# Patient Record
Sex: MALE | Employment: FULL TIME | URBAN - METROPOLITAN AREA
[De-identification: names, ages, dates, MRNs, and addresses within clinical notes are randomized per-mention and may not be internally consistent; named-entity substitution may affect disease eponyms.]

---

## 2017-01-04 ENCOUNTER — MEDICAL CORRESPONDENCE (OUTPATIENT)
Dept: TRANSPLANT | Facility: CLINIC | Age: 52
End: 2017-01-04

## 2017-01-08 DIAGNOSIS — I10 BENIGN ESSENTIAL HYPERTENSION: Primary | ICD-10-CM

## 2017-01-09 RX ORDER — AMLODIPINE BESYLATE 10 MG/1
TABLET ORAL
Qty: 30 TABLET | Refills: 11 | Status: SHIPPED | OUTPATIENT
Start: 2017-01-09 | End: 2017-05-26

## 2017-01-09 NOTE — TELEPHONE ENCOUNTER
Last Office Visit with Nephrologist:  11/15/16.  Medication refilled per Nephrology Clinic protocol.     Jen Alan RN

## 2017-01-25 ENCOUNTER — TELEPHONE (OUTPATIENT)
Dept: NEPHROLOGY | Facility: CLINIC | Age: 52
End: 2017-01-25

## 2017-01-25 NOTE — TELEPHONE ENCOUNTER
Left detailed voicemail reminding patient to have labs drawn. Per 12/12/16 encounter:   Let start him on calcitriol 0.25 mcg daily          Follow his pth and ca,phos next step will be either adding binder, sensipar or both               Jen Alan RN

## 2017-02-01 DIAGNOSIS — Z94.0 KIDNEY REPLACED BY TRANSPLANT: ICD-10-CM

## 2017-02-01 DIAGNOSIS — D84.9 IMMUNOSUPPRESSION (H): ICD-10-CM

## 2017-02-01 DIAGNOSIS — Z48.298 CARE AFTER ORGAN TRANSPLANT: ICD-10-CM

## 2017-02-01 DIAGNOSIS — Z94.0 KIDNEY TRANSPLANTED: ICD-10-CM

## 2017-02-01 LAB
PROT UR-MCNC: 0.92 G/L
PROT/CREAT 24H UR: 1.06 G/G CR (ref 0–0.2)
PTH-INTACT SERPL-MCNC: 719 PG/ML (ref 12–72)

## 2017-02-01 PROCEDURE — 80180 DRUG SCRN QUAN MYCOPHENOLATE: CPT | Performed by: FAMILY MEDICINE

## 2017-02-01 PROCEDURE — 82306 VITAMIN D 25 HYDROXY: CPT | Performed by: FAMILY MEDICINE

## 2017-02-01 PROCEDURE — 80069 RENAL FUNCTION PANEL: CPT | Performed by: FAMILY MEDICINE

## 2017-02-01 PROCEDURE — 83970 ASSAY OF PARATHORMONE: CPT | Performed by: FAMILY MEDICINE

## 2017-02-01 PROCEDURE — 36415 COLL VENOUS BLD VENIPUNCTURE: CPT | Performed by: FAMILY MEDICINE

## 2017-02-01 PROCEDURE — 80158 DRUG ASSAY CYCLOSPORINE: CPT | Performed by: FAMILY MEDICINE

## 2017-02-01 PROCEDURE — 84156 ASSAY OF PROTEIN URINE: CPT | Performed by: FAMILY MEDICINE

## 2017-02-02 LAB
ALBUMIN SERPL-MCNC: 4.3 G/DL (ref 3.4–5)
ANION GAP SERPL CALCULATED.3IONS-SCNC: 13 MMOL/L (ref 3–14)
BUN SERPL-MCNC: 46 MG/DL (ref 7–30)
CALCIUM SERPL-MCNC: 9.1 MG/DL (ref 8.5–10.1)
CHLORIDE SERPL-SCNC: 107 MMOL/L (ref 94–109)
CO2 SERPL-SCNC: 17 MMOL/L (ref 20–32)
CREAT SERPL-MCNC: 4.09 MG/DL (ref 0.66–1.25)
CYCLOSPORINE BLD LC/MS/MS-MCNC: 137 UG/L (ref 50–400)
GFR SERPL CREATININE-BSD FRML MDRD: 15 ML/MIN/1.7M2
GLUCOSE SERPL-MCNC: 116 MG/DL (ref 70–99)
PHOSPHATE SERPL-MCNC: 4 MG/DL (ref 2.5–4.5)
POTASSIUM SERPL-SCNC: 5.2 MMOL/L (ref 3.4–5.3)
SODIUM SERPL-SCNC: 137 MMOL/L (ref 133–144)
TME LAST DOSE: 200 H

## 2017-02-03 LAB
DEPRECATED CALCIDIOL+CALCIFEROL SERPL-MC: 32 UG/L (ref 20–75)
MYCOPHENOLATE SERPL LC/MS/MS-MCNC: 2.59 MG/L (ref 1–3.5)
MYCOPHENOLATE-G SERPL LC/MS/MS-MCNC: 306.5 MG/L (ref 30–95)
TME LAST DOSE: 200 H
VITAMIN D2 SERPL-MCNC: 15 UG/L
VITAMIN D3 SERPL-MCNC: 17 UG/L

## 2017-02-06 DIAGNOSIS — Z94.0 KIDNEY REPLACED BY TRANSPLANT: Primary | ICD-10-CM

## 2017-02-06 NOTE — TELEPHONE ENCOUNTER
Issue CYCLOSPORINE  Level  137   Notes Recorded by Deyvi Dick MD on 2/2/2017 at 6:40 PM  Can we aim at  on the CsA?  Can you verify if he is on sodium bicarb and if not he can start 650 mg po bid          Plan   Call Amadou Lewis confirm current dose of CYCLOSPORINE  100 mg twice per day   Confirm 12 hour trough level   If the above is accurate   Lower CYCLOSPORINE  75 mg twice per day     2nd issue    Review the need for bicarb tablets due to low serum bicarb     Repeat transplant  Labs in 2 weeks after dose change

## 2017-02-08 DIAGNOSIS — Z94.0 KIDNEY REPLACED BY TRANSPLANT: Primary | ICD-10-CM

## 2017-02-08 RX ORDER — CYCLOSPORINE 25 MG/1
75 CAPSULE, LIQUID FILLED ORAL 2 TIMES DAILY
Qty: 180 CAPSULE | Refills: 3 | Status: SHIPPED | OUTPATIENT
Start: 2017-02-08 | End: 2017-06-23

## 2017-02-08 RX ORDER — SODIUM BICARBONATE 650 MG/1
1300 TABLET ORAL 2 TIMES DAILY
Qty: 120 TABLET | Refills: 3 | Status: SHIPPED | OUTPATIENT
Start: 2017-02-08 | End: 2017-05-25

## 2017-02-08 RX ORDER — CYCLOSPORINE 100 MG/1
100 CAPSULE, LIQUID FILLED ORAL 2 TIMES DAILY
Qty: 60 CAPSULE | Refills: 3 | Status: CANCELLED | OUTPATIENT
Start: 2017-02-08

## 2017-02-08 NOTE — TELEPHONE ENCOUNTER
Call placed to patient: Patient states a good twelve hour trough and verbalize understanding that he will begin taking sodium bicarb 650 mg twice a day and decrease his CSA dose to 75 mg twice per day and repeat lab work in 2 weeks. Rx sent

## 2017-02-14 ENCOUNTER — OFFICE VISIT (OUTPATIENT)
Dept: NEPHROLOGY | Facility: CLINIC | Age: 52
End: 2017-02-14
Attending: INTERNAL MEDICINE
Payer: COMMERCIAL

## 2017-02-14 VITALS
WEIGHT: 170.8 LBS | HEART RATE: 89 BPM | SYSTOLIC BLOOD PRESSURE: 213 MMHG | HEIGHT: 67 IN | DIASTOLIC BLOOD PRESSURE: 114 MMHG | BODY MASS INDEX: 26.81 KG/M2 | RESPIRATION RATE: 16 BRPM | TEMPERATURE: 98.3 F

## 2017-02-14 DIAGNOSIS — I15.1 HYPERTENSION SECONDARY TO OTHER RENAL DISORDERS: Primary | ICD-10-CM

## 2017-02-14 DIAGNOSIS — L03.119 CELLULITIS AND ABSCESS OF LEG, EXCEPT FOOT: ICD-10-CM

## 2017-02-14 DIAGNOSIS — L02.419 CELLULITIS AND ABSCESS OF LEG, EXCEPT FOOT: ICD-10-CM

## 2017-02-14 PROCEDURE — 99213 OFFICE O/P EST LOW 20 MIN: CPT | Mod: ZF

## 2017-02-14 RX ORDER — CLONIDINE HYDROCHLORIDE 0.1 MG/1
0.1 TABLET ORAL 2 TIMES DAILY PRN
Qty: 60 TABLET | Refills: 1 | Status: ON HOLD | OUTPATIENT
Start: 2017-02-14 | End: 2018-11-20

## 2017-02-14 RX ORDER — CARVEDILOL 6.25 MG/1
12.5 TABLET ORAL 2 TIMES DAILY WITH MEALS
Qty: 180 TABLET | Refills: 3 | Status: SHIPPED | OUTPATIENT
Start: 2017-02-14 | End: 2017-07-11

## 2017-02-14 RX ORDER — AMOXICILLIN AND CLAVULANATE POTASSIUM 500; 125 MG/1; MG/1
1 TABLET, FILM COATED ORAL 2 TIMES DAILY
Qty: 20 TABLET | Refills: 0 | Status: SHIPPED | OUTPATIENT
Start: 2017-02-14 | End: 2017-05-25 | Stop reason: DRUGHIGH

## 2017-02-14 RX ORDER — DAPSONE 25 MG/1
50 TABLET ORAL DAILY
COMMUNITY
Start: 2015-09-14 | End: 2017-05-25

## 2017-02-14 ASSESSMENT — PAIN SCALES - GENERAL: PAINLEVEL: NO PAIN (0)

## 2017-02-14 NOTE — NURSING NOTE
"Chief Complaint   Patient presents with     RECHECK     Kidney follow up       Initial BP (!) 213/114  Pulse 89  Temp 98.3  F (36.8  C) (Oral)  Resp 16  Ht 1.702 m (5' 7\")  Wt 77.5 kg (170 lb 12.8 oz)  BMI 26.75 kg/m2 Estimated body mass index is 26.75 kg/(m^2) as calculated from the following:    Height as of this encounter: 1.702 m (5' 7\").    Weight as of this encounter: 77.5 kg (170 lb 12.8 oz).  Medication Reconciliation: complete    "

## 2017-02-14 NOTE — MR AVS SNAPSHOT
"              After Visit Summary   2/14/2017    Amadou Lewis    MRN: [id number]           Patient Information     Date Of Birth          1965        Visit Information        Provider Department      2/14/2017 2:00 PM Deyvi Dick MD [facility] Nephrology        Today's Diagnoses     Hypertension secondary to other renal disorders    -  1    Cellulitis and abscess of leg, except foot           Follow-ups after your visit        Who to contact     If you have questions or need follow up information about today's clinic visit or your schedule please contact [facility] NEPHROLOGY directly at [phone].  Normal or non-critical lab and imaging results will be communicated to you by Capzleshart, letter or phone within 4 business days after the clinic has received the results. If you do not hear from us within 7 days, please contact the clinic through Samanta Shoest or phone. If you have a critical or abnormal lab result, we will notify you by phone as soon as possible.  Submit refill requests through IMRSV or call your pharmacy and they will forward the refill request to us. Please allow 3 business days for your refill to be completed.          Additional Information About Your Visit        MyChart Information     IMRSV gives you secure access to your electronic health record. If you see a primary care provider, you can also send messages to your care team and make appointments. If you have questions, please call your primary care clinic.  If you do not have a primary care provider, please call [phone] and they will assist you.        Care EveryWhere ID     This is your Care EveryWhere ID. This could be used by other organizations to access your [facility] medical records  [id number]        Your Vitals Were     Pulse Temperature Respirations Height BMI (Body Mass Index)       89 98.3  F (36.8  C) (Oral) 16 1.702 m (5' 7\") 26.75 kg/m2        Blood Pressure from Last 3 Encounters:   02/14/17 (!) 213/114   11/15/16 " 164/84   09/28/16 139/78    Weight from Last 3 Encounters:   02/14/17 77.5 kg (170 lb 12.8 oz)   11/15/16 78 kg (172 lb)   09/28/16 77.1 kg (170 lb)              Today, you had the following     No orders found for display         Today's Medication Changes          These changes are accurate as of: 2/14/17  3:36 PM.  If you have any questions, ask your nurse or doctor.               Start taking these medicines.        Dose/Directions    amoxicillin-clavulanate 500-125 MG per tablet   Commonly known as:  AUGMENTIN   Used for:  Cellulitis and abscess of leg, except foot   Started by:  Deyvi Dick MD        Dose:  1 tablet   Take 1 tablet by mouth 2 times daily   Quantity:  20 tablet   Refills:  0       carvedilol 6.25 MG tablet   Commonly known as:  COREG   Used for:  Hypertension secondary to other renal disorders   Started by:  Deyvi Dick MD        Dose:  12.5 mg   Take 2 tablets (12.5 mg) by mouth 2 times daily (with meals)   Quantity:  180 tablet   Refills:  3       cloNIDine 0.1 MG tablet   Commonly known as:  CATAPRES   Used for:  Hypertension secondary to other renal disorders   Started by:  Deyvi Dick MD        Dose:  0.1 mg   Take 1 tablet (0.1 mg) by mouth 2 times daily as needed For SBP > 170   Quantity:  60 tablet   Refills:  1         These medicines have changed or have updated prescriptions.        Dose/Directions    cycloSPORINE modified 25 MG capsule   Commonly known as:  GENERIC EQUIVALENT   This may have changed:  Another medication with the same name was removed. Continue taking this medication, and follow the directions you see here.   Used for:  Kidney replaced by transplant   Changed by:  Anu Hanson RN        Dose:  75 mg   Take 3 capsules (75 mg) by mouth 2 times daily Total dose 75 mg twice a day   Quantity:  180 capsule   Refills:  3       insulin glargine 100 UNIT/ML injection   Commonly known as:  LANTUS   This may have changed:    - how much to take  - when to  take this   Used for:  Type 2 diabetes mellitus with diabetic chronic kidney disease (H)        Dose:  53 Units   Inject 53 Units Subcutaneous every 24 hours   Refills:  0            Where to get your medicines      These medications were sent to [id number] - [place], [place] - [address] AT [facility] [address] [place] [address]  [address], [place] [address]    Hours:  24-hours Phone:  [phone]     amoxicillin-clavulanate 500-125 MG per tablet    carvedilol 6.25 MG tablet    cloNIDine 0.1 MG tablet                Primary Care Provider Office Phone # Fax #    Masoud Valentin MD, [phone]       [address] [place] [address]        Thank you!     Thank you for choosing [facility] NEPHROLOGY  for your care. Our goal is always to provide you with excellent care. Hearing back from our patients is one way we can continue to improve our services. Please take a few minutes to complete the written survey that you may receive in the mail after your visit with us. Thank you!             Your Updated Medication List - Protect others around you: Learn how to safely use, store and throw away your medicines at www.disposemymeds.org.          This list is accurate as of: 2/14/17  3:36 PM.  Always use your most recent med list.                   Brand Name Dispense Instructions for use    amLODIPine 10 MG tablet    NORVASC    30 tablet    TAKE 1 TABLET BY MOUTH DAILY       amoxicillin-clavulanate 500-125 MG per tablet    AUGMENTIN    20 tablet    Take 1 tablet by mouth 2 times daily       blood glucose monitoring test strip    no brand specified    1 Box    Use to test blood sugar 4 times daily or as directed.       calcitRIOL 0.25 MCG capsule    ROCALTROL    90 capsule    Take 1 capsule (0.25 mcg) by mouth daily for 28 days       carvedilol 6.25 MG tablet    COREG    180 tablet    Take 2 tablets (12.5 mg) by mouth 2 times daily (with meals)        cloNIDine 0.1 MG tablet    CATAPRES    60 tablet    Take 1 tablet (0.1 mg) by mouth 2 times daily as needed For SBP > 170       cycloSPORINE modified 25 MG capsule    GENERIC EQUIVALENT    180 capsule    Take 3 capsules (75 mg) by mouth 2 times daily Total dose 75 mg twice a day       D3-50 46149 UNITS capsule   Generic drug:  cholecalciferol      Take 50,000 Units by mouth once a week       dapsone 25 MG tablet      Take 50 mg by mouth daily       HYDROcodone-acetaminophen 5-325 MG per tablet    NORCO     Take 2 tablets by mouth every 6 hours as needed for moderate to severe pain       insulin aspart 100 UNIT/ML injection    NovoLOG PEN     Inject 1-16 Units Subcutaneous At Bedtime Correction Scale - VERY HIGH INSULIN RESISTANCE DOSING    Do Not give Bedtime Correction Insulin if BG < 200.  For  - 209 give 1 units.  For  - 219 give 2 units.  For  - 229 give 3 units.  For  - 239 give 4 units.  For  - 249 give 5 units.  For  - 259 give 6 units.  For  - 269 give 7 units.  For  - 279 give 8 units.  For  - 289 give 9 units.       insulin glargine 100 UNIT/ML injection    LANTUS     Inject 53 Units Subcutaneous every 24 hours       mycophenolic acid EC tablet     240 tablet    Take 3 tablets twice per day 540 mg in morning and evening AT noon take 2 tabs 360 mg       OMEPRAZOLE PO      Take 20 mg by mouth as needed (Nausea)       predniSONE 5 MG tablet    DELTASONE    90 tablet    Take 1 tablet (5 mg) by mouth daily       sodium bicarbonate 650 MG tablet     120 tablet    Take 2 tablets (1,300 mg) by mouth 2 times daily

## 2017-02-14 NOTE — LETTER
2/14/2017       RE: Amadou Lewis  [address]  [facility] [address]     Dear Colleague,    Thank you for referring your patient, Amadou Lewis, to the [facility] NEPHROLOGY at [facility]. Please see a copy of my visit note below.    ASSESSMENT AND RECOMMENDATIONS:      1. End-stage renal disease due to polycystic kidneys and diabetic nephropathy status post living donor kidney transplant on 10/10/2013. Complicated by repeated bouts of rejection   2. Immunosuppression. currently on Csa and MPA  3. Hypertension: Not well controlled recently due to his ongoing grief related to the loss of his son, adjusted the medications by raising his coreg and adding clonidine as needed   4. He had a previous history of coronary artery disease and has previous stents. Follow up with cardiology.  5. Diabetic foot process with great toe nail loss will empirically cover with Augmentin   I referred Mr. Lewis for re-listing.       REASON FOR VISIT:      The patient is here for followup after recent hospital admission for rejection.      HISTORY OF PRESENT ILLNESS:      Amadou Lewis is a 49 year old gentleman with ESRD due to PKD +/- diabetic nephropathy, he started on dialysis on 5/2010. He is S/p LDKT 10/10/13 with immediate graft function. His initial baseline creatinine 0.8-0.9 mg/dl. Other PMH is significant for CAD, S/P drug-eluting stent 2010, 2011, last stress test June 2013 ([facility]) was negative, EF 45%.  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 He ishes to change his everolimus to Csa. He had some hemoptysis and had upper GI endoscopy on 3/23/16 at [facility] which showed one superficial esophageal ulcer with no stigmata of bleeding and pathology was negative for malignancy, HSV and CMV.     Since his last visit,  he lost his son to overdosing. He is coping fair. Had a foot trauma and lost his great toe nail.            Transplant Hx:       Tx: LDKT  Date: 10/10/13       Present Maintenance IS: Cyclosporine and Mycophenolate mofetil       Baseline Creatinine: 3 to 3.5 mg/dL       Recent DSA: No         Biopsy: Yes, Jan 2016 : Plasma cell rich Acute cellular rejection.    ROS:    A comprehensive review of systems was obtained and negative, except as noted in the HPI or PMH.    Active Medical Problems:  Patient Active Problem List   Diagnosis     Type II diabetes mellitus with renal manifestations (H)     Diabetes mellitus with background retinopathy (H)     Polycystic kidney     NONSPECIFIC MEDICAL HISTORY     Coronary artery disease     Retinopathy     Hyperlipidemia LDL goal <70     Chronic systolic heart failure (H)     Premature ventricular contractions (PVCs) (VPCs)     Kidney replaced by transplant     Immunosuppressed status (H)     Kidney transplant rejection     Hypomagnesemia     Hyperglycemia     Hypertension     Anemia in chronic renal disease     Care after organ transplant     Personal Hx:  Social History     Social History     Marital status: Single     Spouse name: N/A     Number of children: N/A     Years of education: N/A     Occupational History     Not on file.     Social History Main Topics     Smoking status: Never Smoker     Smokeless tobacco: Never Used     Alcohol use No     Drug use: No     Sexual activity: Yes     Partners: Female     Other Topics Concern     Not on file     Social History Narrative       Allergies:  Allergies   Allergen Reactions     No Known Allergies        Medications:  Current Outpatient Prescriptions   Medication Sig Dispense Refill     dapsone 25 MG tablet Take 50 mg by mouth daily       carvedilol (COREG) 6.25 MG tablet Take 2 tablets (12.5 mg) by mouth 2 times daily (with meals) 180 tablet 3     cloNIDine (CATAPRES) 0.1 MG tablet Take 1 tablet (0.1 mg) by mouth 2 times daily  as needed For SBP > 170 60 tablet 1     amoxicillin-clavulanate (AUGMENTIN) 500-125 MG per tablet Take 1 tablet by mouth 2 times daily 20 tablet 0     cycloSPORINE modified (GENERIC EQUIVALENT) 25 MG capsule Take 3 capsules (75 mg) by mouth 2 times daily Total dose 75 mg twice a day 180 capsule 3     sodium bicarbonate 650 MG tablet Take 2 tablets (1,300 mg) by mouth 2 times daily 120 tablet 3     amLODIPine (NORVASC) 10 MG tablet TAKE 1 TABLET BY MOUTH DAILY 30 tablet 11     calcitRIOL (ROCALTROL) 0.25 MCG capsule Take 1 capsule (0.25 mcg) by mouth daily for 28 days 90 capsule 3     predniSONE (DELTASONE) 5 MG tablet Take 1 tablet (5 mg) by mouth daily 90 tablet 3     MYFORTIC 180 MG PO EC TABLET Take 3 tablets twice per day 540 mg in morning and evening AT noon take 2 tabs 360 mg 240 tablet 3     insulin aspart (NOVOLOG PEN) 100 UNIT/ML soln Inject 1-16 Units Subcutaneous At Bedtime Correction Scale - VERY HIGH INSULIN RESISTANCE DOSING     Do Not give Bedtime Correction Insulin if BG < 200.   For  - 209 give 1 units.   For  - 219 give 2 units.   For  - 229 give 3 units.   For  - 239 give 4 units.   For  - 249 give 5 units.   For  - 259 give 6 units.   For  - 269 give 7 units.   For  - 279 give 8 units.   For  - 289 give 9 units.       insulin glargine (LANTUS) 100 UNIT/ML PEN Inject 53 Units Subcutaneous every 24 hours (Patient taking differently: Inject 38 Units Subcutaneous At Bedtime )       blood glucose monitoring (NO BRAND SPECIFIED) test strip Use to test blood sugar 4 times daily or as directed. 1 Box prn     HYDROcodone-acetaminophen (NORCO) 5-325 MG per tablet Take 2 tablets by mouth every 6 hours as needed for moderate to severe pain       OMEPRAZOLE PO Take 20 mg by mouth as needed (Nausea)       cholecalciferol (D3-50) 22097 UNITS capsule Take 50,000 Units by mouth once a week         Vitals:    BP (!) 213/114  Pulse 89  Temp 98.3  F (36.8  C)  "(Oral)  Resp 16  Ht 1.702 m (5' 7\")  Wt 77.5 kg (170 lb 12.8 oz)  BMI 26.75 kg/m2    Repeat BP was 180/90    Exam:   GENERAL APPEARANCE: alert and no distress  HENT: mouth without ulcers or lesions  LYMPHATICS: no cervical nodes  RESP: lungs clear to auscultation   CV: regular rhythm, normal rate, no rub, no murmur  ABDOMEN:  soft, nontender, +BS  EDEMA: no LE edema bilaterally  SKIN: no rash      Results:  Reviewed       Sincerely,    Deyvi Dick MD  "

## 2017-02-20 NOTE — PROGRESS NOTES
ASSESSMENT AND RECOMMENDATIONS:      1. End-stage renal disease due to polycystic kidneys and diabetic nephropathy status post living donor kidney transplant on 10/10/2013. Complicated by repeated bouts of rejection   2. Immunosuppression. currently on Csa and MPA  3. Hypertension: Not well controlled recently due to his ongoing grief related to the loss of his son, adjusted the medications by raising his coreg and adding clonidine as needed   4. He had a previous history of coronary artery disease and has previous stents. Follow up with cardiology.  5. Diabetic foot process with great toe nail loss will empirically cover with Augmentin   I referred Mr. Lewis for re-listing.       REASON FOR VISIT:      The patient is here for followup after recent hospital admission for rejection.      HISTORY OF PRESENT ILLNESS:      Amadou Lewis is a 49 year old gentleman with ESRD due to PKD +/- diabetic nephropathy, he started on dialysis on 5/2010. He is S/p LDKT 10/10/13 with immediate graft function. His initial baseline creatinine 0.8-0.9 mg/dl. Other PMH is significant for CAD, S/P drug-eluting stent 2010, 2011, last stress test June 2013 ([facility]) was negative, EF 45%.  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 He ishes to change his everolimus to Csa. He had some hemoptysis and had upper GI endoscopy on 3/23/16 at [facility] which showed one superficial esophageal ulcer with no stigmata of bleeding and pathology was negative for malignancy, HSV and CMV.     Since his last visit, he lost his son to overdosing. He is coping fair. Had a foot trauma and lost his great toe nail.            Transplant Hx:       Tx: LDKT  Date: 10/10/13       Present Maintenance IS: Cyclosporine and Mycophenolate mofetil       Baseline Creatinine: 3 to 3.5 mg/dL       Recent DSA:  No         Biopsy: Yes, Jan 2016 : Plasma cell rich Acute cellular rejection.    ROS:    A comprehensive review of systems was obtained and negative, except as noted in the HPI or PMH.    Active Medical Problems:  Patient Active Problem List   Diagnosis     Type II diabetes mellitus with renal manifestations (H)     Diabetes mellitus with background retinopathy (H)     Polycystic kidney     NONSPECIFIC MEDICAL HISTORY     Coronary artery disease     Retinopathy     Hyperlipidemia LDL goal <70     Chronic systolic heart failure (H)     Premature ventricular contractions (PVCs) (VPCs)     Kidney replaced by transplant     Immunosuppressed status (H)     Kidney transplant rejection     Hypomagnesemia     Hyperglycemia     Hypertension     Anemia in chronic renal disease     Care after organ transplant     Personal Hx:  Social History     Social History     Marital status: Single     Spouse name: N/A     Number of children: N/A     Years of education: N/A     Occupational History     Not on file.     Social History Main Topics     Smoking status: Never Smoker     Smokeless tobacco: Never Used     Alcohol use No     Drug use: No     Sexual activity: Yes     Partners: Female     Other Topics Concern     Not on file     Social History Narrative       Allergies:  Allergies   Allergen Reactions     No Known Allergies        Medications:  Current Outpatient Prescriptions   Medication Sig Dispense Refill     dapsone 25 MG tablet Take 50 mg by mouth daily       carvedilol (COREG) 6.25 MG tablet Take 2 tablets (12.5 mg) by mouth 2 times daily (with meals) 180 tablet 3     cloNIDine (CATAPRES) 0.1 MG tablet Take 1 tablet (0.1 mg) by mouth 2 times daily as needed For SBP > 170 60 tablet 1     amoxicillin-clavulanate (AUGMENTIN) 500-125 MG per tablet Take 1 tablet by mouth 2 times daily 20 tablet 0     cycloSPORINE modified (GENERIC EQUIVALENT) 25 MG capsule Take 3 capsules (75 mg) by mouth 2 times daily Total dose 75 mg twice a  "day 180 capsule 3     sodium bicarbonate 650 MG tablet Take 2 tablets (1,300 mg) by mouth 2 times daily 120 tablet 3     amLODIPine (NORVASC) 10 MG tablet TAKE 1 TABLET BY MOUTH DAILY 30 tablet 11     calcitRIOL (ROCALTROL) 0.25 MCG capsule Take 1 capsule (0.25 mcg) by mouth daily for 28 days 90 capsule 3     predniSONE (DELTASONE) 5 MG tablet Take 1 tablet (5 mg) by mouth daily 90 tablet 3     MYFORTIC 180 MG PO EC TABLET Take 3 tablets twice per day 540 mg in morning and evening AT noon take 2 tabs 360 mg 240 tablet 3     insulin aspart (NOVOLOG PEN) 100 UNIT/ML soln Inject 1-16 Units Subcutaneous At Bedtime Correction Scale - VERY HIGH INSULIN RESISTANCE DOSING     Do Not give Bedtime Correction Insulin if BG < 200.   For  - 209 give 1 units.   For  - 219 give 2 units.   For  - 229 give 3 units.   For  - 239 give 4 units.   For  - 249 give 5 units.   For  - 259 give 6 units.   For  - 269 give 7 units.   For  - 279 give 8 units.   For  - 289 give 9 units.       insulin glargine (LANTUS) 100 UNIT/ML PEN Inject 53 Units Subcutaneous every 24 hours (Patient taking differently: Inject 38 Units Subcutaneous At Bedtime )       blood glucose monitoring (NO BRAND SPECIFIED) test strip Use to test blood sugar 4 times daily or as directed. 1 Box prn     HYDROcodone-acetaminophen (NORCO) 5-325 MG per tablet Take 2 tablets by mouth every 6 hours as needed for moderate to severe pain       OMEPRAZOLE PO Take 20 mg by mouth as needed (Nausea)       cholecalciferol (D3-50) 93574 UNITS capsule Take 50,000 Units by mouth once a week         Vitals:    BP (!) 213/114  Pulse 89  Temp 98.3  F (36.8  C) (Oral)  Resp 16  Ht 1.702 m (5' 7\")  Wt 77.5 kg (170 lb 12.8 oz)  BMI 26.75 kg/m2    Repeat BP was 180/90    Exam:   GENERAL APPEARANCE: alert and no distress  HENT: mouth without ulcers or lesions  LYMPHATICS: no cervical nodes  RESP: lungs clear to auscultation   CV: regular " rhythm, normal rate, no rub, no murmur  ABDOMEN:  soft, nontender, +BS  EDEMA: no LE edema bilaterally  SKIN: no rash      Results:  Reviewed

## 2017-02-22 DIAGNOSIS — Z94.0 KIDNEY REPLACED BY TRANSPLANT: ICD-10-CM

## 2017-02-22 RX ORDER — MYCOPHENOLIC ACID 180 MG/1
TABLET, DELAYED RELEASE ORAL
Qty: 240 EACH | Refills: 3 | Status: SHIPPED | OUTPATIENT
Start: 2017-02-22 | End: 2017-05-25 | Stop reason: DRUGHIGH

## 2017-02-26 ENCOUNTER — TELEPHONE (OUTPATIENT)
Dept: TRANSPLANT | Facility: CLINIC | Age: 52
End: 2017-02-26

## 2017-02-26 NOTE — TELEPHONE ENCOUNTER
Follow up post kidney transplant   (relisting for 2nd kidney transplant  )   Sent message to transplant shedfrederic to set up 6 month appointment  With Dr Dick  Please call mail/update EPIC orders for once per month labs

## 2017-02-26 NOTE — LETTER
[facility]  Room 2[address]200  [facility],  [address]  [address]  Tel [phone]  Toll Free [phone]                OUTPATIENT LABORATORY TEST ORDER    Patient Name: Amadou Lewis  Transplant Date: 10/10/2013 (Kidney)  YOB: 1965  Issue Date & Time: 2/27/2017 11:12 AM    [facility] MR:  [id number]  Exp. Date (1 year after date issued)      Diagnoses: Kidney Transplant (ICD-10 V42.0)   Long term use of medications (ICD-10  Z58.69)     Lab results to be available on the same day drawn.   Patient should release information to the [facility], [facility].  Please fax to the Transplant Center at [phone].    Monthly    Hemogram and Platelet   Basic Metabolic Panel (Sodium, Potassium, Chloride, CO2, Creatinine, Urea Nitrogen, Glucose,            Calcium)           CSA mail to [facility] - [facility] mailers and instructions provided by the patient)                   Every 6 Months Due:                  BK (Polyoma Virus) PCR Quantitative - Plasma                                            Urine for protein/creatinine    Yearly:   PRA/DSA level (mailers provided by the patient)         If you have any questions, please call [facility] at [phone] or [phone].    Please fax labs to [phone]    Deyvi Dick

## 2017-02-27 NOTE — TELEPHONE ENCOUNTER
Call placed to patient: Patient reminded to continue with monthly transplant lab work. Patient verbalizes understanding. Updated orders mailed to patient

## 2017-03-02 DIAGNOSIS — Z94.0 KIDNEY REPLACED BY TRANSPLANT: ICD-10-CM

## 2017-03-02 LAB
ERYTHROCYTE [DISTWIDTH] IN BLOOD BY AUTOMATED COUNT: 12.7 % (ref 10–15)
HCT VFR BLD AUTO: 37.4 % (ref 40–53)
HGB BLD-MCNC: 12.1 G/DL (ref 13.3–17.7)
MCH RBC QN AUTO: 28.9 PG (ref 26.5–33)
MCHC RBC AUTO-ENTMCNC: 32.4 G/DL (ref 31.5–36.5)
MCV RBC AUTO: 89 FL (ref 78–100)
PLATELET # BLD AUTO: 242 10E9/L (ref 150–450)
RBC # BLD AUTO: 4.19 10E12/L (ref 4.4–5.9)
WBC # BLD AUTO: 5.7 10E9/L (ref 4–11)

## 2017-03-02 PROCEDURE — 36415 COLL VENOUS BLD VENIPUNCTURE: CPT | Performed by: FAMILY MEDICINE

## 2017-03-02 PROCEDURE — 85027 COMPLETE CBC AUTOMATED: CPT | Performed by: FAMILY MEDICINE

## 2017-03-02 PROCEDURE — 80048 BASIC METABOLIC PNL TOTAL CA: CPT | Performed by: FAMILY MEDICINE

## 2017-03-02 PROCEDURE — 80158 DRUG ASSAY CYCLOSPORINE: CPT | Performed by: FAMILY MEDICINE

## 2017-03-03 ENCOUNTER — TELEPHONE (OUTPATIENT)
Dept: TRANSPLANT | Facility: CLINIC | Age: 52
End: 2017-03-03

## 2017-03-03 LAB
ANION GAP SERPL CALCULATED.3IONS-SCNC: 10 MMOL/L (ref 3–14)
BUN SERPL-MCNC: 52 MG/DL (ref 7–30)
CALCIUM SERPL-MCNC: 9.1 MG/DL (ref 8.5–10.1)
CHLORIDE SERPL-SCNC: 106 MMOL/L (ref 94–109)
CO2 SERPL-SCNC: 22 MMOL/L (ref 20–32)
CREAT SERPL-MCNC: 3.92 MG/DL (ref 0.66–1.25)
CYCLOSPORINE BLD LC/MS/MS-MCNC: 82 UG/L (ref 50–400)
GFR SERPL CREATININE-BSD FRML MDRD: 16 ML/MIN/1.7M2
GLUCOSE SERPL-MCNC: 47 MG/DL (ref 70–99)
POTASSIUM SERPL-SCNC: 5.1 MMOL/L (ref 3.4–5.3)
SODIUM SERPL-SCNC: 138 MMOL/L (ref 133–144)
TME LAST DOSE: NORMAL H

## 2017-03-03 NOTE — TELEPHONE ENCOUNTER
DATE:  3/3/2017   TIME OF RECEIPT FROM LAB:  09:13  LAB TEST:  Glucose  LAB VALUE:  47  RESULTS GIVEN WITH READ-BACK TO (PROVIDER):  Glenis Ko RN    TIME LAB VALUE REPORTED TO PROVIDER:   09:19

## 2017-03-30 DIAGNOSIS — D84.9 IMMUNOSUPPRESSION (H): ICD-10-CM

## 2017-03-30 DIAGNOSIS — Z48.298 CARE AFTER ORGAN TRANSPLANT: ICD-10-CM

## 2017-03-30 DIAGNOSIS — Z94.0 KIDNEY TRANSPLANTED: ICD-10-CM

## 2017-03-30 PROCEDURE — 80180 DRUG SCRN QUAN MYCOPHENOLATE: CPT | Performed by: FAMILY MEDICINE

## 2017-03-30 PROCEDURE — 80069 RENAL FUNCTION PANEL: CPT | Performed by: FAMILY MEDICINE

## 2017-03-30 PROCEDURE — 36415 COLL VENOUS BLD VENIPUNCTURE: CPT | Performed by: FAMILY MEDICINE

## 2017-03-30 PROCEDURE — 80158 DRUG ASSAY CYCLOSPORINE: CPT | Performed by: FAMILY MEDICINE

## 2017-03-31 ENCOUNTER — TELEPHONE (OUTPATIENT)
Dept: TRANSPLANT | Facility: CLINIC | Age: 52
End: 2017-03-31

## 2017-03-31 DIAGNOSIS — Z94.0 KIDNEY REPLACED BY TRANSPLANT: Primary | ICD-10-CM

## 2017-03-31 LAB
ALBUMIN SERPL-MCNC: 3.7 G/DL (ref 3.4–5)
ANION GAP SERPL CALCULATED.3IONS-SCNC: 9 MMOL/L (ref 3–14)
BUN SERPL-MCNC: 56 MG/DL (ref 7–30)
CALCIUM SERPL-MCNC: 8.9 MG/DL (ref 8.5–10.1)
CHLORIDE SERPL-SCNC: 101 MMOL/L (ref 94–109)
CO2 SERPL-SCNC: 23 MMOL/L (ref 20–32)
CREAT SERPL-MCNC: 4.36 MG/DL (ref 0.66–1.25)
CYCLOSPORINE BLD LC/MS/MS-MCNC: 63 UG/L (ref 50–400)
GFR SERPL CREATININE-BSD FRML MDRD: 14 ML/MIN/1.7M2
GLUCOSE SERPL-MCNC: 322 MG/DL (ref 70–99)
PHOSPHATE SERPL-MCNC: 3.6 MG/DL (ref 2.5–4.5)
POTASSIUM SERPL-SCNC: 5.2 MMOL/L (ref 3.4–5.3)
SODIUM SERPL-SCNC: 133 MMOL/L (ref 133–144)
TME LAST DOSE: NORMAL H

## 2017-03-31 NOTE — TELEPHONE ENCOUNTER
CSA level 63   Creatine 4.36  Previous levels within goal  Please Recheck CSA levels and BMP  Encourage hydration, diarrhea?, check if feeling sick

## 2017-03-31 NOTE — TELEPHONE ENCOUNTER
Call placed to patient: No answer voice message left requesting a call back to discuss general health, lab results and hydration status. Will try back

## 2017-03-31 NOTE — LETTER
PHYSICIAN ORDERS      DATE & TIME ISSUED: April 3, 2017 11:07 AM  PATIENT NAME: Aamdou Lewis   : 1965     [facility] MR#  [id number]     DIAGNOSIS:  Kidney Transplant  ICD-10 CODE: Z94.0     Please recheck the following lab work  BMP   Cyclosporine Level    Any questions please call: [phone]    Please fax these results to [phone].      Deyvi Dick

## 2017-04-03 LAB
MYCOPHENOLATE SERPL LC/MS/MS-MCNC: 1.99 MG/L (ref 1–3.5)
MYCOPHENOLATE-G SERPL LC/MS/MS-MCNC: >200 MG/L (ref 30–95)
TME LAST DOSE: ABNORMAL H

## 2017-04-03 NOTE — TELEPHONE ENCOUNTER
F\U call placed to patient: Patient confirms a good twelve hour lab draw. Patient state for the past couple of weeks he' been feeling sick. He was urinated blood and having stomach and side pains. Pain denied scheduling an appointment or going to the ER. Patient states that the blood in his urine has subsided however he's still having side and stomach pains. Patient thinks this is r\t his cyst rupturing. Still no appointment scheduled. Abbynet verbalize understanding to increase fluid intake to 2-3 L a day and repeat labs this week

## 2017-04-03 NOTE — TELEPHONE ENCOUNTER
Problem: Recommend UA/UC and patient be seen in ED to follow up with hematuria and persistent pain.

## 2017-04-04 ENCOUNTER — TELEPHONE (OUTPATIENT)
Dept: TRANSPLANT | Facility: CLINIC | Age: 52
End: 2017-04-04

## 2017-04-04 DIAGNOSIS — Z94.0 KIDNEY TRANSPLANTED: Primary | ICD-10-CM

## 2017-04-04 DIAGNOSIS — T86.10 COMPLICATIONS, KIDNEY TRANSPLANT: ICD-10-CM

## 2017-04-04 NOTE — TELEPHONE ENCOUNTER
Call placed to patient: Patient verbalize understanding to contact the imaging dept to schedule a renal ultrasound [phone].

## 2017-04-04 NOTE — TELEPHONE ENCOUNTER
----- Message from Deyvi Dick MD sent at 4/3/2017  8:21 PM CDT -----  Regarding: RE: Pain over graft  Needs usual transplant labs and u/s of the transplant   ----- Message -----     From: Sailaja Ignacio RN     Sent: 4/3/2017  12:38 PM       To: Deyvi Dick MD  Subject: Pain over graft                                  Pt c/o pain over graft site with a hematuria (pt currently states hematuria is resolved).  We are checking UA, do you want any other labs drawn, US?    Per Dr Dick, he would like patient to have renal US, CBC/BMP and UA/UC.  Orders are in, could you ask patient where is wants to go for these?  If requesting outside facility, please fax orders.

## 2017-04-05 ENCOUNTER — TELEPHONE (OUTPATIENT)
Dept: TRANSPLANT | Facility: CLINIC | Age: 52
End: 2017-04-05

## 2017-05-01 ENCOUNTER — TELEPHONE (OUTPATIENT)
Dept: TRANSPLANT | Facility: CLINIC | Age: 52
End: 2017-05-01

## 2017-05-01 DIAGNOSIS — Z76.82 ORGAN TRANSPLANT CANDIDATE: Primary | ICD-10-CM

## 2017-05-01 NOTE — LETTER
May 17, 2017    Demario Bragg  [address] [place] [address]      Dear Mr. Bragg,    Attached is your Pre Kidney and Pancreas Evaluation schedule on May 31, 2017 and returning on August 30, 2017. Please feel free to contact me at [phone], if you have any question.      Sincerely,   Becca MENDEZ    CC:Lizette REDDY                                                    [facility] [address]  [address]      EVALUATION SCHEDULE: KIDNEY/PANCREAS   TRANSPLANT SLOT 4 EVAL      Patient:   DEMARIO BRAGG  MR#:    [id number]  Coordinator:  Lizette REDDY     [phone]  :   Becca MENDEZ     [phone]  Location[facility]   [facility]  Date:    May 31, 2017 & August 30, 2017      This is your evaluation schedule, please follow dates and times.  You  will receive reminder phone calls for other tests, but please follow this  schedule only!  If you have any questions about dates and times,  please call us on number listed above.  Thank you, Transplant Clinic.     NO FOOD or DRINK AFTER MIDNIGHT  (You may only have small amounts of water)    Day/Date:    Wednesday, May 31, 2017  Time Location Activity   6:30a.m. [facility]  Imaging and Lab Testing  1st floor Blood tests (fasting, PLEASE)   7:15a.m. BREAKFAST     7:30a.m. [facility]  Transplant Services  3rd floor; Clinic 3A Check in & go thru evaluation schedule for the day, get vitals & medication records   7:50 - 9:00a.m. [facility]  Transplant Services  3rd floor; Clinic 3A Pre-transplant class   9:00a.m. [facility]  Transplant Services  3rd floor; Clinic 3A; Consult Room Appointment with either Hermila or Ethel,  Transplant    9:45a.m. [facility]  Transplant Services  3rd floor; Clinic 3A Meet with Lizette  MAUREEN,  Transplant Coordinator   10:00a.m. [facility]  Transplant Services  3rd floor; Clinic 3A Appointment with Dr. Jordan,   Transplant Surgeon   10:30a.m. [facility]  Transplant Services  3rd floor; Clinic 3A; Consult Room Appointment with Claire Cruz,  Registered Dietitian   11:00a.m.-  11:15a.m. [facility]  Transplant Services  3rd floor; Clinic 3A; Consult Room Research    11:15a.m. [facility]  Transplant Services  3rd floor; Clinic 3B Appointment with Dr. Miller,   Transplant Nephrologist   12NOON LUNCH    1:15p.m. [facility]  Imaging and Lab Testing  1st floor 2nd ABO blood draw - confirmation of 1st draw    PLEASE TAKE SHUTTLE GOING TO [facility]    2:30p.m. [facility] Waiting Room  (2nd floor [facility]) Chest X-ray    3:00p.m. [facility] Waiting Room  (2nd floor [facility]) EKG   3:30p.m. [facility] Waiting Room  (2nd floor [facility]) Echocardiogram       NO FOOD or DRINK AFTER MIDNIGHT  (You may only have small amounts of water)    Day/Date:   Wednesday, August 30, 2017   Time Location Activity   8:30a.m. [facility]  Imaging and Lab Testing  1st floor Aorta/Ivc/Iliac Ultra Sound = NO FOOD or DRINK AFTER MIDNIGHT!!     9:30a.m. BREAKFAST    10:00a.m. [facility]  Cardiology/Heart Care Clinic  3rd floor; Clinic 3L Appointment with Dr. Will,  Cardiology

## 2017-05-01 NOTE — TELEPHONE ENCOUNTER
Dr. Luevano reviewed his case last week at 4/27/2017 meeting.  He requests Mr. Lewis be offered consult with Dr. Luevano to discuss wait list options and HP situation.  Mr. Lewis can see Bassem  before full evaluation or schedule full eval.  His choice. Order to Becca for consult Bassem and coordinator appt. lorraine

## 2017-05-03 ENCOUNTER — TELEPHONE (OUTPATIENT)
Dept: TRANSPLANT | Facility: CLINIC | Age: 52
End: 2017-05-03

## 2017-05-03 NOTE — TELEPHONE ENCOUNTER
Janette, HP Representative called to discuss the  referral on Amadou Lewis.   She will call back after Dr. Luevano appt on 5/15/2017.    Sean knows to attend Appt with Dr. Barrera for consult only on 5/15/2017 to review his first transplant and current kidney function; INS situation whether he wants to do full evaluation at [facility].

## 2017-05-04 DIAGNOSIS — Z94.0 KIDNEY TRANSPLANTED: ICD-10-CM

## 2017-05-04 PROCEDURE — 36415 COLL VENOUS BLD VENIPUNCTURE: CPT | Performed by: INTERNAL MEDICINE

## 2017-05-04 PROCEDURE — 80180 DRUG SCRN QUAN MYCOPHENOLATE: CPT | Performed by: INTERNAL MEDICINE

## 2017-05-04 PROCEDURE — 80048 BASIC METABOLIC PNL TOTAL CA: CPT | Performed by: INTERNAL MEDICINE

## 2017-05-05 LAB
ANION GAP SERPL CALCULATED.3IONS-SCNC: 8 MMOL/L (ref 3–14)
BUN SERPL-MCNC: 69 MG/DL (ref 7–30)
CALCIUM SERPL-MCNC: 9.1 MG/DL (ref 8.5–10.1)
CHLORIDE SERPL-SCNC: 105 MMOL/L (ref 94–109)
CO2 SERPL-SCNC: 23 MMOL/L (ref 20–32)
CREAT SERPL-MCNC: 4.71 MG/DL (ref 0.66–1.25)
GFR SERPL CREATININE-BSD FRML MDRD: 13 ML/MIN/1.7M2
GLUCOSE SERPL-MCNC: 126 MG/DL (ref 70–99)
POTASSIUM SERPL-SCNC: 5.1 MMOL/L (ref 3.4–5.3)
SODIUM SERPL-SCNC: 136 MMOL/L (ref 133–144)

## 2017-05-08 ENCOUNTER — TELEPHONE (OUTPATIENT)
Dept: NEPHROLOGY | Facility: CLINIC | Age: 52
End: 2017-05-08

## 2017-05-08 DIAGNOSIS — Q61.3 POLYCYSTIC KIDNEY: Primary | ICD-10-CM

## 2017-05-08 DIAGNOSIS — Z94.0 KIDNEY REPLACED BY TRANSPLANT: ICD-10-CM

## 2017-05-08 NOTE — TELEPHONE ENCOUNTER
Per Dr. Dick:  Can you reach out to Mr. Lewis and assess what modality he may be interested in and his access situation. Please add on any CKD labs he may need before his visit   Thanks     Updated patient. Said he has a left fistula with no issues. Would go back to hemodialysis if needed. Notes occasional nausea and shortness of breath with exertion, but otherwise denied symptoms. Update sent to Dr. Dick.    Jen Alan RN

## 2017-05-09 LAB
MYCOPHENOLATE SERPL LC/MS/MS-MCNC: 2.97 MG/L (ref 1–3.5)
MYCOPHENOLATE-G SERPL LC/MS/MS-MCNC: >200 MG/L (ref 30–95)
TME LAST DOSE: ABNORMAL H

## 2017-05-15 ENCOUNTER — OFFICE VISIT (OUTPATIENT)
Dept: TRANSPLANT | Facility: CLINIC | Age: 52
End: 2017-05-15
Attending: SURGERY
Payer: COMMERCIAL

## 2017-05-15 ENCOUNTER — ALLIED HEALTH/NURSE VISIT (OUTPATIENT)
Dept: TRANSPLANT | Facility: CLINIC | Age: 52
End: 2017-05-15
Attending: SURGERY
Payer: COMMERCIAL

## 2017-05-15 VITALS
HEART RATE: 89 BPM | TEMPERATURE: 97.8 F | SYSTOLIC BLOOD PRESSURE: 188 MMHG | HEIGHT: 67 IN | WEIGHT: 169.6 LBS | OXYGEN SATURATION: 99 % | RESPIRATION RATE: 16 BRPM | DIASTOLIC BLOOD PRESSURE: 95 MMHG | BODY MASS INDEX: 26.62 KG/M2

## 2017-05-15 DIAGNOSIS — Z76.82 ORGAN TRANSPLANT CANDIDATE: ICD-10-CM

## 2017-05-15 DIAGNOSIS — Z76.82 ORGAN TRANSPLANT CANDIDATE: Primary | ICD-10-CM

## 2017-05-15 DIAGNOSIS — T86.10 COMPLICATIONS, KIDNEY TRANSPLANT: Primary | ICD-10-CM

## 2017-05-15 DIAGNOSIS — E11.9 DIABETES MELLITUS, TYPE 2 (H): Primary | ICD-10-CM
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
## 2017-05-15 NOTE — PROGRESS NOTES
Clinic appt with Dr. Luevano today at 2:45pm.     SHREE Bauer saw Dr. Luevano with Mr. Lewis to discuss his KP evaluation and $ coverage.      Dr. Luevano call  Arpita Hennessy, $  on speaker / conference call with Mr. Lewis in the room.    Arpita Hennessy sent Letter of HP coverage until November to Mr. Lewis and Lizette in email.    Currently Mr. Lewis has coverage for a KP evaluation at [facility].  Since he is pre dialysis, Dr. Luevano advise he be scheduled for KP as soon as Mr. Lewis can do it, per his life and schedule.      I sent note to Becca and Sandy to call Mr. Lewis and offer KP evaluation.

## 2017-05-15 NOTE — MR AVS SNAPSHOT
After Visit Summary   5/15/2017    Amadou Lewis    MRN: [id number]           Patient Information     Date Of Birth          1965        Visit Information        Provider Department      5/15/2017 2:45 PM Monique Luevano MD [facility]        Today's Diagnoses     Organ transplant candidate    -  1       Follow-ups after your visit        Your next 10 appointments already scheduled     May 15, 2017  4:00 PM CDT   LAB with  LAB   [facility] ([facility])    [address] [place] [id number] [phone]           Patient must bring picture ID.  Patient should be prepared to give a urine specimen  Please do not eat 10-12 hours before your appointment if you are coming in fasting for labs on lipids, cholesterol, or glucose (sugar).  Pregnant women should follow their Care Team instructions. Water with medications is okay. Do not drink coffee or other fluids.   If you have concerns about taking  your medications, please ask at office or if scheduling via YooLottohart, send a message by clicking on Secure Messaging, Message Your Care Team.            May 25, 2017  2:15 PM CDT   (Arrive by 1:45 PM)   Return Kidney Transplant with Deyvi Dick MD   [facility] ([facility])    [address] [place] [id number] [phone]            Sep 26, 2017  3:15 PM CDT   (Arrive by 2:45 PM)   Return Kidney Transplant with Deyvi Dick MD   [facility] ([facility])    [address] [place] [id number] [phone]              Future tests that were ordered for you today     Open Standing Orders        Priority Remaining Interval Expires Ordered    PRA Single Antigen IgG Antibody Routine 4/4  5/15/2018 5/15/2017            Who to contact     If you have questions or need follow up information about today's clinic  "visit or your schedule please contact [facility]ANSPL[facility] directly at [phone].  Normal or non-critical lab and imaging results will be communicated to you by Afluentahart, letter or phone within 4 business days after the clinic has received the results. If you do not hear from us within 7 days, please contact the clinic through Rhythm NewMediat or phone. If you have a critical or abnormal lab result, we will notify you by phone as soon as possible.  Submit refill requests through Locqus or call your pharmacy and they will forward the refill request to us. Please allow 3 business days for your refill to be completed.          Additional Information About Your Visit        [facility] Management Information     Locqus gives you secure access to your electronic health record. If you see a primary care provider, you can also send messages to your care team and make appointments. If you have questions, please call your primary care clinic.  If you do not have a primary care provider, please call [phone] and they will assist you.        Care EveryWhere ID     This is your Care EveryWhere ID. This could be used by other organizations to access your [facility] medical records  [id number]        Your Vitals Were     Pulse Temperature Respirations Height Pulse Oximetry BMI (Body Mass Index)    89 97.8  F (36.6  C) (Oral) 16 1.702 m (5' 7\") 99% 26.56 kg/m2       Blood Pressure from Last 3 Encounters:   05/15/17 (!) 188/95   02/14/17 (!) 213/114   11/15/16 164/84    Weight from Last 3 Encounters:   05/15/17 76.9 kg (169 lb 9.6 oz)   02/14/17 77.5 kg (170 lb 12.8 oz)   11/15/16 78 kg (172 lb)              Today, you had the following     No orders found for display         Today's Medication Changes          These changes are accurate as of: 5/15/17  3:20 PM.  If you have any questions, ask your nurse or doctor.               These medicines have changed or have updated prescriptions.        Dose/Directions    insulin glargine 100 " UNIT/ML injection   Commonly known as:  LANTUS   This may have changed:    - how much to take  - when to take this   Used for:  Type 2 diabetes mellitus with diabetic chronic kidney disease (H)        Dose:  53 Units   Inject 53 Units Subcutaneous every 24 hours   Refills:  0                Primary Care Provider Office Phone # Fax #    Masoud Valentin MD, [phone]       [address] [address] [address] [place] [address]        Thank you!     Thank you for choosing [facility]  for your care. Our goal is always to provide you with excellent care. Hearing back from our patients is one way we can continue to improve our services. Please take a few minutes to complete the written survey that you may receive in the mail after your visit with us. Thank you!             Your Updated Medication List - Protect others around you: Learn how to safely use, store and throw away your medicines at www.disposemymeds.org.          This list is accurate as of: 5/15/17  3:20 PM.  Always use your most recent med list.                   Brand Name Dispense Instructions for use    amLODIPine 10 MG tablet    NORVASC    30 tablet    TAKE 1 TABLET BY MOUTH DAILY       amoxicillin-clavulanate 500-125 MG per tablet    AUGMENTIN    20 tablet    Take 1 tablet by mouth 2 times daily       blood glucose monitoring test strip    no brand specified    1 Box    Use to test blood sugar 4 times daily or as directed.       calcitRIOL 0.25 MCG capsule    ROCALTROL    90 capsule    Take 1 capsule (0.25 mcg) by mouth daily for 28 days       carvedilol 6.25 MG tablet    COREG    180 tablet    Take 2 tablets (12.5 mg) by mouth 2 times daily (with meals)       cloNIDine 0.1 MG tablet    CATAPRES    60 tablet    Take 1 tablet (0.1 mg) by mouth 2 times daily as needed For SBP > 170       cycloSPORINE modified 25 MG capsule    GENERIC EQUIVALENT    180 capsule    Take 3 capsules (75 mg) by mouth 2  times daily Total dose 75 mg twice a day       D3-50 36098 UNITS capsule   Generic drug:  cholecalciferol      Take 50,000 Units by mouth once a week       dapsone 25 MG tablet      Take 50 mg by mouth daily       HYDROcodone-acetaminophen 5-325 MG per tablet    NORCO     Take 2 tablets by mouth every 6 hours as needed for moderate to severe pain       insulin aspart 100 UNIT/ML injection    NovoLOG PEN     Inject 1-16 Units Subcutaneous At Bedtime Correction Scale - VERY HIGH INSULIN RESISTANCE DOSING    Do Not give Bedtime Correction Insulin if BG < 200.  For  - 209 give 1 units.  For  - 219 give 2 units.  For  - 229 give 3 units.  For  - 239 give 4 units.  For  - 249 give 5 units.  For  - 259 give 6 units.  For  - 269 give 7 units.  For  - 279 give 8 units.  For  - 289 give 9 units.       insulin glargine 100 UNIT/ML injection    LANTUS     Inject 53 Units Subcutaneous every 24 hours       mycophenolic acid 180 MG EC tablet    MYFORTIC - GENERIC EQUIVALENT    240 each    TAKE THREE TABLETS BY MOUTH EVERY MORNING AND TAKE TWO TABLETS BY MOUTH EVERY DAY AT NOON AND TAKE THREE TABLETS BY MOUTH EVERY EVENING       OMEPRAZOLE PO      Take 20 mg by mouth as needed (Nausea)       predniSONE 5 MG tablet    DELTASONE    90 tablet    Take 1 tablet (5 mg) by mouth daily       sodium bicarbonate 650 MG tablet     120 tablet    Take 2 tablets (1,300 mg) by mouth 2 times daily
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
## 2017-05-15 NOTE — MR AVS SNAPSHOT
After Visit Summary   5/15/2017    Amadou Lewis    MRN: [id number]           Patient Information     Date Of Birth          1965        Visit Information        Provider Department      5/15/2017 2:45 PM Lizette Cole RN [facility] Solid Organ Transplant        Today's Diagnoses     Diabetes mellitus, type 2 (H)    -  1    Organ transplant candidate           Follow-ups after your visit        Your next 10 appointments already scheduled     May 25, 2017  2:15 PM CDT   (Arrive by 1:45 PM)   Return Kidney Transplant with Deyvi Dick MD   [facility] Nephrology ([facility])    [address] [place] [address] [phone]            Sep 26, 2017  3:15 PM CDT   (Arrive by 2:45 PM)   Return Kidney Transplant with Deyvi Dick MD   [facility] Nephrology ([facility])    [address] [place] [address] [phone]              Future tests that were ordered for you today     Open Standing Orders        Priority Remaining Interval Expires Ordered    PRA Single Antigen IgG Antibody Routine 4/4  5/15/2018 5/15/2017            Who to contact     If you have questions or need follow up information about today's clinic visit or your schedule please contact [facility] SOLID ORGAN TRANSPLANT directly at [phone].  Normal or non-critical lab and imaging results will be communicated to you by MyChart, letter or phone within 4 business days after the clinic has received the results. If you do not hear from us within 7 days, please contact the clinic through MyChart or phone. If you have a critical or abnormal lab result, we will notify you by phone as soon as possible.  Submit refill requests through [employer] or call your pharmacy and they will forward the refill request to us. Please allow 3 business days for your refill to be completed.          Additional Information About Your Visit         [employer] Information     [employer] gives you secure access to your electronic health record. If you see a primary care provider, you can also send messages to your care team and make appointments. If you have questions, please call your primary care clinic.  If you do not have a primary care provider, please call [phone] and they will assist you.        Care EveryWhere ID     This is your Care EveryWhere ID. This could be used by other organizations to access your [place] medical records  [id number]         Blood Pressure from Last 3 Encounters:   05/15/17 (!) 188/95   02/14/17 (!) 213/114   11/15/16 164/84    Weight from Last 3 Encounters:   05/15/17 76.9 kg (169 lb 9.6 oz)   02/14/17 77.5 kg (170 lb 12.8 oz)   11/15/16 78 kg (172 lb)              Today, you had the following     No orders found for display         Today's Medication Changes          These changes are accurate as of: 5/15/17  5:17 PM.  If you have any questions, ask your nurse or doctor.               These medicines have changed or have updated prescriptions.        Dose/Directions    insulin glargine 100 UNIT/ML injection   Commonly known as:  LANTUS   This may have changed:    - how much to take  - when to take this   Used for:  Type 2 diabetes mellitus with diabetic chronic kidney disease (H)        Dose:  53 Units   Inject 53 Units Subcutaneous every 24 hours   Refills:  0                Primary Care Provider Office Phone # Fax #    Masoud Valentin MD, [phone]       [address] [place] [address]        Thank you!     Thank you for choosing [facility] SOLID ORGAN TRANSPLANT  for your care. Our goal is always to provide you with excellent care. Hearing back from our patients is one way we can continue to improve our services. Please take a few minutes to complete the written survey that you may receive in the mail after your visit with us. Thank you!             Your Updated  Medication List - Protect others around you: Learn how to safely use, store and throw away your medicines at www.disposemymeds.org.          This list is accurate as of: 5/15/17  5:17 PM.  Always use your most recent med list.                   Brand Name Dispense Instructions for use    amLODIPine 10 MG tablet    NORVASC    30 tablet    TAKE 1 TABLET BY MOUTH DAILY       amoxicillin-clavulanate 500-125 MG per tablet    AUGMENTIN    20 tablet    Take 1 tablet by mouth 2 times daily       blood glucose monitoring test strip    no brand specified    1 Box    Use to test blood sugar 4 times daily or as directed.       calcitRIOL 0.25 MCG capsule    ROCALTROL    90 capsule    Take 1 capsule (0.25 mcg) by mouth daily for 28 days       carvedilol 6.25 MG tablet    COREG    180 tablet    Take 2 tablets (12.5 mg) by mouth 2 times daily (with meals)       cloNIDine 0.1 MG tablet    CATAPRES    60 tablet    Take 1 tablet (0.1 mg) by mouth 2 times daily as needed For SBP > 170       cycloSPORINE modified 25 MG capsule    GENERIC EQUIVALENT    180 capsule    Take 3 capsules (75 mg) by mouth 2 times daily Total dose 75 mg twice a day       D3-50 89806 UNITS capsule   Generic drug:  cholecalciferol      Take 50,000 Units by mouth once a week       dapsone 25 MG tablet      Take 50 mg by mouth daily       HYDROcodone-acetaminophen 5-325 MG per tablet    NORCO     Take 2 tablets by mouth every 6 hours as needed for moderate to severe pain       insulin aspart 100 UNIT/ML injection    NovoLOG PEN     Inject 1-16 Units Subcutaneous At Bedtime Correction Scale - VERY HIGH INSULIN RESISTANCE DOSING    Do Not give Bedtime Correction Insulin if BG < 200.  For  - 209 give 1 units.  For  - 219 give 2 units.  For  - 229 give 3 units.  For  - 239 give 4 units.  For  - 249 give 5 units.  For  - 259 give 6 units.  For  - 269 give 7 units.  For  - 279 give 8 units.  For  - 289 give 9 units.        insulin glargine 100 UNIT/ML injection    LANTUS     Inject 53 Units Subcutaneous every 24 hours       mycophenolic acid 180 MG EC tablet    MYFORTIC - GENERIC EQUIVALENT    240 each    TAKE THREE TABLETS BY MOUTH EVERY MORNING AND TAKE TWO TABLETS BY MOUTH EVERY DAY AT NOON AND TAKE THREE TABLETS BY MOUTH EVERY EVENING       OMEPRAZOLE PO      Take 20 mg by mouth as needed (Nausea)       predniSONE 5 MG tablet    DELTASONE    90 tablet    Take 1 tablet (5 mg) by mouth daily       sodium bicarbonate 650 MG tablet     120 tablet    Take 2 tablets (1,300 mg) by mouth 2 times daily

## 2017-05-17 ENCOUNTER — TELEPHONE (OUTPATIENT)
Dept: TRANSPLANT | Facility: CLINIC | Age: 52
End: 2017-05-17

## 2017-05-17 NOTE — TELEPHONE ENCOUNTER
Bisi Ramirez from  represenatative called this am to ask about Amadou Paynets appt with Dr. Luevano on 5/15/2017.  Mr. Lewis agreed to do the  evaluation. Lizette sent note to Becca to schedule evaluation with orders in [facility].  Eval 5/31/2017.

## 2017-05-23 ENCOUNTER — TELEPHONE (OUTPATIENT)
Dept: TRANSPLANT | Facility: CLINIC | Age: 52
End: 2017-05-23

## 2017-05-23 NOTE — TELEPHONE ENCOUNTER
Sean Lewis called to verify labs to be drawn pre Dr. Dick's appt. 5/25/1:45 pm.   Monthly labs ordered on file in [facility] and the [facility]  q6 month.   He will have drawn at his local labs at 2pm on 5/23/22017.   The full evaluation labs expiring 12/27/2017 can be drawn fasting on 5/31/2017.  Sean jordan.  Deyvi De La Cruz MD

## 2017-05-24 ENCOUNTER — RESULTS ONLY (OUTPATIENT)
Dept: OTHER | Facility: CLINIC | Age: 52
End: 2017-05-24

## 2017-05-24 DIAGNOSIS — Z94.0 KIDNEY REPLACED BY TRANSPLANT: ICD-10-CM

## 2017-05-24 DIAGNOSIS — Z94.0 KIDNEY TRANSPLANTED: ICD-10-CM

## 2017-05-24 DIAGNOSIS — Q61.3 POLYCYSTIC KIDNEY: ICD-10-CM

## 2017-05-24 LAB
HGB BLD-MCNC: 12 G/DL (ref 13.3–17.7)
PROT UR-MCNC: 0.91 G/L
PROT/CREAT 24H UR: 1.72 G/G CR (ref 0–0.2)

## 2017-05-24 PROCEDURE — 85018 HEMOGLOBIN: CPT | Performed by: INTERNAL MEDICINE

## 2017-05-24 PROCEDURE — 80180 DRUG SCRN QUAN MYCOPHENOLATE: CPT | Performed by: INTERNAL MEDICINE

## 2017-05-24 PROCEDURE — 87799 DETECT AGENT NOS DNA QUANT: CPT | Performed by: INTERNAL MEDICINE

## 2017-05-24 PROCEDURE — 36415 COLL VENOUS BLD VENIPUNCTURE: CPT | Performed by: INTERNAL MEDICINE

## 2017-05-24 PROCEDURE — 86833 HLA CLASS II HIGH DEFIN QUAL: CPT | Performed by: STUDENT IN AN ORGANIZED HEALTH CARE EDUCATION/TRAINING PROGRAM

## 2017-05-24 PROCEDURE — 80069 RENAL FUNCTION PANEL: CPT | Performed by: INTERNAL MEDICINE

## 2017-05-24 PROCEDURE — 86832 HLA CLASS I HIGH DEFIN QUAL: CPT | Performed by: STUDENT IN AN ORGANIZED HEALTH CARE EDUCATION/TRAINING PROGRAM

## 2017-05-24 PROCEDURE — 84156 ASSAY OF PROTEIN URINE: CPT | Performed by: INTERNAL MEDICINE

## 2017-05-25 ENCOUNTER — OFFICE VISIT (OUTPATIENT)
Dept: NEPHROLOGY | Facility: CLINIC | Age: 52
End: 2017-05-25
Attending: INTERNAL MEDICINE
Payer: COMMERCIAL

## 2017-05-25 VITALS
WEIGHT: 172 LBS | DIASTOLIC BLOOD PRESSURE: 92 MMHG | OXYGEN SATURATION: 99 % | SYSTOLIC BLOOD PRESSURE: 182 MMHG | HEART RATE: 97 BPM | BODY MASS INDEX: 27 KG/M2 | HEIGHT: 67 IN

## 2017-05-25 DIAGNOSIS — Z94.0 KIDNEY REPLACED BY TRANSPLANT: ICD-10-CM

## 2017-05-25 DIAGNOSIS — N25.81 SECONDARY RENAL HYPERPARATHYROIDISM (H): Primary | ICD-10-CM

## 2017-05-25 DIAGNOSIS — R06.01 ORTHOPNEA: ICD-10-CM

## 2017-05-25 DIAGNOSIS — R11.0 NAUSEA: ICD-10-CM

## 2017-05-25 LAB
ALBUMIN SERPL-MCNC: 4.2 G/DL (ref 3.4–5)
ANION GAP SERPL CALCULATED.3IONS-SCNC: 12 MMOL/L (ref 3–14)
BUN SERPL-MCNC: 81 MG/DL (ref 7–30)
CALCIUM SERPL-MCNC: 8.8 MG/DL (ref 8.5–10.1)
CHLORIDE SERPL-SCNC: 104 MMOL/L (ref 94–109)
CO2 SERPL-SCNC: 20 MMOL/L (ref 20–32)
CREAT SERPL-MCNC: 5.24 MG/DL (ref 0.66–1.25)
GFR SERPL CREATININE-BSD FRML MDRD: 12 ML/MIN/1.7M2
GLUCOSE SERPL-MCNC: 236 MG/DL (ref 70–99)
PHOSPHATE SERPL-MCNC: 3.8 MG/DL (ref 2.5–4.5)
POTASSIUM SERPL-SCNC: 5.1 MMOL/L (ref 3.4–5.3)
PRA DONOR SPECIFIC ABY: NORMAL
SODIUM SERPL-SCNC: 136 MMOL/L (ref 133–144)

## 2017-05-25 PROCEDURE — 99212 OFFICE O/P EST SF 10 MIN: CPT | Mod: ZF

## 2017-05-25 RX ORDER — CINACALCET 30 MG/1
30 TABLET, FILM COATED ORAL DAILY
Qty: 30 TABLET | Refills: 3 | Status: SHIPPED | OUTPATIENT
Start: 2017-05-25 | End: 2017-07-07

## 2017-05-25 RX ORDER — MYCOPHENOLIC ACID 180 MG/1
TABLET, DELAYED RELEASE ORAL
Qty: 240 TABLET | Refills: 0 | COMMUNITY
Start: 2017-05-25 | End: 2017-07-11

## 2017-05-25 RX ORDER — ONDANSETRON 4 MG/1
4 TABLET, FILM COATED ORAL EVERY 12 HOURS PRN
Qty: 30 TABLET | Refills: 3 | Status: SHIPPED | OUTPATIENT
Start: 2017-05-25 | End: 2018-11-01

## 2017-05-25 ASSESSMENT — PAIN SCALES - GENERAL: PAINLEVEL: NO PAIN (0)

## 2017-05-25 NOTE — MR AVS SNAPSHOT
After Visit Summary   5/25/2017    Amadou Lewis    MRN: [id number]           Patient Information     Date Of Birth          1965        Visit Information        Provider Department      5/25/2017 2:15 PM Deyvi Dick MD [facility] Nephrology        Today's Diagnoses     Secondary renal hyperparathyroidism (H)    -  1    Orthopnea        Nausea        Kidney replaced by transplant          Care Instructions    1. Your Myfortic dose was reduced to 540 mg twice daily  2. Continue current dose of Cyclosporine   3. Restart Carvedilol 12.5 mg twice daily  4. Restart Amlodipine 10 mg daily  5. Monitor your blood pressure daily and keep records for  to review  6. Take Zofran 4 mg two times daily as needed 30 minutes before meals  7.  Follow up blood work in 4 weeks  8. Follow up with your cardiologist as soon as possible  9. Take Sensipar          Follow-ups after your visit        Additional Services     Cardiology Eval Adult Referral                 Follow-up notes from your care team     Return in about 4 weeks (around 6/22/2017).      Your next 10 appointments already scheduled     May 31, 2017  6:30 AM CDT   Lab with  LAB    Health Lab ([facility])    [address] [place] [address]   [phone]            May 31, 2017  8:00 AM CDT   Transplant Class-Kidney with  TRANSPLANT CLASS   [facility] Solid Organ Transplant ([facility])    [address] [place] [address]   [phone]            May 31, 2017  9:00 AM CDT   (Arrive by 8:45 AM)   SOT SOCIAL WORK EVAL with WILLARD Dial   [facility] Solid Organ Transplant ([facility])    [address] [place] [address]   [phone]            May 31, 2017  9:45 AM CDT   (Arrive by 9:30 AM)   SOT CARE COORDINATOR EVAL with Lizette Cole, RN   [facility] Solid Organ  Transplant ([facility])    [address] [facility] [address]   [phone]            May 31, 2017 10:00 AM CDT   (Arrive by 9:45 AM)   Surgery Consult with JOE BEGUME PATIENT 4   [facility] Solid Organ Transplant ([facility])    [address] [facility] [address]   [phone]            May 31, 2017 10:30 AM CDT   NUTRITION VISIT with Carlota Cruz RD   [facility] Solid Organ Transplant ([facility])    [address] [place] [address]  [address] [facility] [address]   [phone]            May 31, 2017  1:15 PM CDT   Lab with [facility] LAB   [facility] ([facility])    [address] [place] [address]   [phone]            May 31, 2017  2:30 PM CDT   XR CHEST 2 VIEWS with UUXR1   [facility],  Radiology ([facility])    [address] [address]   [phone]           Please bring a list of your current medicines to your exam. (Include vitamins, minerals and over-thecounter medicines.) Leave your valuables at home.  Tell your doctor if there is a chance you may be pregnant.  You do not need to do anything special for this exam.            May 31, 2017  3:00 PM CDT   ecg with UUEKGM   [facility] ELECTROCARDIOLOGY ([facility])    [address] [facility] [address]               May 31, 2017  3:30 PM CDT   Ech Complete with UUECHR2   [facility],  Echocardiography ([facility])    [address] [facility] [address] [phone]           1.  Please bring or wear a comfortable two-piece outfit. 2.  You may eat, drink and take your normal medicines. 3.  For any questions that cannot be answered, please contact the ordering physician              Who to contact     If you have questions  "or need follow up information about today's clinic visit or your schedule please contact [facility] NEPHROLOGY directly at [phone].  Normal or non-critical lab and imaging results will be communicated to you by [employer], letter or phone within 4 business days after the clinic has received the results. If you do not hear from us within 7 days, please contact the clinic through [employer] or phone. If you have a critical or abnormal lab result, we will notify you by phone as soon as possible.  Submit refill requests through [employer] or call your pharmacy and they will forward the refill request to us. Please allow 3 business days for your refill to be completed.          Additional Information About Your Visit        [employer] Information     [employer] gives you secure access to your electronic health record. If you see a primary care provider, you can also send messages to your care team and make appointments. If you have questions, please call your primary care clinic.  If you do not have a primary care provider, please call [phone] and they will assist you.        Care EveryWhere ID     This is your Care EveryWhere ID. This could be used by other organizations to access your [place] medical records  [id number]        Your Vitals Were     Pulse Height Pulse Oximetry BMI (Body Mass Index)          97 1.702 m (5' 7\") 99% 26.94 kg/m2         Blood Pressure from Last 3 Encounters:   05/25/17 (!) 182/92   05/15/17 (!) 188/95   02/14/17 (!) 213/114    Weight from Last 3 Encounters:   05/25/17 78 kg (172 lb)   05/15/17 76.9 kg (169 lb 9.6 oz)   02/14/17 77.5 kg (170 lb 12.8 oz)              We Performed the Following     Cardiology Eval Adult Referral          Today's Medication Changes          These changes are accurate as of: 5/25/17  3:55 PM.  If you have any questions, ask your nurse or doctor.               Start taking these medicines.        Dose/Directions    cinacalcet 30 MG tablet   Commonly known as:  " SENSIPAR   Used for:  Secondary renal hyperparathyroidism (H)   Started by:  Deyvi Dick MD        Dose:  30 mg   Take 1 tablet (30 mg) by mouth daily   Quantity:  30 tablet   Refills:  3       ondansetron 4 MG tablet   Commonly known as:  ZOFRAN   Used for:  Nausea   Started by:  Deyvi Dick MD        Dose:  4 mg   Take 1 tablet (4 mg) by mouth every 12 hours as needed for nausea   Quantity:  30 tablet   Refills:  3         These medicines have changed or have updated prescriptions.        Dose/Directions    insulin glargine 100 UNIT/ML injection   Commonly known as:  LANTUS   This may have changed:    - how much to take  - when to take this   Used for:  Type 2 diabetes mellitus with diabetic chronic kidney disease (H)        Dose:  53 Units   Inject 53 Units Subcutaneous every 24 hours   Refills:  0       mycophenolic acid EC tablet   This may have changed:  Another medication with the same name was removed. Continue taking this medication, and follow the directions you see here.   Changed by:  Deyvi Dick MD        Take three tablets twice daily   Quantity:  240 tablet   Refills:  0         Stop taking these medicines if you haven't already. Please contact your care team if you have questions.     amoxicillin-clavulanate 500-125 MG per tablet   Commonly known as:  AUGMENTIN   Stopped by:  Deyvi Dick MD                Where to get your medicines      These medications were sent to [facility] - [place], [place] - [address]  [address] [facility] [address]    Hours:  Mon-Fri 8:30am-5:00pm Toll Free [phone] Phone:  [phone]     cinacalcet 30 MG tablet    ondansetron 4 MG tablet                Primary Care Provider Office Phone # Fax #    Masoud Valentin MD, [phone]       [address] [place] [address]        Thank you!     Thank you for choosing [facility] NEPHROLOGY  for  your care. Our goal is always to provide you with excellent care. Hearing back from our patients is one way we can continue to improve our services. Please take a few minutes to complete the written survey that you may receive in the mail after your visit with us. Thank you!             Your Updated Medication List - Protect others around you: Learn how to safely use, store and throw away your medicines at www.disposemymeds.org.          This list is accurate as of: 5/25/17  3:55 PM.  Always use your most recent med list.                   Brand Name Dispense Instructions for use    amLODIPine 10 MG tablet    NORVASC    30 tablet    TAKE 1 TABLET BY MOUTH DAILY       blood glucose monitoring test strip    no brand specified    1 Box    Use to test blood sugar 4 times daily or as directed.       carvedilol 6.25 MG tablet    COREG    180 tablet    Take 2 tablets (12.5 mg) by mouth 2 times daily (with meals)       cinacalcet 30 MG tablet    SENSIPAR    30 tablet    Take 1 tablet (30 mg) by mouth daily       cloNIDine 0.1 MG tablet    CATAPRES    60 tablet    Take 1 tablet (0.1 mg) by mouth 2 times daily as needed For SBP > 170       cycloSPORINE modified 25 MG capsule    GENERIC EQUIVALENT    180 capsule    Take 3 capsules (75 mg) by mouth 2 times daily Total dose 75 mg twice a day       HYDROcodone-acetaminophen 5-325 MG per tablet    NORCO     Take 2 tablets by mouth every 6 hours as needed for moderate to severe pain       insulin aspart 100 UNIT/ML injection    NovoLOG PEN     Inject 1-16 Units Subcutaneous At Bedtime Correction Scale - VERY HIGH INSULIN RESISTANCE DOSING    Do Not give Bedtime Correction Insulin if BG < 200.  For  - 209 give 1 units.  For  - 219 give 2 units.  For  - 229 give 3 units.  For  - 239 give 4 units.  For  - 249 give 5 units.  For  - 259 give 6 units.  For  - 269 give 7 units.  For  - 279 give 8 units.  For  - 289 give 9 units.        insulin glargine 100 UNIT/ML injection    LANTUS     Inject 53 Units Subcutaneous every 24 hours       mycophenolic acid EC tablet     240 tablet    Take three tablets twice daily       ondansetron 4 MG tablet    ZOFRAN    30 tablet    Take 1 tablet (4 mg) by mouth every 12 hours as needed for nausea

## 2017-05-25 NOTE — NURSING NOTE
"Chief Complaint   Patient presents with     RECHECK     KIDNEY FOLLOW UP       Initial BP (!) 182/92 (BP Location: Right arm, Patient Position: Chair, Cuff Size: Adult Regular)  Pulse 97  Ht 1.702 m (5' 7\")  Wt 78 kg (172 lb)  SpO2 99%  BMI 26.94 kg/m2 Estimated body mass index is 26.94 kg/(m^2) as calculated from the following:    Height as of this encounter: 1.702 m (5' 7\").    Weight as of this encounter: 78 kg (172 lb).  Medication Reconciliation: complete   GEORGES JAIME CMA      "

## 2017-05-25 NOTE — PATIENT INSTRUCTIONS
1. Your Myfortic dose was reduced to 540 mg twice daily  2. Continue current dose of Cyclosporine   3. Restart Carvedilol 12.5 mg twice daily  4. Restart Amlodipine 10 mg daily  5. Monitor your blood pressure daily and keep records for  to review  6. Take Zofran 4 mg two times daily as needed 30 minutes before meals  7.  Follow up blood work in 4 weeks  8. Follow up with your cardiologist as soon as possible  9. Take Sensipar 30 mg daily

## 2017-05-25 NOTE — LETTER
5/25/2017       RE: Amadou Lewis  [address] [place] [address]     Dear Colleague,    Thank you for referring your patient, Amadou Lewis, to the [facility] NEPHROLOGY at [facility]. Please see a copy of my visit note below.    Assessment and Plan:  1. ESRD:  secondary to polycystic kidney disease and diabetic nephropathy s/p LDKT on 10/10/2013. Complicate by repeated bouts of rejection. Baseline serum creatinine of 3-3.5 mg/dL. Creatinine continue worsening likely secondary to uncontrolled hypertension , currently serum creatinine is 5.2 mg/dL. We will check BMP every 4 weeks  Electrolytes:  -Normal  Serum Na  -Normal serum K  -Normal serum Ca  -Acid/Base status: no acidemia.  -Proteinuria: 1.72 g/gCr    2. Immunosuppression: Mycophenolic level and BK virus by PCR are pending. We will decrease Mycophenolic acid dose to 540 mg twice daily and Cyclosporine   3. Hypertension:  BP is not controlled 182/92 mmHg. Patient was advised to resume previous dose Carvedilol 12.5 mg twice daily and Amlodipine 10 mg daily. Furthermore, he was advised to monitor his blood pressure daily and keep records for  to review  4. Anemia secondary to renal disease: Hgb is stable, at baseline. Monitor  5. Secondary renal hyperparathyroidism: most recent PTH was 719 in 2/2017. We will start on Sensipar 30 mg daily  6. Nausea and vomiting: likely secondary to medications +/- uremia. EGD in 03/2016 showed esophageal ulcer. Patient states that he was on omeprazole 40 mg daily for 8 months without improvement. We will try trial of Zofran 4 mg twice daily as needed 30 minutes prior to meals  7. Orthopnea in patient with known history of systolic CHF -patient was advised to follow up with  Cardiologist as soon as possible. Referral was placed  8. Diabetes mellitus type II insuline requiring-management defer to PCP  9. H/o CAD- s/p PCI with DESx2  10. Insomnia-patient was advised to  follow up with PCP for further management      Assessment and plan was discussed with patient and he voiced his understanding and agreement.      I have seen and discussed the patient with Dr. Kapil Castaneda MD  Nephrology Fellow  [facility]  Department of Medicine  Division of Renal Disease and Hypertension            Reason for Visit:  Mr. Lewis is here for follow up on kidney transplant rejection    HPI:   Amadou Lewis is a 51 year old male with ESKD from polycystic kidney disease and diabetic nephropathy and is status post LDKT on 10/10/2013 with immediate graft function. 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 Patient states that he is still grieving the loss of his son. He reports fatigue, insomnia and worsening of nausea in last 2 months. Patient states that he wakes up every morning feeling very nauseous and usually vomites yellow color fluid. Patient states that his nausea gets somewhat better after he takes two tablets of OCT Shayna-seltzer. Furthermore, he admits intermittent aching pain in the RUQ and worsening of orthopnea in last few months.   Patient denies: NSAIDs use, fever, chills, changes in weight, changes in appetite, dizziness, adenopathy, sore throat, rhinorrhea, cough, shortness of breath , chest pain, palpitations, lower extremity edema, hematochezia, melena, hematemesis, abdominal pain, changes in bowel habits, dysuria, urinary frequency, urgency, hematuria, rash, pruritis, metallic tast .     Patient states that all of his antihypertensive medications were discontinued about 7-8 months ago by his PCP due to nausea and vomiting. Patient states that he dose have tonometer but he dose not check his blood pressure at home             Transplant Hx:       Tx: LDKT  Date: 10/10/2013       Present Maintenance IS: Cyclosporine and Mycophenolic acid       Baseline Creatinine:  3-3.5  mg/dL       Recent DSA: No         Biopsy: Yes: 1/21/2016, 2/15/2016, 09/28/2016        Home BP: Not checked.      ROS:   A comprehensive review of systems was obtained and negative, except as noted in the HPI or PMH.    Active Medical Problems:  Patient Active Problem List   Diagnosis     Type II diabetes mellitus with renal manifestations (H)     Diabetes mellitus with background retinopathy (H)     Polycystic kidney     NONSPECIFIC MEDICAL HISTORY     Coronary artery disease     Retinopathy     Hyperlipidemia LDL goal <70     Chronic systolic heart failure (H)     Premature ventricular contractions (PVCs) (VPCs)     Kidney replaced by transplant     Immunosuppressed status (H)     Kidney transplant rejection     Hypomagnesemia     Hyperglycemia     Hypertension     Anemia in chronic renal disease     Care after organ transplant       Personal Hx:  Social History     Social History     Marital status: Single     Spouse name: N/A     Number of children: N/A     Years of education: N/A     Occupational History     Not on file.     Social History Main Topics     Smoking status: Never Smoker     Smokeless tobacco: Never Used     Alcohol use No     Drug use: No     Sexual activity: Yes     Partners: Female     Other Topics Concern     Not on file     Social History Narrative       Allergies:  Allergies   Allergen Reactions     No Known Allergies        Medications:  Current Outpatient Prescriptions   Medication     cinacalcet (SENSIPAR) 30 MG tablet     ondansetron (ZOFRAN) 4 MG tablet     MYFORTIC 180 MG PO EC TABLET     carvedilol (COREG) 6.25 MG tablet     cloNIDine (CATAPRES) 0.1 MG tablet     cycloSPORINE modified (GENERIC EQUIVALENT) 25 MG capsule     amLODIPine (NORVASC) 10 MG tablet     insulin aspart (NOVOLOG PEN) 100 UNIT/ML soln     insulin glargine (LANTUS) 100 UNIT/ML PEN     blood glucose monitoring (NO BRAND SPECIFIED) test strip     HYDROcodone-acetaminophen (NORCO) 5-325 MG per tablet     No current  "facility-administered medications for this visit.          Vitals:  BP (!) 182/92 (BP Location: Right arm, Patient Position: Chair, Cuff Size: Adult Regular)  Pulse 97  Ht 1.702 m (5' 7\")  Wt 78 kg (172 lb)  SpO2 99%  BMI 26.94 kg/m2    Exam:   GENERAL APPEARANCE: alert and no distress, chronically ill looking  HENT: mouth without ulcers or lesions  LYMPHATICS: no cervical or supraclavicular nodes  RESP: lungs clear to auscultation - no rales, rhonchi or wheezes  CV: regular rhythm, normal rate, no rub, no murmur  EDEMA: no LE edema bilaterally, AVF in the left forearm with palpable thrill   ABDOMEN: protruded, soft, slightly distended, surgical incision well healed, nontender, bowel sounds normal  MS: extremities normal - no gross deformities noted, no evidence of inflammation in joints, no muscle tenderness  SKIN: no rash    Results: reviewed      Patient was seen and evaluated by me, Deyvi Dick MD. I have reviewed the note and agree with the the plan of care as documented by the fellow.  Reviewed labs which became available after visit.   Appears to be progressing towards dialysis soon possible in the next weeks to month   I reduced the MPA dose to bid and will reassess his symptoms   Will need to repeat labs every 2-4 weeks and visit every 3 months   I stressed on the importance of BP control     Again, thank you for allowing me to participate in the care of your patient.      Sincerely,    Deyvi Dick MD      "

## 2017-05-25 NOTE — PROGRESS NOTES
Assessment and Plan:  1. ESRD:  secondary to polycystic kidney disease and diabetic nephropathy s/p LDKT on 10/10/2013. Complicate by repeated bouts of rejection. Baseline serum creatinine of 3-3.5 mg/dL. Creatinine continue worsening likely secondary to uncontrolled hypertension , currently serum creatinine is 5.2 mg/dL. We will check BMP every 4 weeks  Electrolytes:  -Normal  Serum Na  -Normal serum K  -Normal serum Ca  -Acid/Base status: no acidemia.  -Proteinuria: 1.72 g/gCr    2. Immunosuppression: Mycophenolic level and BK virus by PCR are pending. We will decrease Mycophenolic acid dose to 540 mg twice daily and Cyclosporine   3. Hypertension:  BP is not controlled 182/92 mmHg. Patient was advised to resume previous dose Carvedilol 12.5 mg twice daily and Amlodipine 10 mg daily. Furthermore, he was advised to monitor his blood pressure daily and keep records for  to review  4. Anemia secondary to renal disease: Hgb is stable, at baseline. Monitor  5. Secondary renal hyperparathyroidism: most recent PTH was 719 in 2/2017. We will start on Sensipar 30 mg daily  6. Nausea and vomiting: likely secondary to medications +/- uremia. EGD in 03/2016 showed esophageal ulcer. Patient states that he was on omeprazole 40 mg daily for 8 months without improvement. We will try trial of Zofran 4 mg twice daily as needed 30 minutes prior to meals  7. Orthopnea in patient with known history of systolic CHF -patient was advised to follow up with hs Cardiologist as soon as possible. Referral was placed  8. Diabetes mellitus type II insuline requiring-management defer to PCP  9. H/o CAD- s/p PCI with DESx2  10. Insomnia-patient was advised to follow up with PCP for further management      Assessment and plan was discussed with patient and he voiced his understanding and agreement.      I have seen and discussed the patient with Dr. Kapil Castaneda MD  Nephrology Fellow  [facility]  Department of  Medicine  Division of Renal Disease and Hypertension            Reason for Visit:  Mr. Lewis is here for follow up on kidney transplant rejection    HPI:   Amadou Lewis is a 51 year old male with ESKD from polycystic kidney disease and diabetic nephropathy and is status post LDKT on 10/10/2013 with immediate graft function. 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 Patient states that he is still grieving the loss of his son. He reports fatigue, insomnia and worsening of nausea in last 2 months. Patient states that he wakes up every morning feeling very nauseous and usually vomites yellow color fluid. Patient states that his nausea gets somewhat better after he takes two tablets of OCT Shayna-seltzer. Furthermore, he admits intermittent aching pain in the RUQ and worsening of orthopnea in last few months.   Patient denies: NSAIDs use, fever, chills, changes in weight, changes in appetite, dizziness, adenopathy, sore throat, rhinorrhea, cough, shortness of breath , chest pain, palpitations, lower extremity edema, hematochezia, melena, hematemesis, abdominal pain, changes in bowel habits, dysuria, urinary frequency, urgency, hematuria, rash, pruritis, metallic tast .     Patient states that all of his antihypertensive medications were discontinued about 7-8 months ago by his PCP due to nausea and vomiting. Patient states that he dose have tonometer but he dose not check his blood pressure at home             Transplant Hx:       Tx: LDKT  Date: 10/10/2013       Present Maintenance IS: Cyclosporine and Mycophenolic acid       Baseline Creatinine:  3-3.5 mg/dL       Recent DSA: No         Biopsy: Yes: 1/21/2016, 2/15/2016, 09/28/2016        Home BP: Not checked.      ROS:   A comprehensive review of systems was obtained and negative, except as noted in the HPI or PMH.    Active Medical Problems:  Patient Active Problem List   Diagnosis      "Type II diabetes mellitus with renal manifestations (H)     Diabetes mellitus with background retinopathy (H)     Polycystic kidney     NONSPECIFIC MEDICAL HISTORY     Coronary artery disease     Retinopathy     Hyperlipidemia LDL goal <70     Chronic systolic heart failure (H)     Premature ventricular contractions (PVCs) (VPCs)     Kidney replaced by transplant     Immunosuppressed status (H)     Kidney transplant rejection     Hypomagnesemia     Hyperglycemia     Hypertension     Anemia in chronic renal disease     Care after organ transplant       Personal Hx:  Social History     Social History     Marital status: Single     Spouse name: N/A     Number of children: N/A     Years of education: N/A     Occupational History     Not on file.     Social History Main Topics     Smoking status: Never Smoker     Smokeless tobacco: Never Used     Alcohol use No     Drug use: No     Sexual activity: Yes     Partners: Female     Other Topics Concern     Not on file     Social History Narrative       Allergies:  Allergies   Allergen Reactions     No Known Allergies        Medications:  Current Outpatient Prescriptions   Medication     cinacalcet (SENSIPAR) 30 MG tablet     ondansetron (ZOFRAN) 4 MG tablet     MYFORTIC 180 MG PO EC TABLET     carvedilol (COREG) 6.25 MG tablet     cloNIDine (CATAPRES) 0.1 MG tablet     cycloSPORINE modified (GENERIC EQUIVALENT) 25 MG capsule     amLODIPine (NORVASC) 10 MG tablet     insulin aspart (NOVOLOG PEN) 100 UNIT/ML soln     insulin glargine (LANTUS) 100 UNIT/ML PEN     blood glucose monitoring (NO BRAND SPECIFIED) test strip     HYDROcodone-acetaminophen (NORCO) 5-325 MG per tablet     No current facility-administered medications for this visit.          Vitals:  BP (!) 182/92 (BP Location: Right arm, Patient Position: Chair, Cuff Size: Adult Regular)  Pulse 97  Ht 1.702 m (5' 7\")  Wt 78 kg (172 lb)  SpO2 99%  BMI 26.94 kg/m2    Exam:   GENERAL APPEARANCE: alert and no distress, " chronically ill looking  HENT: mouth without ulcers or lesions  LYMPHATICS: no cervical or supraclavicular nodes  RESP: lungs clear to auscultation - no rales, rhonchi or wheezes  CV: regular rhythm, normal rate, no rub, no murmur  EDEMA: no LE edema bilaterally, AVF in the left forearm with palpable thrill   ABDOMEN: protruded, soft, slightly distended, surgical incision well healed, nontender, bowel sounds normal  MS: extremities normal - no gross deformities noted, no evidence of inflammation in joints, no muscle tenderness  SKIN: no rash    Results: reviewed      Patient was seen and evaluated by me, Deyvi Dick MD. I have reviewed the note and agree with the the plan of care as documented by the fellow.  Reviewed labs which became available after visit.   Appears to be progressing towards dialysis soon possible in the next weeks to month   I reduced the MPA dose to bid and will reassess his symptoms   Will need to repeat labs every 2-4 weeks and visit every 3 months   I stressed on the importance of BP control

## 2017-05-26 ENCOUNTER — TELEPHONE (OUTPATIENT)
Dept: TRANSPLANT | Facility: CLINIC | Age: 52
End: 2017-05-26

## 2017-05-26 DIAGNOSIS — I10 BENIGN ESSENTIAL HYPERTENSION: ICD-10-CM

## 2017-05-26 LAB
BKV DNA # SPEC NAA+PROBE: NORMAL COPIES/ML
BKV DNA SPEC NAA+PROBE-LOG#: NORMAL LOG COPIES/ML
SPECIMEN SOURCE: NORMAL

## 2017-05-26 RX ORDER — AMLODIPINE BESYLATE 10 MG/1
10 TABLET ORAL DAILY
Qty: 30 TABLET | Refills: 11 | Status: SHIPPED | OUTPATIENT
Start: 2017-05-26 | End: 2017-07-11

## 2017-05-31 ENCOUNTER — HOSPITAL ENCOUNTER (OUTPATIENT)
Dept: GENERAL RADIOLOGY | Facility: CLINIC | Age: 52
Discharge: HOME | End: 2017-05-31
Attending: PHYSICIAN ASSISTANT | Admitting: PHYSICIAN ASSISTANT
Payer: COMMERCIAL

## 2017-05-31 ENCOUNTER — HOSPITAL ENCOUNTER (OUTPATIENT)
Dept: CARDIOLOGY | Facility: CLINIC | Age: 52
End: 2017-05-31
Attending: PHYSICIAN ASSISTANT
Payer: COMMERCIAL

## 2017-05-31 ENCOUNTER — OFFICE VISIT (OUTPATIENT)
Dept: TRANSPLANT | Facility: CLINIC | Age: 52
End: 2017-05-31
Attending: INTERNAL MEDICINE
Payer: COMMERCIAL

## 2017-05-31 ENCOUNTER — OFFICE VISIT (OUTPATIENT)
Dept: TRANSPLANT | Facility: CLINIC | Age: 52
End: 2017-05-31
Attending: SURGERY
Payer: COMMERCIAL

## 2017-05-31 ENCOUNTER — RESULTS ONLY (OUTPATIENT)
Dept: OTHER | Facility: CLINIC | Age: 52
End: 2017-05-31

## 2017-05-31 ENCOUNTER — HOSPITAL ENCOUNTER (OUTPATIENT)
Dept: CARDIOLOGY | Facility: CLINIC | Age: 52
Discharge: HOME | End: 2017-05-31
Attending: PHYSICIAN ASSISTANT | Admitting: PHYSICIAN ASSISTANT
Payer: COMMERCIAL

## 2017-05-31 VITALS
HEIGHT: 66 IN | WEIGHT: 168.9 LBS | HEART RATE: 94 BPM | SYSTOLIC BLOOD PRESSURE: 174 MMHG | TEMPERATURE: 97.8 F | OXYGEN SATURATION: 100 % | BODY MASS INDEX: 27.14 KG/M2 | DIASTOLIC BLOOD PRESSURE: 88 MMHG

## 2017-05-31 VITALS
TEMPERATURE: 97.8 F | WEIGHT: 168.9 LBS | HEART RATE: 94 BPM | DIASTOLIC BLOOD PRESSURE: 88 MMHG | SYSTOLIC BLOOD PRESSURE: 174 MMHG | BODY MASS INDEX: 27.14 KG/M2 | HEIGHT: 66 IN | OXYGEN SATURATION: 100 %

## 2017-05-31 DIAGNOSIS — Z76.82 MULTIPLE ORGAN TRANSPLANT CANDIDATE: Primary | ICD-10-CM

## 2017-05-31 DIAGNOSIS — E78.5 HYPERLIPIDEMIA: ICD-10-CM

## 2017-05-31 DIAGNOSIS — I25.10 CARDIOVASCULAR DISEASE: ICD-10-CM

## 2017-05-31 DIAGNOSIS — T86.12 KIDNEY TRANSPLANT FAILURE: ICD-10-CM

## 2017-05-31 DIAGNOSIS — Q61.3 POLYCYSTIC KIDNEY: ICD-10-CM

## 2017-05-31 DIAGNOSIS — I10 ESSENTIAL HYPERTENSION: ICD-10-CM

## 2017-05-31 DIAGNOSIS — E11.9 DIABETES MELLITUS, TYPE 2 (H): ICD-10-CM

## 2017-05-31 DIAGNOSIS — Z76.82 ORGAN TRANSPLANT CANDIDATE: Primary | ICD-10-CM

## 2017-05-31 DIAGNOSIS — Z76.82 ORGAN TRANSPLANT CANDIDATE: ICD-10-CM

## 2017-05-31 DIAGNOSIS — N18.5 TYPE 2 DIABETES MELLITUS WITH STAGE 5 CHRONIC KIDNEY DISEASE NOT ON CHRONIC DIALYSIS, WITH LONG-TERM CURRENT USE OF INSULIN (H): Primary | ICD-10-CM

## 2017-05-31 DIAGNOSIS — N18.9 CHRONIC RENAL FAILURE: ICD-10-CM

## 2017-05-31 DIAGNOSIS — T86.91 TRANSPLANT FAILURE DUE TO REJECTION: ICD-10-CM

## 2017-05-31 DIAGNOSIS — Z79.4 TYPE 2 DIABETES MELLITUS WITH STAGE 5 CHRONIC KIDNEY DISEASE NOT ON CHRONIC DIALYSIS, WITH LONG-TERM CURRENT USE OF INSULIN (H): Primary | ICD-10-CM

## 2017-05-31 DIAGNOSIS — E11.22 TYPE 2 DIABETES MELLITUS WITH STAGE 5 CHRONIC KIDNEY DISEASE NOT ON CHRONIC DIALYSIS, WITH LONG-TERM CURRENT USE OF INSULIN (H): Primary | ICD-10-CM

## 2017-05-31 DIAGNOSIS — Q61.3 POLYCYSTIC KIDNEY: Primary | ICD-10-CM

## 2017-05-31 DIAGNOSIS — T86.11 KIDNEY TRANSPLANT REJECTION: ICD-10-CM

## 2017-05-31 DIAGNOSIS — Z94.0 KIDNEY REPLACED BY TRANSPLANT: ICD-10-CM

## 2017-05-31 DIAGNOSIS — Z01.818 ENCOUNTER FOR PRE-TRANSPLANT EVALUATION FOR KIDNEY AND PANCREAS TRANSPLANT: Primary | ICD-10-CM

## 2017-05-31 LAB
ABO + RH BLD: NORMAL
ALBUMIN SERPL-MCNC: 4.1 G/DL (ref 3.4–5)
ALBUMIN UR-MCNC: 100 MG/DL
ALP SERPL-CCNC: 150 U/L (ref 40–150)
ALT SERPL W P-5'-P-CCNC: 17 U/L (ref 0–70)
ANION GAP SERPL CALCULATED.3IONS-SCNC: 10 MMOL/L (ref 3–14)
APPEARANCE UR: CLEAR
APTT PPP: 31 SEC (ref 22–37)
AST SERPL W P-5'-P-CCNC: 8 U/L (ref 0–45)
BASOPHILS # BLD AUTO: 0 10E9/L (ref 0–0.2)
BASOPHILS NFR BLD AUTO: 0.4 %
BILIRUB SERPL-MCNC: 0.5 MG/DL (ref 0.2–1.3)
BILIRUB UR QL STRIP: NEGATIVE
BLD GP AB SCN SERPL QL: NORMAL
BLD GP AB SCN TITR SERPL: NORMAL {TITER}
BLOOD BANK CMNT PATIENT-IMP: NORMAL
BUN SERPL-MCNC: 64 MG/DL (ref 7–30)
C PEPTIDE SERPL-MCNC: 2.8 NG/ML (ref 0.9–6.9)
CALCIUM SERPL-MCNC: 9 MG/DL (ref 8.5–10.1)
CARDIOLIPIN ANTIBODY IGG: NORMAL GPL-U/ML (ref 0–19.9)
CARDIOLIPIN ANTIBODY IGM: 1.9 MPL-U/ML (ref 0–19.9)
CHLORIDE SERPL-SCNC: 107 MMOL/L (ref 94–109)
CHOLEST SERPL-MCNC: 145 MG/DL
CMV IGG SERPL QL IA: ABNORMAL AI (ref 0–0.8)
CO2 SERPL-SCNC: 22 MMOL/L (ref 20–32)
COLOR UR AUTO: ABNORMAL
CREAT SERPL-MCNC: 4.68 MG/DL (ref 0.66–1.25)
DIFFERENTIAL METHOD BLD: ABNORMAL
EBV VCA IGG SER QL IA: ABNORMAL AI (ref 0–0.8)
EOSINOPHIL # BLD AUTO: 0.1 10E9/L (ref 0–0.7)
EOSINOPHIL NFR BLD AUTO: 1.9 %
ERYTHROCYTE [DISTWIDTH] IN BLOOD BY AUTOMATED COUNT: 14 % (ref 10–15)
GFR SERPL CREATININE-BSD FRML MDRD: 13 ML/MIN/1.7M2
GLUCOSE SERPL-MCNC: 157 MG/DL (ref 70–99)
GLUCOSE UR STRIP-MCNC: >499 MG/DL
HBA1C MFR BLD: 10.5 % (ref 4.3–6)
HBV CORE AB SERPL QL IA: NONREACTIVE
HBV SURFACE AB SERPL IA-ACNC: 16.98 M[IU]/ML
HBV SURFACE AG SERPL QL IA: NONREACTIVE
HCT VFR BLD AUTO: 37.1 % (ref 40–53)
HCV AB SERPL QL IA: NORMAL
HDLC SERPL-MCNC: 43 MG/DL
HGB BLD-MCNC: 12.1 G/DL (ref 13.3–17.7)
HGB UR QL STRIP: ABNORMAL
HIV 1+2 AB+HIV1 P24 AG SERPL QL IA: NORMAL
IMM GRANULOCYTES # BLD: 0 10E9/L (ref 0–0.4)
IMM GRANULOCYTES NFR BLD: 0.2 %
INR PPP: 1.09 (ref 0.86–1.14)
KETONES UR STRIP-MCNC: NEGATIVE MG/DL
LDLC SERPL CALC-MCNC: 84 MG/DL
LEUKOCYTE ESTERASE UR QL STRIP: NEGATIVE
LYMPHOCYTES # BLD AUTO: 0.6 10E9/L (ref 0.8–5.3)
LYMPHOCYTES NFR BLD AUTO: 12.6 %
MCH RBC QN AUTO: 27.9 PG (ref 26.5–33)
MCHC RBC AUTO-ENTMCNC: 32.6 G/DL (ref 31.5–36.5)
MCV RBC AUTO: 86 FL (ref 78–100)
MONOCYTES # BLD AUTO: 0.4 10E9/L (ref 0–1.3)
MONOCYTES NFR BLD AUTO: 8 %
NEUTROPHILS # BLD AUTO: 3.7 10E9/L (ref 1.6–8.3)
NEUTROPHILS NFR BLD AUTO: 76.9 %
NITRATE UR QL: NEGATIVE
NONHDLC SERPL-MCNC: 102 MG/DL
NRBC # BLD AUTO: 0 10*3/UL
NRBC BLD AUTO-RTO: 0 /100
PH UR STRIP: 6 PH (ref 5–7)
PLATELET # BLD AUTO: 254 10E9/L (ref 150–450)
POTASSIUM SERPL-SCNC: 4.6 MMOL/L (ref 3.4–5.3)
PROT SERPL-MCNC: 7.3 G/DL (ref 6.8–8.8)
PSA SERPL-ACNC: 0.34 UG/L (ref 0–4)
RBC # BLD AUTO: 4.34 10E12/L (ref 4.4–5.9)
RBC #/AREA URNS AUTO: 10 /HPF (ref 0–2)
SODIUM SERPL-SCNC: 139 MMOL/L (ref 133–144)
SP GR UR STRIP: 1.01 (ref 1–1.03)
SPECIMEN EXP DATE BLD: NORMAL
SPECIMEN EXP DATE BLD: NORMAL
SQUAMOUS #/AREA URNS AUTO: <1 /HPF (ref 0–1)
T PALLIDUM IGG+IGM SER QL: NEGATIVE
THROMBIN TIME: 16.4 SEC (ref 13–19)
TRIGL SERPL-MCNC: 91 MG/DL
URN SPEC COLLECT METH UR: ABNORMAL
UROBILINOGEN UR STRIP-MCNC: 0 MG/DL (ref 0–2)
VZV IGG SER QL IA: 3.9 AI (ref 0–0.8)
WBC # BLD AUTO: 4.8 10E9/L (ref 4–11)
WBC #/AREA URNS AUTO: 1 /HPF (ref 0–2)

## 2017-05-31 PROCEDURE — 71020 XR CHEST 2 VW: CPT

## 2017-05-31 PROCEDURE — 86833 HLA CLASS II HIGH DEFIN QUAL: CPT | Performed by: STUDENT IN AN ORGANIZED HEALTH CARE EDUCATION/TRAINING PROGRAM

## 2017-05-31 PROCEDURE — 93306 TTE W/DOPPLER COMPLETE: CPT | Mod: 26 | Performed by: INTERNAL MEDICINE

## 2017-05-31 PROCEDURE — 86832 HLA CLASS I HIGH DEFIN QUAL: CPT | Performed by: STUDENT IN AN ORGANIZED HEALTH CARE EDUCATION/TRAINING PROGRAM

## 2017-05-31 PROCEDURE — 93306 TTE W/DOPPLER COMPLETE: CPT

## 2017-05-31 ASSESSMENT — PAIN SCALES - GENERAL
PAINLEVEL: NO PAIN (0)
PAINLEVEL: NO PAIN (0)

## 2017-05-31 NOTE — MR AVS SNAPSHOT
After Visit Summary   5/31/2017    Amadou Lewis    MRN: [id number]           Patient Information     Date Of Birth          1965        Visit Information        Provider Department      5/31/2017 9:45 AM Lizette Cole RN [facility] Solid Organ Transplant        Today's Diagnoses     Organ transplant candidate    -  1    Diabetes mellitus, type 2 (H)        Essential hypertension           Follow-ups after your visit        Your next 10 appointments already scheduled     Jul 11, 2017  3:15 PM CDT   (Arrive by 2:45 PM)   Return Kidney Transplant with Deyvi Dick MD   [facility] Nephrology ([facility])    [address] [place] [id number] [phone]            Aug 30, 2017  8:30 AM CDT   US AORTA/IVC/ILIAC DUPLEX COMPLETE with [id number]   [facility] Imaging Center US ([facility])    [address] [place] [address] [id number] [phone]           Please bring a list of your medicines (including vitamins, minerals and over-the-counter drugs). Also, tell your doctor about any allergies you may have. Wear comfortable clothes and leave your valuables at home.  Adults: No eating or drinking for 8 hours before the exam. You may take medicine with a small sip of water.  Children: - Children 6+ years: No food or drink for 6 hours before exam. - Children 1-5 years: No food or drink for 4 hours before exam. - Infants, breast-fed: may have breast milk up to 2 hours before exam. - Infants, formula: may have bottle until 4 hours before exam.  Please call the Imaging Department at your exam site with any questions.            Aug 30, 2017 10:00 AM CDT   (Arrive by 9:45 AM)   NEW PANCREAS/KIDNEY TRANSPLANT WORK-UP with Jesse Will MD   [facility] Heart Care ([facility])    [address] [place] [address] [id number] [phone]            Sep 26, 2017  3:15 PM CDT    (Arrive by 2:45 PM)   Return Kidney Transplant with Deyvi Dick MD   [facility] Nephrology ([facility])    [address] [facility] [address] [phone]              Who to contact     If you have questions or need follow up information about today's clinic visit or your schedule please contact [facility] SOLID ORGAN TRANSPLANT directly at [phone].  Normal or non-critical lab and imaging results will be communicated to you by castacliphart, letter or phone within 4 business days after the clinic has received the results. If you do not hear from us within 7 days, please contact the clinic through [address] [facility] or phone. If you have a critical or abnormal lab result, we will notify you by phone as soon as possible.  Submit refill requests through K[facility] or call your pharmacy and they will forward the refill request to us. Please allow 3 business days for your refill to be completed.          Additional Information About Your Visit        Kiddy Information     K[facility] gives you secure access to your electronic health record. If you see a primary care provider, you can also send messages to your care team and make appointments. If you have questions, please call your primary care clinic.  If you do not have a primary care provider, please call [phone] and they will assist you.        Care EveryWhere ID     This is your Care EveryWhere ID. This could be used by other organizations to access your [facility] medical records  [id number]         Blood Pressure from Last 3 Encounters:   05/31/17 174/88   05/31/17 174/88   05/25/17 (!) 182/92    Weight from Last 3 Encounters:   05/31/17 76.6 kg (168 lb 14.4 oz)   05/31/17 76.6 kg (168 lb 14.4 oz)   05/25/17 78 kg (172 lb)              Today, you had the following     No orders found for display         Today's Medication Changes          These changes are accurate as of: 5/31/17 11:59 PM.  If you have any questions, ask  your nurse or doctor.               These medicines have changed or have updated prescriptions.        Dose/Directions    insulin glargine 100 UNIT/ML injection   Commonly known as:  LANTUS   This may have changed:    - how much to take  - when to take this   Used for:  Type 2 diabetes mellitus with diabetic chronic kidney disease (H)        Dose:  53 Units   Inject 53 Units Subcutaneous every 24 hours   Refills:  0                Primary Care Provider Office Phone # Fax #    Masoud Valentin MD, [phone]       [address] [address] [address] [place] [address]        Thank you!     Thank you for choosing [facility] SOLID ORGAN TRANSPLANT  for your care. Our goal is always to provide you with excellent care. Hearing back from our patients is one way we can continue to improve our services. Please take a few minutes to complete the written survey that you may receive in the mail after your visit with us. Thank you!             Your Updated Medication List - Protect others around you: Learn how to safely use, store and throw away your medicines at www.disposemymeds.org.          This list is accurate as of: 5/31/17 11:59 PM.  Always use your most recent med list.                   Brand Name Dispense Instructions for use    amLODIPine 10 MG tablet    NORVASC    30 tablet    Take 1 tablet (10 mg) by mouth daily       blood glucose monitoring test strip    no brand specified    1 Box    Use to test blood sugar 4 times daily or as directed.       carvedilol 6.25 MG tablet    COREG    180 tablet    Take 2 tablets (12.5 mg) by mouth 2 times daily (with meals)       cinacalcet 30 MG tablet    SENSIPAR    30 tablet    Take 1 tablet (30 mg) by mouth daily       cloNIDine 0.1 MG tablet    CATAPRES    60 tablet    Take 1 tablet (0.1 mg) by mouth 2 times daily as needed For SBP > 170       cycloSPORINE modified 25 MG capsule    GENERIC EQUIVALENT    180 capsule    Take 3 capsules (75 mg)  by mouth 2 times daily Total dose 75 mg twice a day       HYDROcodone-acetaminophen 5-325 MG per tablet    NORCO     Take 2 tablets by mouth every 6 hours as needed for moderate to severe pain       insulin aspart 100 UNIT/ML injection    NovoLOG PEN     Inject 1-16 Units Subcutaneous At Bedtime Correction Scale - VERY HIGH INSULIN RESISTANCE DOSING    Do Not give Bedtime Correction Insulin if BG < 200.  For  - 209 give 1 units.  For  - 219 give 2 units.  For  - 229 give 3 units.  For  - 239 give 4 units.  For  - 249 give 5 units.  For  - 259 give 6 units.  For  - 269 give 7 units.  For  - 279 give 8 units.  For  - 289 give 9 units.       insulin glargine 100 UNIT/ML injection    LANTUS     Inject 53 Units Subcutaneous every 24 hours       mycophenolic acid EC tablet     240 tablet    Take three tablets twice daily       ondansetron 4 MG tablet    ZOFRAN    30 tablet    Take 1 tablet (4 mg) by mouth every 12 hours as needed for nausea

## 2017-05-31 NOTE — MR AVS SNAPSHOT
After Visit Summary   5/31/2017    Amadou Lewis    MRN: [id number]           Patient Information     Date Of Birth          1965        Visit Information        Provider Department      5/31/2017 10:30 AM Carlota Cruz RD [facility] Solid Organ Transplant        Today's Diagnoses     Organ transplant candidate    -  1       Follow-ups after your visit        Your next 10 appointments already scheduled     May 31, 2017 11:15 AM CDT   Nephrology Consult with  PKE PATIENT 4   [facility] Solid Organ Transplant ([facility])    [address]  3rd Floor  [facility] [address] [phone]            May 31, 2017  1:15 PM CDT   Lab with  LAB   [facility] Lab ([facility])    [address]  [address] [facility] [id number] [phone]            May 31, 2017  2:30 PM CDT   XR CHEST 2 VIEWS with UUXR1   [facility] [place],  Radiology ([facility])    [address] [facility] [id number] [phone]           Please bring a list of your current medicines to your exam. (Include vitamins, minerals and over-thecounter medicines.) Leave your valuables at home.  Tell your doctor if there is a chance you may be pregnant.  You do not need to do anything special for this exam.            May 31, 2017  3:00 PM CDT   ecg with UKALLIEM   [facility] ([facility])    [address] [address]               May 31, 2017  3:30 PM CDT   Ech Complete with UUECHR2   [facility] [place],  Echocardiography ([facility])    [address] [facility] [address] [phone]           1.  Please bring or wear a comfortable two-piece outfit. 2.  You may eat, drink and take your normal medicines. 3.  For any questions that cannot be answered, please contact the ordering physician             Jul 11, 2017  3:15 PM CDT   (Arrive by 2:45 PM)   Return Kidney Transplant with Deyvi Dick MD   [facility] Nephrology ([facility])    [address] [place] [address] [phone]            Aug 30, 2017  8:30 AM CDT   US AORTA/IVC/ILIAC DUPLEX COMPLETE with UCUSV1   [facility] Imaging Center US ([facility])    [address] [place] [address] [phone]           Please bring a list of your medicines (including vitamins, minerals and over-the-counter drugs). Also, tell your doctor about any allergies you may have. Wear comfortable clothes and leave your valuables at home.  Adults: No eating or drinking for 8 hours before the exam. You may take medicine with a small sip of water.  Children: - Children 6+ years: No food or drink for 6 hours before exam. - Children 1-5 years: No food or drink for 4 hours before exam. - Infants, breast-fed: may have breast milk up to 2 hours before exam. - Infants, formula: may have bottle until 4 hours before exam.  Please call the Imaging Department at your exam site with any questions.            Aug 30, 2017 10:00 AM CDT   (Arrive by 9:45 AM)   NEW PANCREAS/KIDNEY TRANSPLANT WORK-UP with Jesse Will MD   [facility] Heart Care ([facility])    [address] [place] [id number] [phone]            Sep 26, 2017  3:15 PM CDT   (Arrive by 2:45 PM)   Return Kidney Transplant with Deyvi Dick MD   [facility] Nephrology ([facility])    [address] [place] [address] [phone]              Who to contact     If you have questions or need follow up information about today's clinic visit or your schedule please contact [facility] SOLID ORGAN TRANSPLANT directly at [phone].  Normal or non-critical lab and imaging results will be communicated to you by Shelley  letter or phone within 4 business days after the clinic has received the results. If you do not hear from us within 7 days, please contact the clinic through ZUGGI or phone. If you have a critical or abnormal lab result, we will notify you by phone as soon as possible.  Submit refill requests through ZUGGI or call your pharmacy and they will forward the refill request to us. Please allow 3 business days for your refill to be completed.          Additional Information About Your Visit        ZUGGI Information     ZUGGI gives you secure access to your electronic health record. If you see a primary care provider, you can also send messages to your care team and make appointments. If you have questions, please call your primary care clinic.  If you do not have a primary care provider, please call [phone] and they will assist you.        Care EveryWhere ID     This is your Care EveryWhere ID. This could be used by other organizations to access your [facility] medical records  [id number]         Blood Pressure from Last 3 Encounters:   05/31/17 174/88   05/25/17 (!) 182/92   05/15/17 (!) 188/95    Weight from Last 3 Encounters:   05/31/17 76.6 kg (168 lb 14.4 oz)   05/25/17 78 kg (172 lb)   05/15/17 76.9 kg (169 lb 9.6 oz)              Today, you had the following     No orders found for display         Today's Medication Changes          These changes are accurate as of: 5/31/17 10:54 AM.  If you have any questions, ask your nurse or doctor.               These medicines have changed or have updated prescriptions.        Dose/Directions    insulin glargine 100 UNIT/ML injection   Commonly known as:  LANTUS   This may have changed:    - how much to take  - when to take this   Used for:  Type 2 diabetes mellitus with diabetic chronic kidney disease (H)        Dose:  53 Units   Inject 53 Units Subcutaneous every 24 hours   Refills:  0                Primary Care Provider Office Phone # Fax #    Masoud Valentin  MD, [phone]       [address] [address] [address] [place] [address]        Thank you!     Thank you for choosing [facility] SOLID ORGAN TRANSPLANT  for your care. Our goal is always to provide you with excellent care. Hearing back from our patients is one way we can continue to improve our services. Please take a few minutes to complete the written survey that you may receive in the mail after your visit with us. Thank you!             Your Updated Medication List - Protect others around you: Learn how to safely use, store and throw away your medicines at www.disposemymeds.org.          This list is accurate as of: 5/31/17 10:54 AM.  Always use your most recent med list.                   Brand Name Dispense Instructions for use    amLODIPine 10 MG tablet    NORVASC    30 tablet    Take 1 tablet (10 mg) by mouth daily       blood glucose monitoring test strip    no brand specified    1 Box    Use to test blood sugar 4 times daily or as directed.       carvedilol 6.25 MG tablet    COREG    180 tablet    Take 2 tablets (12.5 mg) by mouth 2 times daily (with meals)       cinacalcet 30 MG tablet    SENSIPAR    30 tablet    Take 1 tablet (30 mg) by mouth daily       cloNIDine 0.1 MG tablet    CATAPRES    60 tablet    Take 1 tablet (0.1 mg) by mouth 2 times daily as needed For SBP > 170       cycloSPORINE modified 25 MG capsule    GENERIC EQUIVALENT    180 capsule    Take 3 capsules (75 mg) by mouth 2 times daily Total dose 75 mg twice a day       HYDROcodone-acetaminophen 5-325 MG per tablet    NORCO     Take 2 tablets by mouth every 6 hours as needed for moderate to severe pain       insulin aspart 100 UNIT/ML injection    NovoLOG PEN     Inject 1-16 Units Subcutaneous At Bedtime Correction Scale - VERY HIGH INSULIN RESISTANCE DOSING    Do Not give Bedtime Correction Insulin if BG < 200.  For  - 209 give 1 units.  For  - 219 give 2 units.  For  - 229 give 3  units.  For  - 239 give 4 units.  For  - 249 give 5 units.  For  - 259 give 6 units.  For  - 269 give 7 units.  For  - 279 give 8 units.  For  - 289 give 9 units.       insulin glargine 100 UNIT/ML injection    LANTUS     Inject 53 Units Subcutaneous every 24 hours       mycophenolic acid EC tablet     240 tablet    Take three tablets twice daily       ondansetron 4 MG tablet    ZOFRAN    30 tablet    Take 1 tablet (4 mg) by mouth every 12 hours as needed for nausea

## 2017-05-31 NOTE — MR AVS SNAPSHOT
After Visit Summary   5/31/2017    Amadou Lewis    MRN: [id number]           Patient Information     Date Of Birth          1965        Visit Information        Provider Department      5/31/2017 11:15 AM UC PKE PATIENT 4 [facility] Solid Organ Transplant        Today's Diagnoses     Polycystic kidney    -  1    Kidney transplant rejection           Follow-ups after your visit        Your next 10 appointments already scheduled     Jul 11, 2017  3:15 PM CDT   (Arrive by 2:45 PM)   Return Kidney Transplant with Deyvi Dick MD   [facility] Nephrology ([facility])    [address] [place] [phone] [phone]            Aug 30, 2017  8:30 AM CDT   US AORTA/IVC/ILIAC DUPLEX COMPLETE with UCUSV1   [facility] Imaging Center US ([facility])    [address] [place] [id number] [phone]           Please bring a list of your medicines (including vitamins, minerals and over-the-counter drugs). Also, tell your doctor about any allergies you may have. Wear comfortable clothes and leave your valuables at home.  Adults: No eating or drinking for 8 hours before the exam. You may take medicine with a small sip of water.  Children: - Children 6+ years: No food or drink for 6 hours before exam. - Children 1-5 years: No food or drink for 4 hours before exam. - Infants, breast-fed: may have breast milk up to 2 hours before exam. - Infants, formula: may have bottle until 4 hours before exam.  Please call the Imaging Department at your exam site with any questions.            Aug 30, 2017 10:00 AM CDT   (Arrive by 9:45 AM)   NEW PANCREAS/KIDNEY TRANSPLANT WORK-UP with Jesse Will MD   [facility] Heart Care ([facility])    [address] [place] [id number] [phone]            Sep 26, 2017  3:15 PM CDT   (Arrive by 2:45 PM)   Return Kidney Transplant  "with Deyvi Dick MD   [facility] Nephrology ([facility])    [address] [facility]  3rd Floor  [facility] [address] [phone]              Who to contact     If you have questions or need follow up information about today's clinic visit or your schedule please contact [facility] SOLID ORGAN TRANSPLANT directly at [phone].  Normal or non-critical lab and imaging results will be communicated to you by Ancancohart, letter or phone within 4 business days after the clinic has received the results. If you do not hear from us within 7 days, please contact the clinic through MobFoxt or phone. If you have a critical or abnormal lab result, we will notify you by phone as soon as possible.  Submit refill requests through LetMeHearYa or call your pharmacy and they will forward the refill request to us. Please allow 3 business days for your refill to be completed.          Additional Information About Your Visit        AncancoharTango Publishing Information     LetMeHearYa gives you secure access to your electronic health record. If you see a primary care provider, you can also send messages to your care team and make appointments. If you have questions, please call your primary care clinic.  If you do not have a primary care provider, please call [phone] and they will assist you.        Care EveryWhere ID     This is your Care EveryWhere ID. This could be used by other organizations to access your [place] medical records  [id number]        Your Vitals Were     Pulse Temperature Height Pulse Oximetry BMI (Body Mass Index)       94 97.8  F (36.6  C) (Oral) 1.67 m (5' 5.75\") 100% 27.47 kg/m2        Blood Pressure from Last 3 Encounters:   05/31/17 174/88   05/31/17 174/88   05/25/17 (!) 182/92    Weight from Last 3 Encounters:   05/31/17 76.6 kg (168 lb 14.4 oz)   05/31/17 76.6 kg (168 lb 14.4 oz)   05/25/17 78 kg (172 lb)              Today, you had the following     No orders found for display         Today's " Medication Changes          These changes are accurate as of: 5/31/17 11:59 PM.  If you have any questions, ask your nurse or doctor.               These medicines have changed or have updated prescriptions.        Dose/Directions    insulin glargine 100 UNIT/ML injection   Commonly known as:  LANTUS   This may have changed:    - how much to take  - when to take this   Used for:  Type 2 diabetes mellitus with diabetic chronic kidney disease (H)        Dose:  53 Units   Inject 53 Units Subcutaneous every 24 hours   Refills:  0                Primary Care Provider Office Phone # Fax #    Masoud Valentin MD, [phone]       [address] [place] [address]        Thank you!     Thank you for choosing [facility] SOLID ORGAN TRANSPLANT  for your care. Our goal is always to provide you with excellent care. Hearing back from our patients is one way we can continue to improve our services. Please take a few minutes to complete the written survey that you may receive in the mail after your visit with us. Thank you!             Your Updated Medication List - Protect others around you: Learn how to safely use, store and throw away your medicines at www.disposemymeds.org.          This list is accurate as of: 5/31/17 11:59 PM.  Always use your most recent med list.                   Brand Name Dispense Instructions for use    amLODIPine 10 MG tablet    NORVASC    30 tablet    Take 1 tablet (10 mg) by mouth daily       blood glucose monitoring test strip    no brand specified    1 Box    Use to test blood sugar 4 times daily or as directed.       carvedilol 6.25 MG tablet    COREG    180 tablet    Take 2 tablets (12.5 mg) by mouth 2 times daily (with meals)       cinacalcet 30 MG tablet    SENSIPAR    30 tablet    Take 1 tablet (30 mg) by mouth daily       cloNIDine 0.1 MG tablet    CATAPRES    60 tablet    Take 1 tablet (0.1 mg) by mouth 2 times daily as needed For SBP >  170       cycloSPORINE modified 25 MG capsule    GENERIC EQUIVALENT    180 capsule    Take 3 capsules (75 mg) by mouth 2 times daily Total dose 75 mg twice a day       HYDROcodone-acetaminophen 5-325 MG per tablet    NORCO     Take 2 tablets by mouth every 6 hours as needed for moderate to severe pain       insulin aspart 100 UNIT/ML injection    NovoLOG PEN     Inject 1-16 Units Subcutaneous At Bedtime Correction Scale - VERY HIGH INSULIN RESISTANCE DOSING    Do Not give Bedtime Correction Insulin if BG < 200.  For  - 209 give 1 units.  For  - 219 give 2 units.  For  - 229 give 3 units.  For  - 239 give 4 units.  For  - 249 give 5 units.  For  - 259 give 6 units.  For  - 269 give 7 units.  For  - 279 give 8 units.  For  - 289 give 9 units.       insulin glargine 100 UNIT/ML injection    LANTUS     Inject 53 Units Subcutaneous every 24 hours       mycophenolic acid EC tablet     240 tablet    Take three tablets twice daily       ondansetron 4 MG tablet    ZOFRAN    30 tablet    Take 1 tablet (4 mg) by mouth every 12 hours as needed for nausea
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
## 2017-05-31 NOTE — MR AVS SNAPSHOT
After Visit Summary   5/31/2017    Amadou Lewis    MRN: [id number]           Patient Information     Date Of Birth          1965        Visit Information        Provider Department      5/31/2017 9:00 AM Ethel Rowland, WILLARD [facility] Solid Organ Transplant        Today's Diagnoses     Multiple organ transplant candidate    -  1       Follow-ups after your visit        Your next 10 appointments already scheduled     May 31, 2017  3:30 PM CDT   Ech Complete with UUECHR2   [facility] [place],  [facility] ([facility], [facility])    [address] [facility] [id number] [phone]           1.  Please bring or wear a comfortable two-piece outfit. 2.  You may eat, drink and take your normal medicines. 3.  For any questions that cannot be answered, please contact the ordering physician            Jul 11, 2017  3:15 PM CDT   (Arrive by 2:45 PM)   Return Kidney Transplant with Deyvi Dick MD   [facility] ([facility] [place])    [address] [facility]  [address] [facility] [id number] [phone]            Aug 30, 2017  8:30 AM CDT   US AORTA/IVC/ILIAC DUPLEX COMPLETE with UCUSV1   [facility] US ([facility])    [address] [facility] [address] [facility]  [address] [facility] [id number] [phone]           Please bring a list of your medicines (including vitamins, minerals and over-the-counter drugs). Also, tell your doctor about any allergies you may have. Wear comfortable clothes and leave your valuables at home.  Adults: No eating or drinking for 8 hours before the exam. You may take medicine with a small sip of water.  Children: - Children 6+ years: No food or drink for 6 hours before exam. - Children 1-5 years: No food or drink for 4 hours before exam. - Infants, breast-fed: may have breast milk up to 2 hours before exam. - Infants, formula: may have bottle until 4 hours before exam.  Please call  the Imaging Department at your exam site with any questions.            Aug 30, 2017 10:00 AM CDT   (Arrive by 9:45 AM)   NEW PANCREAS/KIDNEY TRANSPLANT WORK-UP with Jesse Will MD   [facility] ([facility])    [address] [place] [id number] [phone]            Sep 26, 2017  3:15 PM CDT   (Arrive by 2:45 PM)   Return Kidney Transplant with Deyvi Dick MD   [facility] ([facility])    [address] [place] [address] [phone]              Who to contact     If you have questions or need follow up information about today's clinic visit or your schedule please contact [facility] SOLID ORGAN TRANSPLANT directly at [phone].  Normal or non-critical lab and imaging results will be communicated to you by MyChart, letter or phone within 4 business days after the clinic has received the results. If you do not hear from us within 7 days, please contact the clinic through VerbalizeIthart or phone. If you have a critical or abnormal lab result, we will notify you by phone as soon as possible.  Submit refill requests through DormNoise or call your pharmacy and they will forward the refill request to us. Please allow 3 business days for your refill to be completed.          Additional Information About Your Visit        VerbalizeIthart Information     DormNoise gives you secure access to your electronic health record. If you see a primary care provider, you can also send messages to your care team and make appointments. If you have questions, please call your primary care clinic.  If you do not have a primary care provider, please call [phone] and they will assist you.        Care EveryWhere ID     This is your Care EveryWhere ID. This could be used by other organizations to access your [place] medical records  [id number]         Blood Pressure from Last 3 Encounters:   05/31/17 174/88   05/31/17  174/88   05/25/17 (!) 182/92    Weight from Last 3 Encounters:   05/31/17 76.6 kg (168 lb 14.4 oz)   05/31/17 76.6 kg (168 lb 14.4 oz)   05/25/17 78 kg (172 lb)              Today, you had the following     No orders found for display         Today's Medication Changes          These changes are accurate as of: 5/31/17  3:24 PM.  If you have any questions, ask your nurse or doctor.               These medicines have changed or have updated prescriptions.        Dose/Directions    insulin glargine 100 UNIT/ML injection   Commonly known as:  LANTUS   This may have changed:    - how much to take  - when to take this   Used for:  Type 2 diabetes mellitus with diabetic chronic kidney disease (H)        Dose:  53 Units   Inject 53 Units Subcutaneous every 24 hours   Refills:  0                Primary Care Provider Office Phone # Fax #    Masoud Valentin MD, [phone]       [address] [address] [address] [address] [place] [address]        Thank you!     Thank you for choosing [facility] SOLID ORGAN TRANSPLANT  for your care. Our goal is always to provide you with excellent care. Hearing back from our patients is one way we can continue to improve our services. Please take a few minutes to complete the written survey that you may receive in the mail after your visit with us. Thank you!             Your Updated Medication List - Protect others around you: Learn how to safely use, store and throw away your medicines at www.disposemymeds.org.          This list is accurate as of: 5/31/17  3:24 PM.  Always use your most recent med list.                   Brand Name Dispense Instructions for use    amLODIPine 10 MG tablet    NORVASC    30 tablet    Take 1 tablet (10 mg) by mouth daily       blood glucose monitoring test strip    no brand specified    1 Box    Use to test blood sugar 4 times daily or as directed.       carvedilol 6.25 MG tablet    COREG    180 tablet    Take 2 tablets (12.5 mg) by  mouth 2 times daily (with meals)       cinacalcet 30 MG tablet    SENSIPAR    30 tablet    Take 1 tablet (30 mg) by mouth daily       cloNIDine 0.1 MG tablet    CATAPRES    60 tablet    Take 1 tablet (0.1 mg) by mouth 2 times daily as needed For SBP > 170       cycloSPORINE modified 25 MG capsule    GENERIC EQUIVALENT    180 capsule    Take 3 capsules (75 mg) by mouth 2 times daily Total dose 75 mg twice a day       HYDROcodone-acetaminophen 5-325 MG per tablet    NORCO     Take 2 tablets by mouth every 6 hours as needed for moderate to severe pain       insulin aspart 100 UNIT/ML injection    NovoLOG PEN     Inject 1-16 Units Subcutaneous At Bedtime Correction Scale - VERY HIGH INSULIN RESISTANCE DOSING    Do Not give Bedtime Correction Insulin if BG < 200.  For  - 209 give 1 units.  For  - 219 give 2 units.  For  - 229 give 3 units.  For  - 239 give 4 units.  For  - 249 give 5 units.  For  - 259 give 6 units.  For  - 269 give 7 units.  For  - 279 give 8 units.  For  - 289 give 9 units.       insulin glargine 100 UNIT/ML injection    LANTUS     Inject 53 Units Subcutaneous every 24 hours       mycophenolic acid EC tablet     240 tablet    Take three tablets twice daily       ondansetron 4 MG tablet    ZOFRAN    30 tablet    Take 1 tablet (4 mg) by mouth every 12 hours as needed for nausea

## 2017-05-31 NOTE — MR AVS SNAPSHOT
After Visit Summary   5/31/2017    Amadou Lewis    MRN: [id number]           Patient Information     Date Of Birth          1965        Visit Information        Provider Department      5/31/2017 10:00 AM [facility] PATIENT 4 [facility] Solid Organ Transplant        Today's Diagnoses     Type 2 diabetes mellitus with stage 5 chronic kidney disease not on chronic dialysis, with long-term current use of insulin (H)    -  1    Kidney replaced by transplant        Polycystic kidney           Follow-ups after your visit        Your next 10 appointments already scheduled     Jul 11, 2017  3:15 PM CDT   (Arrive by 2:45 PM)   Return Kidney Transplant with Deyvi Dick MD   [facility]ph[facility] ([facility])    [address] [place] [address]  3rd Floor  [facility] [address] [phone]            Aug 30, 2017  8:30 AM CDT   US AORTA/IVC/ILIAC DUPLEX COMPLETE with UCUSV1   [facility] US ([facility])    [address] [place] [address]  1st Floor  [facility] [address] [phone]           Please bring a list of your medicines (including vitamins, minerals and over-the-counter drugs). Also, tell your doctor about any allergies you may have. Wear comfortable clothes and leave your valuables at home.  Adults: No eating or drinking for 8 hours before the exam. You may take medicine with a small sip of water.  Children: - Children 6+ years: No food or drink for 6 hours before exam. - Children 1-5 years: No food or drink for 4 hours before exam. - Infants, breast-fed: may have breast milk up to 2 hours before exam. - Infants, formula: may have bottle until 4 hours before exam.  Please call the Imaging Department at your exam site with any questions.            Aug 30, 2017 10:00 AM CDT   (Arrive by 9:45 AM)   NEW PANCREAS/KIDNEY TRANSPLANT WORK-UP with Jesse Will MD   [facility] ([facility])    [address] [place] [address]  "[address]  [address] [facility] [id number] [phone]            Sep 26, 2017  3:15 PM CDT   (Arrive by 2:45 PM)   Return Kidney Transplant with Deyvi Dick MD   [facility] Neph[facility] ([facility] [place])    [address] [facility]  [address] [facility] [id number] [phone]              Future tests that were ordered for you today     Open Future Orders        Priority Expected Expires Ordered    X-ray Chest 2 vws* Routine 9/25/2017 6/9/2018 6/9/2017            Who to contact     If you have questions or need follow up information about today's clinic visit or your schedule please contact [facility] SOLID ORGAN TRANSPLANT directly at [phone].  Normal or non-critical lab and imaging results will be communicated to you by Gudeng Precisionhart, letter or phone within 4 business days after the clinic has received the results. If you do not hear from us within 7 days, please contact the clinic through G[employer]isionhart or phone. If you have a critical or abnormal lab result, we will notify you by phone as soon as possible.  Submit refill requests through [employer] or call your pharmacy and they will forward the refill request to us. Please allow 3 business days for your refill to be completed.          Additional Information About Your Visit        Ophis Vape Information     O[employer] gives you secure access to your electronic health record. If you see a primary care provider, you can also send messages to your care team and make appointments. If you have questions, please call your primary care clinic.  If you do not have a primary care provider, please call [phone] and they will assist you.        Care EveryWhere ID     This is your Care EveryWhere ID. This could be used by other organizations to access your [place] medical records  [id number]        Your Vitals Were     Pulse Temperature Height Pulse Oximetry BMI (Body Mass Index)       94 97.8  F (36.6  C) (Oral) 1.67 m (5' 5.75\") 100% 27.47 kg/m2        " Blood Pressure from Last 3 Encounters:   05/31/17 174/88   05/31/17 174/88   05/25/17 (!) 182/92    Weight from Last 3 Encounters:   05/31/17 76.6 kg (168 lb 14.4 oz)   05/31/17 76.6 kg (168 lb 14.4 oz)   05/25/17 78 kg (172 lb)              Today, you had the following     No orders found for display         Today's Medication Changes          These changes are accurate as of: 5/31/17 11:59 PM.  If you have any questions, ask your nurse or doctor.               These medicines have changed or have updated prescriptions.        Dose/Directions    insulin glargine 100 UNIT/ML injection   Commonly known as:  LANTUS   This may have changed:    - how much to take  - when to take this   Used for:  Type 2 diabetes mellitus with diabetic chronic kidney disease (H)        Dose:  53 Units   Inject 53 Units Subcutaneous every 24 hours   Refills:  0                Primary Care Provider Office Phone # Fax #    Masoud Valentin MD, [phone]       [address] [facility] [address] [place] [address]        Thank you!     Thank you for choosing [facility] SOLID ORGAN TRANSPLANT  for your care. Our goal is always to provide you with excellent care. Hearing back from our patients is one way we can continue to improve our services. Please take a few minutes to complete the written survey that you may receive in the mail after your visit with us. Thank you!             Your Updated Medication List - Protect others around you: Learn how to safely use, store and throw away your medicines at www.disposemymeds.org.          This list is accurate as of: 5/31/17 11:59 PM.  Always use your most recent med list.                   Brand Name Dispense Instructions for use    amLODIPine 10 MG tablet    NORVASC    30 tablet    Take 1 tablet (10 mg) by mouth daily       blood glucose monitoring test strip    no brand specified    1 Box    Use to test blood sugar 4 times daily or as directed.       carvedilol  6.25 MG tablet    COREG    180 tablet    Take 2 tablets (12.5 mg) by mouth 2 times daily (with meals)       cinacalcet 30 MG tablet    SENSIPAR    30 tablet    Take 1 tablet (30 mg) by mouth daily       cloNIDine 0.1 MG tablet    CATAPRES    60 tablet    Take 1 tablet (0.1 mg) by mouth 2 times daily as needed For SBP > 170       cycloSPORINE modified 25 MG capsule    GENERIC EQUIVALENT    180 capsule    Take 3 capsules (75 mg) by mouth 2 times daily Total dose 75 mg twice a day       HYDROcodone-acetaminophen 5-325 MG per tablet    NORCO     Take 2 tablets by mouth every 6 hours as needed for moderate to severe pain       insulin aspart 100 UNIT/ML injection    NovoLOG PEN     Inject 1-16 Units Subcutaneous At Bedtime Correction Scale - VERY HIGH INSULIN RESISTANCE DOSING    Do Not give Bedtime Correction Insulin if BG < 200.  For  - 209 give 1 units.  For  - 219 give 2 units.  For  - 229 give 3 units.  For  - 239 give 4 units.  For  - 249 give 5 units.  For  - 259 give 6 units.  For  - 269 give 7 units.  For  - 279 give 8 units.  For  - 289 give 9 units.       insulin glargine 100 UNIT/ML injection    LANTUS     Inject 53 Units Subcutaneous every 24 hours       mycophenolic acid EC tablet     240 tablet    Take three tablets twice daily       ondansetron 4 MG tablet    ZOFRAN    30 tablet    Take 1 tablet (4 mg) by mouth every 12 hours as needed for nausea

## 2017-05-31 NOTE — PROGRESS NOTES
"Psychosocial Assessment  Patient Name/ Age: Amadou \"Sean\" EPIFANIO Lewis 51 year old   Medical Record #: [id number]  Duration of Interview: 30 min  Process:   Face-to-Face Interview                (counseling < 50%)   Present at Appointment: Sean        : EDIN Kohli  Date:  May 31, 2017        Type of transplant: Kidney/Pancreas    Donor type:      Cadaver   Prior Transplants:    Yes - Kidney in  Status of Transplant: graft failure, not on dialysis       Current Living Situation    Location:   [address] [place] [address] [address] [place] [address]  With Whom: lives with his girlfriend Ly part time (she has her daughters part time and stays in an apartment with them)       Family/ Social Support:    Sean has two living children, Dario (who lives in [place]) and Kisha (lives in [place], [place]). Sean reported his son Claudio recently passed away this past December. Sean reported his daughter Kendal passed away over 20 years ago. Sean reported he is doing okay after the death of his son Claudio. Sean has four brothers and three sisters who live in other states. He reported they are supportive. Sean reported his mother lives on the [facility] and he does not have a relationship with her. Sean reported his father is .  available, helpful (children)  Available, occasional (siblings)   Committed relationship: Sean is in a relationship with Ly, who was his donor for his first transplant.     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 His significant other Ly is employed full time.    Income   salary/wages   Insurance      At this time, patient can afford medication " costs:  Yes  private insurance- [employer] through employer.        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  Sean reported he may have three alcoholic drinks a month. Sean denied any substance use or history of chemical dependency treatment.      Psychiatric Impairment No  Sean denied any current or history of anxiety, depression, or other mental health concerns.     Reading ability Good  Education level: Some College Recent Legal History No      Coping Style/Strategies: going for long drives, music, vent to friends       Ability to Adhere to Complex Medical Regime: Yes     Adherence History: Sean reported he follows his physician's recommendations, takes his medications as prescribed, and attends his appointments as scheduled.        Education  _X_ Medicare  _X_ Rehabilitation  _X_ Donor issues  _X_ Community resources  _X_ Post discharge housing  _X_ Financial resources  _X_ Medical insurance options  _X_ Psych adjustment  _X_ Family adjustment  _X_ Health Care Directive No, okay with children.   Psychosocial Risks of Transplant Reviewed and Discussed:  _X_ Increased stress related to emotional,            family, social, employment or financial           situation  _X_ Affect on work and/or disability benefits  _X_ Affect on future health and life           insurance  _X_ Transplant outcome expectations may           not be met  _X_ Mental Health Risks: anxiety,           depression, PTSD, guilt, grief and           chronic fatigue     Notable Items:   None noted.       Final Evaluation/Assessment   Patient seemed to process information well. Appeared well informed, motivated and able to follow post transplant requirements. Behavior was appropriate during interview. Has adequate income and insurance coverage. Adequate social support. No major  contraindications noted for transplant.  At this time patient appears to understand the risks and benefits of transplant.      Recommendation  Acceptable    Selection Criteria Met:  Plan for support Yes   Chemical Dependence Yes   Smoking Yes   Mental Health Yes   Adequate Finances Yes    Signature: EDIN Kohli    Title: Clinical

## 2017-05-31 NOTE — LETTER
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 in the care of your patient.      Sincerely,    TERA

## 2017-05-31 NOTE — MR AVS SNAPSHOT
After Visit Summary   5/31/2017    Amadou Lewis    MRN: [id number]           Patient Information     Date Of Birth          1965        Visit Information        Provider Department      5/31/2017 8:00 AM UC TRANSPLANT CLASS [facility] Solid Organ Transplant        Today's Diagnoses     Encounter for pre-transplant evaluation for kidney and pancreas transplant    -  1       Follow-ups after your visit        Your next 10 appointments already scheduled     May 31, 2017  9:45 AM CDT   (Arrive by 9:30 AM)   SOT CARE COORDINATOR GRABIEL with Lizette Cole RN   [facility] Solid Organ Transplant ([facility])    [address] [place] [id number]   [phone]            May 31, 2017 10:00 AM CDT   (Arrive by 9:45 AM)   Surgery Consult with  PKE PATIENT 4   [facility] Solid Organ Transplant ([facility])    [address] [place] [address]   [phone]            May 31, 2017 10:30 AM CDT   NUTRITION VISIT with Carlota Cruz RD   [facility] Solid Organ Transplant ([facility])    [address] [place] [id number] [phone]            May 31, 2017 11:15 AM CDT   Nephrology Consult with JOE PKE PATIENT 4   [facility] Solid Organ Transplant ([facility])    [address] [place] [address]   [phone]            May 31, 2017  1:15 PM CDT   Lab with  LAB   [facility] Lab ([facility])    [address] [place] [id number] [phone]            May 31, 2017  2:30 PM CDT   XR CHEST 2 VIEWS with [email or web address]   [facility], [place],  Radiology ([facility])    [address] [facility] [id number] [phone]           Please bring a list of your current medicines to your exam. (Include vitamins,  minerals and over-thecounter medicines.) Leave your valuables at home.  Tell your doctor if there is a chance you may be pregnant.  You do not need to do anything special for this exam.            May 31, 2017  3:00 PM CDT   ecg with UUECALIN NATHU ELECTROCARDIOLOGY ([facility])    [address] [facility] [address]               May 31, 2017  3:30 PM CDT   Ech Complete with UUECHR2   [facility], [place],  Echocardiography ([facility])    [address] [facility] [address] [phone]           1.  Please bring or wear a comfortable two-piece outfit. 2.  You may eat, drink and take your normal medicines. 3.  For any questions that cannot be answered, please contact the ordering physician            Jul 11, 2017  3:15 PM CDT   (Arrive by 2:45 PM)   Return Kidney Transplant with Deyvi Dick MD   [facility] Nephrology ([facility])    [address] [place] [id number] [phone]            Aug 30, 2017 10:00 AM CDT   (Arrive by 9:45 AM)   NEW PANCREAS/KIDNEY TRANSPLANT WORK-UP with Jesse Will MD   [facility] Heart Care ([facility])    [address] [place] [address] [phone]              Who to contact     If you have questions or need follow up information about today's clinic visit or your schedule please contact [facility] SOLID ORGAN TRANSPLANT directly at [phone].  Normal or non-critical lab and imaging results will be communicated to you by MyChart, letter or phone within 4 business days after the clinic has received the results. If you do not hear from us within 7 days, please contact the clinic through MyChart or phone. If you have a critical or abnormal lab result, we will notify you by phone as soon as possible.  Submit refill requests through [employer] or call your pharmacy and they will forward the  refill request to us. Please allow 3 business days for your refill to be completed.          Additional Information About Your Visit        Insynchart Information     [employer] gives you secure access to your electronic health record. If you see a primary care provider, you can also send messages to your care team and make appointments. If you have questions, please call your primary care clinic.  If you do not have a primary care provider, please call [phone] and they will assist you.        Care EveryWhere ID     This is your Care EveryWhere ID. This could be used by other organizations to access your [facility] medical records  [id number]         Blood Pressure from Last 3 Encounters:   05/25/17 (!) 182/92   05/15/17 (!) 188/95   02/14/17 (!) 213/114    Weight from Last 3 Encounters:   05/25/17 78 kg (172 lb)   05/15/17 76.9 kg (169 lb 9.6 oz)   02/14/17 77.5 kg (170 lb 12.8 oz)              Today, you had the following     No orders found for display         Today's Medication Changes          These changes are accurate as of: 5/31/17  9:09 AM.  If you have any questions, ask your nurse or doctor.               These medicines have changed or have updated prescriptions.        Dose/Directions    insulin glargine 100 UNIT/ML injection   Commonly known as:  LANTUS   This may have changed:    - how much to take  - when to take this   Used for:  Type 2 diabetes mellitus with diabetic chronic kidney disease (H)        Dose:  53 Units   Inject 53 Units Subcutaneous every 24 hours   Refills:  0                Primary Care Provider Office Phone # Fax #    Masoud Valentin MD, [phone]       [address] [place] [address]        Thank you!     Thank you for choosing [facility] SOLID ORGAN TRANSPLANT  for your care. Our goal is always to provide you with excellent care. Hearing back from our patients is one way we can continue to improve our services. Please take  a few minutes to complete the written survey that you may receive in the mail after your visit with us. Thank you!             Your Updated Medication List - Protect others around you: Learn how to safely use, store and throw away your medicines at www.disposemymeds.org.          This list is accurate as of: 5/31/17  9:09 AM.  Always use your most recent med list.                   Brand Name Dispense Instructions for use    amLODIPine 10 MG tablet    NORVASC    30 tablet    Take 1 tablet (10 mg) by mouth daily       blood glucose monitoring test strip    no brand specified    1 Box    Use to test blood sugar 4 times daily or as directed.       carvedilol 6.25 MG tablet    COREG    180 tablet    Take 2 tablets (12.5 mg) by mouth 2 times daily (with meals)       cinacalcet 30 MG tablet    SENSIPAR    30 tablet    Take 1 tablet (30 mg) by mouth daily       cloNIDine 0.1 MG tablet    CATAPRES    60 tablet    Take 1 tablet (0.1 mg) by mouth 2 times daily as needed For SBP > 170       cycloSPORINE modified 25 MG capsule    GENERIC EQUIVALENT    180 capsule    Take 3 capsules (75 mg) by mouth 2 times daily Total dose 75 mg twice a day       HYDROcodone-acetaminophen 5-325 MG per tablet    NORCO     Take 2 tablets by mouth every 6 hours as needed for moderate to severe pain       insulin aspart 100 UNIT/ML injection    NovoLOG PEN     Inject 1-16 Units Subcutaneous At Bedtime Correction Scale - VERY HIGH INSULIN RESISTANCE DOSING    Do Not give Bedtime Correction Insulin if BG < 200.  For  - 209 give 1 units.  For  - 219 give 2 units.  For  - 229 give 3 units.  For  - 239 give 4 units.  For  - 249 give 5 units.  For  - 259 give 6 units.  For  - 269 give 7 units.  For  - 279 give 8 units.  For  - 289 give 9 units.       insulin glargine 100 UNIT/ML injection    LANTUS     Inject 53 Units Subcutaneous every 24 hours       mycophenolic acid EC tablet     240 tablet    Take  three tablets twice daily       ondansetron 4 MG tablet    ZOFRAN    30 tablet    Take 1 tablet (4 mg) by mouth every 12 hours as needed for nausea

## 2017-05-31 NOTE — PROGRESS NOTES
Pre Abdominal Organ Transplant Coordinator Evaluation Note:    MD present: Dr. Warner Jordan; Gael Agee NP /Jori Wolfe MD   Attendees: self    Type of transplant: kidney and pancreas    Required Topic(s) Discussed: Evaluation notification document, SRTR data, Multiple wait list brochure, [employer]PI, Evaluation/approval process, Selection committee process, Wait list process and Living donor process    Teaching: Instruct patient on living donor process/provided contact info, Provided my business card and To call me with any questions    Assessment/Plan: 1) First selection committee 6/7/2017 2) meets criteria for eGFR 19 to list inactive 3) Amadou thought he was referred for  first transplant, appears to have been kidney only 4) Discussion of his [facility] INS that it covers here at [facility] until 11/31/2017. Goal is to Wait list to accrue time on list and if he has to go to another center his time can transfer. He understands 5) I sent note to Marco Benson RN with Dr. Will asking if they can review past stress test and determine if he needs new one. 6) I reviewed [facility], he is unsure of any donors; SRTR and My transplant place.  He is active with [facility]     Time spent with patient: 30 minutes
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
## 2017-05-31 NOTE — PROGRESS NOTES
NUTRITION KIDNEY PANCREAS TRANSPLANT EVALUATION  Medical Nutrition Therapy    Weight and BMI:  Current BMI: 27.5  BMI is within criteria for kidney pancreas transplant    Malnutrition Status:    Pt does not meet criteria for diagnosing malnutrition        Visit Type: F/U Assessment    Amadou Lewis referred by Dr. Luevano for MNT related to kidney pancreas transplant evaluation    Patient accompanied by self    H/o previous txp: Kidney txp 10/2013    Nutrition Assessment:  Anthropometrics  Height:   65.75 in   BMI:    27.5  Weight Status:Overweight BMI 25-29.9   Weight:  168 lbs/76.6 kg             IBW (lb): 141  % IBW: 119%    Wt Hx: Pt reports up 20# since kidney txp, but has remained stable since.    Adj/dosing BW: 168 lbs/76.6 kg         Recent Labs   Lab Test  05/31/17   0716  11/18/16   1448  02/10/14   1559  02/03/14   1626   CHOL  145  152  132  114   HDL  43  43  47  46   LDL  84  92  56  55   TRIG  91  85  139  64   CHOLHDLRATIO   --    --   2.8  2.5     Lab Results   Component Value Date    A1C 10.5 05/31/2017    A1C 10.4 01/25/2016    A1C 10.4 01/23/2016    A1C 11.1 01/21/2016    A1C 7.9 10/12/2013     Potassium   Date Value Ref Range Status   05/31/2017 4.6 3.4 - 5.3 mmol/L Final   Phosphorus: 3.8 today     Vitamins, Supplements, Herbals, Pertinent Meds:   (Per pt, he is taking the following meds to reduce his elevated Cr levels):  Chitosan  Alpha-lipoic acid     Nutrition History  Pt-reported special diets/eating habits: Mindful of phosphorus levels. Pt reports he was told to add salt to foods.    Frequency of BG checks: 3x/day  Dining out/food not made at home: 50% of the time  Appetite: Pt reports his appetite is variable. He has experienced reduced appetite since Nov-Dec.     Recall:  B: white toast or breakfast s/w or breakfast burrito - from [employer], Holiday or work.  L: s/w on white bread with soup - made at home or from work   D: beef or chicken or pork or fish (grilled or baked) with  "veggies (variety of frozen, fresh, canned) - made at home   Sn: ice cream, white toast with cinnamon, fruit (berries, melon)  Beverages: water, 20 oz/week soda (diet coke or camilla Hinojosa), >8oz/day milk    ETOH (1 drink = 12 oz beer, 5 oz wine, 1.5 oz liquor): Drinks 6-pk beer in one month    Current adherence to recommended diet (Fair) - Pt states he is aware his diet could be improved, but is satisfied with his dietary habits in relation to health at this time.      Physical Activity  Active job     MALNUTRITION  % Intake:  Decreased intake does not meet criteria for malnutrition   % Weight Loss:  None noted   Subcutaneous Fat Loss:  None   Muscle Loss:  None  Fluid Accumulation/Edema:  Unknown  Malnutrition Diagnosis: Patient does not meet two of the above criteria necessary for diagnosing malnutrition    Nutrition Prescription  Energy:  1915 - 2298    (25-30 kcal/kg dosing BW for maintenance needs)     Protein:  46 - 61    (0.6.-0.8 g/kg dosing BW for CKD)    Fluid:  1 ml/kcal or per MD        Micronutrient:  Na+: <2000 mg/day  K+: 6336-9068 mg/day  Phos: 800-1000 mg/day         Nutrition Diagnosis:  Excessive Na+ intake r/t food and nutrition related knowledge deficit AEB diet recall reveals high Na+ foods.    Nutrition Intervention:  Nutrition education provided:  Discussed higher sodium intake in relation to pre-packaged/processed food consumption. Provided pt with ideas to avoiding convenience foods by preparing meals at home (e.g. Making large batches of soup or burritos at home and freezing), however, pt had reasons he did not want to do these. Alternatively, pt was willing to change his methods seasoning foods, therefore RD provided pt with handout \"strategies to seasoning food without salt\".      Reviewed post txp diet guidelines in brief (will review in further detail post txp):   (1) Review of proper food safety measures d/t immunosuppressant therapy post-op and increased risk for food-borne " illness (2) Stressed importance of not taking any herbal/Chinese/alternative medicines or supplements post txp (d/t risk for rejection, unknown effects on the organs, potential interactions with immunosuppresants). (3) Med regimen and possible side effects    Patient Understanding: Pt verbalized understanding of education provided.  Expected Compliance: Fair   Follow-Up Plans: PRN      Nutrition Goals:  Reduce dietary Na+ intake by seasoning foods without salt (goal of <2000 mg/day).     Provided pt with contact info.   Time spent with patient: 30 minutes.  Hanh Naidu  Registration Eligible     Claire Cruz RD, LD

## 2017-05-31 NOTE — LETTER
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 By:  Marky Rossi MD, PhD [facility]    TESTING LAB LOCATION:  [address] [address]  [phone]    COLLECTION SITE:  Client: [facility]  Location: [facility] ([facility])    COMMENTS:  This test has been canceled and credited..    Electronically Signed Out By:  ELEANOR     CPT Codes:    TESTING LAB LOCATION:  [address] [address]  [phone]    COLLECTION SITE:  Client[facility]  [facility]  [facility]     M Tuberculosis by Quantiferon [[id number]]    Collection Time: 05/31/17  7:17 AM   Result Value Ref Range    M Tuberculosis Result Negative NEG    M Tuberculosis Antigen Value 0.01 IU/mL   BK virus PCR quantitative [[id number]]    Collection Time: 05/31/17  7:17 AM   Result Value Ref Range    BK Virus Specimen Plasma     BK Virus Result BK Virus DNA Not Detected BKNEG copies/mL    BK Virus  Log  <2.7 Log copies/mL     Not Calculated   The Real-Time quantitative BK Virus assay was developed and its performance   characteristics determined by the [facility] at   the [facility] in [place], [place]. The   primers and probes for each analyte are Analyte Specific Reagents (ASRs)   manufactured by [employer].   ASRs are used in many laboratory tests necessary for standard medical care and   generally do not require U.S. Food and Drug Administration approval. The FDA   has determined that such clearance or approval is not necessary.   This test is used for clinical purposes. It should not be regarded as   investigational or for research. This laboratory is certified under the   Clinical Laboratory Improvement Amendments of 1988 (CLIA-88) as qualified to   perform high complexity clinical laboratory testing.     Routine UA with microscopic [[id number]]    Collection Time: 05/31/17  7:23 AM   Result Value Ref Range    Color Urine Straw     Appearance Urine Clear     Glucose Urine >499 (A) NEG mg/dL    Bilirubin Urine Negative NEG    Ketones Urine Negative NEG mg/dL    Specific Gravity Urine 1.009 1.003 - 1.035    Blood Urine Small (A) NEG    pH Urine 6.0 5.0 - 7.0 pH    Protein Albumin Urine 100 (A) NEG mg/dL    Urobilinogen mg/dL 0.0 0.0 - 2.0 mg/dL    Nitrite Urine Negative NEG    Leukocyte Esterase Urine Negative NEG    Source Midstream Urine     WBC Urine 1 0 - 2 /HPF    RBC Urine 10 (H) 0 - 2 /HPF    Squamous Epithelial /HPF Urine <1 0 - 1 /HPF   ABO type [[id number]]    Collection Time: 05/31/17  1:24 PM   Result Value Ref Range    ABO B     RH(D)  Pos     Specimen Expires 06/03/2017    EKG 12-lead, tracing only [EKG1]    Collection Time: 05/31/17  2:31 PM   Result Value Ref Range    Interpretation ECG Click View Image link to view waveform and result      UNOS cPRA   Date Value Ref Range Status   11/17/2016 75  Final       PKE

## 2017-05-31 NOTE — PROGRESS NOTES
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  [address] [address]  [phone]    COLLECTION SITE:  Client: [facility]  Location: [facility] (B)    COMMENTS:  This test has been canceled and credited..    Electronically Signed Out By:  ELEANOR     CPT Codes:    TESTING LAB LOCATION:  [address] [address]  [phone]    COLLECTION SITE:  Client:  [facility]  Location:  [facility] (B)     M Tuberculosis by Quantiferon [[id number]]    Collection Time: 05/31/17  7:17 AM   Result Value Ref Range    M Tuberculosis Result Negative NEG    M Tuberculosis Antigen Value 0.01 IU/mL   BK virus PCR quantitative [[id number]]    Collection Time: 05/31/17  7:17 AM   Result Value Ref Range    BK Virus Specimen Plasma     BK Virus Result BK Virus DNA Not Detected BKNEG copies/mL    BK Virus Log  <2.7 Log copies/mL     Not Calculated   The Real-Time quantitative BK Virus assay  was developed and its performance   characteristics determined by the Infectious Diseases Diagnostic Laboratory at   the [facility] in [place], [place]. The   primers and probes for each analyte are Analyte Specific Reagents (ASRs)   manufactured by [employer].   ASRs are used in many laboratory tests necessary for standard medical care and   generally do not require U.S. Food and Drug Administration approval. The FDA   has determined that such clearance or approval is not necessary.   This test is used for clinical purposes. It should not be regarded as   investigational or for research. This laboratory is certified under the   Clinical Laboratory Improvement Amendments of 1988 (CLIA-88) as qualified to   perform high complexity clinical laboratory testing.     Routine UA with microscopic [[id number]]    Collection Time: 05/31/17  7:23 AM   Result Value Ref Range    Color Urine Straw     Appearance Urine Clear     Glucose Urine >499 (A) NEG mg/dL    Bilirubin Urine Negative NEG    Ketones Urine Negative NEG mg/dL    Specific Gravity Urine 1.009 1.003 - 1.035    Blood Urine Small (A) NEG    pH Urine 6.0 5.0 - 7.0 pH    Protein Albumin Urine 100 (A) NEG mg/dL    Urobilinogen mg/dL 0.0 0.0 - 2.0 mg/dL    Nitrite Urine Negative NEG    Leukocyte Esterase Urine Negative NEG    Source Midstream Urine     WBC Urine 1 0 - 2 /HPF    RBC Urine 10 (H) 0 - 2 /HPF    Squamous Epithelial /HPF Urine <1 0 - 1 /HPF   ABO type [[id number]]    Collection Time: 05/31/17  1:24 PM   Result Value Ref Range    ABO B     RH(D)  Pos     Specimen Expires 06/03/2017    EKG 12-lead, tracing only [EKG1]    Collection Time: 05/31/17  2:31 PM   Result Value Ref Range    Interpretation ECG Click View Image link to view waveform and result      UNOS cPRA   Date Value Ref Range Status   11/17/2016 77  Final

## 2017-05-31 NOTE — PROGRESS NOTES
Patient attended the Pre Kidney/Pancreas Transplant Education Class today alone.Patiet was very attentive and and engaged throughout the class and asked few questions. I introduced the [facility] website and encouraged him to access it for further education. In addition to the standard video content. I reviewed living donation paired exchange, KDPI, typical length of stay and the need to stay locally post transplant discharge.

## 2017-06-01 ENCOUNTER — TELEPHONE (OUTPATIENT)
Dept: TRANSPLANT | Facility: CLINIC | Age: 52
End: 2017-06-01

## 2017-06-01 LAB
BKV DNA # SPEC NAA+PROBE: NORMAL COPIES/ML
BKV DNA SPEC NAA+PROBE-LOG#: NORMAL LOG COPIES/ML
HLA TYPING COMPLETE SOT RECIPIENT: NORMAL
INTERPRETATION ECG - MUSE: NORMAL
LA PPP-IMP: NORMAL
M TB TUBERC IFN-G BLD QL: NEGATIVE
M TB TUBERC IFN-G/MITOGEN IGNF BLD: 0.01 IU/ML
MYCOPHENOLATE SERPL LC/MS/MS-MCNC: 2.79 MG/L (ref 1–3.5)
MYCOPHENOLATE-G SERPL LC/MS/MS-MCNC: >200 MG/L (ref 30–95)
PRA SINGLE ANTIGEN IGG ANTIBODY: NORMAL
SPECIMEN SOURCE: NORMAL
TME LAST DOSE: ABNORMAL H

## 2017-06-01 NOTE — TELEPHONE ENCOUNTER
Sean Lewis called asking about his CXR report and if it is okay? I said I would send to the provider and let him know the review.   I sent report to Gael Agee NP Impression: Streaky opacity in the right lower lobe, likely atelectasis   I will call him when they have reviewed. I did tell him we will discuss at the 6/7 meeting on Wednesday

## 2017-06-05 ENCOUNTER — TELEPHONE (OUTPATIENT)
Dept: TRANSPLANT | Facility: CLINIC | Age: 52
End: 2017-06-05

## 2017-06-05 NOTE — TELEPHONE ENCOUNTER
From: Gael Agee APRN CNP Sent: 6/5/2017   7:44 AM    To: Lizette Cole RN Subject CXR You can tell Mr. Lewis that as long as he is not having symptoms, then are no interventions that we have to do at this time. He is scheduled to see Dr. Dick in July, so we can recheck a CXR at that time.  Thanks, Alicia     I called Amadou and read this report to him.  He understands the  team will discuss him on 6/7/17

## 2017-06-06 DIAGNOSIS — Z94.0 KIDNEY REPLACED BY TRANSPLANT: Primary | ICD-10-CM

## 2017-06-06 LAB
B51: 644
B51: 700
DONOR IDENTIFICATION: NORMAL
DONOR IDENTIFICATION: NORMAL
DSA COMMENTS: NORMAL
DSA COMMENTS: NORMAL
DSA PRESENT: YES
DSA PRESENT: YES
DSA TEST METHOD: NORMAL
DSA TEST METHOD: NORMAL
ORGAN: NORMAL
ORGAN: NORMAL
SA1 CELL: NORMAL
SA1 CELL: NORMAL
SA1 COMMENTS: NORMAL
SA1 COMMENTS: NORMAL
SA1 HI RISK ABY: NORMAL
SA1 HI RISK ABY: NORMAL
SA1 MOD RISK ABY: NORMAL
SA1 MOD RISK ABY: NORMAL
SA1 TEST METHOD: NORMAL
SA1 TEST METHOD: NORMAL
SA2 CELL: NORMAL
SA2 CELL: NORMAL
SA2 COMMENTS: NORMAL
SA2 COMMENTS: NORMAL
SA2 HI RISK ABY UA: NORMAL
SA2 HI RISK ABY UA: NORMAL
SA2 MOD RISK ABY: NORMAL
SA2 MOD RISK ABY: NORMAL
SA2 TEST METHOD: NORMAL
SA2 TEST METHOD: NORMAL

## 2017-06-06 NOTE — NURSING NOTE
Per Dr. Dick: Usual CKD labs   Renal panel and cbc   Monthly drugs per transplant        Sent mychart message updating patient, and to transplant coordinator to ensure transplant labs are correct.      Jen Alan RN

## 2017-06-07 ENCOUNTER — COMMITTEE REVIEW (OUTPATIENT)
Dept: TRANSPLANT | Facility: CLINIC | Age: 52
End: 2017-06-07

## 2017-06-07 LAB — COPATH REPORT: NORMAL

## 2017-06-07 NOTE — LETTER
2017    Amadou Lewis  [address] [place] [address]      Dear Mr. Lewis,    This letter is sent to confirm that you have completed your transplant work-up and you are a candidate in the {Organ } transplant program at the [facility] [place] [facility].  You were placed on the {Organ } inactive waitlist on ***.  This means you will accumulate waiting time but not receive  donor calls.       Items we will need from you:      We have received approval from you insurance company for the transplant procedure.  It is critical that you notify us if there is any change in your insurance.  It is also important that you familiarize yourself with the details of your specific insurance policy.  Our patient  is available to assist you if you should have any questions regarding your coverage.      An ALA or PRA blood sample may need to be sent here every 3 to 6 months to match you with  donors or any potential living donors.  If you need this testing, special mailing boxes (called mailers) will be sent to you directly from the Outreach Department.  You should take the physician order form and the  to your home laboratory when it is time to for this testing to be done.  Additional mailers can be obtained by calling the Transplant Office and asking to speak to a {Organ } .      During this waiting period, we may request additional periodic laboratory tests with your primary physician.  It will be your responsibility to remind your physician to forward your results to the Transplant Office.      We need to be kept informed of any changes in your medical condition such as:    o changes in your medications,   o significant changes in your health  o significant infections (such as pneumonia or abscesses)  o blood transfusions  o any condition which requires hospitalization  o any surgery      Remember to  complete any routine cancer screening tests required before your transplant.  This includes colonoscopy; prostrate screening for men, and mammogram and gynecologic testing for women, as well as dental work.  Your primary care clinic can assist you with arranging for these exams.  Remind your caregivers to forward copies of the records and final reports.    We want you to know that our program has physician and surgeon coverage 24 hours a day, 365 days a year. If this coverage changes or there are substantial program changes, you will be notified in writing by letter.     Attached is a letter from the [employer] ([employer]). It describes the services and information offered to patients by UNOS and the Organ Procurement and Transplantation Network.    We appreciate having had the opportunity to participate in your care.  If you have questions, please feel free to call the Transplant Office at [phone] or [phone].      Sincerely,       {Organ UC:[id number]} Transplant Program    Enclosures: {SOT enclosures:[id number]}  CC:   ***

## 2017-06-07 NOTE — LETTER
2017    Amadou Lewis  [address] [place] [address]      Dear Mr. Lewis,    This letter is sent to confirm that you have completed your transplant work-up and you are a candidate in the kidney and pancreas transplant program at the [facility].  You were placed on the kidney and pancreas inactive waitlist on 2017.  This means you will accumulate waiting time but not receive  donor calls.       Items we will need from you per our telephone conversation on 2017:      During this waiting period, we request the following items    Keep the Return to clinic appointments on 2017 at [facility]    Arrange for return appointment with Diabetes/ Endocrine doctor at [facility] with Diabetes education    Make Dermatology/skin check appointment at [facility]    Arrange a Colonoscopy to assess colon for cancer at [facility]  Call your local dentist and ask for assessment for gum or bone disease and dentist to write note of status.        We need to be kept informed of any changes in your medical condition such as:    o changes in your medications,   o significant changes in your health  o significant infections (such as pneumonia or abscesses)  o blood transfusions  o any condition which requires hospitalization  o any surgery    We want you to know that our program has physician and surgeon coverage 24 hours a day, 365 days a year. If this coverage changes or there are substantial program changes, you will be notified in writing by letter.     Attached is a letter from the [employer] (UNOS). It describes the services and information offered to patients by UN[employer] and the Organ Procurement and Transplantation Network.    We appreciate the opportunity to participate in your care.  If you or your providers have questions, please feel free to call the Transplant Office at [phone] or Lizette directly [phone].       Sincerely,     Lizette Cole, RN BSN   Kidney and Pancreas Transplant Program    Enclosures: UNOS Letter  CC:   Masoud Valentin MD; Dominick Ramirez MD; Anu Hanson RN [facility]

## 2017-06-07 NOTE — COMMITTEE REVIEW
Abdominal Committee Review Note     Evaluation Date: 5/31/2017  Committee Review Date: 6/7/2017    Organ being evaluated for: Kidney/Pancreas    Transplant Phase: Waitlist  Transplant Status: Inactive    Transplant Coordinator: Lizette Webber  Transplant Surgeon:       Referring Physician: Deyvi Cannon    Primary Diagnosis: Polycystic Kidneys  Secondary Diagnosis:     Committee Review Members:  Nurse Gael Agee, APRN CNP, Diana Castro, JANICE   Nutrition Carlota Cruz,    Pharmacy Rosana Nascimento [facility]    - Clinical Madelaine Anisa Roberts, [facility]   Transplant Savana Perry PA-C, Reanna Mobley, JANICE, Tobin Pal MD, Cathy Bolivar LPN, Sandy Cole NP, Carlotta Ag, JANICE, Lizette Cole, RN, Anu Leo, RN, Monique Luevano MD, Jori Wolfe MD, Warner Jordan MD   Transplant Surgery Monique Luevano MD       Transplant Eligibility: Insulin-dependent diabetes mellitus, Irreversible chronic kidney disease treated w/dialysis or expected need for dialysis    Committee Review Decision: Approved    Relative Contraindications: Other, education regarding diabetes care     Absolute Contraindications: None    Committee Chair Monique Luevano MD verbally attested to the committee's decision.    Committee Discussion Details:     JOHNSON Mcconnell presented Mr. eLwis for KP evaluation.  History of kidney transplant currently followed by post team.   Dr. Jordan asked why his function decreased with a live kidney donor.  Biopsies reviewed. Due to TG not patient related.   Pt was seen by Dr.Ty Jordan on 6/3/2017  ( previously seen by Dr. Luevano 5/15/2017)  DM denies hypoglycemic unawareness  Cards pending August 30, 2017  Works FT   Team recommends he be more engaged with his medical care:  The team determine he does not need a compliance  contract   [facility] to review with educator  his diabetes care  APPROVED to Wait List  inactive until Eval complete  Must see dentist offer at [facility]  Education for Diabetes important with local endocrine   Colonoscopy to be arranged  PT to See Derm at [facility]    Approved of  listing INACTIVE status until evaluation is complete.    Contacted patient to review outcome of selection committee meeting (See selection committee encounter).   Explained to patient that he/she needs to complete all components of the evaluation to be eligible for active status on the waiting list or to proceed with a live donor kidney transplant.   Reviewed next steps based on outcomes:   Patient will be listed as inactive (is not on dialysis and evaluation is not complete)-will receive:    -A listing letter indicating inactive status with list of Evaluation Summery items that are needed to be completed locally or at [facility]   Confirmed with patient that on successful completion of outstanding components, patient is eligible for active status and they will receive a follow-up call.   Confirmed that patient has contact information for additional questions or concerns.   Mr. Lewis aware of listing and follow up items to arrange at [facility] UNOS LISTING INACTIVE  6/9/2017 incomplete EVAL Candidate Amadou Lewis (SSN: ) has been added to the Pending list. The candidate will not be eligible for match runs until the ABO is verified by a different user.   Double checked by Anu peters HLA lab and Arpita Hennessy

## 2017-06-09 DIAGNOSIS — Z94.0 S/P KIDNEY TRANSPLANT: Primary | ICD-10-CM

## 2017-06-09 DIAGNOSIS — Z76.82 ORGAN TRANSPLANT CANDIDATE: ICD-10-CM

## 2017-06-16 DIAGNOSIS — Z94.0 KIDNEY TRANSPLANT RECIPIENT: Primary | ICD-10-CM

## 2017-06-23 DIAGNOSIS — Z94.0 KIDNEY REPLACED BY TRANSPLANT: ICD-10-CM

## 2017-06-24 RX ORDER — CYCLOSPORINE 25 MG/1
75 CAPSULE, LIQUID FILLED ORAL 2 TIMES DAILY
Qty: 180 CAPSULE | Refills: 11 | Status: SHIPPED | OUTPATIENT
Start: 2017-06-24 | End: 2018-06-21

## 2017-06-30 DIAGNOSIS — Z94.0 KIDNEY TRANSPLANT RECIPIENT: ICD-10-CM

## 2017-06-30 LAB
ERYTHROCYTE [DISTWIDTH] IN BLOOD BY AUTOMATED COUNT: 14.1 % (ref 10–15)
HCT VFR BLD AUTO: 32.8 % (ref 40–53)
HGB BLD-MCNC: 10.9 G/DL (ref 13.3–17.7)
MCH RBC QN AUTO: 28.5 PG (ref 26.5–33)
MCHC RBC AUTO-ENTMCNC: 33.2 G/DL (ref 31.5–36.5)
MCV RBC AUTO: 86 FL (ref 78–100)
PLATELET # BLD AUTO: 241 10E9/L (ref 150–450)
RBC # BLD AUTO: 3.82 10E12/L (ref 4.4–5.9)
WBC # BLD AUTO: 5.7 10E9/L (ref 4–11)

## 2017-06-30 PROCEDURE — 80158 DRUG ASSAY CYCLOSPORINE: CPT | Performed by: INTERNAL MEDICINE

## 2017-06-30 PROCEDURE — 80048 BASIC METABOLIC PNL TOTAL CA: CPT | Performed by: INTERNAL MEDICINE

## 2017-06-30 PROCEDURE — 36415 COLL VENOUS BLD VENIPUNCTURE: CPT | Performed by: INTERNAL MEDICINE

## 2017-06-30 PROCEDURE — 85027 COMPLETE CBC AUTOMATED: CPT | Performed by: INTERNAL MEDICINE

## 2017-07-01 LAB
ANION GAP SERPL CALCULATED.3IONS-SCNC: 14 MMOL/L (ref 3–14)
BUN SERPL-MCNC: 69 MG/DL (ref 7–30)
CALCIUM SERPL-MCNC: 9.4 MG/DL (ref 8.5–10.1)
CHLORIDE SERPL-SCNC: 104 MMOL/L (ref 94–109)
CO2 SERPL-SCNC: 16 MMOL/L (ref 20–32)
CREAT SERPL-MCNC: 5.19 MG/DL (ref 0.66–1.25)
CYCLOSPORINE BLD LC/MS/MS-MCNC: 59 UG/L (ref 50–400)
GFR SERPL CREATININE-BSD FRML MDRD: 12 ML/MIN/1.7M2
GLUCOSE SERPL-MCNC: 194 MG/DL (ref 70–99)
POTASSIUM SERPL-SCNC: 5.1 MMOL/L (ref 3.4–5.3)
SODIUM SERPL-SCNC: 134 MMOL/L (ref 133–144)
TME LAST DOSE: NORMAL H

## 2017-07-07 DIAGNOSIS — N25.81 SECONDARY RENAL HYPERPARATHYROIDISM (H): ICD-10-CM

## 2017-07-07 RX ORDER — CINACALCET 30 MG/1
30 TABLET, FILM COATED ORAL DAILY
Qty: 30 TABLET | Refills: 0 | Status: SHIPPED | OUTPATIENT
Start: 2017-07-07 | End: 2017-08-08

## 2017-07-11 ENCOUNTER — OFFICE VISIT (OUTPATIENT)
Dept: NEPHROLOGY | Facility: CLINIC | Age: 52
End: 2017-07-11
Attending: INTERNAL MEDICINE
Payer: COMMERCIAL

## 2017-07-11 VITALS
TEMPERATURE: 98.8 F | SYSTOLIC BLOOD PRESSURE: 173 MMHG | DIASTOLIC BLOOD PRESSURE: 88 MMHG | HEART RATE: 85 BPM | HEIGHT: 66 IN | OXYGEN SATURATION: 98 %

## 2017-07-11 DIAGNOSIS — E87.20 ACIDOSIS: Primary | ICD-10-CM

## 2017-07-11 DIAGNOSIS — Z48.298 AFTERCARE FOLLOWING ORGAN TRANSPLANT: ICD-10-CM

## 2017-07-11 DIAGNOSIS — Z94.0 KIDNEY REPLACED BY TRANSPLANT: Primary | ICD-10-CM

## 2017-07-11 DIAGNOSIS — Z94.83 PANCREAS REPLACED BY TRANSPLANT (H): ICD-10-CM

## 2017-07-11 DIAGNOSIS — N18.5 CKD (CHRONIC KIDNEY DISEASE) STAGE 5, GFR LESS THAN 15 ML/MIN (H): ICD-10-CM

## 2017-07-11 DIAGNOSIS — I15.1 HYPERTENSION SECONDARY TO OTHER RENAL DISORDERS: ICD-10-CM

## 2017-07-11 PROCEDURE — 99212 OFFICE O/P EST SF 10 MIN: CPT | Mod: ZF

## 2017-07-11 RX ORDER — CARVEDILOL 12.5 MG/1
25 TABLET ORAL 2 TIMES DAILY WITH MEALS
Qty: 360 TABLET | Refills: 3 | Status: SHIPPED | OUTPATIENT
Start: 2017-07-11 | End: 2017-08-08

## 2017-07-11 RX ORDER — NIFEDIPINE 30 MG/1
30 TABLET, EXTENDED RELEASE ORAL 2 TIMES DAILY
Qty: 180 TABLET | Refills: 3 | Status: SHIPPED | OUTPATIENT
Start: 2017-07-11 | End: 2017-08-08

## 2017-07-11 RX ORDER — SODIUM BICARBONATE 650 MG/1
1300 TABLET ORAL 3 TIMES DAILY
Qty: 540 TABLET | Refills: 1 | Status: SHIPPED | OUTPATIENT
Start: 2017-07-11 | End: 2017-08-08

## 2017-07-11 ASSESSMENT — PAIN SCALES - GENERAL: PAINLEVEL: NO PAIN (0)

## 2017-07-11 NOTE — PATIENT INSTRUCTIONS
1. We increased your Coreg to 25 mg (2 tabs of 12.5 mg) new script was sent to EP  2. We stopped Norvasc/Amlodipine   3. We started Nifedipine 30 mg twice daily   4. If BP is < 120/80 and or you have symptoms such as dizziness or low energy, then lower Nifedipine to once daily. If still low or symptomatic then lower coreg to 12.5 mg twice daily   5. Increase your sodium Bicarbonate to 1300 mg twice daily   6. Start Vitamin D3   7. Call us with worsening nausea or daily vomiting, tremors, persistent hiccups or worsening cloudiness of thinking and processing   8. Monthly labs for now

## 2017-07-11 NOTE — LETTER
7/11/2017      RE: Amadou Lewis  [address] [place] [address]       Assessment and Plan:  1. ESRD:  secondary to polycystic kidney disease and diabetic nephropathy s/p LDKT on 10/10/2013. Complicate by repeated bouts of rejection. Creatinine continues to worsen likely secondary to uncontrolled hypertension , currently serum creatinine is 5.2 mg/dL. We will check BMP every 4 weeks  2. Immunosuppression:  Mycophenolic acid and Cyclosporine   3. Hypertension:  Better controlled but not at goal   4. Anemia secondary to renal disease: Hgb is stable, at baseline. Monitor  5. Secondary renal hyperparathyroidism: most recent PTH was 719 in 2/2017. We discussed compliance with sensipar  6. Nausea and vomiting: likely secondary to medications +/- uremia. EGD in 03/2016 showed esophageal ulcer. This is largely stable and denied bleeding   7. Diabetes mellitus type II management per PCP  8. H/o CAD- s/p PCI with DESx2  9. Acidosis: we stressed on maintaining NaHco3 regimen     Assessment and plan was discussed with patient and he voiced his understanding and agreement.      Reason for Visit:  Mr. Lewis is here for follow up on kidney transplant rejection    HPI:   Amadou Lewis is a 51 year old male with ESKD from polycystic kidney disease and diabetic nephropathy and is status post LDKT on 10/10/2013 with immediate graft function. 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 Patient states that he is still grieving the loss of his son. He reports fatigue, insomnia and worsening of nausea in last 2 months. Patient states that he wakes up every morning feeling very nauseous and usually vomites yellow color fluid. Patient states that his nausea gets somewhat better after he takes two tablets of OCT Shayna-seltzer. Furthermore, he admits intermittent aching pain in the RUQ and worsening of orthopnea in last few months.   Patient  denies: NSAIDs use, fever, chills, changes in weight, changes in appetite, dizziness, adenopathy, sore throat, rhinorrhea, cough, shortness of breath , chest pain, palpitations, lower extremity edema, hematochezia, melena, hematemesis, abdominal pain, changes in bowel habits, dysuria, urinary frequency, urgency, hematuria, rash, pruritis, metallic tast .     Since was seen last has been stable. Occasional nausea and vomiting in the morning. We discussed uremic symptoms and compliance with medications.              Transplant Hx:       Tx: LDKT  Date: 10/10/2013       Present Maintenance IS: Cyclosporine and Mycophenolic acid       Baseline Creatinine:  3-3.5 mg/dL       Recent DSA: No         Biopsy: Yes: 1/21/2016, 2/15/2016, 09/28/2016        Home BP: Not checked.      ROS:   A comprehensive review of systems was obtained and negative, except as noted in the HPI or PMH.    Active Medical Problems:  Patient Active Problem List   Diagnosis     Type II diabetes mellitus with renal manifestations (H)     Diabetes mellitus with background retinopathy (H)     Polycystic kidney     NONSPECIFIC MEDICAL HISTORY     Coronary artery disease     Retinopathy     Hyperlipidemia LDL goal <70     Premature ventricular contractions (PVCs) (VPCs)     Kidney replaced by transplant     Immunosuppressed status (H)     Kidney transplant rejection     Hyperglycemia     Hypertension     Anemia in chronic renal disease     Care after organ transplant     Esophageal ulcer       Personal Hx:  Social History     Social History     Marital status: Single     Spouse name: N/A     Number of children: 2     Years of education: 14     Occupational History     [employer] [place]     Social History Main Topics     Smoking status: Never Smoker     Smokeless tobacco: Never Used     Alcohol use No     Drug use: No     Sexual activity: Yes     Partners: Female     Other Topics Concern     Not on file     Social History Narrative  "      Allergies:  Allergies   Allergen Reactions     No Known Allergies        Medications:  Current Outpatient Prescriptions   Medication     carvedilol (COREG) 12.5 MG tablet     NIFEdipine ER osmotic (PROCARDIA XL) 30 MG 24 hr tablet     sodium bicarbonate 650 MG tablet     cholecalciferol (VITAMIN  -D) 1000 UNITS capsule     cinacalcet (SENSIPAR) 30 MG tablet     cycloSPORINE modified (GENERIC EQUIVALENT) 25 MG capsule     ondansetron (ZOFRAN) 4 MG tablet     cloNIDine (CATAPRES) 0.1 MG tablet     insulin aspart (NOVOLOG PEN) 100 UNIT/ML soln     insulin glargine (LANTUS) 100 UNIT/ML PEN     blood glucose monitoring (NO BRAND SPECIFIED) test strip     HYDROcodone-acetaminophen (NORCO) 5-325 MG per tablet     MYFORTIC 180 MG PO EC TABLET     No current facility-administered medications for this visit.          Vitals:  /88  Pulse 85  Temp 98.8  F (37.1  C) (Oral)  Ht 1.67 m (5' 5.75\")  SpO2 98%    Exam:   GENERAL APPEARANCE: alert and no distress, chronically ill looking  HENT: mouth without ulcers or lesions  LYMPHATICS: no cervical or supraclavicular nodes  RESP: lungs clear to auscultation - no rales, rhonchi or wheezes  CV: regular rhythm, normal rate, no rub, no murmur  EDEMA: no LE edema bilaterally, AVF in the left forearm with palpable thrill   ABDOMEN: protruded, soft, slightly distended, surgical incision well healed, nontender, bowel sounds normal  MS: extremities normal - no gross deformities noted, no evidence of inflammation in joints, no muscle tenderness  SKIN: no rash    Results: reviewed    We discussed the following at length   1. We increased your Coreg to 25 mg (2 tabs of 12.5 mg) new script was sent to EP  2. We stopped Norvasc/Amlodipine   3. We started Nifedipine 30 mg twice daily   4. If BP is < 120/80 and or you have symptoms such as dizziness or low energy, then lower Nifedipine to once daily. If still low or symptomatic then lower coreg to 12.5 mg twice daily   5. Increase " your sodium Bicarbonate to 1300 mg twice daily   6. Start Vitamin D3   7. Call us with worsening nausea or daily vomiting, tremors, persistent hiccups or worsening cloudiness of thinking and processing   8. Monthly labs for now     Deyvi Dick MD

## 2017-07-11 NOTE — MR AVS SNAPSHOT
After Visit Summary   7/11/2017    Amadou Lewis    MRN: [id number]           Patient Information     Date Of Birth          1965        Visit Information        Provider Department      7/11/2017 3:15 PM Deyvi Dick MD [facility]        Today's Diagnoses     Acidosis    -  1    Hypertension secondary to other renal disorders        CKD (chronic kidney disease) stage 5, GFR less than 15 ml/min (H)        Aftercare following organ transplant          Care Instructions    1. We increased your Coreg to 25 mg (2 tabs of 12.5 mg) new script was sent to EP  2. We stopped Norvasc/Amlodipine   3. We started Nifedipine 30 mg twice daily   4. If BP is < 120/80 and or you have symptoms such as dizziness or low energy, then lower Nifedipine to once daily. If still low or symptomatic then lower coreg to 12.5 mg twice daily   5. Increase your sodium Bicarbonate to 1300 mg twice daily   6. Start Vitamin D3   7. Call us with worsening nausea or daily vomiting, tremors, persistent hiccups or worsening cloudiness of thinking and processing   8. Monthly labs for now           Follow-ups after your visit        Follow-up notes from your care team     Return in about 4 weeks (around 8/8/2017).      Your next 10 appointments already scheduled     Aug 08, 2017  2:00 PM CDT   (Arrive by 1:30 PM)   Return Kidney Transplant with Deyvi Dick MD   [facility] ([facility])    [address] [place] [address] [id number] [phone]            Aug 30, 2017  8:30 AM CDT   US AORTA/IVC/ILIAC DUPLEX COMPLETE with [id number]   [facility]  ([facility])    [address] [place] [address] [id number] [phone]           Please bring a list of your medicines (including vitamins, minerals and over-the-counter drugs). Also, tell your doctor about any allergies you may have. Wear comfortable clothes and leave  your valuables at home.  Adults: No eating or drinking for 8 hours before the exam. You may take medicine with a small sip of water.  Children: - Children 6+ years: No food or drink for 6 hours before exam. - Children 1-5 years: No food or drink for 4 hours before exam. - Infants, breast-fed: may have breast milk up to 2 hours before exam. - Infants, formula: may have bottle until 4 hours before exam.  Please call the Imaging Department at your exam site with any questions.            Aug 30, 2017 10:00 AM CDT   (Arrive by 9:45 AM)   NEW PANCREAS/KIDNEY TRANSPLANT WORK-UP with Jesse Will MD   [facility] ([facility])    [address] [place] [id number] [phone]            Sep 26, 2017  2:45 PM CDT   XR CHEST 2 VIEWS with UCXR1   [facility] ([facility])    [address] [place] [address] [id number] [phone]           Please bring a list of your current medicines to your exam. (Include vitamins, minerals and over-thecounter medicines.) Leave your valuables at home.  Tell your doctor if there is a chance you may be pregnant.  You do not need to do anything special for this exam.            Sep 26, 2017  3:15 PM CDT   (Arrive by 2:45 PM)   Return Kidney Transplant with Deyvi Dick MD   [facility] ([facility])    [address] [place] [address] [id number] [phone]              Future tests that were ordered for you today     Open Future Orders        Priority Expected Expires Ordered    Parathyroid Hormone Intact Routine 7/27/2017 8/10/2017 7/11/2017    Renal panel Routine  8/10/2017 7/11/2017    Cyclosporine Routine  8/10/2017 7/11/2017    Mycophenolic acid Routine  8/10/2017 7/11/2017    CBC with platelets Routine  8/10/2017 7/11/2017            Who to contact     If you have questions or need follow up information about  "today's clinic visit or your schedule please contact [facility] directly at [phone].  Normal or non-critical lab and imaging results will be communicated to you by MyChart, letter or phone within 4 business days after the clinic has received the results. If you do not hear from us within 7 days, please contact the clinic through [employer] or phone. If you have a critical or abnormal lab result, we will notify you by phone as soon as possible.  Submit refill requests through [employer] or call your pharmacy and they will forward the refill request to us. Please allow 3 business days for your refill to be completed.          Additional Information About Your Visit        "[email or web address] Information     [employer] gives you secure access to your electronic health record. If you see a primary care provider, you can also send messages to your care team and make appointments. If you have questions, please call your primary care clinic.  If you do not have a primary care provider, please call [phone] and they will assist you.        Care EveryWhere ID     This is your Care EveryWhere ID. This could be used by other organizations to access your [place] medical records  [id number]        Your Vitals Were     Pulse Temperature Height Pulse Oximetry          85 98.8  F (37.1  C) (Oral) 1.67 m (5' 5.75\") 98%         Blood Pressure from Last 3 Encounters:   07/11/17 173/88   05/31/17 174/88   05/31/17 174/88    Weight from Last 3 Encounters:   05/31/17 76.6 kg (168 lb 14.4 oz)   05/31/17 76.6 kg (168 lb 14.4 oz)   05/25/17 78 kg (172 lb)                 Today's Medication Changes          These changes are accurate as of: 7/11/17  3:37 PM.  If you have any questions, ask your nurse or doctor.               Start taking these medicines.        Dose/Directions    cholecalciferol 1000 UNITS capsule   Commonly known as:  vitamin  -D   Used for:  CKD (chronic kidney disease) stage 5, GFR less than 15 ml/min (H)   Started by:  " Deyvi Dick MD        Dose:  1 capsule   Take 1 capsule (1,000 Units) by mouth daily   Quantity:  90 capsule   Refills:  3       NIFEdipine ER osmotic 30 MG 24 hr tablet   Commonly known as:  PROCARDIA XL   Used for:  Hypertension secondary to other renal disorders   Started by:  Deyvi Dick MD        Dose:  30 mg   Take 1 tablet (30 mg) by mouth 2 times daily   Quantity:  180 tablet   Refills:  3       sodium bicarbonate 650 MG tablet   Used for:  Acidosis   Started by:  Deyvi Dick MD        Dose:  1300 mg   Take 2 tablets (1,300 mg) by mouth 3 times daily   Quantity:  540 tablet   Refills:  1         These medicines have changed or have updated prescriptions.        Dose/Directions    carvedilol 12.5 MG tablet   Commonly known as:  COREG   This may have changed:    - medication strength  - how much to take   Used for:  Hypertension secondary to other renal disorders   Changed by:  Deyvi Dick MD        Dose:  25 mg   Take 2 tablets (25 mg) by mouth 2 times daily (with meals)   Quantity:  360 tablet   Refills:  3       insulin glargine 100 UNIT/ML injection   Commonly known as:  LANTUS   This may have changed:    - how much to take  - when to take this   Used for:  Type 2 diabetes mellitus with diabetic chronic kidney disease (H)        Dose:  53 Units   Inject 53 Units Subcutaneous every 24 hours   Refills:  0         Stop taking these medicines if you haven't already. Please contact your care team if you have questions.     amLODIPine 10 MG tablet   Commonly known as:  NORVASC   Stopped by:  Deyvi Dick MD                Where to get your medicines      These medications were sent to [id number] - [place], [place] - [address] AT [facility] [place] [facility]  [address], [place] [address]    Hours:  24-hours Phone:  [phone]     carvedilol 12.5 MG tablet    cholecalciferol 1000 UNITS capsule    NIFEdipine ER osmotic 30 MG 24 hr tablet    sodium  bicarbonate 650 MG tablet                Primary Care Provider Office Phone # Fax #    Masoud Valentin MD, [phone]       [address] [place] [address]        Equal Access to Services     RENATA BENNETT : Hadii aad ku hadasho Soomaali, waaxda luqadaha, qaybta kaalmada adeegyada, waxay idiin hayaan adeeg khleonash lacristobalyolie maikol. So [facility] [phone].    ATENCIÓN: Si habla español, tiene a tejeda disposición servicios gratuitos de asistencia lingüística. Llame al [phone].    We comply with applicable federal civil rights laws and [place] laws. We do not discriminate on the basis of race, color, national origin, age, disability sex, sexual orientation or gender identity.            Thank you!     Thank you for choosing [facility]  for your care. Our goal is always to provide you with excellent care. Hearing back from our patients is one way we can continue to improve our services. Please take a few minutes to complete the written survey that you may receive in the mail after your visit with us. Thank you!             Your Updated Medication List - Protect others around you: Learn how to safely use, store and throw away your medicines at www.disposemymeds.org.          This list is accurate as of: 7/11/17  3:37 PM.  Always use your most recent med list.                   Brand Name Dispense Instructions for use Diagnosis    blood glucose monitoring test strip    no brand specified    1 Box    Use to test blood sugar 4 times daily or as directed.    Diabetes mellitus with background retinopathy (H)       carvedilol 12.5 MG tablet    COREG    360 tablet    Take 2 tablets (25 mg) by mouth 2 times daily (with meals)    Hypertension secondary to other renal disorders       cholecalciferol 1000 UNITS capsule    vitamin  -D    90 capsule    Take 1 capsule (1,000 Units) by mouth daily    CKD (chronic kidney disease) stage 5, GFR less than 15 ml/min (H)        cinacalcet 30 MG tablet    SENSIPAR    30 tablet    Take 1 tablet (30 mg) by mouth daily    Secondary renal hyperparathyroidism (H)       cloNIDine 0.1 MG tablet    CATAPRES    60 tablet    Take 1 tablet (0.1 mg) by mouth 2 times daily as needed For SBP > 170    Hypertension secondary to other renal disorders       cycloSPORINE modified 25 MG capsule    GENERIC EQUIVALENT    180 capsule    Take 3 capsules (75 mg) by mouth 2 times daily Total dose 75 mg twice a day    Kidney replaced by transplant       HYDROcodone-acetaminophen 5-325 MG per tablet    NORCO     Take 2 tablets by mouth every 6 hours as needed for moderate to severe pain        insulin aspart 100 UNIT/ML injection    NovoLOG PEN     Inject 1-16 Units Subcutaneous At Bedtime Correction Scale - VERY HIGH INSULIN RESISTANCE DOSING    Do Not give Bedtime Correction Insulin if BG < 200.  For  - 209 give 1 units.  For  - 219 give 2 units.  For  - 229 give 3 units.  For  - 239 give 4 units.  For  - 249 give 5 units.  For  - 259 give 6 units.  For  - 269 give 7 units.  For  - 279 give 8 units.  For  - 289 give 9 units.    Type 2 diabetes mellitus with diabetic chronic kidney disease (H)       insulin glargine 100 UNIT/ML injection    LANTUS     Inject 53 Units Subcutaneous every 24 hours    Type 2 diabetes mellitus with diabetic chronic kidney disease (H)       mycophenolic acid EC tablet     240 tablet    Take three tablets twice daily        NIFEdipine ER osmotic 30 MG 24 hr tablet    PROCARDIA XL    180 tablet    Take 1 tablet (30 mg) by mouth 2 times daily    Hypertension secondary to other renal disorders       ondansetron 4 MG tablet    ZOFRAN    30 tablet    Take 1 tablet (4 mg) by mouth every 12 hours as needed for nausea    Nausea       sodium bicarbonate 650 MG tablet     540 tablet    Take 2 tablets (1,300 mg) by mouth 3 times daily    Acidosis

## 2017-07-11 NOTE — NURSING NOTE
"Chief Complaint   Patient presents with     RECHECK     Follow up kidney transplant.       Initial /88  Pulse 85  Temp 98.8  F (37.1  C) (Oral)  Ht 1.67 m (5' 5.75\")  SpO2 98% Estimated body mass index is 27.47 kg/(m^2) as calculated from the following:    Height as of 5/31/17: 1.67 m (5' 5.75\").    Weight as of 5/31/17: 76.6 kg (168 lb 14.4 oz).  Medication Reconciliation: complete   Christine Anderson., CMA    "

## 2017-07-12 RX ORDER — MYCOPHENOLIC ACID 180 MG/1
540 TABLET, DELAYED RELEASE ORAL 2 TIMES DAILY
Qty: 180 TABLET | Refills: 5 | Status: SHIPPED | OUTPATIENT
Start: 2017-07-12 | End: 2018-01-23

## 2017-07-20 NOTE — PROGRESS NOTES
Assessment and Plan:  1. ESRD:  secondary to polycystic kidney disease and diabetic nephropathy s/p LDKT on 10/10/2013. Complicate by repeated bouts of rejection. Creatinine continues to worsen likely secondary to uncontrolled hypertension , currently serum creatinine is 5.2 mg/dL. We will check BMP every 4 weeks  2. Immunosuppression:  Mycophenolic acid and Cyclosporine   3. Hypertension:  Better controlled but not at goal   4. Anemia secondary to renal disease: Hgb is stable, at baseline. Monitor  5. Secondary renal hyperparathyroidism: most recent PTH was 719 in 2/2017. We discussed compliance with sensipar  6. Nausea and vomiting: likely secondary to medications +/- uremia. EGD in 03/2016 showed esophageal ulcer. This is largely stable and denied bleeding   7. Diabetes mellitus type II management per PCP  8. H/o CAD- s/p PCI with DESx2  9. Acidosis: we stressed on maintaining NaHco3 regimen     Assessment and plan was discussed with patient and he voiced his understanding and agreement.      Reason for Visit:  Mr. Lewis is here for follow up on kidney transplant rejection    HPI:   Amadou Lewis is a 51 year old male with ESKD from polycystic kidney disease and diabetic nephropathy and is status post LDKT on 10/10/2013 with immediate graft function. 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 Patient states that he is still grieving the loss of his son. He reports fatigue, insomnia and worsening of nausea in last 2 months. Patient states that he wakes up every morning feeling very nauseous and usually vomites yellow color fluid. Patient states that his nausea gets somewhat better after he takes two tablets of OCT Shayna-seltzer. Furthermore, he admits intermittent aching pain in the RUQ and worsening of orthopnea in last few months.   Patient denies: NSAIDs use, fever, chills, changes in weight, changes in appetite, dizziness,  adenopathy, sore throat, rhinorrhea, cough, shortness of breath , chest pain, palpitations, lower extremity edema, hematochezia, melena, hematemesis, abdominal pain, changes in bowel habits, dysuria, urinary frequency, urgency, hematuria, rash, pruritis, metallic tast .     Since was seen last has been stable. Occasional nausea and vomiting in the morning. We discussed uremic symptoms and compliance with medications.              Transplant Hx:       Tx: LDKT  Date: 10/10/2013       Present Maintenance IS: Cyclosporine and Mycophenolic acid       Baseline Creatinine:  3-3.5 mg/dL       Recent DSA: No         Biopsy: Yes: 1/21/2016, 2/15/2016, 09/28/2016        Home BP: Not checked.      ROS:   A comprehensive review of systems was obtained and negative, except as noted in the HPI or PMH.    Active Medical Problems:  Patient Active Problem List   Diagnosis     Type II diabetes mellitus with renal manifestations (H)     Diabetes mellitus with background retinopathy (H)     Polycystic kidney     NONSPECIFIC MEDICAL HISTORY     Coronary artery disease     Retinopathy     Hyperlipidemia LDL goal <70     Premature ventricular contractions (PVCs) (VPCs)     Kidney replaced by transplant     Immunosuppressed status (H)     Kidney transplant rejection     Hyperglycemia     Hypertension     Anemia in chronic renal disease     Care after organ transplant     Esophageal ulcer       Personal Hx:  Social History     Social History     Marital status: Single     Spouse name: N/A     Number of children: 2     Years of education: 14     Occupational History     [employer]     Social History Main Topics     Smoking status: Never Smoker     Smokeless tobacco: Never Used     Alcohol use No     Drug use: No     Sexual activity: Yes     Partners: Female     Other Topics Concern     Not on file     Social History Narrative       Allergies:  Allergies   Allergen Reactions     No Known Allergies        Medications:  Current  "Outpatient Prescriptions   Medication     carvedilol (COREG) 12.5 MG tablet     NIFEdipine ER osmotic (PROCARDIA XL) 30 MG 24 hr tablet     sodium bicarbonate 650 MG tablet     cholecalciferol (VITAMIN  -D) 1000 UNITS capsule     cinacalcet (SENSIPAR) 30 MG tablet     cycloSPORINE modified (GENERIC EQUIVALENT) 25 MG capsule     ondansetron (ZOFRAN) 4 MG tablet     cloNIDine (CATAPRES) 0.1 MG tablet     insulin aspart (NOVOLOG PEN) 100 UNIT/ML soln     insulin glargine (LANTUS) 100 UNIT/ML PEN     blood glucose monitoring (NO BRAND SPECIFIED) test strip     HYDROcodone-acetaminophen (NORCO) 5-325 MG per tablet     MYFORTIC 180 MG PO EC TABLET     No current facility-administered medications for this visit.          Vitals:  /88  Pulse 85  Temp 98.8  F (37.1  C) (Oral)  Ht 1.67 m (5' 5.75\")  SpO2 98%    Exam:   GENERAL APPEARANCE: alert and no distress, chronically ill looking  HENT: mouth without ulcers or lesions  LYMPHATICS: no cervical or supraclavicular nodes  RESP: lungs clear to auscultation - no rales, rhonchi or wheezes  CV: regular rhythm, normal rate, no rub, no murmur  EDEMA: no LE edema bilaterally, AVF in the left forearm with palpable thrill   ABDOMEN: protruded, soft, slightly distended, surgical incision well healed, nontender, bowel sounds normal  MS: extremities normal - no gross deformities noted, no evidence of inflammation in joints, no muscle tenderness  SKIN: no rash    Results: reviewed    We discussed the following at length   1. We increased your Coreg to 25 mg (2 tabs of 12.5 mg) new script was sent to EP  2. We stopped Norvasc/Amlodipine   3. We started Nifedipine 30 mg twice daily   4. If BP is < 120/80 and or you have symptoms such as dizziness or low energy, then lower Nifedipine to once daily. If still low or symptomatic then lower coreg to 12.5 mg twice daily   5. Increase your sodium Bicarbonate to 1300 mg twice daily   6. Start Vitamin D3   7. Call us with worsening nausea " or daily vomiting, tremors, persistent hiccups or worsening cloudiness of thinking and processing   8. Monthly labs for now

## 2017-07-27 DIAGNOSIS — Z94.0 KIDNEY REPLACED BY TRANSPLANT: Primary | ICD-10-CM

## 2017-07-27 DIAGNOSIS — R79.89 ELEVATED SERUM CREATININE: ICD-10-CM

## 2017-07-27 NOTE — NURSING NOTE
Additional labs per Dr. Jeanie Ramirez, N  Nephrology  [facility]  [phone]

## 2017-08-03 DIAGNOSIS — Z48.298 AFTERCARE FOLLOWING ORGAN TRANSPLANT: ICD-10-CM

## 2017-08-03 DIAGNOSIS — Z94.0 KIDNEY TRANSPLANT RECIPIENT: ICD-10-CM

## 2017-08-03 DIAGNOSIS — Z94.0 KIDNEY REPLACED BY TRANSPLANT: ICD-10-CM

## 2017-08-03 DIAGNOSIS — N18.5 CKD (CHRONIC KIDNEY DISEASE) STAGE 5, GFR LESS THAN 15 ML/MIN (H): ICD-10-CM

## 2017-08-03 DIAGNOSIS — R79.89 ELEVATED SERUM CREATININE: ICD-10-CM

## 2017-08-03 LAB
ERYTHROCYTE [DISTWIDTH] IN BLOOD BY AUTOMATED COUNT: 13.4 % (ref 10–15)
HCT VFR BLD AUTO: 34.9 % (ref 40–53)
HGB BLD-MCNC: 11.4 G/DL (ref 13.3–17.7)
MCH RBC QN AUTO: 28.1 PG (ref 26.5–33)
MCHC RBC AUTO-ENTMCNC: 32.7 G/DL (ref 31.5–36.5)
MCV RBC AUTO: 86 FL (ref 78–100)
PLATELET # BLD AUTO: 249 10E9/L (ref 150–450)
PTH-INTACT SERPL-MCNC: 790 PG/ML (ref 12–72)
RBC # BLD AUTO: 4.05 10E12/L (ref 4.4–5.9)
WBC # BLD AUTO: 4.5 10E9/L (ref 4–11)

## 2017-08-03 PROCEDURE — 80158 DRUG ASSAY CYCLOSPORINE: CPT | Performed by: FAMILY MEDICINE

## 2017-08-03 PROCEDURE — 82306 VITAMIN D 25 HYDROXY: CPT | Performed by: FAMILY MEDICINE

## 2017-08-03 PROCEDURE — 82728 ASSAY OF FERRITIN: CPT | Performed by: FAMILY MEDICINE

## 2017-08-03 PROCEDURE — 83970 ASSAY OF PARATHORMONE: CPT | Performed by: FAMILY MEDICINE

## 2017-08-03 PROCEDURE — 36415 COLL VENOUS BLD VENIPUNCTURE: CPT | Performed by: FAMILY MEDICINE

## 2017-08-03 PROCEDURE — 83540 ASSAY OF IRON: CPT | Performed by: FAMILY MEDICINE

## 2017-08-03 PROCEDURE — 83550 IRON BINDING TEST: CPT | Performed by: FAMILY MEDICINE

## 2017-08-03 PROCEDURE — 85027 COMPLETE CBC AUTOMATED: CPT | Performed by: FAMILY MEDICINE

## 2017-08-03 PROCEDURE — 80069 RENAL FUNCTION PANEL: CPT | Performed by: FAMILY MEDICINE

## 2017-08-03 PROCEDURE — 80180 DRUG SCRN QUAN MYCOPHENOLATE: CPT | Performed by: FAMILY MEDICINE

## 2017-08-04 LAB
ALBUMIN SERPL-MCNC: 4.2 G/DL (ref 3.4–5)
ANION GAP SERPL CALCULATED.3IONS-SCNC: 10 MMOL/L (ref 3–14)
BUN SERPL-MCNC: 73 MG/DL (ref 7–30)
CALCIUM SERPL-MCNC: 9 MG/DL (ref 8.5–10.1)
CHLORIDE SERPL-SCNC: 104 MMOL/L (ref 94–109)
CO2 SERPL-SCNC: 19 MMOL/L (ref 20–32)
CREAT SERPL-MCNC: 4.99 MG/DL (ref 0.66–1.25)
CYCLOSPORINE BLD LC/MS/MS-MCNC: 68 UG/L (ref 50–400)
DEPRECATED CALCIDIOL+CALCIFEROL SERPL-MC: 29 UG/L (ref 20–75)
FERRITIN SERPL-MCNC: 736 NG/ML (ref 26–388)
GFR SERPL CREATININE-BSD FRML MDRD: 12 ML/MIN/1.7M2
GLUCOSE SERPL-MCNC: 237 MG/DL (ref 70–99)
IRON SATN MFR SERPL: 34 % (ref 15–46)
IRON SERPL-MCNC: 79 UG/DL (ref 35–180)
PHOSPHATE SERPL-MCNC: 4.3 MG/DL (ref 2.5–4.5)
POTASSIUM SERPL-SCNC: 5 MMOL/L (ref 3.4–5.3)
SODIUM SERPL-SCNC: 133 MMOL/L (ref 133–144)
TIBC SERPL-MCNC: 230 UG/DL (ref 240–430)
TME LAST DOSE: NORMAL H

## 2017-08-05 LAB
MYCOPHENOLATE SERPL LC/MS/MS-MCNC: 3.63 MG/L (ref 1–3.5)
MYCOPHENOLATE-G SERPL LC/MS/MS-MCNC: >200 MG/L (ref 30–95)
TME LAST DOSE: ABNORMAL H

## 2017-08-08 ENCOUNTER — OFFICE VISIT (OUTPATIENT)
Dept: NEPHROLOGY | Facility: CLINIC | Age: 52
End: 2017-08-08
Attending: INTERNAL MEDICINE
Payer: COMMERCIAL

## 2017-08-08 VITALS
WEIGHT: 166.2 LBS | RESPIRATION RATE: 18 BRPM | HEIGHT: 66 IN | HEART RATE: 91 BPM | DIASTOLIC BLOOD PRESSURE: 88 MMHG | TEMPERATURE: 97.8 F | BODY MASS INDEX: 26.71 KG/M2 | SYSTOLIC BLOOD PRESSURE: 160 MMHG

## 2017-08-08 DIAGNOSIS — I25.10 CORONARY ARTERY DISEASE INVOLVING NATIVE CORONARY ARTERY OF NATIVE HEART WITHOUT ANGINA PECTORIS: ICD-10-CM

## 2017-08-08 DIAGNOSIS — I15.1 HYPERTENSION SECONDARY TO OTHER RENAL DISORDERS: Primary | ICD-10-CM

## 2017-08-08 DIAGNOSIS — N25.81 SECONDARY RENAL HYPERPARATHYROIDISM (H): ICD-10-CM

## 2017-08-08 DIAGNOSIS — E87.20 ACIDOSIS: ICD-10-CM

## 2017-08-08 PROCEDURE — 99212 OFFICE O/P EST SF 10 MIN: CPT | Mod: ZF

## 2017-08-08 RX ORDER — CITRIC ACID/SODIUM CITRATE 334-500MG
45 SOLUTION, ORAL ORAL DAILY
Qty: 1350 ML | Refills: 3 | Status: SHIPPED | OUTPATIENT
Start: 2017-08-08 | End: 2018-01-23

## 2017-08-08 RX ORDER — CARVEDILOL 3.12 MG/1
3.12 TABLET ORAL 2 TIMES DAILY WITH MEALS
Qty: 180 TABLET | Refills: 3 | Status: SHIPPED | OUTPATIENT
Start: 2017-08-08 | End: 2017-09-26

## 2017-08-08 RX ORDER — NIFEDIPINE 30 MG/1
60 TABLET, EXTENDED RELEASE ORAL 2 TIMES DAILY
Qty: 360 TABLET | Refills: 3 | Status: SHIPPED | OUTPATIENT
Start: 2017-08-08 | End: 2017-09-26

## 2017-08-08 RX ORDER — ATORVASTATIN CALCIUM 10 MG/1
5 TABLET, FILM COATED ORAL DAILY
Qty: 45 TABLET | Refills: 3 | Status: SHIPPED | OUTPATIENT
Start: 2017-08-08 | End: 2019-01-08

## 2017-08-08 RX ORDER — CINACALCET 30 MG/1
30 TABLET, FILM COATED ORAL DAILY
Qty: 90 TABLET | Refills: 3 | Status: ON HOLD | OUTPATIENT
Start: 2017-08-08 | End: 2018-11-20

## 2017-08-08 ASSESSMENT — PAIN SCALES - GENERAL: PAINLEVEL: NO PAIN (0)

## 2017-08-08 NOTE — PROGRESS NOTES
Assessment and Plan:  1. ESRD:  secondary to polycystic kidney disease and diabetic nephropathy s/p LDKT on 10/10/2013. Complicate by repeated bouts of rejection. Creatinine continues to worsen likely secondary to uncontrolled hypertension , currently serum creatinine is 5.2 mg/dL. We will check BMP every 4 weeks  2. Immunosuppression:  Mycophenolic acid and Cyclosporine with acceptable levels   3. Hypertension:  Better controlled but not at goal did not tolerate higher doses of coreg, will resume at 3.125 mg po bid   4. Anemia secondary to renal disease: Hgb is stable, at baseline. Monitor  5. Secondary renal hyperparathyroidism: most recent PTH was 719 in 2/2017. We discussed compliance with sensipar  6. Nausea and vomiting: likely secondary to medications +/- uremia. EGD in 03/2016 showed esophageal ulcer. This is largely stable and denied bleeding   7. Diabetes mellitus type II management per PCP  8. H/o CAD- s/p PCI with DESx2, added small dose statin   9. Acidosis: replace tablets with Bicitra     Assessment and plan was discussed with patient and he voiced his understanding and agreement.      Reason for Visit:  Mr. Lewis is here for follow up on kidney transplant rejection    HPI:   Amadou Lewis is a 51 year old male with ESKD from polycystic kidney disease and diabetic nephropathy and is status post LDKT on 10/10/2013 with immediate graft function. 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 Patient states that he is still grieving the loss of his son. He reports fatigue, insomnia and worsening of nausea in last 2 months. Patient states that he wakes up every morning feeling very nauseous and usually vomites yellow color fluid. Patient states that his nausea gets somewhat better after he takes two tablets of OCT Shayna-seltzer. Furthermore, he admits intermittent aching pain in the RUQ and worsening of orthopnea in last few  months.   Patient denies: NSAIDs use, fever, chills, changes in weight, changes in appetite, dizziness, adenopathy, sore throat, rhinorrhea, cough, shortness of breath , chest pain, palpitations, lower extremity edema, hematochezia, melena, hematemesis, abdominal pain, changes in bowel habits, dysuria, urinary frequency, urgency, hematuria, rash, pruritis, metallic tast .     Since was seen last has been stable. N/V had improved. We discussed uremic symptoms and compliance with medications.   Does not endorse major uremic symptoms and would like to keep medical management of CKD for now.            Transplant Hx:       Tx: LDKT  Date: 10/10/2013       Present Maintenance IS: Cyclosporine and Mycophenolic acid       Baseline Creatinine:  3-3.5 mg/dL       Recent DSA: No         Biopsy: Yes: 1/21/2016, 2/15/2016, 09/28/2016        Home BP: Not checked.      ROS:   A comprehensive review of systems was obtained and negative, except as noted in the HPI or PMH.    Active Medical Problems:  Patient Active Problem List   Diagnosis     Type II diabetes mellitus with renal manifestations (H)     Diabetes mellitus with background retinopathy (H)     Polycystic kidney     NONSPECIFIC MEDICAL HISTORY     Coronary artery disease     Retinopathy     Hyperlipidemia LDL goal <70     Premature ventricular contractions (PVCs) (VPCs)     Kidney replaced by transplant     Immunosuppressed status (H)     Kidney transplant rejection     Hyperglycemia     Hypertension     Anemia in chronic renal disease     Care after organ transplant     Esophageal ulcer       Personal Hx:  Social History     Social History     Marital status: Single     Spouse name: N/A     Number of children: 2     Years of education: 14     Occupational History     [employer]     Social History Main Topics     Smoking status: Never Smoker     Smokeless tobacco: Never Used     Alcohol use No     Drug use: No     Sexual activity: Yes     Partners: Female  "    Other Topics Concern     Not on file     Social History Narrative       Allergies:  Allergies   Allergen Reactions     No Known Allergies        Medications:  Current Outpatient Prescriptions   Medication     MYFORTIC 180 MG PO EC TABLET     carvedilol (COREG) 12.5 MG tablet     NIFEdipine ER osmotic (PROCARDIA XL) 30 MG 24 hr tablet     sodium bicarbonate 650 MG tablet     cholecalciferol (VITAMIN  -D) 1000 UNITS capsule     cinacalcet (SENSIPAR) 30 MG tablet     cycloSPORINE modified (GENERIC EQUIVALENT) 25 MG capsule     ondansetron (ZOFRAN) 4 MG tablet     cloNIDine (CATAPRES) 0.1 MG tablet     insulin aspart (NOVOLOG PEN) 100 UNIT/ML soln     insulin glargine (LANTUS) 100 UNIT/ML PEN     blood glucose monitoring (NO BRAND SPECIFIED) test strip     HYDROcodone-acetaminophen (NORCO) 5-325 MG per tablet     No current facility-administered medications for this visit.          Vitals:  /88  Pulse 91  Temp 97.8  F (36.6  C) (Oral)  Resp 18  Ht 1.67 m (5' 5.75\")  Wt 75.4 kg (166 lb 3.2 oz)  BMI 27.03 kg/m2    Exam:   GENERAL APPEARANCE: alert and no distress, chronically ill looking  HENT: mouth without ulcers or lesions  LYMPHATICS: no cervical or supraclavicular nodes  RESP: lungs clear to auscultation - no rales, rhonchi or wheezes  CV: regular rhythm, normal rate, no rub, no murmur  EDEMA: no LE edema bilaterally, AVF in the left forearm with palpable thrill   ABDOMEN: protruded, soft, slightly distended, surgical incision well healed, nontender, bowel sounds normal  MS: extremities normal - no gross deformities noted, no evidence of inflammation in joints, no muscle tenderness  SKIN: no rash    Results: reviewed    "

## 2017-08-08 NOTE — PATIENT INSTRUCTIONS
1. Resume coreg at 3.125 mg twice daily   2. Increase nifedipine to 60 mg twice daily. If BP < 130/80 change to 30 mg in the morning and 60 mg in the evening   3. Start Bicitra and stop sodium bicarbonate.

## 2017-08-08 NOTE — MR AVS SNAPSHOT
After Visit Summary   8/8/2017    Amadou Lewis    MRN: [id number]           Patient Information     Date Of Birth          1965        Visit Information        Provider Department      8/8/2017 2:00 PM Deyvi Dick MD [facility] Nephrology        Today's Diagnoses     Hypertension secondary to other renal disorders    -  1    Secondary renal hyperparathyroidism (H)        Acidosis        Coronary artery disease involving native coronary artery of native heart without angina pectoris          Care Instructions    1. Resume coreg at 3.125 mg twice daily   2. Increase nifedipine to 60 mg twice daily. If BP < 130/80 change to 30 mg in the morning and 60 mg in the evening   3. Start Bicitra and stop sodium bicarbonate.           Follow-ups after your visit        Follow-up notes from your care team     Return in about 1 month (around 9/8/2017).      Your next 10 appointments already scheduled     Aug 08, 2017  2:45 PM CDT   LAB with [facility] ([facility])    [address] [place] [id number] [phone]           Patient must bring picture ID. Patient should be prepared to give a urine specimen  Please do not eat 10-12 hours before your appointment if you are coming in fasting for labs on lipids, cholesterol, or glucose (sugar). Pregnant women should follow their Care Team instructions. Water with medications is okay. Do not drink coffee or other fluids. If you have concerns about taking  your medications, please ask at office or if scheduling via [employer], send a message by clicking on Secure Messaging, Message Your Care Team.            Aug 30, 2017  8:30 AM CDT   US AORTA/IVC/ILIAC DUPLEX COMPLETE with [id number]  ([facility])    [address] [place] [address] [phone]           Please bring a list of your medicines (including vitamins, minerals and  over-the-counter drugs). Also, tell your doctor about any allergies you may have. Wear comfortable clothes and leave your valuables at home.  Adults: No eating or drinking for 8 hours before the exam. You may take medicine with a small sip of water.  Children: - Children 6+ years: No food or drink for 6 hours before exam. - Children 1-5 years: No food or drink for 4 hours before exam. - Infants, breast-fed: may have breast milk up to 2 hours before exam. - Infants, formula: may have bottle until 4 hours before exam.  Please call the Imaging Department at your exam site with any questions.            Aug 30, 2017 10:00 AM CDT   (Arrive by 9:45 AM)   NEW PANCREAS/KIDNEY TRANSPLANT WORK-UP with Jesse Will MD   [facility] ([facility])    [address] [place] [address] [phone]            Sep 26, 2017  2:45 PM CDT   XR CHEST 2 VIEWS with [id number]   [facility] ([facility])    [address] [place] [id number] [phone]           Please bring a list of your current medicines to your exam. (Include vitamins, minerals and over-thecounter medicines.) Leave your valuables at home.  Tell your doctor if there is a chance you may be pregnant.  You do not need to do anything special for this exam.            Sep 26, 2017  3:15 PM CDT   (Arrive by 2:45 PM)   Return Kidney Transplant with Deyvi Dick MD   [facility] Nephrology ([facility])    [address] [place] [address] [phone]              Who to contact     If you have questions or need follow up information about today's clinic visit or your schedule please contact [facility] NEPHROLOGY directly at [phone].  Normal or non-critical lab and imaging results will be communicated to you by MyChart, letter or phone within 4 business days after the clinic has received the  "results. If you do not hear from us within 7 days, please contact the clinic through [employer] or phone. If you have a critical or abnormal lab result, we will notify you by phone as soon as possible.  Submit refill requests through [employer] or call your pharmacy and they will forward the refill request to us. Please allow 3 business days for your refill to be completed.          Additional Information About Your Visit        [employer] Information     [employer] gives you secure access to your electronic health record. If you see a primary care provider, you can also send messages to your care team and make appointments. If you have questions, please call your primary care clinic.  If you do not have a primary care provider, please call [phone] and they will assist you.        Care EveryWhere ID     This is your Care EveryWhere ID. This could be used by other organizations to access your [place] medical records  [id number]        Your Vitals Were     Pulse Temperature Respirations Height BMI (Body Mass Index)       91 97.8  F (36.6  C) (Oral) 18 1.67 m (5' 5.75\") 27.03 kg/m2        Blood Pressure from Last 3 Encounters:   08/08/17 160/88   07/11/17 173/88   05/31/17 174/88    Weight from Last 3 Encounters:   08/08/17 75.4 kg (166 lb 3.2 oz)   05/31/17 76.6 kg (168 lb 14.4 oz)   05/31/17 76.6 kg (168 lb 14.4 oz)              Today, you had the following     No orders found for display         Today's Medication Changes          These changes are accurate as of: 8/8/17  2:28 PM.  If you have any questions, ask your nurse or doctor.               Start taking these medicines.        Dose/Directions    atorvastatin 10 MG tablet   Commonly known as:  LIPITOR   Used for:  Coronary artery disease involving native coronary artery of native heart without angina pectoris   Started by:  Deyvi Dick MD        Dose:  5 mg   Take 0.5 tablets (5 mg) by mouth daily   Quantity:  45 tablet   Refills:  3       sodium " citrate-citric acid 500-334 MG/5ML solution   Commonly known as:  BICITRA   Used for:  Acidosis   Started by:  Deyvi Dick MD        Dose:  45 mL   Take 45 mLs by mouth daily   Quantity:  1350 mL   Refills:  3         These medicines have changed or have updated prescriptions.        Dose/Directions    carvedilol 3.125 MG tablet   Commonly known as:  COREG   This may have changed:    - medication strength  - how much to take   Used for:  Hypertension secondary to other renal disorders   Changed by:  Deyvi Dick MD        Dose:  3.125 mg   Take 1 tablet (3.125 mg) by mouth 2 times daily (with meals)   Quantity:  180 tablet   Refills:  3       insulin glargine 100 UNIT/ML injection   Commonly known as:  LANTUS   This may have changed:    - how much to take  - when to take this   Used for:  Type 2 diabetes mellitus with diabetic chronic kidney disease (H)        Dose:  53 Units   Inject 53 Units Subcutaneous every 24 hours   Refills:  0       NIFEdipine ER osmotic 30 MG 24 hr tablet   Commonly known as:  PROCARDIA XL   This may have changed:  how much to take   Used for:  Hypertension secondary to other renal disorders   Changed by:  Deyvi Dick MD        Dose:  60 mg   Take 2 tablets (60 mg) by mouth 2 times daily   Quantity:  360 tablet   Refills:  3         Stop taking these medicines if you haven't already. Please contact your care team if you have questions.     sodium bicarbonate 650 MG tablet   Stopped by:  Deyvi Dick MD                Where to get your medicines      These medications were sent to [id number] - [place], [place] - [address] AT [facility] [place] [facility]  [address], [place] [address]    Hours:  24-hours Phone:  [phone]     atorvastatin 10 MG tablet    carvedilol 3.125 MG tablet    cinacalcet 30 MG tablet    NIFEdipine ER osmotic 30 MG 24 hr tablet    sodium citrate-citric acid 500-334 MG/5ML solution                Primary Care  Provider Office Phone # Fax #    Masoud Valentin MD, [phone]       [address] [place] [address]        Equal Access to Services     RENATA BENNETT : Hadii aad ku hadrosyo Soomaali, waaxda luqadaha, qaybta kaalmada adeegyada, waxay idiin sravann adeyadiel drummond lacristobalyolie lassiter. So [facility] [phone].    ATENCIÓN: Si habla español, tiene a tejeda disposición servicios gratuitos de asistencia lingüística. Llame al [phone].    We comply with applicable federal civil rights laws and [place] laws. We do not discriminate on the basis of race, color, national origin, age, disability sex, sexual orientation or gender identity.            Thank you!     Thank you for choosing [facility] NEPH[facility]OLOG[facility]  for your care. Our goal is always to provide you with excellent care. Hearing back from our patients is one way we can continue to improve our services. Please take a few minutes to complete the written survey that you may receive in the mail after your visit with us. Thank you!             Your Updated Medication List - Protect others around you: Learn how to safely use, store and throw away your medicines at www.disposemymeds.org.          This list is accurate as of: 8/8/17  2:28 PM.  Always use your most recent med list.                   Brand Name Dispense Instructions for use Diagnosis    atorvastatin 10 MG tablet    LIPITOR    45 tablet    Take 0.5 tablets (5 mg) by mouth daily    Coronary artery disease involving native coronary artery of native heart without angina pectoris       blood glucose monitoring test strip    no brand specified    1 Box    Use to test blood sugar 4 times daily or as directed.    Diabetes mellitus with background retinopathy (H)       carvedilol 3.125 MG tablet    COREG    180 tablet    Take 1 tablet (3.125 mg) by mouth 2 times daily (with meals)    Hypertension secondary to other renal disorders       cholecalciferol 1000 UNITS capsule     vitamin  -D    90 capsule    Take 1 capsule (1,000 Units) by mouth daily    CKD (chronic kidney disease) stage 5, GFR less than 15 ml/min (H)       cinacalcet 30 MG tablet    SENSIPAR    90 tablet    Take 1 tablet (30 mg) by mouth daily    Secondary renal hyperparathyroidism (H)       cloNIDine 0.1 MG tablet    CATAPRES    60 tablet    Take 1 tablet (0.1 mg) by mouth 2 times daily as needed For SBP > 170    Hypertension secondary to other renal disorders       cycloSPORINE modified 25 MG capsule    GENERIC EQUIVALENT    180 capsule    Take 3 capsules (75 mg) by mouth 2 times daily Total dose 75 mg twice a day    Kidney replaced by transplant       HYDROcodone-acetaminophen 5-325 MG per tablet    NORCO     Take 2 tablets by mouth every 6 hours as needed for moderate to severe pain        insulin aspart 100 UNIT/ML injection    NovoLOG PEN     Inject 1-16 Units Subcutaneous At Bedtime Correction Scale - VERY HIGH INSULIN RESISTANCE DOSING    Do Not give Bedtime Correction Insulin if BG < 200.  For  - 209 give 1 units.  For  - 219 give 2 units.  For  - 229 give 3 units.  For  - 239 give 4 units.  For  - 249 give 5 units.  For  - 259 give 6 units.  For  - 269 give 7 units.  For  - 279 give 8 units.  For  - 289 give 9 units.    Type 2 diabetes mellitus with diabetic chronic kidney disease (H)       insulin glargine 100 UNIT/ML injection    LANTUS     Inject 53 Units Subcutaneous every 24 hours    Type 2 diabetes mellitus with diabetic chronic kidney disease (H)       mycophenolic acid EC tablet     180 tablet    Take 3 tablets (540 mg) by mouth 2 times daily    Kidney replaced by transplant, Pancreas replaced by transplant (H)       NIFEdipine ER osmotic 30 MG 24 hr tablet    PROCARDIA XL    360 tablet    Take 2 tablets (60 mg) by mouth 2 times daily    Hypertension secondary to other renal disorders       ondansetron 4 MG tablet    ZOFRAN    30 tablet    Take 1  tablet (4 mg) by mouth every 12 hours as needed for nausea    Nausea       sodium citrate-citric acid 500-334 MG/5ML solution    BICITRA    1350 mL    Take 45 mLs by mouth daily    Acidosis

## 2017-08-08 NOTE — NURSING NOTE
"Chief Complaint   Patient presents with     RECHECK     kIDNEY FOLLOW UP       Initial /88  Pulse 91  Temp 97.8  F (36.6  C) (Oral)  Resp 18  Ht 1.67 m (5' 5.75\")  Wt 75.4 kg (166 lb 3.2 oz)  BMI 27.03 kg/m2 Estimated body mass index is 27.03 kg/(m^2) as calculated from the following:    Height as of this encounter: 1.67 m (5' 5.75\").    Weight as of this encounter: 75.4 kg (166 lb 3.2 oz).  Medication Reconciliation: complete   GEORGES JAIME CMA      "

## 2017-08-08 NOTE — LETTER
8/8/2017    RE: Amadou Lewis  [address] [place] [address]       Assessment and Plan:  1. ESRD:  secondary to polycystic kidney disease and diabetic nephropathy s/p LDKT on 10/10/2013. Complicate by repeated bouts of rejection. Creatinine continues to worsen likely secondary to uncontrolled hypertension , currently serum creatinine is 5.2 mg/dL. We will check BMP every 4 weeks  2. Immunosuppression:  Mycophenolic acid and Cyclosporine with acceptable levels   3. Hypertension:  Better controlled but not at goal did not tolerate higher doses of coreg, will resume at 3.125 mg po bid   4. Anemia secondary to renal disease: Hgb is stable, at baseline. Monitor  5. Secondary renal hyperparathyroidism: most recent PTH was 719 in 2/2017. We discussed compliance with sensipar  6. Nausea and vomiting: likely secondary to medications +/- uremia. EGD in 03/2016 showed esophageal ulcer. This is largely stable and denied bleeding   7. Diabetes mellitus type II management per PCP  8. H/o CAD- s/p PCI with DESx2, added small dose statin   9. Acidosis: replace tablets with Bicitra     Assessment and plan was discussed with patient and he voiced his understanding and agreement.      Reason for Visit:  Mr. Lewis is here for follow up on kidney transplant rejection    HPI:   Amadou Lewis is a 51 year old male with ESKD from polycystic kidney disease and diabetic nephropathy and is status post LDKT on 10/10/2013 with immediate graft function. 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 Patient states that he is still grieving the loss of his son. He reports fatigue, insomnia and worsening of nausea in last 2 months. Patient states that he wakes up every morning feeling very nauseous and usually vomites yellow color fluid. Patient states that his nausea gets somewhat better after he takes two tablets of OCT Shayna-seltzer. Furthermore, he  admits intermittent aching pain in the RUQ and worsening of orthopnea in last few months.   Patient denies: NSAIDs use, fever, chills, changes in weight, changes in appetite, dizziness, adenopathy, sore throat, rhinorrhea, cough, shortness of breath , chest pain, palpitations, lower extremity edema, hematochezia, melena, hematemesis, abdominal pain, changes in bowel habits, dysuria, urinary frequency, urgency, hematuria, rash, pruritis, metallic tast .     Since was seen last has been stable. N/V had improved. We discussed uremic symptoms and compliance with medications.   Does not endorse major uremic symptoms and would like to keep medical management of CKD for now.            Transplant Hx:       Tx: LDKT  Date: 10/10/2013       Present Maintenance IS: Cyclosporine and Mycophenolic acid       Baseline Creatinine:  3-3.5 mg/dL       Recent DSA: No         Biopsy: Yes: 1/21/2016, 2/15/2016, 09/28/2016        Home BP: Not checked.      ROS:   A comprehensive review of systems was obtained and negative, except as noted in the HPI or PMH.    Active Medical Problems:  Patient Active Problem List   Diagnosis     Type II diabetes mellitus with renal manifestations (H)     Diabetes mellitus with background retinopathy (H)     Polycystic kidney     NONSPECIFIC MEDICAL HISTORY     Coronary artery disease     Retinopathy     Hyperlipidemia LDL goal <70     Premature ventricular contractions (PVCs) (VPCs)     Kidney replaced by transplant     Immunosuppressed status (H)     Kidney transplant rejection     Hyperglycemia     Hypertension     Anemia in chronic renal disease     Care after organ transplant     Esophageal ulcer       Personal Hx:  Social History     Social History     Marital status: Single     Spouse name: N/A     Number of children: 2     Years of education: 14     Occupational History     [employer]     Social History Main Topics     Smoking status: Never Smoker     Smokeless tobacco: Never Used      "Alcohol use No     Drug use: No     Sexual activity: Yes     Partners: Female     Other Topics Concern     Not on file     Social History Narrative       Allergies:  Allergies   Allergen Reactions     No Known Allergies        Medications:  Current Outpatient Prescriptions   Medication     MYFORTIC 180 MG PO EC TABLET     carvedilol (COREG) 12.5 MG tablet     NIFEdipine ER osmotic (PROCARDIA XL) 30 MG 24 hr tablet     sodium bicarbonate 650 MG tablet     cholecalciferol (VITAMIN  -D) 1000 UNITS capsule     cinacalcet (SENSIPAR) 30 MG tablet     cycloSPORINE modified (GENERIC EQUIVALENT) 25 MG capsule     ondansetron (ZOFRAN) 4 MG tablet     cloNIDine (CATAPRES) 0.1 MG tablet     insulin aspart (NOVOLOG PEN) 100 UNIT/ML soln     insulin glargine (LANTUS) 100 UNIT/ML PEN     blood glucose monitoring (NO BRAND SPECIFIED) test strip     HYDROcodone-acetaminophen (NORCO) 5-325 MG per tablet     No current facility-administered medications for this visit.          Vitals:  /88  Pulse 91  Temp 97.8  F (36.6  C) (Oral)  Resp 18  Ht 1.67 m (5' 5.75\")  Wt 75.4 kg (166 lb 3.2 oz)  BMI 27.03 kg/m2    Exam:   GENERAL APPEARANCE: alert and no distress, chronically ill looking  HENT: mouth without ulcers or lesions  LYMPHATICS: no cervical or supraclavicular nodes  RESP: lungs clear to auscultation - no rales, rhonchi or wheezes  CV: regular rhythm, normal rate, no rub, no murmur  EDEMA: no LE edema bilaterally, AVF in the left forearm with palpable thrill   ABDOMEN: protruded, soft, slightly distended, surgical incision well healed, nontender, bowel sounds normal  MS: extremities normal - no gross deformities noted, no evidence of inflammation in joints, no muscle tenderness  SKIN: no rash    Results: reviewed      Deyvi Dick MD      "

## 2017-08-28 ENCOUNTER — PRE VISIT (OUTPATIENT)
Dept: CARDIOLOGY | Facility: CLINIC | Age: 52
End: 2017-08-28

## 2017-08-29 ENCOUNTER — TELEPHONE (OUTPATIENT)
Dept: TRANSPLANT | Facility: CLINIC | Age: 52
End: 2017-08-29

## 2017-08-29 NOTE — LETTER
09/29/17    Amadou Lewis  [address]  [place] [address]    Dear Amadou,    It was a pleasure to see you for consideration of kidney transplantation. Your pre-transplant evaluation appointments were here on 5/31/2017. Your evaluation results were reviewed at our Multidisciplinary Selection Committee on 6/7/2017. The Committee has approved you as a candidate for kidney transplant recipient pending the successful completion of the following things.     1) Sean to arrange a return appointment with Diabetes/ Endocrine doctor at [facility]  [phone].   2) Make a Dermatology/skin check appointment at [facility] [phone]  3) Arrange a Colonoscopy to assess colon for cancer at [facility].  Dr. Valentin's office to assist with prep for full colonoscopy ( not just stool tests), and arrange time for procedure at [facility].   4) Sean to call your local dentist and obtain assessment for gum or bone disease and ask dentist to write note of status and send to the Transplant office.       Your name has been added to the kidney transplant wait list with [employer] [facility] on 6/9/2017, please see separate letter regarding the details of this. You have been placed on INACTIVE status until the above items are successfully completed. You will be notified by our office when your status can be changed to ACTIVE.      Please have any potential live donors register now online with our Program to initiate their [facility] at [email or web address].  You will be notified by our office if an approved live donor is found. You will be able to proceed with a live donor kidney transplant as soon as you are medically approved to proceed.     Three months after placing your name on the Wait List the Transplant Program process transitions patients with INACTIVE status to Wait List team for management.  If you or your providers have questions, please feel free to call the Transplant Office at [phone]  option 5.  You have been assigned to Wait List team member: Reanna Mobley RN, [phone] and Linh Fajardo [phone].    We appreciate the opportunity to participate in your care.     Sincerely,     Lizette Cole, BSN RN on behalf of the   Kidney Transplant Program     Enclosure: [facility] Letter     CC: Masoud Valentin MD; Dominick Ramirez MD; Deyvi Dick MD

## 2017-08-29 NOTE — TELEPHONE ENCOUNTER
Amadou reports he will come to Dr. Dick appt on 9/26/2017.   1) Sean to Arrange for return appointment with Diabetes/ Endocrine doctor at [facility]  [phone]  2) Make Dermatology/skin check appointment at [facility] [phone]  3) Arrange a Colonoscopy to assess colon for cancer at [facility].  PCP office to pass message along to RN to call about prep for Colonoscopy.   Dr. Valentin's office to assist with prep for full colonoscopy ( not just stool tests), and arrange time for test at [facility].   4) Call your local dentist and ask for assessment for gum or bone disease and dentist to write note of status.       Mr. Paynets aware of these items and reasons why, letter to Amadou with items and transition to wait list with Pat

## 2017-08-30 ENCOUNTER — OFFICE VISIT (OUTPATIENT)
Dept: CARDIOLOGY | Facility: CLINIC | Age: 52
End: 2017-08-30
Attending: INTERNAL MEDICINE
Payer: COMMERCIAL

## 2017-08-30 VITALS
HEART RATE: 89 BPM | BODY MASS INDEX: 26.34 KG/M2 | HEIGHT: 67 IN | DIASTOLIC BLOOD PRESSURE: 88 MMHG | OXYGEN SATURATION: 97 % | SYSTOLIC BLOOD PRESSURE: 161 MMHG | WEIGHT: 167.8 LBS

## 2017-08-30 DIAGNOSIS — I25.10 CORONARY ARTERY DISEASE INVOLVING NATIVE CORONARY ARTERY OF NATIVE HEART WITHOUT ANGINA PECTORIS: Primary | ICD-10-CM

## 2017-08-30 PROCEDURE — 99214 OFFICE O/P EST MOD 30 MIN: CPT | Mod: ZP | Performed by: INTERNAL MEDICINE

## 2017-08-30 PROCEDURE — 99212 OFFICE O/P EST SF 10 MIN: CPT | Mod: ZF

## 2017-08-30 RX ORDER — METOPROLOL SUCCINATE 50 MG/1
25 TABLET, EXTENDED RELEASE ORAL DAILY
Qty: 90 TABLET | Refills: 3 | Status: SHIPPED | OUTPATIENT
Start: 2017-08-30 | End: 2018-01-23

## 2017-08-30 ASSESSMENT — PAIN SCALES - GENERAL: PAINLEVEL: NO PAIN (0)

## 2017-08-30 NOTE — MR AVS SNAPSHOT
After Visit Summary   8/30/2017    Amadou Lewis    MRN: [id number]           Patient Information     Date Of Birth          1965        Visit Information        Provider Department      8/30/2017 10:00 AM Jesse Will MD [facility]        Today's Diagnoses     CAD (coronary artery disease)    -  1      Care Instructions    You were seen today in the Cardiovascular Clinic at the [facility].      Cardiology Providers you saw during your visit:  Dr. Will    Recommendations:  Please have a Lexiscan stress test.    NM Stress Test:  A.  Do not eat or drink 3 hours prior to the test  B.  No caffeine, alcohol or smoking 12 hours prior to the test  C.  Report to the [facility] Waiting room, 15 minutes prior to the start of the procedure  D.  Allow 3-4 hours for the procedure.    [address] [address]      Follow-up:  As needed.    Thank you for your visit today!       Please call if you have any questions or concerns.  Cardiology Care Coordinator  Joyce Mendes, JANICE    For scheduling needs [phone] option 1 and the option 1 again.  Nursing questions: [phone] option 1 then chose option 3 for the triage nurse.  For emergencies call [phone].                  Follow-ups after your visit        Follow-up notes from your care team     Return if symptoms worsen or fail to improve, for Dr. Will, Pre [place] car eval, CAD, HTN, HLD.      Your next 10 appointments already scheduled     Sep 07, 2017 10:00 AM CDT   NM INJECTION with UUNMINJ1   [facility], Nuclear Medicine ([facility])    [address] [place] [id number] [phone]            Sep 07, 2017 10:45 AM CDT   NM SCAN3 with UUNM1   [facility], Nuclear Medicine ([facility])    [address] [place] [address] [phone]             Sep 07, 2017 11:15 AM CDT   Ekg Stress Nm Lexiscan with UUEKGNMS   [facility] ([facility])    [address] [address]               Sep 07, 2017 12:15 PM CDT   NM MPI WITH LEXISCAN with UUNM1   [facility] [place], Nuclear Medicine ([facility])    [address] [id number] [phone]           For a ONE day exam: Allow 3-4 hours for test. For a TWO day exam: Allow 2 hours PER day for test.  You may need to stop some medicines before the test. Follow your doctor s orders. - If you take a beta blocker: Follow your doctor s specific instructions on taking it prior to and on the day of your exam. - If you take Aggrenox or dipyridamole (Persantine, Permole), stop taking it 48 hours before your test. - If you take Viagra, Cialis or Levitra, stop taking it 48 hours before your test. - If you take theophylline or aminophylline, stop taking it 12 hours before your test.  For patients with diabetes: - If you take insulin, call your diabetes care team. Ask if you should take a 1/2 dose the morning of your test. - If you take diabetes medicine by mouth, don t take it on the morning of your test. Bring it with you to take after the test. (If you have questions, call your diabetes care team.)  Do not take nitrates on the day of your test. Do not wear your Nitro-Patch.  Stop all caffeine 12 hours before the test. This includes coffee, tea, soda pop, chocolate and certain medicines (such as Anacin, Excedrin and NoDoz). Also avoid decaf coffee and tea, as these contain small amounts of caffeine.  No alcohol, smoking or other tobacco for 12 hours before the test.  Stop eating 3 hours before the test. You may drink water.  Please wear a loose two-piece outfit. If you will have an exercise test, bring rubber-soled walking shoes.  When you arrive, please tell us if you: - Have diabetes - Are  breastfeeding - May be pregnant - Have a pacemaker of ICD (implantable defibrillator).  Please call your Imaging Department at your exam site with any questions.            Sep 26, 2017  2:45 PM CDT   XR CHEST 2 VIEWS with UCXR1   [facility] Xray ([facility])    [address]  [address]st [facility] [id number] [phone]           Please bring a list of your current medicines to your exam. (Include vitamins, minerals and over-thecounter medicines.) Leave your valuables at home.  Tell your doctor if there is a chance you may be pregnant.  You do not need to do anything special for this exam.            Sep 26, 2017  3:15 PM CDT   (Arrive by 2:45 PM)   Return Kidney Transplant with Deyvi Dick MD   [facility] Nephrology ([facility])    [address]  [address] [facility] [id number] [phone]              Future tests that were ordered for you today     Open Future Orders        Priority Expected Expires Ordered    Stress NM Lexiscan Routine  8/30/2018 8/30/2017            Who to contact     If you have questions or need follow up information about today's clinic visit or your schedule please contact [facility] directly at [phone].  Normal or non-critical lab and imaging results will be communicated to you by BountyHunterhart, letter or phone within 4 business days after the clinic has received the results. If you do not hear from us within 7 days, please contact the clinic through BountyHunterhart or phone. If you have a critical or abnormal lab result, we will notify you by phone as soon as possible.  Submit refill requests through [employer] or call your pharmacy and they will forward the refill request to us. Please allow 3 business days for your refill to be completed.          Additional Information About Your Visit        C[employer] Information     [employer] gives you secure access to your electronic health record. If you see a  "primary care provider, you can also send messages to your care team and make appointments. If you have questions, please call your primary care clinic.  If you do not have a primary care provider, please call [phone] and they will assist you.        Care EveryWhere ID     This is your Care EveryWhere ID. This could be used by other organizations to access your [place] medical records  [id number]        Your Vitals Were     Pulse Height Pulse Oximetry BMI (Body Mass Index)          89 1.702 m (5' 7\") 97% 26.28 kg/m2         Blood Pressure from Last 3 Encounters:   08/30/17 161/88   08/08/17 160/88   07/11/17 173/88    Weight from Last 3 Encounters:   08/30/17 76.1 kg (167 lb 12.8 oz)   08/08/17 75.4 kg (166 lb 3.2 oz)   05/31/17 76.6 kg (168 lb 14.4 oz)                 Today's Medication Changes          These changes are accurate as of: 8/30/17 10:39 AM.  If you have any questions, ask your nurse or doctor.               Start taking these medicines.        Dose/Directions    metoprolol 50 MG 24 hr tablet   Commonly known as:  TOPROL-XL   Used for:  CAD (coronary artery disease)   Started by:  Jesse Will MD        Dose:  25 mg   Take 0.5 tablets (25 mg) by mouth daily   Quantity:  90 tablet   Refills:  3         These medicines have changed or have updated prescriptions.        Dose/Directions    insulin glargine 100 UNIT/ML injection   Commonly known as:  LANTUS   This may have changed:    - how much to take  - when to take this   Used for:  Type 2 diabetes mellitus with diabetic chronic kidney disease (H)        Dose:  53 Units   Inject 53 Units Subcutaneous every 24 hours   Refills:  0            Where to get your medicines      These medications were sent to [id number] - [place], [place] - [address] AT [facility] [place] [address]   [address], [place] [address]    Hours:  24-hours Phone:  [phone]     metoprolol 50 MG 24 hr tablet                Primary " Care Provider Office Phone # Fax #    Masoud Valentin MD, [phone]       [address] [address] [address] [place] [address]        Equal Access to Services     RENATA BENNETT : Hadii miguelina ku hadrosyo Soomaali, waaxda luqadaha, qaybta kaalmada adeegyada, waxlaureano minain sravann adeyadiel drummond lacristobalyolie lassiter. So [facility] [phone].    ATENCIÓN: Si habla español, tiene a tejeda disposición servicios gratuitos de asistencia lingüística. [facility] [phone].    We comply with applicable federal civil rights laws and [place] laws. We do not discriminate on the basis of race, color, national origin, age, disability sex, sexual orientation or gender identity.            Thank you!     Thank you for choosing [facility]  for your care. Our goal is always to provide you with excellent care. Hearing back from our patients is one way we can continue to improve our services. Please take a few minutes to complete the written survey that you may receive in the mail after your visit with us. Thank you!             Your Updated Medication List - Protect others around you: Learn how to safely use, store and throw away your medicines at www.disposemymeds.org.          This list is accurate as of: 8/30/17 10:39 AM.  Always use your most recent med list.                   Brand Name Dispense Instructions for use Diagnosis    atorvastatin 10 MG tablet    LIPITOR    45 tablet    Take 0.5 tablets (5 mg) by mouth daily    Coronary artery disease involving native coronary artery of native heart without angina pectoris       blood glucose monitoring test strip    no brand specified    1 Box    Use to test blood sugar 4 times daily or as directed.    Diabetes mellitus with background retinopathy (H)       carvedilol 3.125 MG tablet    COREG    180 tablet    Take 1 tablet (3.125 mg) by mouth 2 times daily (with meals)    Hypertension secondary to other renal disorders       cholecalciferol 1000 UNITS capsule     vitamin  -D    90 capsule    Take 1 capsule (1,000 Units) by mouth daily    CKD (chronic kidney disease) stage 5, GFR less than 15 ml/min (H)       cinacalcet 30 MG tablet    SENSIPAR    90 tablet    Take 1 tablet (30 mg) by mouth daily    Secondary renal hyperparathyroidism (H)       cloNIDine 0.1 MG tablet    CATAPRES    60 tablet    Take 1 tablet (0.1 mg) by mouth 2 times daily as needed For SBP > 170    Hypertension secondary to other renal disorders       cycloSPORINE modified 25 MG capsule    GENERIC EQUIVALENT    180 capsule    Take 3 capsules (75 mg) by mouth 2 times daily Total dose 75 mg twice a day    Kidney replaced by transplant       HYDROcodone-acetaminophen 5-325 MG per tablet    NORCO     Take 2 tablets by mouth every 6 hours as needed for moderate to severe pain        insulin aspart 100 UNIT/ML injection    NovoLOG PEN     Inject 1-16 Units Subcutaneous At Bedtime Correction Scale - VERY HIGH INSULIN RESISTANCE DOSING    Do Not give Bedtime Correction Insulin if BG < 200.  For  - 209 give 1 units.  For  - 219 give 2 units.  For  - 229 give 3 units.  For  - 239 give 4 units.  For  - 249 give 5 units.  For  - 259 give 6 units.  For  - 269 give 7 units.  For  - 279 give 8 units.  For  - 289 give 9 units.    Type 2 diabetes mellitus with diabetic chronic kidney disease (H)       insulin glargine 100 UNIT/ML injection    LANTUS     Inject 53 Units Subcutaneous every 24 hours    Type 2 diabetes mellitus with diabetic chronic kidney disease (H)       metoprolol 50 MG 24 hr tablet    TOPROL-XL    90 tablet    Take 0.5 tablets (25 mg) by mouth daily    CAD (coronary artery disease)       mycophenolic acid EC tablet     180 tablet    Take 3 tablets (540 mg) by mouth 2 times daily    Kidney replaced by transplant, Pancreas replaced by transplant (H)       NIFEdipine ER osmotic 30 MG 24 hr tablet    PROCARDIA XL    360 tablet    Take 2 tablets (60 mg) by  mouth 2 times daily    Hypertension secondary to other renal disorders       ondansetron 4 MG tablet    ZOFRAN    30 tablet    Take 1 tablet (4 mg) by mouth every 12 hours as needed for nausea    Nausea       sodium citrate-citric acid 500-334 MG/5ML solution    BICITRA    1350 mL    Take 45 mLs by mouth daily    Acidosis

## 2017-08-30 NOTE — NURSING NOTE
Chief Complaint   Patient presents with     New Patient     CAD, HTN, pre kindey tx cardiac eval     Vitals were taken and medications were reconciled.     Espinoza Andersen MA  8:58 AM

## 2017-08-30 NOTE — NURSING NOTE
Chief Complaint   Patient presents with     New Patient     CAD, HTN, pre kindey tx cardiac eval     Vitals were taken and medications were reconciled.  HARIKA Alarcon  9:21 AM

## 2017-08-30 NOTE — PATIENT INSTRUCTIONS
You were seen today in the Cardiovascular Clinic at the [facility].      Cardiology Providers you saw during your visit:  Dr. Will    Recommendations:  Please have a Lexiscan stress test.    NM Stress Test:  A.  Do not eat or drink 3 hours prior to the test  B.  No caffeine, alcohol or smoking 12 hours prior to the test  C.  Report to the [facility] Waiting room, 15 minutes prior to the start of the procedure  D.  Allow 3-4 hours for the procedure.    [facility], [address] [address]    Follow-up:  As needed.    Thank you for your visit today!       Please call if you have any questions or concerns.  Cardiology Care Coordinator  Joyce Mendes RN    For scheduling needs [phone] option 1 and the option 1 again.  Nursing questions: [phone] option 1 then chose option 3 for the triage nurse.  For emergencies call [phone].
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
## 2017-08-31 NOTE — NURSING NOTE
Chief Complaint   Patient presents with     New Patient     CAD, HTN, pre kindey tx cardiac eval     Cardiology Providers you saw during your visit:  Dr. Will    Recommendations:  Please have a Lexiscan stress test.    NM Stress Test:  A.  Do not eat or drink 3 hours prior to the test  B.  No caffeine, alcohol or smoking 12 hours prior to the test  C.  Report to the [facility] Waiting room, 15 minutes prior to the start of the procedure  D.  Allow 3-4 hours for the procedure.    [facility], [address] [address]    Follow-up:  As needed.    Cardiac Testing: Patient given instructions regarding  nuclear pharmacologic thallium . Discussed purpose, preparation, procedure and when to expect results reported back to the patient. Patient demonstrated understanding of this information and agreed to call with further questions or concerns.  Med Reconcile: Reviewed and verified all current medications with the patient. The updated medication list was printed and given to the patient.  Return Appointment: Patient given instructions regarding scheduling next clinic visit. Patient demonstrated understanding of this information and agreed to call with further questions or concerns.  Patient stated he understood all health information given and agreed to call with further questions or concerns.

## 2017-09-06 DIAGNOSIS — Z94.0 KIDNEY TRANSPLANT RECIPIENT: ICD-10-CM

## 2017-09-06 LAB
ERYTHROCYTE [DISTWIDTH] IN BLOOD BY AUTOMATED COUNT: 13.1 % (ref 10–15)
HCT VFR BLD AUTO: 32.8 % (ref 40–53)
HGB BLD-MCNC: 10.9 G/DL (ref 13.3–17.7)
MCH RBC QN AUTO: 28.3 PG (ref 26.5–33)
MCHC RBC AUTO-ENTMCNC: 33.2 G/DL (ref 31.5–36.5)
MCV RBC AUTO: 85 FL (ref 78–100)
PLATELET # BLD AUTO: 248 10E9/L (ref 150–450)
RBC # BLD AUTO: 3.85 10E12/L (ref 4.4–5.9)
WBC # BLD AUTO: 5.4 10E9/L (ref 4–11)

## 2017-09-06 PROCEDURE — 85027 COMPLETE CBC AUTOMATED: CPT | Performed by: INTERNAL MEDICINE

## 2017-09-06 PROCEDURE — 80048 BASIC METABOLIC PNL TOTAL CA: CPT | Performed by: INTERNAL MEDICINE

## 2017-09-06 PROCEDURE — 80158 DRUG ASSAY CYCLOSPORINE: CPT | Performed by: INTERNAL MEDICINE

## 2017-09-06 PROCEDURE — 36415 COLL VENOUS BLD VENIPUNCTURE: CPT | Performed by: INTERNAL MEDICINE

## 2017-09-07 ENCOUNTER — HOSPITAL ENCOUNTER (OUTPATIENT)
Dept: NUCLEAR MEDICINE | Facility: CLINIC | Age: 52
Setting detail: NUCLEAR MEDICINE
End: 2017-09-07
Attending: INTERNAL MEDICINE
Payer: COMMERCIAL

## 2017-09-07 ENCOUNTER — TRANSFERRED RECORDS (OUTPATIENT)
Dept: CARDIOLOGY | Facility: CLINIC | Age: 52
End: 2017-09-07

## 2017-09-07 ENCOUNTER — TELEPHONE (OUTPATIENT)
Dept: TRANSPLANT | Facility: CLINIC | Age: 52
End: 2017-09-07

## 2017-09-07 ENCOUNTER — HOSPITAL ENCOUNTER (OUTPATIENT)
Dept: NUCLEAR MEDICINE | Facility: CLINIC | Age: 52
Setting detail: NUCLEAR MEDICINE
Discharge: HOME | End: 2017-09-07
Attending: INTERNAL MEDICINE | Admitting: INTERNAL MEDICINE
Payer: COMMERCIAL

## 2017-09-07 ENCOUNTER — HOSPITAL ENCOUNTER (OUTPATIENT)
Dept: CARDIOLOGY | Facility: CLINIC | Age: 52
End: 2017-09-07
Attending: INTERNAL MEDICINE
Payer: COMMERCIAL

## 2017-09-07 DIAGNOSIS — I25.10 CORONARY ARTERY DISEASE INVOLVING NATIVE CORONARY ARTERY OF NATIVE HEART WITHOUT ANGINA PECTORIS: ICD-10-CM

## 2017-09-07 LAB
ANION GAP SERPL CALCULATED.3IONS-SCNC: 13 MMOL/L (ref 3–14)
BUN SERPL-MCNC: 75 MG/DL (ref 7–30)
CALCIUM SERPL-MCNC: 8.5 MG/DL (ref 8.5–10.1)
CHLORIDE SERPL-SCNC: 102 MMOL/L (ref 94–109)
CO2 SERPL-SCNC: 18 MMOL/L (ref 20–32)
CREAT SERPL-MCNC: 4.88 MG/DL (ref 0.66–1.25)
CYCLOSPORINE BLD LC/MS/MS-MCNC: 56 UG/L (ref 50–400)
GFR SERPL CREATININE-BSD FRML MDRD: 13 ML/MIN/1.7M2
GLUCOSE SERPL-MCNC: 423 MG/DL (ref 70–99)
POTASSIUM SERPL-SCNC: 5.3 MMOL/L (ref 3.4–5.3)
SODIUM SERPL-SCNC: 133 MMOL/L (ref 133–144)
TME LAST DOSE: NORMAL H

## 2017-09-07 PROCEDURE — 25000128 H RX IP 250 OP 636: Performed by: INTERNAL MEDICINE

## 2017-09-07 PROCEDURE — A9502 TC99M TETROFOSMIN: HCPCS | Performed by: INTERNAL MEDICINE

## 2017-09-07 PROCEDURE — 34300033 ZZH RX 343: Performed by: INTERNAL MEDICINE

## 2017-09-07 PROCEDURE — 94620 ZZHC PULMONARY STRESS TEST, SIMPLE: CPT | Mod: 26 | Performed by: INTERNAL MEDICINE

## 2017-09-07 PROCEDURE — 78452 HT MUSCLE IMAGE SPECT MULT: CPT

## 2017-09-07 RX ORDER — REGADENOSON 0.08 MG/ML
0.4 INJECTION, SOLUTION INTRAVENOUS ONCE
Status: COMPLETED | OUTPATIENT
Start: 2017-09-07 | End: 2017-09-07

## 2017-09-07 RX ORDER — AMINOPHYLLINE 25 MG/ML
50-100 INJECTION, SOLUTION INTRAVENOUS
Status: DISCONTINUED | OUTPATIENT
Start: 2017-09-07 | End: 2017-09-08 | Stop reason: HOSPADM

## 2017-09-07 RX ORDER — ALBUTEROL SULFATE 90 UG/1
2 AEROSOL, METERED RESPIRATORY (INHALATION) EVERY 5 MIN PRN
Status: DISCONTINUED | OUTPATIENT
Start: 2017-09-07 | End: 2017-09-08 | Stop reason: HOSPADM

## 2017-09-07 RX ADMIN — REGADENOSON 0.4 MG: 0.08 INJECTION, SOLUTION INTRAVENOUS at 11:16

## 2017-09-07 RX ADMIN — TETROFOSMIN 10.1 MCI.: 1.38 INJECTION, POWDER, LYOPHILIZED, FOR SOLUTION INTRAVENOUS at 10:05

## 2017-09-07 RX ADMIN — TETROFOSMIN 40 MCI.: 1.38 INJECTION, POWDER, LYOPHILIZED, FOR SOLUTION INTRAVENOUS at 11:17

## 2017-09-07 NOTE — TELEPHONE ENCOUNTER
Call to pt. LOIDA left requesting callback to ensure glucose was/wasn't fasting and level has decreased.

## 2017-09-07 NOTE — PROGRESS NOTES
Pt here for Lexiscan.  Test, medication and side effects reviewed with patient.  Lung sounds clear.  Pt denies caffeine use. Pt c/o sob with Lexiscan dose, resolved without intervention. Pt escorted back to Nuclear Medicine for remainder of test.

## 2017-09-07 NOTE — TELEPHONE ENCOUNTER
DATE:  9/7/2017   TIME OF RECEIPT FROM LAB:  1144  LAB TEST:  Glucose- Non- Fasting- Drawn yesterday and ran today  LAB VALUE:  423  RESULTS GIVEN WITH READ-BACK TO (PROVIDER):  BEATRIZ HARPER LPN  TIME LAB VALUE REPORTED TO PROVIDER:   [address] RN

## 2017-09-12 ENCOUNTER — TELEPHONE (OUTPATIENT)
Dept: TRANSPLANT | Facility: CLINIC | Age: 52
End: 2017-09-12

## 2017-09-12 NOTE — TELEPHONE ENCOUNTER
Sean Lewis called to ask about the stress test: Dr. Will made addendum in the epic note:   Addendum: Nuc stress does not reveal significant inducible ischemia. Patient is approved for his procedure at somewhat increased but acceptable perioperative risk for event.       Electronically signed by Jesse Will MD at 9/8/2017 12:04 PM   items for Sean to do:   1) Sean to Arrange for return appointment with Diabetes/ Endocrine doctor at [facility]  [phone]  2) Make Dermatology/skin check appointment at [facility] [phone]  3) Arrange a Colonoscopy to assess colon for cancer at [facility].  PCP office to pass message along to RN to call about prep for Colonoscopy.     4) Call your local dentist and ask for assessment for gum or bone disease and dentist to write note of status.      Mr. Lewis aware of these items and reasons why.   Dr. Valentin's office to assist with prep for full colonoscopy ( not just stool tests), and arrange time for test at [facility].

## 2017-09-26 ENCOUNTER — OFFICE VISIT (OUTPATIENT)
Dept: NEPHROLOGY | Facility: CLINIC | Age: 52
End: 2017-09-26
Attending: INTERNAL MEDICINE
Payer: COMMERCIAL

## 2017-09-26 VITALS
SYSTOLIC BLOOD PRESSURE: 181 MMHG | WEIGHT: 168.7 LBS | HEIGHT: 67 IN | DIASTOLIC BLOOD PRESSURE: 95 MMHG | RESPIRATION RATE: 18 BRPM | TEMPERATURE: 97.8 F | BODY MASS INDEX: 26.48 KG/M2 | HEART RATE: 89 BPM

## 2017-09-26 DIAGNOSIS — I15.1 HYPERTENSION SECONDARY TO OTHER RENAL DISORDERS: ICD-10-CM

## 2017-09-26 RX ORDER — NIFEDIPINE 30 MG/1
90 TABLET, EXTENDED RELEASE ORAL 2 TIMES DAILY
Qty: 540 TABLET | Refills: 3 | Status: ON HOLD | OUTPATIENT
Start: 2017-09-26 | End: 2018-11-20

## 2017-09-26 ASSESSMENT — PAIN SCALES - GENERAL: PAINLEVEL: NO PAIN (0)

## 2017-09-26 NOTE — MR AVS SNAPSHOT
After Visit Summary   9/26/2017    Amadou Lewis    MRN: [id number]           Patient Information     Date Of Birth          1965        Visit Information        Provider Department      9/26/2017 3:15 PM Deyvi Dick MD [facility]        Today's Diagnoses     Hypertension secondary to other renal disorders          Care Instructions    Lower Myfrotic to (3 tabs) 540 mg in the morning and (2 tabs)360 mg in the evening   Increase nifedipine to 90 mg twice daily. Lower dose if BP is persistently below 130/80 to 60 mg twice daily          Follow-ups after your visit        Follow-up notes from your care team     Return in about 3 months (around 12/26/2017).      Your next 10 appointments already scheduled     Jan 23, 2018  4:05 PM CST   (Arrive by 3:35 PM)   Return Kidney Transplant with Deyvi Dick MD   [facility] ([facility])    [address] [place] [address] [phone]              Who to contact     If you have questions or need follow up information about today's clinic visit or your schedule please contact [facility] NE[facility] directly at [phone].  Normal or non-critical lab and imaging results will be communicated to you by MyChart, letter or phone within 4 business days after the clinic has received the results. If you do not hear from us within 7 days, please contact the clinic through everbillhart or phone. If you have a critical or abnormal lab result, we will notify you by phone as soon as possible.  Submit refill requests through [employer] or call your pharmacy and they will forward the refill request to us. Please allow 3 business days for your refill to be completed.          Additional Information About Your Visit        MyChart Information     [employer] gives you secure access to your electronic health record. If you see a primary care provider, you can also send messages to your care team and make  "appointments. If you have questions, please call your primary care clinic.  If you do not have a primary care provider, please call [phone] and they will assist you.        Care EveryWhere ID     This is your Care EveryWhere ID. This could be used by other organizations to access your [place] medical records  [id number]        Your Vitals Were     Pulse Temperature Respirations Height BMI (Body Mass Index)       89 97.8  F (36.6  C) (Oral) 18 1.702 m (5' 7\") 26.42 kg/m2        Blood Pressure from Last 3 Encounters:   09/26/17 (!) 181/95   08/30/17 161/88   08/08/17 160/88    Weight from Last 3 Encounters:   09/26/17 76.5 kg (168 lb 11.2 oz)   08/30/17 76.1 kg (167 lb 12.8 oz)   08/08/17 75.4 kg (166 lb 3.2 oz)              Today, you had the following     No orders found for display         Today's Medication Changes          These changes are accurate as of: 9/26/17 11:59 PM.  If you have any questions, ask your nurse or doctor.               These medicines have changed or have updated prescriptions.        Dose/Directions    insulin glargine 100 UNIT/ML injection   Commonly known as:  LANTUS   This may have changed:    - how much to take  - when to take this   Used for:  Type 2 diabetes mellitus with diabetic chronic kidney disease (H)        Dose:  53 Units   Inject 53 Units Subcutaneous every 24 hours   Refills:  0       NIFEdipine ER osmotic 30 MG 24 hr tablet   Commonly known as:  PROCARDIA XL   This may have changed:  how much to take   Used for:  Hypertension secondary to other renal disorders   Changed by:  Deyvi Dick MD        Dose:  90 mg   Take 3 tablets (90 mg) by mouth 2 times daily   Quantity:  540 tablet   Refills:  3         Stop taking these medicines if you haven't already. Please contact your care team if you have questions.     carvedilol 3.125 MG tablet   Commonly known as:  COREG   Stopped by:  Deyvi Dick MD                Where to get your medicines      These " medications were sent to [email or web address] - [place], [place] - [address] AT [facility] [address] [place] [address]  [address], [place] [address]    Hours:  24-hours Phone:  [phone]     NIFEdipine ER osmotic 30 MG 24 hr tablet                Primary Care Provider Office Phone # Fax #    Masoud Valentin MD, [phone]       [address] [place] [address]        Equal Access to Services     [facility]: Hadii aad ku hadasho Soomaali, waaxda luqadaha, qaybta kaalmada adeegyada, waxay idiin hayaan adeeg kharash la'aan ah. So [facility] [phone].    ATENCIÓN: Si habla español, tiene a tejeda disposición servicios gratuitos de asistencia lingüística. [facility] [phone].    We comply with applicable federal civil rights laws and [place] laws. We do not discriminate on the basis of race, color, national origin, age, disability, sex, sexual orientation, or gender identity.            Thank you!     Thank you for choosing [facility] NEPHROLOG[facility]  for your care. Our goal is always to provide you with excellent care. Hearing back from our patients is one way we can continue to improve our services. Please take a few minutes to complete the written survey that you may receive in the mail after your visit with us. Thank you!             Your Updated Medication List - Protect others around you: Learn how to safely use, store and throw away your medicines at www.disposemymeds.org.          This list is accurate as of: 9/26/17 11:59 PM.  Always use your most recent med list.                   Brand Name Dispense Instructions for use Diagnosis    atorvastatin 10 MG tablet    LIPITOR    45 tablet    Take 0.5 tablets (5 mg) by mouth daily    Coronary artery disease involving native coronary artery of native heart without angina pectoris       blood glucose monitoring test strip    no brand specified    1 Box    Use to test blood sugar 4 times daily or as  directed.    Diabetes mellitus with background retinopathy (H)       cholecalciferol 1000 UNITS capsule    vitamin  -D    90 capsule    Take 1 capsule (1,000 Units) by mouth daily    CKD (chronic kidney disease) stage 5, GFR less than 15 ml/min (H)       cinacalcet 30 MG tablet    SENSIPAR    90 tablet    Take 1 tablet (30 mg) by mouth daily    Secondary renal hyperparathyroidism (H)       cloNIDine 0.1 MG tablet    CATAPRES    60 tablet    Take 1 tablet (0.1 mg) by mouth 2 times daily as needed For SBP > 170    Hypertension secondary to other renal disorders       cycloSPORINE modified 25 MG capsule    GENERIC EQUIVALENT    180 capsule    Take 3 capsules (75 mg) by mouth 2 times daily Total dose 75 mg twice a day    Kidney replaced by transplant       HYDROcodone-acetaminophen 5-325 MG per tablet    NORCO     Take 2 tablets by mouth every 6 hours as needed for moderate to severe pain        insulin aspart 100 UNIT/ML injection    NovoLOG PEN     Inject 1-16 Units Subcutaneous At Bedtime Correction Scale - VERY HIGH INSULIN RESISTANCE DOSING    Do Not give Bedtime Correction Insulin if BG < 200.  For  - 209 give 1 units.  For  - 219 give 2 units.  For  - 229 give 3 units.  For  - 239 give 4 units.  For  - 249 give 5 units.  For  - 259 give 6 units.  For  - 269 give 7 units.  For  - 279 give 8 units.  For  - 289 give 9 units.    Type 2 diabetes mellitus with diabetic chronic kidney disease (H)       insulin glargine 100 UNIT/ML injection    LANTUS     Inject 53 Units Subcutaneous every 24 hours    Type 2 diabetes mellitus with diabetic chronic kidney disease (H)       metoprolol 50 MG 24 hr tablet    TOPROL-XL    90 tablet    Take 0.5 tablets (25 mg) by mouth daily    Coronary artery disease involving native coronary artery of native heart without angina pectoris       mycophenolic acid 180 MG EC tablet     180 tablet    Take 3 tablets (540 mg) by mouth 2 times  daily    Kidney replaced by transplant, Pancreas replaced by transplant (H)       NIFEdipine ER osmotic 30 MG 24 hr tablet    PROCARDIA XL    540 tablet    Take 3 tablets (90 mg) by mouth 2 times daily    Hypertension secondary to other renal disorders       ondansetron 4 MG tablet    ZOFRAN    30 tablet    Take 1 tablet (4 mg) by mouth every 12 hours as needed for nausea    Nausea       sodium citrate-citric acid 500-334 MG/5ML solution    BICITRA    1350 mL    Take 45 mLs by mouth daily    Acidosis

## 2017-09-26 NOTE — PATIENT INSTRUCTIONS
Lower Myfrotic to (3 tabs) 540 mg in the morning and (2 tabs)360 mg in the evening   Increase nifedipine to 90 mg twice daily. Lower dose if BP is persistently below 130/80 to 60 mg twice daily

## 2017-09-26 NOTE — LETTER
9/26/2017       RE: Amadou Lewis  [address] [place] [address]     Dear Colleague,    Thank you for referring your patient, Amadou Lewis, to the [facility] NEPHROLOG[facility] at [facility]. Please see a copy of my visit note below.    Assessment and Plan:  1. ESRD:  secondary to polycystic kidney disease and diabetic nephropathy s/p LDKT on 10/10/2013. Complicate by repeated bouts of rejection. Creatinine continues to worsen likely secondary to uncontrolled hypertension , currently serum creatinine is 5.2 mg/dL. We will continue with checking BMP every 4 weeks  2. Immunosuppression:  Mycophenolic acid and Cyclosporine with acceptable levels. We lowered his MPA dose to 540/360  3. Hypertension: Poorly controlled he again stopped taking the medicine due to side effects. He thinks that is elevated in clinic but better at home, however he has not been checking it at home.   4. Anemia secondary to renal disease: Hgb is stable, at baseline. Monitor  5. Secondary renal hyperparathyroidism: most recent PTH was 719 in 2/2017. We discussed compliance with sensipar he claims he is taking it currently. Will recheck next month   6. Nausea and vomiting: likely secondary to medications +/- uremia. EGD in 03/2016 showed esophageal ulcer. This is largely stable and denied bleeding.  7. Diabetes mellitus type II management per PCP  8. H/o CAD- s/p PCI with DESx2, added small dose statin   9. Acidosis: wanted to use the tablets again   10. Non compliance: Mr. Lewis became angry when I raised my concerns about his BP medications and being on and off. I was able to calm him and he was able to complete the visit. He agreed to keep home BP log and will have our clinic follow up with him.    Assessment and plan was discussed with patient and he voiced his understanding and agreement.      Reason for Visit:  Mr. Lewis is here for follow up on kidney transplant rejection    HPI:   Amadou GODFREY  Debbie is a 51 year old male with ESKD from polycystic kidney disease and diabetic nephropathy and is status post LDKT on 10/10/2013 with immediate graft function. 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 Subsequently had GI issues that was difficult to maintain his immunosuppression with and eventually had intractable rejection   Since was seen last has been stable. N/V stable/ improved only in the morning and not interfering with medications. We discussed uremic symptoms and compliance with medications.   Does not endorse major uremic symptoms and would like to keep medical management of CKD for now.  He has been intolerant of his BP meds and claims he gets dizziness with any increase and he would like to avoid this to continue his full time job.             Transplant Hx:       Tx: LDKT  Date: 10/10/2013       Present Maintenance IS: Cyclosporine and Mycophenolic acid       Baseline Creatinine:  3-3.5 mg/dL       Recent DSA: No         Biopsy: Yes: 1/21/2016, 2/15/2016, 09/28/2016        Home BP: Not checked.      ROS:   A comprehensive review of systems was obtained and negative, except as noted in the HPI or PMH.    Active Medical Problems:  Patient Active Problem List   Diagnosis     Type II diabetes mellitus with renal manifestations (H)     Diabetes mellitus with background retinopathy (H)     Polycystic kidney     NONSPECIFIC MEDICAL HISTORY     Coronary artery disease     Retinopathy     Hyperlipidemia LDL goal <70     Premature ventricular contractions (PVCs) (VPCs)     Kidney replaced by transplant     Immunosuppressed status (H)     Kidney transplant rejection     Hyperglycemia     Hypertension     Anemia in chronic renal disease     Care after organ transplant     Esophageal ulcer       Personal Hx:  Social History     Social History     Marital status: Single     Spouse name: N/A     Number of children: 2     Years of  "education: 14     Occupational History     [employer]     Social History Main Topics     Smoking status: Never Smoker     Smokeless tobacco: Never Used     Alcohol use No     Drug use: No     Sexual activity: Yes     Partners: Female     Other Topics Concern     Not on file     Social History Narrative       Allergies:  Allergies   Allergen Reactions     No Known Allergies        Medications:  Current Outpatient Prescriptions   Medication     NIFEdipine ER osmotic (PROCARDIA XL) 30 MG 24 hr tablet     metoprolol (TOPROL-XL) 50 MG 24 hr tablet     cinacalcet (SENSIPAR) 30 MG tablet     sodium citrate-citric acid (BICITRA) 500-334 MG/5ML solution     atorvastatin (LIPITOR) 10 MG tablet     MYFORTIC 180 MG PO EC TABLET     cholecalciferol (VITAMIN  -D) 1000 UNITS capsule     cycloSPORINE modified (GENERIC EQUIVALENT) 25 MG capsule     ondansetron (ZOFRAN) 4 MG tablet     cloNIDine (CATAPRES) 0.1 MG tablet     insulin aspart (NOVOLOG PEN) 100 UNIT/ML soln     insulin glargine (LANTUS) 100 UNIT/ML PEN     blood glucose monitoring (NO BRAND SPECIFIED) test strip     HYDROcodone-acetaminophen (NORCO) 5-325 MG per tablet     No current facility-administered medications for this visit.          Vitals:  BP (!) 181/95  Pulse 89  Temp 97.8  F (36.6  C) (Oral)  Resp 18  Ht 1.702 m (5' 7\")  Wt 76.5 kg (168 lb 11.2 oz)  BMI 26.42 kg/m2    Exam:   GENERAL APPEARANCE: alert and no distress, chronically ill looking  HENT: mouth without ulcers or lesions  LYMPHATICS: no cervical or supraclavicular nodes  RESP: lungs clear to auscultation - no rales, rhonchi or wheezes  CV: regular rhythm, normal rate, no rub, no murmur  EDEMA: no LE edema bilaterally, AVF in the left forearm with palpable thrill   ABDOMEN: protruded, soft, slightly distended, surgical incision well healed, nontender, bowel sounds normal  MS: extremities normal - no gross deformities noted, no evidence of inflammation in joints, no muscle " tenderness  SKIN: no rash    Results: reviewed    Again, thank you for allowing me to participate in the care of your patient.      Sincerely,    Deyvi Dick MD

## 2017-09-26 NOTE — NURSING NOTE
"Chief Complaint   Patient presents with     RECHECK     Kifney transplant5 follow up       Initial BP (!) 181/95  Pulse 89  Temp 97.8  F (36.6  C) (Oral)  Resp 18  Ht 1.702 m (5' 7\")  Wt 76.5 kg (168 lb 11.2 oz)  BMI 26.42 kg/m2 Estimated body mass index is 26.42 kg/(m^2) as calculated from the following:    Height as of this encounter: 1.702 m (5' 7\").    Weight as of this encounter: 76.5 kg (168 lb 11.2 oz).  Medication Reconciliation: complete   GEORGES JAIME CMA      "

## 2017-10-07 NOTE — PROGRESS NOTES
Assessment and Plan:  1. ESRD:  secondary to polycystic kidney disease and diabetic nephropathy s/p LDKT on 10/10/2013. Complicate by repeated bouts of rejection. Creatinine continues to worsen likely secondary to uncontrolled hypertension , currently serum creatinine is 5.2 mg/dL. We will continue with checking BMP every 4 weeks  2. Immunosuppression:  Mycophenolic acid and Cyclosporine with acceptable levels. We lowered his MPA dose to 540/360  3. Hypertension: Poorly controlled he again stopped taking the medicine due to side effects. He thinks that is elevated in clinic but better at home, however he has not been checking it at home.   4. Anemia secondary to renal disease: Hgb is stable, at baseline. Monitor  5. Secondary renal hyperparathyroidism: most recent PTH was 719 in 2/2017. We discussed compliance with sensipar he claims he is taking it currently. Will recheck next month   6. Nausea and vomiting: likely secondary to medications +/- uremia. EGD in 03/2016 showed esophageal ulcer. This is largely stable and denied bleeding.  7. Diabetes mellitus type II management per PCP  8. H/o CAD- s/p PCI with DESx2, added small dose statin   9. Acidosis: wanted to use the tablets again   10. Non compliance: Mr. Lewis became angry when I raised my concerns about his BP medications and being on and off. I was able to calm him and he was able to complete the visit. He agreed to keep home BP log and will have our clinic follow up with him.    Assessment and plan was discussed with patient and he voiced his understanding and agreement.      Reason for Visit:  Mr. Lewis is here for follow up on kidney transplant rejection    HPI:   Amadou Lewis is a 51 year old male with ESKD from polycystic kidney disease and diabetic nephropathy and is status post LDKT on 10/10/2013 with immediate graft function. 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  Subsequently had GI issues that was difficult to maintain his immunosuppression with and eventually had intractable rejection   Since was seen last has been stable. N/V stable/ improved only in the morning and not interfering with medications. We discussed uremic symptoms and compliance with medications.   Does not endorse major uremic symptoms and would like to keep medical management of CKD for now.  He has been intolerant of his BP meds and claims he gets dizziness with any increase and he would like to avoid this to continue his full time job.             Transplant Hx:       Tx: LDKT  Date: 10/10/2013       Present Maintenance IS: Cyclosporine and Mycophenolic acid       Baseline Creatinine:  3-3.5 mg/dL       Recent DSA: No         Biopsy: Yes: 1/21/2016, 2/15/2016, 09/28/2016        Home BP: Not checked.      ROS:   A comprehensive review of systems was obtained and negative, except as noted in the HPI or PMH.    Active Medical Problems:  Patient Active Problem List   Diagnosis     Type II diabetes mellitus with renal manifestations (H)     Diabetes mellitus with background retinopathy (H)     Polycystic kidney     NONSPECIFIC MEDICAL HISTORY     Coronary artery disease     Retinopathy     Hyperlipidemia LDL goal <70     Premature ventricular contractions (PVCs) (VPCs)     Kidney replaced by transplant     Immunosuppressed status (H)     Kidney transplant rejection     Hyperglycemia     Hypertension     Anemia in chronic renal disease     Care after organ transplant     Esophageal ulcer       Personal Hx:  Social History     Social History     Marital status: Single     Spouse name: N/A     Number of children: 2     Years of education: 14     Occupational History     [employer]     Social History Main Topics     Smoking status: Never Smoker     Smokeless tobacco: Never Used     Alcohol use No     Drug use: No     Sexual activity: Yes     Partners: Female     Other Topics Concern     Not on file  "    Social History Narrative       Allergies:  Allergies   Allergen Reactions     No Known Allergies        Medications:  Current Outpatient Prescriptions   Medication     NIFEdipine ER osmotic (PROCARDIA XL) 30 MG 24 hr tablet     metoprolol (TOPROL-XL) 50 MG 24 hr tablet     cinacalcet (SENSIPAR) 30 MG tablet     sodium citrate-citric acid (BICITRA) 500-334 MG/5ML solution     atorvastatin (LIPITOR) 10 MG tablet     MYFORTIC 180 MG PO EC TABLET     cholecalciferol (VITAMIN  -D) 1000 UNITS capsule     cycloSPORINE modified (GENERIC EQUIVALENT) 25 MG capsule     ondansetron (ZOFRAN) 4 MG tablet     cloNIDine (CATAPRES) 0.1 MG tablet     insulin aspart (NOVOLOG PEN) 100 UNIT/ML soln     insulin glargine (LANTUS) 100 UNIT/ML PEN     blood glucose monitoring (NO BRAND SPECIFIED) test strip     HYDROcodone-acetaminophen (NORCO) 5-325 MG per tablet     No current facility-administered medications for this visit.          Vitals:  BP (!) 181/95  Pulse 89  Temp 97.8  F (36.6  C) (Oral)  Resp 18  Ht 1.702 m (5' 7\")  Wt 76.5 kg (168 lb 11.2 oz)  BMI 26.42 kg/m2    Exam:   GENERAL APPEARANCE: alert and no distress, chronically ill looking  HENT: mouth without ulcers or lesions  LYMPHATICS: no cervical or supraclavicular nodes  RESP: lungs clear to auscultation - no rales, rhonchi or wheezes  CV: regular rhythm, normal rate, no rub, no murmur  EDEMA: no LE edema bilaterally, AVF in the left forearm with palpable thrill   ABDOMEN: protruded, soft, slightly distended, surgical incision well healed, nontender, bowel sounds normal  MS: extremities normal - no gross deformities noted, no evidence of inflammation in joints, no muscle tenderness  SKIN: no rash    Results: reviewed  "

## 2017-10-09 ENCOUNTER — TRANSFERRED RECORDS (OUTPATIENT)
Dept: HEALTH INFORMATION MANAGEMENT | Facility: CLINIC | Age: 52
End: 2017-10-09

## 2017-10-12 DIAGNOSIS — Z94.0 KIDNEY TRANSPLANT RECIPIENT: ICD-10-CM

## 2017-10-12 LAB
ERYTHROCYTE [DISTWIDTH] IN BLOOD BY AUTOMATED COUNT: 13.5 % (ref 10–15)
HCT VFR BLD AUTO: 32.8 % (ref 40–53)
HGB BLD-MCNC: 10.7 G/DL (ref 13.3–17.7)
MCH RBC QN AUTO: 28.5 PG (ref 26.5–33)
MCHC RBC AUTO-ENTMCNC: 32.6 G/DL (ref 31.5–36.5)
MCV RBC AUTO: 87 FL (ref 78–100)
PLATELET # BLD AUTO: 240 10E9/L (ref 150–450)
RBC # BLD AUTO: 3.76 10E12/L (ref 4.4–5.9)
WBC # BLD AUTO: 7.3 10E9/L (ref 4–11)

## 2017-10-12 PROCEDURE — 36415 COLL VENOUS BLD VENIPUNCTURE: CPT | Performed by: INTERNAL MEDICINE

## 2017-10-12 PROCEDURE — 85027 COMPLETE CBC AUTOMATED: CPT | Performed by: INTERNAL MEDICINE

## 2017-10-12 PROCEDURE — 80048 BASIC METABOLIC PNL TOTAL CA: CPT | Performed by: INTERNAL MEDICINE

## 2017-10-12 PROCEDURE — 80158 DRUG ASSAY CYCLOSPORINE: CPT | Performed by: INTERNAL MEDICINE

## 2017-10-13 ENCOUNTER — TELEPHONE (OUTPATIENT)
Dept: TRANSPLANT | Facility: CLINIC | Age: 52
End: 2017-10-13

## 2017-10-13 LAB
ANION GAP SERPL CALCULATED.3IONS-SCNC: 10 MMOL/L (ref 3–14)
BUN SERPL-MCNC: 78 MG/DL (ref 7–30)
CALCIUM SERPL-MCNC: 9 MG/DL (ref 8.5–10.1)
CHLORIDE SERPL-SCNC: 109 MMOL/L (ref 94–109)
CO2 SERPL-SCNC: 17 MMOL/L (ref 20–32)
CREAT SERPL-MCNC: 5.03 MG/DL (ref 0.66–1.25)
CYCLOSPORINE BLD LC/MS/MS-MCNC: 42 UG/L (ref 50–400)
GFR SERPL CREATININE-BSD FRML MDRD: 12 ML/MIN/1.7M2
GLUCOSE SERPL-MCNC: 147 MG/DL (ref 70–99)
POTASSIUM SERPL-SCNC: 5.6 MMOL/L (ref 3.4–5.3)
SODIUM SERPL-SCNC: 136 MMOL/L (ref 133–144)
TME LAST DOSE: ABNORMAL H

## 2017-10-13 NOTE — TELEPHONE ENCOUNTER
DATE:  10/13/2017   TIME OF RECEIPT FROM LAB:  9:43AM  LAB TEST:  Creatinine  LAB VALUE:  5.03  RESULTS GIVEN WITH READ-BACK TO (PROVIDER):  Verbally informed Florida Garcia RN  TIME LAB VALUE REPORTED TO PROVIDER:   9:50AM

## 2017-10-13 NOTE — TELEPHONE ENCOUNTER
"  Call to pt for critical creatinine 5.03  Pt listed inactive KP    Pt denies uremic sx. States \"my numbers are crappy but I feel fine\"     No interventions at this time. Discussed if uremic sx to call txp or proceed to ed. Pt agrees.         "

## 2017-11-03 ENCOUNTER — TRANSFERRED RECORDS (OUTPATIENT)
Dept: HEALTH INFORMATION MANAGEMENT | Facility: CLINIC | Age: 52
End: 2017-11-03

## 2017-11-08 ENCOUNTER — TELEPHONE (OUTPATIENT)
Dept: TRANSPLANT | Facility: CLINIC | Age: 52
End: 2017-11-08

## 2017-11-08 NOTE — TELEPHONE ENCOUNTER
Desire Lewis called to state he has completed two of the four items.  Lizette called [facility] recs [phone] for #1 and #3.  Admin staff please send faxes to Lizette 11/9/2017 on behalf of Reanna   1)  Diabetes/ Endocrine doctor at [facility]  Mya saw them twice for his DM   2) Mya to Make Dermatology/skin check appointment at [facility] [phone] pending   3) Colonoscopy done at [facility].  No polyps Lizette to call [facility] for #1 &3 reports   4) Mya to make dental appt assessment for gum or bone disease and dentist to write note of status.  pending

## 2017-11-14 ENCOUNTER — TELEPHONE (OUTPATIENT)
Dept: TRANSPLANT | Facility: CLINIC | Age: 52
End: 2017-11-14

## 2017-11-14 DIAGNOSIS — Z94.0 KIDNEY TRANSPLANT RECIPIENT: ICD-10-CM

## 2017-11-14 LAB
ERYTHROCYTE [DISTWIDTH] IN BLOOD BY AUTOMATED COUNT: 13.6 % (ref 10–15)
HCT VFR BLD AUTO: 30.5 % (ref 40–53)
HGB BLD-MCNC: 10.2 G/DL (ref 13.3–17.7)
MCH RBC QN AUTO: 29.4 PG (ref 26.5–33)
MCHC RBC AUTO-ENTMCNC: 33.4 G/DL (ref 31.5–36.5)
MCV RBC AUTO: 88 FL (ref 78–100)
PLATELET # BLD AUTO: 246 10E9/L (ref 150–450)
RBC # BLD AUTO: 3.47 10E12/L (ref 4.4–5.9)
WBC # BLD AUTO: 5.5 10E9/L (ref 4–11)

## 2017-11-14 PROCEDURE — 36415 COLL VENOUS BLD VENIPUNCTURE: CPT | Performed by: INTERNAL MEDICINE

## 2017-11-14 PROCEDURE — 80048 BASIC METABOLIC PNL TOTAL CA: CPT | Performed by: INTERNAL MEDICINE

## 2017-11-14 PROCEDURE — 85027 COMPLETE CBC AUTOMATED: CPT | Performed by: INTERNAL MEDICINE

## 2017-11-14 PROCEDURE — 80158 DRUG ASSAY CYCLOSPORINE: CPT | Performed by: INTERNAL MEDICINE

## 2017-11-14 NOTE — TELEPHONE ENCOUNTER
PA Initiation    Medication: mycophenolate  Insurance Company: [employer] - Phone [phone] Fax [phone]  Pharmacy Filling the Rx: [facility] - [place], [place] - [facility] [address]  Filling Pharmacy Phone: [phone]  Filling Pharmacy Fax: [phone]  Start Date: 11/14/2017    [facility] Authorization Team   Phone: [phone]  Fax: [phone]

## 2017-11-14 NOTE — TELEPHONE ENCOUNTER
Prior Authorization Approval    Authorization Effective Date: 10/14/2017  Authorization Expiration Date: 11/14/2018  Medication: mycophenolate  Approved Dose/Quantity: 180  Reference #:  (KEY: [id number])    Insurance Company: [employer] - Phone [phone] Fax [phone]  Expected CoPay: $0.00     CoPay Card Available:      Foundation Assistance Needed:    Which Pharmacy is filling the prescription (Not needed for infusion/clinic administered): [facility] - [place] [address] [address]  Pharmacy Notified: Yes  Patient Notified: Yes      [facility] Prior Authorization Team   Phone: [phone]  Fax: [phone]

## 2017-11-15 ENCOUNTER — TELEPHONE (OUTPATIENT)
Dept: TRANSPLANT | Facility: CLINIC | Age: 52
End: 2017-11-15

## 2017-11-15 LAB
ANION GAP SERPL CALCULATED.3IONS-SCNC: 10 MMOL/L (ref 3–14)
BUN SERPL-MCNC: 66 MG/DL (ref 7–30)
CALCIUM SERPL-MCNC: 9.1 MG/DL (ref 8.5–10.1)
CHLORIDE SERPL-SCNC: 108 MMOL/L (ref 94–109)
CO2 SERPL-SCNC: 20 MMOL/L (ref 20–32)
CREAT SERPL-MCNC: 5.29 MG/DL (ref 0.66–1.25)
CYCLOSPORINE BLD LC/MS/MS-MCNC: 48 UG/L (ref 50–400)
GFR SERPL CREATININE-BSD FRML MDRD: 11 ML/MIN/1.7M2
GLUCOSE SERPL-MCNC: 87 MG/DL (ref 70–99)
POTASSIUM SERPL-SCNC: 5.3 MMOL/L (ref 3.4–5.3)
SODIUM SERPL-SCNC: 138 MMOL/L (ref 133–144)
TME LAST DOSE: 200 H

## 2017-11-15 NOTE — TELEPHONE ENCOUNTER
DATE:  11/15/2017   TIME OF RECEIPT FROM LAB:  10:13 AM  LAB TEST:  Cr  LAB VALUE:  5.29  RESULTS GIVEN WITH READ-BACK TO (PROVIDER):  MAUREEN Ko RN  TIME LAB VALUE REPORTED TO PROVIDER:   10:18 AM

## 2017-11-15 NOTE — TELEPHONE ENCOUNTER
"Spoke with pt. Denies any uremic symptoms. Feels \"fine\". Will contact [facility] or go to ER if he develops any.  "

## 2017-12-12 DIAGNOSIS — Z94.0 KIDNEY TRANSPLANT RECIPIENT: ICD-10-CM

## 2017-12-12 LAB
ERYTHROCYTE [DISTWIDTH] IN BLOOD BY AUTOMATED COUNT: 13.4 % (ref 10–15)
HCT VFR BLD AUTO: 31.2 % (ref 40–53)
HGB BLD-MCNC: 10.1 G/DL (ref 13.3–17.7)
MCH RBC QN AUTO: 28.1 PG (ref 26.5–33)
MCHC RBC AUTO-ENTMCNC: 32.4 G/DL (ref 31.5–36.5)
MCV RBC AUTO: 87 FL (ref 78–100)
PLATELET # BLD AUTO: 229 10E9/L (ref 150–450)
RBC # BLD AUTO: 3.59 10E12/L (ref 4.4–5.9)
WBC # BLD AUTO: 5.5 10E9/L (ref 4–11)

## 2017-12-12 PROCEDURE — 85027 COMPLETE CBC AUTOMATED: CPT | Performed by: INTERNAL MEDICINE

## 2017-12-12 PROCEDURE — 80048 BASIC METABOLIC PNL TOTAL CA: CPT | Performed by: INTERNAL MEDICINE

## 2017-12-12 PROCEDURE — 36415 COLL VENOUS BLD VENIPUNCTURE: CPT | Performed by: INTERNAL MEDICINE

## 2017-12-12 PROCEDURE — 80158 DRUG ASSAY CYCLOSPORINE: CPT | Performed by: INTERNAL MEDICINE

## 2017-12-13 ENCOUNTER — TELEPHONE (OUTPATIENT)
Dept: TRANSPLANT | Facility: CLINIC | Age: 52
End: 2017-12-13

## 2017-12-13 LAB
ANION GAP SERPL CALCULATED.3IONS-SCNC: 11 MMOL/L (ref 3–14)
BUN SERPL-MCNC: 59 MG/DL (ref 7–30)
CALCIUM SERPL-MCNC: 8.9 MG/DL (ref 8.5–10.1)
CHLORIDE SERPL-SCNC: 109 MMOL/L (ref 94–109)
CO2 SERPL-SCNC: 19 MMOL/L (ref 20–32)
CREAT SERPL-MCNC: 5.31 MG/DL (ref 0.66–1.25)
CYCLOSPORINE BLD LC/MS/MS-MCNC: 66 UG/L (ref 50–400)
GFR SERPL CREATININE-BSD FRML MDRD: 11 ML/MIN/1.7M2
GLUCOSE SERPL-MCNC: 85 MG/DL (ref 70–99)
POTASSIUM SERPL-SCNC: 4.8 MMOL/L (ref 3.4–5.3)
SODIUM SERPL-SCNC: 139 MMOL/L (ref 133–144)
TME LAST DOSE: NORMAL H

## 2017-12-13 NOTE — TELEPHONE ENCOUNTER
"Discussed uremic sx with pt. Denies illness - does confirm some \"foggy thinking\"     Pt is aware to call us with any questions or in doubt to proceed to the ED.   "

## 2017-12-13 NOTE — TELEPHONE ENCOUNTER
DATE:  12/13/2017   TIME OF RECEIPT FROM LAB:  10:26AM  LAB TEST:  Creatinine Serum   LAB VALUE:  5.31  RESULTS GIVEN WITH READ-BACK TO (PROVIDER):  Verbally informed Florida Garcia RN  TIME LAB VALUE REPORTED TO PROVIDER:   10:29AM

## 2018-01-08 ENCOUNTER — TRANSFERRED RECORDS (OUTPATIENT)
Dept: HEALTH INFORMATION MANAGEMENT | Facility: CLINIC | Age: 53
End: 2018-01-08

## 2018-01-11 DIAGNOSIS — Z94.0 KIDNEY TRANSPLANT RECIPIENT: ICD-10-CM

## 2018-01-11 LAB
ERYTHROCYTE [DISTWIDTH] IN BLOOD BY AUTOMATED COUNT: 13.6 % (ref 10–15)
HCT VFR BLD AUTO: 32.7 % (ref 40–53)
HGB BLD-MCNC: 10.9 G/DL (ref 13.3–17.7)
MCH RBC QN AUTO: 29.6 PG (ref 26.5–33)
MCHC RBC AUTO-ENTMCNC: 33.3 G/DL (ref 31.5–36.5)
MCV RBC AUTO: 89 FL (ref 78–100)
PLATELET # BLD AUTO: 242 10E9/L (ref 150–450)
RBC # BLD AUTO: 3.68 10E12/L (ref 4.4–5.9)
WBC # BLD AUTO: 5.8 10E9/L (ref 4–11)

## 2018-01-11 PROCEDURE — 85027 COMPLETE CBC AUTOMATED: CPT | Performed by: INTERNAL MEDICINE

## 2018-01-11 PROCEDURE — 80048 BASIC METABOLIC PNL TOTAL CA: CPT | Performed by: INTERNAL MEDICINE

## 2018-01-11 PROCEDURE — 36415 COLL VENOUS BLD VENIPUNCTURE: CPT | Performed by: INTERNAL MEDICINE

## 2018-01-11 PROCEDURE — 80158 DRUG ASSAY CYCLOSPORINE: CPT | Performed by: INTERNAL MEDICINE

## 2018-01-12 ENCOUNTER — TELEPHONE (OUTPATIENT)
Dept: TRANSPLANT | Facility: CLINIC | Age: 53
End: 2018-01-12

## 2018-01-12 LAB
ANION GAP SERPL CALCULATED.3IONS-SCNC: 13 MMOL/L (ref 3–14)
BUN SERPL-MCNC: 70 MG/DL (ref 7–30)
CALCIUM SERPL-MCNC: 8.8 MG/DL (ref 8.5–10.1)
CHLORIDE SERPL-SCNC: 106 MMOL/L (ref 94–109)
CO2 SERPL-SCNC: 18 MMOL/L (ref 20–32)
CREAT SERPL-MCNC: 5.02 MG/DL (ref 0.66–1.25)
CYCLOSPORINE BLD LC/MS/MS-MCNC: 55 UG/L (ref 50–400)
GFR SERPL CREATININE-BSD FRML MDRD: 12 ML/MIN/1.7M2
GLUCOSE SERPL-MCNC: 166 MG/DL (ref 70–99)
POTASSIUM SERPL-SCNC: 5.2 MMOL/L (ref 3.4–5.3)
SODIUM SERPL-SCNC: 137 MMOL/L (ref 133–144)
TME LAST DOSE: 1400 H

## 2018-01-12 NOTE — TELEPHONE ENCOUNTER
DATE:  1/12/2018   TIME OF RECEIPT FROM LAB:  3514    LAB TEST:  Creatinine  LAB VALUE:  5.02  RESULTS GIVEN WITH READ-BACK TO (PROVIDER):  BEATRIZ HARPER LPN  TIME LAB VALUE REPORTED TO PROVIDER:   [id number] Folrida Garcia RN

## 2018-01-23 ENCOUNTER — OFFICE VISIT (OUTPATIENT)
Dept: NEPHROLOGY | Facility: CLINIC | Age: 53
End: 2018-01-23
Attending: INTERNAL MEDICINE
Payer: COMMERCIAL

## 2018-01-23 VITALS
HEIGHT: 67 IN | OXYGEN SATURATION: 98 % | BODY MASS INDEX: 27.56 KG/M2 | SYSTOLIC BLOOD PRESSURE: 189 MMHG | HEART RATE: 87 BPM | DIASTOLIC BLOOD PRESSURE: 95 MMHG | WEIGHT: 175.6 LBS

## 2018-01-23 DIAGNOSIS — Z94.0 KIDNEY REPLACED BY TRANSPLANT: ICD-10-CM

## 2018-01-23 DIAGNOSIS — N25.0 RENAL OSTEODYSTROPHY: ICD-10-CM

## 2018-01-23 DIAGNOSIS — I15.1 HYPERTENSION SECONDARY TO OTHER RENAL DISORDERS: Primary | ICD-10-CM

## 2018-01-23 DIAGNOSIS — N18.5 CKD (CHRONIC KIDNEY DISEASE) STAGE 5, GFR LESS THAN 15 ML/MIN (H): ICD-10-CM

## 2018-01-23 DIAGNOSIS — D84.9 IMMUNOSUPPRESSED STATUS (H): ICD-10-CM

## 2018-01-23 DIAGNOSIS — Z94.83 PANCREAS REPLACED BY TRANSPLANT (H): ICD-10-CM

## 2018-01-23 DIAGNOSIS — Z48.298 AFTERCARE FOLLOWING ORGAN TRANSPLANT: ICD-10-CM

## 2018-01-23 PROCEDURE — G0463 HOSPITAL OUTPT CLINIC VISIT: HCPCS | Mod: ZF

## 2018-01-23 RX ORDER — MYCOPHENOLIC ACID 180 MG/1
540 TABLET, DELAYED RELEASE ORAL 2 TIMES DAILY
Qty: 180 TABLET | Refills: 5 | Status: SHIPPED | OUTPATIENT
Start: 2018-01-23 | End: 2018-08-28

## 2018-01-23 ASSESSMENT — PAIN SCALES - GENERAL: PAINLEVEL: MODERATE PAIN (4)

## 2018-01-23 NOTE — MR AVS SNAPSHOT
After Visit Summary   1/23/2018    Amadou Lewis    MRN: [id number]           Patient Information     Date Of Birth          1965        Visit Information        Provider Department      1/23/2018 4:05 PM Deyvi Dick MD [facility]        Today's Diagnoses     Hypertension secondary to other renal disorders    -  1    Kidney replaced by transplant        Pancreas replaced by transplant (H)        Renal osteodystrophy        Immunosuppressed status (H)        Aftercare following organ transplant        CKD (chronic kidney disease) stage 5, GFR less than 15 ml/min (H)           Follow-ups after your visit        Your next 10 appointments already scheduled     May 01, 2018  2:50 PM CDT   (Arrive by 2:20 PM)   Return Kidney Transplant with Deyvi Dick MD   [facility] ([facility])    [address] [place] [address]  [address]  [facility] [address] [phone]              Future tests that were ordered for you today     Open Future Orders        Priority Expected Expires Ordered    Parathyroid Hormone Intact Routine  2/22/2018 1/23/2018    Routine UA with microscopic Routine  2/22/2018 1/23/2018            Who to contact     If you have questions or need follow up information about today's clinic visit or your schedule please contact [facility] directly at [phone].  Normal or non-critical lab and imaging results will be communicated to you by MyChart, letter or phone within 4 business days after the clinic has received the results. If you do not hear from us within 7 days, please contact the clinic through [employer] or phone. If you have a critical or abnormal lab result, we will notify you by phone as soon as possible.  Submit refill requests through [employer] or call your pharmacy and they will forward the refill request to us. Please allow 3 business days for your refill to be completed.          Additional Information About Your Visit    "     [employer] Information     [employer] gives you secure access to your electronic health record. If you see a primary care provider, you can also send messages to your care team and make appointments. If you have questions, please call your primary care clinic.  If you do not have a primary care provider, please call [phone] and they will assist you.        Care EveryWhere ID     This is your Care EveryWhere ID. This could be used by other organizations to access your [facility] medical records  [id number]        Your Vitals Were     Pulse Height Pulse Oximetry BMI (Body Mass Index)          87 1.702 m (5' 7\") 98% 27.5 kg/m2         Blood Pressure from Last 3 Encounters:   01/23/18 (!) 189/95   09/26/17 (!) 181/95   08/30/17 161/88    Weight from Last 3 Encounters:   01/23/18 79.7 kg (175 lb 9.6 oz)   09/26/17 76.5 kg (168 lb 11.2 oz)   08/30/17 76.1 kg (167 lb 12.8 oz)                 Today's Medication Changes          These changes are accurate as of: 1/23/18  4:09 PM.  If you have any questions, ask your nurse or doctor.               These medicines have changed or have updated prescriptions.        Dose/Directions    cholecalciferol 1000 UNITS capsule   Commonly known as:  vitamin  -D   This may have changed:  how much to take   Used for:  CKD (chronic kidney disease) stage 5, GFR less than 15 ml/min (H)   Changed by:  Deyvi Dick MD        Dose:  2 capsule   Take 2 capsules (2,000 Units) by mouth daily   Quantity:  180 capsule   Refills:  3       * cloNIDine 0.1 MG tablet   Commonly known as:  CATAPRES   This may have changed:  Another medication with the same name was added. Make sure you understand how and when to take each.   Used for:  Hypertension secondary to other renal disorders   Changed by:  Deyvi Dick MD        Dose:  0.1 mg   Take 1 tablet (0.1 mg) by mouth 2 times daily as needed For SBP > 170   Quantity:  60 tablet   Refills:  1       * cloNIDine 0.1 MG/24HR WK patch   Commonly " known as:  CATAPRES-TTS1   This may have changed:  You were already taking a medication with the same name, and this prescription was added. Make sure you understand how and when to take each.   Used for:  Hypertension secondary to other renal disorders   Changed by:  Deyvi Dick MD        Dose:  1 patch   Place 1 patch onto the skin once a week   Quantity:  4 patch   Refills:  11       insulin glargine 100 UNIT/ML injection   Commonly known as:  LANTUS   This may have changed:    - how much to take  - when to take this   Used for:  Type 2 diabetes mellitus with diabetic chronic kidney disease (H)        Dose:  53 Units   Inject 53 Units Subcutaneous every 24 hours   Refills:  0       * Notice:  This list has 2 medication(s) that are the same as other medications prescribed for you. Read the directions carefully, and ask your doctor or other care provider to review them with you.      Stop taking these medicines if you haven't already. Please contact your care team if you have questions.     metoprolol succinate 50 MG 24 hr tablet   Commonly known as:  TOPROL-XL   Stopped by:  Deyvi Dick MD           sodium citrate-citric acid 500-334 MG/5ML solution   Commonly known as:  BICITRA   Stopped by:  Deyvi Dick MD                Where to get your medicines      These [id number] [address] [facility]  [address] [facility] [address]     Phone:  [phone]     cholecalciferol 1000 UNITS capsule    cloNIDine 0.1 MG/24HR WK patch    mycophenolic acid 180 MG EC tablet                Primary Care Provider Office Phone # Fax #    Masoud Valentin MD, [phone]       [address] [place] [address]        Equal Access to Services     RENATA BENNETT AH: Hadii miguelina Mchugh, waaxda luqadaha, qaybta kaalmada myles, jack longo  evonneleonacr forresterBashiraayolie ah. So [facility] [phone].    ATENCIÓN: Si dallin cain, tiene a tejeda disposición servicios gratuitos de asistencia lingüística. Makeda al [phone].    We comply with applicable federal civil rights laws and [place] laws. We do not discriminate on the basis of race, color, national origin, age, disability, sex, sexual orientation, or gender identity.            Thank you!     Thank you for choosing [facility]  for your care. Our goal is always to provide you with excellent care. Hearing back from our patients is one way we can continue to improve our services. Please take a few minutes to complete the written survey that you may receive in the mail after your visit with us. Thank you!             Your Updated Medication List - Protect others around you: Learn how to safely use, store and throw away your medicines at www.disposemymeds.org.          This list is accurate as of: 1/23/18  4:09 PM.  Always use your most recent med list.                   Brand Name Dispense Instructions for use Diagnosis    atorvastatin 10 MG tablet    LIPITOR    45 tablet    Take 0.5 tablets (5 mg) by mouth daily    Coronary artery disease involving native coronary artery of native heart without angina pectoris       blood glucose monitoring test strip    no brand specified    1 Box    Use to test blood sugar 4 times daily or as directed.    Diabetes mellitus with background retinopathy (H)       cholecalciferol 1000 UNITS capsule    vitamin  -D    180 capsule    Take 2 capsules (2,000 Units) by mouth daily    CKD (chronic kidney disease) stage 5, GFR less than 15 ml/min (H)       cinacalcet 30 MG tablet    SENSIPAR    90 tablet    Take 1 tablet (30 mg) by mouth daily    Secondary renal hyperparathyroidism (H)       * cloNIDine 0.1 MG tablet    CATAPRES    60 tablet    Take 1 tablet (0.1 mg) by mouth 2 times daily as needed For SBP > 170    Hypertension secondary to other renal disorders       * cloNIDine 0.1  MG/24HR WK patch    CATAPRES-TTS1    4 patch    Place 1 patch onto the skin once a week    Hypertension secondary to other renal disorders       cycloSPORINE modified 25 MG capsule    GENERIC EQUIVALENT    180 capsule    Take 3 capsules (75 mg) by mouth 2 times daily Total dose 75 mg twice a day    Kidney replaced by transplant       HYDROcodone-acetaminophen 5-325 MG per tablet    NORCO     Take 2 tablets by mouth every 6 hours as needed for moderate to severe pain        insulin aspart 100 UNIT/ML injection    NovoLOG PEN     Inject 1-16 Units Subcutaneous At Bedtime Correction Scale - VERY HIGH INSULIN RESISTANCE DOSING    Do Not give Bedtime Correction Insulin if BG < 200.  For  - 209 give 1 units.  For  - 219 give 2 units.  For  - 229 give 3 units.  For  - 239 give 4 units.  For  - 249 give 5 units.  For  - 259 give 6 units.  For  - 269 give 7 units.  For  - 279 give 8 units.  For  - 289 give 9 units.    Type 2 diabetes mellitus with diabetic chronic kidney disease (H)       insulin glargine 100 UNIT/ML injection    LANTUS     Inject 53 Units Subcutaneous every 24 hours    Type 2 diabetes mellitus with diabetic chronic kidney disease (H)       mycophenolic acid 180 MG EC tablet     180 tablet    Take 3 tablets (540 mg) by mouth 2 times daily    Kidney replaced by transplant, Pancreas replaced by transplant (H)       NIFEdipine ER osmotic 30 MG 24 hr tablet    PROCARDIA XL    540 tablet    Take 3 tablets (90 mg) by mouth 2 times daily    Hypertension secondary to other renal disorders       ondansetron 4 MG tablet    ZOFRAN    30 tablet    Take 1 tablet (4 mg) by mouth every 12 hours as needed for nausea    Nausea       sodium bicarbonate 650 MG tablet     270 tablet    Take 3 tablets (1,950 mg) by mouth 3 times daily    Acidosis       * Notice:  This list has 2 medication(s) that are the same as other medications prescribed for you. Read the directions  carefully, and ask your doctor or other care provider to review them with you.

## 2018-01-23 NOTE — NURSING NOTE
"Chief Complaint   Patient presents with     RECHECK     Post kid tx follow up        Initial BP (!) 189/95  Pulse 87  Ht 1.702 m (5' 7\")  Wt 79.7 kg (175 lb 9.6 oz)  SpO2 98%  BMI 27.5 kg/m2 Estimated body mass index is 27.5 kg/(m^2) as calculated from the following:    Height as of this encounter: 1.702 m (5' 7\").    Weight as of this encounter: 79.7 kg (175 lb 9.6 oz).  Medication Reconciliation: complete   Nadia Byers CMA    "

## 2018-01-23 NOTE — PROGRESS NOTES
Assessment and Plan:  1. ESRD:  secondary to polycystic kidney disease and diabetic nephropathy s/p LDKT on 10/10/2013. Complicate by repeated bouts of rejection. Creatinine stable, currently serum creatinine is ~ 5.2 mg/dL. We will continue with checking BMP every 4 weeks. No significant uremic symptoms.   2. Immunosuppression:  Mycophenolic acid and Cyclosporine with acceptable levels.   3. Hypertension: Somewhat controlled, He thinks that is elevated in clinic but better at home, however he has not been checking it at home. Did not tolerate metoprolol.   4. Anemia secondary to renal disease: Hgb is stable, at baseline. Monitor  5. Secondary renal hyperparathyroidism: most recent PTH was 790 in 8/2017. He is more compliant with Sensipar. Will recheck next month   6. Nausea and vomiting: improved   7. Diabetes mellitus type II management per PCP  8. H/o CAD- s/p PCI with DESx2, added small dose statin (didn't tolerate BB)  9. Acidosis: wanted to use the tablets again   10. PCKD: MRI/MRA from the [facility] didn't reveal aneurysms     Assessment and plan was discussed with patient and he voiced his understanding and agreement.      Reason for Visit:  Mr. Lewis is here for follow up on kidney transplant rejection    HPI:   Amadou Lewis is a 52 year old male with ESKD from polycystic kidney disease and diabetic nephropathy and is status post LDKT on 10/10/2013 with immediate graft function. 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 Subsequently had GI issues that was difficult to maintain his immunosuppression with and eventually had intractable rejection   Since was seen last has been stable. N/V stable/ improved only in the morning and not interfering with medications. We discussed uremic symptoms and compliance with medications.   Does not endorse major uremic symptoms and would like to keep medical management of CKD for now. We  discussed completing the work up so he can be activated on the list.            Transplant Hx:       Tx: LDKT  Date: 10/10/2013       Present Maintenance IS: Cyclosporine and Mycophenolic acid       Baseline Creatinine:  3-3.5 mg/dL       Recent DSA: No         Biopsy: Yes: 1/21/2016, 2/15/2016, 09/28/2016        Home BP: Not checked.      ROS:   A comprehensive review of systems was obtained and negative, except as noted in the HPI or PMH.    Active Medical Problems:  Patient Active Problem List   Diagnosis     Type II diabetes mellitus with renal manifestations (H)     Diabetes mellitus with background retinopathy (H)     Polycystic kidney     NONSPECIFIC MEDICAL HISTORY     Coronary artery disease     Retinopathy     Hyperlipidemia LDL goal <70     Premature ventricular contractions (PVCs) (VPCs)     Kidney replaced by transplant     Immunosuppressed status (H)     Kidney transplant rejection     Hyperglycemia     Hypertension     Anemia in chronic renal disease     Care after organ transplant     Esophageal ulcer       Personal Hx:  Social History     Social History     Marital status: Single     Spouse name: N/A     Number of children: 2     Years of education: 14     Occupational History     [employer]     Social History Main Topics     Smoking status: Never Smoker     Smokeless tobacco: Never Used     Alcohol use No     Drug use: No     Sexual activity: Yes     Partners: Female     Other Topics Concern     Not on file     Social History Narrative       Allergies:  Allergies   Allergen Reactions     No Known Allergies        Medications:  Current Outpatient Prescriptions   Medication     cloNIDine (CATAPRES-TTS1) 0.1 MG/24HR WK patch     MYFORTIC (BRAND) 180 MG EC TABLET     cholecalciferol (VITAMIN  -D) 1000 UNITS capsule     sodium bicarbonate 650 MG tablet     NIFEdipine ER osmotic (PROCARDIA XL) 30 MG 24 hr tablet     cinacalcet (SENSIPAR) 30 MG tablet     atorvastatin (LIPITOR) 10 MG tablet      "cycloSPORINE modified (GENERIC EQUIVALENT) 25 MG capsule     cloNIDine (CATAPRES) 0.1 MG tablet     insulin aspart (NOVOLOG PEN) 100 UNIT/ML soln     insulin glargine (LANTUS) 100 UNIT/ML PEN     blood glucose monitoring (NO BRAND SPECIFIED) test strip     HYDROcodone-acetaminophen (NORCO) 5-325 MG per tablet     ondansetron (ZOFRAN) 4 MG tablet     No current facility-administered medications for this visit.          Vitals:  BP (!) 189/95  Pulse 87  Ht 1.702 m (5' 7\")  Wt 79.7 kg (175 lb 9.6 oz)  SpO2 98%  BMI 27.5 kg/m2    Exam:   GENERAL APPEARANCE: alert and no distress, chronically ill looking  HENT: mouth without ulcers or lesions  LYMPHATICS: no cervical or supraclavicular nodes  RESP: lungs clear to auscultation - no rales, rhonchi or wheezes  CV: regular rhythm, normal rate, no rub, no murmur  EDEMA: no LE edema bilaterally, AVF in the left forearm with palpable thrill   ABDOMEN: protruded, soft, slightly distended, surgical incision well healed, nontender, bowel sounds normal  MS: extremities normal - no gross deformities noted, no evidence of inflammation in joints, no muscle tenderness  SKIN: no rash    Results: reviewed  "

## 2018-02-08 ENCOUNTER — TRANSFERRED RECORDS (OUTPATIENT)
Dept: HEALTH INFORMATION MANAGEMENT | Facility: CLINIC | Age: 53
End: 2018-02-08

## 2018-02-13 ENCOUNTER — TELEPHONE (OUTPATIENT)
Dept: TRANSPLANT | Facility: CLINIC | Age: 53
End: 2018-02-13

## 2018-03-06 DIAGNOSIS — N25.0 RENAL OSTEODYSTROPHY: ICD-10-CM

## 2018-03-06 DIAGNOSIS — Z48.298 AFTERCARE FOLLOWING ORGAN TRANSPLANT: ICD-10-CM

## 2018-03-06 DIAGNOSIS — Z94.0 KIDNEY TRANSPLANT RECIPIENT: ICD-10-CM

## 2018-03-06 LAB
ALBUMIN UR-MCNC: 100 MG/DL
APPEARANCE UR: CLEAR
BACTERIA #/AREA URNS HPF: ABNORMAL /HPF
BILIRUB UR QL STRIP: NEGATIVE
COLOR UR AUTO: YELLOW
ERYTHROCYTE [DISTWIDTH] IN BLOOD BY AUTOMATED COUNT: 13.7 % (ref 10–15)
GLUCOSE UR STRIP-MCNC: >=1000 MG/DL
HCT VFR BLD AUTO: 32.8 % (ref 40–53)
HGB BLD-MCNC: 10.5 G/DL (ref 13.3–17.7)
HGB UR QL STRIP: ABNORMAL
KETONES UR STRIP-MCNC: NEGATIVE MG/DL
LEUKOCYTE ESTERASE UR QL STRIP: NEGATIVE
MCH RBC QN AUTO: 28.2 PG (ref 26.5–33)
MCHC RBC AUTO-ENTMCNC: 32 G/DL (ref 31.5–36.5)
MCV RBC AUTO: 88 FL (ref 78–100)
NITRATE UR QL: NEGATIVE
NON-SQ EPI CELLS #/AREA URNS LPF: ABNORMAL /LPF
PH UR STRIP: 6 PH (ref 5–7)
PLATELET # BLD AUTO: 266 10E9/L (ref 150–450)
PTH-INTACT SERPL-MCNC: 1238 PG/ML (ref 18–80)
RBC # BLD AUTO: 3.73 10E12/L (ref 4.4–5.9)
RBC #/AREA URNS AUTO: ABNORMAL /HPF
SOURCE: ABNORMAL
SP GR UR STRIP: 1.01 (ref 1–1.03)
UROBILINOGEN UR STRIP-ACNC: 0.2 EU/DL (ref 0.2–1)
WBC # BLD AUTO: 6.8 10E9/L (ref 4–11)
WBC #/AREA URNS AUTO: ABNORMAL /HPF

## 2018-03-06 PROCEDURE — 80048 BASIC METABOLIC PNL TOTAL CA: CPT | Performed by: INTERNAL MEDICINE

## 2018-03-06 PROCEDURE — 80158 DRUG ASSAY CYCLOSPORINE: CPT | Performed by: INTERNAL MEDICINE

## 2018-03-06 PROCEDURE — 36415 COLL VENOUS BLD VENIPUNCTURE: CPT | Performed by: INTERNAL MEDICINE

## 2018-03-06 PROCEDURE — 81001 URINALYSIS AUTO W/SCOPE: CPT | Performed by: INTERNAL MEDICINE

## 2018-03-06 PROCEDURE — 85027 COMPLETE CBC AUTOMATED: CPT | Performed by: INTERNAL MEDICINE

## 2018-03-06 PROCEDURE — 83970 ASSAY OF PARATHORMONE: CPT | Performed by: INTERNAL MEDICINE

## 2018-03-07 ENCOUNTER — TELEPHONE (OUTPATIENT)
Dept: TRANSPLANT | Facility: CLINIC | Age: 53
End: 2018-03-07

## 2018-03-07 LAB
ANION GAP SERPL CALCULATED.3IONS-SCNC: 8 MMOL/L (ref 3–14)
BUN SERPL-MCNC: 61 MG/DL (ref 7–30)
CALCIUM SERPL-MCNC: 8.3 MG/DL (ref 8.5–10.1)
CHLORIDE SERPL-SCNC: 106 MMOL/L (ref 94–109)
CO2 SERPL-SCNC: 21 MMOL/L (ref 20–32)
CREAT SERPL-MCNC: 5.31 MG/DL (ref 0.66–1.25)
CYCLOSPORINE BLD LC/MS/MS-MCNC: 58 UG/L (ref 50–400)
GFR SERPL CREATININE-BSD FRML MDRD: 11 ML/MIN/1.7M2
GLUCOSE SERPL-MCNC: 231 MG/DL (ref 70–99)
POTASSIUM SERPL-SCNC: 5.1 MMOL/L (ref 3.4–5.3)
SODIUM SERPL-SCNC: 135 MMOL/L (ref 133–144)
TME LAST DOSE: NORMAL H

## 2018-03-07 NOTE — TELEPHONE ENCOUNTER
DATE:  3/7/2018   TIME OF RECEIPT FROM LAB:  0920  LAB TEST:  creatinine  LAB VALUE:  5.31  RESULTS GIVEN WITH READ-BACK TO (PROVIDER):  Glenis Ko  TIME LAB VALUE REPORTED TO PROVIDER:   0920

## 2018-03-13 ENCOUNTER — DOCUMENTATION ONLY (OUTPATIENT)
Dept: TRANSPLANT | Facility: CLINIC | Age: 53
End: 2018-03-13

## 2018-03-13 NOTE — PROGRESS NOTES
03/13/18 Kidney transplant episode added as patient listed for kidney and kidney/pancreas on 06/09/17.

## 2018-03-29 ENCOUNTER — TELEPHONE (OUTPATIENT)
Dept: TRANSPLANT | Facility: CLINIC | Age: 53
End: 2018-03-29

## 2018-03-29 NOTE — TELEPHONE ENCOUNTER
"Noted I did leave patient a voice message on 02/13/18 to contact me with an update.  Patient did not respond to this and was silent.  Asked whether patient has competed dermatology appointment and dental checkup/any dental work, patient responds he has not; that the dental is going to be \"out of pocket\" for him to pay, so he working on funding for this.  Provided my contact information and requested patient contact me once he has completed these items or should he have questions/or concerns.  Patient responded, \"ok.\"  "

## 2018-04-25 DIAGNOSIS — Z94.0 KIDNEY TRANSPLANT RECIPIENT: ICD-10-CM

## 2018-04-25 LAB
ERYTHROCYTE [DISTWIDTH] IN BLOOD BY AUTOMATED COUNT: 13.1 % (ref 10–15)
HCT VFR BLD AUTO: 33.9 % (ref 40–53)
HGB BLD-MCNC: 11 G/DL (ref 13.3–17.7)
MCH RBC QN AUTO: 28.4 PG (ref 26.5–33)
MCHC RBC AUTO-ENTMCNC: 32.4 G/DL (ref 31.5–36.5)
MCV RBC AUTO: 88 FL (ref 78–100)
PLATELET # BLD AUTO: 247 10E9/L (ref 150–450)
RBC # BLD AUTO: 3.87 10E12/L (ref 4.4–5.9)
WBC # BLD AUTO: 5.8 10E9/L (ref 4–11)

## 2018-04-25 PROCEDURE — 85027 COMPLETE CBC AUTOMATED: CPT | Performed by: INTERNAL MEDICINE

## 2018-04-25 PROCEDURE — 36415 COLL VENOUS BLD VENIPUNCTURE: CPT | Performed by: INTERNAL MEDICINE

## 2018-04-25 PROCEDURE — 80158 DRUG ASSAY CYCLOSPORINE: CPT | Performed by: INTERNAL MEDICINE

## 2018-04-25 PROCEDURE — 80048 BASIC METABOLIC PNL TOTAL CA: CPT | Performed by: INTERNAL MEDICINE

## 2018-04-26 ENCOUNTER — TELEPHONE (OUTPATIENT)
Dept: TRANSPLANT | Facility: CLINIC | Age: 53
End: 2018-04-26

## 2018-04-26 LAB
ANION GAP SERPL CALCULATED.3IONS-SCNC: 12 MMOL/L (ref 3–14)
BUN SERPL-MCNC: 74 MG/DL (ref 7–30)
CALCIUM SERPL-MCNC: 8.6 MG/DL (ref 8.5–10.1)
CHLORIDE SERPL-SCNC: 105 MMOL/L (ref 94–109)
CO2 SERPL-SCNC: 18 MMOL/L (ref 20–32)
CREAT SERPL-MCNC: 5.12 MG/DL (ref 0.66–1.25)
CYCLOSPORINE BLD LC/MS/MS-MCNC: 50 UG/L (ref 50–400)
GFR SERPL CREATININE-BSD FRML MDRD: 12 ML/MIN/1.7M2
GLUCOSE SERPL-MCNC: 291 MG/DL (ref 70–99)
POTASSIUM SERPL-SCNC: 5.1 MMOL/L (ref 3.4–5.3)
SODIUM SERPL-SCNC: 135 MMOL/L (ref 133–144)
TME LAST DOSE: NORMAL H

## 2018-04-26 NOTE — TELEPHONE ENCOUNTER
"Creatinine consistent with previous results. Continue monthly BMP.  Spoke with pt. Denies uremic symptoms. Feeling \"the same.\"  "

## 2018-04-26 NOTE — TELEPHONE ENCOUNTER
DATE:  4/26/2018   TIME OF RECEIPT FROM LAB:  1029  LAB TEST:  creatinine  LAB VALUE:  5.06  RESULTS GIVEN WITH READ-BACK TO (PROVIDER):  Glenis Ko RN  TIME LAB VALUE REPORTED TO PROVIDER:   1038

## 2018-05-01 ENCOUNTER — OFFICE VISIT (OUTPATIENT)
Dept: NEPHROLOGY | Facility: CLINIC | Age: 53
End: 2018-05-01
Attending: INTERNAL MEDICINE
Payer: COMMERCIAL

## 2018-05-01 VITALS
SYSTOLIC BLOOD PRESSURE: 181 MMHG | TEMPERATURE: 98.5 F | BODY MASS INDEX: 27.44 KG/M2 | HEART RATE: 93 BPM | DIASTOLIC BLOOD PRESSURE: 93 MMHG | OXYGEN SATURATION: 97 % | HEIGHT: 67 IN | WEIGHT: 174.8 LBS

## 2018-05-01 DIAGNOSIS — D84.9 IMMUNOSUPPRESSED STATUS (H): ICD-10-CM

## 2018-05-01 DIAGNOSIS — Z48.298 CARE AFTER ORGAN TRANSPLANT: Primary | ICD-10-CM

## 2018-05-01 DIAGNOSIS — I15.1 HYPERTENSION SECONDARY TO OTHER RENAL DISORDERS: ICD-10-CM

## 2018-05-01 DIAGNOSIS — N25.0 RENAL OSTEODYSTROPHY: ICD-10-CM

## 2018-05-01 DIAGNOSIS — T86.11 KIDNEY TRANSPLANT REJECTION: ICD-10-CM

## 2018-05-01 PROCEDURE — G0463 HOSPITAL OUTPT CLINIC VISIT: HCPCS | Mod: ZF

## 2018-05-01 RX ORDER — HYDRALAZINE HYDROCHLORIDE 25 MG/1
25 TABLET, FILM COATED ORAL 3 TIMES DAILY
Qty: 270 TABLET | Refills: 1 | Status: ON HOLD | OUTPATIENT
Start: 2018-05-01 | End: 2018-11-13

## 2018-05-01 RX ORDER — CALCITRIOL 0.25 UG/1
0.25 CAPSULE, LIQUID FILLED ORAL DAILY
Qty: 90 CAPSULE | Refills: 1 | Status: SHIPPED | OUTPATIENT
Start: 2018-05-01 | End: 2018-08-13

## 2018-05-01 ASSESSMENT — PAIN SCALES - GENERAL: PAINLEVEL: NO PAIN (0)

## 2018-05-01 NOTE — LETTER
5/1/2018      RE: Amadou Lewis  [address] [place] [address]       Assessment and Plan:  1. ESRD:  secondary to polycystic kidney disease and diabetic nephropathy s/p LDKT on 10/10/2013. Complicate by repeated bouts of rejection. Creatinine stable, currently serum creatinine is ~ 5.2 mg/dL but stable for the last year. We will continue with checking BMP every 4 weeks. No significant uremic symptoms.    2. Immunosuppression:  Mycophenolic acid and Cyclosporine with acceptable levels.   3. Hypertension: He continues to think that is elevated in clinic but better at home, however he has not been checking it at home. Did not tolerate metoprolol. Will add hydralazine   4. Anemia secondary to renal disease: Hgb is stable, at baseline. Monitor  5. Secondary renal hyperparathyroidism: most recent PTH was 790 in 8/2017. He is more compliant with Sensipar. Will add rocaltrol 0.25 mcg daily. Was not able to raise the sensipar due to mild hypocalcemia. Will check phos.    6. Nausea and vomiting: improved    7. Diabetes mellitus type II management per PCP  8. H/o CAD- s/p PCI with DESx2, added small dose statin (didn't tolerate BB)  9. Acidosis: wanted to use the tablets again   10. PCKD: MRI/MRA from the [facility] didn't reveal aneurysms   11. Access: functional fistula   Assessment and plan was discussed with patient and he voiced his understanding and agreement.      Reason for Visit:  Mr. Lewis is here for follow up on kidney transplant rejection    HPI:   Amadou Lewis is a 52 year old male with ESKD from polycystic kidney disease and diabetic nephropathy and is status post LDKT on 10/10/2013 with immediate graft function. 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 Subsequently had GI issues that was difficult to maintain his immunosuppression with and eventually had intractable rejection   Since was seen last has been  stable. N/V stable/ improved only in the morning and not interfering with medications. We discussed uremic symptoms and compliance with medications.         Transplant Hx:       Tx: LDKT  Date: 10/10/2013       Present Maintenance IS: Cyclosporine and Mycophenolic acid       Baseline Creatinine:  3-3.5 mg/dL       Recent DSA: No         Biopsy: Yes: 1/21/2016, 2/15/2016, 09/28/2016    Since was seen last, he had done ok. He has no new complaints. He is taking his medications regularly.       Home BP: Not checked.      ROS:   A comprehensive review of systems was obtained and negative, except as noted in the HPI or PMH.    Active Medical Problems:  Patient Active Problem List   Diagnosis     Type II diabetes mellitus with renal manifestations (H)     Diabetes mellitus with background retinopathy (H)     Polycystic kidney     NONSPECIFIC MEDICAL HISTORY     Coronary artery disease     Retinopathy     Hyperlipidemia LDL goal <70     Premature ventricular contractions (PVCs) (VPCs)     Kidney replaced by transplant     Immunosuppressed status (H)     Kidney transplant rejection     Hyperglycemia     Hypertension     Anemia in chronic renal disease     Care after organ transplant     Esophageal ulcer       Personal Hx:  Social History     Social History     Marital status: Single     Spouse name: N/A     Number of children: 2     Years of education: 14     Occupational History     [employer] [place]     Social History Main Topics     Smoking status: Never Smoker     Smokeless tobacco: Never Used     Alcohol use No     Drug use: No     Sexual activity: Yes     Partners: Female     Other Topics Concern     Not on file     Social History Narrative       Allergies:  Allergies   Allergen Reactions     No Known Allergies        Medications:  Current Outpatient Prescriptions   Medication     atorvastatin (LIPITOR) 10 MG tablet     blood glucose monitoring (NO BRAND SPECIFIED) test strip     calcitRIOL (ROCALTROL) 0.25 MCG  "capsule     cholecalciferol (VITAMIN  -D) 1000 UNITS capsule     cinacalcet (SENSIPAR) 30 MG tablet     cloNIDine (CATAPRES) 0.1 MG tablet     cycloSPORINE modified (GENERIC EQUIVALENT) 25 MG capsule     hydrALAZINE (APRESOLINE) 25 MG tablet     HYDROcodone-acetaminophen (NORCO) 5-325 MG per tablet     insulin aspart (NOVOLOG PEN) 100 UNIT/ML soln     insulin glargine (LANTUS) 100 UNIT/ML PEN     MYFORTIC (BRAND) 180 MG EC TABLET     NIFEdipine ER osmotic (PROCARDIA XL) 30 MG 24 hr tablet     ondansetron (ZOFRAN) 4 MG tablet     sodium bicarbonate 650 MG tablet     [DISCONTINUED] cloNIDine (CATAPRES-TTS1) 0.1 MG/24HR WK patch     No current facility-administered medications for this visit.          Vitals:  BP (!) 181/93  Pulse 93  Temp 98.5  F (36.9  C) (Oral)  Ht 1.702 m (5' 7\")  Wt 79.3 kg (174 lb 12.8 oz)  SpO2 97%  BMI 27.38 kg/m2    Exam:   GENERAL APPEARANCE: alert and no distress, chronically ill looking  HENT: mouth without ulcers or lesions  LYMPHATICS: no cervical or supraclavicular nodes  RESP: lungs clear to auscultation - no rales, rhonchi or wheezes  CV: regular rhythm, normal rate, no rub, no murmur  EDEMA: no LE edema bilaterally, AVF in the left forearm with palpable thrill   ABDOMEN: protruded, soft, slightly distended, surgical incision well healed, nontender, bowel sounds normal  MS: extremities normal - no gross deformities noted, no evidence of inflammation in joints, no muscle tenderness  SKIN: no rash    Results: reviewed    Deyvi Dick MD      "

## 2018-05-01 NOTE — NURSING NOTE
"Chief Complaint   Patient presents with     RECHECK     kidney Tx       Initial BP (!) 187/96  Pulse 93  Temp 98.5  F (36.9  C) (Oral)  Ht 1.702 m (5' 7\")  Wt 79.3 kg (174 lb 12.8 oz)  SpO2 97%  BMI 27.38 kg/m2 Estimated body mass index is 27.38 kg/(m^2) as calculated from the following:    Height as of this encounter: 1.702 m (5' 7\").    Weight as of this encounter: 79.3 kg (174 lb 12.8 oz).  Medication Reconciliation: complete     Malika Arroyo MA    "

## 2018-05-01 NOTE — PROGRESS NOTES
Assessment and Plan:  1. ESRD:  secondary to polycystic kidney disease and diabetic nephropathy s/p LDKT on 10/10/2013. Complicate by repeated bouts of rejection. Creatinine stable, currently serum creatinine is ~ 5.2 mg/dL but stable for the last year. We will continue with checking BMP every 4 weeks. No significant uremic symptoms.    2. Immunosuppression:  Mycophenolic acid and Cyclosporine with acceptable levels.   3. Hypertension: He continues to think that is elevated in clinic but better at home, however he has not been checking it at home. Did not tolerate metoprolol. Will add hydralazine   4. Anemia secondary to renal disease: Hgb is stable, at baseline. Monitor  5. Secondary renal hyperparathyroidism: most recent PTH was 790 in 8/2017. He is more compliant with Sensipar. Will add rocaltrol 0.25 mcg daily. Was not able to raise the sensipar due to mild hypocalcemia. Will check phos.    6. Nausea and vomiting: improved    7. Diabetes mellitus type II management per PCP  8. H/o CAD- s/p PCI with DESx2, added small dose statin (didn't tolerate BB)  9. Acidosis: wanted to use the tablets again   10. PCKD: MRI/MRA from the [facility] didn't reveal aneurysms   11. Access: functional fistula   Assessment and plan was discussed with patient and he voiced his understanding and agreement.      Reason for Visit:  Mr. Lewis is here for follow up on kidney transplant rejection    HPI:   Amadou Lewis is a 52 year old male with ESKD from polycystic kidney disease and diabetic nephropathy and is status post LDKT on 10/10/2013 with immediate graft function. 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 Subsequently had GI issues that was difficult to maintain his immunosuppression with and eventually had intractable rejection   Since was seen last has been stable. N/V stable/ improved only in the morning and not interfering with medications. We  discussed uremic symptoms and compliance with medications.         Transplant Hx:       Tx: LDKT  Date: 10/10/2013       Present Maintenance IS: Cyclosporine and Mycophenolic acid       Baseline Creatinine:  3-3.5 mg/dL       Recent DSA: No         Biopsy: Yes: 1/21/2016, 2/15/2016, 09/28/2016    Since was seen last, he had done ok. He has no new complaints. He is taking his medications regularly.       Home BP: Not checked.      ROS:   A comprehensive review of systems was obtained and negative, except as noted in the HPI or PMH.    Active Medical Problems:  Patient Active Problem List   Diagnosis     Type II diabetes mellitus with renal manifestations (H)     Diabetes mellitus with background retinopathy (H)     Polycystic kidney     NONSPECIFIC MEDICAL HISTORY     Coronary artery disease     Retinopathy     Hyperlipidemia LDL goal <70     Premature ventricular contractions (PVCs) (VPCs)     Kidney replaced by transplant     Immunosuppressed status (H)     Kidney transplant rejection     Hyperglycemia     Hypertension     Anemia in chronic renal disease     Care after organ transplant     Esophageal ulcer       Personal Hx:  Social History     Social History     Marital status: Single     Spouse name: N/A     Number of children: 2     Years of education: 14     Occupational History     [employer] [place]     Social History Main Topics     Smoking status: Never Smoker     Smokeless tobacco: Never Used     Alcohol use No     Drug use: No     Sexual activity: Yes     Partners: Female     Other Topics Concern     Not on file     Social History Narrative       Allergies:  Allergies   Allergen Reactions     No Known Allergies        Medications:  Current Outpatient Prescriptions   Medication     atorvastatin (LIPITOR) 10 MG tablet     blood glucose monitoring (NO BRAND SPECIFIED) test strip     calcitRIOL (ROCALTROL) 0.25 MCG capsule     cholecalciferol (VITAMIN  -D) 1000 UNITS capsule     cinacalcet (SENSIPAR) 30 MG  "tablet     cloNIDine (CATAPRES) 0.1 MG tablet     cycloSPORINE modified (GENERIC EQUIVALENT) 25 MG capsule     hydrALAZINE (APRESOLINE) 25 MG tablet     HYDROcodone-acetaminophen (NORCO) 5-325 MG per tablet     insulin aspart (NOVOLOG PEN) 100 UNIT/ML soln     insulin glargine (LANTUS) 100 UNIT/ML PEN     MYFORTIC (BRAND) 180 MG EC TABLET     NIFEdipine ER osmotic (PROCARDIA XL) 30 MG 24 hr tablet     ondansetron (ZOFRAN) 4 MG tablet     sodium bicarbonate 650 MG tablet     [DISCONTINUED] cloNIDine (CATAPRES-TTS1) 0.1 MG/24HR WK patch     No current facility-administered medications for this visit.          Vitals:  BP (!) 181/93  Pulse 93  Temp 98.5  F (36.9  C) (Oral)  Ht 1.702 m (5' 7\")  Wt 79.3 kg (174 lb 12.8 oz)  SpO2 97%  BMI 27.38 kg/m2    Exam:   GENERAL APPEARANCE: alert and no distress, chronically ill looking  HENT: mouth without ulcers or lesions  LYMPHATICS: no cervical or supraclavicular nodes  RESP: lungs clear to auscultation - no rales, rhonchi or wheezes  CV: regular rhythm, normal rate, no rub, no murmur  EDEMA: no LE edema bilaterally, AVF in the left forearm with palpable thrill   ABDOMEN: protruded, soft, slightly distended, surgical incision well healed, nontender, bowel sounds normal  MS: extremities normal - no gross deformities noted, no evidence of inflammation in joints, no muscle tenderness  SKIN: no rash    Results: reviewed  "

## 2018-05-01 NOTE — MR AVS SNAPSHOT
After Visit Summary   5/1/2018    Amadou Lewis    MRN: [id number]           Patient Information     Date Of Birth          1965        Visit Information        Provider Department      5/1/2018 2:50 PM Deyvi Dick MD [facility]        Today's Diagnoses     Care after organ transplant    -  1    Renal osteodystrophy        Hypertension secondary to other renal disorders        Immunosuppressed status (H)        Kidney transplant rejection           Follow-ups after your visit        Follow-up notes from your care team     Return in about 3 months (around 8/1/2018).      Your next 10 appointments already scheduled     Aug 13, 2018  2:00 PM CDT   (Arrive by 1:30 PM)   Return Kidney Transplant with Deyvi Dick MD   [facility] ([facility])    [address] [place] [address]  [address]  [facility] [address] [phone]              Future tests that were ordered for you today     Open Future Orders        Priority Expected Expires Ordered    Phosphorus Routine  5/31/2018 5/1/2018            Who to contact     If you have questions or need follow up information about today's clinic visit or your schedule please contact [facility] directly at [phone].  Normal or non-critical lab and imaging results will be communicated to you by MyChart, letter or phone within 4 business days after the clinic has received the results. If you do not hear from us within 7 days, please contact the clinic through [employer] or phone. If you have a critical or abnormal lab result, we will notify you by phone as soon as possible.  Submit refill requests through [employer] or call your pharmacy and they will forward the refill request to us. Please allow 3 business days for your refill to be completed.          Additional Information About Your Visit        MyChart Information     [employer] gives you secure access to your electronic health record. If you see a primary  "care provider, you can also send messages to your care team and make appointments. If you have questions, please call your primary care clinic.  If you do not have a primary care provider, please call [phone] and they will assist you.        Care EveryWhere ID     This is your Care EveryWhere ID. This could be used by other organizations to access your [place] medical records  [id number]        Your Vitals Were     Pulse Temperature Height Pulse Oximetry BMI (Body Mass Index)       93 98.5  F (36.9  C) (Oral) 1.702 m (5' 7\") 97% 27.38 kg/m2        Blood Pressure from Last 3 Encounters:   05/01/18 (!) 181/93   01/23/18 (!) 189/95   09/26/17 (!) 181/95    Weight from Last 3 Encounters:   05/01/18 79.3 kg (174 lb 12.8 oz)   01/23/18 79.7 kg (175 lb 9.6 oz)   09/26/17 76.5 kg (168 lb 11.2 oz)                 Today's Medication Changes          These changes are accurate as of 5/1/18  3:29 PM.  If you have any questions, ask your nurse or doctor.               Start taking these medicines.        Dose/Directions    calcitRIOL 0.25 MCG capsule   Commonly known as:  ROCALTROL   Used for:  Renal osteodystrophy   Started by:  Deyvi Dick MD        Dose:  0.25 mcg   Take 1 capsule (0.25 mcg) by mouth daily   Quantity:  90 capsule   Refills:  1       hydrALAZINE 25 MG tablet   Commonly known as:  APRESOLINE   Used for:  Hypertension secondary to other renal disorders   Started by:  Deyvi Dick MD        Dose:  25 mg   Take 1 tablet (25 mg) by mouth 3 times daily   Quantity:  270 tablet   Refills:  1         These medicines have changed or have updated prescriptions.        Dose/Directions    cloNIDine 0.1 MG tablet   Commonly known as:  CATAPRES   This may have changed:  Another medication with the same name was removed. Continue taking this medication, and follow the directions you see here.   Used for:  Hypertension secondary to other renal disorders   Changed by:  Deyvi Dick MD        Dose:  0.1 " mg   Take 1 tablet (0.1 mg) by mouth 2 times daily as needed For SBP > 170   Quantity:  60 tablet   Refills:  1       insulin glargine 100 UNIT/ML injection   Commonly known as:  LANTUS   This may have changed:    - how much to take  - when to take this   Used for:  Type 2 diabetes mellitus with diabetic chronic kidney disease (H)        Dose:  53 Units   Inject 53 Units Subcutaneous every 24 hours   Refills:  0            Where to get your medicines      These medications were sent to [id number] - [place], [place] - [address] AT [facility] [place] [address]  [address], [place] [address]     Phone:  [phone]     calcitRIOL 0.25 MCG capsule    hydrALAZINE 25 MG tablet                Primary Care Provider Office Phone # Fax #    Masoud Valentin MD, [phone]       [address] [place] [address]        Equal Access to Services     [facility] AH: Hadii aad ku hadasho Soomaali, waaxda luqadaha, qaybta kaalmada adeegyada, waxay idiin hayaan adeeg kharacr jeffers . So [facility] [phone].    ATENCIÓN: Si habla español, tiene a tejeda disposición servicios gratuitos de asistencia lingüística. [facility] [phone].    We comply with applicable federal civil rights laws and [place] laws. We do not discriminate on the basis of race, color, national origin, age, disability, sex, sexual orientation, or gender identity.            Thank you!     Thank you for choosing [facility]  for your care. Our goal is always to provide you with excellent care. Hearing back from our patients is one way we can continue to improve our services. Please take a few minutes to complete the written survey that you may receive in the mail after your visit with us. Thank you!             Your Updated Medication List - Protect others around you: Learn how to safely use, store and throw away your medicines at www.disposemymeds.org.          This  list is accurate as of 5/1/18  3:29 PM.  Always use your most recent med list.                   Brand Name Dispense Instructions for use Diagnosis    atorvastatin 10 MG tablet    LIPITOR    45 tablet    Take 0.5 tablets (5 mg) by mouth daily    Coronary artery disease involving native coronary artery of native heart without angina pectoris       blood glucose monitoring test strip    no brand specified    1 Box    Use to test blood sugar 4 times daily or as directed.    Diabetes mellitus with background retinopathy (H)       calcitRIOL 0.25 MCG capsule    ROCALTROL    90 capsule    Take 1 capsule (0.25 mcg) by mouth daily    Renal osteodystrophy       cholecalciferol 1000 units capsule    vitamin  -D    180 capsule    Take 2 capsules (2,000 Units) by mouth daily    CKD (chronic kidney disease) stage 5, GFR less than 15 ml/min (H)       cinacalcet 30 MG tablet    SENSIPAR    90 tablet    Take 1 tablet (30 mg) by mouth daily    Secondary renal hyperparathyroidism (H)       cloNIDine 0.1 MG tablet    CATAPRES    60 tablet    Take 1 tablet (0.1 mg) by mouth 2 times daily as needed For SBP > 170    Hypertension secondary to other renal disorders       cycloSPORINE modified 25 MG capsule    GENERIC EQUIVALENT    180 capsule    Take 3 capsules (75 mg) by mouth 2 times daily Total dose 75 mg twice a day    Kidney replaced by transplant       hydrALAZINE 25 MG tablet    APRESOLINE    270 tablet    Take 1 tablet (25 mg) by mouth 3 times daily    Hypertension secondary to other renal disorders       HYDROcodone-acetaminophen 5-325 MG per tablet    NORCO     Take 2 tablets by mouth every 6 hours as needed for moderate to severe pain        insulin aspart 100 UNIT/ML injection    NovoLOG PEN     Inject 1-16 Units Subcutaneous At Bedtime Correction Scale - VERY HIGH INSULIN RESISTANCE DOSING    Do Not give Bedtime Correction Insulin if BG < 200.  For  - 209 give 1 units.  For  - 219 give 2 units.  For  - 229  give 3 units.  For  - 239 give 4 units.  For  - 249 give 5 units.  For  - 259 give 6 units.  For  - 269 give 7 units.  For  - 279 give 8 units.  For  - 289 give 9 units.    Type 2 diabetes mellitus with diabetic chronic kidney disease (H)       insulin glargine 100 UNIT/ML injection    LANTUS     Inject 53 Units Subcutaneous every 24 hours    Type 2 diabetes mellitus with diabetic chronic kidney disease (H)       mycophenolic acid 180 MG EC tablet     180 tablet    Take 3 tablets (540 mg) by mouth 2 times daily    Kidney replaced by transplant, Pancreas replaced by transplant (H)       NIFEdipine ER osmotic 30 MG 24 hr tablet    PROCARDIA XL    540 tablet    Take 3 tablets (90 mg) by mouth 2 times daily    Hypertension secondary to other renal disorders       ondansetron 4 MG tablet    ZOFRAN    30 tablet    Take 1 tablet (4 mg) by mouth every 12 hours as needed for nausea    Nausea       sodium bicarbonate 650 MG tablet     270 tablet    Take 3 tablets (1,950 mg) by mouth 3 times daily    Acidosis

## 2018-06-21 DIAGNOSIS — Z94.0 KIDNEY REPLACED BY TRANSPLANT: Primary | ICD-10-CM

## 2018-06-21 RX ORDER — CYCLOSPORINE 25 MG/1
75 CAPSULE, LIQUID FILLED ORAL 2 TIMES DAILY
Qty: 180 CAPSULE | Refills: 0 | Status: SHIPPED | OUTPATIENT
Start: 2018-06-21 | End: 2018-07-25

## 2018-06-21 NOTE — TELEPHONE ENCOUNTER
Cyclosporine 25mg  Last FIlled Date 5/25/18  Qty dispensed 180  Thank you very kindly!  Charlotte Valenzuela [facility]

## 2018-06-27 DIAGNOSIS — E87.20 ACIDOSIS: ICD-10-CM

## 2018-06-27 DIAGNOSIS — Z94.0 KIDNEY TRANSPLANT RECIPIENT: ICD-10-CM

## 2018-06-27 DIAGNOSIS — Z48.298 AFTERCARE FOLLOWING ORGAN TRANSPLANT: ICD-10-CM

## 2018-06-27 DIAGNOSIS — N25.0 RENAL OSTEODYSTROPHY: ICD-10-CM

## 2018-06-27 LAB
ERYTHROCYTE [DISTWIDTH] IN BLOOD BY AUTOMATED COUNT: 13.1 % (ref 10–15)
HCT VFR BLD AUTO: 35.1 % (ref 40–53)
HGB BLD-MCNC: 11.9 G/DL (ref 13.3–17.7)
MCH RBC QN AUTO: 28 PG (ref 26.5–33)
MCHC RBC AUTO-ENTMCNC: 33.9 G/DL (ref 31.5–36.5)
MCV RBC AUTO: 83 FL (ref 78–100)
PLATELET # BLD AUTO: 231 10E9/L (ref 150–450)
RBC # BLD AUTO: 4.25 10E12/L (ref 4.4–5.9)
WBC # BLD AUTO: 6.3 10E9/L (ref 4–11)

## 2018-06-27 PROCEDURE — 80158 DRUG ASSAY CYCLOSPORINE: CPT | Performed by: INTERNAL MEDICINE

## 2018-06-27 PROCEDURE — 36415 COLL VENOUS BLD VENIPUNCTURE: CPT | Performed by: INTERNAL MEDICINE

## 2018-06-27 PROCEDURE — 85027 COMPLETE CBC AUTOMATED: CPT | Performed by: INTERNAL MEDICINE

## 2018-06-27 PROCEDURE — 80069 RENAL FUNCTION PANEL: CPT | Performed by: INTERNAL MEDICINE

## 2018-06-28 ENCOUNTER — TELEPHONE (OUTPATIENT)
Dept: TRANSPLANT | Facility: CLINIC | Age: 53
End: 2018-06-28

## 2018-06-28 DIAGNOSIS — Z94.0 KIDNEY REPLACED BY TRANSPLANT: ICD-10-CM

## 2018-06-28 DIAGNOSIS — Z94.83 PANCREAS REPLACED BY TRANSPLANT (H): Primary | ICD-10-CM

## 2018-06-28 LAB
ALBUMIN SERPL-MCNC: 4.2 G/DL (ref 3.4–5)
ANION GAP SERPL CALCULATED.3IONS-SCNC: 13 MMOL/L (ref 3–14)
BUN SERPL-MCNC: 77 MG/DL (ref 7–30)
CALCIUM SERPL-MCNC: 9.1 MG/DL (ref 8.5–10.1)
CHLORIDE SERPL-SCNC: 101 MMOL/L (ref 94–109)
CO2 SERPL-SCNC: 16 MMOL/L (ref 20–32)
CREAT SERPL-MCNC: 5.19 MG/DL (ref 0.66–1.25)
CYCLOSPORINE BLD LC/MS/MS-MCNC: 66 UG/L (ref 50–400)
GFR SERPL CREATININE-BSD FRML MDRD: 12 ML/MIN/1.7M2
GLUCOSE SERPL-MCNC: 376 MG/DL (ref 70–99)
PHOSPHATE SERPL-MCNC: 3.8 MG/DL (ref 2.5–4.5)
POTASSIUM SERPL-SCNC: 5.2 MMOL/L (ref 3.4–5.3)
SODIUM SERPL-SCNC: 130 MMOL/L (ref 133–144)
TME LAST DOSE: NORMAL H

## 2018-07-25 ENCOUNTER — TELEPHONE (OUTPATIENT)
Dept: TRANSPLANT | Facility: CLINIC | Age: 53
End: 2018-07-25

## 2018-07-25 DIAGNOSIS — Z94.0 KIDNEY REPLACED BY TRANSPLANT: ICD-10-CM

## 2018-07-25 NOTE — TELEPHONE ENCOUNTER
Left voice message for patient to return my call requesting update regarding dermatology and dental exams.

## 2018-07-26 RX ORDER — CYCLOSPORINE 25 MG/1
75 CAPSULE, LIQUID FILLED ORAL 2 TIMES DAILY
Qty: 180 CAPSULE | Refills: 11 | Status: ON HOLD | OUTPATIENT
Start: 2018-07-26 | End: 2018-11-20

## 2018-08-07 DIAGNOSIS — Z94.0 KIDNEY REPLACED BY TRANSPLANT: ICD-10-CM

## 2018-08-07 DIAGNOSIS — Z94.83 PANCREAS REPLACED BY TRANSPLANT (H): ICD-10-CM

## 2018-08-07 LAB
ERYTHROCYTE [DISTWIDTH] IN BLOOD BY AUTOMATED COUNT: 13.8 % (ref 10–15)
HCT VFR BLD AUTO: 32.5 % (ref 40–53)
HGB BLD-MCNC: 10.5 G/DL (ref 13.3–17.7)
LIPASE SERPL-CCNC: 233 U/L (ref 73–393)
MCH RBC QN AUTO: 27.9 PG (ref 26.5–33)
MCHC RBC AUTO-ENTMCNC: 32.3 G/DL (ref 31.5–36.5)
MCV RBC AUTO: 86 FL (ref 78–100)
PLATELET # BLD AUTO: 268 10E9/L (ref 150–450)
RBC # BLD AUTO: 3.76 10E12/L (ref 4.4–5.9)
WBC # BLD AUTO: 8 10E9/L (ref 4–11)

## 2018-08-07 PROCEDURE — 82150 ASSAY OF AMYLASE: CPT | Performed by: INTERNAL MEDICINE

## 2018-08-07 PROCEDURE — 83690 ASSAY OF LIPASE: CPT | Performed by: INTERNAL MEDICINE

## 2018-08-07 PROCEDURE — 85027 COMPLETE CBC AUTOMATED: CPT | Performed by: INTERNAL MEDICINE

## 2018-08-07 PROCEDURE — 80158 DRUG ASSAY CYCLOSPORINE: CPT | Performed by: INTERNAL MEDICINE

## 2018-08-07 PROCEDURE — 80048 BASIC METABOLIC PNL TOTAL CA: CPT | Performed by: INTERNAL MEDICINE

## 2018-08-07 PROCEDURE — 36415 COLL VENOUS BLD VENIPUNCTURE: CPT | Performed by: INTERNAL MEDICINE

## 2018-08-08 ENCOUNTER — TELEPHONE (OUTPATIENT)
Dept: NURSING | Facility: CLINIC | Age: 53
End: 2018-08-08

## 2018-08-08 ENCOUNTER — TELEPHONE (OUTPATIENT)
Dept: TRANSPLANT | Facility: CLINIC | Age: 53
End: 2018-08-08

## 2018-08-08 LAB
AMYLASE SERPL-CCNC: 71 U/L (ref 30–110)
ANION GAP SERPL CALCULATED.3IONS-SCNC: 12 MMOL/L (ref 3–14)
BUN SERPL-MCNC: 69 MG/DL (ref 7–30)
CALCIUM SERPL-MCNC: 8.4 MG/DL (ref 8.5–10.1)
CHLORIDE SERPL-SCNC: 104 MMOL/L (ref 94–109)
CO2 SERPL-SCNC: 20 MMOL/L (ref 20–32)
CREAT SERPL-MCNC: 5.54 MG/DL (ref 0.66–1.25)
CYCLOSPORINE BLD LC/MS/MS-MCNC: 61 UG/L (ref 50–400)
GFR SERPL CREATININE-BSD FRML MDRD: 11 ML/MIN/1.7M2
GLUCOSE SERPL-MCNC: 115 MG/DL (ref 70–99)
POTASSIUM SERPL-SCNC: 5 MMOL/L (ref 3.4–5.3)
SODIUM SERPL-SCNC: 136 MMOL/L (ref 133–144)
TME LAST DOSE: NORMAL H

## 2018-08-08 NOTE — TELEPHONE ENCOUNTER
DATE:  8/8/2018   TIME OF RECEIPT FROM LAB:  3:00 PM  LAB TEST:  Cr  LAB VALUE:  5.5  RESULTS GIVEN WITH READ-BACK TO (PROVIDER):  JANICE Brock  TIME LAB VALUE REPORTED TO PROVIDER:   3:15 PM

## 2018-08-08 NOTE — TELEPHONE ENCOUNTER
[facility] Call Center    Phone Message    May a detailed message be left on voicemail: yes    Reason for Call: Other: Rhonda from [facility] has a critical lab result     Action Taken: Message routed to:  [facility] ([facility]): Please call her at [phone] as soon as possible

## 2018-08-13 ENCOUNTER — OFFICE VISIT (OUTPATIENT)
Dept: NEPHROLOGY | Facility: CLINIC | Age: 53
End: 2018-08-13
Attending: INTERNAL MEDICINE
Payer: COMMERCIAL

## 2018-08-13 VITALS
DIASTOLIC BLOOD PRESSURE: 86 MMHG | WEIGHT: 178.2 LBS | SYSTOLIC BLOOD PRESSURE: 163 MMHG | RESPIRATION RATE: 16 BRPM | HEIGHT: 67 IN | BODY MASS INDEX: 27.97 KG/M2 | HEART RATE: 93 BPM

## 2018-08-13 DIAGNOSIS — I25.10 CORONARY ARTERY DISEASE INVOLVING NATIVE CORONARY ARTERY OF NATIVE HEART WITHOUT ANGINA PECTORIS: ICD-10-CM

## 2018-08-13 DIAGNOSIS — N18.5 CKD (CHRONIC KIDNEY DISEASE) STAGE 5, GFR LESS THAN 15 ML/MIN (H): ICD-10-CM

## 2018-08-13 DIAGNOSIS — N25.81 SECONDARY RENAL HYPERPARATHYROIDISM (H): ICD-10-CM

## 2018-08-13 DIAGNOSIS — Z94.0 KIDNEY REPLACED BY TRANSPLANT: ICD-10-CM

## 2018-08-13 DIAGNOSIS — N18.5 ANEMIA IN STAGE 5 CHRONIC KIDNEY DISEASE (H): ICD-10-CM

## 2018-08-13 DIAGNOSIS — R60.9 EDEMA, UNSPECIFIED TYPE: ICD-10-CM

## 2018-08-13 DIAGNOSIS — Z48.298 AFTERCARE FOLLOWING ORGAN TRANSPLANT: Primary | ICD-10-CM

## 2018-08-13 DIAGNOSIS — D84.9 IMMUNOSUPPRESSION (H): ICD-10-CM

## 2018-08-13 DIAGNOSIS — N25.0 RENAL OSTEODYSTROPHY: ICD-10-CM

## 2018-08-13 DIAGNOSIS — E55.9 VITAMIN D DEFICIENCY: ICD-10-CM

## 2018-08-13 DIAGNOSIS — Z48.298 CARE AFTER ORGAN TRANSPLANT: ICD-10-CM

## 2018-08-13 DIAGNOSIS — I15.1 HYPERTENSION SECONDARY TO OTHER RENAL DISORDERS: ICD-10-CM

## 2018-08-13 DIAGNOSIS — E87.20 ACIDOSIS: ICD-10-CM

## 2018-08-13 DIAGNOSIS — D63.1 ANEMIA IN STAGE 5 CHRONIC KIDNEY DISEASE (H): ICD-10-CM

## 2018-08-13 DIAGNOSIS — T86.11 KIDNEY TRANSPLANT REJECTION: ICD-10-CM

## 2018-08-13 DIAGNOSIS — Q61.3 POLYCYSTIC KIDNEY: ICD-10-CM

## 2018-08-13 DIAGNOSIS — D84.9 IMMUNOSUPPRESSED STATUS (H): ICD-10-CM

## 2018-08-13 PROCEDURE — G0463 HOSPITAL OUTPT CLINIC VISIT: HCPCS | Mod: ZF

## 2018-08-13 RX ORDER — CALCITRIOL 0.25 UG/1
0.25 CAPSULE, LIQUID FILLED ORAL DAILY
Qty: 90 CAPSULE | Refills: 3 | Status: SHIPPED | OUTPATIENT
Start: 2018-08-13 | End: 2018-11-21

## 2018-08-13 RX ORDER — FUROSEMIDE 20 MG
20 TABLET ORAL DAILY PRN
Qty: 90 TABLET | Refills: 1 | Status: SHIPPED | OUTPATIENT
Start: 2018-08-13 | End: 2018-11-01

## 2018-08-13 RX ORDER — INSULIN GLARGINE 100 [IU]/ML
16 INJECTION, SOLUTION SUBCUTANEOUS 2 TIMES DAILY
Status: ON HOLD | COMMUNITY
Start: 2017-12-05 | End: 2018-11-20

## 2018-08-13 ASSESSMENT — PAIN SCALES - GENERAL: PAINLEVEL: NO PAIN (0)

## 2018-08-13 NOTE — NURSING NOTE
"Chief Complaint   Patient presents with     RECHECK     Post kidney tx follow up     /86  Pulse 93  Resp 16  Ht 1.702 m (5' 7\")  Wt 80.8 kg (178 lb 3.2 oz)  BMI 27.91 kg/m2  GEORGES JAIME CMA    "

## 2018-08-13 NOTE — LETTER
8/13/2018      RE: Amadou Lewis  [address]  [place] [address]       TRANSPLANT NEPHROLOGY VISIT    Assessment & Plan   # LDKT: basline Cr ~ 4.8-5.3; Stable.  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  He has possible uremic symptoms that are stage.  He is currently inactive on KP list due to incomplete work up.   - Proteinuria: Moderate    # Immunosuppression: Cyclosporine (goal  50-75) and Mycophenolic acid (goal  1-3.5)   - Changes: No    # Fatigue: may be from worsening anemia, hypocalcemia, worse edema, or related to bp medications.   - Start lasix as needed  - anemia referral  - Start calcitriol for hypocalcemia    # Prophylaxis:   - PJP: none   - CMV: none    # Transplant History:    Transplant: 10/10/2013 (Kidney)    Donor Type: Living Donor Class: Standard Criteria Donor   Crossmatch at time of Tx: negative    DSA at time of Tx: No    Latest DSA: Yes Date of last check: 5/2017   Significant changes in immunosuppression: reduction with history of BK viremia    Biopsy: Yes - 9/2016 with CAN Rejection History:Yes - multiple episodes   CMV IgG Ab Discordance (D+/R-): No    EBV IgG Ab Discordance (D+/R-): No    History of BK Viremia: Yes    Significant Complications: None    Transplant Office Phone Number: [phone]    # Hypertension: inadequate control; Goal BP: 130/80 on clonidine, nifedipine, hydralazine   - Changes: Yes - start furosemide 40 mg daily    # Anemia in chronic renal disease: Hgb: Stable   - Iron studies: low    # Mineral Bone Disorder:   - Secondary renal hyperparathyroidism: poorly controlled, PTH 1238   - Vitamin D: mildly low   - Calcium: low-normal   - Phosphorus: within normal limits    # Electrolytes:    - Magnesium: within normal limits   - Potassium: mildly elevated    # Other Medical Issues:   # Nausea and vomiting: improved    # Diabetes mellitus type II management per PCP  # H/o CAD- s/p PCI  with DESx2, added small dose statin (didn't tolerate BB)  # Acidosis: on sodium bicarbonate  # PCKD: MRI/MRA from the [facility] didn't reveal aneurysms   # Access: functional fistula     Return visit: No Follow-up on file.    Assessment and plan was discussed with the patient and he voiced his understanding and agreement.    Viral Braxton De Los Santos MD    Chief Complaint   Mr. Lewis is a 52 year old here for routine follow up    History of Present Illness   Patient has end stage kidney disease secondary PKD with LDKT 2013 that has history of 1B ACR and now CKD stage 5 and is inactive on the kidney-pancreas list with an incomplete work up.  He needs to have a dental visit still which is limited from his current insurance coverage. Since his last visit he notes some difficulty at work. Patient works at the [employer] as  or floor  if needed.  He notes worsening energy at work.    Recent Hospitalizations:  [x] No [] Yes    New Medical Issues: [x] No [] Yes    Decreased energy: [] No [x] Yes The patient notes worsening energy since the last visit.   Chest pain or SOB with exertion:  [x] No [] Yes    Appetite change or weight change: [x] No [] Yes    Nausea, vomiting or diarrhea:  [x] No [] Yes    Fever, sweats or chills: [x] No [] Yes    Leg swelling: [x] No [] Yes      Other medical issues:  No    Home BP: 120's with low pulse or 160's / 170's HR in the 80's    Review of Systems   A comprehensive review of systems was obtained and negative, except as noted in the HPI or PMH.    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)     Diabetes mellitus with background retinopathy (H)     Polycystic kidney     NONSPECIFIC MEDICAL HISTORY     Coronary artery disease     Retinopathy     Hyperlipidemia LDL goal <70     Premature ventricular contractions (PVCs) (VPCs)     Kidney replaced by transplant     Immunosuppressed status  (H)     Kidney transplant rejection     Hyperglycemia     Hypertension     Anemia in chronic renal disease     Care after organ transplant     Esophageal ulcer       Social History   Social History   Substance Use Topics     Smoking status: Never Smoker     Smokeless tobacco: Never Used     Alcohol use No       Allergies   Allergies   Allergen Reactions     No Known Allergies        Medications   Current Outpatient Prescriptions   Medication Sig     atorvastatin (LIPITOR) 10 MG tablet Take 0.5 tablets (5 mg) by mouth daily     BASAGLAR 100 UNIT/ML injection Inject 38 Units Subcutaneous     blood glucose monitoring (NO BRAND SPECIFIED) test strip Use to test blood sugar 4 times daily or as directed.     calcitRIOL (ROCALTROL) 0.25 MCG capsule Take 1 capsule (0.25 mcg) by mouth daily     cholecalciferol (VITAMIN  -D) 1000 UNITS capsule Take 2 capsules (2,000 Units) by mouth daily     cinacalcet (SENSIPAR) 30 MG tablet Take 1 tablet (30 mg) by mouth daily     cycloSPORINE modified (GENERIC EQUIVALENT) 25 MG capsule Take 3 capsules (75 mg) by mouth 2 times daily Total dose 75 mg twice a day     hydrALAZINE (APRESOLINE) 25 MG tablet Take 1 tablet (25 mg) by mouth 3 times daily     HYDROcodone-acetaminophen (NORCO) 5-325 MG per tablet Take 2 tablets by mouth every 6 hours as needed for moderate to severe pain     insulin aspart (NOVOLOG PEN) 100 UNIT/ML soln Inject 1-16 Units Subcutaneous At Bedtime Correction Scale - VERY HIGH INSULIN RESISTANCE DOSING     Do Not give Bedtime Correction Insulin if BG < 200.   For  - 209 give 1 units.   For  - 219 give 2 units.   For  - 229 give 3 units.   For  - 239 give 4 units.   For  - 249 give 5 units.   For  - 259 give 6 units.   For  - 269 give 7 units.   For  - 279 give 8 units.   For  - 289 give 9 units.     MYFORTIC (BRAND) 180 MG EC TABLET Take 3 tablets (540 mg) by mouth 2 times daily     NIFEdipine ER osmotic (PROCARDIA  "XL) 30 MG 24 hr tablet Take 3 tablets (90 mg) by mouth 2 times daily     sodium bicarbonate 650 MG tablet Take 3 tablets (1,950 mg) by mouth 3 times daily     cloNIDine (CATAPRES) 0.1 MG tablet Take 1 tablet (0.1 mg) by mouth 2 times daily as needed For SBP > 170 (Patient not taking: Reported on 8/13/2018)     insulin glargine (LANTUS) 100 UNIT/ML PEN Inject 53 Units Subcutaneous every 24 hours (Patient not taking: Reported on 8/13/2018)     ondansetron (ZOFRAN) 4 MG tablet Take 1 tablet (4 mg) by mouth every 12 hours as needed for nausea (Patient not taking: Reported on 8/13/2018)     No current facility-administered medications for this visit.      There are no discontinued medications.    Physical Exam   Vital Signs: /86  Pulse 93  Resp 16  Ht 1.702 m (5' 7\")  Wt 80.8 kg (178 lb 3.2 oz)  BMI 27.91 kg/m2  GENERAL APPEARANCE: alert and no distress  HENT: mouth without ulcers or lesions  LYMPHATICS: no cervical or supraclavicular nodes  RESP: lungs clear to auscultation - no rales, rhonchi or wheezes  CV: regular rhythm, normal rate, no rub, no murmur  EDEMA: no LE edema bilaterally  ABDOMEN: soft, nondistended, nontender, bowel sounds normal  MS: extremities normal - no gross deformities noted, no evidence of inflammation in joints, no muscle tenderness  SKIN: no rash  TX KIDNEY: normal    Data   Renal -   Recent Labs   Lab Test  08/07/18   1351  06/27/18   1357  04/25/18   1355   NA  136  130*  135   POTASSIUM  5.0  5.2  5.1   CHLORIDE  104  101  105   CO2  20  16*  18*   BUN  69*  77*  74*   CR  5.54*  5.19*  5.12*   GLC  115*  376*  291*   JE  8.4*  9.1  8.6     Recent Labs   Lab Test  06/27/18   1357  08/03/17   1440  05/24/17   1414   02/01/16   1548  01/29/16   1535  01/26/16   0651   MAG   --    --    --    --   1.8  1.7  1.8   PHOS  3.8  4.3  3.8   < >  3.2  2.7   --     < > = values in this interval not displayed.     Recent Labs   Lab Test  03/06/18   1354  08/03/17   1440  02/01/17   1412 "   PTHI  1238*  790*  719*     Recent Labs   Lab Test  08/03/17   1440  11/18/16   1448  11/15/16   1402   VITDT  29  21  18*       CBC -   Recent Labs   Lab Test  08/07/18   1351  06/27/18   1357  04/25/18   1355   WBC  8.0  6.3  5.8   HGB  10.5*  11.9*  11.0*   PLT  268  231  247       LFTs -   Recent Labs   Lab Test  06/27/18   1357  08/03/17   1440  05/31/17   0716   01/20/16   1154  02/10/14   1559   ALKPHOS   --    --   150   --   101  176*   BILITOTAL   --    --   0.5   --   0.5  0.7   ALT   --    --   17   --   23  22   AST   --    --   8   --   Unsatisfactory specimen - hemolyzed  19   PROTTOTAL   --    --   7.3   --   8.0  7.7   ALBUMIN  4.2  4.2  4.1   < >  3.4  4.5    < > = values in this interval not displayed.       Pancreas -  Recent Labs   Lab Test  05/31/17   0716  01/25/16   0614  01/23/16   0539   A1C  10.5*  10.4*  10.4*     Recent Labs   Lab Test  08/07/18   1351   AMYLASE  71   LIPASE  233       Iron Panel -   Recent Labs   Lab Test  08/03/17   1440  11/18/16   1448  11/15/16   1402   IRON  79  82  94   IRONSAT  34  34  39   TIMOTHY  736*   --   655*       Transplant -   Recent Labs   Lab Test  09/28/16   0659  04/14/16   1451  03/18/16   1324   CSPEC  Plasma  EDTA PLASMA  Plasma, EDTA anticoagulant  CORRECTED ON 03/19 AT 0821: PREVIOUSLY REPORTED AS Whole blood, EDTA   anticoagulant     CMVQNT  CMV DNA Not Detected   The OFELIA AmpliPrep/OFELIA TaqMan CMV Test is an FDA-approved in vitro nucleic   acid amplification test for the quantitation of cytomegalovirus DNA in human   plasma (EDTA plasma) using the OFELIA AmpliPrep Instrument for automated viral   nucleic acid extraction and the [employer] TaqMan Analyzer or [employer] TaqMan for   automated Real Time amplification and detection of the viral nucleic acid   target.   Titer results are reported in International Units/mL (IU/mL using 1st WHO   International standard for Human Cytomegalovirus for Nucleic Acid Amplification   based assays. The conversion  factor between CMV DNA copis/mL (as defined by the   Roche OFELIA TaqMan CMV test) and International Units is the CMV DNA   concentration in IU/mL x 1.1 copies/IU = CMV DNA in copies/mL.   This assay has received FDA approval for the testing of human plasma only. The   [facility] Diagnostic Laboratory at the [facility], [place], has validated the pe rformance characteristics of the Roche   CMV assay for plasma, bronchial alveolar lavage/wash and urine.    CMV DNA Not Detected   The OFELIA AmpliPrep/OFELIA TaqMan CMV Test is an FDA-approved in vitro nucleic   acid amplification test for the quantitation of cytomegalovirus DNA in human   plasma (EDTA plasma) using the OFELIA AmpliPrep Instrument for automated viral   nucleic acid extraction and the Spreaker TaqMan Analyzer or Cascaad (CircleMe) for   automated Real Time amplification and detection of the viral nucleic acid   target.   Titer results are reported in International Units/mL (IU/mL using 1st WHO   International standard for Human Cytomegalovirus for Nucleic Acid Amplification   based assays. The conversion factor between CMV DNA copis/mL (as defined by the   Roche OFELIA TaqMan CMV test) and International Units is the CMV DNA   concentration in IU/mL x 1.1 copies/IU = CMV DNA in copies/mL.   This assay has received FDA approval for the testing of human plasma only. The   [facility] Diagnostic Laboratory at the [facility], [place], has validated the pe rformance characteristics of the Roche   CMV assay for plasma, bronchial alveolar lavage/wash and urine.    CMV DNA Not Detected   The OFELIA AmpliPrep/OFELIA TaqMan CMV Test is an FDA-approved in vitro nucleic   acid amplification test for the quantitation of cytomegalovirus DNA in human   plasma (EDTA plasma) using the OFELIA MicroblriPrep Instrument for automated viral   nucleic acid extraction and the Spreaker TaqMan Analyzer or Cascaad (CircleMe) for    automated Real Time amplification and detection of the viral nucleic acid   target.   Titer results are reported in International Units/mL (IU/mL using 1st WHO   International standard for Human Cytomegalovirus for Nucleic Acid Amplification   based assays. The conversion factor between CMV DNA copis/mL (as defined by the   Roche OFELIA TaqMan CMV test) and International Units is the CMV DNA   concentration in IU/mL x 1.1 copies/IU = CMV DNA in copies/mL.   This assay has received FDA approval for the testing of human plasma only. The   Infect[facility] Disease Diagnostic Laboratory at the [facility], [place], has validated the pe rformance characteristics of the Roche   CMV assay for plasma, bronchial alveolar lavage/wash and urine.     CMVLOG  Not Calculated  Not Calculated  Not Calculated     Recent Labs   Lab Test  03/18/16   1324  01/20/16   1851   EBRES  EBV DNA Not Detected  EBV DNA Not Detected   EBLOG  Not Calculated   The Real-Time quantitative EBV assay was developed and its performance   characteristics determined by the Infect[facility] Diagnostic Laboratory at   the [facility] in [place], [place].  The   primers and probes are Analyte Specific Reagents (ASRs) manufactured  by   Qiagen.   ASRs are used in many laboratory tests necessary for standard medical care and   generally do not require U.S. Food and Drug Administration approval.  The FDA   has determined that such clearance or approval is not necessary.  This test is   used for clinical purposes.  It should not be regarded as investigational or   research.   This laboratory is certified under the Clinical Laboratory Improvement   Amendments of 1988 (CLIA-88) as qualified to perform high complexity clinical   laboratory testing.   The quantitative range of this assay is 500-22,500,00 copies/mL (2.7-7.4 log   copies/mL).  A negative result does not rule out the presence of PCR inhibitors     in the patient specimen or EBV DNA nucleic acid in concentrations below the   level of detection of the assay.  Inhibition may also lead to underestimation   of viral quantitation.    Not Calculated   The Real-Time quantitative EBV assay was developed and its performance   characteristics determined by the Infect[facility] Diseases Diagnostic Laboratory at   the [facility] in [place], [place].  The   primers and probes are Analyte Specific Reagents (ASRs) manufactured  by   Qiagen.   ASRs are used in many laboratory tests necessary for standard medical care and   generally do not require U.S. Food and Drug Administration approval.  The FDA   has determined that such clearance or approval is not necessary.  This test is   used for clinical purposes.  It should not be regarded as investigational or   research.   This laboratory is certified under the Clinical Laboratory Improvement   Amendments of 1988 (CLIA-88) as qualified to perform high complexity clinical   laboratory testing.   The quantitative range of this assay is 500-22,500,00 copies/mL (2.7-7.4 log   copies/mL).  A negative result does not rule out the presence of PCR inhibitors    in the patient specimen or EBV DNA nucleic acid in concentrations below the   level of detection of the assay.  Inhibition may also lead to underestimation   of viral quantitation.       Recent Labs   Lab Test  05/31/17   0717  05/24/17   1415  11/15/16   1403   BKRES  BK Virus DNA Not Detected  BK Virus DNA Not Detected  BK Virus DNA Not Detected   BKLOG  Not Calculated   The Real-Time quantitative BK Virus assay was developed and its performance   characteristics determined by the [facility] Diagnostic Laboratory at   the [facility] in [place], [place]. The   primers and probes for each analyte are Analyte Specific Reagents (ASRs)   manufactured by [employer].   ASRs are used in many laboratory tests  necessary for standard medical care and   generally do not require U.S. Food and Drug Administration approval. The FDA   has determined that such clearance or approval is not necessary.   This test is used for clinical purposes. It should not be regarded as   investigational or for research. This laboratory is certified under the   Clinical Laboratory Improvement Amendments of 1988 (CLIA-88) as qualified to   perform high complexity clinical laboratory testing.    Not Calculated   The Real-Time quantitative BK Virus assay was developed and its performance   characteristics determined by the Infect[facility] Diseases Diagnostic Laboratory at   the [facility] in [place], [place]. The   primers and probes for each analyte are Analyte Specific Reagents (ASRs)   manufactured by [employer].   ASRs are used in many laboratory tests necessary for standard medical care and   generally do not require U.S. Food and Drug Administration approval. The FDA   has determined that such clearance or approval is not necessary.   This test is used for clinical purposes. It should not be regarded as   investigational or for research. This laboratory is certified under the   Clinical Laboratory Improvement Amendments of 1988 (CLIA-88) as qualified to   perform high complexity clinical laboratory testing.    Not Calculated   The Real-Time quantitative BK Virus assay was developed and its performance   characteristics determined by the Infect[facility] Diagnostic Laboratory at   the [facility] in [place], [place]. The   primers and probes for each analyte are Analyte Specific Reagents (ASRs)   manufactured by [employer].   ASRs are used in many laboratory tests necessary for standard medical care and   generally do not require U.S. Food and Drug Administration approval. The FDA   has determined that such clearance or approval is not necessary.   This test is used for clinical  purposes. It should not be regarded as   investigational or for research. This laboratory is certified under the   Clinical Laboratory Improvement Amendments of 1988 (CLIA-88) as qualified to   perform high complexity clinical laboratory testing.         Recent Labs   Lab Test  10/18/16   1434  10/05/16   1406  09/28/16   0701   DOSTAC  400  10/05/2016 at 0400 AM  19:15on 09/27/16   TACROL  3.7*  4.2*  6.5     Recent Labs   Lab Test  08/03/17   1441  05/24/17   1414  05/04/17   1408   DOSMPA  08/03/2017 AT 0330 AM  Last dose 5/24/17 at 0430AM  LAST DOSE 5/4/2017 AT 2:00 AM   MPACID  3.63*  2.79  2.97   MPAG  >200.0*  >200.0*  >200.0*       Kidney/Pancreas Recipient

## 2018-08-13 NOTE — PROGRESS NOTES
TRANSPLANT NEPHROLOGY VISIT    Assessment & Plan   # LDKT: basline Cr ~ 4.8-5.3; Stable.  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  He has possible uremic symptoms that are stage.  He is currently inactive on KP list due to incomplete work up.   - Proteinuria: Moderate    # Immunosuppression: Cyclosporine (goal  50-75) and Mycophenolic acid (goal  1-3.5)   - Changes: No    # Fatigue: may be from worsening anemia, hypocalcemia, worse edema, or related to bp medications.   - Start lasix as needed  - anemia referral  - Start calcitriol for hypocalcemia    # Prophylaxis:   - PJP: none   - CMV: none    # Transplant History:    Transplant: 10/10/2013 (Kidney)    Donor Type: Living Donor Class: Standard Criteria Donor   Crossmatch at time of Tx: negative    DSA at time of Tx: No    Latest DSA: Yes Date of last check: 5/2017   Significant changes in immunosuppression: reduction with history of BK viremia    Biopsy: Yes - 9/2016 with CAN Rejection History:Yes - multiple episodes   CMV IgG Ab Discordance (D+/R-): No    EBV IgG Ab Discordance (D+/R-): No    History of BK Viremia: Yes    Significant Complications: None    Transplant Office Phone Number: [phone]    # Hypertension: inadequate control; Goal BP: 130/80 on clonidine, nifedipine, hydralazine   - Changes: Yes - start furosemide 40 mg daily    # Anemia in chronic renal disease: Hgb: Stable   - Iron studies: low    # Mineral Bone Disorder:   - Secondary renal hyperparathyroidism: poorly controlled, PTH 1238   - Vitamin D: mildly low   - Calcium: low-normal   - Phosphorus: within normal limits    # Electrolytes:    - Magnesium: within normal limits   - Potassium: mildly elevated    # Other Medical Issues:   # Nausea and vomiting: improved    # Diabetes mellitus type II management per PCP  # H/o CAD- s/p PCI with DESx2, added small dose statin (didn't tolerate BB)  # Acidosis: on sodium  bicarbonate  # PCKD: MRI/MRA from the [facility] didn't reveal aneurysms   # Access: functional fistula     Return visit: No Follow-up on file.    Assessment and plan was discussed with the patient and he voiced his understanding and agreement.    Jose L De Los Santos MD    Chief Complaint   Mr. Lewis is a 52 year old here for routine follow up    History of Present Illness   Patient has end stage kidney disease secondary PKD with LDKT 2013 that has history of 1B ACR and now CKD stage 5 and is inactive on the kidney-pancreas list with an incomplete work up.  He needs to have a dental visit still which is limited from his current insurance coverage. Since his last visit he notes some difficulty at work. Patient works at the [employer] as  or floor  if needed.  He notes worsening energy at work.    Recent Hospitalizations:  [x] No [] Yes    New Medical Issues: [x] No [] Yes    Decreased energy: [] No [x] Yes The patient notes worsening energy since the last visit.   Chest pain or SOB with exertion:  [x] No [] Yes    Appetite change or weight change: [x] No [] Yes    Nausea, vomiting or diarrhea:  [x] No [] Yes    Fever, sweats or chills: [x] No [] Yes    Leg swelling: [x] No [] Yes      Other medical issues:  No    Home BP: 120's with low pulse or 160's / 170's HR in the 80's    Review of Systems   A comprehensive review of systems was obtained and negative, except as noted in the HPI or PMH.    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)     Diabetes mellitus with background retinopathy (H)     Polycystic kidney     NONSPECIFIC MEDICAL HISTORY     Coronary artery disease     Retinopathy     Hyperlipidemia LDL goal <70     Premature ventricular contractions (PVCs) (VPCs)     Kidney replaced by transplant     Immunosuppressed status (H)     Kidney transplant rejection     Hyperglycemia     Hypertension     Anemia  in chronic renal disease     Care after organ transplant     Esophageal ulcer       Social History   Social History   Substance Use Topics     Smoking status: Never Smoker     Smokeless tobacco: Never Used     Alcohol use No       Allergies   Allergies   Allergen Reactions     No Known Allergies        Medications   Current Outpatient Prescriptions   Medication Sig     atorvastatin (LIPITOR) 10 MG tablet Take 0.5 tablets (5 mg) by mouth daily     BASAGLAR 100 UNIT/ML injection Inject 38 Units Subcutaneous     blood glucose monitoring (NO BRAND SPECIFIED) test strip Use to test blood sugar 4 times daily or as directed.     calcitRIOL (ROCALTROL) 0.25 MCG capsule Take 1 capsule (0.25 mcg) by mouth daily     cholecalciferol (VITAMIN  -D) 1000 UNITS capsule Take 2 capsules (2,000 Units) by mouth daily     cinacalcet (SENSIPAR) 30 MG tablet Take 1 tablet (30 mg) by mouth daily     cycloSPORINE modified (GENERIC EQUIVALENT) 25 MG capsule Take 3 capsules (75 mg) by mouth 2 times daily Total dose 75 mg twice a day     hydrALAZINE (APRESOLINE) 25 MG tablet Take 1 tablet (25 mg) by mouth 3 times daily     HYDROcodone-acetaminophen (NORCO) 5-325 MG per tablet Take 2 tablets by mouth every 6 hours as needed for moderate to severe pain     insulin aspart (NOVOLOG PEN) 100 UNIT/ML soln Inject 1-16 Units Subcutaneous At Bedtime Correction Scale - VERY HIGH INSULIN RESISTANCE DOSING     Do Not give Bedtime Correction Insulin if BG < 200.   For  - 209 give 1 units.   For  - 219 give 2 units.   For  - 229 give 3 units.   For  - 239 give 4 units.   For  - 249 give 5 units.   For  - 259 give 6 units.   For  - 269 give 7 units.   For  - 279 give 8 units.   For  - 289 give 9 units.     MYFORTIC (BRAND) 180 MG EC TABLET Take 3 tablets (540 mg) by mouth 2 times daily     NIFEdipine ER osmotic (PROCARDIA XL) 30 MG 24 hr tablet Take 3 tablets (90 mg) by mouth 2 times daily     sodium  "bicarbonate 650 MG tablet Take 3 tablets (1,950 mg) by mouth 3 times daily     cloNIDine (CATAPRES) 0.1 MG tablet Take 1 tablet (0.1 mg) by mouth 2 times daily as needed For SBP > 170 (Patient not taking: Reported on 8/13/2018)     insulin glargine (LANTUS) 100 UNIT/ML PEN Inject 53 Units Subcutaneous every 24 hours (Patient not taking: Reported on 8/13/2018)     ondansetron (ZOFRAN) 4 MG tablet Take 1 tablet (4 mg) by mouth every 12 hours as needed for nausea (Patient not taking: Reported on 8/13/2018)     No current facility-administered medications for this visit.      There are no discontinued medications.    Physical Exam   Vital Signs: /86  Pulse 93  Resp 16  Ht 1.702 m (5' 7\")  Wt 80.8 kg (178 lb 3.2 oz)  BMI 27.91 kg/m2  GENERAL APPEARANCE: alert and no distress  HENT: mouth without ulcers or lesions  LYMPHATICS: no cervical or supraclavicular nodes  RESP: lungs clear to auscultation - no rales, rhonchi or wheezes  CV: regular rhythm, normal rate, no rub, no murmur  EDEMA: no LE edema bilaterally  ABDOMEN: soft, nondistended, nontender, bowel sounds normal  MS: extremities normal - no gross deformities noted, no evidence of inflammation in joints, no muscle tenderness  SKIN: no rash  TX KIDNEY: normal    Data   Renal -   Recent Labs   Lab Test  08/07/18   1351  06/27/18   1357  04/25/18   1355   NA  136  130*  135   POTASSIUM  5.0  5.2  5.1   CHLORIDE  104  101  105   CO2  20  16*  18*   BUN  69*  77*  74*   CR  5.54*  5.19*  5.12*   GLC  115*  376*  291*   JE  8.4*  9.1  8.6     Recent Labs   Lab Test  06/27/18   1357  08/03/17   1440  05/24/17   1414   02/01/16   1548  01/29/16   1535  01/26/16   0651   MAG   --    --    --    --   1.8  1.7  1.8   PHOS  3.8  4.3  3.8   < >  3.2  2.7   --     < > = values in this interval not displayed.     Recent Labs   Lab Test  03/06/18   1354  08/03/17   1440  02/01/17   1412   PTHI  1238*  790*  719*     Recent Labs   Lab Test  08/03/17   1440  11/18/16   " 1448  11/15/16   1402   VITDT  29  21  18*       CBC -   Recent Labs   Lab Test  08/07/18   1351  06/27/18   1357  04/25/18   1355   WBC  8.0  6.3  5.8   HGB  10.5*  11.9*  11.0*   PLT  268  231  247       LFTs -   Recent Labs   Lab Test  06/27/18   1357  08/03/17   1440  05/31/17   0716   01/20/16   1154  02/10/14   1559   ALKPHOS   --    --   150   --   101  176*   BILITOTAL   --    --   0.5   --   0.5  0.7   ALT   --    --   17   --   23  22   AST   --    --   8   --   Unsatisfactory specimen - hemolyzed  19   PROTTOTAL   --    --   7.3   --   8.0  7.7   ALBUMIN  4.2  4.2  4.1   < >  3.4  4.5    < > = values in this interval not displayed.       Pancreas -  Recent Labs   Lab Test  05/31/17   0716  01/25/16   0614  01/23/16   0539   A1C  10.5*  10.4*  10.4*     Recent Labs   Lab Test  08/07/18   1351   AMYLASE  71   LIPASE  233       Iron Panel -   Recent Labs   Lab Test  08/03/17   1440  11/18/16   1448  11/15/16   1402   IRON  79  82  94   IRONSAT  34  34  39   TIMOTHY  736*   --   655*       Transplant -   Recent Labs   Lab Test  09/28/16   0659  04/14/16   1451  03/18/16   1324   CSPEC  Plasma  EDTA PLASMA  Plasma, EDTA anticoagulant  CORRECTED ON 03/19 AT 0821: PREVIOUSLY REPORTED AS Whole blood, EDTA   anticoagulant     CMVQNT  CMV DNA Not Detected   The OFELIA AmpliPrep/OFELIA TaqMan CMV Test is an FDA-approved in vitro nucleic   acid amplification test for the quantitation of cytomegalovirus DNA in human   plasma (EDTA plasma) using the [employer]IA AmpliPrep Instrument for automated viral   nucleic acid extraction and the [employer]ELIA TaqMan Analyzer or [employer] TaqMan for   automated Real Time amplification and detection of the viral nucleic acid   target.   Titer results are reported in International Units/mL (IU/mL using 1st WHO   International standard for Human Cytomegalovirus for Nucleic Acid Amplification   based assays. The conversion factor between CMV DNA copis/mL (as defined by the   Roche OFELIA TaqMan CMV test) and  International Units is the CMV DNA   concentration in IU/mL x 1.1 copies/IU = CMV DNA in copies/mL.   This assay has received FDA approval for the testing of human plasma only. The   [facility] Laboratory at the [facility], [place], has validated the pe rformance characteristics of the Roche   CMV assay for plasma, bronchial alveolar lavage/wash and urine.    CMV DNA Not Detected   The OFELIA AmpliPrep/OFELIA TaqMan CMV Test is an FDA-approved in vitro nucleic   acid amplification test for the quantitation of cytomegalovirus DNA in human   plasma (EDTA plasma) using the [employer] AmpliPrep Instrument for automated viral   nucleic acid extraction and the OFELIA TaqMan Analyzer or [employer]IA TaqMan for   automated Real Time amplification and detection of the viral nucleic acid   target.   Titer results are reported in International Units/mL (IU/mL using 1st WHO   International standard for Human Cytomegalovirus for Nucleic Acid Amplification   based assays. The conversion factor between CMV DNA copis/mL (as defined by the   Roche OFELIA TaqMan CMV test) and International Units is the CMV DNA   concentration in IU/mL x 1.1 copies/IU = CMV DNA in copies/mL.   This assay has received FDA approval for the testing of human plasma only. The   [facility] Laboratory at the [facility], [place], has validated the pe rformance characteristics of the Roche   CMV assay for plasma, bronchial alveolar lavage/wash and urine.    CMV DNA Not Detected   The OFELIA AmpliPrep/OFELIA TaqMan CMV Test is an FDA-approved in vitro nucleic   acid amplification test for the quantitation of cytomegalovirus DNA in human   plasma (EDTA plasma) using the OFELIA AmpliPrep Instrument for automated viral   nucleic acid extraction and the OFELIA TaqMan Analyzer or [employer] TaqMan for   automated Real Time amplification and detection of the viral nucleic acid   target.    Titer results are reported in International Units/mL (IU/mL using 1st WHO   International standard for Human Cytomegalovirus for Nucleic Acid Amplification   based assays. The conversion factor between CMV DNA copis/mL (as defined by the   Roche FOELIA TaqMan CMV test) and International Units is the CMV DNA   concentration in IU/mL x 1.1 copies/IU = CMV DNA in copies/mL.   This assay has received FDA approval for the testing of human plasma only. The   In[facility] Disease Diagnostic Laboratory at the [facility] [place] [facility], [place], has validated the pe rformance characteristics of the Roche   CMV assay for plasma, bronchial alveolar lavage/wash and urine.     CMVLOG  Not Calculated  Not Calculated  Not Calculated     Recent Labs   Lab Test  03/18/16   1324  01/20/16   1851   EBRES  EBV DNA Not Detected  EBV DNA Not Detected   EBLOG  Not Calculated   The Real-Time quantitative EBV assay was developed and its performance   characteristics determined by the Infect[facility] Diagnostic Laboratory at   the [facility] [place] [facility] in [place], [place].  The   primers and probes are Analyte Specific Reagents (ASRs) manufactured  by   [employer].   ASRs are used in many laboratory tests necessary for standard medical care and   generally do not require U.S. Food and Drug Administration approval.  The FDA   has determined that such clearance or approval is not necessary.  This test is   used for clinical purposes.  It should not be regarded as investigational or   research.   This laboratory is certified under the Clinical Laboratory Improvement   Amendments of 1988 (CLIA-88) as qualified to perform high complexity clinical   laboratory testing.   The quantitative range of this assay is 500-22,500,00 copies/mL (2.7-7.4 log   copies/mL).  A negative result does not rule out the presence of PCR inhibitors    in the patient specimen or EBV DNA nucleic acid in concentrations below the   level  of detection of the assay.  Inhibition may also lead to underestimation   of viral quantitation.    Not Calculated   The Real-Time quantitative EBV assay was developed and its performance   characteristics determined by the Infect[facility] Diagnostic Laboratory at   the [facility] in [place], [place].  The   primers and probes are Analyte Specific Reagents (ASRs) manufactured  by   [employer].   ASRs are used in many laboratory tests necessary for standard medical care and   generally do not require U.S. Food and Drug Administration approval.  The FDA   has determined that such clearance or approval is not necessary.  This test is   used for clinical purposes.  It should not be regarded as investigational or   research.   This laboratory is certified under the Clinical Laboratory Improvement   Amendments of 1988 (CLIA-88) as qualified to perform high complexity clinical   laboratory testing.   The quantitative range of this assay is 500-22,500,00 copies/mL (2.7-7.4 log   copies/mL).  A negative result does not rule out the presence of PCR inhibitors    in the patient specimen or EBV DNA nucleic acid in concentrations below the   level of detection of the assay.  Inhibition may also lead to underestimation   of viral quantitation.       Recent Labs   Lab Test  05/31/17   0717  05/24/17   1415  11/15/16   1403   BKRES  BK Virus DNA Not Detected  BK Virus DNA Not Detected  BK Virus DNA Not Detected   BKLOG  Not Calculated   The Real-Time quantitative BK Virus assay was developed and its performance   characteristics determined by the [facility] Diagnostic Laboratory at   the [facility] in [place], [place]. The   primers and probes for each analyte are Analyte Specific Reagents (ASRs)   manufactured by [employer].   ASRs are used in many laboratory tests necessary for standard medical care and   generally do not require U.S. Food and Drug  Administration approval. The FDA   has determined that such clearance or approval is not necessary.   This test is used for clinical purposes. It should not be regarded as   investigational or for research. This laboratory is certified under the   Clinical Laboratory Improvement Amendments of 1988 (CLIA-88) as qualified to   perform high complexity clinical laboratory testing.    Not Calculated   The Real-Time quantitative BK Virus assay was developed and its performance   characteristics determined by the Infectious Diseases Diagnostic Laboratory at   the [facility] in [place], [place]. The   primers and probes for each analyte are Analyte Specific Reagents (ASRs)   manufactured by [employer].   ASRs are used in many laboratory tests necessary for standard medical care and   generally do not require U.S. Food and Drug Administration approval. The FDA   has determined that such clearance or approval is not necessary.   This test is used for clinical purposes. It should not be regarded as   investigational or for research. This laboratory is certified under the   Clinical Laboratory Improvement Amendments of 1988 (CLIA-88) as qualified to   perform high complexity clinical laboratory testing.    Not Calculated   The Real-Time quantitative BK Virus assay was developed and its performance   characteristics determined by the Infect[facility] Diseases Diagnostic Laboratory at   the [facility] in [place], [place]. The   primers and probes for each analyte are Analyte Specific Reagents (ASRs)   manufactured by [employer].   ASRs are used in many laboratory tests necessary for standard medical care and   generally do not require U.S. Food and Drug Administration approval. The FDA   has determined that such clearance or approval is not necessary.   This test is used for clinical purposes. It should not be regarded as   investigational or for research. This laboratory is  certified under the   Clinical Laboratory Improvement Amendments of 1988 (CLIA-88) as qualified to   perform high complexity clinical laboratory testing.         Recent Labs   Lab Test  10/18/16   1434  10/05/16   1406  09/28/16   0701   DOSTAC  400  10/05/2016 at 0400 AM  19:15on 09/27/16   TACROL  3.7*  4.2*  6.5     Recent Labs   Lab Test  08/03/17   1441  05/24/17   1414  05/04/17   1408   DOSMPA  08/03/2017 AT 0330 AM  Last dose 5/24/17 at 0430AM  LAST DOSE 5/4/2017 AT 2:00 AM   MPACID  3.63*  2.79  2.97   MPAG  >200.0*  >200.0*  >200.0*

## 2018-08-13 NOTE — PATIENT INSTRUCTIONS
.Preventive Care:    Diabetic Eye Exam Screening: During our visit today, we discussed that it is recommended you receive diabetic eye exam screening. Please call or make an appointment with your primary care provider to discuss this with them. You may also call the [facility] scheduling line ([phone]) to set up an eye exam at one of the [facility].

## 2018-08-13 NOTE — MR AVS SNAPSHOT
After Visit Summary   8/13/2018    Amadou Lewis    MRN: [id number]           Patient Information     Date Of Birth          1965        Visit Information        Provider Department      8/13/2018 1:55 PM Recipient, [facility] Nephrology        Today's Diagnoses     Aftercare following organ transplant    -  1    Anemia in stage 5 chronic kidney disease (H)        Renal osteodystrophy        Edema, unspecified type        Hypertension secondary to other renal disorders        Immunosuppressed status (H)        Kidney transplant rejection        Care after organ transplant        Kidney replaced by transplant        CKD (chronic kidney disease) stage 5, GFR less than 15 ml/min (H)        Acidosis        Secondary renal hyperparathyroidism (H)        Coronary artery disease involving native coronary artery of native heart without angina pectoris        Polycystic kidney        Vitamin D deficiency        Immunosuppression (H)          Care Instructions    .Preventive Care:    Diabetic Eye Exam Screening: During our visit today, we discussed that it is recommended you receive diabetic eye exam screening. Please call or make an appointment with your primary care provider to discuss this with them. You may also call the [facility] scheduling line ([phone]) to set up an eye exam at one of the [facility] Eye Clinics.                Follow-ups after your visit        Additional Services     ANEMIA REFERRAL (PharmD)       Orders:  Primary Diagnosis: Anemia in Chronic Kidney Disease CKD Stage 5 (D63.1, N18.5)  Secondary Diagnosis: Organ or tissue replaced by transplant,Kidney (Z94.0)  Drug: New Start - PharmD/RN to initiate medication  Dose: PharmD/RN to dose  Hemoglobin Goal Range: 9-10g/dL    By signing this referral form, the physician is giving the clinical pharmacist authority to perform the clinical duties outlined in the Collaborative Practice Agreement.      Referring  "Provider/Pager:  Magali / [phone]                  Follow-up notes from your care team     Return in about 3 months (around 11/13/2018) for Routine Visit.      Who to contact     If you have questions or need follow up information about today's clinic visit or your schedule please contact [facility] NEPHROLOGY directly at [phone].  Normal or non-critical lab and imaging results will be communicated to you by Activaerohart, letter or phone within 4 business days after the clinic has received the results. If you do not hear from us within 7 days, please contact the clinic through Activaerohart or phone. If you have a critical or abnormal lab result, we will notify you by phone as soon as possible.  Submit refill requests through Songkick or call your pharmacy and they will forward the refill request to us. Please allow 3 business days for your refill to be completed.          Additional Information About Your Visit        Activaerohart Information     Songkick gives you secure access to your electronic health record. If you see a primary care provider, you can also send messages to your care team and make appointments. If you have questions, please call your primary care clinic.  If you do not have a primary care provider, please call [phone] and they will assist you.        Care EveryWhere ID     This is your Care EveryWhere ID. This could be used by other organizations to access your [place] medical records  [id number]        Your Vitals Were     Pulse Respirations Height BMI (Body Mass Index)          93 16 1.702 m (5' 7\") 27.91 kg/m2         Blood Pressure from Last 3 Encounters:   08/13/18 163/86   05/01/18 (!) 181/93   01/23/18 (!) 189/95    Weight from Last 3 Encounters:   08/13/18 80.8 kg (178 lb 3.2 oz)   05/01/18 79.3 kg (174 lb 12.8 oz)   01/23/18 79.7 kg (175 lb 9.6 oz)              We Performed the Following     ANEMIA REFERRAL (PharmD)          Today's Medication Changes          These changes are accurate as of " 8/13/18 11:59 PM.  If you have any questions, ask your nurse or doctor.               Start taking these medicines.        Dose/Directions    furosemide 20 MG tablet   Commonly known as:  LASIX   Used for:  Edema, unspecified type   Started by:  Recipient, [facility]        Dose:  20 mg   Take 1 tablet (20 mg) by mouth daily as needed   Quantity:  90 tablet   Refills:  1            Where to get your medicines      These medications were sent to [id number] - [place], [place] - [address] AT [facility] [place] [facility] [address]  [address], [place] [address]    Hours:  24-hours Phone:  [phone]     calcitRIOL 0.25 MCG capsule    furosemide 20 MG tablet                Primary Care Provider Office Phone # Fax #    Masoud Valentin MD, [phone]       [address] [place] [address]        Equal Access to Services     [facility] AH: Hadii aad ku hadasho Soomaali, waaxda luqadaha, qaybta kaalmada adeegyada, waxay idiin hayaan adeeg evonnearacr jeffers . So [facility] [phone].    ATENCIÓN: Si habla español, tiene a tejeda disposición servicios gratuitos de asistencia lingüística. [facility] [phone].    We comply with applicable federal civil rights laws and [place] laws. We do not discriminate on the basis of race, color, national origin, age, disability, sex, sexual orientation, or gender identity.            Thank you!     Thank you for choosing [facility] NEPHROLOGY  for your care. Our goal is always to provide you with excellent care. Hearing back from our patients is one way we can continue to improve our services. Please take a few minutes to complete the written survey that you may receive in the mail after your visit with us. Thank you!             Your Updated Medication List - Protect others around you: Learn how to safely use, store and throw away your medicines at www.disposemymeds.org.          This list is accurate  as of 8/13/18 11:59 PM.  Always use your most recent med list.                   Brand Name Dispense Instructions for use Diagnosis    atorvastatin 10 MG tablet    LIPITOR    45 tablet    Take 0.5 tablets (5 mg) by mouth daily    Coronary artery disease involving native coronary artery of native heart without angina pectoris       blood glucose monitoring test strip    no brand specified    1 Box    Use to test blood sugar 4 times daily or as directed.    Diabetes mellitus with background retinopathy (H)       calcitRIOL 0.25 MCG capsule    ROCALTROL    90 capsule    Take 1 capsule (0.25 mcg) by mouth daily    Renal osteodystrophy       cholecalciferol 1000 units capsule    vitamin  -D    180 capsule    Take 2 capsules (2,000 Units) by mouth daily    CKD (chronic kidney disease) stage 5, GFR less than 15 ml/min (H)       cinacalcet 30 MG tablet    SENSIPAR    90 tablet    Take 1 tablet (30 mg) by mouth daily    Secondary renal hyperparathyroidism (H)       cloNIDine 0.1 MG tablet    CATAPRES    60 tablet    Take 1 tablet (0.1 mg) by mouth 2 times daily as needed For SBP > 170    Hypertension secondary to other renal disorders       cycloSPORINE modified 25 MG capsule    GENERIC EQUIVALENT    180 capsule    Take 3 capsules (75 mg) by mouth 2 times daily Total dose 75 mg twice a day    Kidney replaced by transplant       furosemide 20 MG tablet    LASIX    90 tablet    Take 1 tablet (20 mg) by mouth daily as needed    Edema, unspecified type       hydrALAZINE 25 MG tablet    APRESOLINE    270 tablet    Take 1 tablet (25 mg) by mouth 3 times daily    Hypertension secondary to other renal disorders       HYDROcodone-acetaminophen 5-325 MG per tablet    NORCO     Take 2 tablets by mouth every 6 hours as needed for moderate to severe pain        insulin aspart 100 UNIT/ML injection    NovoLOG PEN     Inject 1-16 Units Subcutaneous At Bedtime Correction Scale - VERY HIGH INSULIN RESISTANCE DOSING    Do Not give Bedtime  Correction Insulin if BG < 200.  For  - 209 give 1 units.  For  - 219 give 2 units.  For  - 229 give 3 units.  For  - 239 give 4 units.  For  - 249 give 5 units.  For  - 259 give 6 units.  For  - 269 give 7 units.  For  - 279 give 8 units.  For  - 289 give 9 units.    Type 2 diabetes mellitus with diabetic chronic kidney disease (H)       * insulin glargine 100 UNIT/ML injection    LANTUS     Inject 53 Units Subcutaneous every 24 hours    Type 2 diabetes mellitus with diabetic chronic kidney disease (H)       * BASAGLAR 100 UNIT/ML injection      Inject 38 Units Subcutaneous        mycophenolic acid 180 MG EC tablet     180 tablet    Take 3 tablets (540 mg) by mouth 2 times daily    Kidney replaced by transplant, Pancreas replaced by transplant (H)       NIFEdipine ER osmotic 30 MG 24 hr tablet    PROCARDIA XL    540 tablet    Take 3 tablets (90 mg) by mouth 2 times daily    Hypertension secondary to other renal disorders       ondansetron 4 MG tablet    ZOFRAN    30 tablet    Take 1 tablet (4 mg) by mouth every 12 hours as needed for nausea    Nausea       sodium bicarbonate 650 MG tablet     270 tablet    Take 3 tablets (1,950 mg) by mouth 3 times daily    Acidosis       * Notice:  This list has 2 medication(s) that are the same as other medications prescribed for you. Read the directions carefully, and ask your doctor or other care provider to review them with you.

## 2018-08-20 ENCOUNTER — TELEPHONE (OUTPATIENT)
Dept: PHARMACY | Facility: CLINIC | Age: 53
End: 2018-08-20

## 2018-08-20 DIAGNOSIS — D63.1 ANEMIA IN STAGE 5 CHRONIC KIDNEY DISEASE, NOT ON CHRONIC DIALYSIS (H): Primary | ICD-10-CM

## 2018-08-20 DIAGNOSIS — N18.5 ANEMIA IN STAGE 5 CHRONIC KIDNEY DISEASE, NOT ON CHRONIC DIALYSIS (H): Primary | ICD-10-CM

## 2018-08-20 DIAGNOSIS — Z94.0 KIDNEY REPLACED BY TRANSPLANT: ICD-10-CM

## 2018-08-20 NOTE — LETTER
August 20, 2018      Amadou Lewis  [address] [place] [address]        Dear Amadou,       Welcome to the Anemia Clinic!  You have been referred to our clinic by Jose L De Los Santos MD for the monitoring of your anemia therapy.  The Anemia Clinic is a referral clinic staffed by [facility] pharmacists.    Our goals in monitoring your anemia therapy include:       Optimizing your anemia therapy.    Providing you with appropriate education and resources concerning your anemia therapy.       Monitoring your lab values (Hemoglobin and iron) and adjusting your anemia therapy as needed.  Your Hemoglobin Goal = 9-10 g/dl      If you use an outside lab to obtain blood draws, it will be your responsibility to report all lab results to the Anemia Clinic.  Follow-up attempts will be made for one month, at that time if we have not heard back from you we will inactive your anemia prescriptions and refer your management back to the referring provider.    Please call the Anemia Clinic at [phone] if you have any questions.  Sincerely,    Franci Benson, PharmD, BCACP  Arcelia Garner,[facility]  [phone]  August 20, 2018      Sincerely,        Franci Benson [facility]

## 2018-08-20 NOTE — TELEPHONE ENCOUNTER
Anemia Management Note - Enrollment  SUBJECTIVE/OBJECTIVE:    Referred by Dr. Jose L De Los Santos on 2018  Primary Diagnosis: Anemia in Chronic Kidney Disease (N18.5, D63.1)     Secondary Diagnosis:  Organ or tissue replaced by transplant, kidney (Z94.0)  Hgb goal range:  9-10  Epo/Darbo: None  Iron regimen:  None  Labs : 2019  Recent RERE use, transfusion, IV iron: None  RX/TX plans : 2019  No history of stroke, MI and blood clots or cancers  Contact:  Ok to leave message regarding Medical and scheduling per consent to communicate dated 2013    OK to speak with Shwetha Lewis (daughter) regarding medical and scheduling per consent to communicate dated 2013  Anemia Latest Ref Rng & Units 2017 2017 2018 3/6/2018 2018 2018 2018   Hemoglobin 13.3 - 17.7 g/dL 10.2(L) 10.1(L) 10.9(L) 10.5(L) 11.0(L) 11.9(L) 10.5(L)   TSAT 15 - 46 % - - - - - - -   Ferritin 26 - 388 ng/mL - - - - - - -       BP Readings from Last 3 Encounters:   18 163/86   18 (!) 181/93   18 (!) 189/95     Wt Readings from Last 2 Encounters:   18 178 lb 3.2 oz (80.8 kg)   18 174 lb 12.8 oz (79.3 kg)     Current Outpatient Prescriptions   Medication Sig Dispense Refill     atorvastatin (LIPITOR) 10 MG tablet Take 0.5 tablets (5 mg) by mouth daily 45 tablet 3     BASAGLAR 100 UNIT/ML injection Inject 38 Units Subcutaneous       blood glucose monitoring (NO BRAND SPECIFIED) test strip Use to test blood sugar 4 times daily or as directed. 1 Box prn     calcitRIOL (ROCALTROL) 0.25 MCG capsule Take 1 capsule (0.25 mcg) by mouth daily 90 capsule 3     cholecalciferol (VITAMIN  -D) 1000 UNITS capsule Take 2 capsules (2,000 Units) by mouth daily 180 capsule 3     cinacalcet (SENSIPAR) 30 MG tablet Take 1 tablet (30 mg) by mouth daily 90 tablet 3     cloNIDine (CATAPRES) 0.1 MG tablet Take 1 tablet (0.1 mg) by mouth 2 times daily as needed For SBP > 170 (Patient not taking:  Reported on 8/13/2018) 60 tablet 1     cycloSPORINE modified (GENERIC EQUIVALENT) 25 MG capsule Take 3 capsules (75 mg) by mouth 2 times daily Total dose 75 mg twice a day 180 capsule 11     furosemide (LASIX) 20 MG tablet Take 1 tablet (20 mg) by mouth daily as needed 90 tablet 1     hydrALAZINE (APRESOLINE) 25 MG tablet Take 1 tablet (25 mg) by mouth 3 times daily 270 tablet 1     HYDROcodone-acetaminophen (NORCO) 5-325 MG per tablet Take 2 tablets by mouth every 6 hours as needed for moderate to severe pain       insulin aspart (NOVOLOG PEN) 100 UNIT/ML soln Inject 1-16 Units Subcutaneous At Bedtime Correction Scale - VERY HIGH INSULIN RESISTANCE DOSING     Do Not give Bedtime Correction Insulin if BG < 200.   For  - 209 give 1 units.   For  - 219 give 2 units.   For  - 229 give 3 units.   For  - 239 give 4 units.   For  - 249 give 5 units.   For  - 259 give 6 units.   For  - 269 give 7 units.   For  - 279 give 8 units.   For  - 289 give 9 units.       insulin glargine (LANTUS) 100 UNIT/ML PEN Inject 53 Units Subcutaneous every 24 hours (Patient not taking: Reported on 8/13/2018)       MYFORTIC (BRAND) 180 MG EC TABLET Take 3 tablets (540 mg) by mouth 2 times daily 180 tablet 5     NIFEdipine ER osmotic (PROCARDIA XL) 30 MG 24 hr tablet Take 3 tablets (90 mg) by mouth 2 times daily 540 tablet 3     ondansetron (ZOFRAN) 4 MG tablet Take 1 tablet (4 mg) by mouth every 12 hours as needed for nausea (Patient not taking: Reported on 8/13/2018) 30 tablet 3     sodium bicarbonate 650 MG tablet Take 3 tablets (1,950 mg) by mouth 3 times daily 270 tablet 11     ASSESSMENT:  Hgb Not at goal/Initiation of therapy   Ferritin: Due for labs  TSat: Due for labs  Iron regimen recommended: TBD  Recommended RERE regimen to initiate when Hgb < 9  Blood Pressure: OK for RERE    PLAN:  1. Patient called today for enrollment in Anemia Management Service.  2. Discussed:  anemia  overview, monitoring service and goal hemoglobin range and rationale and risks of RERE blood clots, stroke and increase in blood pressure  3. Dose location: in clinic   4. Labs: HP or [facility]?  5. Pharmacy: TBD  6. Sent new patient letter with anemia guide to patient.     Left VM for Sean.      Next call date:  08/24/2018    Anemia Management Service  Franci Benson,CheriseD, BCACP and Arcelia Garner,[facility]  Phone: [phone]  Fax: [phone]

## 2018-08-24 ENCOUNTER — TELEPHONE (OUTPATIENT)
Dept: PHARMACY | Facility: CLINIC | Age: 53
End: 2018-08-24

## 2018-08-24 NOTE — TELEPHONE ENCOUNTER
Follow-up with anemia management service:    Left VM asking to get Ferritin and iron binding labs    Anemia Latest Ref Rng & Units 2017 2017 2018 3/6/2018 2018 2018 2018   Hemoglobin 13.3 - 17.7 g/dL 10.2(L) 10.1(L) 10.9(L) 10.5(L) 11.0(L) 11.9(L) 10.5(L)   TSAT 15 - 46 % - - - - - - -   Ferritin 26 - 388 ng/mL - - - - - - -       Orders needed to be renewed (for next follow-up date) in EPIC: None   Med order expires: N/A   Lab orders : 2019    Follow-up call date: 2018    [facility]    Anemia Management Service  Franci Benson,PharmD and Arcelia Garner CPhT  Phone: [phone]  Fax: [phone]

## 2018-08-28 DIAGNOSIS — Z94.0 KIDNEY REPLACED BY TRANSPLANT: ICD-10-CM

## 2018-08-28 DIAGNOSIS — Z94.83 PANCREAS REPLACED BY TRANSPLANT (H): ICD-10-CM

## 2018-08-28 RX ORDER — MYCOPHENOLIC ACID 180 MG/1
540 TABLET, DELAYED RELEASE ORAL 2 TIMES DAILY
Qty: 180 TABLET | Refills: 11 | Status: ON HOLD | OUTPATIENT
Start: 2018-08-28 | End: 2018-11-13

## 2018-08-28 NOTE — TELEPHONE ENCOUNTER
Myfortic 180mg  Last FIlled Date 7/26  Qty dispensed 180    Thank you very kindly!  Charlotte Valenzuela [facility]

## 2018-08-29 DIAGNOSIS — Z94.0 KIDNEY REPLACED BY TRANSPLANT: ICD-10-CM

## 2018-08-29 DIAGNOSIS — Z94.83 PANCREAS REPLACED BY TRANSPLANT (H): ICD-10-CM

## 2018-09-06 ENCOUNTER — TELEPHONE (OUTPATIENT)
Dept: PHARMACY | Facility: CLINIC | Age: 53
End: 2018-09-06

## 2018-09-06 NOTE — TELEPHONE ENCOUNTER
Follow-up with anemia management service:    LM for patient reminding him that he is due for Hgb, ferritin and iron labs    Anemia Latest Ref Rng & Units 2017 2017 2018 3/6/2018 2018 2018 2018   Hemoglobin 13.3 - 17.7 g/dL 10.2(L) 10.1(L) 10.9(L) 10.5(L) 11.0(L) 11.9(L) 10.5(L)   TSAT 15 - 46 % - - - - - - -   Ferritin 26 - 388 ng/mL - - - - - - -       Orders needed to be renewed (for next follow-up date) in EPIC: None                          Med order expires: N/A                          Lab orders : 2019    Follow-up call date: 18    Reshma Garner CP  Anemia Clinic  [phone]  Reviewed 2018 [facility]  Anemia Management Service  Franci Benson,PharmD and Arcelia Garner CPhT  Phone: [phone]  Fax: [phone]

## 2018-09-11 DIAGNOSIS — D63.1 ANEMIA IN STAGE 5 CHRONIC KIDNEY DISEASE, NOT ON CHRONIC DIALYSIS (H): ICD-10-CM

## 2018-09-11 DIAGNOSIS — Z94.0 KIDNEY REPLACED BY TRANSPLANT: ICD-10-CM

## 2018-09-11 DIAGNOSIS — Z94.83 PANCREAS REPLACED BY TRANSPLANT (H): ICD-10-CM

## 2018-09-11 DIAGNOSIS — N18.5 ANEMIA IN STAGE 5 CHRONIC KIDNEY DISEASE, NOT ON CHRONIC DIALYSIS (H): ICD-10-CM

## 2018-09-11 LAB
ERYTHROCYTE [DISTWIDTH] IN BLOOD BY AUTOMATED COUNT: 13.8 % (ref 10–15)
FERRITIN SERPL-MCNC: 761 NG/ML (ref 26–388)
HCT VFR BLD AUTO: 31.8 % (ref 40–53)
HGB BLD-MCNC: 10.4 G/DL (ref 13.3–17.7)
IRON SATN MFR SERPL: 37 % (ref 15–46)
IRON SERPL-MCNC: 85 UG/DL (ref 35–180)
LIPASE SERPL-CCNC: 224 U/L (ref 73–393)
MCH RBC QN AUTO: 28.3 PG (ref 26.5–33)
MCHC RBC AUTO-ENTMCNC: 32.7 G/DL (ref 31.5–36.5)
MCV RBC AUTO: 87 FL (ref 78–100)
PLATELET # BLD AUTO: 298 10E9/L (ref 150–450)
RBC # BLD AUTO: 3.67 10E12/L (ref 4.4–5.9)
TIBC SERPL-MCNC: 232 UG/DL (ref 240–430)
WBC # BLD AUTO: 6.5 10E9/L (ref 4–11)

## 2018-09-11 PROCEDURE — 85027 COMPLETE CBC AUTOMATED: CPT | Performed by: INTERNAL MEDICINE

## 2018-09-11 PROCEDURE — 80158 DRUG ASSAY CYCLOSPORINE: CPT | Performed by: INTERNAL MEDICINE

## 2018-09-11 PROCEDURE — 36415 COLL VENOUS BLD VENIPUNCTURE: CPT | Performed by: INTERNAL MEDICINE

## 2018-09-11 PROCEDURE — 83550 IRON BINDING TEST: CPT | Performed by: INTERNAL MEDICINE

## 2018-09-11 PROCEDURE — 82728 ASSAY OF FERRITIN: CPT | Performed by: INTERNAL MEDICINE

## 2018-09-11 PROCEDURE — 82150 ASSAY OF AMYLASE: CPT | Performed by: INTERNAL MEDICINE

## 2018-09-11 PROCEDURE — 83540 ASSAY OF IRON: CPT | Performed by: INTERNAL MEDICINE

## 2018-09-11 PROCEDURE — 80048 BASIC METABOLIC PNL TOTAL CA: CPT | Performed by: INTERNAL MEDICINE

## 2018-09-11 PROCEDURE — 83690 ASSAY OF LIPASE: CPT | Performed by: INTERNAL MEDICINE

## 2018-09-12 ENCOUNTER — DOCUMENTATION ONLY (OUTPATIENT)
Dept: TRANSPLANT | Facility: CLINIC | Age: 53
End: 2018-09-12

## 2018-09-12 ENCOUNTER — TELEPHONE (OUTPATIENT)
Dept: TRANSPLANT | Facility: CLINIC | Age: 53
End: 2018-09-12

## 2018-09-12 LAB
AMYLASE SERPL-CCNC: 72 U/L (ref 30–110)
ANION GAP SERPL CALCULATED.3IONS-SCNC: 7 MMOL/L (ref 3–14)
BUN SERPL-MCNC: 75 MG/DL (ref 7–30)
CALCIUM SERPL-MCNC: 8.6 MG/DL (ref 8.5–10.1)
CHLORIDE SERPL-SCNC: 107 MMOL/L (ref 94–109)
CO2 SERPL-SCNC: 21 MMOL/L (ref 20–32)
CREAT SERPL-MCNC: 5.25 MG/DL (ref 0.66–1.25)
CYCLOSPORINE BLD LC/MS/MS-MCNC: 51 UG/L (ref 50–400)
GFR SERPL CREATININE-BSD FRML MDRD: 11 ML/MIN/1.7M2
GLUCOSE SERPL-MCNC: 170 MG/DL (ref 70–99)
POTASSIUM SERPL-SCNC: 4.8 MMOL/L (ref 3.4–5.3)
SODIUM SERPL-SCNC: 135 MMOL/L (ref 133–144)
TME LAST DOSE: NORMAL H

## 2018-09-12 NOTE — PROGRESS NOTES
Creatinine 5.25 noted.  Consistent with pt's baseline. Pt. not currently on dialysis, sent message to Nephrology RN who will reach out to pt.     Hanh Multani, RN  P Nephrology Nurses-                     Hey just KI I got a critical page on his creatine 5.25.  Looks pretty close to where he's been but just wanted to let you guys know to keep him on the radar!     Angella Multani RN, BSN   Post Kidney/Pancreas Transplant Coordinator   [phone]

## 2018-09-12 NOTE — TELEPHONE ENCOUNTER
DATE:  9/12/2018   TIME OF RECEIPT FROM LAB:  12:16  LAB TEST:  Creatinine  LAB VALUE:  5.25  RESULTS GIVEN WITH READ-BACK TO (PROVIDER):  Hanh Multani    TIME LAB VALUE REPORTED TO PROVIDER:   12:20

## 2018-09-13 NOTE — TELEPHONE ENCOUNTER
Per vinht message, pt. with no fever, dysuria, or cough.  BP is 160s/80.  Discussed pt. with Dr. De Los Santos and s/s are consistent with uremia and pt. will likely need to start dialysis soon.  Message sent to Jen Alan, nephrology RN to coordinate.

## 2018-09-13 NOTE — TELEPHONE ENCOUNTER
Received message from Jen Alan nephrology RN that pt. was having several days of n/v, headache, and body aches.    JANICE Li had followed up with pt. regarding creatinine of 5.25 which is consistent with pt's baseline.    Left v/m for pt. To return call to further discuss how he is feeling, if he is able to keep medications down, and assess for s/s of uremia.

## 2018-09-14 DIAGNOSIS — N18.5 CKD (CHRONIC KIDNEY DISEASE) STAGE 5, GFR LESS THAN 15 ML/MIN (H): ICD-10-CM

## 2018-09-14 PROCEDURE — 36415 COLL VENOUS BLD VENIPUNCTURE: CPT | Performed by: INTERNAL MEDICINE

## 2018-09-14 PROCEDURE — 86706 HEP B SURFACE ANTIBODY: CPT | Performed by: INTERNAL MEDICINE

## 2018-09-14 PROCEDURE — 87340 HEPATITIS B SURFACE AG IA: CPT | Performed by: INTERNAL MEDICINE

## 2018-09-14 PROCEDURE — 86480 TB TEST CELL IMMUN MEASURE: CPT | Performed by: INTERNAL MEDICINE

## 2018-09-14 PROCEDURE — 86704 HEP B CORE ANTIBODY TOTAL: CPT | Performed by: INTERNAL MEDICINE

## 2018-09-17 ENCOUNTER — TELEPHONE (OUTPATIENT)
Dept: PHARMACY | Facility: CLINIC | Age: 53
End: 2018-09-17

## 2018-09-17 LAB
HBV CORE AB SERPL QL IA: NONREACTIVE
HBV SURFACE AB SERPL IA-ACNC: 14.59 M[IU]/ML
HBV SURFACE AG SERPL QL IA: NONREACTIVE

## 2018-09-17 NOTE — TELEPHONE ENCOUNTER
Anemia Management Note  SUBJECTIVE/OBJECTIVE:    Referred by Dr. Jose L De Los Santos on 2018  Primary Diagnosis: Anemia in Chronic Kidney Disease (N18.5, D63.1)     Secondary Diagnosis:  Organ or tissue replaced by transplant, kidney (Z94.0)  Hgb goal range:  9-10  Epo/Darbo: None  Iron regimen:  None  Labs : 2019  Recent RERE use, transfusion, IV iron: None  RX/TX plans : 2019  No history of stroke, MI and blood clots or cancers  Contact:  Ok to leave message regarding Medical and scheduling per consent to communicate dated 2013    OK to speak with Shwetha Lewis (daughter) regarding medical and scheduling per consent to communicate dated 2013    Anemia Latest Ref Rng & Units 2017 2018 3/6/2018 2018 2018 2018 2018   Hemoglobin 13.3 - 17.7 g/dL 10.1(L) 10.9(L) 10.5(L) 11.0(L) 11.9(L) 10.5(L) 10.4(L)   TSAT 15 - 46 % - - - - - - 37   Ferritin 26 - 388 ng/mL - - - - - - 761(H)     BP Readings from Last 3 Encounters:   18 163/86   18 (!) 181/93   18 (!) 189/95     Wt Readings from Last 2 Encounters:   18 178 lb 3.2 oz (80.8 kg)   18 174 lb 12.8 oz (79.3 kg)     Left detailed VM that iron labs are above level for intervention, and Hgb stable. Does not meet criteria for anemia clinic intervention.  Requested he call back if OK with discharge, happy to re-enroll if services needed in future. ERIC    ASSESSMENT:  Hgb: Above goal -   TSat: at goal >30% Ferritin: At goal (>100ng/mL)    PLAN:  RTC for Hgb, ferritin and iron labs in 4 week(s)  Referred to Franci to determine if patient meets the protocol for Anemia Services    Orders needed to be renewed (for next follow-up date) in EPIC: None    Iron labs due:  10/9/18    Plan discussed with:  Left VM  Plan provided by:  Reshma Garner [facility]  Anemia Clinic  [phone]    NEXT FOLLOW-UP DATE:  2018  Reviewed 2018 [facility]  Anemia Management Service  Franci Benson,CheriseD and Arcelia  Jose De Jesus Garner  Phone: [phone]  Fax: [phone]

## 2018-09-18 LAB
GAMMA INTERFERON BACKGROUND BLD IA-ACNC: 0.02 IU/ML
M TB IFN-G BLD-IMP: NEGATIVE
M TB IFN-G CD4+ BCKGRND COR BLD-ACNC: 3.2 IU/ML
MITOGEN IGNF BCKGRD COR BLD-ACNC: 0 IU/ML
MITOGEN IGNF BCKGRD COR BLD-ACNC: 0.01 IU/ML

## 2018-09-20 ENCOUNTER — TELEPHONE (OUTPATIENT)
Dept: PHARMACY | Facility: CLINIC | Age: 53
End: 2018-09-20

## 2018-09-20 ENCOUNTER — CARE COORDINATION (OUTPATIENT)
Dept: NEPHROLOGY | Facility: CLINIC | Age: 53
End: 2018-09-20

## 2018-09-21 NOTE — TELEPHONE ENCOUNTER
Follow-up with anemia management service:    Discharged due to message abhi Terrell that starting dialysis 2018 - all orders cancelled    Anemia Latest Ref Rng & Units 2017 2018 3/6/2018 2018 2018 2018 2018   Hemoglobin 13.3 - 17.7 g/dL 10.1(L) 10.9(L) 10.5(L) 11.0(L) 11.9(L) 10.5(L) 10.4(L)   TSAT 15 - 46 % - - - - - - 37   Ferritin 26 - 388 ng/mL - - - - - - 761(H)       Orders needed to be renewed (for next follow-up date) in EPIC: None   Med order expires: N/A   Lab orders : N/A    Follow-up call date: N/A    [facility]    Anemia Management Service  Franci Benson PharmD and Arcelia Garner CPhT  Phone: [phone]  Fax: [phone]

## 2018-10-01 ENCOUNTER — TELEPHONE (OUTPATIENT)
Dept: TRANSPLANT | Facility: CLINIC | Age: 53
End: 2018-10-01

## 2018-10-01 DIAGNOSIS — N18.5 CHRONIC KIDNEY DISEASE (CKD), STAGE V (H): ICD-10-CM

## 2018-10-01 DIAGNOSIS — Z76.82 ORGAN TRANSPLANT CANDIDATE: ICD-10-CM

## 2018-10-01 DIAGNOSIS — Z94.0 KIDNEY REPLACED BY TRANSPLANT: ICD-10-CM

## 2018-10-01 DIAGNOSIS — E11.9 DIABETES MELLITUS, TYPE 2 (H): ICD-10-CM

## 2018-10-01 DIAGNOSIS — Q61.2 POLYCYSTIC KIDNEY DISEASE, AUTOSOMAL DOMINANT: Primary | ICD-10-CM

## 2018-10-01 NOTE — Clinical Note
Jarvis Crenshaw, Please call patient schedule kidney/pancreas waitlist update appointments.  The surgeon appointment needs to be scheduled with either Dr. Pal or Dr. Luevano. Thanks, Reanna

## 2018-10-01 NOTE — TELEPHONE ENCOUNTER
Left voice message for patient that Dr. Dick confirmed patient completed dental update and patient is due for update appointments with 1) pancreas transplant surgeon, 2) social work, & 3) [facility] cardiology.  Noted I will send a request to our  to contact patient to schedule these update appointments.  Once completed, will review with the multi-disciplinary transplant team for approval to change patient's status to active on the kidney and kidney/pancreas lists.

## 2018-10-08 ENCOUNTER — TELEPHONE (OUTPATIENT)
Dept: TRANSPLANT | Facility: CLINIC | Age: 53
End: 2018-10-08

## 2018-10-08 ENCOUNTER — OFFICE VISIT (OUTPATIENT)
Dept: TRANSPLANT | Facility: CLINIC | Age: 53
End: 2018-10-08
Attending: SURGERY
Payer: COMMERCIAL

## 2018-10-08 VITALS
BODY MASS INDEX: 27.15 KG/M2 | HEIGHT: 67 IN | WEIGHT: 173 LBS | OXYGEN SATURATION: 98 % | DIASTOLIC BLOOD PRESSURE: 75 MMHG | SYSTOLIC BLOOD PRESSURE: 144 MMHG | HEART RATE: 105 BPM

## 2018-10-08 DIAGNOSIS — Z94.0 KIDNEY REPLACED BY TRANSPLANT: ICD-10-CM

## 2018-10-08 DIAGNOSIS — Q61.2 POLYCYSTIC KIDNEY DISEASE, AUTOSOMAL DOMINANT: ICD-10-CM

## 2018-10-08 DIAGNOSIS — Z76.82 ORGAN TRANSPLANT CANDIDATE: ICD-10-CM

## 2018-10-08 DIAGNOSIS — Z01.818 PRE-TRANSPLANT EVALUATION FOR KIDNEY AND PANCREAS TRANSPLANT: ICD-10-CM

## 2018-10-08 DIAGNOSIS — N18.5 CHRONIC KIDNEY DISEASE (CKD), STAGE V (H): ICD-10-CM

## 2018-10-08 PROCEDURE — G0463 HOSPITAL OUTPT CLINIC VISIT: HCPCS | Mod: ZF

## 2018-10-08 ASSESSMENT — PAIN SCALES - GENERAL: PAINLEVEL: NO PAIN (1)

## 2018-10-08 NOTE — NURSING NOTE
"Chief Complaint   Patient presents with     Transplant Waitlist Maintenance     Waitlist update     /75  Pulse 105  Ht 1.702 m (5' 7\")  Wt 78.5 kg (173 lb)  SpO2 98%  BMI 27.1 kg/m2  GEORGES JAIME CMA    "

## 2018-10-08 NOTE — LETTER
"10/8/2018      RE: Amadou GODFREY Lewis  [address]  [place] [address]         Transplant Surgery      Reason For Visit: Annual wait list appt for simultaneous kidney and pancreas transplant     History of Present Illness:  On simultaneous kidney and pancreas transplant waiting list with 15 months of waiting time.  Inactive due to incomplete finances and dental. Those seem to have been resolved.  Resumed HD this month.  No living donors.  PRA 78%.  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  On CsA and Myfortic.  No blood thinners.  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  History of cardiac stents.  No blood thinners.       ROS: 10 point ROS neg other than the symptoms noted above in the HPI    US Iliacs last year.  Impression:      1. Arterial evaluation: Widely patent with mild scattered  atherosclerosis     2. Venous evaluation: Widely patent    CT abd 2016 shows adequate vascular targets.        Exam:   /75  Pulse 105  Ht 1.702 m (5' 7\")  Wt 78.5 kg (173 lb)  SpO2 98%  BMI 27.1 kg/m2   Well appearing, no apparent distress       Impression:  If cardiac eval, dental eval, and finances in order I do not see barrier to making active on the simultaneous kidney and pancreas transplant waiting list.  Does have adequate vascular targets.    Plan: Complete any outstanding ethan screening and make active on simultaneous kidney and pancreas transplant waiting list.    Tobin Pal MD, PhD  Transplant Surgery    TT: 20 min counseling time > 50%        Tobin Pal MD      "

## 2018-10-08 NOTE — MR AVS SNAPSHOT
After Visit Summary   10/8/2018    Amadou Lewis    MRN: [id number]           Patient Information     Date Of Birth          1965        Visit Information        Provider Department      10/8/2018 3:15 PM Tobin Pal MD [facility] Solid Organ Transplant        Today's Diagnoses     Polycystic kidney disease, autosomal dominant        Kidney replaced by transplant        Chronic kidney disease (CKD), stage V (H)        Pre-transplant evaluation for kidney and pancreas transplant        Organ transplant candidate           Follow-ups after your visit        Your next 10 appointments already scheduled     Nov 01, 2018  8:00 AM CDT   (Arrive by 7:45 AM)   Return Visit with CINTHYA Ambrose [facility] ([facility])    [address] [place] [address]  [address] [place] [id number] [phone]            Nov 01, 2018  9:00 AM CDT   NM INJECTION with UUNMINJ1   [facility], Nuclear Medicine ([facility])    [address] [facility] [id number] [phone]            Nov 01, 2018  9:45 AM CDT   NM SCAN3 with UUNM1   [facility], Nuclear Medicine ([facility])    [address] [facility] [address] [phone]            Nov 01, 2018 10:15 AM CDT   Ekg Stress Nm Lexiscan with UUEKGNMS   [facility] ([facility])    [address] [facility] [address]               Nov 01, 2018 10:45 AM CDT   NM MPI WITH LEXISCAN with UUNM1   [facility], Nuclear Medicine ([facility])    [address] [facility] [address] [phone]           How do I prepare for my exam? (Food and drink instructions) Day 1 & Day 2: Stop all caffeine 12 hours before the test. This includes coffee, tea, soda pop, chocolate and certain medicines  (such as Anacin, Excedrin and NoDoz). Also avoid decaf coffee and tea, as these contain small amounts of caffeine. Stop eating 4 hours before the test. You may drink water.  How do I prepare for my exam? (Other instructions) You may need to stop some medicines before the test. Follow your doctor s orders. Day 1 & Day 2: *If you take a beta blocker: Do not take your beta-blocker on the day before your test, unless specifically told to by your doctor. And do not take it on the day of your test. Bring it with you to take after the test.  *If you take Aggrenox or dipyridamole (Persantine, Permole), stop taking it 48 hours before your test. *If you take Viagra, Cialis or Levitra, stop taking it 48 hours before your test. *If you take theophylline or aminophylline, stop taking it 12 hours before your test.  For patients with diabetes: *If you take insulin, call your diabetes care team. Ask if you should take a 1/2 dose the morning of your test. *If you take diabetes medicine by mouth, don t take it on the morning of your test. Bring it with you to take after the test. (If you have questions, call your diabetes care team.)  *Do not take nitrates on the day of your test. Do not wear your Nitro-Patch. *No alcohol, smoking or other tobacco for 12 hours before the test.  What should I wear: Please wear a loose two-piece outfit. If you will have an exercise test, bring rubber-soled walking shoes.  How long does the exam take: *This test can take 1-2 days.* ONE day exam: Allow 3-4 hours for test. IF TWO day exam: Allow 30-60 minutes PER day for test.  What should I bring: Please bring a list of your medicines (including vitamins, minerals and over-the-counter drugs). Leave your valuables at home.  Do I need a :  No  is needed.  What do I need to tell my doctor? When you arrive, please tell us if you: * Have diabetes * Are breastfeeding * May be pregnant * Have a pacemaker of ICD (implantable defibrillator).  What  should I do after the exam: No restrictions, You may resume normal activities.  What is this test: Your doctor has ordered a nuclear stress test to check how well blood is flowing through your heart. You will either exercise or take a medicine that mimics exercise; we will watch your heart.  Who should I call with questions: If you have any questions, please call the Imaging Department where you will have your exam. Directions, parking instructions, and other information is available on our website, [email or web address].            Nov 01, 2018  1:00 PM CDT   (Arrive by 12:45 PM)   SOT EVALUATION RETURN VISIT with WILLARD Dial   [facility] Solid Organ Transplant ([facility])    [address]  [address]  [facility] [id number] [phone]            Nov 01, 2018  2:00 PM CDT   Lab with  LAB   [facility] Lab ([facility])    [address]  1st Floor  [facility] [id number] [phone]            Nov 13, 2018  2:35 PM CST   (Arrive by 2:05 PM)   Return Kidney Transplant with  Kidney/Pancreas Recipient 1   [facility] Nephrology ([facility])    [address]  [address]  [facility] [id number] [phone]              Who to contact     If you have questions or need follow up information about today's clinic visit or your schedule please contact [facility] SOLID ORGAN TRANSPLANT directly at [phone].  Normal or non-critical lab and imaging results will be communicated to you by MyChart, letter or phone within 4 business days after the clinic has received the results. If you do not hear from us within 7 days, please contact the clinic through MyChart or phone. If you have a critical or abnormal lab result, we will notify you by phone as soon as possible.  Submit refill requests through [employer] or call your pharmacy and they will forward the refill request to us. Please allow 3 business days for  "your refill to be completed.          Additional Information About Your Visit        MyChart Information     [employer] gives you secure access to your electronic health record. If you see a primary care provider, you can also send messages to your care team and make appointments. If you have questions, please call your primary care clinic.  If you do not have a primary care provider, please call [phone] and they will assist you.        Care EveryWhere ID     This is your Care EveryWhere ID. This could be used by other organizations to access your [facility] medical records  [id number]        Your Vitals Were     Pulse Height Pulse Oximetry BMI (Body Mass Index)          105 1.702 m (5' 7\") 98% 27.1 kg/m2         Blood Pressure from Last 3 Encounters:   10/08/18 144/75   08/13/18 163/86   05/01/18 (!) 181/93    Weight from Last 3 Encounters:   10/08/18 78.5 kg (173 lb)   08/13/18 80.8 kg (178 lb 3.2 oz)   05/01/18 79.3 kg (174 lb 12.8 oz)              Today, you had the following     No orders found for display       Primary Care Provider Office Phone # Fax #    Masoud Valentin MD, [phone]       [address] [address] [place] [address]        Equal Access to Services     [facility]: Hadii aad ku hadasho Soomaali, waaxda luqadaha, qaybta kaalmada adeegyada, waxay rigo haykyaw jeffers . So [facility] [phone].    ATENCIÓN: Si habla español, tiene a tejeda disposición servicios gratuitos de asistencia lingüística. Llame al [phone].    We comply with applicable federal civil rights laws and [place] laws. We do not discriminate on the basis of race, color, national origin, age, disability, sex, sexual orientation, or gender identity.            Thank you!     Thank you for choosing [facility] SOL[facility] ORGAN TRANSPLANT  for your care. Our goal is always to provide you with excellent care. Hearing back from our patients is one way we can continue to " improve our services. Please take a few minutes to complete the written survey that you may receive in the mail after your visit with us. Thank you!             Your Updated Medication List - Protect others around you: Learn how to safely use, store and throw away your medicines at www.disposemymeds.org.          This list is accurate as of 10/8/18  4:48 PM.  Always use your most recent med list.                   Brand Name Dispense Instructions for use Diagnosis    atorvastatin 10 MG tablet    LIPITOR    45 tablet    Take 0.5 tablets (5 mg) by mouth daily    Coronary artery disease involving native coronary artery of native heart without angina pectoris       BASAGLAR 100 UNIT/ML injection      Inject 38 Units Subcutaneous        blood glucose monitoring test strip    no brand specified    1 Box    Use to test blood sugar 4 times daily or as directed.    Diabetes mellitus with background retinopathy (H)       calcitRIOL 0.25 MCG capsule    ROCALTROL    90 capsule    Take 1 capsule (0.25 mcg) by mouth daily    Renal osteodystrophy       cholecalciferol 1000 units capsule    vitamin  -D    180 capsule    Take 2 capsules (2,000 Units) by mouth daily    CKD (chronic kidney disease) stage 5, GFR less than 15 ml/min (H)       cinacalcet 30 MG tablet    SENSIPAR    90 tablet    Take 1 tablet (30 mg) by mouth daily    Secondary renal hyperparathyroidism (H)       cloNIDine 0.1 MG tablet    CATAPRES    60 tablet    Take 1 tablet (0.1 mg) by mouth 2 times daily as needed For SBP > 170    Hypertension secondary to other renal disorders       cycloSPORINE modified 25 MG capsule    GENERIC EQUIVALENT    180 capsule    Take 3 capsules (75 mg) by mouth 2 times daily Total dose 75 mg twice a day    Kidney replaced by transplant       furosemide 20 MG tablet    LASIX    90 tablet    Take 1 tablet (20 mg) by mouth daily as needed    Edema, unspecified type       hydrALAZINE 25 MG tablet    APRESOLINE    270 tablet    Take 1 tablet  (25 mg) by mouth 3 times daily    Hypertension secondary to other renal disorders       HYDROcodone-acetaminophen 5-325 MG per tablet    NORCO     Take 2 tablets by mouth every 6 hours as needed for moderate to severe pain        insulin aspart 100 UNIT/ML injection    NovoLOG PEN     Inject 1-16 Units Subcutaneous At Bedtime Correction Scale - VERY HIGH INSULIN RESISTANCE DOSING    Do Not give Bedtime Correction Insulin if BG < 200.  For  - 209 give 1 units.  For  - 219 give 2 units.  For  - 229 give 3 units.  For  - 239 give 4 units.  For  - 249 give 5 units.  For  - 259 give 6 units.  For  - 269 give 7 units.  For  - 279 give 8 units.  For  - 289 give 9 units.    Type 2 diabetes mellitus with diabetic chronic kidney disease (H)       mycophenolic acid 180 MG EC tablet     180 tablet    Take 3 tablets (540 mg) by mouth 2 times daily    Kidney replaced by transplant, Pancreas replaced by transplant (H)       NIFEdipine ER osmotic 30 MG 24 hr tablet    PROCARDIA XL    540 tablet    Take 3 tablets (90 mg) by mouth 2 times daily    Hypertension secondary to other renal disorders       ondansetron 4 MG tablet    ZOFRAN    30 tablet    Take 1 tablet (4 mg) by mouth every 12 hours as needed for nausea    Nausea       sodium bicarbonate 650 MG tablet     270 tablet    Take 3 tablets (1,950 mg) by mouth 3 times daily    Acidosis

## 2018-10-08 NOTE — TELEPHONE ENCOUNTER
Received call from Charlotte at [facility]. She would like to know when patient's last pneumonia shot was. Please call back to provide this information.

## 2018-10-08 NOTE — PROGRESS NOTES
"  Transplant Surgery      Reason For Visit: Annual wait list appt for simultaneous kidney and pancreas transplant     History of Present Illness:  On simultaneous kidney and pancreas transplant waiting list with 15 months of waiting time.  Inactive due to incomplete finances and dental. Those seem to have been resolved.  Resumed HD this month.  No living donors.  PRA 78%.  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  On CsA and Myfortic.  No blood thinners.  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  History of cardiac stents.  No blood thinners.       ROS: 10 point ROS neg other than the symptoms noted above in the HPI    US Iliacs last year.  Impression:      1. Arterial evaluation: Widely patent with mild scattered  atherosclerosis     2. Venous evaluation: Widely patent    CT abd 2016 shows adequate vascular targets.        Exam:   /75  Pulse 105  Ht 1.702 m (5' 7\")  Wt 78.5 kg (173 lb)  SpO2 98%  BMI 27.1 kg/m2   Well appearing, no apparent distress       Impression:  If cardiac eval, dental eval, and finances in order I do not see barrier to making active on the simultaneous kidney and pancreas transplant waiting list.  Does have adequate vascular targets.    Plan: Complete any outstanding ethan screening and make active on simultaneous kidney and pancreas transplant waiting list.    Tobin Pal MD, PhD  Transplant Surgery    TT: 20 min counseling time > 50%      "

## 2018-10-15 ENCOUNTER — TELEPHONE (OUTPATIENT)
Dept: TRANSPLANT | Facility: CLINIC | Age: 53
End: 2018-10-15

## 2018-10-15 ENCOUNTER — DOCUMENTATION ONLY (OUTPATIENT)
Dept: TRANSPLANT | Facility: CLINIC | Age: 53
End: 2018-10-15

## 2018-10-15 NOTE — PROGRESS NOTES
Chart Prep    Clinic Visit on: 11/13/18    Last lab completed:  9/11/18    Lab letter updated: 10/15/18    Lab orders faxed to:  [facility] [phone] (Phone)  [phone] (Fax)     Lab orders up to date in Epic.

## 2018-10-15 NOTE — TELEPHONE ENCOUNTER
Order sent. Call returned to Charlotte at [facility] to inform her that patient has standing orders in EPIC

## 2018-10-15 NOTE — LETTER
PHYSICIAN ORDERS    DATE & TIME ISSUED: October 15, 2018 11:38 AM  PATIENT NAME: Amadou Lewis   : 1965     [facility] MR# [if applicable]: [id number]     DIAGNOSIS / ICD - 10 CODES    Kidney Transplanted (Z94.0)    After Care Following Organ Transplant (Z48.298)    Long Term Use of Medication (Z79.899)    Complications Kidney Transplant (T86.10)      Please complete the following labs:    Every 3 Months    Basic Metabolic panel    Complete Blood Count    Cyclosporine level    Every 6 months    Protein Random Urine with creatinine ratio      Patient should release information to the [facility].   Please fax to the Transplant Center at [phone].  Any questions please call [phone].      .

## 2018-10-15 NOTE — TELEPHONE ENCOUNTER
Provider Call: General    Reason for call: [facility]ino G[facility]mez received orders faxed today- They do not draw labs at their facility for Tx labs  Orders need to be in EPIC for pt to go to [facility]

## 2018-11-01 ENCOUNTER — HOSPITAL ENCOUNTER (OUTPATIENT)
Dept: CARDIOLOGY | Facility: CLINIC | Age: 53
Discharge: HOME | End: 2018-11-01
Attending: SURGERY | Admitting: SURGERY
Payer: COMMERCIAL

## 2018-11-01 ENCOUNTER — HOSPITAL ENCOUNTER (OUTPATIENT)
Dept: NUCLEAR MEDICINE | Facility: CLINIC | Age: 53
Setting detail: NUCLEAR MEDICINE
End: 2018-11-01
Attending: SURGERY
Payer: COMMERCIAL

## 2018-11-01 ENCOUNTER — OFFICE VISIT (OUTPATIENT)
Dept: CARDIOLOGY | Facility: CLINIC | Age: 53
End: 2018-11-01
Attending: SURGERY
Payer: COMMERCIAL

## 2018-11-01 ENCOUNTER — ALLIED HEALTH/NURSE VISIT (OUTPATIENT)
Dept: TRANSPLANT | Facility: CLINIC | Age: 53
End: 2018-11-01
Attending: SURGERY
Payer: COMMERCIAL

## 2018-11-01 ENCOUNTER — RESULTS ONLY (OUTPATIENT)
Dept: OTHER | Facility: CLINIC | Age: 53
End: 2018-11-01

## 2018-11-01 ENCOUNTER — HOSPITAL ENCOUNTER (OUTPATIENT)
Facility: CLINIC | Age: 53
Setting detail: SPECIMEN
Discharge: HOME | End: 2018-11-01
Admitting: SURGERY
Payer: COMMERCIAL

## 2018-11-01 VITALS
SYSTOLIC BLOOD PRESSURE: 136 MMHG | WEIGHT: 175 LBS | HEART RATE: 100 BPM | HEIGHT: 67 IN | BODY MASS INDEX: 27.47 KG/M2 | DIASTOLIC BLOOD PRESSURE: 85 MMHG | OXYGEN SATURATION: 96 %

## 2018-11-01 DIAGNOSIS — N18.5 CHRONIC KIDNEY DISEASE (CKD), STAGE V (H): ICD-10-CM

## 2018-11-01 DIAGNOSIS — Z76.82 ORGAN TRANSPLANT CANDIDATE: ICD-10-CM

## 2018-11-01 DIAGNOSIS — E78.5 HYPERLIPIDEMIA LDL GOAL <70: ICD-10-CM

## 2018-11-01 DIAGNOSIS — Z01.810 PRE-OPERATIVE CARDIOVASCULAR EXAMINATION: Primary | ICD-10-CM

## 2018-11-01 DIAGNOSIS — Z94.0 KIDNEY REPLACED BY TRANSPLANT: ICD-10-CM

## 2018-11-01 DIAGNOSIS — E11.9 DIABETES MELLITUS, TYPE 2 (H): ICD-10-CM

## 2018-11-01 DIAGNOSIS — Z76.82 AWAITING ORGAN TRANSPLANT: Primary | ICD-10-CM

## 2018-11-01 DIAGNOSIS — Q61.2 POLYCYSTIC KIDNEY DISEASE, AUTOSOMAL DOMINANT: ICD-10-CM

## 2018-11-01 DIAGNOSIS — Z94.83 PANCREAS REPLACED BY TRANSPLANT (H): ICD-10-CM

## 2018-11-01 LAB
AMYLASE SERPL-CCNC: 72 U/L (ref 30–110)
ANION GAP SERPL CALCULATED.3IONS-SCNC: 8 MMOL/L (ref 3–14)
BUN SERPL-MCNC: 37 MG/DL (ref 7–30)
CALCIUM SERPL-MCNC: 8.8 MG/DL (ref 8.5–10.1)
CHLORIDE SERPL-SCNC: 94 MMOL/L (ref 94–109)
CHOLEST SERPL-MCNC: 147 MG/DL
CO2 SERPL-SCNC: 29 MMOL/L (ref 20–32)
CREAT SERPL-MCNC: 4.48 MG/DL (ref 0.66–1.25)
CYCLOSPORINE BLD LC/MS/MS-MCNC: 97 UG/L (ref 50–400)
ERYTHROCYTE [DISTWIDTH] IN BLOOD BY AUTOMATED COUNT: 12.9 % (ref 10–15)
GFR SERPL CREATININE-BSD FRML MDRD: 14 ML/MIN/1.7M2
GLUCOSE SERPL-MCNC: 342 MG/DL (ref 70–99)
HCT VFR BLD AUTO: 39.4 % (ref 40–53)
HDLC SERPL-MCNC: 34 MG/DL
HGB BLD-MCNC: 12.3 G/DL (ref 13.3–17.7)
LDLC SERPL CALC-MCNC: 81 MG/DL
LIPASE SERPL-CCNC: 263 U/L (ref 73–393)
MCH RBC QN AUTO: 28.5 PG (ref 26.5–33)
MCHC RBC AUTO-ENTMCNC: 31.2 G/DL (ref 31.5–36.5)
MCV RBC AUTO: 91 FL (ref 78–100)
NONHDLC SERPL-MCNC: 113 MG/DL
PLATELET # BLD AUTO: 243 10E9/L (ref 150–450)
POTASSIUM SERPL-SCNC: 5.1 MMOL/L (ref 3.4–5.3)
PSA SERPL-ACNC: 0.36 UG/L (ref 0–4)
RBC # BLD AUTO: 4.32 10E12/L (ref 4.4–5.9)
SODIUM SERPL-SCNC: 131 MMOL/L (ref 133–144)
TME LAST DOSE: NORMAL H
TRIGL SERPL-MCNC: 160 MG/DL
WBC # BLD AUTO: 4.4 10E9/L (ref 4–11)

## 2018-11-01 PROCEDURE — 93017 CV STRESS TEST TRACING ONLY: CPT

## 2018-11-01 PROCEDURE — 82150 ASSAY OF AMYLASE: CPT | Performed by: SURGERY

## 2018-11-01 PROCEDURE — 94618 PULMONARY STRESS TESTING: CPT | Mod: 26 | Performed by: INTERNAL MEDICINE

## 2018-11-01 PROCEDURE — A9502 TC99M TETROFOSMIN: HCPCS | Performed by: SURGERY

## 2018-11-01 PROCEDURE — 93010 ELECTROCARDIOGRAM REPORT: CPT | Mod: ZP | Performed by: INTERNAL MEDICINE

## 2018-11-01 PROCEDURE — 93016 CV STRESS TEST SUPVJ ONLY: CPT | Performed by: INTERNAL MEDICINE

## 2018-11-01 PROCEDURE — 25000128 H RX IP 250 OP 636: Performed by: INTERNAL MEDICINE

## 2018-11-01 PROCEDURE — 86832 HLA CLASS I HIGH DEFIN QUAL: CPT | Performed by: SURGERY

## 2018-11-01 PROCEDURE — 93005 ELECTROCARDIOGRAM TRACING: CPT | Mod: ZF

## 2018-11-01 PROCEDURE — G0463 HOSPITAL OUTPT CLINIC VISIT: HCPCS | Mod: 25,ZF

## 2018-11-01 PROCEDURE — 34300033 ZZH RX 343: Performed by: SURGERY

## 2018-11-01 PROCEDURE — 99214 OFFICE O/P EST MOD 30 MIN: CPT | Mod: ZP | Performed by: NURSE PRACTITIONER

## 2018-11-01 PROCEDURE — 85027 COMPLETE CBC AUTOMATED: CPT | Performed by: SURGERY

## 2018-11-01 PROCEDURE — 83690 ASSAY OF LIPASE: CPT | Performed by: SURGERY

## 2018-11-01 PROCEDURE — 86833 HLA CLASS II HIGH DEFIN QUAL: CPT | Performed by: SURGERY

## 2018-11-01 PROCEDURE — 78452 HT MUSCLE IMAGE SPECT MULT: CPT

## 2018-11-01 PROCEDURE — 87799 DETECT AGENT NOS DNA QUANT: CPT | Performed by: INTERNAL MEDICINE

## 2018-11-01 PROCEDURE — G0103 PSA SCREENING: HCPCS | Performed by: SURGERY

## 2018-11-01 PROCEDURE — 80158 DRUG ASSAY CYCLOSPORINE: CPT | Performed by: INTERNAL MEDICINE

## 2018-11-01 PROCEDURE — 36415 COLL VENOUS BLD VENIPUNCTURE: CPT | Performed by: SURGERY

## 2018-11-01 PROCEDURE — 80048 BASIC METABOLIC PNL TOTAL CA: CPT | Performed by: SURGERY

## 2018-11-01 RX ORDER — ACYCLOVIR 200 MG/1
0-1 CAPSULE ORAL
Status: DISCONTINUED | OUTPATIENT
Start: 2018-11-01 | End: 2018-11-02 | Stop reason: HOSPADM

## 2018-11-01 RX ORDER — ALBUTEROL SULFATE 90 UG/1
2 AEROSOL, METERED RESPIRATORY (INHALATION) EVERY 5 MIN PRN
Status: DISCONTINUED | OUTPATIENT
Start: 2018-11-01 | End: 2018-11-02 | Stop reason: HOSPADM

## 2018-11-01 RX ORDER — REGADENOSON 0.08 MG/ML
0.4 INJECTION, SOLUTION INTRAVENOUS ONCE
Status: COMPLETED | OUTPATIENT
Start: 2018-11-01 | End: 2018-11-01

## 2018-11-01 RX ORDER — AMINOPHYLLINE 25 MG/ML
50-100 INJECTION, SOLUTION INTRAVENOUS
Status: DISCONTINUED | OUTPATIENT
Start: 2018-11-01 | End: 2018-11-02 | Stop reason: HOSPADM

## 2018-11-01 RX ADMIN — REGADENOSON 0.4 MG: 0.08 INJECTION, SOLUTION INTRAVENOUS at 09:53

## 2018-11-01 RX ADMIN — TETROFOSMIN 10 MCI.: 1.38 INJECTION, POWDER, LYOPHILIZED, FOR SOLUTION INTRAVENOUS at 08:50

## 2018-11-01 RX ADMIN — TETROFOSMIN 42 MCI.: 1.38 INJECTION, POWDER, LYOPHILIZED, FOR SOLUTION INTRAVENOUS at 09:55

## 2018-11-01 ASSESSMENT — PAIN SCALES - GENERAL: PAINLEVEL: NO PAIN (0)

## 2018-11-01 NOTE — PROGRESS NOTES
Pt here for Lexiscan. Test, medication and side effects reviewed with patient. Lung sounds clear.  Denied caffeine use. Tolerated Lexiscan dose with complaints of SOB that slowly resolved. Monitored post injection and then taken back to nuclear medicine for follow up imaging.

## 2018-11-01 NOTE — MR AVS SNAPSHOT
After Visit Summary   11/1/2018    Amadou Lewis    MRN: [id number]           Patient Information     Date Of Birth          1965        Visit Information        Provider Department      11/1/2018 8:00 AM Micheal Feldman APRN CNP [facility]        Today's Diagnoses     Pre-operative cardiovascular examination    -  1    Hyperlipidemia LDL goal <70          Care Instructions    Patient Instructions:    It was a pleasure to see you in the cardiology clinic today.    If you have any questions you can reach our nurse triage line at [phone].  Press Option #1 for the [facility], then press Option #3 for nursing or Option #1 for scheduling. We also encourage the use of OPENLANE to communicate with your HealthCare Provider    Note new medications: none  Stop the following medications: no changes    The results from today include: EKG was normal.    Please follow up with Cardiology in one year.  I will follow-up on your stress test results and send you a OPENLANE message.    Control your risk of coronary artery disease with these four lifestyle changes:  - Eating a heart healthy diet by following the American Heart Association Recommendations: Reduce saturated fat and trans fat to 5-6 percent of daily calories and minimizing the amount of trans fat you eat by limiting your intake of red meat and dairy products made with whole milk. It also means choosing skim milk, low-fat or fat-free dairy products, limiting fried food, and cooking with healthy oils such as vegetable oil. A healthy diet should include emphasis on fruits, vegetables, whole grains, poultry, fish and nuts, and limiting sugary foods and beverages. We recommend following the DASH (Dietary Approaches to Stop Hypertension) or Mediterranean Diet.  - Regular Exercise: Just 40 minutes of aerobic exercise of moderate to vigorous intensity done 3-4 times per week is enough to lower both  cholesterol and high blood pressure. Brisk walking, swimming, bicycling or a dance class are examples.  - Avoiding Tobacco Smoking: Smoking compounds the risk from other risk factors for heart disease including high cholesterol, high blood pressure, and diabetes. Smokers can lower cholesterol, blood pressure, and protect their arteries by quitting. Ask to learn more about quitting smoking.  - Losing Weight: Being overweight or obese raises your risk of high cholesterol, high blood pressure, and diabetes which are all risk factors for heart disease. Losing excess weight can improve cholesterol levels, blood pressure, and reduce incidence of diabetes and potentially reverse these disease processes.     Get help if you experience any of these heart attack warning signs: Although some heart attacks are sudden and intense, most start slowly, with mild pain or discomfort. Pay attention to your body -- and call 911 if you feel:  - Chest discomfort: Most heart attacks involve discomfort in the center of the chest that lasts more than a few minutes, or that goes away and comes back. It can feel like uncomfortable pressure, squeezing, fullness or pain.  - Discomfort in other areas of the upper body: Symptoms can include pain or discomfort in one or both arms, the back, neck, jaw or stomach.   - Shortness of breath with or without chest discomfort   - Other signs may include breaking out in a cold sweat, nausea or lightheadedness      Sincerely,    Micheal Feldman CNP            Follow-ups after your visit        Additional Services     Follow-Up with Cardiologist                 Your next 10 appointments already scheduled     Nov 01, 2018  9:00 AM CDT   NM INJECTION with UUNMINJ1   [facility], Nuclear Medicine ([facility])    [address] [address] [phone]            Nov 01, 2018  9:45 AM CDT   NM SCAN3 with UUNM1   [facility], Nuclear  Medicine ([facility])    [address] [facility] [facility] [address] [facility] [address]   [phone]            Nov 01, 2018 10:15 AM CDT   Ekg Stress Nm Lexiscan with UUEKGNMS   [facility] ([facility])    [address] [facility] [address]               Nov 01, 2018 10:45 AM CDT   NM MPI WITH LEXISCAN with UUNM1   [facility] [place], Nuclear Medicine ([facility])    [address] [facility] [address] [facility] [address]   [phone]           How do I prepare for my exam? (Food and drink instructions) Day 1 & Day 2: Stop all caffeine 12 hours before the test. This includes coffee, tea, soda pop, chocolate and certain medicines (such as Anacin, Excedrin and NoDoz). Also avoid decaf coffee and tea, as these contain small amounts of caffeine. Stop eating 4 hours before the test. You may drink water.  How do I prepare for my exam? (Other instructions) You may need to stop some medicines before the test. Follow your doctor s orders. Day 1 & Day 2: *If you take a beta blocker: Do not take your beta-blocker on the day before your test, unless specifically told to by your doctor. And do not take it on the day of your test. Bring it with you to take after the test.  *If you take Aggrenox or dipyridamole (Persantine, Permole), stop taking it 48 hours before your test. *If you take Viagra, Cialis or Levitra, stop taking it 48 hours before your test. *If you take theophylline or aminophylline, stop taking it 12 hours before your test.  For patients with diabetes: *If you take insulin, call your diabetes care team. Ask if you should take a 1/2 dose the morning of your test. *If you take diabetes medicine by mouth, don t take it on the morning of your test. Bring it with you to take after the test. (If you have questions, call your diabetes care team.)  *Do not take nitrates on the day of your test. Do not  wear your Nitro-Patch. *No alcohol, smoking or other tobacco for 12 hours before the test.  What should I wear: Please wear a loose two-piece outfit. If you will have an exercise test, bring rubber-soled walking shoes.  How long does the exam take: *This test can take 1-2 days.* ONE day exam: Allow 3-4 hours for test. IF TWO day exam: Allow  minutes PER day for test.  What should I bring: Please bring a list of your medicines (including vitamins, minerals and over-the-counter drugs). Leave your valuables at home.  Do I need a :  No  is needed.  What do I need to tell my doctor? When you arrive, please tell us if you: * Have diabetes * Are breastfeeding * May be pregnant * Have a pacemaker of ICD (implantable defibrillator).  What should I do after the exam: No restrictions, You may resume normal activities.  What is this test: Your doctor has ordered a nuclear stress test to check how well blood is flowing through your heart. You will either exercise or take a medicine that mimics exercise; we will watch your heart.  Who should I call with questions: If you have any questions, please call the Imaging Department where you will have your exam. Directions, parking instructions, and other information is available on our website, [email or web address].            Nov 01, 2018  1:00 PM CDT   (Arrive by 12:45 PM)   SOT EVALUATION RETURN VISIT with WILLARD Dial   [facility] ([facility])    [address] [place] [address] Se  [address]  [facility] [address]   [phone]            Nov 01, 2018  2:00 PM CDT   Lab with  LAB   [facility] ([facility])    [address] [place] [address] Se  1st Floor  [facility] [address]   [phone]            Nov 13, 2018  2:35 PM CST   (Arrive by 2:05 PM)   Return Kidney Transplant with  Kidney/Pancreas Recipient [address] [place] [address] Nephrology ([facility])    [address] [place] [address]  "  [address]  [facility] [id number] [phone]              Future tests that were ordered for you today     Open Future Orders        Priority Expected Expires Ordered    Follow-Up with Cardiologist Routine 11/1/2019 11/2/2019 11/1/2018    Lipid panel reflex to direct LDL Fasting Routine 11/1/2019 11/2/2019 11/1/2018            Who to contact     If you have questions or need follow up information about today's clinic visit or your schedule please contact [facility] directly at [phone].  Normal or non-critical lab and imaging results will be communicated to you by [employer], letter or phone within 4 business days after the clinic has received the results. If you do not hear from us within 7 days, please contact the clinic through [employer] or phone. If you have a critical or abnormal lab result, we will notify you by phone as soon as possible.  Submit refill requests through [employer] or call your pharmacy and they will forward the refill request to us. Please allow 3 business days for your refill to be completed.          Additional Information About Your Visit        [employer] Information     [employer] gives you secure access to your electronic health record. If you see a primary care provider, you can also send messages to your care team and make appointments. If you have questions, please call your primary care clinic.  If you do not have a primary care provider, please call [phone] and they will assist you.        Care EveryWhere ID     This is your Care EveryWhere ID. This could be used by other organizations to access your [facility] medical records  [id number]        Your Vitals Were     Pulse Height Pulse Oximetry BMI (Body Mass Index)          100 1.702 m (5' 7\") 96% 27.41 kg/m2         Blood Pressure from Last 3 Encounters:   11/01/18 136/85   10/08/18 144/75   08/13/18 163/86    Weight from Last 3 Encounters:   11/01/18 79.4 kg (175 lb)   10/08/18 78.5 kg (173 lb)   08/13/18 80.8 kg " (178 lb 3.2 oz)              We Performed the Following     EKG 12-lead, tracing only (Same Day)          Today's Medication Changes          These changes are accurate as of 11/1/18  8:10 AM.  If you have any questions, ask your nurse or doctor.               Stop taking these medicines if you haven't already. Please contact your care team if you have questions.     furosemide 20 MG tablet   Commonly known as:  LASIX   Stopped by:  Micheal Feldman APRN CNP           ondansetron 4 MG tablet   Commonly known as:  ZOFRAN   Stopped by:  Micheal Feldman APRN CNP                    Primary Care Provider Office Phone # Fax #    Masoud Valentin MD, [phone]       [address] [place] [address]        Equal Access to Services     [facility]: Hadii aad ku hadasho Soomaali, waaxda luqadaha, qaybta kaalmada adeegyada, waxay rigo haykyaw jeffers . So [facility] [phone].    ATENCIÓN: Si habla español, tiene a tejeda disposición servicios gratuitos de asistencia lingüística. [facility] [phone].    We comply with applicable federal civil rights laws and [place] laws. We do not discriminate on the basis of race, color, national origin, age, disability, sex, sexual orientation, or gender identity.            Thank you!     Thank you for choosing [facility]  for your care. Our goal is always to provide you with excellent care. Hearing back from our patients is one way we can continue to improve our services. Please take a few minutes to complete the written survey that you may receive in the mail after your visit with us. Thank you!             Your Updated Medication List - Protect others around you: Learn how to safely use, store and throw away your medicines at www.disposemymeds.org.          This list is accurate as of 11/1/18  8:10 AM.  Always use your most recent med list.                   Brand Name Dispense  Instructions for use Diagnosis    atorvastatin 10 MG tablet    LIPITOR    45 tablet    Take 0.5 tablets (5 mg) by mouth daily    Coronary artery disease involving native coronary artery of native heart without angina pectoris       BASAGLAR 100 UNIT/ML injection      Inject 38 Units Subcutaneous        blood glucose monitoring test strip    no brand specified    1 Box    Use to test blood sugar 4 times daily or as directed.    Diabetes mellitus with background retinopathy (H)       calcitRIOL 0.25 MCG capsule    ROCALTROL    90 capsule    Take 1 capsule (0.25 mcg) by mouth daily    Renal osteodystrophy       cholecalciferol 1000 units capsule    vitamin  -D    180 capsule    Take 2 capsules (2,000 Units) by mouth daily    CKD (chronic kidney disease) stage 5, GFR less than 15 ml/min (H)       cinacalcet 30 MG tablet    SENSIPAR    90 tablet    Take 1 tablet (30 mg) by mouth daily    Secondary renal hyperparathyroidism (H)       cloNIDine 0.1 MG tablet    CATAPRES    60 tablet    Take 1 tablet (0.1 mg) by mouth 2 times daily as needed For SBP > 170    Hypertension secondary to other renal disorders       cycloSPORINE modified 25 MG capsule    GENERIC EQUIVALENT    180 capsule    Take 3 capsules (75 mg) by mouth 2 times daily Total dose 75 mg twice a day    Kidney replaced by transplant       hydrALAZINE 25 MG tablet    APRESOLINE    270 tablet    Take 1 tablet (25 mg) by mouth 3 times daily    Hypertension secondary to other renal disorders       HYDROcodone-acetaminophen 5-325 MG per tablet    NORCO     Take 2 tablets by mouth every 6 hours as needed for moderate to severe pain        insulin aspart 100 UNIT/ML injection    NovoLOG PEN     Inject 1-16 Units Subcutaneous At Bedtime Correction Scale - VERY HIGH INSULIN RESISTANCE DOSING    Do Not give Bedtime Correction Insulin if BG < 200.  For  - 209 give 1 units.  For  - 219 give 2 units.  For  - 229 give 3 units.  For  - 239 give 4 units.   For  - 249 give 5 units.  For  - 259 give 6 units.  For  - 269 give 7 units.  For  - 279 give 8 units.  For  - 289 give 9 units.    Type 2 diabetes mellitus with diabetic chronic kidney disease (H)       mycophenolic acid 180 MG EC tablet     180 tablet    Take 3 tablets (540 mg) by mouth 2 times daily    Kidney replaced by transplant, Pancreas replaced by transplant (H)       NIFEdipine ER osmotic 30 MG 24 hr tablet    PROCARDIA XL    540 tablet    Take 3 tablets (90 mg) by mouth 2 times daily    Hypertension secondary to other renal disorders       sodium bicarbonate 650 MG tablet     270 tablet    Take 3 tablets (1,950 mg) by mouth 3 times daily    Acidosis
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
## 2018-11-01 NOTE — PATIENT INSTRUCTIONS
Patient Instructions:    It was a pleasure to see you in the cardiology clinic today.    If you have any questions you can reach our nurse triage line at [phone].  Press Option #1 for the [facility], then press Option #3 for nursing or Option #1 for scheduling. We also encourage the use of [employer] to communicate with your HealthCare Provider    Note new medications: none  Stop the following medications: no changes    The results from today include: EKG was normal.    Please follow up with Cardiology in one year.  I will follow-up on your stress test results and send you a [employer] message.    Control your risk of coronary artery disease with these four lifestyle changes:  - Eating a heart healthy diet by following the American Heart Association Recommendations: Reduce saturated fat and trans fat to 5-6 percent of daily calories and minimizing the amount of trans fat you eat by limiting your intake of red meat and dairy products made with whole milk. It also means choosing skim milk, low-fat or fat-free dairy products, limiting fried food, and cooking with healthy oils such as vegetable oil. A healthy diet should include emphasis on fruits, vegetables, whole grains, poultry, fish and nuts, and limiting sugary foods and beverages. We recommend following the DASH (Dietary Approaches to Stop Hypertension) or Mediterranean Diet.  - Regular Exercise: Just 40 minutes of aerobic exercise of moderate to vigorous intensity done 3-4 times per week is enough to lower both cholesterol and high blood pressure. Brisk walking, swimming, bicycling or a dance class are examples.  - Avoiding Tobacco Smoking: Smoking compounds the risk from other risk factors for heart disease including high cholesterol, high blood pressure, and diabetes. Smokers can lower cholesterol, blood pressure, and protect their arteries by quitting. Ask to learn more about quitting smoking.  - Losing Weight: Being overweight or  obese raises your risk of high cholesterol, high blood pressure, and diabetes which are all risk factors for heart disease. Losing excess weight can improve cholesterol levels, blood pressure, and reduce incidence of diabetes and potentially reverse these disease processes.     Get help if you experience any of these heart attack warning signs: Although some heart attacks are sudden and intense, most start slowly, with mild pain or discomfort. Pay attention to your body -- and call 911 if you feel:  - Chest discomfort: Most heart attacks involve discomfort in the center of the chest that lasts more than a few minutes, or that goes away and comes back. It can feel like uncomfortable pressure, squeezing, fullness or pain.  - Discomfort in other areas of the upper body: Symptoms can include pain or discomfort in one or both arms, the back, neck, jaw or stomach.   - Shortness of breath with or without chest discomfort   - Other signs may include breaking out in a cold sweat, nausea or lightheadedness      Sincerely,    Micheal Feldman, CNP
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
## 2018-11-01 NOTE — MR AVS SNAPSHOT
After Visit Summary   11/1/2018    Amadou Lewis    MRN: [id number]           Patient Information     Date Of Birth          1965        Visit Information        Provider Department      11/1/2018 1:00 PM Madelaine Roberts MSW [facility] Solid Organ Transplant        Today's Diagnoses     Awaiting organ transplant    -  1       Follow-ups after your visit        Who to contact     If you have questions or need follow up information about today's clinic visit or your schedule please contact [facility] SOLID ORGAN TRANSPLANT directly at [phone].  Normal or non-critical lab and imaging results will be communicated to you by CoderBuddyhart, letter or phone within 4 business days after the clinic has received the results. If you do not hear from us within 7 days, please contact the clinic through Anatole or phone. If you have a critical or abnormal lab result, we will notify you by phone as soon as possible.  Submit refill requests through Anatole or call your pharmacy and they will forward the refill request to us. Please allow 3 business days for your refill to be completed.          Additional Information About Your Visit        MyChart Information     Anatole gives you secure access to your electronic health record. If you see a primary care provider, you can also send messages to your care team and make appointments. If you have questions, please call your primary care clinic.  If you do not have a primary care provider, please call [phone] and they will assist you.        Care EveryWhere ID     This is your Care EveryWhere ID. This could be used by other organizations to access your [facility] medical records  [id number]         Blood Pressure from Last 3 Encounters:   11/01/18 136/85   10/08/18 144/75   08/13/18 163/86    Weight from Last 3 Encounters:   11/01/18 79.4 kg (175 lb)   10/08/18 78.5 kg (173 lb)   08/13/18 80.8 kg (178 lb 3.2 oz)              Today, you had the following      No orders found for display         Today's Medication Changes          These changes are accurate as of 11/1/18 11:59 PM.  If you have any questions, ask your nurse or doctor.               Stop taking these medicines if you haven't already. Please contact your care team if you have questions.     furosemide 20 MG tablet   Commonly known as:  LASIX   Stopped by:  Micheal Feldman APRN CNP           ondansetron 4 MG tablet   Commonly known as:  ZOFRAN   Stopped by:  Micheal Feldman APRN CNP                    Primary Care Provider Office Phone # Fax #    Masoud Valentin MD, [phone]       [address] [place] [address]        Equal Access to Services     [facility]: Hadii miguelina ku hadasho Soomaali, waaxda luqadaha, qaybta kaalmada adeegyada, waxay idiin haykyaw jeffers . So [facility] [phone].    ATENCIÓN: Si habla español, tiene a tejeda disposición servicios gratuitos de asistencia lingüística. [facility] [phone].    We comply with applicable federal civil rights laws and [place] laws. We do not discriminate on the basis of race, color, national origin, age, disability, sex, sexual orientation, or gender identity.            Thank you!     Thank you for choosing [facility] SOLID ORGAN TRANSPLANT  for your care. Our goal is always to provide you with excellent care. Hearing back from our patients is one way we can continue to improve our services. Please take a few minutes to complete the written survey that you may receive in the mail after your visit with us. Thank you!             Your Updated Medication List - Protect others around you: Learn how to safely use, store and throw away your medicines at www.disposemymeds.org.          This list is accurate as of 11/1/18 11:59 PM.  Always use your most recent med list.                   Brand Name Dispense Instructions for use Diagnosis    atorvastatin 10 MG tablet     LIPITOR    45 tablet    Take 0.5 tablets (5 mg) by mouth daily    Coronary artery disease involving native coronary artery of native heart without angina pectoris       BASAGLAR 100 UNIT/ML injection      Inject 38 Units Subcutaneous        blood glucose monitoring test strip    no brand specified    1 Box    Use to test blood sugar 4 times daily or as directed.    Diabetes mellitus with background retinopathy (H)       calcitRIOL 0.25 MCG capsule    ROCALTROL    90 capsule    Take 1 capsule (0.25 mcg) by mouth daily    Renal osteodystrophy       cholecalciferol 1000 units capsule    vitamin  -D    180 capsule    Take 2 capsules (2,000 Units) by mouth daily    CKD (chronic kidney disease) stage 5, GFR less than 15 ml/min (H)       cinacalcet 30 MG tablet    SENSIPAR    90 tablet    Take 1 tablet (30 mg) by mouth daily    Secondary renal hyperparathyroidism (H)       cloNIDine 0.1 MG tablet    CATAPRES    60 tablet    Take 1 tablet (0.1 mg) by mouth 2 times daily as needed For SBP > 170    Hypertension secondary to other renal disorders       cycloSPORINE modified 25 MG capsule    GENERIC EQUIVALENT    180 capsule    Take 3 capsules (75 mg) by mouth 2 times daily Total dose 75 mg twice a day    Kidney replaced by transplant       hydrALAZINE 25 MG tablet    APRESOLINE    270 tablet    Take 1 tablet (25 mg) by mouth 3 times daily    Hypertension secondary to other renal disorders       HYDROcodone-acetaminophen 5-325 MG per tablet    NORCO     Take 2 tablets by mouth every 6 hours as needed for moderate to severe pain        insulin aspart 100 UNIT/ML injection    NovoLOG PEN     Inject 1-16 Units Subcutaneous At Bedtime Correction Scale - VERY HIGH INSULIN RESISTANCE DOSING    Do Not give Bedtime Correction Insulin if BG < 200.  For  - 209 give 1 units.  For  - 219 give 2 units.  For  - 229 give 3 units.  For  - 239 give 4 units.  For  - 249 give 5 units.  For  - 259 give 6 units.   For  - 269 give 7 units.  For  - 279 give 8 units.  For  - 289 give 9 units.    Type 2 diabetes mellitus with diabetic chronic kidney disease (H)       mycophenolic acid 180 MG EC tablet     180 tablet    Take 3 tablets (540 mg) by mouth 2 times daily    Kidney replaced by transplant, Pancreas replaced by transplant (H)       NIFEdipine ER osmotic 30 MG 24 hr tablet    PROCARDIA XL    540 tablet    Take 3 tablets (90 mg) by mouth 2 times daily    Hypertension secondary to other renal disorders       sodium bicarbonate 650 MG tablet     270 tablet    Take 3 tablets (1,950 mg) by mouth 3 times daily    Acidosis

## 2018-11-01 NOTE — NURSING NOTE
Chief Complaint   Patient presents with     Follow Up For     wait list update assess for active status     Vitals were taken and medications were reconciled. EKG was performed.    Alannah Bean MA    7:48 AM

## 2018-11-02 ENCOUNTER — TELEPHONE (OUTPATIENT)
Dept: TRANSPLANT | Facility: CLINIC | Age: 53
End: 2018-11-02

## 2018-11-02 ENCOUNTER — MYC MEDICAL ADVICE (OUTPATIENT)
Dept: CARDIOLOGY | Facility: CLINIC | Age: 53
End: 2018-11-02

## 2018-11-02 LAB
BKV DNA # SPEC NAA+PROBE: NORMAL COPIES/ML
BKV DNA SPEC NAA+PROBE-LOG#: NORMAL LOG COPIES/ML
INTERPRETATION ECG - MUSE: NORMAL
SPECIMEN SOURCE: NORMAL

## 2018-11-02 NOTE — TELEPHONE ENCOUNTER
Called Sean to discuss his blood sugar of 342 from yesterday.  He states it was 150 yesterday when he left the house.  RNCC asked what if he ate before his lab draw and he states he doesn't remember.  Pt. is on Insulin and on the  wait list.  Sean reports that his blood sugars today have been in the 140s.  Encouraged him to follow up with his endocrinologist.  Pt. verbalized understanding.

## 2018-11-07 ENCOUNTER — COMMITTEE REVIEW (OUTPATIENT)
Dept: TRANSPLANT | Facility: CLINIC | Age: 53
End: 2018-11-07

## 2018-11-07 ENCOUNTER — DOCUMENTATION ONLY (OUTPATIENT)
Dept: TRANSPLANT | Facility: CLINIC | Age: 53
End: 2018-11-07

## 2018-11-07 NOTE — COMMITTEE REVIEW
Abdominal Patient Discussion Note Transplant Coordinator: Hanh Streeter  Transplant Surgeon:       Referring Physician: Dominick Ramirez, Deyvi Dick    Committee Review Members:  Nephrology Masoud Tate MD, Gael Agee, APRN CNP, Deyvi Dick MD, Viral Braxton De Los Santos MD   Nutrition Becca Cruz,    Pharmacy Micheal Matthews, [facility]    - Clinical Madelaine WILLARD Pappas, Anya Hayward, [facility]   Transplant Reanna Mobley RN, Carlotta Ag, RN, Lizette Cole, JANICE, Monique Luevano MD, Jori Wolfe MD       Additional Discussion Notes and Findings:   Patient has completed all requested evaluation appointments and tests.  Team reviewed cardiology update and approves patient to have status changed to Active on the kidney and kidney/pancreas lists.

## 2018-11-08 ENCOUNTER — TELEPHONE (OUTPATIENT)
Dept: TRANSPLANT | Facility: CLINIC | Age: 53
End: 2018-11-08

## 2018-11-08 DIAGNOSIS — N18.6 ESRD ON DIALYSIS (H): ICD-10-CM

## 2018-11-08 DIAGNOSIS — E11.9 DIABETES MELLITUS, TYPE 2 (H): ICD-10-CM

## 2018-11-08 DIAGNOSIS — Z99.2 ESRD ON DIALYSIS (H): ICD-10-CM

## 2018-11-08 DIAGNOSIS — Z76.82 ORGAN TRANSPLANT CANDIDATE: Primary | ICD-10-CM

## 2018-11-08 LAB
DONOR IDENTIFICATION: NORMAL
DSA COMMENTS: NORMAL
DSA PRESENT: NO
DSA TEST METHOD: NORMAL
ORGAN: NORMAL
SA1 CELL: NORMAL
SA1 COMMENTS: NORMAL
SA1 HI RISK ABY: NORMAL
SA1 MOD RISK ABY: NORMAL
SA1 TEST METHOD: NORMAL
SA2 CELL: NORMAL
SA2 COMMENTS: NORMAL
SA2 HI RISK ABY UA: NORMAL
SA2 MOD RISK ABY: NORMAL
SA2 TEST METHOD: NORMAL
UNACCEPTABLE ANTIGEN: NORMAL
UNOS CPRA: 0

## 2018-11-08 NOTE — LETTER
PHYSICIAN ORDER   ALA/PRA BLOOD    DATE & TIME ISSUED: 2018 4:42 PM  PATIENT NAME: Amadou Lewis   : 1965     [facility] MR# [if applicable]: [id number]     DIAGNOSIS/ICD-10 CODE: Awaiting Organ transplant [Z76.82}  EXPIRES: (1 YEAR AFTER DATE ISSUED)  Every 3 months: NEXT DUE 2019    1. Please draw 20ml of blood in red top (plain) tube for Antileukocyte Antibody (ALA or PRA).  2. Label tubes with the patient s name, date of birth, and complete lab slip.    3. Mailers, lab slips with instructions are sent to patient separately.  4. Call the Outreach Lab at [phone] to reorder mailers.  5. Mail blood to (this address is also on the mailers):    IMMUNOLOGY LABORATORY  [facility]  [phone] [place] [address]      .

## 2018-11-08 NOTE — TELEPHONE ENCOUNTER
Spoke with Sean.  Confirmed he is active status on the kidney and kidney/pancreas lists.  Reviewed organ offer call process, average length of hospital stay for kidney versus kidney/pancreas transplants, driving and lifting restrictions, post-discharge return appointments, assessments, and lab frequency.  Noted I will send Sean a waitlist status change letter and PRA order and will send copies of this information to his dialysis unit; that he did have a PRA sample drawn on 11/01/18, so he is next due to have drawn 02/2019 and will have  kits sent to his dialysis unit.  Reviewed while he is on the waitlists, he will be invited back to ealth annual for appointments with transplant surgery, social work, and cardiology.   Encouraged Sean to contact me with questions.  He verbalizes agreement with this plan.

## 2018-11-08 NOTE — LETTER
2018    Amadou Lewis  [address] [place] [address]      Dear Mr. Lewis,    This letter is sent to confirm that you have completed your transplant work-up and you are a candidate in the kidney and pancreas transplant program at the [facility].  Your status was changed to Active on the kidney and  kidney and pancreas waitlists on 2018.      When you are active on the waitlist and an organ becomes available, a coordinator will need to speak to you immediately.  You could be contacted at any time during the day or night as an organ could become available at any time.  Please make certain our office always has your current telephone numbers and address.      Items we will need from you:      We have received approval from your insurance company for the transplant procedure.  It is critical that you notify us if there is any change in your insurance.  It is also important that you familiarize yourself with the details of your specific insurance policy.  Our patient  is available to assist you if you should have any questions regarding your coverage.      An ALA or PRA blood sample needs to be sent here every 3 months to match you with  donors or any potential living donors.  If you need this testing, special mailing boxes (called mailers) will be sent to you directly from the Outreach Department.  You should take the physician order form and the  to your home laboratory when it is time to for this testing to be done.  Additional mailers can be obtained by calling the Transplant Office and asking to speak to a kidney and pancreas .      During this waiting period, we may request additional periodic laboratory tests with your primary physician.  It will be your responsibility to remind your physician to forward your results to the Transplant Office.      We need to be kept informed of any  changes in your medical condition such as:    o changes in your medications,   o significant changes in your health  o significant infections (such as pneumonia or abscesses)  o blood transfusions  o any condition which requires hospitalization  o any surgery      Remember to complete any routine cancer screening tests required before your transplant.  This includes colonoscopy; prostate screening for men, and mammogram and gynecologic testing for women, as well as dental work.  Your primary care clinic can assist you with arranging for these exams.  Remind your caregivers to forward copies of the records and final reports.    We want you to know that our program has physician and surgeon coverage 24 hours a day, 365 days a year. If this coverage changes or there are substantial program changes, you will be notified in writing by letter.     Attached is a letter from the [employer] ([employer]). It describes the services and information offered to patients by [employer] and the Organ Procurement and Transplantation Network.    We appreciate having had the opportunity to participate in your care.  If you have questions, please feel free to call me at [phone].      Sincerely,    Reanna Mobley RN, BSN  Transplant Coordinator      Enclosures: ALA/PRA Physician Order, Telephone Contact List, [employer] Letter, Waitlist Information Update and While You Are Waiting  CC:  Dominick Ramirez MD;  Dash Dick MD;  Masoud Valentin MD;          [facility] (ESRD)

## 2018-11-08 NOTE — TELEPHONE ENCOUNTER
Left voice message for Sean that I am changing his status to Active on the Kidney and kidney/pancreas lists today, which means he is eligible to receive calls regarding  organ offers.  Noted I will send a change of status letter and new PRA order.  Requested Sean have his dialysis unit draw a new PRA sample.  Requested patient contact me with questions.

## 2018-11-08 NOTE — PROGRESS NOTES
Patient Name: Amadou Lewis  : 1965  Age: 52 year old  MRN: [id number]  Date of Initial Social Work Evaluation: 17    Patient on kidney/pancreas transplant wait list inactive due to some required testing.  Met with Sean today to update psychosocial assessment.      Presenting Information   Living Situation: in [place], [place] with girlfriend Ly (actually part time as Ly has her daughters part time and stays in an apartment with them).  If not local, plans for short term stay:  N/A  Previous Functional Status: Independent  Cultural/Language/Spiritual Considerations: N/A    Support System  Primary Support Person Ly  Other support:  His children and sibings  Plan for support in immediate post-transplant period: Ly    Health Care Directive  Decision Maker: Self  Alternate Decision Maker: Adult children  Health Care Directive: Provided education and Declined completing    Mental Health/Coping:   History of Mental Health: No known history  History of Chemical Health: Sean indicated he may have three drinks a month.  Current status: Appropriate  Coping: Sean indicated when under stress he will go for a long drive, listen to music or work on cars.  Services Needed/Recommended: None at this time.    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  Insurance and medication coverage: Medicare and [employer] through his employer.  Financial concerns: None at this time.  Resources needed: None at this time.    Assessment and recommendations and plan:  Sean started dialysis 2018 and has been able to continue working full time.  Sean continues to be an appropriate transplant candidate.  Reviewed transplant education (Medicare, rehabilitation, donor issues, community/financial resources, and psych/family adjustment) as well as psychosocial risks of transplant. Patient seemed to  process information well. Appeared well informed, motivated, and able to follow post transplant requirements. Behavior was appropriate during interview. Has adequate income and insurance coverage. Adequate social support. No major contraindications noted for transplant. At this time, patient appears to understand the risks and benefits of transplant. `

## 2018-11-11 ENCOUNTER — APPOINTMENT (OUTPATIENT)
Dept: CT IMAGING | Facility: CLINIC | Age: 53
DRG: 008 | End: 2018-11-11
Attending: NURSE PRACTITIONER
Payer: COMMERCIAL

## 2018-11-11 ENCOUNTER — ANESTHESIA EVENT (OUTPATIENT)
Dept: SURGERY | Facility: CLINIC | Age: 53
End: 2018-11-11

## 2018-11-11 ENCOUNTER — RESULTS ONLY (OUTPATIENT)
Dept: OTHER | Facility: CLINIC | Age: 53
End: 2018-11-11

## 2018-11-11 ENCOUNTER — ANESTHESIA (OUTPATIENT)
Dept: SURGERY | Facility: CLINIC | Age: 53
End: 2018-11-11

## 2018-11-11 ENCOUNTER — ORGAN (OUTPATIENT)
Dept: TRANSPLANT | Facility: CLINIC | Age: 53
End: 2018-11-11

## 2018-11-11 ENCOUNTER — HOSPITAL ENCOUNTER (INPATIENT)
Facility: CLINIC | Age: 53
LOS: 8 days | Discharge: HOME HEALTH | DRG: 008 | End: 2018-11-20
Attending: SURGERY | Admitting: SURGERY
Payer: COMMERCIAL

## 2018-11-11 ENCOUNTER — APPOINTMENT (OUTPATIENT)
Dept: GENERAL RADIOLOGY | Facility: CLINIC | Age: 53
DRG: 008 | End: 2018-11-11
Attending: SURGERY
Payer: COMMERCIAL

## 2018-11-11 DIAGNOSIS — I15.1 HYPERTENSION SECONDARY TO OTHER RENAL DISORDERS: ICD-10-CM

## 2018-11-11 DIAGNOSIS — E83.39 HYPOPHOSPHATEMIA: ICD-10-CM

## 2018-11-11 DIAGNOSIS — G89.29 OTHER CHRONIC PAIN: Chronic | ICD-10-CM

## 2018-11-11 DIAGNOSIS — Z94.83 STATUS POST SIMULTANEOUS KIDNEY AND PANCREAS TRANSPLANT (H): Chronic | ICD-10-CM

## 2018-11-11 DIAGNOSIS — R11.0 NAUSEA: ICD-10-CM

## 2018-11-11 DIAGNOSIS — K59.03 DRUG-INDUCED CONSTIPATION: ICD-10-CM

## 2018-11-11 DIAGNOSIS — Z94.0 KIDNEY REPLACED BY TRANSPLANT: Primary | ICD-10-CM

## 2018-11-11 DIAGNOSIS — Z94.83 PANCREAS TRANSPLANTED (H): ICD-10-CM

## 2018-11-11 DIAGNOSIS — N18.6 ESRD (END STAGE RENAL DISEASE) (H): Primary | ICD-10-CM

## 2018-11-11 DIAGNOSIS — D84.9 IMMUNOSUPPRESSED STATUS (H): Chronic | ICD-10-CM

## 2018-11-11 DIAGNOSIS — E11.3299 DIABETES MELLITUS WITH BACKGROUND RETINOPATHY (H): ICD-10-CM

## 2018-11-11 DIAGNOSIS — Z94.0 STATUS POST SIMULTANEOUS KIDNEY AND PANCREAS TRANSPLANT (H): Chronic | ICD-10-CM

## 2018-11-11 LAB
ABO + RH BLD: NORMAL
ABO + RH BLD: NORMAL
ALBUMIN SERPL-MCNC: 4 G/DL (ref 3.4–5)
ALBUMIN UR-MCNC: 30 MG/DL
ALP SERPL-CCNC: 189 U/L (ref 40–150)
ALT SERPL W P-5'-P-CCNC: 14 U/L (ref 0–70)
AMYLASE SERPL-CCNC: 71 U/L (ref 30–110)
ANION GAP SERPL CALCULATED.3IONS-SCNC: 11 MMOL/L (ref 3–14)
APPEARANCE UR: CLEAR
APTT PPP: 33 SEC (ref 22–37)
AST SERPL W P-5'-P-CCNC: 9 U/L (ref 0–45)
BILIRUB SERPL-MCNC: 0.3 MG/DL (ref 0.2–1.3)
BILIRUB UR QL STRIP: NEGATIVE
BLD GP AB SCN SERPL QL: NORMAL
BLD PROD TYP BPU: NORMAL
BLOOD BANK CMNT PATIENT-IMP: NORMAL
BUN SERPL-MCNC: 58 MG/DL (ref 7–30)
CALCIUM SERPL-MCNC: 9 MG/DL (ref 8.5–10.1)
CHLORIDE SERPL-SCNC: 96 MMOL/L (ref 94–109)
CMV IGG SERPL QL IA: >8 AI (ref 0–0.8)
CMV IGM SERPL QL IA: 1.6 AI (ref 0–0.8)
CO2 SERPL-SCNC: 25 MMOL/L (ref 20–32)
COLOR UR AUTO: ABNORMAL
CREAT SERPL-MCNC: 5.46 MG/DL (ref 0.66–1.25)
EBV VCA IGG SER QL IA: >8 AI (ref 0–0.8)
EBV VCA IGM SER QL IA: >4 AI (ref 0–0.8)
ERYTHROCYTE [DISTWIDTH] IN BLOOD BY AUTOMATED COUNT: 13.1 % (ref 10–15)
GFR SERPL CREATININE-BSD FRML MDRD: 11 ML/MIN/1.7M2
GLUCOSE BLDC GLUCOMTR-MCNC: 113 MG/DL (ref 70–99)
GLUCOSE BLDC GLUCOMTR-MCNC: 148 MG/DL (ref 70–99)
GLUCOSE BLDC GLUCOMTR-MCNC: 153 MG/DL (ref 70–99)
GLUCOSE BLDC GLUCOMTR-MCNC: 154 MG/DL (ref 70–99)
GLUCOSE BLDC GLUCOMTR-MCNC: 169 MG/DL (ref 70–99)
GLUCOSE BLDC GLUCOMTR-MCNC: 194 MG/DL (ref 70–99)
GLUCOSE BLDC GLUCOMTR-MCNC: 244 MG/DL (ref 70–99)
GLUCOSE BLDC GLUCOMTR-MCNC: 267 MG/DL (ref 70–99)
GLUCOSE BLDC GLUCOMTR-MCNC: 294 MG/DL (ref 70–99)
GLUCOSE SERPL-MCNC: 282 MG/DL (ref 70–99)
GLUCOSE UR STRIP-MCNC: >1000 MG/DL
HBA1C MFR BLD: 9.1 % (ref 0–5.6)
HCT VFR BLD AUTO: 38.7 % (ref 40–53)
HGB BLD-MCNC: 12.6 G/DL (ref 13.3–17.7)
HGB UR QL STRIP: ABNORMAL
INR PPP: 1.04 (ref 0.86–1.14)
KETONES UR STRIP-MCNC: NEGATIVE MG/DL
LEUKOCYTE ESTERASE UR QL STRIP: NEGATIVE
LIPASE SERPL-CCNC: 264 U/L (ref 73–393)
MCH RBC QN AUTO: 29.4 PG (ref 26.5–33)
MCHC RBC AUTO-ENTMCNC: 32.6 G/DL (ref 31.5–36.5)
MCV RBC AUTO: 90 FL (ref 78–100)
NITRATE UR QL: NEGATIVE
NUM BPU REQUESTED: 2
PH UR STRIP: 8 PH (ref 5–7)
PLATELET # BLD AUTO: 269 10E9/L (ref 150–450)
POTASSIUM SERPL-SCNC: 4.5 MMOL/L (ref 3.4–5.3)
PROT SERPL-MCNC: 7.6 G/DL (ref 6.8–8.8)
PROT UR-MCNC: 0.72 G/L
PROT/CREAT 24H UR: 1.96 G/G CR (ref 0–0.2)
RBC # BLD AUTO: 4.28 10E12/L (ref 4.4–5.9)
RBC #/AREA URNS AUTO: <1 /HPF (ref 0–2)
SODIUM SERPL-SCNC: 131 MMOL/L (ref 133–144)
SOURCE: ABNORMAL
SP GR UR STRIP: 1.01 (ref 1–1.03)
SPECIMEN EXP DATE BLD: NORMAL
UROBILINOGEN UR STRIP-MCNC: NORMAL MG/DL (ref 0–2)
WBC # BLD AUTO: 4.8 10E9/L (ref 4–11)
WBC #/AREA URNS AUTO: <1 /HPF (ref 0–5)

## 2018-11-11 PROCEDURE — 84156 ASSAY OF PROTEIN URINE: CPT | Performed by: STUDENT IN AN ORGANIZED HEALTH CARE EDUCATION/TRAINING PROGRAM

## 2018-11-11 PROCEDURE — 25000132 ZZH RX MED GY IP 250 OP 250 PS 637: Performed by: STUDENT IN AN ORGANIZED HEALTH CARE EDUCATION/TRAINING PROGRAM

## 2018-11-11 PROCEDURE — 82150 ASSAY OF AMYLASE: CPT | Performed by: STUDENT IN AN ORGANIZED HEALTH CARE EDUCATION/TRAINING PROGRAM

## 2018-11-11 PROCEDURE — 36415 COLL VENOUS BLD VENIPUNCTURE: CPT | Performed by: STUDENT IN AN ORGANIZED HEALTH CARE EDUCATION/TRAINING PROGRAM

## 2018-11-11 PROCEDURE — 87340 HEPATITIS B SURFACE AG IA: CPT | Performed by: STUDENT IN AN ORGANIZED HEALTH CARE EDUCATION/TRAINING PROGRAM

## 2018-11-11 PROCEDURE — 85027 COMPLETE CBC AUTOMATED: CPT | Performed by: STUDENT IN AN ORGANIZED HEALTH CARE EDUCATION/TRAINING PROGRAM

## 2018-11-11 PROCEDURE — 87389 HIV-1 AG W/HIV-1&-2 AB AG IA: CPT | Performed by: STUDENT IN AN ORGANIZED HEALTH CARE EDUCATION/TRAINING PROGRAM

## 2018-11-11 PROCEDURE — 86704 HEP B CORE ANTIBODY TOTAL: CPT | Performed by: STUDENT IN AN ORGANIZED HEALTH CARE EDUCATION/TRAINING PROGRAM

## 2018-11-11 PROCEDURE — 81001 URINALYSIS AUTO W/SCOPE: CPT | Performed by: STUDENT IN AN ORGANIZED HEALTH CARE EDUCATION/TRAINING PROGRAM

## 2018-11-11 PROCEDURE — 86645 CMV ANTIBODY IGM: CPT | Performed by: STUDENT IN AN ORGANIZED HEALTH CARE EDUCATION/TRAINING PROGRAM

## 2018-11-11 PROCEDURE — 86923 COMPATIBILITY TEST ELECTRIC: CPT | Performed by: STUDENT IN AN ORGANIZED HEALTH CARE EDUCATION/TRAINING PROGRAM

## 2018-11-11 PROCEDURE — 86665 EPSTEIN-BARR CAPSID VCA: CPT | Performed by: STUDENT IN AN ORGANIZED HEALTH CARE EDUCATION/TRAINING PROGRAM

## 2018-11-11 PROCEDURE — 25000125 ZZHC RX 250: Performed by: NURSE PRACTITIONER

## 2018-11-11 PROCEDURE — 83690 ASSAY OF LIPASE: CPT | Performed by: STUDENT IN AN ORGANIZED HEALTH CARE EDUCATION/TRAINING PROGRAM

## 2018-11-11 PROCEDURE — 85610 PROTHROMBIN TIME: CPT | Performed by: STUDENT IN AN ORGANIZED HEALTH CARE EDUCATION/TRAINING PROGRAM

## 2018-11-11 PROCEDURE — 25000132 ZZH RX MED GY IP 250 OP 250 PS 637: Performed by: NURSE PRACTITIONER

## 2018-11-11 PROCEDURE — 71046 X-RAY EXAM CHEST 2 VIEWS: CPT

## 2018-11-11 PROCEDURE — 86900 BLOOD TYPING SEROLOGIC ABO: CPT | Performed by: STUDENT IN AN ORGANIZED HEALTH CARE EDUCATION/TRAINING PROGRAM

## 2018-11-11 PROCEDURE — 80053 COMPREHEN METABOLIC PANEL: CPT | Performed by: STUDENT IN AN ORGANIZED HEALTH CARE EDUCATION/TRAINING PROGRAM

## 2018-11-11 PROCEDURE — 93005 ELECTROCARDIOGRAM TRACING: CPT

## 2018-11-11 PROCEDURE — 25000131 ZZH RX MED GY IP 250 OP 636 PS 637: Performed by: STUDENT IN AN ORGANIZED HEALTH CARE EDUCATION/TRAINING PROGRAM

## 2018-11-11 PROCEDURE — 86850 RBC ANTIBODY SCREEN: CPT | Performed by: STUDENT IN AN ORGANIZED HEALTH CARE EDUCATION/TRAINING PROGRAM

## 2018-11-11 PROCEDURE — 25000128 H RX IP 250 OP 636: Performed by: STUDENT IN AN ORGANIZED HEALTH CARE EDUCATION/TRAINING PROGRAM

## 2018-11-11 PROCEDURE — 93010 ELECTROCARDIOGRAM REPORT: CPT | Performed by: INTERNAL MEDICINE

## 2018-11-11 PROCEDURE — 86901 BLOOD TYPING SEROLOGIC RH(D): CPT | Performed by: STUDENT IN AN ORGANIZED HEALTH CARE EDUCATION/TRAINING PROGRAM

## 2018-11-11 PROCEDURE — 83036 HEMOGLOBIN GLYCOSYLATED A1C: CPT | Performed by: STUDENT IN AN ORGANIZED HEALTH CARE EDUCATION/TRAINING PROGRAM

## 2018-11-11 PROCEDURE — 85730 THROMBOPLASTIN TIME PARTIAL: CPT | Performed by: STUDENT IN AN ORGANIZED HEALTH CARE EDUCATION/TRAINING PROGRAM

## 2018-11-11 PROCEDURE — 82962 GLUCOSE BLOOD TEST: CPT

## 2018-11-11 PROCEDURE — 86803 HEPATITIS C AB TEST: CPT | Performed by: STUDENT IN AN ORGANIZED HEALTH CARE EDUCATION/TRAINING PROGRAM

## 2018-11-11 PROCEDURE — 74176 CT ABD & PELVIS W/O CONTRAST: CPT

## 2018-11-11 PROCEDURE — 86644 CMV ANTIBODY: CPT | Performed by: STUDENT IN AN ORGANIZED HEALTH CARE EDUCATION/TRAINING PROGRAM

## 2018-11-11 PROCEDURE — 40000556 ZZH STATISTIC PERIPHERAL IV START W US GUIDANCE

## 2018-11-11 RX ORDER — NICOTINE POLACRILEX 4 MG
15-30 LOZENGE BUCCAL
Status: DISCONTINUED | OUTPATIENT
Start: 2018-11-11 | End: 2018-11-11

## 2018-11-11 RX ORDER — HYDROMORPHONE HYDROCHLORIDE 1 MG/ML
.3-.5 INJECTION, SOLUTION INTRAMUSCULAR; INTRAVENOUS; SUBCUTANEOUS EVERY 5 MIN PRN
Status: CANCELLED | OUTPATIENT
Start: 2018-11-11

## 2018-11-11 RX ORDER — NICOTINE POLACRILEX 4 MG
15-30 LOZENGE BUCCAL
Status: DISCONTINUED | OUTPATIENT
Start: 2018-11-11 | End: 2018-11-12

## 2018-11-11 RX ORDER — PIPERACILLIN SODIUM, TAZOBACTAM SODIUM 3; .375 G/15ML; G/15ML
3.38 INJECTION, POWDER, LYOPHILIZED, FOR SOLUTION INTRAVENOUS ONCE
Status: COMPLETED | OUTPATIENT
Start: 2018-11-11 | End: 2018-11-12

## 2018-11-11 RX ORDER — DEXTROSE MONOHYDRATE 25 G/50ML
25-50 INJECTION, SOLUTION INTRAVENOUS
Status: DISCONTINUED | OUTPATIENT
Start: 2018-11-11 | End: 2018-11-11

## 2018-11-11 RX ORDER — LABETALOL HYDROCHLORIDE 5 MG/ML
10 INJECTION, SOLUTION INTRAVENOUS
Status: CANCELLED | OUTPATIENT
Start: 2018-11-11

## 2018-11-11 RX ORDER — LIDOCAINE 40 MG/G
CREAM TOPICAL
Status: DISCONTINUED | OUTPATIENT
Start: 2018-11-11 | End: 2018-11-12 | Stop reason: HOSPADM

## 2018-11-11 RX ORDER — ACETAMINOPHEN 325 MG/1
975 TABLET ORAL ONCE
Status: CANCELLED | OUTPATIENT
Start: 2018-11-11 | End: 2018-11-11

## 2018-11-11 RX ORDER — HYDROMORPHONE HYDROCHLORIDE 2 MG/1
2 TABLET ORAL ONCE
Status: COMPLETED | OUTPATIENT
Start: 2018-11-11 | End: 2018-11-11

## 2018-11-11 RX ORDER — OCTREOTIDE ACETATE 100 UG/ML
100 INJECTION, SOLUTION INTRAVENOUS; SUBCUTANEOUS ONCE
Status: DISCONTINUED | OUTPATIENT
Start: 2018-11-11 | End: 2018-11-12 | Stop reason: HOSPADM

## 2018-11-11 RX ORDER — HYDRALAZINE HYDROCHLORIDE 20 MG/ML
10 INJECTION INTRAMUSCULAR; INTRAVENOUS EVERY 4 HOURS PRN
Status: DISCONTINUED | OUTPATIENT
Start: 2018-11-11 | End: 2018-11-17

## 2018-11-11 RX ORDER — HYDROCODONE BITARTRATE AND ACETAMINOPHEN 5; 325 MG/1; MG/1
2 TABLET ORAL EVERY 6 HOURS PRN
Status: DISCONTINUED | OUTPATIENT
Start: 2018-11-11 | End: 2018-11-13

## 2018-11-11 RX ORDER — MYCOPHENOLIC ACID 180 MG/1
540 TABLET, DELAYED RELEASE ORAL 2 TIMES DAILY
Status: DISCONTINUED | OUTPATIENT
Start: 2018-11-11 | End: 2018-11-12

## 2018-11-11 RX ORDER — SODIUM CHLORIDE 9 MG/ML
INJECTION, SOLUTION INTRAVENOUS CONTINUOUS
Status: DISCONTINUED | OUTPATIENT
Start: 2018-11-11 | End: 2018-11-12

## 2018-11-11 RX ORDER — NALOXONE HYDROCHLORIDE 0.4 MG/ML
.1-.4 INJECTION, SOLUTION INTRAMUSCULAR; INTRAVENOUS; SUBCUTANEOUS
Status: CANCELLED | OUTPATIENT
Start: 2018-11-11 | End: 2018-11-12

## 2018-11-11 RX ORDER — CYCLOSPORINE 25 MG/1
75 CAPSULE, LIQUID FILLED ORAL 2 TIMES DAILY
Status: DISCONTINUED | OUTPATIENT
Start: 2018-11-11 | End: 2018-11-12

## 2018-11-11 RX ORDER — GABAPENTIN 300 MG/1
300 CAPSULE ORAL ONCE
Status: CANCELLED | OUTPATIENT
Start: 2018-11-11 | End: 2018-11-11

## 2018-11-11 RX ORDER — ONDANSETRON 2 MG/ML
4 INJECTION INTRAMUSCULAR; INTRAVENOUS EVERY 30 MIN PRN
Status: CANCELLED | OUTPATIENT
Start: 2018-11-11

## 2018-11-11 RX ORDER — DEXTROSE MONOHYDRATE 25 G/50ML
25-50 INJECTION, SOLUTION INTRAVENOUS
Status: DISCONTINUED | OUTPATIENT
Start: 2018-11-11 | End: 2018-11-12

## 2018-11-11 RX ORDER — ONDANSETRON 4 MG/1
4 TABLET, ORALLY DISINTEGRATING ORAL EVERY 30 MIN PRN
Status: CANCELLED | OUTPATIENT
Start: 2018-11-11

## 2018-11-11 RX ORDER — SODIUM CHLORIDE, SODIUM LACTATE, POTASSIUM CHLORIDE, CALCIUM CHLORIDE 600; 310; 30; 20 MG/100ML; MG/100ML; MG/100ML; MG/100ML
INJECTION, SOLUTION INTRAVENOUS CONTINUOUS
Status: DISCONTINUED | OUTPATIENT
Start: 2018-11-11 | End: 2018-11-11

## 2018-11-11 RX ORDER — NALOXONE HYDROCHLORIDE 0.4 MG/ML
.1-.4 INJECTION, SOLUTION INTRAMUSCULAR; INTRAVENOUS; SUBCUTANEOUS
Status: DISCONTINUED | OUTPATIENT
Start: 2018-11-11 | End: 2018-11-12

## 2018-11-11 RX ORDER — FENTANYL CITRATE 50 UG/ML
25-50 INJECTION, SOLUTION INTRAMUSCULAR; INTRAVENOUS
Status: CANCELLED | OUTPATIENT
Start: 2018-11-11

## 2018-11-11 RX ORDER — SODIUM CHLORIDE, SODIUM LACTATE, POTASSIUM CHLORIDE, CALCIUM CHLORIDE 600; 310; 30; 20 MG/100ML; MG/100ML; MG/100ML; MG/100ML
INJECTION, SOLUTION INTRAVENOUS CONTINUOUS
Status: CANCELLED | OUTPATIENT
Start: 2018-11-11

## 2018-11-11 RX ADMIN — HYDROMORPHONE HYDROCHLORIDE 2 MG: 2 TABLET ORAL at 16:40

## 2018-11-11 RX ADMIN — HYDROCODONE BITARTRATE AND ACETAMINOPHEN 2 TABLET: 5; 325 TABLET ORAL at 23:58

## 2018-11-11 RX ADMIN — MYCOPHENOLIC ACID 540 MG: 360 TABLET, DELAYED RELEASE ORAL at 10:44

## 2018-11-11 RX ADMIN — CYCLOSPORINE 75 MG: 25 CAPSULE, LIQUID FILLED ORAL at 20:20

## 2018-11-11 RX ADMIN — HUMAN INSULIN 3.5 UNITS/HR: 100 INJECTION, SOLUTION SUBCUTANEOUS at 20:31

## 2018-11-11 RX ADMIN — NIFEDIPINE 60 MG: 60 TABLET, FILM COATED, EXTENDED RELEASE ORAL at 10:39

## 2018-11-11 RX ADMIN — CYCLOSPORINE 75 MG: 25 CAPSULE, LIQUID FILLED ORAL at 10:46

## 2018-11-11 RX ADMIN — MYCOPHENOLIC ACID 540 MG: 360 TABLET, DELAYED RELEASE ORAL at 20:20

## 2018-11-11 RX ADMIN — SODIUM CHLORIDE: 9 INJECTION, SOLUTION INTRAVENOUS at 12:25

## 2018-11-11 RX ADMIN — SODIUM CHLORIDE: 9 INJECTION, SOLUTION INTRAVENOUS at 23:13

## 2018-11-11 NOTE — IP AVS SNAPSHOT
MRN:[id number]                      After Visit Summary   11/11/2018    Amadou Lewis    MRN: [id number]           Thank you!     Thank you for choosing [place] for your care. Our goal is always to provide you with excellent care. Hearing back from our patients is one way we can continue to improve our services. Please take a few minutes to complete the written survey that you may receive in the mail after you visit with us. Thank you!        Patient Information     Date Of Birth          1965        Designated Caregiver       Most Recent Value    Caregiver    Will someone help with your care after discharge? yes    Name of designated caregiver rocky    Phone number of caregiver [phone]    Caregiver address [place]      About your hospital stay     You were admitted on:  November 11, 2018 You last received care in the:  [address] [facility]    You were discharged on:  November 20, 2018        Reason for your hospital stay       Kidney and pancreas transplant                  Who to Call     For medical emergencies, please call 911.  For non-urgent questions about your medical care, please call your primary care provider or clinic, [phone]  For questions related to your surgery, please call your surgery clinic        Attending Provider     Provider Specialty    Monique Luevano MD Transplant       Primary Care Provider Office Phone # Fax #    Masoud Valentin MD, [phone]       When to contact your care team       Notify your coordinator if you have pain over your kidney or pancreas, fever greater than 100.5F, redness or drainage from incision, or decreased urine output.    Notify your coordinator immediately if you are ever unable to take your immunosuppressive medications for any reason.    Transplant Coordinator Zora [phone]                  After Care Instructions     Activity       Your activity upon discharge: No lifting greater than 10 pounds  for at least 6 weeks, no driving while taking narcotic pain medications, avoid bath tubs, hot tubs, and swimming for at least 3 weeks.            Diet       Follow this diet upon discharge: Regular diet, drink 2-3 liters of water every day            Discharge Instructions       Do not restart your Norco until you are done taking oxycodone. Taking both medications at the same time can cause an overdose. When you restart Norco, stop taking Tylenol (acetaminophen). Both drugs contain acetaminophen and taking both at the same time can cause an overdose. Always keep pain medications away from children. Dispose of unused pain medications at a Pharmacy or safe disposal site.            Tubes and drains       You are going home with the following tubes or drains: SARAH.  Empty drain daily and write down amount. Clean around drain with soap and water every day.            Wound care and dressings       Instructions to care for your wound at home: keep wound clean and dry and may get incision wet in shower but do not soak or scrub.                  Follow-up Appointments     Adult [facility] Follow-up and recommended labs and tests       1. [facility] ([address] [place] [address])  Starting Wed 11/21 at 6:45am.  Please bring all medications, lab book, and medication card with you.  Do not take your morning dose of tacrolimus until after labs are drawn.     2. Labs daily in [facility] and then every Monday, Wednesday, Friday: BMP, CBC, Mag, Phos, tacrolimus level, amylase, lipase  3. Dr. Luevano, Transplant Clinic, 12/3/18  4. Ureteral stent removal in 4-6 weeks, to be scheduled by Transplant Coordinator      Appointments on [facility] and/or [facility] (with [facility] or [facility] provider or service). Call [phone] if you haven't heard regarding these appointments within 7 days of discharge.                  Your next 10 appointments already scheduled     Nov 21, 2018  7:00 AM CST   (Arrive by 6:45 AM)   New Transplant  Visit with UC SPEC INFUSION, UC 41 ATC   [facility] Specialty and Procedure ([facility])    [address]  [address]  [place] [address]   [phone]            Nov 21, 2018  8:00 AM CST   (Arrive by 7:45 AM)   Kidney Transplant Discharge with Jose L De Los Santos MD   [facility] Specialty [facility] Procedure ([facility])    [address]  [address]  [place] [address]   [phone]            Nov 21, 2018  8:30 AM CST   (Arrive by 8:15 AM)   Office Visit with Luke Tesfaye [facility] Medication Therapy Management ([facility])    [address]  3rd Floor  [place] [id number] [phone]           Bring a current list of meds and any records pertaining to this visit. For Physicals, please bring immunization records and any forms needing to be filled out. Please arrive 10 minutes early to complete paperwork.            Nov 22, 2018  7:00 AM CST   (Arrive by 6:45 AM)   New Transplant Visit with UC SPEC INFUSION, UC 43 ATC   [facility] Procedure ([facility])    [address]  [address]  [place] [address]   [phone]            Nov 23, 2018  7:00 AM CST   (Arrive by 6:45 AM)   New Transplant Visit with UC SPEC INFUSION, UC 43 ATC   [facility] ([facility])    [address]  [address]  [place] [address]   [phone]            Nov 23, 2018  8:00 AM CST   (Arrive by 7:45 AM)   Kidney Transplant Discharge with Jose L De Los Santos MD   [facility] Procedure ([facility])    [address]  [address]  [place] [address]   [phone]            Dec 03, 2018  2:30 PM CST   (Arrive by 2:15 PM)   Post-Op with Monique  "MD Bassem   [facility] Solid Organ Transplant ([facility])    [address]  [address]  [facility] [address] [phone]              Additional Services     Home care nursing referral       ___________________________________________________  [facility]  Phone: [phone]  Fax: [phone]  ___________________________________________________        RN skilled nursing visits--to begin when [facility] clinic([phone] visits are complete--approx visit start on 11/26 or 11/28     RN to assess vital signs and weight, respiratory and cardiac status, patients ability to take and record daily blood pressure, temp and weight, pain level and activity tolerance, incision for signs/symptoms of infection, hydration, nutrition and bowel status and home safety.    RN to teach medication management.   Assist / teach patient to obtain and record lab results in handbook       RN to provide morning lab draws, every M-W-F and report results to Outpatient Care Coordinator: Alesha \"Zora\" Rosalva  Ph: [phone]  Fax: [phone]        Your provider has ordered home care nursing services. If you have not been contacted within 2 days of your discharge please call the inpatient department phone number at [phone] .                  Further instructions from your care team       ________________________________________________________  Discharge RN please fax discharge orders to home care agency: [facility]  ________________________________________________________      Diet recommendations post-transplant: High protein diet x 8 weeks.  Heart healthy dietary habits long term (low saturated/trans fat, low sodium). Practice food safety precautions. See nutrition handout and food safety booklet for more information.     Pending Results     No orders found from 11/9/2018 to 11/12/2018.            Statement of Approval     Ordered          11/20/18 1249  I have reviewed and agree with all " "the recommendations and orders detailed in this document.  EFFECTIVE NOW     Approved and electronically signed by:  Karyn Crystal APRN CNP             Admission Information     Date & Time Provider Department Dept. Phone    11/11/2018 Monique Luevano MD [address] [facility] [phone]      Your Vitals Were     Blood Pressure Pulse Temperature Respirations Height Weight    114/73 (BP Location: Right arm) 69 97.5  F (36.4  C) (Oral) 16 1.702 m (5' 7\") 81.8 kg (180 lb 6.4 oz)    Pulse Oximetry BMI (Body Mass Index)                97% 28.25 kg/m2          MyChart Information     [facility] gives you secure access to your electronic health record. If you see a primary care provider, you can also send messages to your care team and make appointments. If you have questions, please call your primary care clinic.  If you do not have a primary care provider, please call [phone] and they will assist you.        Care EveryWhere ID     This is your Care EveryWhere ID. This could be used by other organizations to access your [place] medical records  [id number]        Equal Access to Services     RENATA BENNETT AH: Hadramana Mchugh, jordana perez, jack danielle. So [facility] [phone].    ATENCIÓN: Si habla español, tiene a tejeda disposición servicios gratuitos de asistencia lingüística. [facility] [phone].    We comply with applicable federal civil rights laws and [place] laws. We do not discriminate on the basis of race, color, national origin, age, disability, sex, sexual orientation, or gender identity.               Review of your medicines      START taking        Dose / Directions    acetaminophen 325 MG tablet   Commonly known as:  TYLENOL   Used for:  Kidney replaced by transplant        Dose:  975 mg   Take 3 tablets (975 mg) by mouth every 8 hours as needed for mild pain or fever (DO NOT USE WITH NORCO)   Quantity:  100 tablet "   Refills:  0       aspirin 81 MG EC tablet        Dose:  81 mg   Start taking on:  11/21/2018   Take 1 tablet (81 mg) by mouth daily   Quantity:  30 tablet   Refills:  5       calcium carbonate 500 mg-vitamin D 200 units 500-200 MG-UNIT per tablet   Commonly known as:  OSCAL with D;OYSTER SHELL CALCIUM   Used for:  Kidney replaced by transplant        Dose:  1 tablet   Take 1 tablet by mouth 2 times daily (with meals)   Quantity:  60 tablet   Refills:  2       carvedilol 6.25 MG tablet   Commonly known as:  COREG   Used for:  Hypertension secondary to other renal disorders        Dose:  6.25 mg   Take 1 tablet (6.25 mg) by mouth 2 times daily   Quantity:  60 tablet   Refills:  3       Lidocaine 4 % Patch   Commonly known as:  LIDOCARE        Dose:  2 patch   Place 2 patches onto the skin every 24 hours As needed for pain   Quantity:  10 patch   Refills:  1       magnesium oxide 400 MG tablet   Commonly known as:  MAG-OX        Dose:  400 mg   Take 1 tablet (400 mg) by mouth 3 times daily   Quantity:  90 tablet   Refills:  2       mycophenolic acid 360 MG EC tablet   Commonly known as:  GENERIC EQUIVALENT   Replaces:  mycophenolic acid 180 MG EC tablet        Dose:  720 mg   Take 2 tablets (720 mg) by mouth 2 times daily   Quantity:  120 tablet   Refills:  11       nystatin 215791 UNIT/ML suspension   Commonly known as:  MYCOSTATIN   Indication:  Thrush prophylaxis   Used for:  Immunosuppressed status (H)        Dose:  881089 Units   Take 5 mLs (500,000 Units) by mouth 4 times daily   Quantity:  600 mL   Refills:  2       ondansetron 4 MG ODT tab   Commonly known as:  ZOFRAN-ODT        Dose:  4 mg   Take 1 tablet (4 mg) by mouth every 6 hours as needed for nausea or vomiting   Quantity:  30 tablet   Refills:  1       oxyCODONE IR 5 MG tablet   Commonly known as:  ROXICODONE   Used for:  Kidney replaced by transplant        Take 10-12.5mg (2-2.5 tabs) every 4 hours as needed for pain on 11/20-11/22 Take 5-10mg (1-2  tabs) every 4 hours as needed for pain on 11/23-11/25 Take 5-7.5mg (1-1.5 tabs) every 4 hours as needed for pain on 11/26-11/28 Starting 11/29 you may restart your regular dose of Norco. DO NOT USE OXYCODONE AND NORCO AT THE SAME TIME.   Quantity:  108 tablet   Refills:  0       phosphorus tablet 250 mg 250 MG per tablet   Commonly known as:  PHOSPHA 250 NEUTRAL        Dose:  500 mg   Take 2 tablets (500 mg) by mouth 2 times daily   Quantity:  120 tablet   Refills:  1       polyethylene glycol powder   Commonly known as:  MIRALAX   Used for:  Drug-induced constipation        Dose:  1 capful   Take 17 g (1 capful) by mouth 2 times daily Hold for loose stools   Quantity:  850 g   Refills:  0       prochlorperazine 5 MG tablet   Commonly known as:  COMPAZINE        Dose:  5 mg   Take 1 tablet (5 mg) by mouth every 6 hours as needed for nausea or vomiting   Quantity:  30 tablet   Refills:  1       sennosides 8.6 MG tablet   Commonly known as:  SENOKOT   Used for:  Drug-induced constipation        Dose:  2 tablet   Take 2 tablets by mouth 2 times daily Hold for loose stools   Quantity:  120 each   Refills:  1       sulfamethoxazole-trimethoprim 400-80 MG per tablet   Commonly known as:  BACTRIM/SEPTRA   Indication:  PCP Prophylaxis   Used for:  Kidney replaced by transplant        Dose:  1 tablet   Start taking on:  11/21/2018   Take 1 tablet by mouth daily   Quantity:  30 tablet   Refills:  11       * tacrolimus 1 MG capsule   Commonly known as:  GENERIC EQUIVALENT        Dose:  3 mg   Take 3 capsules (3 mg) by mouth 2 times daily Take with 0.5mg caps for a total of 3.5mg twice daily   Quantity:  180 capsule   Refills:  11       * tacrolimus 0.5 MG capsule   Commonly known as:  GENERIC EQUIVALENT        Dose:  0.5 mg   Take 1 capsule (0.5 mg) by mouth 2 times daily Take with 1mg caps for a total of 3.5mg twice daily   Quantity:  60 capsule   Refills:  11       * tacrolimus 0.5 MG capsule   Commonly known as:  GENERIC  EQUIVALENT        Take 1 cap by mouth twice daily as directed by Transplant Center for dose changes   Quantity:  30 capsule   Refills:  0       valGANciclovir 450 MG tablet   Commonly known as:  VALCYTE   Indication:  Medication Treatment to Prevent Cytomegalovirus Disease   Used for:  Kidney replaced by transplant        Dose:  900 mg   Start taking on:  11/21/2018   Take 2 tablets (900 mg) by mouth daily   Quantity:  152 tablet   Refills:  0       * Notice:  This list has 3 medication(s) that are the same as other medications prescribed for you. Read the directions carefully, and ask your doctor or other care provider to review them with you.      CONTINUE these medicines which may have CHANGED, or have new prescriptions. If we are uncertain of the size of tablets/capsules you have at home, strength may be listed as something that might have changed.        Dose / Directions    HYDROcodone-acetaminophen 5-325 MG per tablet   Commonly known as:  NORCO   This may have changed:    - how much to take  - how to take this  - when to take this  - reasons to take this  - additional instructions        Stop taking until you have completed your oxycodone taper. DO NOT TAKE WITH OXYCODONE.   Refills:  0       NIFEdipine ER osmotic 30 MG 24 hr tablet   Commonly known as:  PROCARDIA XL   This may have changed:  how much to take   Used for:  Hypertension secondary to other renal disorders        Dose:  30 mg   Take 1 tablet (30 mg) by mouth 2 times daily   Quantity:  60 tablet   Refills:  3         CONTINUE these medicines which have NOT CHANGED        Dose / Directions    atorvastatin 10 MG tablet   Commonly known as:  LIPITOR   Used for:  Coronary artery disease involving native coronary artery of native heart without angina pectoris        Dose:  5 mg   Take 0.5 tablets (5 mg) by mouth daily   Quantity:  45 tablet   Refills:  3       blood glucose monitoring test strip   Commonly known as:  no brand specified   Used for:   Diabetes mellitus with background retinopathy (H)        Use to test blood sugar 4 times daily or as directed.   Quantity:  1 Box   Refills:  prn       calcitRIOL 0.25 MCG capsule   Commonly known as:  ROCALTROL   Used for:  Renal osteodystrophy        Dose:  0.25 mcg   Take 1 capsule (0.25 mcg) by mouth daily   Quantity:  90 capsule   Refills:  3       cholecalciferol 1000 units capsule   Commonly known as:  vitamin  -D   Used for:  CKD (chronic kidney disease) stage 5, GFR less than 15 ml/min (H)        Dose:  2 capsule   Take 2 capsules (2,000 Units) by mouth daily   Quantity:  180 capsule   Refills:  3         STOP taking     cinacalcet 30 MG tablet   Commonly known as:  SENSIPAR           cloNIDine 0.1 MG tablet   Commonly known as:  CATAPRES           cycloSPORINE modified 25 MG capsule   Commonly known as:  GENERIC EQUIVALENT           hydrALAZINE 25 MG tablet   Commonly known as:  APRESOLINE           insulin glargine 100 UNIT/ML pen           insulin lispro 100 UNIT/ML injection   Commonly known as:  HumaLOG PEN           mycophenolic acid 180 MG EC tablet   Commonly known as:  GENERIC EQUIVALENT   Replaced by:  mycophenolic acid 360 MG EC tablet           sodium bicarbonate 650 MG tablet                Where to get your medicines      These medications were sent to [facility] [place], [place] - [address] [address]     Phone:  [phone]     acetaminophen 325 MG tablet    aspirin 81 MG EC tablet    carvedilol 6.25 MG tablet    Lidocaine 4 % Patch    magnesium oxide 400 MG tablet    mycophenolic acid 360 MG EC tablet    NIFEdipine ER osmotic 30 MG 24 hr tablet    nystatin 022487 UNIT/ML suspension    ondansetron 4 MG ODT tab    phosphorus tablet 250 mg 250 MG per tablet    polyethylene glycol powder    prochlorperazine 5 MG tablet    sennosides 8.6 MG tablet    sulfamethoxazole-trimethoprim 400-80 MG per tablet    tacrolimus 0.5 MG capsule     tacrolimus 0.5 MG capsule    tacrolimus 1 MG capsule    valGANciclovir 450 MG tablet         Some of these will need a paper prescription and others can be bought over the counter. Ask your nurse if you have questions.     Bring a paper prescription for each of these medications     calcium carbonate 500 mg-vitamin D 200 units 500-200 MG-UNIT per tablet    oxyCODONE IR 5 MG tablet       You don't need a prescription for these medications     HYDROcodone-acetaminophen 5-325 MG per tablet                Protect others around you: Learn how to safely use, store and throw away your medicines at www.disposemymeds.org.        ANTIBIOTIC INSTRUCTION     You've Been Prescribed an Antibiotic - Now What?  Your healthcare team thinks that you or your loved one might have an infection. Some infections can be treated with antibiotics, which are powerful, life-saving drugs. Like all medications, antibiotics have side effects and should only be used when necessary. There are some important things you should know about your antibiotic treatment.      Your healthcare team may run tests before you start taking an antibiotic.    Your team may take samples (e.g., from your blood, urine or other areas) to run tests to look for bacteria. These test can be important to determine if you need an antibiotic at all and, if you do, which antibiotic will work best.      Within a few days, your healthcare team might change or even stop your antibiotic.    Your team may start you on an antibiotic while they are working to find out what is making you sick.    Your team might change your antibiotic because test results show that a different antibiotic would be better to treat your infection.    In some cases, once your team has more information, they learn that you do not need an antibiotic at all. They may find out that you don't have an infection, or that the antibiotic you're taking won't work against your infection. For example, an infection  caused by a virus can't be treated with antibiotics. Staying on an antibiotic when you don't need it is more likely to be harmful than helpful.      You may experience side effects from your antibiotic.    Like all medications, antibiotics have side effects. Some of these can be serious.    Let you healthcare team know if you have any known allergies when you are admitted to the hospital.    One significant side effect of nearly all antibiotics is the risk of severe and sometimes deadly diarrhea caused by Clostridium difficile (C. Difficile). This occurs when a person takes antibiotics because some good germs are destroyed. Antibiotic use allows C. diificile to take over, putting patients at high risk for this serious infection.    As a patient or caregiver, it is important to understand your or your loved one's antibiotic treatment. It is especially important for caregivers to speak up when patients can't speak for themselves. Here are some important questions to ask your healthcare team.    What infection is this antibiotic treating and how do you know I have that infection?    What side effects might occur from this antibiotic?    How long will I need to take this antibiotic?    Is it safe to take this antibiotic with other medications or supplements (e.g., vitamins) that I am taking?     Are there any special directions I need to know about taking this antibiotic? For example, should I take it with food?    How will I be monitored to know whether my infection is responding to the antibiotic?    What tests may help to make sure the right antibiotic is prescribed for me?      Information provided by:  www.cdc.gov/getsmart  U.S. Department of Health and Human Services  Centers for disease Control and Prevention  National Center for Emerging and Zoonotic Infectious Diseases  Division of Healthcare Quality Promotion        Information about OPIOIDS     PRESCRIPTION OPIOIDS: WHAT YOU NEED TO KNOW   We gave you an  opioid (narcotic) pain medicine. It is important to manage your pain, but opioids are not always the best choice. You should first try all the other options your care team gave you. Take this medicine for as short a time (and as few doses) as possible.    Some activities can increase your pain, such as bandage changes or therapy sessions. It may help to take your pain medicine 30 to 60 minutes before these activities. Reduce your stress by getting enough sleep, working on hobbies you enjoy and practicing relaxation or meditation. Talk to your care team about ways to manage your pain beyond prescription opioids.    These medicines have risks:    DO NOT drive when on new or higher doses of pain medicine. These medicines can affect your alertness and reaction times, and you could be arrested for driving under the influence (DUI). If you need to use opioids long-term, talk to your care team about driving.    DO NOT operate heavy machinery    DO NOT do any other dangerous activities while taking these medicines.    DO NOT drink any alcohol while taking these medicines.     If the opioid prescribed includes acetaminophen, DO NOT take with any other medicines that contain acetaminophen. Read all labels carefully. Look for the word  acetaminophen  or  Tylenol.  Ask your pharmacist if you have questions or are unsure.    You can get addicted to pain medicines, especially if you have a history of addiction (chemical, alcohol or substance dependence). Talk to your care team about ways to reduce this risk.    All opioids tend to cause constipation. Drink plenty of water and eat foods that have a lot of fiber, such as fruits, vegetables, prune juice, apple juice and high-fiber cereal. Take a laxative (Miralax, milk of magnesia, Colace, Senna) if you don t move your bowels at least every other day. Other side effects include upset stomach, sleepiness, dizziness, throwing up, tolerance (needing more of the medicine to have the  same effect), physical dependence and slowed breathing.    Store your pills in a secure place, locked if possible. We will not replace any lost or stolen medicine. If you don t finish your medicine, please throw away (dispose) as directed by your pharmacist. The [place] [employer] has more information about safe disposal: https://[email or web address].[facility].mn.us/living-green/managing-unwanted-medications             Medication List: This is a list of all your medications and when to take them. Check marks below indicate your daily home schedule. Keep this list as a reference.      Medications           Morning Afternoon Evening Bedtime As Needed    acetaminophen 325 MG tablet   Commonly known as:  TYLENOL   Take 3 tablets (975 mg) by mouth every 8 hours as needed for mild pain or fever (DO NOT USE WITH NORCO)   Last time this was given:  975 mg on 11/20/2018  5:06 AM                                aspirin 81 MG EC tablet   Take 1 tablet (81 mg) by mouth daily   Start taking on:  11/21/2018   Last time this was given:  81 mg on 11/20/2018  7:52 AM                                atorvastatin 10 MG tablet   Commonly known as:  LIPITOR   Take 0.5 tablets (5 mg) by mouth daily   Last time this was given:  5 mg on 11/19/2018  8:24 PM                                blood glucose monitoring test strip   Commonly known as:  no brand specified   Use to test blood sugar 4 times daily or as directed.                                calcitRIOL 0.25 MCG capsule   Commonly known as:  ROCALTROL   Take 1 capsule (0.25 mcg) by mouth daily   Last time this was given:  0.25 mcg on 11/20/2018  7:52 AM                                calcium carbonate 500 mg-vitamin D 200 units 500-200 MG-UNIT per tablet   Commonly known as:  OSCAL with D;OYSTER SHELL CALCIUM   Take 1 tablet by mouth 2 times daily (with meals)   Last time this was given:  1 tablet on 11/20/2018  7:52 AM                                carvedilol 6.25 MG tablet   Commonly  known as:  COREG   Take 1 tablet (6.25 mg) by mouth 2 times daily   Last time this was given:  6.25 mg on 11/20/2018  7:51 AM                                cholecalciferol 1000 units capsule   Commonly known as:  vitamin  -D   Take 2 capsules (2,000 Units) by mouth daily                                HYDROcodone-acetaminophen 5-325 MG per tablet   Commonly known as:  NORCO   Stop taking until you have completed your oxycodone taper. DO NOT TAKE WITH OXYCODONE.   Last time this was given:  2 tablets on 11/11/2018 11:58 PM                                Lidocaine 4 % Patch   Commonly known as:  LIDOCARE   Place 2 patches onto the skin every 24 hours As needed for pain   Last time this was given:  2 patches on 11/19/2018  8:24 PM                                magnesium oxide 400 MG tablet   Commonly known as:  MAG-OX   Take 1 tablet (400 mg) by mouth 3 times daily   Last time this was given:  400 mg on 11/20/2018  9:13 AM                                mycophenolic acid 360 MG EC tablet   Commonly known as:  GENERIC EQUIVALENT   Take 2 tablets (720 mg) by mouth 2 times daily                                NIFEdipine ER osmotic 30 MG 24 hr tablet   Commonly known as:  PROCARDIA XL   Take 1 tablet (30 mg) by mouth 2 times daily   Last time this was given:  30 mg on 11/20/2018  7:52 AM                                nystatin 530428 UNIT/ML suspension   Commonly known as:  MYCOSTATIN   Take 5 mLs (500,000 Units) by mouth 4 times daily   Last time this was given:  500,000 Units on 11/20/2018 12:43 PM                                ondansetron 4 MG ODT tab   Commonly known as:  ZOFRAN-ODT   Take 1 tablet (4 mg) by mouth every 6 hours as needed for nausea or vomiting                                oxyCODONE IR 5 MG tablet   Commonly known as:  ROXICODONE   Take 10-12.5mg (2-2.5 tabs) every 4 hours as needed for pain on 11/20-11/22 Take 5-10mg (1-2 tabs) every 4 hours as needed for pain on 11/23-11/25 Take 5-7.5mg (1-1.5  tabs) every 4 hours as needed for pain on 11/26-11/28 Starting 11/29 you may restart your regular dose of Norco. DO NOT USE OXYCODONE AND NORCO AT THE SAME TIME.   Last time this was given:  15 mg on 11/20/2018  9:13 AM                                phosphorus tablet 250 mg 250 MG per tablet   Commonly known as:  PHOSPHA 250 NEUTRAL   Take 2 tablets (500 mg) by mouth 2 times daily   Last time this was given:  500 mg on 11/20/2018  7:52 AM                                polyethylene glycol powder   Commonly known as:  MIRALAX   Take 17 g (1 capful) by mouth 2 times daily Hold for loose stools                                prochlorperazine 5 MG tablet   Commonly known as:  COMPAZINE   Take 1 tablet (5 mg) by mouth every 6 hours as needed for nausea or vomiting                                sennosides 8.6 MG tablet   Commonly known as:  SENOKOT   Take 2 tablets by mouth 2 times daily Hold for loose stools   Last time this was given:  8.6 mg on 11/20/2018  7:52 AM                                sulfamethoxazole-trimethoprim 400-80 MG per tablet   Commonly known as:  BACTRIM/SEPTRA   Take 1 tablet by mouth daily   Start taking on:  11/21/2018   Last time this was given:  1 tablet on 11/20/2018  7:52 AM                                * tacrolimus 1 MG capsule   Commonly known as:  GENERIC EQUIVALENT   Take 3 capsules (3 mg) by mouth 2 times daily Take with 0.5mg caps for a total of 3.5mg twice daily   Last time this was given:  3.5 mg on 11/20/2018  7:51 AM                                * tacrolimus 0.5 MG capsule   Commonly known as:  GENERIC EQUIVALENT   Take 1 capsule (0.5 mg) by mouth 2 times daily Take with 1mg caps for a total of 3.5mg twice daily   Last time this was given:  3.5 mg on 11/20/2018  7:51 AM                                * tacrolimus 0.5 MG capsule   Commonly known as:  GENERIC EQUIVALENT   Take 1 cap by mouth twice daily as directed by Transplant Center for dose changes   Last time this was  given:  3.5 mg on 11/20/2018  7:51 AM                                valGANciclovir 450 MG tablet   Commonly known as:  VALCYTE   Take 2 tablets (900 mg) by mouth daily   Start taking on:  11/21/2018   Last time this was given:  900 mg on 11/20/2018  7:52 AM                                * Notice:  This list has 3 medication(s) that are the same as other medications prescribed for you. Read the directions carefully, and ask your doctor or other care provider to review them with you.

## 2018-11-11 NOTE — IP AVS SNAPSHOT
[address] [address]    Phone:  [phone]                                       After Visit Summary   11/11/2018    Amadou Lewis    MRN: [id number]           After Visit Summary Signature Page     I have received my discharge instructions, and my questions have been answered. I have discussed any challenges I see with this plan with the nurse or doctor.    ..........................................................................................................................................  Patient/Patient Representative Signature      ..........................................................................................................................................  Patient Representative Print Name and Relationship to Patient    ..................................................               ................................................  Date                                   Time    ..........................................................................................................................................  Reviewed by Signature/Title    ...................................................              ..............................................  Date                                               Time          22EPIC Rev 08/18

## 2018-11-11 NOTE — LETTER
Transition Communication Hand-off for Care Transitions to Next Level of Care Provider    Name: Amadou Lewis  : 1965  MRN #: [id number]  Primary Care Provider: Masoud Valentin MD     Primary Clinic: [facility] [address] [place] [address]     Reason for Hospitalization:  Pancreas transplanted (H) [Z94.83]  Admit Date/Time: 2018  8:34 AM  Discharge Date: 18  Payor Source: Payor: [employer] / Plan: [employer] HP TRANSPLANT / Product Type: PPO /              Reason for Communication Hand-off Referral: Other SPK    Discharge Plan:       Concern for non-adherence with plan of care:   Y/N N  Discharge Needs Assessment:  Needs       Most Recent Value    Equipment Currently Used at Home glucometer    Home Care [facility] [phone], Fax: [phone]            Follow-up specialty is recommended: Yes    Follow-up plan:  Future Appointments  Date Time Provider Department Center   2018 7:00 AM [facility]ION [facility]   2018 7:00 AM [facility] INFUSION [facility]   2018 8:00 AM Jose L De Los Santos MD INPR [facility]   12/3/2018 2:30 PM Monique Luevano MD TX[facility]   12/10/2018 9:30 AM Transplant, [facility]   2018 9:00 AM Sandy Cole, NP [facility]   2019 9:30 AM Luke Tesfaye, [facility]   2019 11:00 AM Transplant, [facility]   3/4/2019 11:00 AM Transplant, [facility]   2019 11:00 AM Transplant, [facility]       Any outstanding tests or procedures:        Referrals     Future Labs/Procedures    Home care nursing referral     Comments:    ___________________________________________________  [facility]  Phone: [phone]  Fax: [phone]  ___________________________________________________        RN skilled nursing visits--to begin when [facility] clinic([phone] visits are complete--approx visit start on  or  "11/28     RN to assess vital signs and weight, respiratory and cardiac status, patients ability to take and record daily blood pressure, temp and weight, pain level and activity tolerance, incision for signs/symptoms of infection, hydration, nutrition and bowel status and home safety.    RN to teach medication management.   Assist / teach patient to obtain and record lab results in handbook       RN to provide morning lab draws, every M-W-F and report results to Outpatient Care Coordinator: Alesha \"Zora\" Rosalva  Ph: [phone]  Fax: [phone]        Your provider has ordered home care nursing services. If you have not been contacted within 2 days of your discharge please call the inpatient department phone number at [phone] .            [employer] Recommendations:      Charlotte Arteaga    AVS/Discharge Summary is the source of truth; this is a helpful guide for improved communication of patient story          "

## 2018-11-11 NOTE — Clinical Note
Patient currently admitted for a local  offer. This offer was turned down upon visualization, fatty nodules noted. He will remain in the hospital, subsequently received another offer.
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
## 2018-11-11 NOTE — ANESTHESIA PREPROCEDURE EVALUATION
Anesthesia Evaluation     . Pt has had prior anesthetic. Type: General and MAC    No history of anesthetic complications          ROS/MED HX    ENT/Pulmonary:  - neg pulmonary ROS     Neurologic:  - neg neurologic ROS     Cardiovascular:     (+) hypertension--CAD, --stent,. : . CHF Last EF: 45% . . :. .       METS/Exercise Tolerance:     Hematologic:  - neg hematologic  ROS       Musculoskeletal:  - neg musculoskeletal ROS       GI/Hepatic:  - neg GI/hepatic ROS       Renal/Genitourinary: Comment: On Dialysis 3x a week    (+) chronic renal disease, type: ESRD, Pt does not require dialysis, Pt has history of transplant, date: 10/2013,       Endo:     (+) type II DM Using insulin .      Psychiatric:  - neg psychiatric ROS       Infectious Disease:  - neg infectious disease ROS       Malignancy:      - no malignancy   Other:    (+) No chance of pregnancy C-spine cleared: N/A, H/O Chronic Pain,no other significant disability                    Physical Exam  Normal systems: cardiovascular, pulmonary and dental    Airway   Mallampati: I  TM distance: >3 FB  Neck ROM: full    Dental     Cardiovascular   Rhythm and rate: regular and normal      Pulmonary                     Anesthesia Plan      History & Physical Review  History and physical reviewed and following examination; no interval change.    ASA Status:  3 .        Plan for General with Propofol and Intravenous induction. Maintenance will be TIVA and Inhalation.      Additional equipment: Videolaryngoscope, Arterial Line, 2nd IV and Central Line      Postoperative Care      Consents  Anesthetic plan, risks, benefits and alternatives discussed with:  Patient or representative..          ANGELICA FV AN PHYSICAL EXAM    Assessment:   ASA SCORE: 3

## 2018-11-11 NOTE — PLAN OF CARE
Problem: Patient Care Overview  Goal: Plan of Care/Patient Progress Review  Outcome: No Change  Pt. Was admitted from home (called at work) for a kidney/pancreas transplant.  Labs, CXR, EKG, CT of abdomen and some pre-op teaching comepleted.  Hypertensive on arrival to the unit. Orthostatic blood pressures were negative.  Took ordered BP meds. Pt. Then had BP in the normal range.  Took 16 units of long-acting insulin at 0400 this morning at home.  Also took 2 units of Novolog.   Blood sugars ranged from 244 to 153; no additional insulin was given.  Report called to PACU.  PACU nurse called to say transplant for today was cancelled; pt. Aware and now back on [address].

## 2018-11-12 ENCOUNTER — APPOINTMENT (OUTPATIENT)
Dept: ULTRASOUND IMAGING | Facility: CLINIC | Age: 53
DRG: 008 | End: 2018-11-12
Attending: SURGERY
Payer: COMMERCIAL

## 2018-11-12 ENCOUNTER — ANESTHESIA (OUTPATIENT)
Dept: SURGERY | Facility: CLINIC | Age: 53
DRG: 008 | End: 2018-11-12
Payer: COMMERCIAL

## 2018-11-12 ENCOUNTER — APPOINTMENT (OUTPATIENT)
Dept: GENERAL RADIOLOGY | Facility: CLINIC | Age: 53
DRG: 008 | End: 2018-11-12
Attending: ANESTHESIOLOGY
Payer: COMMERCIAL

## 2018-11-12 ENCOUNTER — DOCUMENTATION ONLY (OUTPATIENT)
Dept: TRANSPLANT | Facility: CLINIC | Age: 53
End: 2018-11-12

## 2018-11-12 ENCOUNTER — ANESTHESIA EVENT (OUTPATIENT)
Dept: SURGERY | Facility: CLINIC | Age: 53
DRG: 008 | End: 2018-11-12
Payer: COMMERCIAL

## 2018-11-12 PROBLEM — Z01.818 PRE-TRANSPLANT EVALUATION FOR KIDNEY AND PANCREAS TRANSPLANT: Status: ACTIVE | Noted: 2018-11-12

## 2018-11-12 LAB
AMYLASE SERPL-CCNC: 330 U/L (ref 30–110)
AMYLASE SERPL-CCNC: 352 U/L (ref 30–110)
ANION GAP SERPL CALCULATED.3IONS-SCNC: 13 MMOL/L (ref 3–14)
ANISOCYTOSIS BLD QL SMEAR: SLIGHT
APTT PPP: 33 SEC (ref 22–37)
APTT PPP: 34 SEC (ref 22–37)
BASE DEFICIT BLDA-SCNC: 7 MMOL/L
BASE DEFICIT BLDA-SCNC: 7.7 MMOL/L
BASE DEFICIT BLDA-SCNC: 8 MMOL/L
BASE DEFICIT BLDA-SCNC: 8.7 MMOL/L
BASOPHILS # BLD AUTO: 0 10E9/L (ref 0–0.2)
BASOPHILS # BLD AUTO: 0 10E9/L (ref 0–0.2)
BASOPHILS NFR BLD AUTO: 0 %
BASOPHILS NFR BLD AUTO: 0 %
BLD PROD TYP BPU: NORMAL
BLD PROD TYP BPU: NORMAL
BLD UNIT ID BPU: 0
BLD UNIT ID BPU: 0
BLOOD PRODUCT CODE: NORMAL
BLOOD PRODUCT CODE: NORMAL
BPU ID: NORMAL
BPU ID: NORMAL
BUN SERPL-MCNC: 57 MG/DL (ref 7–30)
CA-I BLD-MCNC: 4.4 MG/DL (ref 4.4–5.2)
CA-I BLD-MCNC: 4.6 MG/DL (ref 4.4–5.2)
CA-I BLD-MCNC: 4.7 MG/DL (ref 4.4–5.2)
CA-I BLD-MCNC: 4.8 MG/DL (ref 4.4–5.2)
CALCIUM SERPL-MCNC: 8.1 MG/DL (ref 8.5–10.1)
CHLORIDE SERPL-SCNC: 107 MMOL/L (ref 94–109)
CO2 SERPL-SCNC: 18 MMOL/L (ref 20–32)
CREAT SERPL-MCNC: 5.02 MG/DL (ref 0.66–1.25)
DIFFERENTIAL METHOD BLD: ABNORMAL
DIFFERENTIAL METHOD BLD: ABNORMAL
EOSINOPHIL # BLD AUTO: 0 10E9/L (ref 0–0.7)
EOSINOPHIL # BLD AUTO: 0 10E9/L (ref 0–0.7)
EOSINOPHIL NFR BLD AUTO: 0 %
EOSINOPHIL NFR BLD AUTO: 0.1 %
ERYTHROCYTE [DISTWIDTH] IN BLOOD BY AUTOMATED COUNT: 13.5 % (ref 10–15)
ERYTHROCYTE [DISTWIDTH] IN BLOOD BY AUTOMATED COUNT: 13.5 % (ref 10–15)
GFR SERPL CREATININE-BSD FRML MDRD: 12 ML/MIN/1.7M2
GLUCOSE BLD-MCNC: 128 MG/DL (ref 70–99)
GLUCOSE BLD-MCNC: 215 MG/DL (ref 70–99)
GLUCOSE BLD-MCNC: 84 MG/DL (ref 70–99)
GLUCOSE BLD-MCNC: 92 MG/DL (ref 70–99)
GLUCOSE BLDC GLUCOMTR-MCNC: 100 MG/DL (ref 70–99)
GLUCOSE BLDC GLUCOMTR-MCNC: 106 MG/DL (ref 70–99)
GLUCOSE BLDC GLUCOMTR-MCNC: 106 MG/DL (ref 70–99)
GLUCOSE BLDC GLUCOMTR-MCNC: 108 MG/DL (ref 70–99)
GLUCOSE BLDC GLUCOMTR-MCNC: 111 MG/DL (ref 70–99)
GLUCOSE BLDC GLUCOMTR-MCNC: 112 MG/DL (ref 70–99)
GLUCOSE BLDC GLUCOMTR-MCNC: 120 MG/DL (ref 70–99)
GLUCOSE BLDC GLUCOMTR-MCNC: 172 MG/DL (ref 70–99)
GLUCOSE BLDC GLUCOMTR-MCNC: 187 MG/DL (ref 70–99)
GLUCOSE BLDC GLUCOMTR-MCNC: 206 MG/DL (ref 70–99)
GLUCOSE BLDC GLUCOMTR-MCNC: 76 MG/DL (ref 70–99)
GLUCOSE BLDC GLUCOMTR-MCNC: 85 MG/DL (ref 70–99)
GLUCOSE BLDC GLUCOMTR-MCNC: 88 MG/DL (ref 70–99)
GLUCOSE BLDC GLUCOMTR-MCNC: 92 MG/DL (ref 70–99)
GLUCOSE BLDC GLUCOMTR-MCNC: 97 MG/DL (ref 70–99)
GLUCOSE BLDC GLUCOMTR-MCNC: 97 MG/DL (ref 70–99)
GLUCOSE BLDC GLUCOMTR-MCNC: 98 MG/DL (ref 70–99)
GLUCOSE SERPL-MCNC: 97 MG/DL (ref 70–99)
HBV CORE AB SERPL QL IA: NONREACTIVE
HBV SURFACE AG SERPL QL IA: NONREACTIVE
HCO3 BLD-SCNC: 17 MMOL/L (ref 21–28)
HCO3 BLD-SCNC: 18 MMOL/L (ref 21–28)
HCT VFR BLD AUTO: 30.9 % (ref 40–53)
HCT VFR BLD AUTO: 31.8 % (ref 40–53)
HCV AB SERPL QL IA: NONREACTIVE
HGB BLD-MCNC: 10.1 G/DL (ref 13.3–17.7)
HGB BLD-MCNC: 10.4 G/DL (ref 13.3–17.7)
HGB BLD-MCNC: 10.4 G/DL (ref 13.3–17.7)
HGB BLD-MCNC: 8.8 G/DL (ref 13.3–17.7)
HGB BLD-MCNC: 9.3 G/DL (ref 13.3–17.7)
HGB BLD-MCNC: 9.8 G/DL (ref 13.3–17.7)
HIV 1+2 AB+HIV1 P24 AG SERPL QL IA: NONREACTIVE
IMM GRANULOCYTES # BLD: 0 10E9/L (ref 0–0.4)
IMM GRANULOCYTES NFR BLD: 0.1 %
INR PPP: 1.31 (ref 0.86–1.14)
INR PPP: 1.41 (ref 0.86–1.14)
LIPASE SERPL-CCNC: 1927 U/L (ref 73–393)
LIPASE SERPL-CCNC: 2878 U/L (ref 73–393)
LYMPHOCYTES # BLD AUTO: 0 10E9/L (ref 0.8–5.3)
LYMPHOCYTES # BLD AUTO: 0.2 10E9/L (ref 0.8–5.3)
LYMPHOCYTES NFR BLD AUTO: 0 %
LYMPHOCYTES NFR BLD AUTO: 1.1 %
MACROCYTES BLD QL SMEAR: PRESENT
MAGNESIUM SERPL-MCNC: 1.5 MG/DL (ref 1.6–2.3)
MAGNESIUM SERPL-MCNC: 1.6 MG/DL (ref 1.6–2.3)
MCH RBC QN AUTO: 28.3 PG (ref 26.5–33)
MCH RBC QN AUTO: 28.7 PG (ref 26.5–33)
MCHC RBC AUTO-ENTMCNC: 31.7 G/DL (ref 31.5–36.5)
MCHC RBC AUTO-ENTMCNC: 31.8 G/DL (ref 31.5–36.5)
MCV RBC AUTO: 89 FL (ref 78–100)
MCV RBC AUTO: 90 FL (ref 78–100)
MONOCYTES # BLD AUTO: 0.2 10E9/L (ref 0–1.3)
MONOCYTES # BLD AUTO: 0.3 10E9/L (ref 0–1.3)
MONOCYTES NFR BLD AUTO: 0.9 %
MONOCYTES NFR BLD AUTO: 2.5 %
NEUTROPHILS # BLD AUTO: 12.9 10E9/L (ref 1.6–8.3)
NEUTROPHILS # BLD AUTO: 19.2 10E9/L (ref 1.6–8.3)
NEUTROPHILS NFR BLD AUTO: 96.2 %
NEUTROPHILS NFR BLD AUTO: 99.1 %
NRBC # BLD AUTO: 0 10*3/UL
NRBC BLD AUTO-RTO: 0 /100
O2/TOTAL GAS SETTING VFR VENT: 44 %
O2/TOTAL GAS SETTING VFR VENT: 49 %
O2/TOTAL GAS SETTING VFR VENT: 60 %
O2/TOTAL GAS SETTING VFR VENT: 60 %
PCO2 BLD: 33 MM HG (ref 35–45)
PCO2 BLD: 33 MM HG (ref 35–45)
PCO2 BLD: 36 MM HG (ref 35–45)
PCO2 BLD: 38 MM HG (ref 35–45)
PH BLD: 7.29 PH (ref 7.35–7.45)
PH BLD: 7.3 PH (ref 7.35–7.45)
PH BLD: 7.31 PH (ref 7.35–7.45)
PH BLD: 7.35 PH (ref 7.35–7.45)
PHOSPHATE SERPL-MCNC: 3.8 MG/DL (ref 2.5–4.5)
PHOSPHATE SERPL-MCNC: 4.1 MG/DL (ref 2.5–4.5)
PLATELET # BLD AUTO: 169 10E9/L (ref 150–450)
PLATELET # BLD AUTO: 216 10E9/L (ref 150–450)
PLATELET # BLD EST: ABNORMAL 10*3/UL
PO2 BLD: 126 MM HG (ref 80–105)
PO2 BLD: 136 MM HG (ref 80–105)
PO2 BLD: 185 MM HG (ref 80–105)
PO2 BLD: 82 MM HG (ref 80–105)
POTASSIUM BLD-SCNC: 3.6 MMOL/L (ref 3.4–5.3)
POTASSIUM BLD-SCNC: 3.7 MMOL/L (ref 3.4–5.3)
POTASSIUM BLD-SCNC: 4.3 MMOL/L (ref 3.4–5.3)
POTASSIUM BLD-SCNC: 4.9 MMOL/L (ref 3.4–5.3)
POTASSIUM SERPL-SCNC: 3.8 MMOL/L (ref 3.4–5.3)
POTASSIUM SERPL-SCNC: 4.2 MMOL/L (ref 3.4–5.3)
POTASSIUM SERPL-SCNC: 4.6 MMOL/L (ref 3.4–5.3)
RBC # BLD AUTO: 3.46 10E12/L (ref 4.4–5.9)
RBC # BLD AUTO: 3.52 10E12/L (ref 4.4–5.9)
SODIUM BLD-SCNC: 133 MMOL/L (ref 133–144)
SODIUM BLD-SCNC: 133 MMOL/L (ref 133–144)
SODIUM BLD-SCNC: 135 MMOL/L (ref 133–144)
SODIUM BLD-SCNC: 136 MMOL/L (ref 133–144)
SODIUM SERPL-SCNC: 138 MMOL/L (ref 133–144)
TRANSFUSION STATUS PATIENT QL: NORMAL
WBC # BLD AUTO: 13.4 10E9/L (ref 4–11)
WBC # BLD AUTO: 19.4 10E9/L (ref 4–11)

## 2018-11-12 PROCEDURE — 0DTJ0ZZ RESECTION OF APPENDIX, OPEN APPROACH: ICD-10-PCS | Performed by: SURGERY

## 2018-11-12 PROCEDURE — 27210995 ZZH RX 272: Performed by: NURSE ANESTHETIST, CERTIFIED REGISTERED

## 2018-11-12 PROCEDURE — 0TY10Z0 TRANSPLANTATION OF LEFT KIDNEY, ALLOGENEIC, OPEN APPROACH: ICD-10-PCS | Performed by: SURGERY

## 2018-11-12 PROCEDURE — 25000125 ZZHC RX 250: Performed by: STUDENT IN AN ORGANIZED HEALTH CARE EDUCATION/TRAINING PROGRAM

## 2018-11-12 PROCEDURE — 80048 BASIC METABOLIC PNL TOTAL CA: CPT | Performed by: STUDENT IN AN ORGANIZED HEALTH CARE EDUCATION/TRAINING PROGRAM

## 2018-11-12 PROCEDURE — 82803 BLOOD GASES ANY COMBINATION: CPT | Performed by: SURGERY

## 2018-11-12 PROCEDURE — P9041 ALBUMIN (HUMAN),5%, 50ML: HCPCS | Performed by: NURSE ANESTHETIST, CERTIFIED REGISTERED

## 2018-11-12 PROCEDURE — 83735 ASSAY OF MAGNESIUM: CPT | Performed by: SURGERY

## 2018-11-12 PROCEDURE — P9016 RBC LEUKOCYTES REDUCED: HCPCS | Performed by: STUDENT IN AN ORGANIZED HEALTH CARE EDUCATION/TRAINING PROGRAM

## 2018-11-12 PROCEDURE — 25000125 ZZHC RX 250: Performed by: SURGERY

## 2018-11-12 PROCEDURE — 83735 ASSAY OF MAGNESIUM: CPT | Performed by: STUDENT IN AN ORGANIZED HEALTH CARE EDUCATION/TRAINING PROGRAM

## 2018-11-12 PROCEDURE — 81200001 ZZH ACQUISITION KIDNEY CADAVER OF SPK

## 2018-11-12 PROCEDURE — 82947 ASSAY GLUCOSE BLOOD QUANT: CPT | Performed by: ANESTHESIOLOGY

## 2018-11-12 PROCEDURE — 83690 ASSAY OF LIPASE: CPT | Performed by: STUDENT IN AN ORGANIZED HEALTH CARE EDUCATION/TRAINING PROGRAM

## 2018-11-12 PROCEDURE — 85610 PROTHROMBIN TIME: CPT | Performed by: SURGERY

## 2018-11-12 PROCEDURE — 0FYG0Z0 TRANSPLANTATION OF PANCREAS, ALLOGENEIC, OPEN APPROACH: ICD-10-PCS | Performed by: SURGERY

## 2018-11-12 PROCEDURE — 0T770DZ DILATION OF LEFT URETER WITH INTRALUMINAL DEVICE, OPEN APPROACH: ICD-10-PCS | Performed by: SURGERY

## 2018-11-12 PROCEDURE — 40000170 ZZH STATISTIC PRE-PROCEDURE ASSESSMENT II: Performed by: SURGERY

## 2018-11-12 PROCEDURE — 84295 ASSAY OF SERUM SODIUM: CPT | Performed by: ANESTHESIOLOGY

## 2018-11-12 PROCEDURE — 25000565 ZZH ISOFLURANE, EA 15 MIN: Performed by: SURGERY

## 2018-11-12 PROCEDURE — 71000017 ZZH RECOVERY PHASE 1 LEVEL 3 EA ADDTL HR: Performed by: SURGERY

## 2018-11-12 PROCEDURE — 76705 ECHO EXAM OF ABDOMEN: CPT

## 2018-11-12 PROCEDURE — 25000128 H RX IP 250 OP 636: Performed by: STUDENT IN AN ORGANIZED HEALTH CARE EDUCATION/TRAINING PROGRAM

## 2018-11-12 PROCEDURE — 25000125 ZZHC RX 250: Performed by: NURSE ANESTHETIST, CERTIFIED REGISTERED

## 2018-11-12 PROCEDURE — 85610 PROTHROMBIN TIME: CPT | Performed by: STUDENT IN AN ORGANIZED HEALTH CARE EDUCATION/TRAINING PROGRAM

## 2018-11-12 PROCEDURE — 81200014 ZZH ACQUISITION PANCREAS CADAVER OF SPK

## 2018-11-12 PROCEDURE — 25000128 H RX IP 250 OP 636: Performed by: NURSE ANESTHETIST, CERTIFIED REGISTERED

## 2018-11-12 PROCEDURE — 84132 ASSAY OF SERUM POTASSIUM: CPT | Performed by: ANESTHESIOLOGY

## 2018-11-12 PROCEDURE — 88304 TISSUE EXAM BY PATHOLOGIST: CPT | Performed by: SURGERY

## 2018-11-12 PROCEDURE — 25000131 ZZH RX MED GY IP 250 OP 636 PS 637: Performed by: SURGERY

## 2018-11-12 PROCEDURE — 25000128 H RX IP 250 OP 636: Performed by: SURGERY

## 2018-11-12 PROCEDURE — 36415 COLL VENOUS BLD VENIPUNCTURE: CPT | Performed by: ANESTHESIOLOGY

## 2018-11-12 PROCEDURE — 36592 COLLECT BLOOD FROM PICC: CPT | Performed by: SURGERY

## 2018-11-12 PROCEDURE — C2617 STENT, NON-COR, TEM W/O DEL: HCPCS | Performed by: SURGERY

## 2018-11-12 PROCEDURE — 83690 ASSAY OF LIPASE: CPT | Performed by: SURGERY

## 2018-11-12 PROCEDURE — 82150 ASSAY OF AMYLASE: CPT | Performed by: STUDENT IN AN ORGANIZED HEALTH CARE EDUCATION/TRAINING PROGRAM

## 2018-11-12 PROCEDURE — 40000275 ZZH STATISTIC RCP TIME EA 10 MIN

## 2018-11-12 PROCEDURE — 84295 ASSAY OF SERUM SODIUM: CPT | Performed by: SURGERY

## 2018-11-12 PROCEDURE — 85025 COMPLETE CBC W/AUTO DIFF WBC: CPT | Performed by: SURGERY

## 2018-11-12 PROCEDURE — 00000146 ZZHCL STATISTIC GLUCOSE BY METER IP

## 2018-11-12 PROCEDURE — 71045 X-RAY EXAM CHEST 1 VIEW: CPT

## 2018-11-12 PROCEDURE — 85025 COMPLETE CBC W/AUTO DIFF WBC: CPT | Performed by: STUDENT IN AN ORGANIZED HEALTH CARE EDUCATION/TRAINING PROGRAM

## 2018-11-12 PROCEDURE — 82947 ASSAY GLUCOSE BLOOD QUANT: CPT | Performed by: SURGERY

## 2018-11-12 PROCEDURE — 25000125 ZZHC RX 250

## 2018-11-12 PROCEDURE — 40000196 ZZH STATISTIC RAPCV CVP MONITORING

## 2018-11-12 PROCEDURE — 25000128 H RX IP 250 OP 636: Performed by: ANESTHESIOLOGY

## 2018-11-12 PROCEDURE — 82330 ASSAY OF CALCIUM: CPT | Performed by: ANESTHESIOLOGY

## 2018-11-12 PROCEDURE — 12000006 ZZH R&B IMCU INTERMEDIATE UMMC

## 2018-11-12 PROCEDURE — 76776 US EXAM K TRANSPL W/DOPPLER: CPT

## 2018-11-12 PROCEDURE — 37000009 ZZH ANESTHESIA TECHNICAL FEE, EACH ADDTL 15 MIN: Performed by: SURGERY

## 2018-11-12 PROCEDURE — 82330 ASSAY OF CALCIUM: CPT | Performed by: SURGERY

## 2018-11-12 PROCEDURE — 71000016 ZZH RECOVERY PHASE 1 LEVEL 3 FIRST HR: Performed by: SURGERY

## 2018-11-12 PROCEDURE — 27210794 ZZH OR GENERAL SUPPLY STERILE: Performed by: SURGERY

## 2018-11-12 PROCEDURE — 82150 ASSAY OF AMYLASE: CPT | Performed by: SURGERY

## 2018-11-12 PROCEDURE — 25000131 ZZH RX MED GY IP 250 OP 636 PS 637: Performed by: STUDENT IN AN ORGANIZED HEALTH CARE EDUCATION/TRAINING PROGRAM

## 2018-11-12 PROCEDURE — 82803 BLOOD GASES ANY COMBINATION: CPT | Performed by: ANESTHESIOLOGY

## 2018-11-12 PROCEDURE — 0WQF0ZZ REPAIR ABDOMINAL WALL, OPEN APPROACH: ICD-10-PCS | Performed by: SURGERY

## 2018-11-12 PROCEDURE — 84132 ASSAY OF SERUM POTASSIUM: CPT | Performed by: SURGERY

## 2018-11-12 PROCEDURE — 36000068 ZZH SURGERY LEVEL 5 1ST 30 MIN - UMMC: Performed by: SURGERY

## 2018-11-12 PROCEDURE — 85730 THROMBOPLASTIN TIME PARTIAL: CPT | Performed by: STUDENT IN AN ORGANIZED HEALTH CARE EDUCATION/TRAINING PROGRAM

## 2018-11-12 PROCEDURE — 88302 TISSUE EXAM BY PATHOLOGIST: CPT | Performed by: SURGERY

## 2018-11-12 PROCEDURE — 84100 ASSAY OF PHOSPHORUS: CPT | Performed by: STUDENT IN AN ORGANIZED HEALTH CARE EDUCATION/TRAINING PROGRAM

## 2018-11-12 PROCEDURE — 85730 THROMBOPLASTIN TIME PARTIAL: CPT | Performed by: SURGERY

## 2018-11-12 PROCEDURE — 40000014 ZZH STATISTIC ARTERIAL MONITORING DAILY

## 2018-11-12 PROCEDURE — 27210995 ZZH RX 272: Performed by: SURGERY

## 2018-11-12 PROCEDURE — 84100 ASSAY OF PHOSPHORUS: CPT | Performed by: SURGERY

## 2018-11-12 PROCEDURE — 25000132 ZZH RX MED GY IP 250 OP 250 PS 637: Performed by: STUDENT IN AN ORGANIZED HEALTH CARE EDUCATION/TRAINING PROGRAM

## 2018-11-12 PROCEDURE — 37000008 ZZH ANESTHESIA TECHNICAL FEE, 1ST 30 MIN: Performed by: SURGERY

## 2018-11-12 PROCEDURE — 36000070 ZZH SURGERY LEVEL 5 EA 15 ADDTL MIN - UMMC: Performed by: SURGERY

## 2018-11-12 PROCEDURE — 85018 HEMOGLOBIN: CPT | Performed by: STUDENT IN AN ORGANIZED HEALTH CARE EDUCATION/TRAINING PROGRAM

## 2018-11-12 DEVICE — STENT URETERAL SOFT UNIVERSA 5.0FRX18CM US-518 G53145
Type: IMPLANTABLE DEVICE | Site: URETER | Status: NON-FUNCTIONAL
Removed: 2018-12-20

## 2018-11-12 RX ORDER — FENTANYL CITRATE 50 UG/ML
25-50 INJECTION, SOLUTION INTRAMUSCULAR; INTRAVENOUS
Status: DISCONTINUED | OUTPATIENT
Start: 2018-11-12 | End: 2018-11-12 | Stop reason: HOSPADM

## 2018-11-12 RX ORDER — SODIUM CHLORIDE 9 MG/ML
INJECTION, SOLUTION INTRAVENOUS CONTINUOUS
Status: DISCONTINUED | OUTPATIENT
Start: 2018-11-12 | End: 2018-11-12 | Stop reason: HOSPADM

## 2018-11-12 RX ORDER — LIDOCAINE HYDROCHLORIDE 20 MG/ML
INJECTION, SOLUTION INFILTRATION; PERINEURAL PRN
Status: DISCONTINUED | OUTPATIENT
Start: 2018-11-12 | End: 2018-11-12

## 2018-11-12 RX ORDER — PROPOFOL 10 MG/ML
INJECTION, EMULSION INTRAVENOUS PRN
Status: DISCONTINUED | OUTPATIENT
Start: 2018-11-12 | End: 2018-11-12

## 2018-11-12 RX ORDER — ONDANSETRON 2 MG/ML
4 INJECTION INTRAMUSCULAR; INTRAVENOUS EVERY 6 HOURS PRN
Status: DISCONTINUED | OUTPATIENT
Start: 2018-11-12 | End: 2018-11-12 | Stop reason: HOSPADM

## 2018-11-12 RX ORDER — METOPROLOL TARTRATE 1 MG/ML
5 INJECTION, SOLUTION INTRAVENOUS EVERY 6 HOURS PRN
Status: DISCONTINUED | OUTPATIENT
Start: 2018-11-12 | End: 2018-11-12 | Stop reason: HOSPADM

## 2018-11-12 RX ORDER — SODIUM CHLORIDE, SODIUM LACTATE, POTASSIUM CHLORIDE, CALCIUM CHLORIDE 600; 310; 30; 20 MG/100ML; MG/100ML; MG/100ML; MG/100ML
INJECTION, SOLUTION INTRAVENOUS CONTINUOUS
Status: DISCONTINUED | OUTPATIENT
Start: 2018-11-12 | End: 2018-11-12 | Stop reason: HOSPADM

## 2018-11-12 RX ORDER — AMOXICILLIN 250 MG
2 CAPSULE ORAL 2 TIMES DAILY
Status: DISCONTINUED | OUTPATIENT
Start: 2018-11-12 | End: 2018-11-12 | Stop reason: HOSPADM

## 2018-11-12 RX ORDER — MANNITOL 20 G/100ML
INJECTION, SOLUTION INTRAVENOUS PRN
Status: DISCONTINUED | OUTPATIENT
Start: 2018-11-12 | End: 2018-11-12

## 2018-11-12 RX ORDER — NALOXONE HYDROCHLORIDE 0.4 MG/ML
.1-.4 INJECTION, SOLUTION INTRAMUSCULAR; INTRAVENOUS; SUBCUTANEOUS
Status: DISCONTINUED | OUTPATIENT
Start: 2018-11-12 | End: 2018-11-12 | Stop reason: HOSPADM

## 2018-11-12 RX ORDER — ALBUMIN, HUMAN INJ 5% 5 %
SOLUTION INTRAVENOUS CONTINUOUS PRN
Status: DISCONTINUED | OUTPATIENT
Start: 2018-11-12 | End: 2018-11-12

## 2018-11-12 RX ORDER — LABETALOL HYDROCHLORIDE 5 MG/ML
INJECTION, SOLUTION INTRAVENOUS PRN
Status: DISCONTINUED | OUTPATIENT
Start: 2018-11-12 | End: 2018-11-12

## 2018-11-12 RX ORDER — METOCLOPRAMIDE HYDROCHLORIDE 5 MG/ML
5 INJECTION INTRAMUSCULAR; INTRAVENOUS EVERY 6 HOURS PRN
Status: DISCONTINUED | OUTPATIENT
Start: 2018-11-12 | End: 2018-11-12 | Stop reason: HOSPADM

## 2018-11-12 RX ORDER — METOCLOPRAMIDE 5 MG/1
5 TABLET ORAL EVERY 6 HOURS PRN
Status: DISCONTINUED | OUTPATIENT
Start: 2018-11-12 | End: 2018-11-12 | Stop reason: HOSPADM

## 2018-11-12 RX ORDER — CALCIUM CHLORIDE 100 MG/ML
INJECTION INTRAVENOUS; INTRAVENTRICULAR PRN
Status: DISCONTINUED | OUTPATIENT
Start: 2018-11-12 | End: 2018-11-12

## 2018-11-12 RX ORDER — PIPERACILLIN SODIUM, TAZOBACTAM SODIUM 2; .25 G/10ML; G/10ML
2.25 INJECTION, POWDER, LYOPHILIZED, FOR SOLUTION INTRAVENOUS EVERY 6 HOURS
Status: DISCONTINUED | OUTPATIENT
Start: 2018-11-12 | End: 2018-11-14

## 2018-11-12 RX ORDER — SODIUM CHLORIDE 9 MG/ML
INJECTION, SOLUTION INTRAVENOUS CONTINUOUS PRN
Status: DISCONTINUED | OUTPATIENT
Start: 2018-11-12 | End: 2018-11-12

## 2018-11-12 RX ORDER — SULFAMETHOXAZOLE AND TRIMETHOPRIM 200; 40 MG/5ML; MG/5ML
10 SUSPENSION ORAL DAILY
Status: DISCONTINUED | OUTPATIENT
Start: 2018-11-12 | End: 2018-11-12 | Stop reason: HOSPADM

## 2018-11-12 RX ORDER — AMOXICILLIN 250 MG
1 CAPSULE ORAL 2 TIMES DAILY
Status: DISCONTINUED | OUTPATIENT
Start: 2018-11-12 | End: 2018-11-12 | Stop reason: HOSPADM

## 2018-11-12 RX ORDER — OCTREOTIDE ACETATE 100 UG/ML
100 INJECTION, SOLUTION INTRAVENOUS; SUBCUTANEOUS EVERY 12 HOURS
Status: COMPLETED | OUTPATIENT
Start: 2018-11-12 | End: 2018-11-17

## 2018-11-12 RX ORDER — FUROSEMIDE 10 MG/ML
INJECTION INTRAMUSCULAR; INTRAVENOUS PRN
Status: DISCONTINUED | OUTPATIENT
Start: 2018-11-12 | End: 2018-11-12

## 2018-11-12 RX ORDER — DEXTROSE MONOHYDRATE 25 G/50ML
25-50 INJECTION, SOLUTION INTRAVENOUS
Status: DISCONTINUED | OUTPATIENT
Start: 2018-11-12 | End: 2018-11-19

## 2018-11-12 RX ORDER — SODIUM CHLORIDE, SODIUM LACTATE, POTASSIUM CHLORIDE, CALCIUM CHLORIDE 600; 310; 30; 20 MG/100ML; MG/100ML; MG/100ML; MG/100ML
INJECTION, SOLUTION INTRAVENOUS CONTINUOUS PRN
Status: DISCONTINUED | OUTPATIENT
Start: 2018-11-12 | End: 2018-11-12

## 2018-11-12 RX ORDER — DEXTROSE MONOHYDRATE 25 G/50ML
25-50 INJECTION, SOLUTION INTRAVENOUS
Status: DISCONTINUED | OUTPATIENT
Start: 2018-11-12 | End: 2018-11-12 | Stop reason: HOSPADM

## 2018-11-12 RX ORDER — MAGNESIUM OXIDE 400 MG/1
400 TABLET ORAL EVERY MORNING
Status: DISCONTINUED | OUTPATIENT
Start: 2018-11-14 | End: 2018-11-17

## 2018-11-12 RX ORDER — MAGNESIUM HYDROXIDE 1200 MG/15ML
LIQUID ORAL
Status: COMPLETED
Start: 2018-11-12 | End: 2018-11-12

## 2018-11-12 RX ORDER — MAGNESIUM OXIDE 400 MG/1
400 TABLET ORAL EVERY MORNING
Status: DISCONTINUED | OUTPATIENT
Start: 2018-11-14 | End: 2018-11-12 | Stop reason: HOSPADM

## 2018-11-12 RX ORDER — NALOXONE HYDROCHLORIDE 0.4 MG/ML
.1-.4 INJECTION, SOLUTION INTRAMUSCULAR; INTRAVENOUS; SUBCUTANEOUS
Status: DISCONTINUED | OUTPATIENT
Start: 2018-11-12 | End: 2018-11-20 | Stop reason: HOSPADM

## 2018-11-12 RX ORDER — ONDANSETRON 2 MG/ML
4 INJECTION INTRAMUSCULAR; INTRAVENOUS EVERY 30 MIN PRN
Status: DISCONTINUED | OUTPATIENT
Start: 2018-11-12 | End: 2018-11-12 | Stop reason: HOSPADM

## 2018-11-12 RX ORDER — FUROSEMIDE 10 MG/ML
80 INJECTION INTRAMUSCULAR; INTRAVENOUS
Status: DISCONTINUED | OUTPATIENT
Start: 2018-11-12 | End: 2018-11-12 | Stop reason: HOSPADM

## 2018-11-12 RX ORDER — VALGANCICLOVIR 450 MG/1
450 TABLET, FILM COATED ORAL
Status: DISCONTINUED | OUTPATIENT
Start: 2018-11-12 | End: 2018-11-12 | Stop reason: HOSPADM

## 2018-11-12 RX ORDER — HYDROMORPHONE HYDROCHLORIDE 1 MG/ML
.3-.5 INJECTION, SOLUTION INTRAMUSCULAR; INTRAVENOUS; SUBCUTANEOUS EVERY 5 MIN PRN
Status: DISCONTINUED | OUTPATIENT
Start: 2018-11-12 | End: 2018-11-12 | Stop reason: HOSPADM

## 2018-11-12 RX ORDER — ONDANSETRON 4 MG/1
4 TABLET, ORALLY DISINTEGRATING ORAL EVERY 30 MIN PRN
Status: DISCONTINUED | OUTPATIENT
Start: 2018-11-12 | End: 2018-11-12 | Stop reason: HOSPADM

## 2018-11-12 RX ORDER — NICOTINE POLACRILEX 4 MG
15-30 LOZENGE BUCCAL
Status: DISCONTINUED | OUTPATIENT
Start: 2018-11-12 | End: 2018-11-19

## 2018-11-12 RX ORDER — MYCOPHENOLATE MOFETIL 200 MG/ML
1500 POWDER, FOR SUSPENSION ORAL 2 TIMES DAILY
Status: DISCONTINUED | OUTPATIENT
Start: 2018-11-12 | End: 2018-11-13

## 2018-11-12 RX ORDER — SODIUM CHLORIDE 9 MG/ML
INJECTION, SOLUTION INTRAVENOUS CONTINUOUS
Status: DISCONTINUED | OUTPATIENT
Start: 2018-11-12 | End: 2018-11-19 | Stop reason: CLARIF

## 2018-11-12 RX ORDER — LIDOCAINE 40 MG/G
CREAM TOPICAL
Status: DISCONTINUED | OUTPATIENT
Start: 2018-11-12 | End: 2018-11-12

## 2018-11-12 RX ORDER — PROCHLORPERAZINE MALEATE 5 MG
10 TABLET ORAL EVERY 6 HOURS PRN
Status: DISCONTINUED | OUTPATIENT
Start: 2018-11-12 | End: 2018-11-12 | Stop reason: HOSPADM

## 2018-11-12 RX ORDER — ONDANSETRON 4 MG/1
4 TABLET, ORALLY DISINTEGRATING ORAL EVERY 6 HOURS PRN
Status: DISCONTINUED | OUTPATIENT
Start: 2018-11-12 | End: 2018-11-20 | Stop reason: HOSPADM

## 2018-11-12 RX ORDER — NICOTINE POLACRILEX 4 MG
15-30 LOZENGE BUCCAL
Status: DISCONTINUED | OUTPATIENT
Start: 2018-11-12 | End: 2018-11-12 | Stop reason: HOSPADM

## 2018-11-12 RX ORDER — NALOXONE HYDROCHLORIDE 0.4 MG/ML
.1-.4 INJECTION, SOLUTION INTRAMUSCULAR; INTRAVENOUS; SUBCUTANEOUS
Status: DISCONTINUED | OUTPATIENT
Start: 2018-11-12 | End: 2018-11-12

## 2018-11-12 RX ORDER — MYCOPHENOLATE MOFETIL 200 MG/ML
1000 POWDER, FOR SUSPENSION ORAL 2 TIMES DAILY
Status: DISCONTINUED | OUTPATIENT
Start: 2018-11-12 | End: 2018-11-12 | Stop reason: HOSPADM

## 2018-11-12 RX ORDER — VALGANCICLOVIR HYDROCHLORIDE 50 MG/ML
450 POWDER, FOR SOLUTION ORAL
Status: DISCONTINUED | OUTPATIENT
Start: 2018-11-12 | End: 2018-11-13

## 2018-11-12 RX ORDER — CYCLOSPORINE 100 MG/ML
75 SOLUTION ORAL 2 TIMES DAILY
Status: DISCONTINUED | OUTPATIENT
Start: 2018-11-12 | End: 2018-11-13

## 2018-11-12 RX ORDER — FENTANYL CITRATE 50 UG/ML
INJECTION, SOLUTION INTRAMUSCULAR; INTRAVENOUS PRN
Status: DISCONTINUED | OUTPATIENT
Start: 2018-11-12 | End: 2018-11-12

## 2018-11-12 RX ORDER — ONDANSETRON 4 MG/1
4 TABLET, ORALLY DISINTEGRATING ORAL EVERY 6 HOURS PRN
Status: DISCONTINUED | OUTPATIENT
Start: 2018-11-12 | End: 2018-11-12 | Stop reason: HOSPADM

## 2018-11-12 RX ORDER — ONDANSETRON 2 MG/ML
4 INJECTION INTRAMUSCULAR; INTRAVENOUS EVERY 6 HOURS PRN
Status: DISCONTINUED | OUTPATIENT
Start: 2018-11-12 | End: 2018-11-20 | Stop reason: HOSPADM

## 2018-11-12 RX ORDER — PIPERACILLIN SODIUM, TAZOBACTAM SODIUM 3; .375 G/15ML; G/15ML
3.38 INJECTION, POWDER, LYOPHILIZED, FOR SOLUTION INTRAVENOUS EVERY 6 HOURS
Status: DISCONTINUED | OUTPATIENT
Start: 2018-11-12 | End: 2018-11-12 | Stop reason: HOSPADM

## 2018-11-12 RX ORDER — HYDROMORPHONE HYDROCHLORIDE 2 MG/1
2 TABLET ORAL EVERY 4 HOURS PRN
Status: DISCONTINUED | OUTPATIENT
Start: 2018-11-12 | End: 2018-11-12 | Stop reason: HOSPADM

## 2018-11-12 RX ORDER — OCTREOTIDE ACETATE 100 UG/ML
100 INJECTION, SOLUTION INTRAVENOUS; SUBCUTANEOUS EVERY 12 HOURS
Status: DISCONTINUED | OUTPATIENT
Start: 2018-11-12 | End: 2018-11-12 | Stop reason: HOSPADM

## 2018-11-12 RX ORDER — ONDANSETRON 2 MG/ML
INJECTION INTRAMUSCULAR; INTRAVENOUS PRN
Status: DISCONTINUED | OUTPATIENT
Start: 2018-11-12 | End: 2018-11-12

## 2018-11-12 RX ORDER — SODIUM CHLORIDE 450 MG/100ML
INJECTION, SOLUTION INTRAVENOUS CONTINUOUS PRN
Status: DISCONTINUED | OUTPATIENT
Start: 2018-11-12 | End: 2018-11-12

## 2018-11-12 RX ORDER — SULFAMETHOXAZOLE AND TRIMETHOPRIM 200; 40 MG/5ML; MG/5ML
10 SUSPENSION ORAL DAILY
Status: DISCONTINUED | OUTPATIENT
Start: 2018-11-13 | End: 2018-11-16

## 2018-11-12 RX ADMIN — PANTOPRAZOLE SODIUM 40 MG: 40 TABLET, DELAYED RELEASE ORAL at 21:46

## 2018-11-12 RX ADMIN — FENTANYL CITRATE 25 MCG: 50 INJECTION, SOLUTION INTRAMUSCULAR; INTRAVENOUS at 15:53

## 2018-11-12 RX ADMIN — ALBUMIN HUMAN: 0.05 INJECTION, SOLUTION INTRAVENOUS at 13:28

## 2018-11-12 RX ADMIN — FENTANYL CITRATE 50 MCG: 50 INJECTION, SOLUTION INTRAMUSCULAR; INTRAVENOUS at 16:23

## 2018-11-12 RX ADMIN — ROCURONIUM BROMIDE 50 MG: 10 INJECTION INTRAVENOUS at 08:23

## 2018-11-12 RX ADMIN — OCTREOTIDE ACETATE 100 MCG: 100 INJECTION, SOLUTION INTRAVENOUS; SUBCUTANEOUS at 21:46

## 2018-11-12 RX ADMIN — ROCURONIUM BROMIDE 20 MG: 10 INJECTION INTRAVENOUS at 11:06

## 2018-11-12 RX ADMIN — PIPERACILLIN AND TAZOBACTAM 2.25 G: 2; .25 INJECTION, POWDER, FOR SOLUTION INTRAVENOUS at 21:47

## 2018-11-12 RX ADMIN — CALCIUM CHLORIDE 250 MG: 100 INJECTION, SOLUTION INTRAVENOUS at 12:51

## 2018-11-12 RX ADMIN — ANTI-THYMOCYTE GLOBULIN (RABBIT) 125 MG: 5 INJECTION, POWDER, LYOPHILIZED, FOR SOLUTION INTRAVENOUS at 09:00

## 2018-11-12 RX ADMIN — SODIUM CHLORIDE, POTASSIUM CHLORIDE, SODIUM LACTATE AND CALCIUM CHLORIDE: 600; 310; 30; 20 INJECTION, SOLUTION INTRAVENOUS at 08:15

## 2018-11-12 RX ADMIN — Medication: at 17:18

## 2018-11-12 RX ADMIN — ROCURONIUM BROMIDE 10 MG: 10 INJECTION INTRAVENOUS at 14:56

## 2018-11-12 RX ADMIN — FENTANYL CITRATE 50 MCG: 50 INJECTION, SOLUTION INTRAMUSCULAR; INTRAVENOUS at 16:06

## 2018-11-12 RX ADMIN — MYCOPHENOLATE MOFETIL 1500 MG: 200 POWDER, FOR SUSPENSION ORAL at 21:47

## 2018-11-12 RX ADMIN — ONDANSETRON 4 MG: 2 INJECTION INTRAMUSCULAR; INTRAVENOUS at 15:50

## 2018-11-12 RX ADMIN — Medication 0.3 MG: at 17:34

## 2018-11-12 RX ADMIN — SODIUM CHLORIDE, SODIUM LACTATE, POTASSIUM CHLORIDE, CALCIUM CHLORIDE AND DEXTROSE MONOHYDRATE: 5; 600; 310; 30; 20 INJECTION, SOLUTION INTRAVENOUS at 17:04

## 2018-11-12 RX ADMIN — PHENYLEPHRINE HYDROCHLORIDE 50 MCG: 10 INJECTION, SOLUTION INTRAMUSCULAR; INTRAVENOUS; SUBCUTANEOUS at 15:48

## 2018-11-12 RX ADMIN — LIDOCAINE HYDROCHLORIDE 100 MG: 20 INJECTION, SOLUTION INFILTRATION; PERINEURAL at 08:23

## 2018-11-12 RX ADMIN — SODIUM CHLORIDE: 4.5 INJECTION, SOLUTION INTRAVENOUS at 14:30

## 2018-11-12 RX ADMIN — PHENYLEPHRINE HYDROCHLORIDE 100 MCG: 10 INJECTION, SOLUTION INTRAMUSCULAR; INTRAVENOUS; SUBCUTANEOUS at 09:13

## 2018-11-12 RX ADMIN — MICAFUNGIN SODIUM 100 MG: 10 INJECTION, POWDER, LYOPHILIZED, FOR SOLUTION INTRAVENOUS at 08:43

## 2018-11-12 RX ADMIN — MIDAZOLAM 1 MG: 1 INJECTION INTRAMUSCULAR; INTRAVENOUS at 08:15

## 2018-11-12 RX ADMIN — ROCURONIUM BROMIDE 10 MG: 10 INJECTION INTRAVENOUS at 14:24

## 2018-11-12 RX ADMIN — SUGAMMADEX 200 MG: 100 INJECTION, SOLUTION INTRAVENOUS at 15:58

## 2018-11-12 RX ADMIN — CALCIUM CHLORIDE 500 MG: 100 INJECTION, SOLUTION INTRAVENOUS at 11:24

## 2018-11-12 RX ADMIN — MANNITOL 12.5 G: 20 INJECTION, SOLUTION INTRAVENOUS at 13:37

## 2018-11-12 RX ADMIN — SODIUM CHLORIDE: 9 INJECTION, SOLUTION INTRAVENOUS at 21:55

## 2018-11-12 RX ADMIN — LABETALOL HYDROCHLORIDE 5 MG: 5 INJECTION INTRAVENOUS at 16:10

## 2018-11-12 RX ADMIN — FENTANYL CITRATE 25 MCG: 50 INJECTION INTRAMUSCULAR; INTRAVENOUS at 16:59

## 2018-11-12 RX ADMIN — PHENYLEPHRINE HYDROCHLORIDE 50 MCG: 10 INJECTION, SOLUTION INTRAMUSCULAR; INTRAVENOUS; SUBCUTANEOUS at 15:21

## 2018-11-12 RX ADMIN — FENTANYL CITRATE 25 MCG: 50 INJECTION INTRAMUSCULAR; INTRAVENOUS at 16:44

## 2018-11-12 RX ADMIN — CALCIUM CHLORIDE 250 MG: 100 INJECTION, SOLUTION INTRAVENOUS at 15:16

## 2018-11-12 RX ADMIN — SODIUM CHLORIDE 500 MG: 9 INJECTION, SOLUTION INTRAVENOUS at 08:29

## 2018-11-12 RX ADMIN — SODIUM CHLORIDE, POTASSIUM CHLORIDE, SODIUM LACTATE AND CALCIUM CHLORIDE: 600; 310; 30; 20 INJECTION, SOLUTION INTRAVENOUS at 09:30

## 2018-11-12 RX ADMIN — SODIUM CHLORIDE: 9 INJECTION, SOLUTION INTRAVENOUS at 17:05

## 2018-11-12 RX ADMIN — HYDROMORPHONE HYDROCHLORIDE 0.5 MG: 1 INJECTION, SOLUTION INTRAMUSCULAR; INTRAVENOUS; SUBCUTANEOUS at 15:42

## 2018-11-12 RX ADMIN — FUROSEMIDE 40 MG: 10 INJECTION, SOLUTION INTRAVENOUS at 13:35

## 2018-11-12 RX ADMIN — ROCURONIUM BROMIDE 30 MG: 10 INJECTION INTRAVENOUS at 11:33

## 2018-11-12 RX ADMIN — VALGANCICLOVIR HYDROCHLORIDE 450 MG: 50 POWDER, FOR SOLUTION ORAL at 21:47

## 2018-11-12 RX ADMIN — SODIUM CHLORIDE: 9 INJECTION, SOLUTION INTRAVENOUS at 12:34

## 2018-11-12 RX ADMIN — PHENYLEPHRINE HYDROCHLORIDE 50 MCG: 10 INJECTION, SOLUTION INTRAMUSCULAR; INTRAVENOUS; SUBCUTANEOUS at 14:08

## 2018-11-12 RX ADMIN — FUROSEMIDE 40 MG: 10 INJECTION, SOLUTION INTRAVENOUS at 11:19

## 2018-11-12 RX ADMIN — FENTANYL CITRATE 25 MCG: 50 INJECTION, SOLUTION INTRAMUSCULAR; INTRAVENOUS at 15:57

## 2018-11-12 RX ADMIN — CYCLOSPORINE 75 MG: 100 SOLUTION ORAL at 21:47

## 2018-11-12 RX ADMIN — PHENYLEPHRINE HYDROCHLORIDE 50 MCG: 10 INJECTION, SOLUTION INTRAMUSCULAR; INTRAVENOUS; SUBCUTANEOUS at 09:46

## 2018-11-12 RX ADMIN — SODIUM BICARBONATE 25 MEQ: 84 INJECTION, SOLUTION INTRAVENOUS at 13:23

## 2018-11-12 RX ADMIN — CALCIUM CHLORIDE 250 MG: 100 INJECTION, SOLUTION INTRAVENOUS at 15:18

## 2018-11-12 RX ADMIN — SODIUM BICARBONATE 25 MEQ: 84 INJECTION, SOLUTION INTRAVENOUS at 10:11

## 2018-11-12 RX ADMIN — FENTANYL CITRATE 25 MCG: 50 INJECTION INTRAMUSCULAR; INTRAVENOUS at 17:22

## 2018-11-12 RX ADMIN — HUMAN INSULIN 2 UNITS/HR: 100 INJECTION, SOLUTION SUBCUTANEOUS at 10:14

## 2018-11-12 RX ADMIN — FENTANYL CITRATE 25 MCG: 50 INJECTION, SOLUTION INTRAMUSCULAR; INTRAVENOUS at 14:54

## 2018-11-12 RX ADMIN — Medication 0.5 MG: at 17:55

## 2018-11-12 RX ADMIN — HUMAN INSULIN 0.2 UNITS/HR: 100 INJECTION, SOLUTION SUBCUTANEOUS at 20:27

## 2018-11-12 RX ADMIN — SODIUM CHLORIDE, PRESERVATIVE FREE: 5 INJECTION INTRAVENOUS at 21:56

## 2018-11-12 RX ADMIN — SODIUM CHLORIDE 500 ML: 900 IRRIGANT IRRIGATION at 21:48

## 2018-11-12 RX ADMIN — ONDANSETRON 4 MG: 2 INJECTION INTRAMUSCULAR; INTRAVENOUS at 22:13

## 2018-11-12 RX ADMIN — MYCOPHENOLATE MOFETIL 1000 MG: 500 INJECTION, POWDER, LYOPHILIZED, FOR SOLUTION INTRAVENOUS at 09:04

## 2018-11-12 RX ADMIN — ROCURONIUM BROMIDE 20 MG: 10 INJECTION INTRAVENOUS at 12:43

## 2018-11-12 RX ADMIN — PROPOFOL 200 MG: 10 INJECTION, EMULSION INTRAVENOUS at 08:23

## 2018-11-12 RX ADMIN — FENTANYL CITRATE 25 MCG: 50 INJECTION INTRAMUSCULAR; INTRAVENOUS at 17:16

## 2018-11-12 RX ADMIN — FENTANYL CITRATE 25 MCG: 50 INJECTION, SOLUTION INTRAMUSCULAR; INTRAVENOUS at 14:52

## 2018-11-12 RX ADMIN — MANNITOL 12.5 G: 20 INJECTION, SOLUTION INTRAVENOUS at 11:19

## 2018-11-12 RX ADMIN — ROCURONIUM BROMIDE 50 MG: 10 INJECTION INTRAVENOUS at 09:20

## 2018-11-12 RX ADMIN — CLOTRIMAZOLE 10 MG: 10 LOZENGE ORAL at 21:47

## 2018-11-12 RX ADMIN — ALBUMIN HUMAN: 0.05 INJECTION, SOLUTION INTRAVENOUS at 11:27

## 2018-11-12 RX ADMIN — PIPERACILLIN SODIUM AND TAZOBACTAM SODIUM 3.38 G: 3; .375 INJECTION, POWDER, LYOPHILIZED, FOR SOLUTION INTRAVENOUS at 08:38

## 2018-11-12 RX ADMIN — FENTANYL CITRATE 25 MCG: 50 INJECTION, SOLUTION INTRAMUSCULAR; INTRAVENOUS at 14:33

## 2018-11-12 RX ADMIN — ROCURONIUM BROMIDE 10 MG: 10 INJECTION INTRAVENOUS at 13:50

## 2018-11-12 RX ADMIN — Medication 0.2 MG: at 17:42

## 2018-11-12 RX ADMIN — CALCIUM CHLORIDE 250 MG: 100 INJECTION, SOLUTION INTRAVENOUS at 12:05

## 2018-11-12 RX ADMIN — FENTANYL CITRATE 100 MCG: 50 INJECTION, SOLUTION INTRAMUSCULAR; INTRAVENOUS at 09:30

## 2018-11-12 RX ADMIN — FENTANYL CITRATE 25 MCG: 50 INJECTION, SOLUTION INTRAMUSCULAR; INTRAVENOUS at 15:30

## 2018-11-12 RX ADMIN — FENTANYL CITRATE 100 MCG: 50 INJECTION, SOLUTION INTRAMUSCULAR; INTRAVENOUS at 08:23

## 2018-11-12 ASSESSMENT — ACTIVITIES OF DAILY LIVING (ADL): ADLS_ACUITY_SCORE: 10

## 2018-11-12 ASSESSMENT — PAIN DESCRIPTION - DESCRIPTORS: DESCRIPTORS: CRAMPING

## 2018-11-12 NOTE — BRIEF OP NOTE
[facility], [place]    Brief Operative Note    Pre-operative diagnosis: End renal stage disease 2/2 PCKD and DMII  Post-operative diagnosis Same  Procedure: Procedure(s):  COMBINED TRANSPLANT PANCREAS, KIDNEY  DONOR, umbilical hernia repair, ureteral stent placement, appendectomy  BENCH PANCREAS/KIDNEY  Surgeon: Surgeon(s) and Role:     * Monique Luevano MD - Primary     * Rodolfo Man MD - Assisting     * Eldon Henry MD - Assisting     * Florence Lu MD - Resident - Assisting  Anesthesia: General   Estimated blood loss: 300 cc  Drains: RLQ SARAH  Specimens:   ID Type Source Tests Collected by Time Destination   A : Umbilical hernia sac Tissue Hernia Sac SURGICAL PATHOLOGY EXAM Monique Luevano MD 2018  9:33 AM    B :  Organ Appendix SURGICAL PATHOLOGY EXAM Monique Luevano MD 2018  2:51 PM      Findings:   Native and previously transplanted kidneys left in place, pancreas txp with enteric drainage.  Complications: None.  Implants: None.

## 2018-11-12 NOTE — PLAN OF CARE
Problem: Patient Care Overview  Goal: Plan of Care/Patient Progress Review  Outcome: No Change  9812-8516: A/Ox4. BPs 140s/80s; OVSS on RA. NPO since midnight for pre-op kidney/pancreas transplant. Up independently. Insulin gtt has been off since 0120. BG . R PIV with NS @ 100mL/hr. Norco given x1 for back pain related to PCKD. Pt went down to pre-op @ 0545. He notified his son and daughter that he was going to the OR. His daughter's contact information is in the chart. Patient's belongings are labeled and being held on 7A; will organize transport of items to 6B after surgery.

## 2018-11-12 NOTE — TELEPHONE ENCOUNTER
Organ Offer Encounter Information    Organ Offer Information   Organ offer date & time:  11/11/2018  8:04 AM   Coordinator/Fellow/Attending name:  BENJI VIDAL   Organ(s):   Organ UNOS ID Match Run ID Comment Organ Laterality   Pancreas [id number] local    Kidney [id number] local          Recent infections?:  No    New medications?:  No Recent pregnancy?:  No   Angicoagulation medications?:  No Recent vaccinations?:  No   Recent blood transfusions?:  No Recent hospitalizations?:  No   Has your insurance changed in the last 6-12 months?:  Neg    Patient last dialyzed:  11/9/2018  5:20 AM   Dialysis type:  [facility]   Discussed organ offer with:  Patient   Patient/Caregiver name:  Amadou Lewis   Discussed risk category with Patient/Other:  N/A   Understood donor criteria, verbalized understanding   Patient/Other asked to speak to a surgeon?:  No   Discussed program-specific outcomes:  Did not have questions regarding SRTR   Organ offer decision status Patient/Other:  Accepted Offer   Organ disposition:  Case Cancelled - Other (specify) (Comment: fatty nodules visualization)   Additional Comments:    November 11, 2018  7:53 AM  MD: Donovan/Bassem  Plan: Discussed local case with Dr. Man. Donor OR set for 1300. Patient to be admitted. Obtain FXM. Savana, in immunology notified. Discussed with Savana will obtain recent serum once patient is admitted, to run FXM.     November 11, 2018  7:56 AM  Left VM on patient's cell.      November 11, 2018  8:25 AM  Discussed local donor KP with patient. Informed him this acceptance is pending visualization of KP. Patient expressed interest.  ETA to hospital in about an hour from now, per patient. Instructions to remain NPO. All questions answered, regarding definition of DBD donor, XM non-compatable vs compatable, local donor information, and possible returning home without transplant. He verbalized understanding. Dr. Man aware patient en route to hospital for  direct admission. OR set for Kody, Wesly in OR desk informed.    November 11, 2018  8:33 AM  Dr. Man will go on procurement for KP on this local case. Jennifer-onsite [employer] coordinator informed. Dr. Man will be picked up at 11:00 [facility] ER entrance. Dr. Man informed.     November 11, 2018  1341  Xclamp 1440. Per Jennifer-onsite coordinator, fatty nodule on visualization. Per Dr. Man, turned down for quality. He will informed patient.  Patient will remain in the hospitalization, subsequently another local offer is pending. Immunology, Michela informed. OR desk, Wesly informed case cancelled.     Unit 7A charge RN @  8:09 AM Gabbie   Admission @ 8:15 AM Oscar   Immunology @ 8:24 [place]  Blood Bank@ Pending  OR Control Desk @ Pending    Benji Vidal RN, On call coordiantor     Attestation I have discussed all of the above with the Patient/Legal Guardian/Caregiver regarding this organ offer.:  Yes   Coordinator/Fellow/Attending name:  BENJI VIDAL

## 2018-11-12 NOTE — ANESTHESIA PREPROCEDURE EVALUATION
Anesthesia Evaluation     . Pt has had prior anesthetic. Type: General and MAC    No history of anesthetic complications          ROS/MED HX    ENT/Pulmonary:  - neg pulmonary ROS     Neurologic:  - neg neurologic ROS     Cardiovascular:     (+) hypertension--CAD, --stent,. : . CHF Last EF: 45% . . :. .       METS/Exercise Tolerance:     Hematologic:  - neg hematologic  ROS       Musculoskeletal:  - neg musculoskeletal ROS       GI/Hepatic:  - neg GI/hepatic ROS       Renal/Genitourinary: Comment: On Dialysis 3x a week    (+) chronic renal disease, type: ESRD, Pt does not require dialysis, Pt has history of transplant, date: 10/2013,       Endo:     (+) type II DM Using insulin .      Psychiatric:  - neg psychiatric ROS       Infectious Disease:  - neg infectious disease ROS       Malignancy:      - no malignancy   Other:    (+) No chance of pregnancy C-spine cleared: N/A, H/O Chronic Pain,no other significant disability                    Physical Exam  Normal systems: cardiovascular, pulmonary and dental    Airway   Mallampati: I  TM distance: >3 FB  Neck ROM: full    Dental     Cardiovascular   Rhythm and rate: regular and normal      Pulmonary                     Anesthesia Plan      History & Physical Review  History and physical reviewed and following examination; no interval change.    ASA Status:  3 .        Plan for General with Propofol and Intravenous induction. Maintenance will be TIVA and Inhalation.      Additional equipment: Arterial Line, 2nd IV and Central Line      Postoperative Care      Consents  Anesthetic plan, risks, benefits and alternatives discussed with:  Patient or representative..          ANGELICA FV AN PHYSICAL EXAM    Assessment:   ASA SCORE: 3            Tobacco Use:  NO Active use of Tobacco/UNKNOWN Tobacco use status     Plan:                             PONV Management:  Adult Risk Factors:, Non-Smoker

## 2018-11-12 NOTE — ANESTHESIA CARE TRANSFER NOTE
Patient: Amadou Lewis    Procedure(s):  COMBINED TRANSPLANT PANCREAS, KIDNEY  DONOR, umbilical hernia repair, ureteral stent placement, appendectomy  BENCH PANCREAS/KIDNEY    Diagnosis: end renal stage disease   Diagnosis Additional Information: No value filed.    Anesthesia Type:   General     Note:  Airway :Face Mask  Patient transferred to:PACU  Comments: Patient oxygenating and ventilating well on 6LFM.  Patient DERAS, following commands, and fentanyl titrated for pain reported by patient.  Report given to RN and VSS.  Handoff Report: Identifed the Patient, Identified the Reponsible Provider, Reviewed the pertinent medical history, Discussed the surgical course, Reviewed Intra-OP anesthesia mangement and issues during anesthesia, Set expectations for post-procedure period and Allowed opportunity for questions and acknowledgement of understanding      Vitals: (Last set prior to Anesthesia Care Transfer)    CRNA VITALS  2018 1545 - 2018 1623      2018             NIBP: 123/74    Pulse: 94    ART BP: 130/57    Ht Rate: 94    SpO2: 100 %    Resp Rate (observed): 16                Electronically Signed By: CINTHYA Bermeo CRNA  2018  4:23 PM

## 2018-11-12 NOTE — ANESTHESIA POSTPROCEDURE EVALUATION
Anesthesia POST Procedure Evaluation    Patient: Amadou Lewis   MRN:     [id number] Gender:   male   Age:    52 year old :      1965        Preoperative Diagnosis: end renal stage disease    Procedure(s):  COMBINED TRANSPLANT PANCREAS, KIDNEY  DONOR, umbilical hernia repair, ureteral stent placement, appendectomy  BENCH PANCREAS/KIDNEY   Postop Comments: No value filed.       Anesthesia Type:  Not documented    Reportable Event: NO     PAIN: Uncomplicated   Sign Out status: Comfortable, Well controlled pain     PONV: No PONV   Sign Out status:  No Nausea or Vomiting     Neuro/Psych: Uneventful perioperative course   Sign Out Status: Preoperative baseline; Age appropriate mentation     Airway/Resp.: Uneventful perioperative course   Sign Out Status: Non labored breathing, age appropriate RR; Resp. Status within EXPECTED Parameters     CV: Uneventful perioperative course   Sign Out status: Appropriate BP and perfusion indices; Appropriate HR/Rhythm     Disposition:   Sign Out in:  PACU  Disposition:  Floor  Recovery Course: Uneventful  Follow-Up: Not required           Last Anesthesia Record Vitals:  CRNA VITALS  2018 1545 - 2018 1642      2018             NIBP: 127/60    Pulse: 92    Temp: 36.7  C (98.1  F)    SpO2: 100 %    Resp Rate (observed): 16          Last PACU/Preop Vitals:  Vitals:    18 0302 18 0606 18 1615   BP: 149/86 (!) 166/93 123/74   Pulse:      Resp: 16 16 16   Temp: 36.7  C (98  F) 36.9  C (98.5  F) 36.7  C (98.1  F)   SpO2: 95% 98% 98%         Electronically Signed By: Escobar Roche MD, 2018, 4:42 PM

## 2018-11-12 NOTE — ANESTHESIA PROCEDURE NOTES
Central Line Procedure Note  Staff:     Anesthesiologist:  HANNAH GERMAN  Location: In OR after induction  Procedure Start/Stop Times:     patient identified, IV checked, site marked, risks and benefits discussed, informed consent, monitors and equipment checked, pre-op evaluation and at physician/surgeon's request      Correct Patient: Yes      Correct Position: Yes      Correct Site: Yes      Correct Procedure: Yes      Correct Laterality:  Yes    Site Marked:  Yes  Line Placement:     Procedure:  Central Line    Insertion laterality:  Right    Insertion site:  Internal Jugular    Position:  Trendelenburg      Maximal Sterile Barriers: All elements of maximal sterile barrier technique followed      (Maximal sterile barriers include:   Sterile gown, Sterile Gloves, Mask, Cap, Whole body draped, hand hygiene and acceptable skin prep).Skin Prep: Chloraprep         Injection Technique:  Ultrasound guided    Sterile Ultrasound Technique:  Sterile probe cover and Sterile gel    Vein evaluated via U/S for patency/adequacy of catheter insertion and is adequate.  Using realtime U/S imaging the vein was punctured, and needle was observed entering vein on U/S      Permanent Image entered into patient's record      Local skin infiltration:  None    Catheter size:  7 Fr, 3 lumen, 20 cm    Catheter length at skin (cm):  15    Cath secured with: suture and anchor securement device      Dressing:  Tegaderm and Biopatch    Complications:  None obvious    Blood aspirated all lumens: Yes      All Lumens Flushed: Yes      Verification method:  Placement to be verified post-op

## 2018-11-12 NOTE — PLAN OF CARE
Problem: Patient Care Overview  Goal: Plan of Care/Patient Progress Review  Outcome: No Change    Hypertensive (152/92), all other vital signs stable. One time dose of PO Dilaudid given for back pain. Insulin drip started using algorithm #1 at 20:30 for blood sugar of 294. 21:30 blood sugar was 267. Up independently in room. Good appetite on regular diet. Pre-op checklist completed. Pre-op shower completed. NPO at midnight for probable OR call tomorrow at 06:00.

## 2018-11-12 NOTE — ANESTHESIA PROCEDURE NOTES
Arterial Line Procedure Note  Staff:     Anesthesiologist:  HANNAH GERMAN  Location: In OR After Induction  Procedure Start/Stop Times:     patient identified, IV checked, site marked, risks and benefits discussed, informed consent, monitors and equipment checked, pre-op evaluation and at physician/surgeon's request      Correct Patient: Yes      Correct Position: Yes      Correct Site: Yes      Correct Procedure: Yes      Correct Laterality:  Yes    Site Marked:  Yes  Line Placement:     Procedure:  Arterial Line    Insertion Site:  Radial    Insertion laterality:  Right    Skin Prep: Chloraprep      Patient Prep: mask, sterile gloves, hat and hand hygiene      Ultrasound Guided?: No      Catheter size:  20 gauge, 12 cm    Dressing:  Tegaderm    Complications:  None obvious    Arterial waveform: Yes      IBP within 10% of NIBP: Yes

## 2018-11-13 PROBLEM — Z94.0 STATUS POST SIMULTANEOUS KIDNEY AND PANCREAS TRANSPLANT (H): Chronic | Status: ACTIVE | Noted: 2018-11-12

## 2018-11-13 PROBLEM — Z94.83 STATUS POST SIMULTANEOUS KIDNEY AND PANCREAS TRANSPLANT (H): Status: ACTIVE | Noted: 2018-11-12

## 2018-11-13 PROBLEM — Z94.83 STATUS POST SIMULTANEOUS KIDNEY AND PANCREAS TRANSPLANT (H): Chronic | Status: ACTIVE | Noted: 2018-11-12

## 2018-11-13 PROBLEM — G89.29 OTHER CHRONIC PAIN: Chronic | Status: ACTIVE | Noted: 2018-11-13

## 2018-11-13 PROBLEM — Z01.818 PRE-TRANSPLANT EVALUATION FOR KIDNEY AND PANCREAS TRANSPLANT: Status: RESOLVED | Noted: 2018-11-12 | Resolved: 2018-11-13

## 2018-11-13 PROBLEM — Z94.0 STATUS POST SIMULTANEOUS KIDNEY AND PANCREAS TRANSPLANT (H): Status: ACTIVE | Noted: 2018-11-12

## 2018-11-13 PROBLEM — G89.29 OTHER CHRONIC PAIN: Status: ACTIVE | Noted: 2018-11-13

## 2018-11-13 LAB
AMYLASE SERPL-CCNC: 289 U/L (ref 30–110)
ANION GAP SERPL CALCULATED.3IONS-SCNC: 9 MMOL/L (ref 3–14)
BASOPHILS # BLD AUTO: 0 10E9/L (ref 0–0.2)
BASOPHILS NFR BLD AUTO: 0 %
BUN SERPL-MCNC: 50 MG/DL (ref 7–30)
CALCIUM SERPL-MCNC: 8 MG/DL (ref 8.5–10.1)
CHLORIDE SERPL-SCNC: 111 MMOL/L (ref 94–109)
CO2 SERPL-SCNC: 20 MMOL/L (ref 20–32)
CREAT SERPL-MCNC: 3.45 MG/DL (ref 0.66–1.25)
CYCLOSPORINE BLD LC/MS/MS-MCNC: 27 UG/L (ref 50–400)
DIFFERENTIAL METHOD BLD: ABNORMAL
DONOR IDENTIFICATION: NORMAL
DSA COMMENTS: NORMAL
DSA PRESENT: NO
DSA TEST METHOD: NORMAL
EOSINOPHIL # BLD AUTO: 0 10E9/L (ref 0–0.7)
EOSINOPHIL NFR BLD AUTO: 0 %
ERYTHROCYTE [DISTWIDTH] IN BLOOD BY AUTOMATED COUNT: 13.6 % (ref 10–15)
GFR SERPL CREATININE-BSD FRML MDRD: 19 ML/MIN/1.7M2
GLUCOSE BLDC GLUCOMTR-MCNC: 102 MG/DL (ref 70–99)
GLUCOSE BLDC GLUCOMTR-MCNC: 104 MG/DL (ref 70–99)
GLUCOSE BLDC GLUCOMTR-MCNC: 107 MG/DL (ref 70–99)
GLUCOSE BLDC GLUCOMTR-MCNC: 109 MG/DL (ref 70–99)
GLUCOSE BLDC GLUCOMTR-MCNC: 111 MG/DL (ref 70–99)
GLUCOSE BLDC GLUCOMTR-MCNC: 112 MG/DL (ref 70–99)
GLUCOSE BLDC GLUCOMTR-MCNC: 113 MG/DL (ref 70–99)
GLUCOSE BLDC GLUCOMTR-MCNC: 114 MG/DL (ref 70–99)
GLUCOSE BLDC GLUCOMTR-MCNC: 115 MG/DL (ref 70–99)
GLUCOSE BLDC GLUCOMTR-MCNC: 117 MG/DL (ref 70–99)
GLUCOSE BLDC GLUCOMTR-MCNC: 118 MG/DL (ref 70–99)
GLUCOSE BLDC GLUCOMTR-MCNC: 118 MG/DL (ref 70–99)
GLUCOSE BLDC GLUCOMTR-MCNC: 119 MG/DL (ref 70–99)
GLUCOSE BLDC GLUCOMTR-MCNC: 121 MG/DL (ref 70–99)
GLUCOSE BLDC GLUCOMTR-MCNC: 130 MG/DL (ref 70–99)
GLUCOSE BLDC GLUCOMTR-MCNC: 94 MG/DL (ref 70–99)
GLUCOSE BLDC GLUCOMTR-MCNC: 96 MG/DL (ref 70–99)
GLUCOSE BLDC GLUCOMTR-MCNC: 97 MG/DL (ref 70–99)
GLUCOSE BLDC GLUCOMTR-MCNC: 98 MG/DL (ref 70–99)
GLUCOSE SERPL-MCNC: 98 MG/DL (ref 70–99)
HCT VFR BLD AUTO: 31.7 % (ref 40–53)
HGB BLD-MCNC: 10 G/DL (ref 13.3–17.7)
HGB BLD-MCNC: 10.5 G/DL (ref 13.3–17.7)
HGB BLD-MCNC: 9.1 G/DL (ref 13.3–17.7)
HGB BLD-MCNC: 9.4 G/DL (ref 13.3–17.7)
HGB BLD-MCNC: 9.4 G/DL (ref 13.3–17.7)
HGB BLD-MCNC: 9.5 G/DL (ref 13.3–17.7)
IMM GRANULOCYTES # BLD: 0 10E9/L (ref 0–0.4)
IMM GRANULOCYTES NFR BLD: 0.2 %
INTERPRETATION ECG - MUSE: NORMAL
LIPASE SERPL-CCNC: 1102 U/L (ref 73–393)
LMWH PPP CHRO-ACNC: <0.1 IU/ML
LYMPHOCYTES # BLD AUTO: 0.2 10E9/L (ref 0.8–5.3)
LYMPHOCYTES NFR BLD AUTO: 1.8 %
MAGNESIUM SERPL-MCNC: 1.8 MG/DL (ref 1.6–2.3)
MCH RBC QN AUTO: 28.6 PG (ref 26.5–33)
MCHC RBC AUTO-ENTMCNC: 31.5 G/DL (ref 31.5–36.5)
MCV RBC AUTO: 91 FL (ref 78–100)
MONOCYTES # BLD AUTO: 0.4 10E9/L (ref 0–1.3)
MONOCYTES NFR BLD AUTO: 3.5 %
NEUTROPHILS # BLD AUTO: 11.2 10E9/L (ref 1.6–8.3)
NEUTROPHILS NFR BLD AUTO: 94.5 %
NRBC # BLD AUTO: 0 10*3/UL
NRBC BLD AUTO-RTO: 0 /100
ORGAN: NORMAL
PHOSPHATE SERPL-MCNC: 4.5 MG/DL (ref 2.5–4.5)
PLATELET # BLD AUTO: 205 10E9/L (ref 150–450)
POTASSIUM SERPL-SCNC: 4.4 MMOL/L (ref 3.4–5.3)
POTASSIUM SERPL-SCNC: 4.4 MMOL/L (ref 3.4–5.3)
POTASSIUM SERPL-SCNC: 4.5 MMOL/L (ref 3.4–5.3)
POTASSIUM SERPL-SCNC: 4.5 MMOL/L (ref 3.4–5.3)
POTASSIUM SERPL-SCNC: 4.6 MMOL/L (ref 3.4–5.3)
POTASSIUM SERPL-SCNC: 4.6 MMOL/L (ref 3.4–5.3)
PTH-INTACT SERPL-MCNC: 1217 PG/ML (ref 18–80)
RBC # BLD AUTO: 3.5 10E12/L (ref 4.4–5.9)
SODIUM SERPL-SCNC: 140 MMOL/L (ref 133–144)
TME LAST DOSE: ABNORMAL H
WBC # BLD AUTO: 11.9 10E9/L (ref 4–11)

## 2018-11-13 PROCEDURE — 25000128 H RX IP 250 OP 636: Performed by: STUDENT IN AN ORGANIZED HEALTH CARE EDUCATION/TRAINING PROGRAM

## 2018-11-13 PROCEDURE — 80158 DRUG ASSAY CYCLOSPORINE: CPT | Performed by: SURGERY

## 2018-11-13 PROCEDURE — 83690 ASSAY OF LIPASE: CPT | Performed by: SURGERY

## 2018-11-13 PROCEDURE — 85018 HEMOGLOBIN: CPT | Performed by: STUDENT IN AN ORGANIZED HEALTH CARE EDUCATION/TRAINING PROGRAM

## 2018-11-13 PROCEDURE — 84100 ASSAY OF PHOSPHORUS: CPT | Performed by: SURGERY

## 2018-11-13 PROCEDURE — 80048 BASIC METABOLIC PNL TOTAL CA: CPT | Performed by: SURGERY

## 2018-11-13 PROCEDURE — 85520 HEPARIN ASSAY: CPT | Performed by: SURGERY

## 2018-11-13 PROCEDURE — 25000125 ZZHC RX 250: Performed by: STUDENT IN AN ORGANIZED HEALTH CARE EDUCATION/TRAINING PROGRAM

## 2018-11-13 PROCEDURE — 25000125 ZZHC RX 250: Performed by: SURGERY

## 2018-11-13 PROCEDURE — 25000132 ZZH RX MED GY IP 250 OP 250 PS 637: Performed by: STUDENT IN AN ORGANIZED HEALTH CARE EDUCATION/TRAINING PROGRAM

## 2018-11-13 PROCEDURE — 82962 GLUCOSE BLOOD TEST: CPT

## 2018-11-13 PROCEDURE — 36415 COLL VENOUS BLD VENIPUNCTURE: CPT | Performed by: SURGERY

## 2018-11-13 PROCEDURE — 85018 HEMOGLOBIN: CPT | Performed by: SURGERY

## 2018-11-13 PROCEDURE — 25000131 ZZH RX MED GY IP 250 OP 636 PS 637: Performed by: NURSE PRACTITIONER

## 2018-11-13 PROCEDURE — 25000132 ZZH RX MED GY IP 250 OP 250 PS 637: Performed by: SURGERY

## 2018-11-13 PROCEDURE — 25000128 H RX IP 250 OP 636: Performed by: NURSE PRACTITIONER

## 2018-11-13 PROCEDURE — 30243S1 TRANSFUSION OF NONAUTOLOGOUS GLOBULIN INTO CENTRAL VEIN, PERCUTANEOUS APPROACH: ICD-10-PCS | Performed by: SURGERY

## 2018-11-13 PROCEDURE — 36592 COLLECT BLOOD FROM PICC: CPT | Performed by: SURGERY

## 2018-11-13 PROCEDURE — 25000132 ZZH RX MED GY IP 250 OP 250 PS 637: Performed by: NURSE PRACTITIONER

## 2018-11-13 PROCEDURE — 84132 ASSAY OF SERUM POTASSIUM: CPT | Performed by: STUDENT IN AN ORGANIZED HEALTH CARE EDUCATION/TRAINING PROGRAM

## 2018-11-13 PROCEDURE — 25000131 ZZH RX MED GY IP 250 OP 636 PS 637: Performed by: STUDENT IN AN ORGANIZED HEALTH CARE EDUCATION/TRAINING PROGRAM

## 2018-11-13 PROCEDURE — 12000024 ZZH R&B TRANSPLANT CRITICAL

## 2018-11-13 PROCEDURE — 25000128 H RX IP 250 OP 636: Performed by: SURGERY

## 2018-11-13 PROCEDURE — 36592 COLLECT BLOOD FROM PICC: CPT | Performed by: STUDENT IN AN ORGANIZED HEALTH CARE EDUCATION/TRAINING PROGRAM

## 2018-11-13 PROCEDURE — 85025 COMPLETE CBC W/AUTO DIFF WBC: CPT | Performed by: SURGERY

## 2018-11-13 PROCEDURE — 82150 ASSAY OF AMYLASE: CPT | Performed by: SURGERY

## 2018-11-13 PROCEDURE — 84132 ASSAY OF SERUM POTASSIUM: CPT | Performed by: SURGERY

## 2018-11-13 PROCEDURE — 83735 ASSAY OF MAGNESIUM: CPT | Performed by: SURGERY

## 2018-11-13 PROCEDURE — 83970 ASSAY OF PARATHORMONE: CPT | Performed by: SURGERY

## 2018-11-13 RX ORDER — HEPARIN SODIUM 10000 [USP'U]/100ML
200 INJECTION, SOLUTION INTRAVENOUS CONTINUOUS
Status: DISCONTINUED | OUTPATIENT
Start: 2018-11-13 | End: 2018-11-17

## 2018-11-13 RX ORDER — DIPHENHYDRAMINE HCL 25 MG
25 CAPSULE ORAL ONCE
Status: COMPLETED | OUTPATIENT
Start: 2018-11-13 | End: 2018-11-13

## 2018-11-13 RX ORDER — VALGANCICLOVIR HYDROCHLORIDE 50 MG/ML
450 POWDER, FOR SOLUTION ORAL EVERY OTHER DAY
Status: DISCONTINUED | OUTPATIENT
Start: 2018-11-14 | End: 2018-11-14

## 2018-11-13 RX ORDER — HYDRALAZINE HYDROCHLORIDE 25 MG/1
25 TABLET, FILM COATED ORAL 3 TIMES DAILY
Status: ON HOLD | COMMUNITY
End: 2018-11-20

## 2018-11-13 RX ORDER — NYSTATIN 100000/ML
500000 SUSPENSION, ORAL (FINAL DOSE FORM) ORAL 4 TIMES DAILY
Status: DISCONTINUED | OUTPATIENT
Start: 2018-11-13 | End: 2018-11-20 | Stop reason: HOSPADM

## 2018-11-13 RX ORDER — DIPHENHYDRAMINE HCL 25 MG
25-50 CAPSULE ORAL ONCE
Status: COMPLETED | OUTPATIENT
Start: 2018-11-13 | End: 2018-11-13

## 2018-11-13 RX ORDER — DIPHENHYDRAMINE HCL 25 MG
25 CAPSULE ORAL EVERY 6 HOURS PRN
Status: DISCONTINUED | OUTPATIENT
Start: 2018-11-13 | End: 2018-11-13

## 2018-11-13 RX ORDER — DIPHENHYDRAMINE HCL 12.5MG/5ML
25-50 LIQUID (ML) ORAL ONCE
Status: COMPLETED | OUTPATIENT
Start: 2018-11-13 | End: 2018-11-13

## 2018-11-13 RX ORDER — MYCOPHENOLATE MOFETIL 200 MG/ML
1000 POWDER, FOR SUSPENSION ORAL
Status: DISCONTINUED | OUTPATIENT
Start: 2018-11-13 | End: 2018-11-16

## 2018-11-13 RX ORDER — MYCOPHENOLIC ACID 180 MG/1
540 TABLET, DELAYED RELEASE ORAL 2 TIMES DAILY
Status: ON HOLD | COMMUNITY
End: 2018-11-20

## 2018-11-13 RX ADMIN — ACETAMINOPHEN 650 MG: 325 SOLUTION ORAL at 10:26

## 2018-11-13 RX ADMIN — PANTOPRAZOLE SODIUM 40 MG: 40 TABLET, DELAYED RELEASE ORAL at 08:06

## 2018-11-13 RX ADMIN — PROCHLORPERAZINE EDISYLATE 5 MG: 5 INJECTION INTRAMUSCULAR; INTRAVENOUS at 08:07

## 2018-11-13 RX ADMIN — HUMAN INSULIN 0.5 UNITS/HR: 100 INJECTION, SOLUTION SUBCUTANEOUS at 00:09

## 2018-11-13 RX ADMIN — SENNOSIDES A AND B 5 ML: 415.36 LIQUID ORAL at 19:56

## 2018-11-13 RX ADMIN — MYCOPHENOLATE MOFETIL 1000 MG: 200 POWDER, FOR SUSPENSION ORAL at 18:25

## 2018-11-13 RX ADMIN — Medication: at 06:15

## 2018-11-13 RX ADMIN — PROCHLORPERAZINE EDISYLATE 5 MG: 5 INJECTION INTRAMUSCULAR; INTRAVENOUS at 20:00

## 2018-11-13 RX ADMIN — METHYLPREDNISOLONE SODIUM SUCCINATE 250 MG: 125 INJECTION, POWDER, LYOPHILIZED, FOR SOLUTION INTRAMUSCULAR; INTRAVENOUS at 10:43

## 2018-11-13 RX ADMIN — PIPERACILLIN AND TAZOBACTAM 2.25 G: 2; .25 INJECTION, POWDER, FOR SOLUTION INTRAVENOUS at 02:08

## 2018-11-13 RX ADMIN — PROCHLORPERAZINE EDISYLATE 5 MG: 5 INJECTION INTRAMUSCULAR; INTRAVENOUS at 00:23

## 2018-11-13 RX ADMIN — OCTREOTIDE ACETATE 100 MCG: 100 INJECTION, SOLUTION INTRAVENOUS; SUBCUTANEOUS at 20:11

## 2018-11-13 RX ADMIN — PIPERACILLIN AND TAZOBACTAM 2.25 G: 2; .25 INJECTION, POWDER, FOR SOLUTION INTRAVENOUS at 08:14

## 2018-11-13 RX ADMIN — DIPHENHYDRAMINE HYDROCHLORIDE 50 MG: 25 SOLUTION ORAL at 10:27

## 2018-11-13 RX ADMIN — Medication 1 MG: at 18:25

## 2018-11-13 RX ADMIN — MYCOPHENOLATE MOFETIL 1500 MG: 200 POWDER, FOR SUSPENSION ORAL at 08:07

## 2018-11-13 RX ADMIN — OCTREOTIDE ACETATE 100 MCG: 100 INJECTION, SOLUTION INTRAVENOUS; SUBCUTANEOUS at 08:42

## 2018-11-13 RX ADMIN — CLOTRIMAZOLE 10 MG: 10 LOZENGE ORAL at 08:06

## 2018-11-13 RX ADMIN — SULFAMETHOXAZOLE AND TRIMETHOPRIM 80 MG: 200; 40 SUSPENSION ORAL at 08:14

## 2018-11-13 RX ADMIN — SENNOSIDES A AND B 5 ML: 415.36 LIQUID ORAL at 09:15

## 2018-11-13 RX ADMIN — SODIUM CHLORIDE 500 ML: 9 INJECTION, SOLUTION INTRAVENOUS at 14:00

## 2018-11-13 RX ADMIN — PIPERACILLIN AND TAZOBACTAM 2.25 G: 2; .25 INJECTION, POWDER, FOR SOLUTION INTRAVENOUS at 20:10

## 2018-11-13 RX ADMIN — CLOTRIMAZOLE 10 MG: 10 LOZENGE ORAL at 11:28

## 2018-11-13 RX ADMIN — Medication 1 MG: at 09:15

## 2018-11-13 RX ADMIN — NYSTATIN 500000 UNITS: 500000 SUSPENSION ORAL at 19:56

## 2018-11-13 RX ADMIN — MICAFUNGIN SODIUM 100 MG: 10 INJECTION, POWDER, LYOPHILIZED, FOR SOLUTION INTRAVENOUS at 09:15

## 2018-11-13 RX ADMIN — DIPHENHYDRAMINE HYDROCHLORIDE 25 MG: 25 CAPSULE ORAL at 19:56

## 2018-11-13 RX ADMIN — CALCIUM CARBONATE-VITAMIN D TAB 500 MG-200 UNIT 1 TABLET: 500-200 TAB at 18:25

## 2018-11-13 RX ADMIN — Medication 5 MG: at 19:56

## 2018-11-13 RX ADMIN — SODIUM CHLORIDE, SODIUM LACTATE, POTASSIUM CHLORIDE, CALCIUM CHLORIDE AND DEXTROSE MONOHYDRATE: 5; 600; 310; 30; 20 INJECTION, SOLUTION INTRAVENOUS at 04:09

## 2018-11-13 RX ADMIN — CALCIUM CARBONATE-VITAMIN D TAB 500 MG-200 UNIT 1 TABLET: 500-200 TAB at 08:06

## 2018-11-13 RX ADMIN — HEPARIN SODIUM 200 UNITS/HR: 10000 INJECTION, SOLUTION INTRAVENOUS at 09:23

## 2018-11-13 RX ADMIN — ANTI-THYMOCYTE GLOBULIN (RABBIT) 125 MG: 5 INJECTION, POWDER, LYOPHILIZED, FOR SOLUTION INTRAVENOUS at 11:18

## 2018-11-13 RX ADMIN — PIPERACILLIN AND TAZOBACTAM 2.25 G: 2; .25 INJECTION, POWDER, FOR SOLUTION INTRAVENOUS at 13:59

## 2018-11-13 ASSESSMENT — ACTIVITIES OF DAILY LIVING (ADL)
FALL_HISTORY_WITHIN_LAST_SIX_MONTHS: NO
ADLS_ACUITY_SCORE: 10

## 2018-11-13 ASSESSMENT — PAIN DESCRIPTION - DESCRIPTORS
DESCRIPTORS: ACHING

## 2018-11-13 NOTE — TELEPHONE ENCOUNTER
Organ Offer Encounter Information    Organ Offer Information   Organ offer date & time:  11/11/2018 11:11 AM   Coordinator/Fellow/Attending name:  RICHARD BILL   Organ(s):   Organ UNOS ID Match Run ID Comment Organ Laterality   Kidney [id number] local    Pancreas [id number] local          Organ disposition:  Transplanted   Additional Comments:    November 11, 2018  11:11 AM  Patient is currently in the hospital [facility] unit 7A. Dr. Man will discussed primary local offer information with patient.  Cathy Brown RN, On call transplant coordinator    November 11, 2018  1356  [place], onsite [employer] coordinator, donor OR will be at 11/12@ 0200. Dr. Man will go for procurement. He will be picked up at the ER entrance at 0115. Dr. Man informed.   FXM pending.  Recipient OR set for  11/12@ 0700. As per [place], -NO Choice KIDNEY/ PANCREAS. Dr. Man informed.   Cathy Brown RN, On call transplant coordinator    November 11, 2018  Addendum donor hep B core positive, Hep surface antigen negative, CARLOTA negative- DR. MAN INFORMED.   Cathy Brown    Admission: n/a  Immunology: Jenny notified.  7A unit Charge RN- Gabbie notified.  OR control desk: [facility] notified. - set for 0700 11/12.  Blood bank:  [facility]/[facility]/Add organ:    November 11, 2018 9:00 PM  FXM negative, reported to Dr. Man.  Maritza Ramirez RN   Transplant Coordinator    November 12, 2018 6:56 AM  RKI/PA accepted.  OR ppw printed, organs added.  Blood bank notified.  Maritza Ramirez RN   Transplant Coordinator         Coordinator/Fellow/Attending name:  BILL MAN

## 2018-11-13 NOTE — PROGRESS NOTES
"Post-Operative check: 12:32 AM 2018   Name: Amadou Lewis 52 year old male ID: [id number]      POD#0 s/p right kidney transplant from  donor      S: Pt is alert and oriented, pt states that pain is adequately controlled. Nausea not controlled with Zofran. No vomiting/chest pain/shortness of breath.     O:  /87  Pulse 88  Temp 98.6  F (37  C) (Oral)  Resp 12  Ht 1.702 m (5' 7\")  Wt 79.7 kg (175 lb 11.3 oz)  SpO2 98%  BMI 27.52 kg/m2  GEN: male, appropriate responses to questions, NAD  CV/PULM: RRR. No M/G/R. Lungs CTAB on anterior exam.   Abdomen: Soft, appropriately tender. SARAH with serosanguinous output.  Incision: Dressings in place with serosanguinous staining    Labs:   Lab Results   Component Value Date    WBC 19.4 2018     Lab Results   Component Value Date    RBC 3.52 2018     Lab Results   Component Value Date    HGB 10.1 2018     Lab Results   Component Value Date    HCT 31.8 2018     Lab Results   Component Value Date    MCV 90 2018     Lab Results   Component Value Date    MCH 28.7 2018     Lab Results   Component Value Date    MCHC 31.8 2018     Lab Results   Component Value Date    RDW 13.5 2018     Lab Results   Component Value Date     2018     Last Comprehensive Metabolic Panel:  Sodium   Date Value Ref Range Status   2018 138 133 - 144 mmol/L Final     Potassium   Date Value Ref Range Status   2018 4.2 3.4 - 5.3 mmol/L Final     Chloride   Date Value Ref Range Status   2018 107 94 - 109 mmol/L Final     Carbon Dioxide   Date Value Ref Range Status   2018 18 (L) 20 - 32 mmol/L Final     Anion Gap   Date Value Ref Range Status   2018 13 3 - 14 mmol/L Final     Glucose   Date Value Ref Range Status   2018 97 70 - 99 mg/dL Final     Urea Nitrogen   Date Value Ref Range Status   2018 57 (H) 7 - 30 mg/dL Final     Creatinine   Date Value Ref Range Status   2018 5.02 (H) " 0.66 - 1.25 mg/dL Final     GFR Estimate   Date Value Ref Range Status   2018 12 (L) >60 mL/min/1.7m2 Final     Comment:     Non  GFR Calc     Calcium   Date Value Ref Range Status   2018 8.1 (L) 8.5 - 10.1 mg/dL Final       A/P: 52 year old male who is s/p right kidney transplant from  donor. Recovering well following surgery.       Nausea not well controlled with zofran, compazine added    Further management per primary team.     Carlos Prince MD on 2018 at 12:32 AM  PGY-1, General Surgey

## 2018-11-13 NOTE — PROGRESS NOTES
Patient removed from UNOS waitlist after  donor SPK transplant. UNOS ID [id number].   Donor PHS increased risk: NO.   If Yes, MD documentation N/A.

## 2018-11-13 NOTE — PROGRESS NOTES
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 Underwent  donor pancreas transplant with enteric drainage, kidney transplant (with venous reconstruction, two arteries, and stent), appendectomy, and umbilical hernia repair on 18.    Graft function: POD #1  Pancreas: Lipase trending down. Minimal insulin requirements on gtt. Continue octreotide.  Kidney: Cr 5->3.5, acceptable UOP. Pt made normal urine volumes PTA.  Immunosuppression management: Induction with thymoglobulin and steroid pulse.  Thymo:  125mg /, 125mg   Methylpred:  500mg , 250mg   MMF: 1000mg BID, consider switch to Myfortic when taking pills d/t past GI intolerance.  Tacro: Start today 1mg, goal level 8-10.  Cyclo: On PTA, discontinued  (no dose today).  Complexity of management: High. Contributing factors: induction  Hematology:   Acute blood loss anemia: 300ml EBL in OR. Hgb 10, stable.  Vascular prophylaxis: Start st rate heparin 200u/h.  Cardiorespiratory:   Hx HTN: On hydralazine, nifedipine, and PRN clonidine PTA.  BP currently acceptable without medications. Monitor. PRN hydralazine available.  Hx CAD: On statin. Does not tolerate beta blockers.  GI/Nutrition: NPO with NG decompression until return of bowel function. Bowel regimen.   Nausea: PRN zofran and compazine  Endocrine: Poorly controlled DM prior to transplant. A1c 9.1-11.1% last few years.  Fluid/Electrolytes: MIVF: D5LR @ 100  : Ramirez to remain due to new surgical anastomosis  Neuro:   Acute and chronic pain:  Currently managed with PCA diluadid. Takes Norco (up to 8 tabs daily) at home for pain.  Infectious disease: Afebrile, leukocytosis 2/2 steroids  Prophylaxis: DVT,  "fall, GI, clotrimazole, micafungin x7d, zosyn x3d, bactrim, valcyte  Disposition: Transfer to  later this morning    Medical Decision Making: Medium  Subsequent visit 09597 (moderate level decision making)    ODALIS/Fellow/Resident Provider: Karyn Crystal NP [id number]    Faculty: Monique Luevano M.D.  _________________________________________________________________  Transplant History:   11/12/2018 (Kidney / Pancreas), 10/10/2013 (Kidney), Postoperative day: 1860 (Kidney), 1 (Kidney, Pancreas)     Interval History: History is obtained from the patient  Overnight events: Nausea overnight, pain issues resulting in PCA increase    ROS:   A 10-point review of systems was negative except as noted above.    Meds:    acetaminophen  650 mg Oral Once     anti-thymocyte globulin  1.5 mg/kg Intravenous Central line Once     calcium carbonate 500 mg-vitamin D 200 units  1 tablet Oral BID w/meals     clotrimazole  10 mg Buccal 4x Daily     diphenhydrAMINE  25-50 mg Oral Once    Or     diphenhydrAMINE  25-50 mg Per NG tube Once     [START ON 11/14/2018] magnesium oxide  400 mg Oral QAM     methylPREDNISolone  250 mg Intravenous Once     micafungin  100 mg Intravenous Q24H     mycophenolate  1,500 mg Per NG tube BID     octreotide  100 mcg Subcutaneous Q12H     pantoprazole  40 mg Per NG tube Daily     piperacillin-tazobactam  2.25 g Intravenous Q6H     sodium chloride (PF)  3 mL Intravenous Q8H     sulfamethoxazole-trimethoprim  10 mL Per NG tube Daily     valGANciclovir  450 mg Oral Once per day on Mon Thu       Physical Exam:     Admit Weight: 79.2 kg (174 lb 8 oz)    Current vitals:   /71  Pulse 88  Temp 98.6  F (37  C) (Oral)  Resp 12  Ht 1.702 m (5' 7\")  Wt 83 kg (182 lb 15.7 oz)  SpO2 97%  BMI 28.66 kg/m2    CVP (mmHg): (S) 2 mmHg (Provider Notified )    Vital sign ranges:    Temp:  [97.9  F (36.6  C)-98.9  F (37.2  C)] 98.6  F (37  C)  Heart Rate:  [] 96  Resp:  [9-16] 12  BP: (109-160)/() 133/71  SpO2: "  [95 %-98 %] 97 %  Patient Vitals for the past 24 hrs:   BP Temp Temp src Heart Rate Resp SpO2 Weight   11/13/18 0652 - - - - - - 83 kg (182 lb 15.7 oz)   11/13/18 0600 133/71 - - 96 12 97 % -   11/13/18 0500 132/78 - - 95 9 96 % -   11/13/18 0400 149/65 98.6  F (37  C) Oral 95 9 96 % -   11/13/18 0300 137/78 - - 96 11 98 % -   11/13/18 0200 142/80 - - 101 10 97 % -   11/13/18 0100 125/71 - - 99 11 96 % -   11/13/18 0030 127/85 - - 105 - - -   11/13/18 0000 146/87 98.6  F (37  C) Oral 103 - 97 % -   11/12/18 2330 (!) 148/109 - - 102 - - -   11/12/18 2300 (!) 147/97 - - 101 - - -   11/12/18 2230 (!) 158/91 - - 99 - - -   11/12/18 2200 133/83 - - 101 - 98 % -   11/12/18 2145 - - - 99 - - -   11/12/18 2130 146/82 - - 103 - - -   11/12/18 2115 - - - 104 - - -   11/12/18 2100 (!) 146/93 - - 101 - - -   11/12/18 2045 133/84 - - 102 - - -   11/12/18 2030 123/80 - - 98 - - -   11/12/18 2015 152/88 - - 101 - - -   11/12/18 2000 132/89 - - 99 12 - -   11/12/18 1945 (!) 154/92 - - 100 - - -   11/12/18 1930 136/81 - - 102 - - -   11/12/18 1927 - 98.9  F (37.2  C) Axillary 100 - - -   11/12/18 1915 (!) 160/96 - - 105 - - -   11/12/18 1900 138/89 - - - - - -   11/12/18 1845 123/75 - - - 12 97 % -   11/12/18 1830 129/74 98.2  F (36.8  C) - 94 10 97 % -   11/12/18 1815 121/76 98.1  F (36.7  C) - 92 12 98 % -   11/12/18 1800 109/69 98.1  F (36.7  C) - 94 14 97 % -   11/12/18 1745 111/69 97.9  F (36.6  C) - 93 12 95 % -   11/12/18 1730 116/68 98.1  F (36.7  C) - 91 10 - -   11/12/18 1715 120/71 98.1  F (36.7  C) - 92 12 - -   11/12/18 1700 119/71 98.1  F (36.7  C) - 93 10 - -   11/12/18 1645 125/73 98.1  F (36.7  C) - 92 12 - -   11/12/18 1630 118/76 98.1  F (36.7  C) - 91 12 - -   11/12/18 1615 123/74 98.1  F (36.7  C) Oral 91 16 98 % -     General Appearance: in no apparent distress.   Skin: normal, warm, dry  Heart: ST  Lungs: Unlabored, 2L  Abdomen: The abdomen is rounded, SARAH serosang, incision CDI  : milian is present. Urine  has small gross hematuria.   Extremities: edema: absent.  Neurologic: awake, alert and oriented. Tremor absent.    Data:   CMP  Recent Labs  Lab 11/13/18 0435 11/13/18  0007 11/12/18  2102 11/12/18  1631  11/12/18  1530 11/12/18  1412  11/11/18  0955     --   --  138  --  133 135  < > 131*   POTASSIUM 4.5 4.5 4.2 3.8  --  3.6 3.7  < > 4.5   CHLORIDE 111*  --   --  107  --   --   --   --  96   CO2 20  --   --  18*  --   --   --   --  25   GLC 98  --   --  97  --  92 84  < > 282*   BUN 50*  --   --  57*  --   --   --   --  58*   CR 3.45*  --   --  5.02*  --   --   --   --  5.46*   GFRESTIMATED 19*  --   --  12*  --   --   --   --  11*   GFRESTBLACK 22*  --   --  15*  --   --   --   --  13*   JE 8.0*  --   --  8.1*  --   --   --   --  9.0   ICAW  --   --   --   --   --  4.8 4.6  < >  --    MAG 1.8  --  1.6 1.5*  < >  --   --   --   --    PHOS 4.5  --  4.1 3.8  < >  --   --   --   --    AMYLASE 289*  --  352* 330*  --   --   --   --  71   LIPASE 1102*  --  1927* 2878*  --   --   --   --  264   ALBUMIN  --   --   --   --   --   --   --   --  4.0   BILITOTAL  --   --   --   --   --   --   --   --  0.3   ALKPHOS  --   --   --   --   --   --   --   --  189*   AST  --   --   --   --   --   --   --   --  9   ALT  --   --   --   --   --   --   --   --  14   < > = values in this interval not displayed.  CBC  Recent Labs  Lab 11/13/18 0435 11/13/18  0007 11/12/18 2102 11/11/18  0955   HGB 10.0* 10.5* 10.1*  < > 12.6*   WBC 11.9*  --  19.4*  < > 4.8     --  216  < > 269   A1C  --   --   --   --  9.1*   < > = values in this interval not displayed.  COAGS  Recent Labs  Lab 11/12/18  2102 11/12/18  1631   INR 1.31* 1.41*   PTT 33 34      Urinalysis  Recent Labs   Lab Test  11/11/18   1100  03/06/18   1410   05/24/17   1424   COLOR  Light Yellow  Yellow   < >   --    APPEARANCE  Clear  Clear   < >   --    URINEGLC  >1000*  >=1000*   < >   --    URINEBILI  Negative  Negative   < >   --    URINEKETONE  Negative   Negative   < >   --    SG  1.006  1.015   < >   --    UBLD  Trace*  Moderate*   < >   --    URINEPH  8.0*  6.0   < >   --    PROTEIN  30*  100*   < >   --    NITRITE  Negative  Negative   < >   --    LEUKEST  Negative  Negative   < >   --    RBCU  <1  2-5*   < >   --    WBCU  <1  0 - 5   < >   --    UTPG  1.96*   --    --   1.72*    < > = values in this interval not displayed.     Virology:  CMV DNA Quantitation Specimen   Date Value Ref Range Status   09/28/2016 Plasma  Final     CMV Quantitative   Date Value Ref Range Status   10/20/2014 <100 <100 Copies/mL Final   12/05/2013 <100 <100 Copies/mL Final   10/17/2013 <100 <100 Copies/mL Final     CMV IgG Antibody   Date Value Ref Range Status   10/09/2013 <0.20  Negative for anti-CMV IgG U/mL Final     EBV VCA IgG Antibody   Date Value Ref Range Status   10/09/2013 173.00 U/mL Final     Comment:     Positive, suggests immunologic exposure.     Hepatitis C Antibody   Date Value Ref Range Status   11/11/2018 Nonreactive NR^Nonreactive Final     Comment:     Assay performance characteristics have not been established for newborns,   infants, and children       Hep B Surface Sophia   Date Value Ref Range Status   10/09/2013 13.0  Final     Comment:     Positive, Patient is considered to be immune to infection with hepatitis B   when   the value is greater than or equal to 12.0 mlU/mL.     BK Virus Result   Date Value Ref Range Status   11/01/2018 BK Virus DNA Not Detected BKNEG^BK Virus DNA Not Detected copies/mL Final   05/31/2017 BK Virus DNA Not Detected BKNEG copies/mL Final   05/24/2017 BK Virus DNA Not Detected BKNEG copies/mL Final   11/15/2016 BK Virus DNA Not Detected BKNEG copies/mL Final   09/28/2016 BK Virus DNA Not Detected BKNEG copies/mL Final   09/14/2016 BK Virus DNA Not Detected BKNEG copies/mL Final   06/14/2016 BK Virus DNA Not Detected BKNEG copies/mL Final   05/17/2016 BK Virus DNA Not Detected BKNEG copies/mL Final   04/14/2016 BK Virus DNA Not  Detected BKNEG copies/mL Final   03/18/2016 BK Virus DNA Not Detected BKNEG copies/mL Final   02/29/2016 BK Virus DNA Not Detected BKNEG copies/mL Final   02/12/2016 BK Virus DNA Not Detected BKNEG copies/mL Final   02/10/2016 BK Virus DNA Not Detected BKNEG copies/mL Final   01/22/2016 BK Virus DNA Not Detected BKNEG copies/mL Final   01/20/2016 BK Virus DNA Not Detected BKNEG copies/mL Final   10/19/2015 BK Virus DNA Not Detected BKNEG copies/mL Final   03/04/2015 BK Virus DNA Not Detected BKNEG copies/mL Final   02/16/2015 BK Virus DNA Not Detected BKNEG copies/mL Final   01/20/2015 BK Virus DNA Not Detected BKNEG copies/mL Final   01/09/2014 <1000 <1000 copies/mL Final   11/06/2013 <1000 <1000 copies/mL Final

## 2018-11-13 NOTE — DISCHARGE SUMMARY
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 He is s/p living donor kidney transplant in  complicated by BK viremia in , rejection in , and graft failure in 2018. Underwent  donor pancreas transplant with enteric drainage, kidney transplant (with venous reconstruction, two arteries, and stent), appendectomy, and umbilical hernia repair on 18.    Hospital Course   Kidney transplant: Good graft function, with decreasing creatinine since transplant (1.2 on day of discharge). Good UOP. Renal US on POD#0 was normal.   Pancreas transplant: Lipase initially trended downward and then began rising again. Patient was found to be significantly constipated, and lipase fell from 1188 to 784 with management of constipation.  Drain lipase 1010, consistent with serum.  CT abdomen negative for fluid collection but noted some inflammation around pancreas. BG normal without exogenous insulin. On ASA 81mg for thrombosis prophylaxis.  Immunosuppression:  Induction immunosuppression with thymoglobulin 600mg (7.6 mg/kg) and steroid taper. Maintenance immunosuppression on admission was cyclosporine and Myfortic, changed to tacrolimus and Myfortic post-op. Tacrolimus goal level 8-10 (12 hour trough). Level on day of discharge was 8.6. Infectious prophylaxis with nystatin (x12 weeks),  valganciclovir (x12 weeks) and bactrim (indefinitely).   Transplant coordinator: Aleshavesta Reed, [phone]  Donor type:  DBD  DSA at time of transplant:  NO  Ureteral stent: YES  CMV:  Donor + / Recipient +  EBV:  Donor + / Recipient +  Thymoglobulin: 600mg (7.6 mg/kg)    Acute blood loss anemia: EBL 300ml in OR, hemoglobin stable in the 8's-9's range after transplant.    Hypertension: On Hydralazine, Nifedipine, and PRN Clonidine PTA. High blood pressures after transplant, has not tolerated Amlodipine in the past due to edema issues. Will discharge on Coreg 6.25mg BID and Nifedipine 30mg BID.    Acute on chronic pain: Takes Norco (up to 8 tabs daily) at home for pain. Post-op management with dilaudid PCA, followed by oxycodone, lidocaine patches, and tylenol. Required higher dose oxycodone due to opioid tolerance. Will taper down dose every 3 days. Should complete taper by  and then may resume home dose Norco. He was dispensed oxycodone #108, enough to complete full taper.    Nausea: Significant nausea post-op. Controlled with compazine and zofran. Improved by day of discharge and tolerating food and fluids. Will discharge with supplies of both medications. Monitor for dehydration over the next few weeks.     Significant Results and Procedures    Procedure Date:  18    Preoperative Diagnosis:  End Stage renal failure due to diabetic nephropathy and diabetes mellitus type 2    Postoperative Diagnosis:  Same,     Procedure:  1. Right Kidney  - Brain Death Kidney  Re-, Left iliac fossa, with venous reconstruction. A J-J stent was placed.   2. Kidney allograft preparation on Back Table   3. Open appendectomy    4. Whole Pancreas  - Brain Death Pancreas Transplant   5. Pancreas allograft preparation on Back Table  6. Umbilical hernia repair     Surgeon:  Surgeon(s) and Role:     * Monique Luevano MD - Primary     * Rodolfo Man MD - Assisting     * Eldon Henry,  MD - Assisting     * Florence Lu MD - Resident - Assisting         Pending Results   These results will be followed up by the transplant coordinator.  Unresulted Labs Ordered in the Past 30 Days of this Admission     Date and Time Order Name Status Description    11/19/2018 2330 Tacrolimus level In process           Code Status   Full    Primary Care Physician   Masoud Valentin MD    General Appearance: in no apparent distress.   Skin: normal, warm, dry  Lungs: Unlabored, RA  Abdomen: The abdomen is rounded, mildly tender, non tender over kidney and pancreas grafts, SARAH serosang, incision CDI  : No milian  Extremities: edema: absent.  Neurologic: awake, alert and oriented x4. Tremor absent.    Time Spent on this Encounter   I, Karyn Crystal NP, personally saw the patient today and spent greater than 30 minutes discharging this patient.    Discharge Disposition   Discharged to home  Condition at discharge: Stable    Consultations This Hospital Stay   VASCULAR ACCESS CARE ADULT IP CONSULT  NEPHROLOGY KIDNEY/PANCREAS TRANSPLANT ADULT IP CONSULT  NEPHROLOGY KIDNEY/PANCREAS TRANSPLANT ADULT IP CONSULT  SOCIAL WORK IP CONSULT  PHARMACY IP CONSULT  NUTRITION SERVICES ADULT IP CONSULT  CNS DIABETES IP CONSULT  DIABETES EDUCATION IP CONSULT  NEPHROLOGY KIDNEY/PANCREAS TRANSPLANT ADULT IP CONSULT  SOCIAL WORK IP CONSULT  PHARMACY IP CONSULT  NUTRITION SERVICES ADULT IP CONSULT  CNS DIABETES IP CONSULT  DIABETES EDUCATION IP CONSULT  NEPHROLOGY KIDNEY/PANCREAS TRANSPLANT ADULT IP CONSULT  PHARMACY IP CONSULT  MEDICATION HISTORY IP PHARMACY CONSULT    Discharge Orders       Home care nursing referral     Reason for your hospital stay   Kidney and pancreas transplant     Activity   Your activity upon discharge: No lifting greater than 10 pounds for at least 6 weeks, no driving while taking narcotic pain medications, avoid bath tubs, hot tubs, and swimming for at least 3 weeks.     When to contact your care team   Notify your  coordinator if you have pain over your kidney or pancreas, fever greater than 100.5F, redness or drainage from incision, or decreased urine output.    Notify your coordinator immediately if you are ever unable to take your immunosuppressive medications for any reason.    Transplant Coordinator Zora [phone]     Wound care and dressings   Instructions to care for your wound at home: keep wound clean and dry and may get incision wet in shower but do not soak or scrub.     Tubes and drains   You are going home with the following tubes or drains: SARAH.  Empty drain daily and write down amount. Clean around drain with soap and water every day.     Discharge Instructions   Do not restart your Norco until you are done taking oxycodone. Taking both medications at the same time can cause an overdose. When you restart Norco, stop taking Tylenol (acetaminophen). Both drugs contain acetaminophen and taking both at the same time can cause an overdose. Always keep pain medications away from children. Dispose of unused pain medications at a Pharmacy or safe disposal site.     Adult [facility] Follow-up and recommended labs and tests   1. [facility] ([address] [place] [address])  Starting Wed 11/21 at 6:45am.  Please bring all medications, lab book, and medication card with you.  Do not take your morning dose of tacrolimus until after labs are drawn.     2. Labs daily in [facility] and then every Monday, Wednesday, Friday: BMP, CBC, Mag, Phos, tacrolimus level, amylase, lipase  3. Dr. Luevano, Transplant Clinic, 12/3/18  4. Ureteral stent removal in 4-6 weeks, to be scheduled by Transplant Coordinator      Appointments on [place] and/or [facility] (with [facility] or [facility] provider or service). Call [phone] if you haven't heard regarding these appointments within 7 days of discharge.     Diet   Follow this diet upon discharge: Regular diet, drink 2-3 liters of water every day          Discharge Medications      Current Discharge Medication List      START taking these medications    Details   acetaminophen (TYLENOL) 325 MG tablet Take 3 tablets (975 mg) by mouth every 8 hours as needed for mild pain or fever (DO NOT USE WITH NORCO)  Qty: 100 tablet, Refills: 0    Associated Diagnoses: Kidney replaced by transplant      aspirin 81 MG EC tablet Take 1 tablet (81 mg) by mouth daily  Qty: 30 tablet, Refills: 5    Associated Diagnoses: Pancreas transplanted (H)      calcium carbonate 500 mg-vitamin D 200 units (OSCAL WITH D;OYSTER SHELL CALCIUM) 500-200 MG-UNIT per tablet Take 1 tablet by mouth 2 times daily (with meals)  Qty: 60 tablet, Refills: 2    Associated Diagnoses: Kidney replaced by transplant      carvedilol (COREG) 6.25 MG tablet Take 1 tablet (6.25 mg) by mouth 2 times daily  Qty: 60 tablet, Refills: 3    Associated Diagnoses: Hypertension secondary to other renal disorders      Lidocaine (LIDOCARE) 4 % Patch Place 2 patches onto the skin every 24 hours As needed for pain  Qty: 10 patch, Refills: 1    Associated Diagnoses: Pancreas transplanted (H)      magnesium oxide (MAG-OX) 400 MG tablet Take 1 tablet (400 mg) by mouth 3 times daily  Qty: 90 tablet, Refills: 2    Associated Diagnoses: Pancreas transplanted (H)      nystatin (MYCOSTATIN) 210472 UNIT/ML suspension Take 5 mLs (500,000 Units) by mouth 4 times daily  Qty: 600 mL, Refills: 2    Associated Diagnoses: Immunosuppressed status (H)      ondansetron (ZOFRAN-ODT) 4 MG ODT tab Take 1 tablet (4 mg) by mouth every 6 hours as needed for nausea or vomiting  Qty: 30 tablet, Refills: 1    Associated Diagnoses: Nausea      oxyCODONE IR (ROXICODONE) 5 MG tablet Take 10-12.5mg (2-2.5 tabs) every 4 hours as needed for pain on 11/20-11/22  Take 5-10mg (1-2 tabs) every 4 hours as needed for pain on 11/23-11/25  Take 5-7.5mg (1-1.5 tabs) every 4 hours as needed for pain on 11/26-11/28  Starting 11/29 you may restart your regular dose of Norco. DO NOT USE OXYCODONE AND  NORCO AT THE SAME TIME.  Qty: 108 tablet, Refills: 0    Associated Diagnoses: Kidney replaced by transplant      phosphorus tablet 250 mg (PHOSPHA 250 NEUTRAL) 250 MG per tablet Take 2 tablets (500 mg) by mouth 2 times daily  Qty: 120 tablet, Refills: 1    Associated Diagnoses: Hypophosphatemia      polyethylene glycol (MIRALAX) powder Take 17 g (1 capful) by mouth 2 times daily Hold for loose stools  Qty: 850 g, Refills: 0    Associated Diagnoses: Drug-induced constipation      prochlorperazine (COMPAZINE) 5 MG tablet Take 1 tablet (5 mg) by mouth every 6 hours as needed for nausea or vomiting  Qty: 30 tablet, Refills: 1    Associated Diagnoses: Nausea      sennosides (SENOKOT) 8.6 MG tablet Take 2 tablets by mouth 2 times daily Hold for loose stools  Qty: 120 each, Refills: 1    Associated Diagnoses: Drug-induced constipation      sulfamethoxazole-trimethoprim (BACTRIM/SEPTRA) 400-80 MG per tablet Take 1 tablet by mouth daily  Qty: 30 tablet, Refills: 11    Associated Diagnoses: Pancreas transplanted (H); Kidney replaced by transplant      !! tacrolimus (GENERIC EQUIVALENT) 0.5 MG capsule Take 1 capsule (0.5 mg) by mouth 2 times daily Take with 1mg caps for a total of 3.5mg twice daily  Qty: 60 capsule, Refills: 11    Associated Diagnoses: Pancreas transplanted (H)      !! tacrolimus (GENERIC EQUIVALENT) 0.5 MG capsule Take 1 cap by mouth twice daily as directed by Transplant Center for dose changes  Qty: 30 capsule, Refills: 0    Associated Diagnoses: Pancreas transplanted (H)      tacrolimus (GENERIC EQUIVALENT) 1 MG capsule Take 3 capsules (3 mg) by mouth 2 times daily Take with 0.5mg caps for a total of 3.5mg twice daily  Qty: 180 capsule, Refills: 11    Associated Diagnoses: Pancreas transplanted (H)      valGANciclovir (VALCYTE) 450 MG tablet Take 2 tablets (900 mg) by mouth daily  Qty: 152 tablet, Refills: 0    Associated Diagnoses: Kidney replaced by transplant; Pancreas transplanted (H)       !! -  Potential duplicate medications found. Please discuss with provider.      CONTINUE these medications which have CHANGED    Details   HYDROcodone-acetaminophen (NORCO) 5-325 MG per tablet Stop taking until you have completed your oxycodone taper. DO NOT TAKE WITH OXYCODONE.    Associated Diagnoses: Other chronic pain      mycophenolic acid (GENERIC EQUIVALENT) 360 MG EC tablet Take 2 tablets (720 mg) by mouth 2 times daily  Qty: 120 tablet, Refills: 11    Associated Diagnoses: Pancreas transplanted (H)      NIFEdipine ER osmotic (PROCARDIA XL) 30 MG 24 hr tablet Take 1 tablet (30 mg) by mouth 2 times daily  Qty: 60 tablet, Refills: 3    Associated Diagnoses: Hypertension secondary to other renal disorders         CONTINUE these medications which have NOT CHANGED    Details   atorvastatin (LIPITOR) 10 MG tablet Take 0.5 tablets (5 mg) by mouth daily  Qty: 45 tablet, Refills: 3    Associated Diagnoses: Coronary artery disease involving native coronary artery of native heart without angina pectoris      calcitRIOL (ROCALTROL) 0.25 MCG capsule Take 1 capsule (0.25 mcg) by mouth daily  Qty: 90 capsule, Refills: 3    Associated Diagnoses: Renal osteodystrophy      cholecalciferol (VITAMIN  -D) 1000 UNITS capsule Take 2 capsules (2,000 Units) by mouth daily  Qty: 180 capsule, Refills: 3    Associated Diagnoses: CKD (chronic kidney disease) stage 5, GFR less than 15 ml/min (H)      blood glucose monitoring (NO BRAND SPECIFIED) test strip Use to test blood sugar 4 times daily or as directed.  Qty: 1 Box, Refills: prn    Associated Diagnoses: Diabetes mellitus with background retinopathy (H)         STOP taking these medications       BASAGLAR 100 UNIT/ML injection Comments:   Reason for Stopping:         cinacalcet (SENSIPAR) 30 MG tablet Comments:   Reason for Stopping:         cloNIDine (CATAPRES) 0.1 MG tablet Comments:   Reason for Stopping:         cycloSPORINE modified (GENERIC EQUIVALENT) 25 MG capsule Comments:    Reason for Stopping:         hydrALAZINE (APRESOLINE) 25 MG tablet Comments:   Reason for Stopping:         insulin lispro (HUMALOG PEN) 100 UNIT/ML injection Comments:   Reason for Stopping:         sodium bicarbonate 650 MG tablet Comments:   Reason for Stopping:             Allergies   Allergies   Allergen Reactions     No Known Allergies      Data   Most Recent 3 CBC's:  Recent Labs   Lab Test  11/20/18 0523 11/19/18   0639  11/18/18   0629   WBC  5.2  5.9  2.8*   HGB  9.1*  9.0*  8.7*   MCV  90  90  91   PLT  165  147*  132*     Most Recent 3 BMP's:  Recent Labs   Lab Test  11/20/18 0523 11/19/18   0639  11/18/18   0629   NA  131*  134  137   POTASSIUM  5.0  4.6  4.0   CHLORIDE  103  106  107   CO2  22  21  22   BUN  16  21  22   CR  1.18  1.18  1.09   ANIONGAP  5  7  8   JE  8.1*  8.0*  8.2*   GLC  84  80  77

## 2018-11-13 NOTE — PROGRESS NOTES
I diIscussed an organ offer with Mr. Amadou Lewis today. The donor is a HBcAb+. The nature of the known risk was disclosed. CARLOTA testing is negative for HBV, HCV, HIV and other tests were reviewed. The risk of potential donor transmitted infection due to this status was discussed in detail, estimated overall as very low, but higher than a donor without this designation. Mr. Lewis verbalized an understanding of this risk and wishes to proceed with transplantation with this donor.    Rodolfo Man MD  Transplant Fellow  Pager# [phone]

## 2018-11-13 NOTE — PROGRESS NOTES
Transfer  Transferred from: PACU  Via:bed  Reason for transfer:Post kidney/panc transplant  Family: Aware of transfer. Daughter at bedside  Belongings: On 7A, House Orderly to deliver  Chart: Received with pt  Medications: MAR  2 RN Skin Assessment Completed By: Linh Yuen   Report received from: Radha.   Pt status:  B/P: 132/89, T: 98.9, P: 88, R: 12  Alert and oriented. C/o incisional pain. Nauseous upon arrival, relieved when connected to suction. Oriented to staff, unit, call light, white board and plan of care. Called 7A searching for belongings, house orderly will deliver. All orders placed as PACU Phase of Care, therefore auto-cancelled upon arrival to 6B. Dr. Swan notified and orders replaced. Will continue to monitor and follow POC.

## 2018-11-13 NOTE — PHARMACY-ADMISSION MEDICATION HISTORY
Admission medication history interview status for the 11/11/2018 admission is complete. See Epic admission navigator for allergy information, pharmacy, prior to admission medications and immunization status.     Medication history interview sources:  patient, [facility], Reconcile external dispense records     Changes made to PTA medication list (reason)  Added: none  Deleted: none  Changed:   -Basaglar from 38 units daily to 16 units BID   -Updated Humalog to 1 unit per 15 grams and sliding scale  -hydralazine from three times daily PRN to scheduled three times daily   -Myfortic (brand) to generic per [facility] dispense records     Additional medication history information (including reliability of information, actions taken by pharmacist): Patient seemed somewhat reliable during medication history interview and did fall asleep once but was able to complete medication interview.     Prior to Admission medications    Medication Sig Last Dose Taking? Auth Provider   atorvastatin (LIPITOR) 10 MG tablet Take 0.5 tablets (5 mg) by mouth daily 11/10/2018 at AM Yes Deyvi Dick MD   BASAGLAR 100 UNIT/ML injection Inject 16 Units Subcutaneous 2 times daily   Yes Reported, Patient   calcitRIOL (ROCALTROL) 0.25 MCG capsule Take 1 capsule (0.25 mcg) by mouth daily 11/11/2018 at 0800 Yes Jose L De Los Santos MD   cholecalciferol (VITAMIN  -D) 1000 UNITS capsule Take 2 capsules (2,000 Units) by mouth daily 11/10/2018 at Unknown time Yes Deyvi Dick MD   cinacalcet (SENSIPAR) 30 MG tablet Take 1 tablet (30 mg) by mouth daily 11/10/2018 at Unknown time Yes Deyvi Dick MD   cycloSPORINE modified (GENERIC EQUIVALENT) 25 MG capsule Take 3 capsules (75 mg) by mouth 2 times daily Total dose 75 mg twice a day 11/10/2018 Yes Deyvi Dick MD   hydrALAZINE (APRESOLINE) 25 MG tablet Take 25 mg by mouth 3 times daily 11/10/2018 Yes Unknown, Entered By History   HYDROcodone-acetaminophen  (NORCO) 5-325 MG per tablet Take 2 tablets by mouth every 6 hours as needed for moderate to severe pain 11/10/2018 Yes Reported, Patient   insulin lispro (HUMALOG PEN) 100 UNIT/ML injection Take 1 unit for 15 g plus 1 unit for every 50 blood sugar above 150 three times daily with meals    For -199 give 1 additional unit  For -249 give 2 additional units  For -299 give 3 additional units  For -349 give 4 additional units  Yes Unknown, Entered By History   mycophenolic acid (GENERIC EQUIVALENT) 180 MG EC tablet Take 540 mg by mouth 2 times daily 11/11/2018 Yes Unknown, Entered By History   NIFEdipine ER osmotic (PROCARDIA XL) 30 MG 24 hr tablet Take 3 tablets (90 mg) by mouth 2 times daily 11/10/2018 Yes Deyvi Dick MD   sodium bicarbonate 650 MG tablet Take 3 tablets (1,950 mg) by mouth 3 times daily 11/10/2018 Yes Deyvi Dick MD   blood glucose monitoring (NO BRAND SPECIFIED) test strip Use to test blood sugar 4 times daily or as directed.   Deborah Mcelroy PA-C   cloNIDine (CATAPRES) 0.1 MG tablet Take 1 tablet (0.1 mg) by mouth 2 times daily as needed For SBP > 170 More than a month at Unknown time  Deyvi Dick MD       Medication history completed by: Ivy Roque, PharmD

## 2018-11-13 NOTE — OR NURSING
Ultrasound done at bedside in PACU. Dr Man text paged that lab results are done and in the chart. VSS. Stable to discharge to floor. Daughter called with update. Son will be coming to hospital to see pt.

## 2018-11-13 NOTE — PHARMACY-TRANSPLANT NOTE
Adult Kidney/Pancreas Transplant Post Operative Note    52 year old male s/p SPK  transplant on 11/12/18 for Type II diabetes.      Planned immunosuppression regimen per kidney/pancreas transplant protocol:  INDUCTION: thymoglobulin 1.5 mg/kg/dose IV for 5 doses and methylprednisolone IV daily x 3 doses used as a pre-med for thymoglobulin.    MAINTENANCE:  mycophenolate and tacrolimus with goal trough levels of 8-10 mcg/L for the first 6 months post-transplant.      Opportunistic pathogen prophylaxis includes: trimethoprim/sulfamethoxazole, clotrimazole and valganciclovir.    Post-op antibiotic/antifungal surgical prophylaxis includes: piperacillin-tazobactam for 3 days post-procedure and micafungin IV for 6 days post-procedure.    Patient is not enrolled in medication study.    Pharmacy will monitor for medication interactions and immunosuppression levels in conjunction with the team.  Medication therapy needs for discharge planning will continue to be addressed throughout the current admission via multidisciplinary rounds and order review.   Pharmacy will make recommendations as appropriate.
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
## 2018-11-13 NOTE — PLAN OF CARE
Problem: Patient Care Overview  Goal: Plan of Care/Patient Progress Review  Outcome: Improving  Neuro: A&Ox4. Drowsy.   Cardiac: SR. -150's/80's, HR up to 100s with pain and nausea. MD notified.     Respiratory: Sating 99% on 2L nc. ETCO2 38, IPI 10, RR 12.   GI/: Light pink urine 400mL trending down to 175mL. +BS. Nausea and vomiting ~30 min after meds through G Tube and NG clamped. Did not respond to zofran so compazine ordered. NG to low continuous suction.   Diet/appetite: Minimal ice chips.   Activity:  Assist of 2, repositioning.   Pain: Dilaudid 0.2 q10min not fully covering pain. Encourage pt to push button.   Skin: No deficits noted.  LDA's: 3L internal jugular, PIV x2 R fa, L arm fistula, Ramirez, SARAH, 2L nc    Plan: Continue with POC. Notify primary team with changes.

## 2018-11-13 NOTE — PROGRESS NOTES
"CLINICAL NUTRITION SERVICES - ASSESSMENT NOTE     Nutrition Prescription    RECOMMENDATIONS FOR MDs/PROVIDERS TO ORDER:  None at this time    Malnutrition Status:    Patient does not meet two of the above criteria necessary for diagnosing malnutrition    Recommendations already ordered by Registered Dietitian (RD):  Discharge diet order written 11/13    Future/Additional Recommendations:  1. Once diet adv, if suspect eating poorly, would offer supplements and start on kcal cnts.   2. Monitor wt, BG and lytes.      REASON FOR ASSESSMENT  Amadou Lewis is a 52 year old male seen by the dietitian for MD Order- Assess & Educate post-op SOT    NUTRITION HISTORY  Pt is s/p SKP txp with enteric drainage (POD1). Pt with h/o ESRD 2/2 T2DM (Poorly controlled DM prior to transplant. A1c 9.1-11.1% last few years), and previous kidney txp in 2013. Pt last seen by RD on 5/31/17 for op eval for KP txp. Per previous RD note, pt reports losing 20# post- last txp in 2013.     Per pt interview (with family present): Pt reports following a regular diet at home, occasionally counting carbs. He denies using herbal supplements PTA. He reports having adequate intake and no issues with appetite prior to txp. He began dialysis in Sept of 2018. He notes losing wt prior to start of dialysis, but had gained that wt back prior to txp. Pt was not restricting protein, potassium, phosphorus or sodium prior-txp.     CURRENT NUTRITION ORDERS  Diet: NPO  Intake/Tolerance: No intake this admission. NG in place for suction.     LABS  Labs reviewed  Mg++ low (1.5) on 11/12, Mg supplement scheduled, WNL today.     MEDICATIONS  Medications reviewed  Calcium with vitamin D  Immunosuppression regimen  IV D5 at 100 mL/hr - Providing 408 kcal/day (5 kcal/kg)  Insulin gtt     ANTHROPOMETRICS  Height: 170.2 cm (5' 7\")  Most Recent Weight: 83 kg (182 lb 15.7 oz)    IBW: 67.3 kg  BMI: Overweight BMI 25-29.9  Weight History: Wt appears to be trending up in the " past year. No concerns with wt loss.  Wt Readings from Last 10 Encounters:   11/13/18 83 kg (182 lb 15.7 oz)   11/01/18 79.4 kg (175 lb)   10/08/18 78.5 kg (173 lb)   08/13/18 80.8 kg (178 lb 3.2 oz)   05/01/18 79.3 kg (174 lb 12.8 oz)   01/23/18 79.7 kg (175 lb 9.6 oz)   09/26/17 76.5 kg (168 lb 11.2 oz)   08/30/17 76.1 kg (167 lb 12.8 oz)   08/08/17 75.4 kg (166 lb 3.2 oz)   05/31/17 76.6 kg (168 lb 14.4 oz)     Dosing Weight: 79 kg (actual, lowest wt this adm of 79.2 kg on 11/11)    ASSESSED NUTRITION NEEDS:  Estimated Energy Needs: 3025-5691 kcals (30-35 Kcal/Kg)  Justification: increased needs post-op SOT  Estimated Protein Needs: 103-158 grams protein (1.3-2 gm/Kg)  Justification: increased needs post op SOT  Estimated Fluid Needs: 0963-7207  mL (25-30 mL/Kg)  Justification: maintenance, or per MD pending fluid status and adequate UOP    PHYSICAL FINDINGS  See malnutrition section below.    MALNUTRITION  % Intake: Decreased intake does not meet criteria  % Weight Loss: None noted  Subcutaneous Fat Loss: None observed  Muscle Loss: None observed  Fluid Accumulation/Edema: None noted  Malnutrition Diagnosis: Patient does not meet two of the above criteria necessary for diagnosing malnutrition    NUTRITION DIAGNOSIS:  1. Food and nutrition-related knowledge deficit r/t length of time since previous post-transplant education AEB patient verbal report, review of chart record, and MD consult for nutrition education.     2. Inadequate energy-protein intake related to NPO status as evidence by pt remaining NPO and no nutrition support yet initiated.     INTERVENTIONS  Implementation  Nutrition Education:  1. Provided instruction on post-transplant diet with discussion regarding protein sources and high protein needs in acute post-tx phase.  Reviewed recommendations to follow low fat/low sodium diet long term and discussed heart healthy diet tips.  Discussed monitoring of K+/Phos lab values with possible need for  adjustment of these in the diet as necessary. Reviewed need for food safety precautions to prevent food borne illness.    2. Provided & reviewed handout: Post-transplant diet guidelines. Patient receptive to information provided. Expected diet compliance is good.     Goals  1. Patient will verbalize understanding of 3 important aspects of post-transplant diet guidelines.   2. Diet adv v nutrition support within 4-5 days.    Monitoring/Evaluation  Progress toward goals will be monitored and evaluated per protocol.    Naheed Aguirre RD      I have read and agree with the above re-assessment, evaluation, interventions and recommendations.    Majo Martinez MS, RD, LD  Pager [phone]

## 2018-11-13 NOTE — PLAN OF CARE
Problem: Patient Care Overview  Goal: Plan of Care/Patient Progress Review  Outcome: Improving  Neuro: A&Ox4.   Cardiac: Sinus Tachy. BP's stable    Respiratory: Sating 94-98 on 2L NC, on Capnography, RR 8-16   GI/: Adequate urine output. No BM overnight  Diet/appetite: Tolerating ice chips diet.   Activity:  Assist of 1 to turn, pt has declined getting to edge of bed.  Pain: On PCA pump for surgery pain, Dose increased due to increase in pain.  Skin: Abdominal Incision  LDA's: SARAH drain, milian catheter, 3 lumen CVC, NG tube to continuous suction, 2 PIV on R arm, and  L forearm dialysis fistula  D5 LR at 100ml/hr, insulin drip titrated on hourly blood sugar amounts, and Diluadid PCA    Plan: Continue hourly checks and monitoring pt's output, titration of drips appropriately. Notified NP at 0600 for low CPA (2) no new orders. Continue with POC. Notify primary team with changes.      Problem: Diabetes Comorbidity  Goal: Diabetes  Patient comorbidity will be monitored for signs and symptoms of hyperglycemia or hypoglycemia. Problems will be absent, minimized or managed by discharge/transition of care.   Outcome: Improving  Insulin drip utilized for tight control of blood sugars.     Problem: Kidney Transplant (Adult)  Goal: Signs and Symptoms of Listed Potential Problems Will be Absent, Minimized or Managed (Kidney Transplant)  Signs and symptoms of listed potential problems will be absent, minimized or managed by discharge/transition of care (reference Kidney Transplant (Adult) CPG).   Outcome: Improving  Pt having nausea, compazine and zofran given to help control. Pt's RR 8-10 when sleeping, continue to monitor and assess somnolence.

## 2018-11-14 LAB
AMYLASE SERPL-CCNC: 121 U/L (ref 30–110)
ANION GAP SERPL CALCULATED.3IONS-SCNC: 5 MMOL/L (ref 3–14)
BASOPHILS # BLD AUTO: 0 10E9/L (ref 0–0.2)
BASOPHILS NFR BLD AUTO: 0 %
BUN SERPL-MCNC: 38 MG/DL (ref 7–30)
CALCIUM SERPL-MCNC: 8.6 MG/DL (ref 8.5–10.1)
CHLORIDE SERPL-SCNC: 116 MMOL/L (ref 94–109)
CO2 SERPL-SCNC: 23 MMOL/L (ref 20–32)
CREAT SERPL-MCNC: 1.84 MG/DL (ref 0.66–1.25)
DIFFERENTIAL METHOD BLD: ABNORMAL
EOSINOPHIL # BLD AUTO: 0 10E9/L (ref 0–0.7)
EOSINOPHIL NFR BLD AUTO: 0 %
ERYTHROCYTE [DISTWIDTH] IN BLOOD BY AUTOMATED COUNT: 14.1 % (ref 10–15)
GFR SERPL CREATININE-BSD FRML MDRD: 38 ML/MIN/1.7M2
GLUCOSE BLDC GLUCOMTR-MCNC: 100 MG/DL (ref 70–99)
GLUCOSE BLDC GLUCOMTR-MCNC: 101 MG/DL (ref 70–99)
GLUCOSE BLDC GLUCOMTR-MCNC: 104 MG/DL (ref 70–99)
GLUCOSE BLDC GLUCOMTR-MCNC: 105 MG/DL (ref 70–99)
GLUCOSE BLDC GLUCOMTR-MCNC: 113 MG/DL (ref 70–99)
GLUCOSE BLDC GLUCOMTR-MCNC: 115 MG/DL (ref 70–99)
GLUCOSE BLDC GLUCOMTR-MCNC: 119 MG/DL (ref 70–99)
GLUCOSE BLDC GLUCOMTR-MCNC: 96 MG/DL (ref 70–99)
GLUCOSE BLDC GLUCOMTR-MCNC: 98 MG/DL (ref 70–99)
GLUCOSE BLDC GLUCOMTR-MCNC: 98 MG/DL (ref 70–99)
GLUCOSE BLDC GLUCOMTR-MCNC: 99 MG/DL (ref 70–99)
GLUCOSE SERPL-MCNC: 97 MG/DL (ref 70–99)
HCT VFR BLD AUTO: 27.9 % (ref 40–53)
HGB BLD-MCNC: 8.7 G/DL (ref 13.3–17.7)
IMM GRANULOCYTES # BLD: 0 10E9/L (ref 0–0.4)
IMM GRANULOCYTES NFR BLD: 0.1 %
LIPASE FLD-CCNC: NORMAL U/L
LIPASE SERPL-CCNC: 534 U/L (ref 73–393)
LMWH PPP CHRO-ACNC: <0.1 IU/ML
LYMPHOCYTES # BLD AUTO: 0.2 10E9/L (ref 0.8–5.3)
LYMPHOCYTES NFR BLD AUTO: 2.6 %
MAGNESIUM SERPL-MCNC: 2 MG/DL (ref 1.6–2.3)
MCH RBC QN AUTO: 28.7 PG (ref 26.5–33)
MCHC RBC AUTO-ENTMCNC: 31.2 G/DL (ref 31.5–36.5)
MCV RBC AUTO: 92 FL (ref 78–100)
MONOCYTES # BLD AUTO: 0.3 10E9/L (ref 0–1.3)
MONOCYTES NFR BLD AUTO: 4.1 %
NEUTROPHILS # BLD AUTO: 6.8 10E9/L (ref 1.6–8.3)
NEUTROPHILS NFR BLD AUTO: 93.2 %
NRBC # BLD AUTO: 0 10*3/UL
NRBC BLD AUTO-RTO: 0 /100
PHOSPHATE SERPL-MCNC: 2.7 MG/DL (ref 2.5–4.5)
PLATELET # BLD AUTO: 111 10E9/L (ref 150–450)
POTASSIUM SERPL-SCNC: 4.6 MMOL/L (ref 3.4–5.3)
RBC # BLD AUTO: 3.03 10E12/L (ref 4.4–5.9)
SODIUM SERPL-SCNC: 144 MMOL/L (ref 133–144)
SPECIMEN SOURCE FLD: NORMAL
TACROLIMUS BLD-MCNC: 3.4 UG/L (ref 5–15)
TME LAST DOSE: ABNORMAL H
WBC # BLD AUTO: 7.3 10E9/L (ref 4–11)

## 2018-11-14 PROCEDURE — 25000132 ZZH RX MED GY IP 250 OP 250 PS 637: Performed by: STUDENT IN AN ORGANIZED HEALTH CARE EDUCATION/TRAINING PROGRAM

## 2018-11-14 PROCEDURE — 25000128 H RX IP 250 OP 636: Performed by: STUDENT IN AN ORGANIZED HEALTH CARE EDUCATION/TRAINING PROGRAM

## 2018-11-14 PROCEDURE — 84100 ASSAY OF PHOSPHORUS: CPT | Performed by: SURGERY

## 2018-11-14 PROCEDURE — 00000146 ZZHCL STATISTIC GLUCOSE BY METER IP

## 2018-11-14 PROCEDURE — 25000132 ZZH RX MED GY IP 250 OP 250 PS 637: Performed by: PHYSICIAN ASSISTANT

## 2018-11-14 PROCEDURE — 85520 HEPARIN ASSAY: CPT | Performed by: SURGERY

## 2018-11-14 PROCEDURE — 83690 ASSAY OF LIPASE: CPT | Performed by: SURGERY

## 2018-11-14 PROCEDURE — 25000131 ZZH RX MED GY IP 250 OP 636 PS 637: Performed by: NURSE PRACTITIONER

## 2018-11-14 PROCEDURE — 25000128 H RX IP 250 OP 636: Performed by: NURSE PRACTITIONER

## 2018-11-14 PROCEDURE — 82150 ASSAY OF AMYLASE: CPT | Performed by: SURGERY

## 2018-11-14 PROCEDURE — 85025 COMPLETE CBC W/AUTO DIFF WBC: CPT | Performed by: SURGERY

## 2018-11-14 PROCEDURE — 25000128 H RX IP 250 OP 636: Performed by: SURGERY

## 2018-11-14 PROCEDURE — 80197 ASSAY OF TACROLIMUS: CPT | Performed by: NURSE PRACTITIONER

## 2018-11-14 PROCEDURE — 36592 COLLECT BLOOD FROM PICC: CPT | Performed by: SURGERY

## 2018-11-14 PROCEDURE — 80048 BASIC METABOLIC PNL TOTAL CA: CPT | Performed by: SURGERY

## 2018-11-14 PROCEDURE — 83690 ASSAY OF LIPASE: CPT | Performed by: NURSE PRACTITIONER

## 2018-11-14 PROCEDURE — 25000132 ZZH RX MED GY IP 250 OP 250 PS 637: Performed by: NURSE PRACTITIONER

## 2018-11-14 PROCEDURE — 12000024 ZZH R&B TRANSPLANT CRITICAL

## 2018-11-14 PROCEDURE — 83735 ASSAY OF MAGNESIUM: CPT | Performed by: SURGERY

## 2018-11-14 PROCEDURE — 25000132 ZZH RX MED GY IP 250 OP 250 PS 637: Performed by: SURGERY

## 2018-11-14 RX ORDER — NIFEDIPINE 30 MG/1
30 TABLET, EXTENDED RELEASE ORAL AT BEDTIME
Status: DISCONTINUED | OUTPATIENT
Start: 2018-11-14 | End: 2018-11-15

## 2018-11-14 RX ORDER — DIPHENHYDRAMINE HCL 12.5MG/5ML
25-50 LIQUID (ML) ORAL ONCE
Status: COMPLETED | OUTPATIENT
Start: 2018-11-14 | End: 2018-11-14

## 2018-11-14 RX ORDER — PIPERACILLIN SODIUM, TAZOBACTAM SODIUM 3; .375 G/15ML; G/15ML
3.38 INJECTION, POWDER, LYOPHILIZED, FOR SOLUTION INTRAVENOUS EVERY 6 HOURS
Status: COMPLETED | OUTPATIENT
Start: 2018-11-14 | End: 2018-11-15

## 2018-11-14 RX ORDER — FUROSEMIDE 10 MG/ML
10 INJECTION INTRAMUSCULAR; INTRAVENOUS ONCE
Status: COMPLETED | OUTPATIENT
Start: 2018-11-14 | End: 2018-11-14

## 2018-11-14 RX ORDER — BISACODYL 10 MG
10 SUPPOSITORY, RECTAL RECTAL DAILY PRN
Status: DISCONTINUED | OUTPATIENT
Start: 2018-11-14 | End: 2018-11-20 | Stop reason: HOSPADM

## 2018-11-14 RX ORDER — VALGANCICLOVIR HYDROCHLORIDE 50 MG/ML
450 POWDER, FOR SOLUTION ORAL DAILY
Status: DISCONTINUED | OUTPATIENT
Start: 2018-11-15 | End: 2018-11-15

## 2018-11-14 RX ORDER — FLUORIDE TOOTHPASTE
5-10 TOOTHPASTE DENTAL 4 TIMES DAILY PRN
Status: DISCONTINUED | OUTPATIENT
Start: 2018-11-14 | End: 2018-11-20 | Stop reason: HOSPADM

## 2018-11-14 RX ORDER — DIPHENHYDRAMINE HCL 25 MG
25-50 CAPSULE ORAL ONCE
Status: COMPLETED | OUTPATIENT
Start: 2018-11-14 | End: 2018-11-14

## 2018-11-14 RX ORDER — DIPHENHYDRAMINE HCL 25 MG
25 CAPSULE ORAL EVERY 6 HOURS PRN
Status: DISCONTINUED | OUTPATIENT
Start: 2018-11-14 | End: 2018-11-20 | Stop reason: HOSPADM

## 2018-11-14 RX ORDER — DIPHENHYDRAMINE HCL 12.5MG/5ML
25 LIQUID (ML) ORAL ONCE
Status: COMPLETED | OUTPATIENT
Start: 2018-11-14 | End: 2018-11-14

## 2018-11-14 RX ORDER — METHYLPREDNISOLONE SODIUM SUCCINATE 125 MG/2ML
100 INJECTION, POWDER, LYOPHILIZED, FOR SOLUTION INTRAMUSCULAR; INTRAVENOUS ONCE
Status: COMPLETED | OUTPATIENT
Start: 2018-11-14 | End: 2018-11-14

## 2018-11-14 RX ORDER — PIPERACILLIN SODIUM, TAZOBACTAM SODIUM 4; .5 G/20ML; G/20ML
4.5 INJECTION, POWDER, LYOPHILIZED, FOR SOLUTION INTRAVENOUS EVERY 6 HOURS
Status: DISCONTINUED | OUTPATIENT
Start: 2018-11-14 | End: 2018-11-14

## 2018-11-14 RX ADMIN — PIPERACILLIN AND TAZOBACTAM 2.25 G: 2; .25 INJECTION, POWDER, FOR SOLUTION INTRAVENOUS at 14:08

## 2018-11-14 RX ADMIN — DIPHENHYDRAMINE HYDROCHLORIDE 25 MG: 25 SOLUTION ORAL at 04:50

## 2018-11-14 RX ADMIN — Medication: at 16:52

## 2018-11-14 RX ADMIN — Medication 10 ML: at 12:09

## 2018-11-14 RX ADMIN — HYDRALAZINE HYDROCHLORIDE 10 MG: 20 INJECTION INTRAMUSCULAR; INTRAVENOUS at 16:04

## 2018-11-14 RX ADMIN — METHYLPREDNISOLONE SODIUM SUCCINATE 100 MG: 125 INJECTION, POWDER, LYOPHILIZED, FOR SOLUTION INTRAMUSCULAR; INTRAVENOUS at 11:37

## 2018-11-14 RX ADMIN — MYCOPHENOLATE MOFETIL 1000 MG: 200 POWDER, FOR SUSPENSION ORAL at 08:07

## 2018-11-14 RX ADMIN — HYDRALAZINE HYDROCHLORIDE 10 MG: 20 INJECTION INTRAMUSCULAR; INTRAVENOUS at 04:03

## 2018-11-14 RX ADMIN — PIPERACILLIN SODIUM,TAZOBACTAM SODIUM 3.38 G: 3; .375 INJECTION, POWDER, FOR SOLUTION INTRAVENOUS at 20:46

## 2018-11-14 RX ADMIN — ACETAMINOPHEN 650 MG: 325 SOLUTION ORAL at 11:34

## 2018-11-14 RX ADMIN — Medication 5 MG: at 20:47

## 2018-11-14 RX ADMIN — PANTOPRAZOLE SODIUM 40 MG: 40 TABLET, DELAYED RELEASE ORAL at 08:07

## 2018-11-14 RX ADMIN — VALGANCICLOVIR HYDROCHLORIDE 450 MG: 50 POWDER, FOR SOLUTION ORAL at 08:07

## 2018-11-14 RX ADMIN — PROCHLORPERAZINE EDISYLATE 5 MG: 5 INJECTION INTRAMUSCULAR; INTRAVENOUS at 09:38

## 2018-11-14 RX ADMIN — BISACODYL 10 MG: 10 SUPPOSITORY RECTAL at 14:30

## 2018-11-14 RX ADMIN — NYSTATIN 500000 UNITS: 500000 SUSPENSION ORAL at 16:04

## 2018-11-14 RX ADMIN — NYSTATIN 500000 UNITS: 500000 SUSPENSION ORAL at 12:12

## 2018-11-14 RX ADMIN — NIFEDIPINE 30 MG: 30 TABLET, FILM COATED, EXTENDED RELEASE ORAL at 21:12

## 2018-11-14 RX ADMIN — MYCOPHENOLATE MOFETIL 1000 MG: 200 POWDER, FOR SUSPENSION ORAL at 18:16

## 2018-11-14 RX ADMIN — SULFAMETHOXAZOLE AND TRIMETHOPRIM 80 MG: 200; 40 SUSPENSION ORAL at 08:06

## 2018-11-14 RX ADMIN — NYSTATIN 500000 UNITS: 500000 SUSPENSION ORAL at 08:08

## 2018-11-14 RX ADMIN — OCTREOTIDE ACETATE 100 MCG: 100 INJECTION, SOLUTION INTRAVENOUS; SUBCUTANEOUS at 08:06

## 2018-11-14 RX ADMIN — SODIUM CHLORIDE, SODIUM LACTATE, POTASSIUM CHLORIDE, CALCIUM CHLORIDE AND DEXTROSE MONOHYDRATE: 5; 600; 310; 30; 20 INJECTION, SOLUTION INTRAVENOUS at 00:08

## 2018-11-14 RX ADMIN — Medication 2 MG: at 18:16

## 2018-11-14 RX ADMIN — PIPERACILLIN AND TAZOBACTAM 2.25 G: 2; .25 INJECTION, POWDER, FOR SOLUTION INTRAVENOUS at 08:09

## 2018-11-14 RX ADMIN — SODIUM CHLORIDE, SODIUM LACTATE, POTASSIUM CHLORIDE, CALCIUM CHLORIDE AND DEXTROSE MONOHYDRATE: 5; 600; 310; 30; 20 INJECTION, SOLUTION INTRAVENOUS at 10:21

## 2018-11-14 RX ADMIN — NYSTATIN 500000 UNITS: 500000 SUSPENSION ORAL at 20:45

## 2018-11-14 RX ADMIN — OCTREOTIDE ACETATE 100 MCG: 100 INJECTION, SOLUTION INTRAVENOUS; SUBCUTANEOUS at 20:41

## 2018-11-14 RX ADMIN — MICAFUNGIN SODIUM 100 MG: 10 INJECTION, POWDER, LYOPHILIZED, FOR SOLUTION INTRAVENOUS at 08:48

## 2018-11-14 RX ADMIN — DIPHENHYDRAMINE HYDROCHLORIDE 25 MG: 25 CAPSULE ORAL at 17:04

## 2018-11-14 RX ADMIN — SENNOSIDES A AND B 5 ML: 415.36 LIQUID ORAL at 08:06

## 2018-11-14 RX ADMIN — FUROSEMIDE 10 MG: 10 INJECTION, SOLUTION INTRAVENOUS at 11:38

## 2018-11-14 RX ADMIN — SODIUM CHLORIDE, SODIUM LACTATE, POTASSIUM CHLORIDE, CALCIUM CHLORIDE AND DEXTROSE MONOHYDRATE: 5; 600; 310; 30; 20 INJECTION, SOLUTION INTRAVENOUS at 22:18

## 2018-11-14 RX ADMIN — SENNOSIDES A AND B 5 ML: 415.36 LIQUID ORAL at 20:47

## 2018-11-14 RX ADMIN — DIPHENHYDRAMINE HYDROCHLORIDE 50 MG: 25 SOLUTION ORAL at 11:36

## 2018-11-14 RX ADMIN — PIPERACILLIN AND TAZOBACTAM 2.25 G: 2; .25 INJECTION, POWDER, FOR SOLUTION INTRAVENOUS at 02:07

## 2018-11-14 RX ADMIN — Medication 2 MG: at 09:25

## 2018-11-14 RX ADMIN — MAGNESIUM OXIDE TAB 400 MG (241.3 MG ELEMENTAL MG) 400 MG: 400 (241.3 MG) TAB at 08:08

## 2018-11-14 RX ADMIN — ANTI-THYMOCYTE GLOBULIN (RABBIT) 125 MG: 5 INJECTION, POWDER, LYOPHILIZED, FOR SOLUTION INTRAVENOUS at 12:24

## 2018-11-14 ASSESSMENT — ACTIVITIES OF DAILY LIVING (ADL)
ADLS_ACUITY_SCORE: 10
ADLS_ACUITY_SCORE: 11
ADLS_ACUITY_SCORE: 10

## 2018-11-14 NOTE — PLAN OF CARE
Problem: Patient Care Overview  Goal: Plan of Care/Patient Progress Review  Outcome: No Change  1900-0730: A&O x 4, pleasant; BPmax 183/111, administered PRN hydralazine x 1, reduced to 155/86, Tachycardia, low 100s, oVSS on RA; c/o pain, dilaudid PCA in use; denies nausea; experiences dyspnea upon exertion; NPO excepts sips/chips; TLIJ infusing D5LR at 100, Heparin at 200 with TKO, dilaudid PCA with TKO; NGT intact at LCS; Ramirez intact, moderate output; No BM, absent flatus, BS hypoactive; R.SARAH with serosanguinous output; Skin remarkable for midline incision covered with operative dressing, drainage minimal and marked, no changes; Up with assist of 1; Will continue to monitor and update Pancreas service with any significant changes.

## 2018-11-14 NOTE — PLAN OF CARE
Problem: Patient Care Overview  Goal: Plan of Care/Patient Progress Review  Outcome: Improving  Data: Patient VSS ex /92 @ 0900 and 157/104 @ 1100. O2 stable on RA. BG ranged  between 0800 and 1400.  Urine output 575 mL during shift. Right SARAH drain put out 25 mL. NG tube put out 300 mL. Patient is up with standby assist.   Interventions: Patient is NPO except ice chips. He is getting q2h BG checks. Patient is using PCA pump to cover pain. He has an NG to LCS. T[facility]J running heparin 200 units/hr with dilaudid PCA, D5LR at 75 mL/hr, NS TKO with zosyn q6. 3rd round of thymoglobulin started at 1100.   Assessment: Patient bowel sounds hypoactive, he has not passed flatus. C/o nausea during medication administration at 0830, managed well with compazine. C/o pain, but stated that it is decently covered with dilaudid bumps. Patient also c/o his back itching which improved once he took a shower.    Plan: Encourage ambulation and bowel activity.

## 2018-11-14 NOTE — PLAN OF CARE
Problem: Patient Care Overview  Goal: Plan of Care/Patient Progress Review  Outcome: No Change  Neuro: A/Ox4.  Cardiac: SR with HR 90 - 100. VSS. Afebrile.   Respiratory: O2 sats stable on RA. Capno in use without alarms.   GI/: Ramirez intact with 50 - 100mL UOP Q1hr. No BM today with hypoactive BS.   Diet/appetite: NPO with ice chips.   Activity:  Ax 1. Up and walking the halls x2.   Pain: At acceptable level on current regimen. PCA dilaudid at 0.3mg Q10min.   Skin: Intact, no new deficits noted.  LDA's: NGT intact to LCS. R internal jugular. PIV x2. MIV at 100mL/hr. Replacements DC'd today. CVP DC'd.      - 130. Insulin gtt remains off.     Plan: Continue with POC. Notify primary team with changes.    Problem: Diabetes Comorbidity  Goal: Diabetes  Patient comorbidity will be monitored for signs and symptoms of hyperglycemia or hypoglycemia. Problems will be absent, minimized or managed by discharge/transition of care.   Outcome: No Change  BG Q1hr.  - 130.

## 2018-11-14 NOTE — PROGRESS NOTES
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 Underwent  donor pancreas transplant with enteric drainage, kidney transplant (with venous reconstruction, two arteries, and stent), appendectomy, and umbilical hernia repair on 18.    Graft function: POD #2  Pancreas: Lipase trending down. Drain lipase 19K. Off insulin gtt. Continue octreotide.  Kidney: Cr 3.5->1.8, acceptable UOP. Pt made normal urine volumes PTA.  Immunosuppression management: Induction with thymoglobulin and steroid pulse.  Thymo:  125mg 11/12, 125mg /, 125mg /14  Methylpred:  500mg /, 250mg /, 100mg /14  MMF: 1000mg BID, consider switch to Myfortic when taking pills d/t past GI intolerance.  Tacro: Increase to 2mg, goal level 8-10.  Cyclo: On PTA, discontinued .  Complexity of management: High. Contributing factors: induction  Hematology:   Acute blood loss anemia: 300ml EBL in OR. Hgb 10->8.7, likely due to hemodilution.  Vascular prophylaxis: St rate heparin 200u/h, no increase per staff.  Cardiorespiratory:   Hx HTN: On hydralazine, nifedipine, and PRN clonidine PTA.  BP increasing, will add nifedipine 30mg HS and lasix 10mg x1 today. Does not tolerate amlodipine due to edema issues. PRN hydralazine available.  Hx CAD: On statin. Does not tolerate beta blockers.  GI/Nutrition: NPO with NG decompression until return of bowel function. Bowel regimen.   Nausea: Controlled with PRN zofran and compazine  Endocrine: Poorly controlled DM prior to transplant. A1c 9.1-11.1% last few years.  Fluid/Electrolytes: Hypervolemia, reduce IVF to 75ml/h and lasix 10mg  : Ramirez to remain until POD 3 due to new surgical anastomosis  Neuro:   Acute and  chronic pain:  Currently managed with PCA dilaudid, plan to decrease PCA dose tomorrow. Takes Norco (up to 8 tabs daily) at home for pain.  Infectious disease: Afebrile  Prophylaxis: DVT, fall, GI, clotrimazole, micafungin x7d, zosyn x3d, bactrim, valcyte  Disposition: 7A    Medical Decision Making: Medium  Subsequent visit 90576 (moderate level decision making)    ODALIS/Fellow/Resident Provider: Karyn Crystal NP [id number]    Faculty: Monique Luevano M.D.  _________________________________________________________________  Transplant History:   11/12/2018 (Kidney / Pancreas), 10/10/2013 (Kidney), Postoperative day: 2     Interval History: History is obtained from the patient  Overnight events: Pain controlled, nausea controlled, dry mouth    ROS:   A 10-point review of systems was negative except as noted above.    Meds:    acetaminophen  650 mg Oral Once     anti-thymocyte globulin  125 mg Intravenous Central line Once     atorvastatin  5 mg Oral QPM     calcium carbonate 500 mg-vitamin D 200 units  1 tablet Oral BID w/meals     diphenhydrAMINE  25-50 mg Oral Once    Or     diphenhydrAMINE  25-50 mg Per NG tube Once     furosemide  10 mg Intravenous Once     magnesium oxide  400 mg Oral QAM     methylPREDNISolone  100 mg Intravenous Once     micafungin  100 mg Intravenous Q24H     mycophenolate  1,000 mg Oral BID IS     NIFEdipine ER osmotic  30 mg Oral At Bedtime     nystatin  500,000 Units Oral 4x Daily     octreotide  100 mcg Subcutaneous Q12H     pantoprazole  40 mg Per NG tube Daily     piperacillin-tazobactam  2.25 g Intravenous Q6H     sennosides  5 mL Oral BID     sodium chloride (PF)  3 mL Intravenous Q8H     sulfamethoxazole-trimethoprim  10 mL Per NG tube Daily     tacrolimus  2 mg Oral BID IS     valGANciclovir  450 mg Oral Every Other Day       Physical Exam:     Admit Weight: 79.2 kg (174 lb 8 oz)    Current vitals:   BP (!) 151/92 (BP Location: Right arm)  Pulse 93  Temp 98.2  F (36.8  C) (Oral)  Resp 12  "  1.702 m (5' 7\")  Wt 84.3 kg (185 lb 12.8 oz)  SpO2 96%  BMI 29.1 kg/m2    Vital sign ranges:    Temp:  [97.5  F (36.4  C)-98.6  F (37  C)] 98.2  F (36.8  C)  Pulse:  [93] 93  Heart Rate:  [] 102  Resp:  [12-18] 12  BP: ()/() 151/92  SpO2:  [92 %-97 %] 96 %  Patient Vitals for the past 24 hrs:   BP Temp Temp src Pulse Heart Rate Resp SpO2 Weight   11/14/18 0900 - - - - - 12 - 84.3 kg (185 lb 12.8 oz)   11/14/18 0859 (!) 151/92 98.2  F (36.8  C) Oral 93 - - 96 % -   11/14/18 0425 155/86 - - - 102 - - -   11/14/18 0358 (!) 183/110 - - - - - - -   11/14/18 0349 (!) 183/111 97.5  F (36.4  C) Oral - 100 18 94 % -   11/13/18 2347 (!) 139/93 97.7  F (36.5  C) Oral - 95 18 93 % -   11/13/18 2056 (!) 143/97 97.5  F (36.4  C) Oral - 109 18 97 % -   11/13/18 2000 127/77 - - - 102 - - -   11/13/18 1800 121/74 - - - 110 16 92 % -   11/13/18 1700 134/82 - - - 105 16 92 % -   11/13/18 1600 140/81 97.5  F (36.4  C) Oral - 100 15 94 % -   11/13/18 1500 126/76 - - - 112 - 93 % -   11/13/18 1445 - - - - - - - 83.2 kg (183 lb 8 oz)   11/13/18 1400 130/88 - - - 102 - 96 % -   11/13/18 1350 (!) 140/91 - - - 91 - - -   11/13/18 1300 99/69 98.6  F (37  C) Oral - 93 - 96 % -   11/13/18 1200 105/71 - - - 96 - 96 % -     General Appearance: in no apparent distress.   Skin: normal, warm, dry  Heart: RRR  Lungs: Unlabored, RA  Abdomen: The abdomen is rounded, SARAH serosang, incision CDI  : milian is present. Urine clear.  Extremities: edema: absent.  Neurologic: awake, alert and oriented x4. Tremor absent.    Data:   CMP  Recent Labs  Lab 11/14/18  0800 11/14/18  0646 11/13/18 2009 11/13/18  0435  11/12/18  1530 11/12/18  1412  11/11/18  0955   NA  --  144  --   --  140  < > 133 135  < > 131*   POTASSIUM  --  4.6 4.6  < > 4.5  < > 3.6 3.7  < > 4.5   CHLORIDE  --  116*  --   --  111*  < >  --   --   --  96   CO2  --  23  --   --  20  < >  --   --   --  25   GLC  --  97  --   --  98  < > 92 84  < > 282*   BUN  --  38*  " --   --  50*  < >  --   --   --  58*   CR  --  1.84*  --   --  3.45*  < >  --   --   --  5.46*   GFRESTIMATED  --  38*  --   --  19*  < >  --   --   --  11*   GFRESTBLACK  --  46*  --   --  22*  < >  --   --   --  13*   JE  --  8.6  --   --  8.0*  < >  --   --   --  9.0   ICAW  --   --   --   --   --   --  4.8 4.6  < >  --    MAG  --  2.0  --   --  1.8  < >  --   --   --   --    PHOS  --  2.7  --   --  4.5  < >  --   --   --   --    AMYLASE  --  121*  --   --  289*  < >  --   --   --  71   LIPASE  --  534*  --   --  1102*  < >  --   --   --  264   ALBUMIN  --   --   --   --   --   --   --   --   --  4.0   BILITOTAL  --   --   --   --   --   --   --   --   --  0.3   ALKPHOS  --   --   --   --   --   --   --   --   --  189*   AST  --   --   --   --   --   --   --   --   --  9   ALT  --   --   --   --   --   --   --   --   --  14   FLIPA 26987  --   --   --   --   --   --   --   --   --    < > = values in this interval not displayed.  CBC  Recent Labs  Lab 11/14/18  0646 11/13/18 2009 11/13/18  0435  11/11/18  0955   HGB 8.7* 9.1*  < > 10.0*  < > 12.6*   WBC 7.3  --   --  11.9*  < > 4.8   *  --   --  205  < > 269   A1C  --   --   --   --   --  9.1*   < > = values in this interval not displayed.  COAGS    Recent Labs  Lab 11/12/18  2102 11/12/18  1631   INR 1.31* 1.41*   PTT 33 34      Urinalysis  Recent Labs   Lab Test  11/11/18   1100  03/06/18   1410   05/24/17   1424   COLOR  Light Yellow  Yellow   < >   --    APPEARANCE  Clear  Clear   < >   --    URINEGLC  >1000*  >=1000*   < >   --    URINEBILI  Negative  Negative   < >   --    URINEKETONE  Negative  Negative   < >   --    SG  1.006  1.015   < >   --    UBLD  Trace*  Moderate*   < >   --    URINEPH  8.0*  6.0   < >   --    PROTEIN  30*  100*   < >   --    NITRITE  Negative  Negative   < >   --    LEUKEST  Negative  Negative   < >   --    RBCU  <1  2-5*   < >   --    WBCU  <1  0 - 5   < >   --    UTPG  1.96*   --    --   1.72*    < > = values in this  interval not displayed.     Virology:  CMV DNA Quantitation Specimen   Date Value Ref Range Status   09/28/2016 Plasma  Final     CMV Quantitative   Date Value Ref Range Status   10/20/2014 <100 <100 Copies/mL Final   12/05/2013 <100 <100 Copies/mL Final   10/17/2013 <100 <100 Copies/mL Final     CMV IgG Antibody   Date Value Ref Range Status   10/09/2013 <0.20  Negative for anti-CMV IgG U/mL Final     EBV VCA IgG Antibody   Date Value Ref Range Status   10/09/2013 173.00 U/mL Final     Comment:     Positive, suggests immunologic exposure.     Hepatitis C Antibody   Date Value Ref Range Status   11/11/2018 Nonreactive NR^Nonreactive Final     Comment:     Assay performance characteristics have not been established for newborns,   infants, and children       Hep B Surface Sophia   Date Value Ref Range Status   10/09/2013 13.0  Final     Comment:     Positive, Patient is considered to be immune to infection with hepatitis B   when   the value is greater than or equal to 12.0 mlU/mL.     BK Virus Result   Date Value Ref Range Status   11/01/2018 BK Virus DNA Not Detected BKNEG^BK Virus DNA Not Detected copies/mL Final   05/31/2017 BK Virus DNA Not Detected BKNEG copies/mL Final   05/24/2017 BK Virus DNA Not Detected BKNEG copies/mL Final   11/15/2016 BK Virus DNA Not Detected BKNEG copies/mL Final   09/28/2016 BK Virus DNA Not Detected BKNEG copies/mL Final   09/14/2016 BK Virus DNA Not Detected BKNEG copies/mL Final   06/14/2016 BK Virus DNA Not Detected BKNEG copies/mL Final   05/17/2016 BK Virus DNA Not Detected BKNEG copies/mL Final   04/14/2016 BK Virus DNA Not Detected BKNEG copies/mL Final   03/18/2016 BK Virus DNA Not Detected BKNEG copies/mL Final   02/29/2016 BK Virus DNA Not Detected BKNEG copies/mL Final   02/12/2016 BK Virus DNA Not Detected BKNEG copies/mL Final   02/10/2016 BK Virus DNA Not Detected BKNEG copies/mL Final   01/22/2016 BK Virus DNA Not Detected BKNEG copies/mL Final   01/20/2016 BK Virus DNA  Not Detected BKNEG copies/mL Final   10/19/2015 BK Virus DNA Not Detected BKNEG copies/mL Final   03/04/2015 BK Virus DNA Not Detected BKNEG copies/mL Final   02/16/2015 BK Virus DNA Not Detected BKNEG copies/mL Final   01/20/2015 BK Virus DNA Not Detected BKNEG copies/mL Final   01/09/2014 <1000 <1000 copies/mL Final   11/06/2013 <1000 <1000 copies/mL Final

## 2018-11-15 LAB
AMYLASE SERPL-CCNC: 93 U/L (ref 30–110)
ANION GAP SERPL CALCULATED.3IONS-SCNC: 8 MMOL/L (ref 3–14)
BASOPHILS # BLD AUTO: 0 10E9/L (ref 0–0.2)
BASOPHILS NFR BLD AUTO: 0 %
BUN SERPL-MCNC: 32 MG/DL (ref 7–30)
CALCIUM SERPL-MCNC: 8.7 MG/DL (ref 8.5–10.1)
CHLORIDE SERPL-SCNC: 116 MMOL/L (ref 94–109)
CO2 SERPL-SCNC: 21 MMOL/L (ref 20–32)
COPATH REPORT: NORMAL
CREAT SERPL-MCNC: 1.52 MG/DL (ref 0.66–1.25)
DIFFERENTIAL METHOD BLD: ABNORMAL
EOSINOPHIL # BLD AUTO: 0 10E9/L (ref 0–0.7)
EOSINOPHIL NFR BLD AUTO: 0 %
ERYTHROCYTE [DISTWIDTH] IN BLOOD BY AUTOMATED COUNT: 14.1 % (ref 10–15)
GFR SERPL CREATININE-BSD FRML MDRD: 48 ML/MIN/1.7M2
GLUCOSE BLDC GLUCOMTR-MCNC: 101 MG/DL (ref 70–99)
GLUCOSE BLDC GLUCOMTR-MCNC: 113 MG/DL (ref 70–99)
GLUCOSE BLDC GLUCOMTR-MCNC: 118 MG/DL (ref 70–99)
GLUCOSE BLDC GLUCOMTR-MCNC: 121 MG/DL (ref 70–99)
GLUCOSE BLDC GLUCOMTR-MCNC: 122 MG/DL (ref 70–99)
GLUCOSE BLDC GLUCOMTR-MCNC: 90 MG/DL (ref 70–99)
GLUCOSE BLDC GLUCOMTR-MCNC: 97 MG/DL (ref 70–99)
GLUCOSE SERPL-MCNC: 96 MG/DL (ref 70–99)
HCT VFR BLD AUTO: 29 % (ref 40–53)
HGB BLD-MCNC: 9 G/DL (ref 13.3–17.7)
IMM GRANULOCYTES # BLD: 0 10E9/L (ref 0–0.4)
IMM GRANULOCYTES NFR BLD: 0 %
LACTATE BLD-SCNC: 1.1 MMOL/L (ref 0.7–2)
LIPASE SERPL-CCNC: 288 U/L (ref 73–393)
LMWH PPP CHRO-ACNC: <0.1 IU/ML
LYMPHOCYTES # BLD AUTO: 0.1 10E9/L (ref 0.8–5.3)
LYMPHOCYTES NFR BLD AUTO: 4.5 %
MAGNESIUM SERPL-MCNC: 2 MG/DL (ref 1.6–2.3)
MCH RBC QN AUTO: 28.7 PG (ref 26.5–33)
MCHC RBC AUTO-ENTMCNC: 31 G/DL (ref 31.5–36.5)
MCV RBC AUTO: 92 FL (ref 78–100)
MONOCYTES # BLD AUTO: 0.2 10E9/L (ref 0–1.3)
MONOCYTES NFR BLD AUTO: 8 %
NEUTROPHILS # BLD AUTO: 2.5 10E9/L (ref 1.6–8.3)
NEUTROPHILS NFR BLD AUTO: 87.5 %
NRBC # BLD AUTO: 0 10*3/UL
NRBC BLD AUTO-RTO: 0 /100
PHOSPHATE SERPL-MCNC: 2.2 MG/DL (ref 2.5–4.5)
PLATELET # BLD AUTO: 101 10E9/L (ref 150–450)
POTASSIUM SERPL-SCNC: 4.4 MMOL/L (ref 3.4–5.3)
RBC # BLD AUTO: 3.14 10E12/L (ref 4.4–5.9)
SODIUM SERPL-SCNC: 145 MMOL/L (ref 133–144)
TACROLIMUS BLD-MCNC: 6.6 UG/L (ref 5–15)
TME LAST DOSE: NORMAL H
WBC # BLD AUTO: 2.9 10E9/L (ref 4–11)

## 2018-11-15 PROCEDURE — 25000132 ZZH RX MED GY IP 250 OP 250 PS 637: Performed by: NURSE PRACTITIONER

## 2018-11-15 PROCEDURE — 25000131 ZZH RX MED GY IP 250 OP 636 PS 637: Performed by: NURSE PRACTITIONER

## 2018-11-15 PROCEDURE — 25000128 H RX IP 250 OP 636: Performed by: STUDENT IN AN ORGANIZED HEALTH CARE EDUCATION/TRAINING PROGRAM

## 2018-11-15 PROCEDURE — 93005 ELECTROCARDIOGRAM TRACING: CPT

## 2018-11-15 PROCEDURE — 85520 HEPARIN ASSAY: CPT | Performed by: SURGERY

## 2018-11-15 PROCEDURE — 25000128 H RX IP 250 OP 636: Performed by: SURGERY

## 2018-11-15 PROCEDURE — 85025 COMPLETE CBC W/AUTO DIFF WBC: CPT | Performed by: SURGERY

## 2018-11-15 PROCEDURE — 93010 ELECTROCARDIOGRAM REPORT: CPT | Performed by: INTERNAL MEDICINE

## 2018-11-15 PROCEDURE — 25000132 ZZH RX MED GY IP 250 OP 250 PS 637: Performed by: SURGERY

## 2018-11-15 PROCEDURE — 25000132 ZZH RX MED GY IP 250 OP 250 PS 637: Performed by: PHYSICIAN ASSISTANT

## 2018-11-15 PROCEDURE — 80197 ASSAY OF TACROLIMUS: CPT | Performed by: NURSE PRACTITIONER

## 2018-11-15 PROCEDURE — 25000128 H RX IP 250 OP 636: Performed by: NURSE PRACTITIONER

## 2018-11-15 PROCEDURE — 12000026 ZZH R&B TRANSPLANT

## 2018-11-15 PROCEDURE — 00000146 ZZHCL STATISTIC GLUCOSE BY METER IP

## 2018-11-15 PROCEDURE — 80048 BASIC METABOLIC PNL TOTAL CA: CPT | Performed by: SURGERY

## 2018-11-15 PROCEDURE — 36415 COLL VENOUS BLD VENIPUNCTURE: CPT | Performed by: SURGERY

## 2018-11-15 PROCEDURE — 36592 COLLECT BLOOD FROM PICC: CPT | Performed by: SURGERY

## 2018-11-15 PROCEDURE — 83690 ASSAY OF LIPASE: CPT | Performed by: SURGERY

## 2018-11-15 PROCEDURE — 82150 ASSAY OF AMYLASE: CPT | Performed by: SURGERY

## 2018-11-15 PROCEDURE — 84100 ASSAY OF PHOSPHORUS: CPT | Performed by: SURGERY

## 2018-11-15 PROCEDURE — 83605 ASSAY OF LACTIC ACID: CPT | Performed by: SURGERY

## 2018-11-15 PROCEDURE — 83735 ASSAY OF MAGNESIUM: CPT | Performed by: SURGERY

## 2018-11-15 PROCEDURE — 25000132 ZZH RX MED GY IP 250 OP 250 PS 637: Performed by: STUDENT IN AN ORGANIZED HEALTH CARE EDUCATION/TRAINING PROGRAM

## 2018-11-15 RX ORDER — FUROSEMIDE 10 MG/ML
40 INJECTION INTRAMUSCULAR; INTRAVENOUS ONCE
Status: COMPLETED | OUTPATIENT
Start: 2018-11-15 | End: 2018-11-15

## 2018-11-15 RX ORDER — DIPHENHYDRAMINE HCL 25 MG
25-50 CAPSULE ORAL ONCE
Status: COMPLETED | OUTPATIENT
Start: 2018-11-15 | End: 2018-11-15

## 2018-11-15 RX ORDER — HYDROMORPHONE HYDROCHLORIDE 1 MG/ML
0.3 INJECTION, SOLUTION INTRAMUSCULAR; INTRAVENOUS; SUBCUTANEOUS ONCE
Status: COMPLETED | OUTPATIENT
Start: 2018-11-15 | End: 2018-11-15

## 2018-11-15 RX ORDER — VALGANCICLOVIR HYDROCHLORIDE 50 MG/ML
900 POWDER, FOR SOLUTION ORAL DAILY
Status: DISCONTINUED | OUTPATIENT
Start: 2018-11-16 | End: 2018-11-16

## 2018-11-15 RX ORDER — LIDOCAINE 4 G/G
2 PATCH TOPICAL
Status: DISCONTINUED | OUTPATIENT
Start: 2018-11-15 | End: 2018-11-20 | Stop reason: HOSPADM

## 2018-11-15 RX ORDER — OXYCODONE HCL 5 MG/5 ML
10-15 SOLUTION, ORAL ORAL
Status: DISCONTINUED | OUTPATIENT
Start: 2018-11-15 | End: 2018-11-16

## 2018-11-15 RX ORDER — OXYCODONE HCL 5 MG/5 ML
10-15 SOLUTION, ORAL ORAL EVERY 4 HOURS PRN
Status: DISCONTINUED | OUTPATIENT
Start: 2018-11-15 | End: 2018-11-15

## 2018-11-15 RX ORDER — HYDRALAZINE HYDROCHLORIDE 20 MG/ML
10 INJECTION INTRAMUSCULAR; INTRAVENOUS ONCE
Status: COMPLETED | OUTPATIENT
Start: 2018-11-15 | End: 2018-11-15

## 2018-11-15 RX ORDER — OXYCODONE HCL 5 MG/5 ML
5-10 SOLUTION, ORAL ORAL EVERY 4 HOURS PRN
Status: DISCONTINUED | OUTPATIENT
Start: 2018-11-15 | End: 2018-11-15

## 2018-11-15 RX ORDER — DIPHENHYDRAMINE HCL 12.5MG/5ML
25-50 LIQUID (ML) ORAL ONCE
Status: COMPLETED | OUTPATIENT
Start: 2018-11-15 | End: 2018-11-15

## 2018-11-15 RX ORDER — NIFEDIPINE 30 MG/1
30 TABLET, EXTENDED RELEASE ORAL 2 TIMES DAILY
Status: DISCONTINUED | OUTPATIENT
Start: 2018-11-15 | End: 2018-11-20 | Stop reason: HOSPADM

## 2018-11-15 RX ORDER — OXYCODONE HCL 5 MG/5 ML
5 SOLUTION, ORAL ORAL ONCE
Status: COMPLETED | OUTPATIENT
Start: 2018-11-15 | End: 2018-11-15

## 2018-11-15 RX ORDER — METHYLPREDNISOLONE SODIUM SUCCINATE 125 MG/2ML
100 INJECTION, POWDER, LYOPHILIZED, FOR SOLUTION INTRAMUSCULAR; INTRAVENOUS ONCE
Status: COMPLETED | OUTPATIENT
Start: 2018-11-15 | End: 2018-11-15

## 2018-11-15 RX ADMIN — HYDRALAZINE HYDROCHLORIDE 10 MG: 20 INJECTION INTRAMUSCULAR; INTRAVENOUS at 03:51

## 2018-11-15 RX ADMIN — OXYCODONE HYDROCHLORIDE 15 MG: 5 SOLUTION ORAL at 20:29

## 2018-11-15 RX ADMIN — MYCOPHENOLATE MOFETIL 1000 MG: 200 POWDER, FOR SUSPENSION ORAL at 18:10

## 2018-11-15 RX ADMIN — ACETAMINOPHEN 975 MG: 325 SOLUTION ORAL at 16:08

## 2018-11-15 RX ADMIN — CALCIUM CARBONATE-VITAMIN D TAB 500 MG-200 UNIT 1 TABLET: 500-200 TAB at 08:55

## 2018-11-15 RX ADMIN — Medication 2 MG: at 18:10

## 2018-11-15 RX ADMIN — SULFAMETHOXAZOLE AND TRIMETHOPRIM 80 MG: 200; 40 SUSPENSION ORAL at 08:56

## 2018-11-15 RX ADMIN — Medication 10 ML: at 16:08

## 2018-11-15 RX ADMIN — PIPERACILLIN SODIUM,TAZOBACTAM SODIUM 3.38 G: 3; .375 INJECTION, POWDER, FOR SOLUTION INTRAVENOUS at 08:55

## 2018-11-15 RX ADMIN — ACETAMINOPHEN 650 MG: 325 SOLUTION ORAL at 12:42

## 2018-11-15 RX ADMIN — OCTREOTIDE ACETATE 100 MCG: 100 INJECTION, SOLUTION INTRAVENOUS; SUBCUTANEOUS at 20:23

## 2018-11-15 RX ADMIN — LIDOCAINE 2 PATCH: 560 PATCH PERCUTANEOUS; TOPICAL; TRANSDERMAL at 20:26

## 2018-11-15 RX ADMIN — NIFEDIPINE 30 MG: 30 TABLET, FILM COATED, EXTENDED RELEASE ORAL at 20:29

## 2018-11-15 RX ADMIN — Medication 0.3 MG: at 16:08

## 2018-11-15 RX ADMIN — METHYLPREDNISOLONE SODIUM SUCCINATE 100 MG: 125 INJECTION, POWDER, LYOPHILIZED, FOR SOLUTION INTRAMUSCULAR; INTRAVENOUS at 12:42

## 2018-11-15 RX ADMIN — PANTOPRAZOLE SODIUM 40 MG: 40 TABLET, DELAYED RELEASE ORAL at 08:56

## 2018-11-15 RX ADMIN — PIPERACILLIN SODIUM,TAZOBACTAM SODIUM 3.38 G: 3; .375 INJECTION, POWDER, FOR SOLUTION INTRAVENOUS at 02:18

## 2018-11-15 RX ADMIN — Medication 5 MG: at 20:29

## 2018-11-15 RX ADMIN — SENNOSIDES A AND B 5 ML: 415.36 LIQUID ORAL at 08:56

## 2018-11-15 RX ADMIN — PIPERACILLIN SODIUM,TAZOBACTAM SODIUM 3.38 G: 3; .375 INJECTION, POWDER, FOR SOLUTION INTRAVENOUS at 14:11

## 2018-11-15 RX ADMIN — DIBASIC SODIUM PHOSPHATE, MONOBASIC POTASSIUM PHOSPHATE AND MONOBASIC SODIUM PHOSPHATE 250 MG: 852; 155; 130 TABLET ORAL at 08:55

## 2018-11-15 RX ADMIN — NYSTATIN 500000 UNITS: 500000 SUSPENSION ORAL at 16:08

## 2018-11-15 RX ADMIN — OXYCODONE HYDROCHLORIDE 15 MG: 5 SOLUTION ORAL at 13:43

## 2018-11-15 RX ADMIN — VALGANCICLOVIR HYDROCHLORIDE 450 MG: 50 POWDER, FOR SOLUTION ORAL at 08:56

## 2018-11-15 RX ADMIN — Medication 2 MG: at 08:56

## 2018-11-15 RX ADMIN — NYSTATIN 500000 UNITS: 500000 SUSPENSION ORAL at 08:55

## 2018-11-15 RX ADMIN — OXYCODONE HYDROCHLORIDE 10 MG: 5 SOLUTION ORAL at 10:20

## 2018-11-15 RX ADMIN — OCTREOTIDE ACETATE 100 MCG: 100 INJECTION, SOLUTION INTRAVENOUS; SUBCUTANEOUS at 08:56

## 2018-11-15 RX ADMIN — NYSTATIN 500000 UNITS: 500000 SUSPENSION ORAL at 20:28

## 2018-11-15 RX ADMIN — HYDRALAZINE HYDROCHLORIDE 10 MG: 20 INJECTION INTRAMUSCULAR; INTRAVENOUS at 00:15

## 2018-11-15 RX ADMIN — SENNOSIDES A AND B 5 ML: 415.36 LIQUID ORAL at 20:29

## 2018-11-15 RX ADMIN — DIBASIC SODIUM PHOSPHATE, MONOBASIC POTASSIUM PHOSPHATE AND MONOBASIC SODIUM PHOSPHATE 250 MG: 852; 155; 130 TABLET ORAL at 20:29

## 2018-11-15 RX ADMIN — DIPHENHYDRAMINE HYDROCHLORIDE 50 MG: 25 CAPSULE ORAL at 12:42

## 2018-11-15 RX ADMIN — DIPHENHYDRAMINE HYDROCHLORIDE 25 MG: 25 CAPSULE ORAL at 03:24

## 2018-11-15 RX ADMIN — FUROSEMIDE 40 MG: 10 INJECTION, SOLUTION INTRAVENOUS at 11:27

## 2018-11-15 RX ADMIN — MAGNESIUM OXIDE TAB 400 MG (241.3 MG ELEMENTAL MG) 400 MG: 400 (241.3 MG) TAB at 11:27

## 2018-11-15 RX ADMIN — NYSTATIN 500000 UNITS: 500000 SUSPENSION ORAL at 11:27

## 2018-11-15 RX ADMIN — MICAFUNGIN SODIUM 100 MG: 10 INJECTION, POWDER, LYOPHILIZED, FOR SOLUTION INTRAVENOUS at 08:50

## 2018-11-15 RX ADMIN — NIFEDIPINE 30 MG: 30 TABLET, FILM COATED, EXTENDED RELEASE ORAL at 08:55

## 2018-11-15 RX ADMIN — MYCOPHENOLATE MOFETIL 1000 MG: 200 POWDER, FOR SUSPENSION ORAL at 08:56

## 2018-11-15 RX ADMIN — OXYCODONE HYDROCHLORIDE 5 MG: 5 SOLUTION ORAL at 16:08

## 2018-11-15 RX ADMIN — PROCHLORPERAZINE EDISYLATE 5 MG: 5 INJECTION INTRAMUSCULAR; INTRAVENOUS at 09:19

## 2018-11-15 RX ADMIN — Medication: at 05:38

## 2018-11-15 RX ADMIN — Medication: at 08:47

## 2018-11-15 RX ADMIN — ANTI-THYMOCYTE GLOBULIN (RABBIT) 75 MG: 5 INJECTION, POWDER, LYOPHILIZED, FOR SOLUTION INTRAVENOUS at 13:54

## 2018-11-15 ASSESSMENT — ACTIVITIES OF DAILY LIVING (ADL)
ADLS_ACUITY_SCORE: 11
ADLS_ACUITY_SCORE: 12
ADLS_ACUITY_SCORE: 11
ADLS_ACUITY_SCORE: 11

## 2018-11-15 ASSESSMENT — PAIN DESCRIPTION - DESCRIPTORS
DESCRIPTORS: ACHING
DESCRIPTORS: ACHING

## 2018-11-15 NOTE — DISCHARGE INSTRUCTIONS
________________________________________________________  Discharge RN please fax discharge orders to home care agency: [facility]  ________________________________________________________      Diet recommendations post-transplant: High protein diet x 8 weeks.  Heart healthy dietary habits long term (low saturated/trans fat, low sodium). Practice food safety precautions. See nutrition handout and food safety booklet for more information.

## 2018-11-15 NOTE — PROVIDER NOTIFICATION
1700: Order obtained for PRN Benadryl; 25mg given for itching.    1730: New onset of chills/shakes and shortness of breath. Frequent VS started.     1745: Thymo stopped. MD notified. Pt tachy in 100s. BPs trending down, but stable. Afebrile. Maintaining sats on RA. Shaking has subsided since infusion stopped.    1625: Thymo restarted at half-rate per transplant team. VS monitored Q15min. Pt put on 1L O2 for shortness of breath; sats remain stable. Denies chest pain. No evidence of hives. Denies pain and nausea.

## 2018-11-15 NOTE — PROGRESS NOTES
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 He has a PMH of uncontrolled DM HbA1c 10.5, CAD sp ENE 2010, HTN on clonidine , hydralazine and nifedipine, on IS with CsA and MMF prior to kidney Pancreas Tx on 11/12/18 . Transplant nephrology consulted fo co management for his transplant and IS.     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 and ACR/AbMR and failed Transplant now on HD since 9/2018  Follows [facility] with dr Bull, EDW 78.5Kg , last HD 11/9  Post transplant he has been doing well , creatinine improving  UO maintained and he is on maintenance IVF CVP 3-5     SP Pancreas Tx 11/12/18  Prior HBA1c 9-11  Lipase and amylase trending down post op  -- NGT +  -- On octreotide , and micafungin, zosyn completed     Induction: Thymo + solumedrol  Immunosuppression: MMF 1000mg (was on MPA due to intolerance to MMF)+ Tacrolimus 2mg (detectable levels)  CsA discontinued 11/13     CMV+/+  EBV+/+  Hx of BK Viremia  BK PCR -ve 11/1/18  HepC + donor     Px:  PCP PX: Bactrim  CMV Px: Valcyte  Clotrimazole lozanges   On Zosyn and micafungin     Volume status  euvolemic  HTN   Increased now  -- lasix 40mg once   -- Resume nifedipine 30mg at bedtime (noted orders)     Electrolytes  Stable  Mild hypomagnesemia  Replete per protocol , keep ~2     Anemia   Hb stable ~9.5 post op     BMD  Hx of sec hyperparathyroidism PTH 1217  Calcium wnl and phos low  -- continue on calcitriol 0.25mcg and vit D 2000U as PTA for now  -- replete phos     Hx of CAD SP ENE 2010  Stable and asymptomatic     Recommendations were communicated to primary team     Seen and discussed with Dr. Paul Moon MD  "  [phone]    Interval History :   Nursing and provider notes from last 24 hours reviewed.  In the last 24 hours Amadou Lewis has been stable , he has had BM , NGT plans for removal tomorrow    Review of Systems:   I reviewed the following systems:  GI: + appetite. mild nausea No vomiting or diarrhea.   Neuro:  - confusion  Constitutional:  - fever or chills  CV: - dyspnea or edema.  - chest pain.    Physical Exam:   I/O last 3 completed shifts:  In: 2276 [I.V.:2056; NG/GT:190; IV Piggyback:30]  Out: 2850 [Urine:2400; Emesis/NG output:350; Drains:100]   BP (!) 163/95 (BP Location: Right arm)  Pulse 101  Temp 98.1  F (36.7  C) (Oral)  Resp 18  Ht 1.702 m (5' 7\")  Wt 85.6 kg (188 lb 11.2 oz)  SpO2 93%  BMI 29.55 kg/m2     GENERAL APPEARANCE: NAD  EYES:  - scleral icterus, pupils equal  HENT: mouth without ulcers or lesions  PULM: lungs clear to auscultation bilaterally, equal air movement, no clubbing  CV: regular rhythm, normal rate, no rub     -JVD -     -edema +   GI: soft, non tender, mild distended, bowel sounds are +  INTEGUMENT: no cyanosis, - rash  NEURO:  - asterixis   Access CVC tunneled     Labs:   All labs reviewed by me  Electrolytes/Renal -   Recent Labs   Lab Test  11/15/18   0547  11/14/18 0646 11/13/18 2009 11/13/18   0435   NA  145*  144   --    --   140   POTASSIUM  4.4  4.6  4.6   < >  4.5   CHLORIDE  116*  116*   --    --   111*   CO2  21  23   --    --   20   BUN  32*  38*   --    --   50*   CR  1.52*  1.84*   --    --   3.45*   GLC  96  97   --    --   98   JE  8.7  8.6   --    --   8.0*   MAG  2.0  2.0   --    --   1.8   PHOS  2.2*  2.7   --    --   4.5    < > = values in this interval not displayed.       CBC -   Recent Labs   Lab Test  11/15/18   0547  11/14/18   0646  11/13/18 2009 11/13/18   0435   WBC  2.9*  7.3   --    --   11.9*   HGB  9.0*  8.7*  9.1*   < >  10.0*   PLT  101*  111*   --    --   205    < > = values in this interval not displayed.       LFTs - "   Recent Labs   Lab Test  11/11/18   0955  06/27/18   1357  08/03/17   1440  05/31/17   0716   01/20/16   1154   ALKPHOS  189*   --    --   150   --   101   BILITOTAL  0.3   --    --   0.5   --   0.5   ALT  14   --    --   17   --   23   AST  9   --    --   8   --   Unsatisfactory specimen - hemolyzed   PROTTOTAL  7.6   --    --   7.3   --   8.0   ALBUMIN  4.0  4.2  4.2  4.1   < >  3.4    < > = values in this interval not displayed.       Iron Panel -   Recent Labs   Lab Test  09/11/18   1402  08/03/17   1440  11/18/16   1448   IRON  85  79  82   IRONSAT  37  34  34   TIMOTHY  761*  736*   --          Imaging:  All imaging studies reviewed by me.     Current Medications:    acetaminophen  975 mg Oral Q8H     atorvastatin  5 mg Oral QPM     calcium carbonate 500 mg-vitamin D 200 units  1 tablet Oral BID w/meals     insulin aspart  1-6 Units Subcutaneous Q4H     lidocaine  2 patch Transdermal Q24h    And     [START ON 11/16/2018] lidocaine   Transdermal Q24H    And     lidocaine   Transdermal Q8H     magnesium oxide  400 mg Oral QAM     micafungin  100 mg Intravenous Q24H     mycophenolate  1,000 mg Oral BID IS     NIFEdipine ER osmotic  30 mg Oral BID     nystatin  500,000 Units Oral 4x Daily     octreotide  100 mcg Subcutaneous Q12H     pantoprazole  40 mg Per NG tube Daily     phosphorus tablet 250 mg  250 mg Oral BID     sennosides  5 mL Oral BID     sodium chloride (PF)  3 mL Intravenous Q8H     sulfamethoxazole-trimethoprim  10 mL Per NG tube Daily     tacrolimus  2 mg Oral BID IS     [START ON 11/16/2018] valGANciclovir  900 mg Oral Daily       IV fluid REPLACEMENT ONLY       IV fluid REPLACEMENT ONLY 50 mL/hr at 11/15/18 0745     heparin 200 Units/hr (11/15/18 0745)     sodium chloride 10 mL/hr at 11/15/18 0745     Ashleigh Moon MD     I have seen and examined this patient with the resident.  This note reflects our joint assessment and plan.     Hermila Miller MD

## 2018-11-15 NOTE — PROGRESS NOTES
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 Underwent  donor pancreas transplant with enteric drainage, kidney transplant (with venous reconstruction, two arteries, and stent), appendectomy, and umbilical hernia repair on 18.    Graft function: POD #3  Pancreas: Lipase trending down.  Drain lipase 19K. Not requiring insulin gtt, will add sliding scale.  Kidney: Cr 1.8->1.5, acceptable UOP. Pt made normal urine volumes PTA.  Immunosuppression management: Induction with thymoglobulin and steroid pulse.  Thymo:  125mg 11/12, 125mg 11/, 125mg 11/14, 75mg 11/15 (run slow due to thymo reaction overnight)  Methylpred:  500mg 11/, 250mg 11/, 100mg 11/14, 100mg 11/15 due to thymo reaction  MMF: 1000mg BID, consider switch to Myfortic when taking pills d/t past GI intolerance.  Tacro:  2mg, goal level 8-10.  Cyclo: On PTA, discontinued .  Complexity of management: High. Contributing factors: induction  Hematology: Pancytopenia  Acute blood loss anemia: 300ml EBL in OR. Hgb 9, stable.  Thrombocytopenia: 2/2 thymo, monitor.  Leukopenia: Secondary to medications, monitor.  Vascular prophylaxis: St rate heparin 200u/h, no increase per staff.  Cardiorespiratory:   Hx HTN: On hydralazine, nifedipine, and PRN clonidine PTA.  BP increasing, will increase nifedipine to 30mg BID. Does not tolerate amlodipine due to edema issues. PRN hydralazine available.  Hx CAD: On statin. Does not tolerate beta blockers.  GI/Nutrition: NPO with NG decompression. Bowel regimen.   Nausea: Controlled with PRN zofran and compazine  Endocrine: Poorly controlled DM prior to transplant. A1c 9.1-11.1% last few years.  Fluid/Electrolytes: Hypervolemia,  reduce IVF to 50ml/h.   : discharge milian today  Neuro:   Acute and chronic pain:  discharge PCA and start oxy 5-10 q4 PRN and assess response. Takes Norco (up to 8 tabs daily) at home for pain.  Infectious disease: Afebrile  Prophylaxis: DVT, fall, GI, nystatin, micafungin x7d, zosyn x3d, bactrim, valcyte  Disposition: 7A    Medical Decision Making: Medium  Subsequent visit 35532 (moderate level decision making)    ODALIS/Fellow/Resident Provider: Karyn Crystal NP [id number]    Faculty: Monique Luevano M.D.  _________________________________________________________________  Transplant History:   11/12/2018 (Kidney / Pancreas), 10/10/2013 (Kidney), Postoperative day: 3     Interval History: History is obtained from the patient  Overnight events: Pain controlled, no nausea, BM x1. Had a presumed thymo reaction last night with chills/shakes/dyspnea that resolved with slowing the rate.    ROS:   A 10-point review of systems was negative except as noted above.    Meds:    acetaminophen  650 mg Oral Once     anti-thymocyte globulin  1.5 mg/kg Intravenous Central line Once     atorvastatin  5 mg Oral QPM     calcium carbonate 500 mg-vitamin D 200 units  1 tablet Oral BID w/meals     diphenhydrAMINE  25-50 mg Oral Once    Or     diphenhydrAMINE  25-50 mg Per NG tube Once     insulin aspart  1-6 Units Subcutaneous Q4H     magnesium oxide  400 mg Oral QAM     methylPREDNISolone  100 mg Intravenous Once     micafungin  100 mg Intravenous Q24H     mycophenolate  1,000 mg Oral BID IS     NIFEdipine ER osmotic  30 mg Oral BID     nystatin  500,000 Units Oral 4x Daily     octreotide  100 mcg Subcutaneous Q12H     pantoprazole  40 mg Per NG tube Daily     phosphorus tablet 250 mg  250 mg Oral BID     piperacillin-tazobactam  3.375 g Intravenous Q6H     sennosides  5 mL Oral BID     sodium chloride (PF)  3 mL Intravenous Q8H     sulfamethoxazole-trimethoprim  10 mL Per NG tube Daily     tacrolimus  2 mg Oral BID IS     valGANciclovir  450  "mg Oral Daily       Physical Exam:     Admit Weight: 79.2 kg (174 lb 8 oz)    Current vitals:   /90  Pulse 101  Temp 97.7  F (36.5  C)  Resp 18  Ht 1.702 m (5' 7\")  Wt 85.6 kg (188 lb 11.2 oz)  SpO2 98%  BMI 29.55 kg/m2    Vital sign ranges:    Temp:  [97.5  F (36.4  C)-98.3  F (36.8  C)] 97.7  F (36.5  C)  Pulse:  [101] 101  Heart Rate:  [] 94  Resp:  [12-18] 18  BP: (129-176)/() 148/90  SpO2:  [90 %-98 %] 98 %  Patient Vitals for the past 24 hrs:   BP Temp Temp src Pulse Heart Rate Resp SpO2 Weight   11/15/18 0900 - - - - - - - 85.6 kg (188 lb 11.2 oz)   11/15/18 0847 148/90 97.7  F (36.5  C) - - 94 18 98 % -   11/15/18 0507 139/86 97.5  F (36.4  C) Oral - 94 16 95 % -   11/15/18 0356 153/90 - - - - - - -   11/15/18 0350 (!) 165/96 - - - 99 - 94 % -   11/15/18 0315 (!) 176/102 97.7  F (36.5  C) Oral - 103 12 96 % -   11/15/18 0020 (!) 162/93 - - - 94 - - -   11/15/18 0000 (!) 172/99 98.3  F (36.8  C) Oral - 97 16 95 % -   11/14/18 1920 (!) 160/93 97.7  F (36.5  C) Oral - 99 16 96 % -   11/14/18 1815 146/89 - - - - - - -   11/14/18 1800 (!) 159/91 - - - - - - -   11/14/18 1753 135/83 - - - 111 - 94 % -   11/14/18 1745 129/84 97.8  F (36.6  C) Oral - 107 12 90 % -   11/14/18 1731 152/84 - - - 108 - 94 % -   11/14/18 1728 176/87 - - - 111 - 95 % -   11/14/18 1715 155/90 97.6  F (36.4  C) Oral - 108 - 92 % -   11/14/18 1634 149/87 - - - - - - -   11/14/18 1525 (!) 162/94 98  F (36.7  C) Oral - 105 16 95 % -   11/14/18 1214 (!) 157/104 98  F (36.7  C) Oral 101 - 15 96 % -     General Appearance: in no apparent distress.   Skin: normal, warm, dry  Heart: RRR  Lungs: Unlabored, RA  Abdomen: The abdomen is rounded, SARAH serosang, incision CDI  : Remove milian  Extremities: edema: absent.  Neurologic: awake, alert and oriented x4. Tremor absent.    Data:   CMP  Recent Labs  Lab 11/15/18  0547 11/14/18  0800 11/14/18  0646  11/12/18  1530 11/12/18  1412  11/11/18  0955   *  --  144  < > 133 135 "  < > 131*   POTASSIUM 4.4  --  4.6  < > 3.6 3.7  < > 4.5   CHLORIDE 116*  --  116*  < >  --   --   --  96   CO2 21  --  23  < >  --   --   --  25   GLC 96  --  97  < > 92 84  < > 282*   BUN 32*  --  38*  < >  --   --   --  58*   CR 1.52*  --  1.84*  < >  --   --   --  5.46*   GFRESTIMATED 48*  --  38*  < >  --   --   --  11*   GFRESTBLACK 58*  --  46*  < >  --   --   --  13*   JE 8.7  --  8.6  < >  --   --   --  9.0   ICAW  --   --   --   --  4.8 4.6  < >  --    MAG 2.0  --  2.0  < >  --   --   --   --    PHOS 2.2*  --  2.7  < >  --   --   --   --    AMYLASE 93  --  121*  < >  --   --   --  71   LIPASE 288  --  534*  < >  --   --   --  264   ALBUMIN  --   --   --   --   --   --   --  4.0   BILITOTAL  --   --   --   --   --   --   --  0.3   ALKPHOS  --   --   --   --   --   --   --  189*   AST  --   --   --   --   --   --   --  9   ALT  --   --   --   --   --   --   --  14   FLIPA  --  56130  --   --   --   --   --   --    < > = values in this interval not displayed.  CBC  Recent Labs  Lab 11/15/18  0547 11/14/18  0646  11/11/18  0955   HGB 9.0* 8.7*  < > 12.6*   WBC 2.9* 7.3  < > 4.8   * 111*  < > 269   A1C  --   --   --  9.1*   < > = values in this interval not displayed.  COAGS    Recent Labs  Lab 11/12/18  2102 11/12/18  1631   INR 1.31* 1.41*   PTT 33 34      Urinalysis  Recent Labs   Lab Test  11/11/18   1100  03/06/18   1410   05/24/17   1424   COLOR  Light Yellow  Yellow   < >   --    APPEARANCE  Clear  Clear   < >   --    URINEGLC  >1000*  >=1000*   < >   --    URINEBILI  Negative  Negative   < >   --    URINEKETONE  Negative  Negative   < >   --    SG  1.006  1.015   < >   --    UBLD  Trace*  Moderate*   < >   --    URINEPH  8.0*  6.0   < >   --    PROTEIN  30*  100*   < >   --    NITRITE  Negative  Negative   < >   --    LEUKEST  Negative  Negative   < >   --    RBCU  <1  2-5*   < >   --    WBCU  <1  0 - 5   < >   --    UTPG  1.96*   --    --   1.72*    < > = values in this interval not displayed.      Virology:  CMV DNA Quantitation Specimen   Date Value Ref Range Status   09/28/2016 Plasma  Final     CMV Quantitative   Date Value Ref Range Status   10/20/2014 <100 <100 Copies/mL Final   12/05/2013 <100 <100 Copies/mL Final   10/17/2013 <100 <100 Copies/mL Final     CMV IgG Antibody   Date Value Ref Range Status   10/09/2013 <0.20  Negative for anti-CMV IgG U/mL Final     EBV VCA IgG Antibody   Date Value Ref Range Status   10/09/2013 173.00 U/mL Final     Comment:     Positive, suggests immunologic exposure.     Hepatitis C Antibody   Date Value Ref Range Status   11/11/2018 Nonreactive NR^Nonreactive Final     Comment:     Assay performance characteristics have not been established for newborns,   infants, and children       Hep B Surface Sophia   Date Value Ref Range Status   10/09/2013 13.0  Final     Comment:     Positive, Patient is considered to be immune to infection with hepatitis B   when   the value is greater than or equal to 12.0 mlU/mL.     BK Virus Result   Date Value Ref Range Status   11/01/2018 BK Virus DNA Not Detected BKNEG^BK Virus DNA Not Detected copies/mL Final   05/31/2017 BK Virus DNA Not Detected BKNEG copies/mL Final   05/24/2017 BK Virus DNA Not Detected BKNEG copies/mL Final   11/15/2016 BK Virus DNA Not Detected BKNEG copies/mL Final   09/28/2016 BK Virus DNA Not Detected BKNEG copies/mL Final   09/14/2016 BK Virus DNA Not Detected BKNEG copies/mL Final   06/14/2016 BK Virus DNA Not Detected BKNEG copies/mL Final   05/17/2016 BK Virus DNA Not Detected BKNEG copies/mL Final   04/14/2016 BK Virus DNA Not Detected BKNEG copies/mL Final   03/18/2016 BK Virus DNA Not Detected BKNEG copies/mL Final   02/29/2016 BK Virus DNA Not Detected BKNEG copies/mL Final   02/12/2016 BK Virus DNA Not Detected BKNEG copies/mL Final   02/10/2016 BK Virus DNA Not Detected BKNEG copies/mL Final   01/22/2016 BK Virus DNA Not Detected BKNEG copies/mL Final   01/20/2016 BK Virus DNA Not Detected BKNEG  copies/mL Final   10/19/2015 BK Virus DNA Not Detected BKNEG copies/mL Final   03/04/2015 BK Virus DNA Not Detected BKNEG copies/mL Final   02/16/2015 BK Virus DNA Not Detected BKNEG copies/mL Final   01/20/2015 BK Virus DNA Not Detected BKNEG copies/mL Final   01/09/2014 <1000 <1000 copies/mL Final   11/06/2013 <1000 <1000 copies/mL Final

## 2018-11-15 NOTE — PROGRESS NOTES
Care Coordinator    Care Coordinator - Discharge Planning    Admission Date/Time:  2018  Attending MD:  Monique Luevano MD     Data  Date of initial CC assessment:  11.15.18  Chart reviewed, discussed with interdisciplinary team.   Patient was admitted for:   1. Kidney replaced by transplant    2. Pancreas transplanted (H)    3. Diabetes mellitus with background retinopathy (H)    4. Immunosuppressed status (H)    5. 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 Underwent  donor pancreas transplant with enteric drainage, kidney transplant (with venous reconstruction, two arteries, and stent), appendectomy, and umbilical hernia repair on 18--per Karyn Crystal NP, note today.        Assessment   Concerns with insurance coverage for discharge needs: None.  Current Living Situation: Patient lives alone.  Support System: Supportive and Involved  Services Involved: Dialysis Services and Home Care  Transportation at Discharge: Car and Family or friend will provide  Transportation to Medical Appointments:    - Name of caregiver: daughter: Kisha Lewis  Barriers to Discharge: post op recovery/labs      Coordination of Care and Referrals: Provided patient/family with options for Home Care.  I met with the pt and his so Milvia, in their room, and explained my role and went over discharge routine. Pt was sleepy but wanted to proceed.  I explained that they return to ATC clinic the next morning @ 0645 for lab draw and then go to clinic and first visit is approx. 4-5 hour visit with subsequent visits approx 2-3 hours long. On lab draw days (ATC and N visit days --), they wait to take their immunosuppression pills until their labs are drawn and then they  take them (bring them with to the ATC clinic). If you will have a HHN visit, they do not begin visits until ATC clinic appointments are complete and then the ATC nurse calls them to let them know what day to begin. I have emailed [facility] agency @ Ph:[phone] with referral. I have verified address and phone #.  Pt's Outpatient Care Coordinator is: Alesha Blackwell. Pt is to call OP CC with questions or concerns and notify them if they develop vomiting or diarrhea right away, as pt may need to be readmitted to determine cause and get IV immunosuppression/ or hydration.  No lifting over 10 lbs x 6 weeks and no driving for at least 2 weeks, and then have to be off narcotics and reflexes back to normal. Stay hydrated with 1-2 liters of water QD. Avoid ill people and frequent handwashing to prevent illness. Pt does not have any pets, if he would pet sit I advised him not to  feces.  PCP: Masoud Valentin @ [facility]  Ph: [phone]  OP Labs--pt has been going to  [facility] and will plan to continue going there when HHn visits are complete.  Endocrinologist: He can't remember the name but is at [facility] with PCP  Hemodialysis: [facility]  Equipment: Pt has a working glucometer and supplies at home.  ATC--I have updated them  Plan  Anticipated Discharge Date:  11/18  Anticipated Discharge Plan:  ATC starting the morning after discharge and then [facility] with RTC's    CTS Handoff completed:  YES    Charlotte Arteaga RN

## 2018-11-15 NOTE — PLAN OF CARE
Problem: Kidney Transplant (Adult)  Goal: Signs and Symptoms of Listed Potential Problems Will be Absent, Minimized or Managed (Kidney Transplant)  Signs and symptoms of listed potential problems will be absent, minimized or managed by discharge/transition of care (reference Kidney Transplant (Adult) CPG).   Outcome: No Change  BP remains elevated 170's-150's/100's-90's. IV Hydralazine given x 2. OVSS. BS-122 & 97. Pain is well controlled with Dilaudid PCA. C/o itching and received prn oral Benadryl 25 mg. Rmairez has had 350 ml + out so far. SARAH had had 10 ml out and is leaking at the site. SARAH dressing change done x 2. Denies nausea.

## 2018-11-15 NOTE — PLAN OF CARE
"Problem: Patient Care Overview  Goal: Plan of Care/Patient Progress Review  Outcome: Improving  BP (!) 160/93 (BP Location: Right arm)  Pulse 101  Temp 97.7  F (36.5  C) (Oral)  Resp 16  Ht 1.702 m (5' 7\")  Wt 84.3 kg (185 lb 12.8 oz)  SpO2 96%  BMI 29.1 kg/m2    2027-7006: A/Ox4. Hypertensive; IV hydralazine given x1. HR in 100s. OVSS on RA. Possible mild thymo reaction today - chills/shaking, shortness of breath, and itching reported by patient; benadryl given x1 with mild relief. MD notified; see note. Thymo dose was stopped when symptoms occurred and later restarted, per MD, at half the rate. Pt tolerated the rest of the dose well. Pain is well controlled on Dilaudid PCA. Denies nausea. NG to LCS with 150mL output. R SARAH with 25mL serosanguinous output. Ramirez with 475mL venkat output. Pt had first BM today since surgery; passing gas infrequently. Ambulated in hallway x2; up with SBA.  R internal jugular with D5LR @ 75mL/hr + Heparin gtt @ 200 units/hr + Dilaudid PCA + TKO. BGs 104-115. Pt is NPO with ice chips. Will continue to monitor closely and notify the team of any acute changes.       "

## 2018-11-15 NOTE — PLAN OF CARE
Problem: Patient Care Overview  Goal: Plan of Care/Patient Progress Review  Borderline hypertensive, AOVSS on RA (93-94%), afebrile. Scheduled nifedipine given for BPs, PRN hydralazine available. C/o LLQ abdominal pain, PCA pump in use until 1145 (0.2 Q10min, 1.2 lockout); transition to oxycodone 5-10 mg via NG. 10 mg oxy given x 1 with little relief. Dose increased to 10-15; 15 mg oxycodone x1 with relief. C/o SOB, MDs notified in rounds; given IV lasix 40 mg. C/o nausea, given PRN compazine 5 mg with relief. NG to low continuous suction. R SARAH 50 mL bright blood out. SARAH site leaky, dressing changed x 2. Midline incision oozing scant serous fluid, abd pad changed x 2. R internal jugular c/d/i; TKO NS @ 10 + IV ABx (micafungin, zosyn x 2); D5 LR @ 50 mL/hr + heparin 200 units/hr. Thymo dose #2 initiated- 75 mg in 180 mL @ 30 mL/hr with premeds given. No s/s of reaction, denies chills, SOB. See VS. Q4H BGs 90, 101, 113. Novolog correction available, not needed. Ramirez catheter removed @ 1000. Cath cares done. Total UO 1425, urine increasingly pale & straw-colored. Ambulated in gonzales x 1 with SBA. Continue to monitor and update team with acute changes.

## 2018-11-15 NOTE — PROGRESS NOTES
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 He has a PMH of uncontrolled DM HbA1c 10.5, CAD sp ENE 2010, HTN on clonidine , hydralazine and nifedipine, on IS with CsA and MMF prior to kidney Pancreas Tx on 11/12/18 . Transplant nephrology consulted fo co management for his transplant and IS.     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 and ACR/AbMR and failed Transplant now on HD since 9/2018  Follows [facility] with dr Bull, EDW 78.5Kg , last HD 11/9  Post transplant he has been doing well , creatinine clearance improving  UO maintained and he is on maintenance IVF CVP 3-5  Creatinine improving      SP Pancreas Tx 11/12/18  Prior HBA1c 9-11  Lipase and amylase trending down post op     Induction: Thymo + solumedrol  Immunosuppression: MMF 1000mg (was on MPA due to intolerance to MMF)+tacrolimus 1mg   CsA discontinued 11/13  -- will follow on levels     CMV+/+  EBV+/+  Hx of BK Viremia  BK PCR -ve 11/1/18  HepC + donor     Px:  PCP PX: Bactrim  CMV Px: Valcyte  Clotrimazole lozanges   On Zosyn and micafungin     Volume status  euvolemic  With high UO and low N CVP , continued on IVF for maintainance  HTN   Increased now  -- lasix and then nifedipine 30mg at bedtime if still uncontrolled     Electrolytes  Stable  Mild hypomagnesemia  Replete per protocol , keep ~2     Anemia   Hb stable ~9.5 post op     BMD  Hx of sec hyperparathyroidism PTH 1217  Calcium and phos wnl  -- he will need to continue cinacalcet 30mg at bedtime.  Discontinue if calcium falls  -- continue on calcitriol 0.25mcg and vit D 2000U as PTA for now  -- phos is stable but he will be at risk for low phos in the coming  "days     Hx of CAD SP ENE 2010  Stable and asymptomatic     Recommendations were communicated to primary team     Seen and discussed with Dr. Paul Moon MD   [phone]    Interval History :   Nursing and provider notes from last 24 hours reviewed.  In the last 24 hours Amadou Lewis has been stable , still has not had a BM and hypoactive bowel sounds    Review of Systems:   I reviewed the following systems:  GI: + appetite. mild nausea No vomiting or diarrhea.   Neuro:  - confusion  Constitutional:  - fever or chills  CV: - dyspnea or edema.  - chest pain.    Physical Exam:   I/O last 3 completed shifts:  In: 2276 [I.V.:2056; NG/GT:190; IV Piggyback:30]  Out: 2850 [Urine:2400; Emesis/NG output:350; Drains:100]   BP (!) 163/95 (BP Location: Right arm)  Pulse 101  Temp 98.1  F (36.7  C) (Oral)  Resp 18  Ht 1.702 m (5' 7\")  Wt 85.6 kg (188 lb 11.2 oz)  SpO2 93%  BMI 29.55 kg/m2     GENERAL APPEARANCE: NAD  EYES:  - scleral icterus, pupils equal  HENT: mouth without ulcers or lesions  PULM: lungs clear to auscultation bilaterally, equal air movement, no clubbing  CV: regular rhythm, normal rate, no rub     -JVD -     -edema +   GI: soft, non tender, mild distended, bowel sounds are +  INTEGUMENT: no cyanosis, - rash  NEURO:  - asterixis   Access CVC tunneled     Labs:   All labs reviewed by me  Electrolytes/Renal -   Recent Labs   Lab Test  11/15/18   0547  11/14/18   0646  11/13/18 2009 11/13/18   0435   NA  145*  144   --    --   140   POTASSIUM  4.4  4.6  4.6   < >  4.5   CHLORIDE  116*  116*   --    --   111*   CO2  21  23   --    --   20   BUN  32*  38*   --    --   50*   CR  1.52*  1.84*   --    --   3.45*   GLC  96  97   --    --   98   JE  8.7  8.6   --    --   8.0*   MAG  2.0  2.0   --    --   1.8   PHOS  2.2*  2.7   --    --   4.5    < > = values in this interval not displayed.       CBC -   Recent Labs   Lab Test  11/15/18   0547  11/14/18   0646  11/13/18 2009 11/13/18   0435 "   WBC  2.9*  7.3   --    --   11.9*   HGB  9.0*  8.7*  9.1*   < >  10.0*   PLT  101*  111*   --    --   205    < > = values in this interval not displayed.       LFTs -   Recent Labs   Lab Test  11/11/18   0955  06/27/18   1357  08/03/17   1440  05/31/17   0716   01/20/16   1154   ALKPHOS  189*   --    --   150   --   101   BILITOTAL  0.3   --    --   0.5   --   0.5   ALT  14   --    --   17   --   23   AST  9   --    --   8   --   Unsatisfactory specimen - hemolyzed   PROTTOTAL  7.6   --    --   7.3   --   8.0   ALBUMIN  4.0  4.2  4.2  4.1   < >  3.4    < > = values in this interval not displayed.       Iron Panel -   Recent Labs   Lab Test  09/11/18   1402  08/03/17   1440  11/18/16   1448   IRON  85  79  82   IRONSAT  37  34  34   TIMTOHY  761*  736*   --          Imaging:  All imaging studies reviewed by me.     Current Medications:    acetaminophen  975 mg Oral Q8H     atorvastatin  5 mg Oral QPM     calcium carbonate 500 mg-vitamin D 200 units  1 tablet Oral BID w/meals     insulin aspart  1-6 Units Subcutaneous Q4H     lidocaine  2 patch Transdermal Q24h    And     [START ON 11/16/2018] lidocaine   Transdermal Q24H    And     lidocaine   Transdermal Q8H     magnesium oxide  400 mg Oral QAM     micafungin  100 mg Intravenous Q24H     mycophenolate  1,000 mg Oral BID IS     NIFEdipine ER osmotic  30 mg Oral BID     nystatin  500,000 Units Oral 4x Daily     octreotide  100 mcg Subcutaneous Q12H     pantoprazole  40 mg Per NG tube Daily     phosphorus tablet 250 mg  250 mg Oral BID     sennosides  5 mL Oral BID     sodium chloride (PF)  3 mL Intravenous Q8H     sulfamethoxazole-trimethoprim  10 mL Per NG tube Daily     tacrolimus  2 mg Oral BID IS     [START ON 11/16/2018] valGANciclovir  900 mg Oral Daily       IV fluid REPLACEMENT ONLY       IV fluid REPLACEMENT ONLY 50 mL/hr at 11/15/18 0745     heparin 200 Units/hr (11/15/18 0745)     sodium chloride 10 mL/hr at 11/15/18 0745     Ashleigh Moon MD     I have seen  and examined this patient with the resident.  This note reflects our joint assessment and plan.     Hermila Miller MD

## 2018-11-16 ENCOUNTER — APPOINTMENT (OUTPATIENT)
Dept: GENERAL RADIOLOGY | Facility: CLINIC | Age: 53
DRG: 008 | End: 2018-11-16
Attending: STUDENT IN AN ORGANIZED HEALTH CARE EDUCATION/TRAINING PROGRAM
Payer: COMMERCIAL

## 2018-11-16 DIAGNOSIS — Z94.83 PANCREAS REPLACED BY TRANSPLANT (H): Primary | ICD-10-CM

## 2018-11-16 DIAGNOSIS — Z94.0 KIDNEY REPLACED BY TRANSPLANT: ICD-10-CM

## 2018-11-16 DIAGNOSIS — Z48.298 AFTERCARE FOLLOWING ORGAN TRANSPLANT: ICD-10-CM

## 2018-11-16 LAB
ALBUMIN UR-MCNC: 30 MG/DL
AMYLASE SERPL-CCNC: 204 U/L (ref 30–110)
ANION GAP SERPL CALCULATED.3IONS-SCNC: 8 MMOL/L (ref 3–14)
APPEARANCE UR: CLEAR
BASOPHILS # BLD AUTO: 0 10E9/L (ref 0–0.2)
BASOPHILS NFR BLD AUTO: 0 %
BILIRUB UR QL STRIP: NEGATIVE
BUN SERPL-MCNC: 30 MG/DL (ref 7–30)
CALCIUM SERPL-MCNC: 8.4 MG/DL (ref 8.5–10.1)
CHLORIDE SERPL-SCNC: 114 MMOL/L (ref 94–109)
CO2 SERPL-SCNC: 24 MMOL/L (ref 20–32)
COLOR UR AUTO: YELLOW
CREAT SERPL-MCNC: 1.46 MG/DL (ref 0.66–1.25)
DIFFERENTIAL METHOD BLD: ABNORMAL
EOSINOPHIL # BLD AUTO: 0 10E9/L (ref 0–0.7)
EOSINOPHIL NFR BLD AUTO: 0 %
ERYTHROCYTE [DISTWIDTH] IN BLOOD BY AUTOMATED COUNT: 13.8 % (ref 10–15)
GFR SERPL CREATININE-BSD FRML MDRD: 50 ML/MIN/1.7M2
GLUCOSE BLDC GLUCOMTR-MCNC: 112 MG/DL (ref 70–99)
GLUCOSE BLDC GLUCOMTR-MCNC: 121 MG/DL (ref 70–99)
GLUCOSE BLDC GLUCOMTR-MCNC: 127 MG/DL (ref 70–99)
GLUCOSE BLDC GLUCOMTR-MCNC: 131 MG/DL (ref 70–99)
GLUCOSE BLDC GLUCOMTR-MCNC: 136 MG/DL (ref 70–99)
GLUCOSE BLDC GLUCOMTR-MCNC: 98 MG/DL (ref 70–99)
GLUCOSE BLDC GLUCOMTR-MCNC: 99 MG/DL (ref 70–99)
GLUCOSE SERPL-MCNC: 91 MG/DL (ref 70–99)
GLUCOSE UR STRIP-MCNC: NEGATIVE MG/DL
HCT VFR BLD AUTO: 26.4 % (ref 40–53)
HGB BLD-MCNC: 8.1 G/DL (ref 13.3–17.7)
HGB UR QL STRIP: ABNORMAL
IMM GRANULOCYTES # BLD: 0 10E9/L (ref 0–0.4)
IMM GRANULOCYTES NFR BLD: 0 %
INTERPRETATION ECG - MUSE: NORMAL
KETONES UR STRIP-MCNC: NEGATIVE MG/DL
LACTATE BLD-SCNC: 1 MMOL/L (ref 0.7–2)
LEUKOCYTE ESTERASE UR QL STRIP: NEGATIVE
LIPASE FLD-CCNC: 2681 U/L
LIPASE SERPL-CCNC: 336 U/L (ref 73–393)
LMWH PPP CHRO-ACNC: <0.1 IU/ML
LYMPHOCYTES # BLD AUTO: 0.1 10E9/L (ref 0.8–5.3)
LYMPHOCYTES NFR BLD AUTO: 6.4 %
MAGNESIUM SERPL-MCNC: 1.8 MG/DL (ref 1.6–2.3)
MCH RBC QN AUTO: 28.5 PG (ref 26.5–33)
MCHC RBC AUTO-ENTMCNC: 30.7 G/DL (ref 31.5–36.5)
MCV RBC AUTO: 93 FL (ref 78–100)
MONOCYTES # BLD AUTO: 0.1 10E9/L (ref 0–1.3)
MONOCYTES NFR BLD AUTO: 5.9 %
MUCOUS THREADS #/AREA URNS LPF: PRESENT /LPF
NEUTROPHILS # BLD AUTO: 1.8 10E9/L (ref 1.6–8.3)
NEUTROPHILS NFR BLD AUTO: 87.7 %
NITRATE UR QL: NEGATIVE
NRBC # BLD AUTO: 0 10*3/UL
NRBC BLD AUTO-RTO: 0 /100
PH UR STRIP: 7 PH (ref 5–7)
PHOSPHATE SERPL-MCNC: 2.3 MG/DL (ref 2.5–4.5)
PLATELET # BLD AUTO: 118 10E9/L (ref 150–450)
POTASSIUM SERPL-SCNC: 3.9 MMOL/L (ref 3.4–5.3)
RBC # BLD AUTO: 2.84 10E12/L (ref 4.4–5.9)
RBC #/AREA URNS AUTO: >182 /HPF (ref 0–2)
SODIUM SERPL-SCNC: 146 MMOL/L (ref 133–144)
SOURCE: ABNORMAL
SP GR UR STRIP: 1.02 (ref 1–1.03)
SPECIMEN SOURCE FLD: NORMAL
SQUAMOUS #/AREA URNS AUTO: <1 /HPF (ref 0–1)
TACROLIMUS BLD-MCNC: 6.4 UG/L (ref 5–15)
TME LAST DOSE: NORMAL H
UROBILINOGEN UR STRIP-MCNC: NORMAL MG/DL (ref 0–2)
WBC # BLD AUTO: 2 10E9/L (ref 4–11)
WBC #/AREA URNS AUTO: 5 /HPF (ref 0–5)

## 2018-11-16 PROCEDURE — 84100 ASSAY OF PHOSPHORUS: CPT | Performed by: SURGERY

## 2018-11-16 PROCEDURE — 83735 ASSAY OF MAGNESIUM: CPT | Performed by: SURGERY

## 2018-11-16 PROCEDURE — 80048 BASIC METABOLIC PNL TOTAL CA: CPT | Performed by: SURGERY

## 2018-11-16 PROCEDURE — 25000128 H RX IP 250 OP 636: Performed by: NURSE PRACTITIONER

## 2018-11-16 PROCEDURE — 00000146 ZZHCL STATISTIC GLUCOSE BY METER IP

## 2018-11-16 PROCEDURE — 83690 ASSAY OF LIPASE: CPT | Performed by: PHYSICIAN ASSISTANT

## 2018-11-16 PROCEDURE — 25000131 ZZH RX MED GY IP 250 OP 636 PS 637: Performed by: PHYSICIAN ASSISTANT

## 2018-11-16 PROCEDURE — 25000128 H RX IP 250 OP 636: Performed by: PHYSICIAN ASSISTANT

## 2018-11-16 PROCEDURE — 85520 HEPARIN ASSAY: CPT | Performed by: SURGERY

## 2018-11-16 PROCEDURE — 25000128 H RX IP 250 OP 636: Performed by: STUDENT IN AN ORGANIZED HEALTH CARE EDUCATION/TRAINING PROGRAM

## 2018-11-16 PROCEDURE — 25000132 ZZH RX MED GY IP 250 OP 250 PS 637: Performed by: STUDENT IN AN ORGANIZED HEALTH CARE EDUCATION/TRAINING PROGRAM

## 2018-11-16 PROCEDURE — 83690 ASSAY OF LIPASE: CPT | Performed by: SURGERY

## 2018-11-16 PROCEDURE — 83605 ASSAY OF LACTIC ACID: CPT | Performed by: SURGERY

## 2018-11-16 PROCEDURE — 36592 COLLECT BLOOD FROM PICC: CPT | Performed by: SURGERY

## 2018-11-16 PROCEDURE — 25000132 ZZH RX MED GY IP 250 OP 250 PS 637: Performed by: NURSE PRACTITIONER

## 2018-11-16 PROCEDURE — 74018 RADEX ABDOMEN 1 VIEW: CPT

## 2018-11-16 PROCEDURE — 25000132 ZZH RX MED GY IP 250 OP 250 PS 637: Performed by: PHYSICIAN ASSISTANT

## 2018-11-16 PROCEDURE — 12000026 ZZH R&B TRANSPLANT

## 2018-11-16 PROCEDURE — 25000132 ZZH RX MED GY IP 250 OP 250 PS 637: Performed by: SURGERY

## 2018-11-16 PROCEDURE — 81001 URINALYSIS AUTO W/SCOPE: CPT | Performed by: PHYSICIAN ASSISTANT

## 2018-11-16 PROCEDURE — 85025 COMPLETE CBC W/AUTO DIFF WBC: CPT | Performed by: SURGERY

## 2018-11-16 PROCEDURE — 80197 ASSAY OF TACROLIMUS: CPT | Performed by: NURSE PRACTITIONER

## 2018-11-16 PROCEDURE — 25000131 ZZH RX MED GY IP 250 OP 636 PS 637: Performed by: NURSE PRACTITIONER

## 2018-11-16 PROCEDURE — 82150 ASSAY OF AMYLASE: CPT | Performed by: SURGERY

## 2018-11-16 RX ORDER — OXYCODONE HYDROCHLORIDE 5 MG/1
10-15 TABLET ORAL
Status: DISCONTINUED | OUTPATIENT
Start: 2018-11-16 | End: 2018-11-17

## 2018-11-16 RX ORDER — CALCITRIOL 0.25 UG/1
0.25 CAPSULE, LIQUID FILLED ORAL DAILY
Status: DISCONTINUED | OUTPATIENT
Start: 2018-11-16 | End: 2018-11-16

## 2018-11-16 RX ORDER — TACROLIMUS 0.5 MG/1
0.5 CAPSULE ORAL ONCE
Status: COMPLETED | OUTPATIENT
Start: 2018-11-16 | End: 2018-11-16

## 2018-11-16 RX ORDER — FUROSEMIDE 10 MG/ML
40 INJECTION INTRAMUSCULAR; INTRAVENOUS ONCE
Status: COMPLETED | OUTPATIENT
Start: 2018-11-16 | End: 2018-11-16

## 2018-11-16 RX ORDER — DIPHENHYDRAMINE HCL 25 MG
25-50 CAPSULE ORAL ONCE
Status: COMPLETED | OUTPATIENT
Start: 2018-11-16 | End: 2018-11-16

## 2018-11-16 RX ORDER — ACETAMINOPHEN 325 MG/1
650 TABLET ORAL ONCE
Status: COMPLETED | OUTPATIENT
Start: 2018-11-16 | End: 2018-11-16

## 2018-11-16 RX ORDER — MYCOPHENOLIC ACID 360 MG/1
720 TABLET, DELAYED RELEASE ORAL
Status: DISCONTINUED | OUTPATIENT
Start: 2018-11-16 | End: 2018-11-20 | Stop reason: HOSPADM

## 2018-11-16 RX ORDER — ACETAMINOPHEN 325 MG/1
975 TABLET ORAL EVERY 8 HOURS PRN
Status: DISCONTINUED | OUTPATIENT
Start: 2018-11-16 | End: 2018-11-20 | Stop reason: HOSPADM

## 2018-11-16 RX ORDER — ASPIRIN 81 MG/1
81 TABLET ORAL DAILY
Status: DISCONTINUED | OUTPATIENT
Start: 2018-11-16 | End: 2018-11-20 | Stop reason: HOSPADM

## 2018-11-16 RX ORDER — SULFAMETHOXAZOLE AND TRIMETHOPRIM 400; 80 MG/1; MG/1
1 TABLET ORAL DAILY
Status: DISCONTINUED | OUTPATIENT
Start: 2018-11-17 | End: 2018-11-20 | Stop reason: HOSPADM

## 2018-11-16 RX ORDER — SENNOSIDES 8.6 MG
8.6 TABLET ORAL 2 TIMES DAILY
Status: DISCONTINUED | OUTPATIENT
Start: 2018-11-16 | End: 2018-11-20 | Stop reason: HOSPADM

## 2018-11-16 RX ORDER — VALGANCICLOVIR 450 MG/1
900 TABLET, FILM COATED ORAL DAILY
Status: DISCONTINUED | OUTPATIENT
Start: 2018-11-17 | End: 2018-11-20 | Stop reason: HOSPADM

## 2018-11-16 RX ORDER — METHYLPREDNISOLONE SODIUM SUCCINATE 125 MG/2ML
50 INJECTION, POWDER, LYOPHILIZED, FOR SOLUTION INTRAMUSCULAR; INTRAVENOUS ONCE
Status: COMPLETED | OUTPATIENT
Start: 2018-11-16 | End: 2018-11-16

## 2018-11-16 RX ORDER — PANTOPRAZOLE SODIUM 40 MG/1
40 TABLET, DELAYED RELEASE ORAL
Status: DISCONTINUED | OUTPATIENT
Start: 2018-11-17 | End: 2018-11-20 | Stop reason: HOSPADM

## 2018-11-16 RX ORDER — DIPHENHYDRAMINE HCL 12.5MG/5ML
25-50 LIQUID (ML) ORAL ONCE
Status: COMPLETED | OUTPATIENT
Start: 2018-11-16 | End: 2018-11-16

## 2018-11-16 RX ORDER — CARVEDILOL 6.25 MG/1
6.25 TABLET ORAL 2 TIMES DAILY
Status: DISCONTINUED | OUTPATIENT
Start: 2018-11-16 | End: 2018-11-20 | Stop reason: HOSPADM

## 2018-11-16 RX ORDER — CALCITRIOL 1 UG/ML
0.25 SOLUTION ORAL DAILY
Status: DISCONTINUED | OUTPATIENT
Start: 2018-11-16 | End: 2018-11-16

## 2018-11-16 RX ORDER — CALCITRIOL 0.25 UG/1
0.25 CAPSULE, LIQUID FILLED ORAL DAILY
Status: DISCONTINUED | OUTPATIENT
Start: 2018-11-17 | End: 2018-11-20 | Stop reason: HOSPADM

## 2018-11-16 RX ADMIN — OXYCODONE HYDROCHLORIDE 15 MG: 5 TABLET ORAL at 17:28

## 2018-11-16 RX ADMIN — TACROLIMUS 0.5 MG: 0.5 CAPSULE ORAL at 13:33

## 2018-11-16 RX ADMIN — OXYCODONE HYDROCHLORIDE 15 MG: 5 SOLUTION ORAL at 07:54

## 2018-11-16 RX ADMIN — Medication 2000 UNITS: at 08:27

## 2018-11-16 RX ADMIN — MYCOPHENOLIC ACID 720 MG: 360 TABLET, DELAYED RELEASE ORAL at 17:28

## 2018-11-16 RX ADMIN — Medication 5 MG: at 19:51

## 2018-11-16 RX ADMIN — CALCIUM CARBONATE-VITAMIN D TAB 500 MG-200 UNIT 1 TABLET: 500-200 TAB at 08:03

## 2018-11-16 RX ADMIN — ACETAMINOPHEN 650 MG: 325 TABLET, FILM COATED ORAL at 16:11

## 2018-11-16 RX ADMIN — CALCITRIOL 0.25 MCG: 1 SOLUTION ORAL at 08:27

## 2018-11-16 RX ADMIN — DIBASIC SODIUM PHOSPHATE, MONOBASIC POTASSIUM PHOSPHATE AND MONOBASIC SODIUM PHOSPHATE 500 MG: 852; 155; 130 TABLET ORAL at 19:52

## 2018-11-16 RX ADMIN — OXYCODONE HYDROCHLORIDE 15 MG: 5 SOLUTION ORAL at 04:17

## 2018-11-16 RX ADMIN — DIBASIC SODIUM PHOSPHATE, MONOBASIC POTASSIUM PHOSPHATE AND MONOBASIC SODIUM PHOSPHATE 250 MG: 852; 155; 130 TABLET ORAL at 08:02

## 2018-11-16 RX ADMIN — SULFAMETHOXAZOLE AND TRIMETHOPRIM 80 MG: 200; 40 SUSPENSION ORAL at 10:45

## 2018-11-16 RX ADMIN — CARVEDILOL 6.25 MG: 25 TABLET, FILM COATED ORAL at 10:47

## 2018-11-16 RX ADMIN — OXYCODONE HYDROCHLORIDE 10 MG: 5 TABLET ORAL at 13:32

## 2018-11-16 RX ADMIN — NIFEDIPINE 30 MG: 30 TABLET, FILM COATED, EXTENDED RELEASE ORAL at 08:02

## 2018-11-16 RX ADMIN — ASPIRIN 81 MG: 81 TABLET, COATED ORAL at 10:48

## 2018-11-16 RX ADMIN — ANTI-THYMOCYTE GLOBULIN (RABBIT) 75 MG: 5 INJECTION, POWDER, LYOPHILIZED, FOR SOLUTION INTRAVENOUS at 17:12

## 2018-11-16 RX ADMIN — LIDOCAINE 2 PATCH: 560 PATCH PERCUTANEOUS; TOPICAL; TRANSDERMAL at 20:08

## 2018-11-16 RX ADMIN — SENNOSIDES 8.6 MG: 8.6 TABLET, FILM COATED ORAL at 19:51

## 2018-11-16 RX ADMIN — PROCHLORPERAZINE EDISYLATE 5 MG: 5 INJECTION INTRAMUSCULAR; INTRAVENOUS at 00:11

## 2018-11-16 RX ADMIN — NIFEDIPINE 30 MG: 30 TABLET, FILM COATED, EXTENDED RELEASE ORAL at 19:52

## 2018-11-16 RX ADMIN — ACETAMINOPHEN 975 MG: 325 SOLUTION ORAL at 00:02

## 2018-11-16 RX ADMIN — VALGANCICLOVIR HYDROCHLORIDE 900 MG: 50 POWDER, FOR SOLUTION ORAL at 10:46

## 2018-11-16 RX ADMIN — NYSTATIN 500000 UNITS: 500000 SUSPENSION ORAL at 08:03

## 2018-11-16 RX ADMIN — Medication 2 MG: at 08:00

## 2018-11-16 RX ADMIN — CARVEDILOL 6.25 MG: 6.25 TABLET, FILM COATED ORAL at 19:52

## 2018-11-16 RX ADMIN — OXYCODONE HYDROCHLORIDE 15 MG: 5 TABLET ORAL at 23:41

## 2018-11-16 RX ADMIN — OXYCODONE HYDROCHLORIDE 15 MG: 5 TABLET ORAL at 20:37

## 2018-11-16 RX ADMIN — METHYLPREDNISOLONE SODIUM SUCCINATE 50 MG: 125 INJECTION, POWDER, LYOPHILIZED, FOR SOLUTION INTRAMUSCULAR; INTRAVENOUS at 16:12

## 2018-11-16 RX ADMIN — ACETAMINOPHEN 975 MG: 325 SOLUTION ORAL at 08:01

## 2018-11-16 RX ADMIN — OCTREOTIDE ACETATE 100 MCG: 100 INJECTION, SOLUTION INTRAVENOUS; SUBCUTANEOUS at 19:51

## 2018-11-16 RX ADMIN — NYSTATIN 500000 UNITS: 500000 SUSPENSION ORAL at 16:12

## 2018-11-16 RX ADMIN — TACROLIMUS 2.5 MG: 1 CAPSULE ORAL at 17:29

## 2018-11-16 RX ADMIN — FUROSEMIDE 40 MG: 10 INJECTION, SOLUTION INTRAVENOUS at 10:50

## 2018-11-16 RX ADMIN — OXYCODONE HYDROCHLORIDE 15 MG: 5 SOLUTION ORAL at 00:02

## 2018-11-16 RX ADMIN — MYCOPHENOLATE MOFETIL 1000 MG: 200 POWDER, FOR SUSPENSION ORAL at 08:01

## 2018-11-16 RX ADMIN — MICAFUNGIN SODIUM 100 MG: 10 INJECTION, POWDER, LYOPHILIZED, FOR SOLUTION INTRAVENOUS at 08:19

## 2018-11-16 RX ADMIN — SODIUM CHLORIDE, SODIUM LACTATE, POTASSIUM CHLORIDE, CALCIUM CHLORIDE AND DEXTROSE MONOHYDRATE: 5; 600; 310; 30; 20 INJECTION, SOLUTION INTRAVENOUS at 05:59

## 2018-11-16 RX ADMIN — PANTOPRAZOLE SODIUM 40 MG: 40 TABLET, DELAYED RELEASE ORAL at 08:02

## 2018-11-16 RX ADMIN — CALCIUM CARBONATE-VITAMIN D TAB 500 MG-200 UNIT 1 TABLET: 500-200 TAB at 17:29

## 2018-11-16 RX ADMIN — NYSTATIN 500000 UNITS: 500000 SUSPENSION ORAL at 19:51

## 2018-11-16 RX ADMIN — OCTREOTIDE ACETATE 100 MCG: 100 INJECTION, SOLUTION INTRAVENOUS; SUBCUTANEOUS at 08:37

## 2018-11-16 RX ADMIN — SENNOSIDES A AND B 5 ML: 415.36 LIQUID ORAL at 08:02

## 2018-11-16 RX ADMIN — MAGNESIUM OXIDE TAB 400 MG (241.3 MG ELEMENTAL MG) 400 MG: 400 (241.3 MG) TAB at 14:41

## 2018-11-16 RX ADMIN — DIPHENHYDRAMINE HYDROCHLORIDE 25 MG: 25 CAPSULE ORAL at 16:11

## 2018-11-16 ASSESSMENT — ACTIVITIES OF DAILY LIVING (ADL)
ADLS_ACUITY_SCORE: 12
ADLS_ACUITY_SCORE: 11

## 2018-11-16 ASSESSMENT — PAIN DESCRIPTION - DESCRIPTORS: DESCRIPTORS: CONSTANT

## 2018-11-16 NOTE — PLAN OF CARE
"Problem: Patient Care Overview  Goal: Plan of Care/Patient Progress Review  Outcome: No Change  BP (!) 146/96 (BP Location: Right arm)  Pulse 101  Temp 98  F (36.7  C) (Oral)  Resp 18  Ht 1.702 m (5' 7\")  Wt 85.6 kg (188 lb 11.2 oz)  SpO2 95%  BMI 29.55 kg/m2    8647-8472: A/Ox4. Hypertensive; BPs decreased after pain controlled. HR in 100s. Up with SBA. NPO with ice chips. Pain uncontrolled at beginning of shift; gave additional 5mg oxycodone, 0.3mg IV Dilaudid, + obtained orders for scheduled Tylenol. Patient received oxycodone at bedtime as well, but pain was well controlled throughout most of shift. Denies nausea. Rebound tenderness in LLQ; Lidocaine patches applied. Incision with [place]. R SARAH with serosanguinous output. NG to LCS. Urine output 915mL; pt reports burning and urgency with urination since Ramirez removed. No BM this shift; passing gas. R internal jugular with D5LR @ 50mL/hr + Heparin @ 200units/hr. BGs 121 and 118; no sliding scale insulin needed. Will continue to monitor and notify the team of any acute changes.       "

## 2018-11-16 NOTE — PROGRESS NOTES
Transplant Social Work Services Progress Note      Collaborated with:     Data:  is concerned about the cost of parking, as his car has been in the ramp since admission.   Intervention: Provided information about parking cost options, and problem-solved ways to address his concerns. Provided a one week parking pass that can start the day he entered the ramp.   Assessment: He will not get towed, and is thinking someone can come and get his car tomorrow. He is concerned about cost as he will be on short-term disability at a portion of his wage for the next several weeks.  Education provided by SW: Parking cost options.   Plan:    Discharge Plans in Progress: Potentially this weekend, to home.     Barriers to d/c plan: Medical status.     Follow up Plan: None by this writer for the above.     Evelin Dee, MSW, LICW

## 2018-11-16 NOTE — PLAN OF CARE
Problem: Patient Care Overview  Goal: Plan of Care/Patient Progress Review  [id number]: Hypertensive that resolved without intervention x2, tachycardic in 100s-110s, AOVSS on RA. Q4H BGs: 121 & 99, no correction required. Pt struggling with pain, receiving oxy 15mg via NG tube x2, compazine IV x1 following an emesis after receiving medications at 0000. Emesis was approximately 50ml. Ice chips intake overnight was approximately 50ml. R triple lumen IJ running D5LR at 50ml/hr and NS TKO with heparin straight rated at 200U/hr. Voiding adequate amounts, no BM overnight. NG tube to low continuous suction, output was 35ml. R SARAH output was 20ml. SBA, slept between cares.

## 2018-11-16 NOTE — PROGRESS NOTES
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 Underwent  donor pancreas transplant with enteric drainage, kidney transplant (with venous reconstruction, two arteries, and stent), appendectomy, and umbilical hernia repair on 18.    Graft function: POD #4  Pancreas: Lipase trending to 288, today 336. 11/14 Drain lipase 19K. Repeat drain lipase today. octreotide x 5 days. Anticoagulation for graft thrombosis ppx. sliding scale insulin, no insulin given.  Kidney: Cr 1.5, ethan, acceptable UOP. Pt made normal urine volumes PTA.  Immunosuppression management: Induction with thymoglobulin and steroid pulse.  Thymo:  125mg 11/12, 125mg 11/13, 125mg 11/14, 75mg 11/15 (ALC 0.1), 75mg 11/16 (ALC 0.1). Will need 75 mg yet.  Methylpred:  500mg 11/12, 250mg 11/13, 100mg 11/14, 100mg 11/15 due to thymo reaction  MMF: Myfortic 720 mg BID d/t past GI intolerance.  Tacro:  2.5mg PO, goal level 8-10. Increased dose to today.   Cyclo: On PTA, discontinued .  Complexity of management: High. Contributing factors: induction  Hematology: Pancytopenia  Acute blood loss anemia: 300ml EBL in OR. Hgb 8.1 <-9 <- 8.7.  Thrombocytopenia: 2/2 thymo, monitor.  Leukopenia: Secondary to medications, monitor.  Vascular prophylaxis: St rate heparin 200u/h, no increase per staff. Start ASA 81 mg today.   Cardiorespiratory:   Hx HTN: On hydralazine, nifedipine, and PRN clonidine PTA.  BP increasing, increased nifedipine to 30mg BID yesterday. Start carvedilol 6.25 mg BID. Lasix 40 mg IV x 1. Does not tolerate amlodipine due to edema issues. PRN hydralazine available.  Hx CAD: On statin. Does not tolerate beta blockers.  GI/Nutrition: Removed NGT. Start CLD.  Bowel regimen.   Nausea: Controlled with PRN zofran and compazine  Endocrine: Poorly controlled DM prior to transplant. A1c 9.1-11.1% last few years.  Fluid/Electrolytes: Hypervolemia, stop MIV. Lasix IV x 1 today. Replace phos, oral.   : Ramirez removed POD 3.   Neuro:   Acute and chronic pain: Takes Norco (up to 8 tabs daily) at home for pain. Oxycodone 10-15 mg every 3 hours PRN. Scheduled tylenol.   Infectious disease: Afebrile  Prophylaxis: DVT, fall, GI, nystatin, micafungin x7d, zosyn x3d, bactrim, valcyte  Disposition: 7A. Possible discharge in 2-3 days.    Medical Decision Making: Medium  Subsequent visit [id number] (moderate level decision making)    ODALIS/Fellow/Resident Provider: JORDAN Killian [id number]    Faculty: Monique Luevano M.D.  _________________________________________________________________  Transplant History:   11/12/2018 (Kidney / Pancreas), 10/10/2013 (Kidney), Postoperative day: 4     Interval History: History is obtained from the patient  Overnight events: Pain controlled, no nausea, +flatus. No BM yesterday, had BM day prior. No SOB. Ambulating. No reaction to thymo yesterday.    ROS:   A 10-point review of systems was negative except as noted above.    Meds:    acetaminophen  650 mg Oral Once     acetaminophen  975 mg Oral Q8H     anti-thymocyte globulin  75 mg Intravenous Central line Once     aspirin  81 mg Oral Daily     atorvastatin  5 mg Oral QPM     calcitRIOL  0.25 mcg Oral Daily     calcium carbonate 500 mg-vitamin D 200 units  1 tablet Oral BID w/meals     carvedilol  6.25 mg Oral BID     cholecalciferol  2,000 Units Oral Daily     diphenhydrAMINE  25-50 mg Oral Once    Or     diphenhydrAMINE  25-50 mg Per NG tube Once     insulin aspart  1-6 Units Subcutaneous Q4H     lidocaine  2 patch Transdermal Q24h    And     lidocaine   Transdermal Q24H    And     lidocaine   Transdermal Q8H     magnesium oxide  400 mg Oral QAM     methylPREDNISolone  50 mg Intravenous Once     micafungin  100  "mg Intravenous Q24H     mycophenolate  1,000 mg Oral BID IS     NIFEdipine ER osmotic  30 mg Oral BID     nystatin  500,000 Units Oral 4x Daily     octreotide  100 mcg Subcutaneous Q12H     pantoprazole  40 mg Per NG tube Daily     phosphorus tablet 250 mg  500 mg Oral BID     sennosides  5 mL Oral BID     sodium chloride (PF)  3 mL Intravenous Q8H     sulfamethoxazole-trimethoprim  10 mL Per NG tube Daily     tacrolimus  2 mg Oral BID IS     valGANciclovir  900 mg Oral Daily       Physical Exam:     Admit Weight: 79.2 kg (174 lb 8 oz)    Current vitals:   /89 (BP Location: Right arm)  Pulse 95  Temp 98  F (36.7  C) (Oral)  Resp 16  Ht 1.702 m (5' 7\")  Wt 85.6 kg (188 lb 11.2 oz)  SpO2 95%  BMI 29.55 kg/m2    Vital sign ranges:    Temp:  [97.7  F (36.5  C)-98.2  F (36.8  C)] 98  F (36.7  C)  Pulse:  [] 95  Heart Rate:  [] 96  Resp:  [14-18] 16  BP: (144-178)/() 156/89  SpO2:  [93 %-96 %] 95 %  Patient Vitals for the past 24 hrs:   BP Temp Temp src Pulse Heart Rate Resp SpO2   11/16/18 0945 156/89 - - - 96 - -   11/16/18 0717 (!) 167/101 98  F (36.7  C) Oral - 98 16 95 %   11/16/18 0438 (!) 162/93 - - 95 - - -   11/16/18 0423 (!) 178/101 - - 107 - - -   11/16/18 0409 (!) 170/104 98  F (36.7  C) Oral 87 - 14 95 %   11/16/18 0043 156/86 - - - - - -   11/16/18 0000 (!) 174/98 - - - - - -   11/15/18 2355 (!) 177/99 98.2  F (36.8  C) Oral 109 - 18 96 %   11/15/18 2002 (!) 146/96 98  F (36.7  C) Oral - 96 18 95 %   11/15/18 1815 144/87 - - - - - -   11/15/18 1502 (!) 163/95 98.1  F (36.7  C) Oral - 96 18 93 %   11/15/18 1410 145/90 97.7  F (36.5  C) Oral - 95 16 93 %   11/15/18 1353 148/78 98.1  F (36.7  C) Oral - 99 18 94 %   11/15/18 1251 154/88 98.2  F (36.8  C) Oral - 106 16 93 %   11/15/18 1210 146/85 97.7  F (36.5  C) - - 110 16 93 %     General Appearance: in no apparent distress.   Skin: normal, warm, dry  Heart: RRR  Lungs: BCTA, Unlabored, RA  Abdomen: The abdomen is rounded, mildly " tender, non tender over kidney and pancreas grafts, SARAH serosang, incision CDI  : Remove milian  Extremities: edema: absent.  Neurologic: awake, alert and oriented x4. Tremor absent.  CVC    Data:   CMP  Recent Labs  Lab 11/16/18  0618 11/15/18  0547 11/14/18  0800  11/12/18  1530 11/12/18  1412  11/11/18  0955   * 145*  --   < > 133 135  < > 131*   POTASSIUM 3.9 4.4  --   < > 3.6 3.7  < > 4.5   CHLORIDE 114* 116*  --   < >  --   --   --  96   CO2 24 21  --   < >  --   --   --  25   GLC 91 96  --   < > 92 84  < > 282*   BUN 30 32*  --   < >  --   --   --  58*   CR 1.46* 1.52*  --   < >  --   --   --  5.46*   GFRESTIMATED 50* 48*  --   < >  --   --   --  11*   GFRESTBLACK 61 58*  --   < >  --   --   --  13*   JE 8.4* 8.7  --   < >  --   --   --  9.0   ICAW  --   --   --   --  4.8 4.6  < >  --    MAG 1.8 2.0  --   < >  --   --   --   --    PHOS 2.3* 2.2*  --   < >  --   --   --   --    AMYLASE 204* 93  --   < >  --   --   --  71   LIPASE 336 288  --   < >  --   --   --  264   ALBUMIN  --   --   --   --   --   --   --  4.0   BILITOTAL  --   --   --   --   --   --   --  0.3   ALKPHOS  --   --   --   --   --   --   --  189*   AST  --   --   --   --   --   --   --  9   ALT  --   --   --   --   --   --   --  14   FLIPA  --   --  06201  --   --   --   --   --    < > = values in this interval not displayed.  CBC  Recent Labs  Lab 11/16/18  0618 11/15/18  0547  11/11/18  0955   HGB 8.1* 9.0*  < > 12.6*   WBC 2.0* 2.9*  < > 4.8   * 101*  < > 269   A1C  --   --   --  9.1*   < > = values in this interval not displayed.  COAGS    Recent Labs  Lab 11/12/18  2102 11/12/18  1631   INR 1.31* 1.41*   PTT 33 34      Urinalysis  Recent Labs   Lab Test  11/11/18   1100  03/06/18   1410   05/24/17   1424   COLOR  Light Yellow  Yellow   < >   --    APPEARANCE  Clear  Clear   < >   --    URINEGLC  >1000*  >=1000*   < >   --    URINEBILI  Negative  Negative   < >   --    URINEKETONE  Negative  Negative   < >   --    SG  1.006   1.015   < >   --    UBLD  Trace*  Moderate*   < >   --    URINEPH  8.0*  6.0   < >   --    PROTEIN  30*  100*   < >   --    NITRITE  Negative  Negative   < >   --    LEUKEST  Negative  Negative   < >   --    RBCU  <1  2-5*   < >   --    WBCU  <1  0 - 5   < >   --    UTPG  1.96*   --    --   1.72*    < > = values in this interval not displayed.     Virology:  CMV DNA Quantitation Specimen   Date Value Ref Range Status   09/28/2016 Plasma  Final     CMV Quantitative   Date Value Ref Range Status   10/20/2014 <100 <100 Copies/mL Final   12/05/2013 <100 <100 Copies/mL Final   10/17/2013 <100 <100 Copies/mL Final     CMV IgG Antibody   Date Value Ref Range Status   10/09/2013 <0.20  Negative for anti-CMV IgG U/mL Final     EBV VCA IgG Antibody   Date Value Ref Range Status   10/09/2013 173.00 U/mL Final     Comment:     Positive, suggests immunologic exposure.     Hepatitis C Antibody   Date Value Ref Range Status   11/11/2018 Nonreactive NR^Nonreactive Final     Comment:     Assay performance characteristics have not been established for newborns,   infants, and children       Hep B Surface Sophia   Date Value Ref Range Status   10/09/2013 13.0  Final     Comment:     Positive, Patient is considered to be immune to infection with hepatitis B   when   the value is greater than or equal to 12.0 mlU/mL.     BK Virus Result   Date Value Ref Range Status   11/01/2018 BK Virus DNA Not Detected BKNEG^BK Virus DNA Not Detected copies/mL Final   05/31/2017 BK Virus DNA Not Detected BKNEG copies/mL Final   05/24/2017 BK Virus DNA Not Detected BKNEG copies/mL Final   11/15/2016 BK Virus DNA Not Detected BKNEG copies/mL Final   09/28/2016 BK Virus DNA Not Detected BKNEG copies/mL Final   09/14/2016 BK Virus DNA Not Detected BKNEG copies/mL Final   06/14/2016 BK Virus DNA Not Detected BKNEG copies/mL Final   05/17/2016 BK Virus DNA Not Detected BKNEG copies/mL Final   04/14/2016 BK Virus DNA Not Detected BKNEG copies/mL Final    03/18/2016 BK Virus DNA Not Detected BKNEG copies/mL Final   02/29/2016 BK Virus DNA Not Detected BKNEG copies/mL Final   02/12/2016 BK Virus DNA Not Detected BKNEG copies/mL Final   02/10/2016 BK Virus DNA Not Detected BKNEG copies/mL Final   01/22/2016 BK Virus DNA Not Detected BKNEG copies/mL Final   01/20/2016 BK Virus DNA Not Detected BKNEG copies/mL Final   10/19/2015 BK Virus DNA Not Detected BKNEG copies/mL Final   03/04/2015 BK Virus DNA Not Detected BKNEG copies/mL Final   02/16/2015 BK Virus DNA Not Detected BKNEG copies/mL Final   01/20/2015 BK Virus DNA Not Detected BKNEG copies/mL Final   01/09/2014 <1000 <1000 copies/mL Final   11/06/2013 <1000 <1000 copies/mL Final

## 2018-11-16 NOTE — PLAN OF CARE
"Problem: Patient Care Overview  Goal: Individualization & Mutuality  Outcome: Improving  /87 (BP Location: Right arm)  Pulse 95  Temp 98  F (36.7  C) (Oral)  Resp 16  Ht 1.702 m (5' 7\")  Wt 85.6 kg (188 lb 11.2 oz)  SpO2 95%  BMI 29.55 kg/m2  Tachycardia in the upper 90's, lower 100's, with regular apical pulse and denies chest pain.   AOVSS on room air.  Denies nausea. NG removed in the am without incident.   Blood glucose monitored. No correction needed per transplant.   Pain controlled with Oxycodone 15 ml in am and 10 mg in pm.   He is up independently. Took a shower with SBA. Passed on need for internal jugular dressing change with bedside report.   Ate a clear liquid diet today without nausea.   Adequate UOP. He may not be saving with two episodes of loose bowel movement.  Thymo will be given this afternoon. Look for Lipase level in abd fluid that can be compared to an earlier level.   Plan is to discontinue Heparin tomorrow as he progresses to discharge.       "

## 2018-11-16 NOTE — PHARMACY-TRANSPLANT NOTE
I had the pleasure of meeting with Amadou Lewis in hospital today for medication teaching. Also reviewed discharge process and getting refills.     Reviewed all transplant and nontransplant medications including names, uses, doses, schedule, duration and primary AE's. Patient was able to repeat names of anti-rejection medications by end of session.     Additional teaching points:   -Avoid Grapefruit and Grapefruit juice.   -Avoid Herbal products unless approved by Transplant team.     Patient will see [facility] pharmacist for JF and at 3-4 month check up.   Lauryn review to use one phone number listed on med box or magnet if using specialty.   Gave 7 day medication box and thermometer. Patient has home BP machine. Patient did choose to continue to receive refills from  specialty pharmacy for immunosuppression. Will continue to use [facility]'s pharmacy for all other meds.     Concerns addressed today:  1. Patient reports having loose stools here, will only use laxatives as needed at home. Using hydrocodone PTA for chronic pain.  2. Patient requested pill splitter, will have discharge pharmacy supply one.    Verified contact information. Patient had no further questions.     Raleigh You, PharmD  Pharmacy Resident

## 2018-11-17 LAB
AMYLASE SERPL-CCNC: 166 U/L (ref 30–110)
ANION GAP SERPL CALCULATED.3IONS-SCNC: 8 MMOL/L (ref 3–14)
BASOPHILS # BLD AUTO: 0 10E9/L (ref 0–0.2)
BASOPHILS NFR BLD AUTO: 0 %
BUN SERPL-MCNC: 22 MG/DL (ref 7–30)
CALCIUM SERPL-MCNC: 8.3 MG/DL (ref 8.5–10.1)
CHLORIDE SERPL-SCNC: 108 MMOL/L (ref 94–109)
CO2 SERPL-SCNC: 23 MMOL/L (ref 20–32)
CREAT SERPL-MCNC: 1.17 MG/DL (ref 0.66–1.25)
DIFFERENTIAL METHOD BLD: ABNORMAL
EOSINOPHIL # BLD AUTO: 0 10E9/L (ref 0–0.7)
EOSINOPHIL NFR BLD AUTO: 0 %
ERYTHROCYTE [DISTWIDTH] IN BLOOD BY AUTOMATED COUNT: 13.7 % (ref 10–15)
GFR SERPL CREATININE-BSD FRML MDRD: 65 ML/MIN/1.7M2
GLUCOSE BLDC GLUCOMTR-MCNC: 100 MG/DL (ref 70–99)
GLUCOSE BLDC GLUCOMTR-MCNC: 103 MG/DL (ref 70–99)
GLUCOSE BLDC GLUCOMTR-MCNC: 113 MG/DL (ref 70–99)
GLUCOSE BLDC GLUCOMTR-MCNC: 78 MG/DL (ref 70–99)
GLUCOSE BLDC GLUCOMTR-MCNC: 97 MG/DL (ref 70–99)
GLUCOSE SERPL-MCNC: 88 MG/DL (ref 70–99)
HCT VFR BLD AUTO: 27.3 % (ref 40–53)
HGB BLD-MCNC: 8.4 G/DL (ref 13.3–17.7)
IMM GRANULOCYTES # BLD: 0 10E9/L (ref 0–0.4)
IMM GRANULOCYTES NFR BLD: 0 %
LIPASE SERPL-CCNC: 468 U/L (ref 73–393)
LMWH PPP CHRO-ACNC: <0.1 IU/ML
LYMPHOCYTES # BLD AUTO: 0.1 10E9/L (ref 0.8–5.3)
LYMPHOCYTES NFR BLD AUTO: 4.8 %
MAGNESIUM SERPL-MCNC: 1.4 MG/DL (ref 1.6–2.3)
MCH RBC QN AUTO: 28.2 PG (ref 26.5–33)
MCHC RBC AUTO-ENTMCNC: 30.8 G/DL (ref 31.5–36.5)
MCV RBC AUTO: 92 FL (ref 78–100)
MONOCYTES # BLD AUTO: 0.1 10E9/L (ref 0–1.3)
MONOCYTES NFR BLD AUTO: 6.7 %
NEUTROPHILS # BLD AUTO: 1.8 10E9/L (ref 1.6–8.3)
NEUTROPHILS NFR BLD AUTO: 88.5 %
NRBC # BLD AUTO: 0 10*3/UL
NRBC BLD AUTO-RTO: 0 /100
PHOSPHATE SERPL-MCNC: 2.4 MG/DL (ref 2.5–4.5)
PLATELET # BLD AUTO: 125 10E9/L (ref 150–450)
POTASSIUM SERPL-SCNC: 4.1 MMOL/L (ref 3.4–5.3)
RBC # BLD AUTO: 2.98 10E12/L (ref 4.4–5.9)
SODIUM SERPL-SCNC: 138 MMOL/L (ref 133–144)
TACROLIMUS BLD-MCNC: 7.6 UG/L (ref 5–15)
TME LAST DOSE: NORMAL H
WBC # BLD AUTO: 2.1 10E9/L (ref 4–11)

## 2018-11-17 PROCEDURE — 25000128 H RX IP 250 OP 636: Performed by: SURGERY

## 2018-11-17 PROCEDURE — 25000132 ZZH RX MED GY IP 250 OP 250 PS 637: Performed by: PHYSICIAN ASSISTANT

## 2018-11-17 PROCEDURE — 25000128 H RX IP 250 OP 636: Performed by: STUDENT IN AN ORGANIZED HEALTH CARE EDUCATION/TRAINING PROGRAM

## 2018-11-17 PROCEDURE — 25000132 ZZH RX MED GY IP 250 OP 250 PS 637: Performed by: NURSE PRACTITIONER

## 2018-11-17 PROCEDURE — 83735 ASSAY OF MAGNESIUM: CPT | Performed by: SURGERY

## 2018-11-17 PROCEDURE — 12000026 ZZH R&B TRANSPLANT

## 2018-11-17 PROCEDURE — 25000128 H RX IP 250 OP 636: Performed by: PHYSICIAN ASSISTANT

## 2018-11-17 PROCEDURE — 25000132 ZZH RX MED GY IP 250 OP 250 PS 637: Performed by: STUDENT IN AN ORGANIZED HEALTH CARE EDUCATION/TRAINING PROGRAM

## 2018-11-17 PROCEDURE — 80048 BASIC METABOLIC PNL TOTAL CA: CPT | Performed by: SURGERY

## 2018-11-17 PROCEDURE — 85520 HEPARIN ASSAY: CPT | Performed by: SURGERY

## 2018-11-17 PROCEDURE — 85025 COMPLETE CBC W/AUTO DIFF WBC: CPT | Performed by: SURGERY

## 2018-11-17 PROCEDURE — 25000132 ZZH RX MED GY IP 250 OP 250 PS 637: Performed by: SURGERY

## 2018-11-17 PROCEDURE — 83690 ASSAY OF LIPASE: CPT | Performed by: SURGERY

## 2018-11-17 PROCEDURE — 80197 ASSAY OF TACROLIMUS: CPT | Performed by: NURSE PRACTITIONER

## 2018-11-17 PROCEDURE — 00000146 ZZHCL STATISTIC GLUCOSE BY METER IP

## 2018-11-17 PROCEDURE — 36592 COLLECT BLOOD FROM PICC: CPT | Performed by: SURGERY

## 2018-11-17 PROCEDURE — 25000131 ZZH RX MED GY IP 250 OP 636 PS 637: Performed by: PHYSICIAN ASSISTANT

## 2018-11-17 PROCEDURE — 84100 ASSAY OF PHOSPHORUS: CPT | Performed by: SURGERY

## 2018-11-17 PROCEDURE — 82150 ASSAY OF AMYLASE: CPT | Performed by: SURGERY

## 2018-11-17 RX ORDER — METHYLPREDNISOLONE SODIUM SUCCINATE 125 MG/2ML
50 INJECTION, POWDER, LYOPHILIZED, FOR SOLUTION INTRAMUSCULAR; INTRAVENOUS ONCE
Status: COMPLETED | OUTPATIENT
Start: 2018-11-17 | End: 2018-11-17

## 2018-11-17 RX ORDER — CLONIDINE HYDROCHLORIDE 0.1 MG/1
0.1 TABLET ORAL 3 TIMES DAILY PRN
Status: DISCONTINUED | OUTPATIENT
Start: 2018-11-17 | End: 2018-11-20 | Stop reason: HOSPADM

## 2018-11-17 RX ORDER — OXYCODONE HYDROCHLORIDE 5 MG/1
10-15 TABLET ORAL EVERY 4 HOURS PRN
Status: DISCONTINUED | OUTPATIENT
Start: 2018-11-17 | End: 2018-11-20 | Stop reason: DRUGHIGH

## 2018-11-17 RX ORDER — OCTREOTIDE ACETATE 100 UG/ML
100 INJECTION, SOLUTION INTRAVENOUS; SUBCUTANEOUS EVERY 12 HOURS
Status: DISCONTINUED | OUTPATIENT
Start: 2018-11-17 | End: 2018-11-18

## 2018-11-17 RX ORDER — MAGNESIUM OXIDE 400 MG/1
400 TABLET ORAL 2 TIMES DAILY
Status: DISCONTINUED | OUTPATIENT
Start: 2018-11-17 | End: 2018-11-19

## 2018-11-17 RX ORDER — DIPHENHYDRAMINE HCL 25 MG
25-50 CAPSULE ORAL ONCE
Status: COMPLETED | OUTPATIENT
Start: 2018-11-17 | End: 2018-11-17

## 2018-11-17 RX ORDER — DIPHENHYDRAMINE HCL 12.5MG/5ML
25-50 LIQUID (ML) ORAL ONCE
Status: COMPLETED | OUTPATIENT
Start: 2018-11-17 | End: 2018-11-17

## 2018-11-17 RX ORDER — ACETAMINOPHEN 325 MG/1
650 TABLET ORAL ONCE
Status: COMPLETED | OUTPATIENT
Start: 2018-11-17 | End: 2018-11-17

## 2018-11-17 RX ADMIN — NIFEDIPINE 30 MG: 30 TABLET, FILM COATED, EXTENDED RELEASE ORAL at 08:17

## 2018-11-17 RX ADMIN — VITAMIN D, TAB 1000IU (100/BT) 2000 UNITS: 25 TAB at 08:17

## 2018-11-17 RX ADMIN — MAGNESIUM OXIDE TAB 400 MG (241.3 MG ELEMENTAL MG) 400 MG: 400 (241.3 MG) TAB at 12:53

## 2018-11-17 RX ADMIN — OXYCODONE HYDROCHLORIDE 10 MG: 5 TABLET ORAL at 20:56

## 2018-11-17 RX ADMIN — OXYCODONE HYDROCHLORIDE 15 MG: 5 TABLET ORAL at 12:52

## 2018-11-17 RX ADMIN — ACETAMINOPHEN 650 MG: 325 TABLET, FILM COATED ORAL at 14:17

## 2018-11-17 RX ADMIN — LIDOCAINE 2 PATCH: 560 PATCH PERCUTANEOUS; TOPICAL; TRANSDERMAL at 20:19

## 2018-11-17 RX ADMIN — VALGANCICLOVIR 900 MG: 450 TABLET, FILM COATED ORAL at 08:18

## 2018-11-17 RX ADMIN — CALCIUM CARBONATE-VITAMIN D TAB 500 MG-200 UNIT 1 TABLET: 500-200 TAB at 08:17

## 2018-11-17 RX ADMIN — DIBASIC SODIUM PHOSPHATE, MONOBASIC POTASSIUM PHOSPHATE AND MONOBASIC SODIUM PHOSPHATE 500 MG: 852; 155; 130 TABLET ORAL at 08:16

## 2018-11-17 RX ADMIN — ANTI-THYMOCYTE GLOBULIN (RABBIT) 75 MG: 5 INJECTION, POWDER, LYOPHILIZED, FOR SOLUTION INTRAVENOUS at 14:53

## 2018-11-17 RX ADMIN — CALCIUM CARBONATE-VITAMIN D TAB 500 MG-200 UNIT 1 TABLET: 500-200 TAB at 17:18

## 2018-11-17 RX ADMIN — SENNOSIDES 8.6 MG: 8.6 TABLET, FILM COATED ORAL at 20:19

## 2018-11-17 RX ADMIN — DIPHENHYDRAMINE HYDROCHLORIDE 50 MG: 25 CAPSULE ORAL at 14:17

## 2018-11-17 RX ADMIN — NYSTATIN 500000 UNITS: 500000 SUSPENSION ORAL at 12:53

## 2018-11-17 RX ADMIN — NIFEDIPINE 30 MG: 30 TABLET, FILM COATED, EXTENDED RELEASE ORAL at 20:19

## 2018-11-17 RX ADMIN — TACROLIMUS 2.5 MG: 1 CAPSULE ORAL at 17:17

## 2018-11-17 RX ADMIN — MYCOPHENOLIC ACID 720 MG: 360 TABLET, DELAYED RELEASE ORAL at 08:18

## 2018-11-17 RX ADMIN — MAGNESIUM OXIDE TAB 400 MG (241.3 MG ELEMENTAL MG) 400 MG: 400 (241.3 MG) TAB at 20:18

## 2018-11-17 RX ADMIN — Medication 5 MG: at 20:27

## 2018-11-17 RX ADMIN — PANTOPRAZOLE SODIUM 40 MG: 40 TABLET, DELAYED RELEASE ORAL at 08:19

## 2018-11-17 RX ADMIN — CARVEDILOL 6.25 MG: 6.25 TABLET, FILM COATED ORAL at 20:19

## 2018-11-17 RX ADMIN — NYSTATIN 500000 UNITS: 500000 SUSPENSION ORAL at 17:16

## 2018-11-17 RX ADMIN — DIBASIC SODIUM PHOSPHATE, MONOBASIC POTASSIUM PHOSPHATE AND MONOBASIC SODIUM PHOSPHATE 500 MG: 852; 155; 130 TABLET ORAL at 20:19

## 2018-11-17 RX ADMIN — ACETAMINOPHEN 975 MG: 325 TABLET, FILM COATED ORAL at 08:21

## 2018-11-17 RX ADMIN — MICAFUNGIN SODIUM 100 MG: 10 INJECTION, POWDER, LYOPHILIZED, FOR SOLUTION INTRAVENOUS at 08:28

## 2018-11-17 RX ADMIN — OXYCODONE HYDROCHLORIDE 10 MG: 5 TABLET ORAL at 17:16

## 2018-11-17 RX ADMIN — MYCOPHENOLIC ACID 720 MG: 360 TABLET, DELAYED RELEASE ORAL at 17:17

## 2018-11-17 RX ADMIN — METHYLPREDNISOLONE SODIUM SUCCINATE 50 MG: 125 INJECTION, POWDER, LYOPHILIZED, FOR SOLUTION INTRAMUSCULAR; INTRAVENOUS at 14:18

## 2018-11-17 RX ADMIN — CALCITRIOL CAPSULES 0.25 MCG 0.25 MCG: 0.25 CAPSULE ORAL at 08:19

## 2018-11-17 RX ADMIN — OCTREOTIDE ACETATE 100 MCG: 100 INJECTION, SOLUTION INTRAVENOUS; SUBCUTANEOUS at 08:38

## 2018-11-17 RX ADMIN — OXYCODONE HYDROCHLORIDE 15 MG: 5 TABLET ORAL at 08:21

## 2018-11-17 RX ADMIN — TACROLIMUS 2.5 MG: 1 CAPSULE ORAL at 08:16

## 2018-11-17 RX ADMIN — OXYCODONE HYDROCHLORIDE 15 MG: 5 TABLET ORAL at 05:30

## 2018-11-17 RX ADMIN — NYSTATIN 500000 UNITS: 500000 SUSPENSION ORAL at 20:18

## 2018-11-17 RX ADMIN — SULFAMETHOXAZOLE AND TRIMETHOPRIM 1 TABLET: 400; 80 TABLET ORAL at 08:19

## 2018-11-17 RX ADMIN — NYSTATIN 500000 UNITS: 500000 SUSPENSION ORAL at 08:16

## 2018-11-17 RX ADMIN — OCTREOTIDE ACETATE 100 MCG: 100 INJECTION, SOLUTION INTRAVENOUS; SUBCUTANEOUS at 20:56

## 2018-11-17 RX ADMIN — SODIUM CHLORIDE, PRESERVATIVE FREE 30 ML: 5 INJECTION INTRAVENOUS at 05:46

## 2018-11-17 RX ADMIN — ASPIRIN 81 MG: 81 TABLET, COATED ORAL at 08:17

## 2018-11-17 RX ADMIN — CARVEDILOL 6.25 MG: 6.25 TABLET, FILM COATED ORAL at 08:17

## 2018-11-17 ASSESSMENT — ACTIVITIES OF DAILY LIVING (ADL)
ADLS_ACUITY_SCORE: 11

## 2018-11-17 NOTE — PROGRESS NOTES
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 Underwent  donor pancreas transplant with enteric drainage, kidney transplant (with venous reconstruction, two arteries, and stent), appendectomy, and umbilical hernia repair on 18.    Graft function: POD #5  Pancreas: Lipase trending to 288, 336 < 468. 11/14 Drain lipase 19K. Repeat drain lipase tomorrow. octreotide x 5 days. Anticoagulation for graft thrombosis ppx. sliding scale insulin, no insulin given.  Kidney: Cr 1.5, ethan, acceptable UOP. Pt made normal urine volumes PTA.  Immunosuppression management: Induction with thymoglobulin and steroid pulse.  Thymo:  125mg 11/12, 125mg 11/13, 125mg 11/14, 75mg 11/15 (ALC 0.1), 75mg 11/16 (ALC 0.1). Giving 75 mg today, last dose.  Methylpred:  500mg 11/12, 250mg 11/13, 100mg 11/14, 100mg 11/15 due to thymo reaction  MMF: Myfortic 720 mg BID d/t past GI intolerance.  Tacro:  2.5mg PO, goal level 8-10. Increased dose to today.   Cyclo: On PTA, discontinued .  Complexity of management: High. Contributing factors: induction  Hematology: Pancytopenia  Acute blood loss anemia: 300ml EBL in OR. Hgb 8.1 <-9 <- 8.7.  Thrombocytopenia: 2/2 thymo, monitor.  Leukopenia: Secondary to medications, monitor.  Vascular prophylaxis: St rate heparin 200u/h, no increase per staff. ASA 81 mg yesterday, will stop Heparin.   Cardiorespiratory:   Hx HTN: On hydralazine, nifedipine, and PRN clonidine PTA.  BP increasing, increased nifedipine to 30mg BID yesterday. Start carvedilol 6.25 mg BID. Lasix 40 mg IV x 1. Does not tolerate amlodipine due to edema issues. PRN hydralazine discontinued; will increase Carvedilol if BP still high.   Hx CAD: On statin.  Does not tolerate beta blockers.  GI/Nutrition: Removed NGT. Start CLD. Bowel regimen.   Nausea: Controlled with PRN zofran and compazine  Endocrine: Poorly controlled DM prior to transplant. A1c 9.1-11.1% last few years.  Fluid/Electrolytes: Hypervolemia, stop MIV. Lasix IV x 1 today. Replace phos, oral.   : Ramirez removed POD 3.   Neuro:   Acute and chronic pain: Takes Norco (up to 8 tabs daily) at home for pain. Oxycodone 10-15 mg every 4 hours PRN. Scheduled tylenol.   Infectious disease: Afebrile  Prophylaxis: DVT, fall, GI, nystatin, micafungin x7d, zosyn x3d, bactrim, valcyte  Disposition: 7A. Possible discharge in 1-2 days.    Medical Decision Making: Medium  Subsequent visit [id number] (moderate level decision making)    ODALIS/Fellow/Resident Provider: Rodolfo Man, fellow    Faculty: Monique Luevano M.D.  _________________________________________________________________  Transplant History:   11/12/2018 (Kidney / Pancreas), 10/10/2013 (Kidney), Postoperative day: 5     Interval History: History is obtained from the patient  Overnight events: Pain controlled, no nausea, +flatus. No BM yesterday, had BM day prior. No SOB. Ambulating.     ROS:   A 10-point review of systems was negative except as noted above.    Meds:    acetaminophen  650 mg Oral Once     anti-thymocyte globulin  75 mg Intravenous Central line Once     aspirin  81 mg Oral Daily     atorvastatin  5 mg Oral QPM     calcitRIOL  0.25 mcg Oral Daily     calcium carbonate 500 mg-vitamin D 200 units  1 tablet Oral BID w/meals     carvedilol  6.25 mg Oral BID     cholecalciferol  2,000 Units Oral Daily     diphenhydrAMINE  25-50 mg Oral Once    Or     diphenhydrAMINE  25-50 mg Per NG tube Once     insulin aspart  1-6 Units Subcutaneous Q4H     lidocaine  2 patch Transdermal Q24h    And     lidocaine   Transdermal Q24H    And     lidocaine   Transdermal Q8H     magnesium oxide  400 mg Oral QAM     methylPREDNISolone  50 mg Intravenous Once     micafungin   "100 mg Intravenous Q24H     mycophenolic acid  720 mg Oral BID IS     NIFEdipine ER osmotic  30 mg Oral BID     nystatin  500,000 Units Oral 4x Daily     octreotide  100 mcg Subcutaneous Q12H     pantoprazole  40 mg Oral QAM AC     phosphorus tablet 250 mg  500 mg Oral BID     sennosides  8.6 mg Oral BID     sodium chloride (PF)  3 mL Intravenous Q8H     sulfamethoxazole-trimethoprim  1 tablet Oral Daily     tacrolimus  2.5 mg Oral BID IS     valGANciclovir  900 mg Oral Daily       Physical Exam:     Admit Weight: 79.2 kg (174 lb 8 oz)    Current vitals:   BP (!) 149/93 (BP Location: Right arm)  Pulse 72  Temp 98  F (36.7  C) (Oral)  Resp 16  Ht 1.702 m (5' 7\")  Wt 85.6 kg (188 lb 11.2 oz)  SpO2 96%  BMI 29.55 kg/m2    Vital sign ranges:    Temp:  [97.9  F (36.6  C)-98.3  F (36.8  C)] 98  F (36.7  C)  Pulse:  [72] 72  Heart Rate:  [] 72  Resp:  [14-16] 16  BP: (119-153)/(76-94) 149/93  SpO2:  [93 %-100 %] 96 %  Patient Vitals for the past 24 hrs:   BP Temp Temp src Pulse Heart Rate Resp SpO2   11/17/18 0808 (!) 149/93 98  F (36.7  C) Oral 72 72 16 96 %   11/17/18 0347 (!) 153/94 98.3  F (36.8  C) Oral - 89 14 96 %   11/16/18 2333 151/89 - - - - - -   11/16/18 2330 (!) 153/94 98.1  F (36.7  C) Oral - 100 15 100 %   11/16/18 2028 147/89 98.1  F (36.7  C) Oral - 95 16 93 %   11/16/18 1731 140/81 - - - 74 - 100 %   11/16/18 1712 119/83 - - - 88 15 96 %   11/16/18 1609 128/76 97.9  F (36.6  C) Oral - 93 16 95 %     General Appearance: in no apparent distress.   Skin: normal, warm, dry  Heart: RRR  Lungs: BCTA, Unlabored, RA  Abdomen: The abdomen is rounded, mildly tender, non tender over kidney and pancreas grafts, SARAH serosang, incision CDI  : Remove milian  Extremities: edema: absent.  Neurologic: awake, alert and oriented x4. Tremor absent.  CVC    Data:   CMP  Recent Labs  Lab 11/17/18  0547 11/16/18  0935 11/16/18  0618  11/12/18  1530 11/12/18  1412  11/11/18  0955     --  146*  < > 133 135  < > " 131*   POTASSIUM 4.1  --  3.9  < > 3.6 3.7  < > 4.5   CHLORIDE 108  --  114*  < >  --   --   --  96   CO2 23  --  24  < >  --   --   --  25   GLC 88  --  91  < > 92 84  < > 282*   BUN 22  --  30  < >  --   --   --  58*   CR 1.17  --  1.46*  < >  --   --   --  5.46*   GFRESTIMATED 65  --  50*  < >  --   --   --  11*   GFRESTBLACK 78  --  61  < >  --   --   --  13*   JE 8.3*  --  8.4*  < >  --   --   --  9.0   ICAW  --   --   --   --  4.8 4.6  < >  --    MAG 1.4*  --  1.8  < >  --   --   --   --    PHOS 2.4*  --  2.3*  < >  --   --   --   --    AMYLASE 166*  --  204*  < >  --   --   --  71   LIPASE 468*  --  336  < >  --   --   --  264   ALBUMIN  --   --   --   --   --   --   --  4.0   BILITOTAL  --   --   --   --   --   --   --  0.3   ALKPHOS  --   --   --   --   --   --   --  189*   AST  --   --   --   --   --   --   --  9   ALT  --   --   --   --   --   --   --  14   FLIPA  --  2681  --   < >  --   --   --   --    < > = values in this interval not displayed.  CBC  Recent Labs  Lab 11/17/18  0547 11/16/18  0618  11/11/18  0955   HGB 8.4* 8.1*  < > 12.6*   WBC 2.1* 2.0*  < > 4.8   * 118*  < > 269   A1C  --   --   --  9.1*   < > = values in this interval not displayed.  COAGS    Recent Labs  Lab 11/12/18  2102 11/12/18  1631   INR 1.31* 1.41*   PTT 33 34      Urinalysis  Recent Labs   Lab Test  11/16/18   1110  11/11/18   1100   05/24/17   1424   COLOR  Yellow  Light Yellow   < >   --    APPEARANCE  Clear  Clear   < >   --    URINEGLC  Negative  >1000*   < >   --    URINEBILI  Negative  Negative   < >   --    URINEKETONE  Negative  Negative   < >   --    SG  1.019  1.006   < >   --    UBLD  Large*  Trace*   < >   --    URINEPH  7.0  8.0*   < >   --    PROTEIN  30*  30*   < >   --    NITRITE  Negative  Negative   < >   --    LEUKEST  Negative  Negative   < >   --    RBCU  >182*  <1   < >   --    WBCU  5  <1   < >   --    UTPG   --   1.96*   --   1.72*    < > = values in this interval not displayed.      Virology:  CMV DNA Quantitation Specimen   Date Value Ref Range Status   09/28/2016 Plasma  Final     CMV Quantitative   Date Value Ref Range Status   10/20/2014 <100 <100 Copies/mL Final   12/05/2013 <100 <100 Copies/mL Final   10/17/2013 <100 <100 Copies/mL Final     CMV IgG Antibody   Date Value Ref Range Status   10/09/2013 <0.20  Negative for anti-CMV IgG U/mL Final     EBV VCA IgG Antibody   Date Value Ref Range Status   10/09/2013 173.00 U/mL Final     Comment:     Positive, suggests immunologic exposure.     Hepatitis C Antibody   Date Value Ref Range Status   11/11/2018 Nonreactive NR^Nonreactive Final     Comment:     Assay performance characteristics have not been established for newborns,   infants, and children       Hep B Surface Sophia   Date Value Ref Range Status   10/09/2013 13.0  Final     Comment:     Positive, Patient is considered to be immune to infection with hepatitis B   when   the value is greater than or equal to 12.0 mlU/mL.     BK Virus Result   Date Value Ref Range Status   11/01/2018 BK Virus DNA Not Detected BKNEG^BK Virus DNA Not Detected copies/mL Final   05/31/2017 BK Virus DNA Not Detected BKNEG copies/mL Final   05/24/2017 BK Virus DNA Not Detected BKNEG copies/mL Final   11/15/2016 BK Virus DNA Not Detected BKNEG copies/mL Final   09/28/2016 BK Virus DNA Not Detected BKNEG copies/mL Final   09/14/2016 BK Virus DNA Not Detected BKNEG copies/mL Final   06/14/2016 BK Virus DNA Not Detected BKNEG copies/mL Final   05/17/2016 BK Virus DNA Not Detected BKNEG copies/mL Final   04/14/2016 BK Virus DNA Not Detected BKNEG copies/mL Final   03/18/2016 BK Virus DNA Not Detected BKNEG copies/mL Final   02/29/2016 BK Virus DNA Not Detected BKNEG copies/mL Final   02/12/2016 BK Virus DNA Not Detected BKNEG copies/mL Final   02/10/2016 BK Virus DNA Not Detected BKNEG copies/mL Final   01/22/2016 BK Virus DNA Not Detected BKNEG copies/mL Final   01/20/2016 BK Virus DNA Not Detected BKNEG  copies/mL Final   10/19/2015 BK Virus DNA Not Detected BKNEG copies/mL Final   03/04/2015 BK Virus DNA Not Detected BKNEG copies/mL Final   02/16/2015 BK Virus DNA Not Detected BKNEG copies/mL Final   01/20/2015 BK Virus DNA Not Detected BKNEG copies/mL Final   01/09/2014 <1000 <1000 copies/mL Final   11/06/2013 <1000 <1000 copies/mL Final

## 2018-11-17 NOTE — PLAN OF CARE
"Problem: Patient Care Overview  Goal: Individualization & Mutuality  Outcome: Improving  /65 (BP Location: Right arm)  Pulse 88  Temp 97.8  F (36.6  C) (Oral)  Resp 17  Ht 1.702 m (5' 7\")  Wt 83.5 kg (184 lb 1.6 oz)  SpO2 96%  BMI 28.83 kg/m2  VSS on RA. Pt is alert and orient x 4. Denies nausea and is tolerating a regular diet. Pain controlled with Oxycodone 15 mg X 2. Pt states he would like to decrease the dosage this evening to 10 mg. Blood glucose monitored; 78 & 103 pre-meal.   Magnesium trending down 1.4 from 1.8. Oral Magnesium order changed to BID per JOHNSON Killian.   Heparin drip discontinued. Thymo 75 mg started at 1450 for 4 hours. Continue to monitor VS.   Internal jugular dressing changed.  Pt is up independently to the bathroom. Good UOP; pt is now saving. Two formed/ loose combination stools this shift. Writer educated pt on reporting how large the formed parts are per monitoring for obstruction. Of note, pt was changed to Myfortic per loose stools yesterday. Continue to monitor.   Plan is for possible discharge tomorrow.         "

## 2018-11-17 NOTE — PLAN OF CARE
Problem: Patient Care Overview  Goal: Individualization & Mutuality  Outcome: No Change  [id number]: AVSS on RA. Blood sugars were 131 and 100 overnight. Patient complains of abdominal pain and requested Oxycodone X2 overnight. Abdomen is distended but patient states that he is passing flatus and had a BM yesterday; voiding fair amounts. Incision is stapled. SARAH has small serosanguinous output. Independent in room. No other complaints and resting between cares. Continue with current plan of care and notify team of any concerns.

## 2018-11-17 NOTE — PROGRESS NOTES
[facility], [place]   Transplant Nephrology Progress Note  Date of Admission:  2018    Assessment & Plan    # SPK: baseline Cr ~ yet to be determined; down to 1.1 mg / dl today.     # Pancreas Tx (SPK):Enteric drained     - Blood glucose: Euglycemia     - Pancreatic enzymes: slight increase in lipase to 468.     # Immunosuppression: Tacrolimus immediate release (goal  8-10) and Mycophenolate mofetil (goal  1-3.5)   - Changes: No     7.6 ng / ml today on 2.5 mg po bid.     # Hypertension/Volume Status: Borderline control; Goal BP: < 140/90   - Changes: increase coreg to 12.5 mg po bid.       # Anemia in chronic renal disease: Hgb: Stable    # Mineral Bone Disorder:    - Secondary renal hyperparathyroidism; PTH level was 1217 prior to transplant.     - Calcium; level is: Low  - Phosphorus; level is: Low     On calcium, phos supplements currently with calcitriol. Monitor closely on discharge as high likelihood of HYPERcalcemia as OP.    #PCP prophylaxis: bactrim single strength tab daily    #CMV prophylaxis: valcyte 900 mg po daily x 3 months for R+    # Transplant History:  Etiology of kidney failure: polycystic kidney disease (PKD)  Tx: SPK  Transplant: 2018 (Kidney / Pancreas), 10/10/2013 (Kidney)  Donor Type:  - Brain Death Donor Class:    Recommendations were communicated to the primary team verbally.    Masoud Tate MD    Interval History   No cp, sob, no n/v/d, no f/s/c. Tmax: 100. I/O: 2467/890.    Review of Systems   4 point ROS was obtained and negative except as noted in the interval history    Physical Exam   Temp  Av.1  F (36.7  C)  Min: 92  F (33.3  C)  Max: 99.2  F (37.3  C)  Arterial Line MAP (mmHg)  Av.8 mmHg  Min: 93 mmHg  Max: 121 mmHg  Arterial Line BP  Min: 132/64  Max: 178/88      Pulse  Av.1  Min: 50  Max: 110 Resp  Avg: 15.5  Min: 9  Max: 20  SpO2  Av.4 %  Min: 90 %  Max: 100 %    CVP (mmHg): 6 mmHgBP 120/73 (BP Location:  "Right arm)  Pulse 84  Temp 98.3  F (36.8  C) (Oral)  Resp 16  Ht 1.702 m (5' 7\")  Wt 83.5 kg (184 lb 1.6 oz)  SpO2 96%  BMI 28.83 kg/m2   Date 11/17/18 0700 - 11/18/18 0659   Shift 1821-9130 2424-2550 7817-3228 24 Hour Total   I  N  T  A  K  E   P.O. 600   600    Shift Total  (mL/kg) 600  (7.19)   600  (7.19)   O  U  T  P  U  T   Urine 425   425    Drains 25   25    Stool 0   0    Shift Total  (mL/kg) 450  (5.39)   450  (5.39)   Weight (kg) 83.51 83.51 83.51 83.51     Admit Weight: 79.2 kg (174 lb 8 oz)   GENERAL APPEARANCE: alert and no distress  HENT: mouth without ulcers or lesions  LYMPHATICS: no cervical or supraclavicular nodes  RESP: lungs clear to auscultation - no rales, rhonchi or wheezes  CV: regular rhythm, normal rate, no rub, no murmur  EDEMA: no LE edema bilaterally  ABDOMEN: soft, nondistended, nontender, bowel sounds normal  MS: extremities normal - no gross deformities noted, no evidence of inflammation in joints, no muscle tenderness  SKIN: no rash    Data   All labs reviewed by me.  CMP  Recent Labs  Lab 11/17/18  0547 11/16/18  0618 11/15/18  0547 11/14/18  0646  11/11/18  0955    146* 145* 144  < > 131*   POTASSIUM 4.1 3.9 4.4 4.6  < > 4.5   CHLORIDE 108 114* 116* 116*  < > 96   CO2 23 24 21 23  < > 25   ANIONGAP 8 8 8 5  < > 11   GLC 88 91 96 97  < > 282*   BUN 22 30 32* 38*  < > 58*   CR 1.17 1.46* 1.52* 1.84*  < > 5.46*   GFRESTIMATED 65 50* 48* 38*  < > 11*   GFRESTBLACK 78 61 58* 46*  < > 13*   JE 8.3* 8.4* 8.7 8.6  < > 9.0   MAG 1.4* 1.8 2.0 2.0  < >  --    PHOS 2.4* 2.3* 2.2* 2.7  < >  --    PROTTOTAL  --   --   --   --   --  7.6   ALBUMIN  --   --   --   --   --  4.0   BILITOTAL  --   --   --   --   --  0.3   ALKPHOS  --   --   --   --   --  189*   AST  --   --   --   --   --  9   ALT  --   --   --   --   --  14   < > = values in this interval not displayed.  CBC  Recent Labs  Lab 11/17/18  0547 11/16/18  0618 11/15/18  0547 11/14/18  0646   HGB 8.4* 8.1* 9.0* 8.7*   WBC " 2.1* 2.0* 2.9* 7.3   RBC 2.98* 2.84* 3.14* 3.03*   HCT 27.3* 26.4* 29.0* 27.9*   MCV 92 93 92 92   MCH 28.2 28.5 28.7 28.7   MCHC 30.8* 30.7* 31.0* 31.2*   RDW 13.7 13.8 14.1 14.1   * 118* 101* 111*     INR  Recent Labs  Lab 11/12/18  2102 11/12/18  1631 11/11/18  0955   INR 1.31* 1.41* 1.04   PTT 33 34 33     ABG  Recent Labs  Lab 11/12/18  1530 11/12/18  1412 11/12/18  1102 11/12/18  0920   PH 7.35 7.30* 7.31* 7.29*   PCO2 33* 36 33* 38   PO2 136* 126* 82 185*   HCO3 18* 18* 17* 18*   O2PER 60 60.0 44.0 49.0      Urine Studies  Recent Labs   Lab Test  11/16/18   1110  11/11/18   1100  03/06/18   1410  05/31/17   0723   COLOR  Yellow  Light Yellow  Yellow  Straw   APPEARANCE  Clear  Clear  Clear  Clear   URINEGLC  Negative  >1000*  >=1000*  >499*   URINEBILI  Negative  Negative  Negative  Negative   URINEKETONE  Negative  Negative  Negative  Negative   SG  1.019  1.006  1.015  1.009   UBLD  Large*  Trace*  Moderate*  Small*   URINEPH  7.0  8.0*  6.0  6.0   PROTEIN  30*  30*  100*  100*   UROBILINOGEN   --    --   0.2   --    NITRITE  Negative  Negative  Negative  Negative   LEUKEST  Negative  Negative  Negative  Negative   RBCU  >182*  <1  2-5*  10*   WBCU  5  <1  0 - 5  1     Recent Labs   Lab Test  11/11/18   1100  05/24/17   1424  02/01/17   1413  12/29/16   1420  11/29/16   1432  11/15/16   1401  09/28/16   0700  02/12/16   0719  02/10/16   0822  10/19/15   1521  02/03/14   1627   UTPG  1.96*  1.72*  1.06*  0.65*  0.67*  1.02*  0.75*  Lost  RUBA NGUYỄN IN UINPR @1425 2/13/16 BY HN    0.31*  0.13  0.07     PTH  Recent Labs   Lab Test  11/13/18   0834  03/06/18   1354  08/03/17   1440  02/01/17   1412  11/18/16   1448  11/15/16   1402  10/14/13   0718   PTHI  1217*  1238*  790*  719*  985*  816*  466*     Iron Studies  Recent Labs   Lab Test  09/11/18   1402  08/03/17   1440  11/18/16   1448  11/15/16   1402  03/31/16   1316  10/14/13   0718   IRON  85  79  82  94  45  51   FEB  232*  230*  241  240  217*    --    IRONSAT  37  34  34  39  21   --    TIMOTHY  761*  736*   --   655*  837*  578*     MEDICATIONS:  Current Facility-Administered Medications   Medication     acetaminophen (TYLENOL) tablet 975 mg     anti-thymocyte globulin (THYMOGLOBULIN - Rabbit) 75 mg in sodium chloride 0.9 % intermittent infusion     artificial saliva (BIOTENE DRY MOUTHWASH) liquid 5-10 mL     aspirin EC tablet 81 mg     atorvastatin (LIPITOR) half-tab 5 mg     bisacodyl (DULCOLAX) Suppository 10 mg     calcitRIOL (ROCALTROL) capsule 0.25 mcg     calcium carbonate 500 mg-vitamin D 200 units (OSCAL with D;OYSTER SHELL CALCIUM) per tablet 1 tablet     carvedilol (COREG) tablet 6.25 mg     cholecalciferol (vitamin D3) tablet 2,000 Units     cloNIDine (CATAPRES) tablet 0.1 mg     dextrose 10 % 1,000 mL infusion     glucose gel 15-30 g    Or     dextrose 50 % injection 25-50 mL    Or     glucagon injection 1 mg     diphenhydrAMINE (BENADRYL) capsule 25 mg     insulin aspart (NovoLOG) inj (RAPID ACTING)     Lidocaine (LIDOCARE) 4 % Patch 2 patch    And     lidocaine patch REMOVAL    And     lidocaine patch in PLACE     magnesium oxide (MAG-OX) tablet 400 mg     micafungin (MYCAMINE) 100 mg in sodium chloride 0.9 % 100 mL intermittent infusion     mycophenolic acid (MYFORTIC BRAND) EC tablet 720 mg     naloxone (NARCAN) injection 0.1-0.4 mg     NIFEdipine ER osmotic (PROCARDIA XL) 24 hr tablet 30 mg     nystatin (MYCOSTATIN) suspension 500,000 Units     octreotide (sandoSTATIN) injection 100 mcg     ondansetron (ZOFRAN-ODT) ODT tab 4 mg    Or     ondansetron (ZOFRAN) injection 4 mg     oxyCODONE IR (ROXICODONE) tablet 10-15 mg     pantoprazole (PROTONIX) EC tablet 40 mg     phosphorus tablet 250 mg (PHOSPHA 250 NEUTRAL) per tablet 500 mg     prochlorperazine (COMPAZINE) injection 5 mg     sennosides (SENOKOT) tablet 8.6 mg     sodium chloride (PF) 0.9% PF flush 3 mL     sodium chloride (PF) 0.9% PF flush 3 mL     sodium chloride (PF) 0.9% PF flush 3  mL     sodium chloride 0.9% infusion     sulfamethoxazole-trimethoprim (BACTRIM/SEPTRA) 400-80 MG per tablet 1 tablet     tacrolimus (GENERIC EQUIVALENT) capsule 2.5 mg     valGANciclovir (VALCYTE) tablet 900 mg

## 2018-11-17 NOTE — PROGRESS NOTES
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 He has a PMH of uncontrolled DM HbA1c 10.5, CAD sp ENE 2010, HTN on clonidine , hydralazine and nifedipine, on IS with CsA and MMF prior to kidney Pancreas Tx on 11/12/18 . Transplant nephrology consulted fo co management for his transplant and IS.     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 and ACR/AbMR and failed Transplant now on HD since 9/2018  Follows [facility] with dr Bull, EDW 78.5Kg , last HD 11/9  Post transplant he has been doing well , creatinine improving, 1.4-1.5  UO maintained   -- discontinue IVF  -- once time dose of lasix 40mg , start coreg 6.25mg BiD and continue on Nifedipine 30mg Bid     SP Pancreas Tx 11/12/18  Prior HBA1c 9-11  Lipase and amylase trending down post op, today mild increase , follow on labs in am  -- NGT discontinued 11/16  -- On octreotide , and micafungin, zosyn completed     Induction: Thymo + solumedrol   Immunosuppression: MMF 1000mg (was on MPA due to intolerance to MMF)+ Tacrolimus 2.5mg (detectable levels)  CsA discontinued 11/13     CMV+/+  EBV+/+  Hx of BK Viremia  BK PCR -ve 11/1/18  HepC + donor     Px:  PCP PX: Bactrim  CMV Px: Valcyte  Clotrimazole lozanges   On Zosyn and micafungin     Volume status  euvolemic  HTN   Increased now  -- lasix 40mg once   -- Resume nifedipine 30mg Bid , Added Coreg 6.25 mg Bid , monitor as he reports prior hx of intolerance , hydralazine PRN     Electrolytes  Stable  Mild hypomagnesemia  Replete per protocol , keep ~2     Anemia   Hb stable ~9.5 post op     BMD  Hx of sec hyperparathyroidism PTH 1217  Calcium wnl and phos low  -- continue on calcitriol  "0.25mcg and vit D 2000U as PTA for now  -- replete phos     Hx of CAD SP ENE 2010  Stable and asymptomatic     Recommendations were communicated to primary team     Seen and discussed with Dr. Paul Moon MD   [phone]    Interval History :   Nursing and provider notes from last 24 hours reviewed.  In the last 24 hours Amadou Lewis has been stable , he has had BM , NGT removed today      Review of Systems:   I reviewed the following systems:  GI: + appetite. mild nausea No vomiting or diarrhea.   Neuro:  - confusion  Constitutional:  - fever or chills  CV: - dyspnea or edema.  - chest pain.    Physical Exam:   I/O last 3 completed shifts:  In: 2510 [P.O.:1270; I.V.:980; NG/GT:260]  Out: 2420 [Urine:1670; Emesis/NG output:660; Drains:90]   /81  Pulse 95  Temp 97.9  F (36.6  C) (Oral)  Resp 15  Ht 1.702 m (5' 7\")  Wt 85.6 kg (188 lb 11.2 oz)  SpO2 100%  BMI 29.55 kg/m2     GENERAL APPEARANCE: NAD  EYES:  - scleral icterus, pupils equal  HENT: mouth without ulcers or lesions  PULM: lungs clear to auscultation bilaterally, equal air movement, no clubbing  CV: regular rhythm, normal rate, no rub     -JVD -     -edema +   GI: soft, non tender, mild distended, bowel sounds are +  INTEGUMENT: no cyanosis, - rash  NEURO:  - asterixis   Access CVC tunneled     Labs:   All labs reviewed by me  Electrolytes/Renal -   Recent Labs   Lab Test  11/16/18   0618  11/15/18   0547  11/14/18   0646   NA  146*  145*  144   POTASSIUM  3.9  4.4  4.6   CHLORIDE  114*  116*  116*   CO2  24  21  23   BUN  30  32*  38*   CR  1.46*  1.52*  1.84*   GLC  91  96  97   JE  8.4*  8.7  8.6   MAG  1.8  2.0  2.0   PHOS  2.3*  2.2*  2.7       CBC -   Recent Labs   Lab Test  11/16/18   0618  11/15/18   0547  11/14/18   0646   WBC  2.0*  2.9*  7.3   HGB  8.1*  9.0*  8.7*   PLT  118*  101*  111*       LFTs -   Recent Labs   Lab Test  11/11/18   0955  06/27/18   1357  08/03/17   1440  05/31/17   0716   01/20/16   1154   ALKPHOS  " 189*   --    --   150   --   101   BILITOTAL  0.3   --    --   0.5   --   0.5   ALT  14   --    --   17   --   23   AST  9   --    --   8   --   Unsatisfactory specimen - hemolyzed   PROTTOTAL  7.6   --    --   7.3   --   8.0   ALBUMIN  4.0  4.2  4.2  4.1   < >  3.4    < > = values in this interval not displayed.       Iron Panel -   Recent Labs   Lab Test  09/11/18   1402  08/03/17   1440  11/18/16   1448   IRON  85  79  82   IRONSAT  37  34  34   TIMOTHY  761*  736*   --          Imaging:  All imaging studies reviewed by me.     Current Medications:    aspirin  81 mg Oral Daily     atorvastatin  5 mg Oral QPM     [START ON 11/17/2018] calcitRIOL  0.25 mcg Oral Daily     calcium carbonate 500 mg-vitamin D 200 units  1 tablet Oral BID w/meals     carvedilol  6.25 mg Oral BID     [START ON 11/17/2018] cholecalciferol  2,000 Units Oral Daily     insulin aspart  1-6 Units Subcutaneous Q4H     lidocaine  2 patch Transdermal Q24h    And     lidocaine   Transdermal Q24H    And     lidocaine   Transdermal Q8H     magnesium oxide  400 mg Oral QAM     micafungin  100 mg Intravenous Q24H     mycophenolic acid  720 mg Oral BID IS     NIFEdipine ER osmotic  30 mg Oral BID     nystatin  500,000 Units Oral 4x Daily     octreotide  100 mcg Subcutaneous Q12H     [START ON 11/17/2018] pantoprazole  40 mg Oral QAM AC     phosphorus tablet 250 mg  500 mg Oral BID     sennosides  8.6 mg Oral BID     sodium chloride (PF)  3 mL Intravenous Q8H     [START ON 11/17/2018] sulfamethoxazole-trimethoprim  1 tablet Oral Daily     tacrolimus  2.5 mg Oral BID IS     [START ON 11/17/2018] valGANciclovir  900 mg Oral Daily       IV fluid REPLACEMENT ONLY       heparin 200 Units/hr (11/16/18 1600)     sodium chloride 10 mL/hr at 11/15/18 0745     Ashleigh Moon MD     I have seen and examined this patient with the resident.  This note reflects our joint assessment and plan.     Hermila Miller MD

## 2018-11-17 NOTE — PLAN OF CARE
Problem: Patient Care Overview  Goal: Plan of Care/Patient Progress Review  Outcome: No Change  Patient is Hypertensive, otherwise VSS on RA. Patient reports sharp abd pain, PRN oxy 15mg administered 2x this shift. SARAH dressing change completed this shift, serioussangoius leaking noted. R internal jugular infusing heparin 200 units/hr and NS. Patient is on a clear liquid diet, tolerating fair, patient reports abd discomfort r/t gas. Fair amount of urine output, patient was encouraged to drink fluids. Patient is up at ro[facility]io. Lisa Bolton RN on 11/16/2018 at 10:10 PM

## 2018-11-18 LAB
AMYLASE SERPL-CCNC: 197 U/L (ref 30–110)
ANION GAP SERPL CALCULATED.3IONS-SCNC: 8 MMOL/L (ref 3–14)
BASOPHILS # BLD AUTO: 0 10E9/L (ref 0–0.2)
BASOPHILS NFR BLD AUTO: 0 %
BUN SERPL-MCNC: 22 MG/DL (ref 7–30)
CALCIUM SERPL-MCNC: 8.2 MG/DL (ref 8.5–10.1)
CHLORIDE SERPL-SCNC: 107 MMOL/L (ref 94–109)
CO2 SERPL-SCNC: 22 MMOL/L (ref 20–32)
CREAT SERPL-MCNC: 1.09 MG/DL (ref 0.66–1.25)
DIFFERENTIAL METHOD BLD: ABNORMAL
EOSINOPHIL # BLD AUTO: 0 10E9/L (ref 0–0.7)
EOSINOPHIL NFR BLD AUTO: 0.4 %
ERYTHROCYTE [DISTWIDTH] IN BLOOD BY AUTOMATED COUNT: 13.7 % (ref 10–15)
GFR SERPL CREATININE-BSD FRML MDRD: 70 ML/MIN/1.7M2
GLUCOSE BLDC GLUCOMTR-MCNC: 102 MG/DL (ref 70–99)
GLUCOSE BLDC GLUCOMTR-MCNC: 103 MG/DL (ref 70–99)
GLUCOSE BLDC GLUCOMTR-MCNC: 139 MG/DL (ref 70–99)
GLUCOSE BLDC GLUCOMTR-MCNC: 145 MG/DL (ref 70–99)
GLUCOSE BLDC GLUCOMTR-MCNC: 91 MG/DL (ref 70–99)
GLUCOSE BLDC GLUCOMTR-MCNC: 98 MG/DL (ref 70–99)
GLUCOSE BLDC GLUCOMTR-MCNC: 98 MG/DL (ref 70–99)
GLUCOSE SERPL-MCNC: 77 MG/DL (ref 70–99)
HCT VFR BLD AUTO: 27.5 % (ref 40–53)
HGB BLD-MCNC: 8.7 G/DL (ref 13.3–17.7)
IMM GRANULOCYTES # BLD: 0 10E9/L (ref 0–0.4)
IMM GRANULOCYTES NFR BLD: 0.7 %
LIPASE FLD-CCNC: 4818 U/L
LIPASE SERPL-CCNC: 735 U/L (ref 73–393)
LYMPHOCYTES # BLD AUTO: 0.1 10E9/L (ref 0.8–5.3)
LYMPHOCYTES NFR BLD AUTO: 3.6 %
MAGNESIUM SERPL-MCNC: 1.4 MG/DL (ref 1.6–2.3)
MCH RBC QN AUTO: 28.6 PG (ref 26.5–33)
MCHC RBC AUTO-ENTMCNC: 31.6 G/DL (ref 31.5–36.5)
MCV RBC AUTO: 91 FL (ref 78–100)
MONOCYTES # BLD AUTO: 0.2 10E9/L (ref 0–1.3)
MONOCYTES NFR BLD AUTO: 6.2 %
NEUTROPHILS # BLD AUTO: 2.5 10E9/L (ref 1.6–8.3)
NEUTROPHILS NFR BLD AUTO: 89.1 %
NRBC # BLD AUTO: 0 10*3/UL
NRBC BLD AUTO-RTO: 0 /100
PHOSPHATE SERPL-MCNC: 2.4 MG/DL (ref 2.5–4.5)
PLATELET # BLD AUTO: 132 10E9/L (ref 150–450)
POTASSIUM SERPL-SCNC: 4 MMOL/L (ref 3.4–5.3)
RBC # BLD AUTO: 3.04 10E12/L (ref 4.4–5.9)
SODIUM SERPL-SCNC: 137 MMOL/L (ref 133–144)
SPECIMEN SOURCE FLD: NORMAL
TACROLIMUS BLD-MCNC: 6.8 UG/L (ref 5–15)
TME LAST DOSE: NORMAL H
WBC # BLD AUTO: 2.8 10E9/L (ref 4–11)

## 2018-11-18 PROCEDURE — 83735 ASSAY OF MAGNESIUM: CPT | Performed by: SURGERY

## 2018-11-18 PROCEDURE — 00000146 ZZHCL STATISTIC GLUCOSE BY METER IP

## 2018-11-18 PROCEDURE — 25000132 ZZH RX MED GY IP 250 OP 250 PS 637: Performed by: NURSE PRACTITIONER

## 2018-11-18 PROCEDURE — 84100 ASSAY OF PHOSPHORUS: CPT | Performed by: SURGERY

## 2018-11-18 PROCEDURE — 25000125 ZZHC RX 250: Performed by: PHYSICIAN ASSISTANT

## 2018-11-18 PROCEDURE — 80197 ASSAY OF TACROLIMUS: CPT | Performed by: NURSE PRACTITIONER

## 2018-11-18 PROCEDURE — 25000132 ZZH RX MED GY IP 250 OP 250 PS 637: Performed by: STUDENT IN AN ORGANIZED HEALTH CARE EDUCATION/TRAINING PROGRAM

## 2018-11-18 PROCEDURE — 85025 COMPLETE CBC W/AUTO DIFF WBC: CPT | Performed by: SURGERY

## 2018-11-18 PROCEDURE — 25000132 ZZH RX MED GY IP 250 OP 250 PS 637: Performed by: PHYSICIAN ASSISTANT

## 2018-11-18 PROCEDURE — 25000128 H RX IP 250 OP 636: Performed by: STUDENT IN AN ORGANIZED HEALTH CARE EDUCATION/TRAINING PROGRAM

## 2018-11-18 PROCEDURE — 25000131 ZZH RX MED GY IP 250 OP 636 PS 637: Performed by: STUDENT IN AN ORGANIZED HEALTH CARE EDUCATION/TRAINING PROGRAM

## 2018-11-18 PROCEDURE — 80048 BASIC METABOLIC PNL TOTAL CA: CPT | Performed by: SURGERY

## 2018-11-18 PROCEDURE — 25000128 H RX IP 250 OP 636: Performed by: PHYSICIAN ASSISTANT

## 2018-11-18 PROCEDURE — 36592 COLLECT BLOOD FROM PICC: CPT | Performed by: SURGERY

## 2018-11-18 PROCEDURE — 83690 ASSAY OF LIPASE: CPT | Performed by: SURGERY

## 2018-11-18 PROCEDURE — 25000131 ZZH RX MED GY IP 250 OP 636 PS 637: Performed by: NURSE PRACTITIONER

## 2018-11-18 PROCEDURE — 25000131 ZZH RX MED GY IP 250 OP 636 PS 637: Performed by: PHYSICIAN ASSISTANT

## 2018-11-18 PROCEDURE — 12000026 ZZH R&B TRANSPLANT

## 2018-11-18 PROCEDURE — 82150 ASSAY OF AMYLASE: CPT | Performed by: SURGERY

## 2018-11-18 PROCEDURE — 83690 ASSAY OF LIPASE: CPT | Performed by: STUDENT IN AN ORGANIZED HEALTH CARE EDUCATION/TRAINING PROGRAM

## 2018-11-18 PROCEDURE — 25000132 ZZH RX MED GY IP 250 OP 250 PS 637: Performed by: SURGERY

## 2018-11-18 RX ORDER — OCTREOTIDE ACETATE 100 UG/ML
100 INJECTION, SOLUTION INTRAVENOUS; SUBCUTANEOUS 3 TIMES DAILY
Status: DISCONTINUED | OUTPATIENT
Start: 2018-11-18 | End: 2018-11-20 | Stop reason: HOSPADM

## 2018-11-18 RX ORDER — TACROLIMUS 1 MG/1
3 CAPSULE ORAL
Status: DISCONTINUED | OUTPATIENT
Start: 2018-11-18 | End: 2018-11-19

## 2018-11-18 RX ORDER — MAGNESIUM SULFATE HEPTAHYDRATE 40 MG/ML
2 INJECTION, SOLUTION INTRAVENOUS ONCE
Status: COMPLETED | OUTPATIENT
Start: 2018-11-18 | End: 2018-11-18

## 2018-11-18 RX ORDER — LIDOCAINE HYDROCHLORIDE 10 MG/ML
10 INJECTION, SOLUTION EPIDURAL; INFILTRATION; INTRACAUDAL; PERINEURAL ONCE
Status: COMPLETED | OUTPATIENT
Start: 2018-11-18 | End: 2018-11-18

## 2018-11-18 RX ADMIN — MYCOPHENOLIC ACID 720 MG: 360 TABLET, DELAYED RELEASE ORAL at 08:03

## 2018-11-18 RX ADMIN — Medication 5 MG: at 20:53

## 2018-11-18 RX ADMIN — NIFEDIPINE 30 MG: 30 TABLET, FILM COATED, EXTENDED RELEASE ORAL at 08:07

## 2018-11-18 RX ADMIN — VITAMIN D, TAB 1000IU (100/BT) 2000 UNITS: 25 TAB at 08:07

## 2018-11-18 RX ADMIN — LIDOCAINE HYDROCHLORIDE 10 ML: 10 INJECTION, SOLUTION EPIDURAL; INFILTRATION; INTRACAUDAL; PERINEURAL at 10:08

## 2018-11-18 RX ADMIN — NYSTATIN 500000 UNITS: 500000 SUSPENSION ORAL at 12:45

## 2018-11-18 RX ADMIN — OXYCODONE HYDROCHLORIDE 10 MG: 5 TABLET ORAL at 01:01

## 2018-11-18 RX ADMIN — OCTREOTIDE ACETATE 100 MCG: 100 INJECTION, SOLUTION INTRAVENOUS; SUBCUTANEOUS at 15:56

## 2018-11-18 RX ADMIN — ASPIRIN 81 MG: 81 TABLET, COATED ORAL at 08:07

## 2018-11-18 RX ADMIN — CALCITRIOL CAPSULES 0.25 MCG 0.25 MCG: 0.25 CAPSULE ORAL at 08:05

## 2018-11-18 RX ADMIN — SULFAMETHOXAZOLE AND TRIMETHOPRIM 1 TABLET: 400; 80 TABLET ORAL at 08:07

## 2018-11-18 RX ADMIN — NYSTATIN 500000 UNITS: 500000 SUSPENSION ORAL at 08:03

## 2018-11-18 RX ADMIN — CALCIUM CARBONATE-VITAMIN D TAB 500 MG-200 UNIT 1 TABLET: 500-200 TAB at 17:45

## 2018-11-18 RX ADMIN — MAGNESIUM OXIDE TAB 400 MG (241.3 MG ELEMENTAL MG) 400 MG: 400 (241.3 MG) TAB at 20:00

## 2018-11-18 RX ADMIN — CALCIUM CARBONATE-VITAMIN D TAB 500 MG-200 UNIT 1 TABLET: 500-200 TAB at 08:07

## 2018-11-18 RX ADMIN — OCTREOTIDE ACETATE 100 MCG: 100 INJECTION, SOLUTION INTRAVENOUS; SUBCUTANEOUS at 20:56

## 2018-11-18 RX ADMIN — DIBASIC SODIUM PHOSPHATE, MONOBASIC POTASSIUM PHOSPHATE AND MONOBASIC SODIUM PHOSPHATE 500 MG: 852; 155; 130 TABLET ORAL at 20:00

## 2018-11-18 RX ADMIN — MICAFUNGIN SODIUM 100 MG: 10 INJECTION, POWDER, LYOPHILIZED, FOR SOLUTION INTRAVENOUS at 08:29

## 2018-11-18 RX ADMIN — VALGANCICLOVIR 900 MG: 450 TABLET, FILM COATED ORAL at 08:04

## 2018-11-18 RX ADMIN — MAGNESIUM OXIDE TAB 400 MG (241.3 MG ELEMENTAL MG) 400 MG: 400 (241.3 MG) TAB at 12:45

## 2018-11-18 RX ADMIN — OXYCODONE HYDROCHLORIDE 15 MG: 5 TABLET ORAL at 04:55

## 2018-11-18 RX ADMIN — MAGNESIUM SULFATE IN WATER 2 G: 40 INJECTION, SOLUTION INTRAVENOUS at 11:58

## 2018-11-18 RX ADMIN — OXYCODONE HYDROCHLORIDE 15 MG: 5 TABLET ORAL at 20:53

## 2018-11-18 RX ADMIN — TACROLIMUS 2.5 MG: 1 CAPSULE ORAL at 08:04

## 2018-11-18 RX ADMIN — ACETAMINOPHEN 975 MG: 325 TABLET, FILM COATED ORAL at 15:39

## 2018-11-18 RX ADMIN — DIBASIC SODIUM PHOSPHATE, MONOBASIC POTASSIUM PHOSPHATE AND MONOBASIC SODIUM PHOSPHATE 500 MG: 852; 155; 130 TABLET ORAL at 08:03

## 2018-11-18 RX ADMIN — CARVEDILOL 6.25 MG: 6.25 TABLET, FILM COATED ORAL at 20:01

## 2018-11-18 RX ADMIN — NIFEDIPINE 30 MG: 30 TABLET, FILM COATED, EXTENDED RELEASE ORAL at 20:01

## 2018-11-18 RX ADMIN — DIPHENHYDRAMINE HYDROCHLORIDE 25 MG: 25 CAPSULE ORAL at 20:09

## 2018-11-18 RX ADMIN — TACROLIMUS 3 MG: 1 CAPSULE ORAL at 17:48

## 2018-11-18 RX ADMIN — NYSTATIN 500000 UNITS: 500000 SUSPENSION ORAL at 20:01

## 2018-11-18 RX ADMIN — PANTOPRAZOLE SODIUM 40 MG: 40 TABLET, DELAYED RELEASE ORAL at 08:07

## 2018-11-18 RX ADMIN — MYCOPHENOLIC ACID 720 MG: 360 TABLET, DELAYED RELEASE ORAL at 17:45

## 2018-11-18 RX ADMIN — LIDOCAINE 2 PATCH: 560 PATCH PERCUTANEOUS; TOPICAL; TRANSDERMAL at 20:03

## 2018-11-18 RX ADMIN — NYSTATIN 500000 UNITS: 500000 SUSPENSION ORAL at 15:39

## 2018-11-18 RX ADMIN — SENNOSIDES 8.6 MG: 8.6 TABLET, FILM COATED ORAL at 20:01

## 2018-11-18 RX ADMIN — OCTREOTIDE ACETATE 100 MCG: 100 INJECTION, SOLUTION INTRAVENOUS; SUBCUTANEOUS at 08:34

## 2018-11-18 RX ADMIN — ACETAMINOPHEN 975 MG: 325 TABLET, FILM COATED ORAL at 23:37

## 2018-11-18 RX ADMIN — INSULIN ASPART 1 UNITS: 100 INJECTION, SOLUTION INTRAVENOUS; SUBCUTANEOUS at 00:35

## 2018-11-18 RX ADMIN — CARVEDILOL 6.25 MG: 6.25 TABLET, FILM COATED ORAL at 08:05

## 2018-11-18 RX ADMIN — ACETAMINOPHEN 975 MG: 325 TABLET, FILM COATED ORAL at 04:55

## 2018-11-18 RX ADMIN — OXYCODONE HYDROCHLORIDE 15 MG: 5 TABLET ORAL at 09:04

## 2018-11-18 RX ADMIN — OXYCODONE HYDROCHLORIDE 15 MG: 5 TABLET ORAL at 12:45

## 2018-11-18 RX ADMIN — OXYCODONE HYDROCHLORIDE 15 MG: 5 TABLET ORAL at 16:48

## 2018-11-18 ASSESSMENT — ACTIVITIES OF DAILY LIVING (ADL)
ADLS_ACUITY_SCORE: 11

## 2018-11-18 NOTE — PLAN OF CARE
Problem: Patient Care Overview  Goal: Plan of Care/Patient Progress Review  Outcome: Improving  V/S/S on RA. BG Q 4 hours; 97, 113. Mag; 1.4, replacing with oral BID regimen. Pain managed with oxycodone; 10mg Q 4 hours, given x2. Can be given again @ 0100 11/18/18. Denies nausea. Regular diet, good appetite and intake. Triple lumen, saline locked. SARAH, serosanguinous output. Dressing changed, leaky around sight. At least 2 BM's. Adequate urine output. Anticipated to discharge tomorrow ( 11/18). Patient is aware. Continue with plan of care, please notify MD with any changes.

## 2018-11-18 NOTE — PROGRESS NOTES
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 Underwent  donor pancreas transplant with enteric drainage, kidney transplant (with venous reconstruction, two arteries, and stent), appendectomy, and umbilical hernia repair on 18.    Graft function: POD #6  Pancreas: Lipase trending to 288, 336 < 468 < 735 - today. 11 Drain lipase 19K. Repeat drain lipase today. octreotide will keep for now. Anticoagulation for graft thrombosis, Aspirin 81 mg daily. sliding scale insulin, no insulin given.  Kidney: Cr 1.09 today, with acceptable UOP. Pt made normal urine volumes PTA.  Immunosuppression management: Induction with thymoglobulin and steroid pulse.  Thymo:  125mg 11/12, 125mg 11/13, 125mg 11/14, 75mg 11/15 (ALC 0.1), 75mg 11/16 (ALC 0.1), 75 mg, completed yesterday.  Methylpred:  500mg 11/12, 250mg 11/13, 100mg 11/14, 100mg 11/15 due to thymo reaction  MMF: Myfortic 720 mg BID d/t past GI intolerance.  Tacro:  Increased to 3 mg PO, level today is 6.8 goal level 8-10.   Cyclo: On PTA, discontinued .  Complexity of management: High. Contributing factors: induction  Hematology: Pancytopenia  Acute blood loss anemia: 300ml EBL in OR. Hgb 8.1 <-9 <- 8.7.  Thrombocytopenia: 2/2 thymo, monitor.  Leukopenia: Secondary to medications, monitor.  Vascular prophylaxis: St rate heparin 200u/h, no increase per staff. ASA 81 mg yesterday, will stop Heparin.   Cardiorespiratory:   Hx HTN: On hydralazine, nifedipine, and PRN clonidine PTA.  BP increasing, increased nifedipine to 30mg BID yesterday. Start carvedilol 6.25 mg BID. Lasix 40 mg IV x 1. Does not tolerate amlodipine due to edema issues. PRN hydralazine discontinued; will increase  Carvedilol if BP still high.   Hx CAD: On statin. Does not tolerate beta blockers.  GI/Nutrition: Removed NGT. Start CLD. Bowel regimen.   Nausea: Controlled with PRN zofran and compazine  Endocrine: Poorly controlled DM prior to transplant. A1c 9.1-11.1% last few years.  Fluid/Electrolytes: Hypervolemia, stop MIV. Replace phos, oral.   : Ramirez removed POD 3.   Neuro:   Acute and chronic pain: Takes Norco (up to 8 tabs daily) at home for pain. Oxycodone 10-15 mg every 4 hours PRN. Scheduled tylenol.   Infectious disease: Afebrile  Prophylaxis: DVT, fall, GI, nystatin, micafungin x7d, zosyn x3d, bactrim, valcyte  Disposition: 7A. Possible discharge in 1-2 days.    Medical Decision Making: Medium  Subsequent visit 11361 (moderate level decision making)    ODALIS/Fellow/Resident Provider: Rodolfo Man, fellow    Faculty: Monique Luevano M.D.  _________________________________________________________________  Transplant History:   11/12/2018 (Kidney / Pancreas), 10/10/2013 (Kidney), Postoperative day: 6     Interval History: History is obtained from the patient  Overnight events: Had LLQ pain overnight, no nausea, +flatus. No BM. No SOB. Ambulating.     ROS:   A 10-point review of systems was negative except as noted above.    Meds:    aspirin  81 mg Oral Daily     atorvastatin  5 mg Oral QPM     calcitRIOL  0.25 mcg Oral Daily     calcium carbonate 500 mg-vitamin D 200 units  1 tablet Oral BID w/meals     carvedilol  6.25 mg Oral BID     cholecalciferol  2,000 Units Oral Daily     insulin aspart  1-6 Units Subcutaneous Q4H     lidocaine  2 patch Transdermal Q24h    And     lidocaine   Transdermal Q24H    And     lidocaine   Transdermal Q8H     magnesium oxide  400 mg Oral BID     magnesium sulfate  2 g Intravenous Once     mycophenolic acid  720 mg Oral BID IS     NIFEdipine ER osmotic  30 mg Oral BID     nystatin  500,000 Units Oral 4x Daily     octreotide  100 mcg Subcutaneous Q12H     pantoprazole  40 mg Oral QAM AC  "    phosphorus tablet 250 mg  500 mg Oral BID     sennosides  8.6 mg Oral BID     sodium chloride (PF)  3 mL Intravenous Q8H     sulfamethoxazole-trimethoprim  1 tablet Oral Daily     tacrolimus  3 mg Oral BID IS     valGANciclovir  900 mg Oral Daily       Physical Exam:     Admit Weight: 79.2 kg (174 lb 8 oz)    Current vitals:   /75 (BP Location: Right arm)  Pulse 94  Temp 98.1  F (36.7  C) (Oral)  Resp 16  Ht 1.702 m (5' 7\")  Wt 83.2 kg (183 lb 8 oz)  SpO2 96%  BMI 28.74 kg/m2    Vital sign ranges:    Temp:  [97.8  F (36.6  C)-98.7  F (37.1  C)] 98.1  F (36.7  C)  Pulse:  [78-94] 94  Heart Rate:  [78-98] 94  Resp:  [16-17] 16  BP: (114-146)/(65-93) 125/75  SpO2:  [93 %-96 %] 96 %  Patient Vitals for the past 24 hrs:   BP Temp Temp src Pulse Heart Rate Resp SpO2 Weight   11/18/18 0949 - - - - - - - 83.2 kg (183 lb 8 oz)   11/18/18 0810 125/75 98.1  F (36.7  C) Oral 94 94 16 96 % -   11/18/18 0358 (!) 146/93 98  F (36.7  C) Oral - 89 16 96 % -   11/17/18 2359 (!) 140/92 - - - - - - -   11/17/18 2355 (!) 145/91 98.3  F (36.8  C) Oral - 93 17 96 % -   11/17/18 1948 141/88 98.7  F (37.1  C) Oral - 98 17 96 % -   11/17/18 1637 136/88 98.4  F (36.9  C) Oral 78 78 16 93 % -   11/17/18 1455 114/65 97.8  F (36.6  C) Oral 88 88 17 96 % -   11/17/18 1228 120/73 98.3  F (36.8  C) Oral 84 84 16 96 % 83.5 kg (184 lb 1.6 oz)     General Appearance: in no apparent distress.   Skin: normal, warm, dry  Heart: RRR  Lungs: BCTA, Unlabored, RA  Abdomen: The abdomen is rounded, mildly tender, non tender over kidney and pancreas grafts, SARAH serosang, incision CDI  : Remove milian  Extremities: edema: absent.  Neurologic: awake, alert and oriented x4. Tremor absent.  CVC    Data:   CMP  Recent Labs  Lab 11/18/18  0945 11/18/18  0629 11/17/18  0547  11/12/18  1530 11/12/18  1412   NA  --  137 138  < > 133 135   POTASSIUM  --  4.0 4.1  < > 3.6 3.7   CHLORIDE  --  107 108  < >  --   --    CO2  --  22 23  < >  --   --    GLC  -- "  77 88  < > 92 84   BUN  --  22 22  < >  --   --    CR  --  1.09 1.17  < >  --   --    GFRESTIMATED  --  70 65  < >  --   --    GFRESTBLACK  --  85 78  < >  --   --    JE  --  8.2* 8.3*  < >  --   --    ICAW  --   --   --   --  4.8 4.6   MAG  --  1.4* 1.4*  < >  --   --    PHOS  --  2.4* 2.4*  < >  --   --    AMYLASE  --  197* 166*  < >  --   --    LIPASE  --  735* 468*  < >  --   --    FLIPA 4818  --   --   < >  --   --    < > = values in this interval not displayed.  CBC    Recent Labs  Lab 11/18/18  0629 11/17/18  0547   HGB 8.7* 8.4*   WBC 2.8* 2.1*   * 125*     COAGS    Recent Labs  Lab 11/12/18  2102 11/12/18  1631   INR 1.31* 1.41*   PTT 33 34      Urinalysis  Recent Labs   Lab Test  11/16/18   1110  11/11/18   1100   05/24/17   1424   COLOR  Yellow  Light Yellow   < >   --    APPEARANCE  Clear  Clear   < >   --    URINEGLC  Negative  >1000*   < >   --    URINEBILI  Negative  Negative   < >   --    URINEKETONE  Negative  Negative   < >   --    SG  1.019  1.006   < >   --    UBLD  Large*  Trace*   < >   --    URINEPH  7.0  8.0*   < >   --    PROTEIN  30*  30*   < >   --    NITRITE  Negative  Negative   < >   --    LEUKEST  Negative  Negative   < >   --    RBCU  >182*  <1   < >   --    WBCU  5  <1   < >   --    UTPG   --   1.96*   --   1.72*    < > = values in this interval not displayed.     Virology:  CMV DNA Quantitation Specimen   Date Value Ref Range Status   09/28/2016 Plasma  Final     CMV Quantitative   Date Value Ref Range Status   10/20/2014 <100 <100 Copies/mL Final   12/05/2013 <100 <100 Copies/mL Final   10/17/2013 <100 <100 Copies/mL Final     CMV IgG Antibody   Date Value Ref Range Status   10/09/2013 <0.20  Negative for anti-CMV IgG U/mL Final     EBV VCA IgG Antibody   Date Value Ref Range Status   10/09/2013 173.00 U/mL Final     Comment:     Positive, suggests immunologic exposure.     Hepatitis C Antibody   Date Value Ref Range Status   11/11/2018 Nonreactive NR^Nonreactive Final      Comment:     Assay performance characteristics have not been established for newborns,   infants, and children       Hep B Surface Sophia   Date Value Ref Range Status   10/09/2013 13.0  Final     Comment:     Positive, Patient is considered to be immune to infection with hepatitis B   when   the value is greater than or equal to 12.0 mlU/mL.     BK Virus Result   Date Value Ref Range Status   11/01/2018 BK Virus DNA Not Detected BKNEG^BK Virus DNA Not Detected copies/mL Final   05/31/2017 BK Virus DNA Not Detected BKNEG copies/mL Final   05/24/2017 BK Virus DNA Not Detected BKNEG copies/mL Final   11/15/2016 BK Virus DNA Not Detected BKNEG copies/mL Final   09/28/2016 BK Virus DNA Not Detected BKNEG copies/mL Final   09/14/2016 BK Virus DNA Not Detected BKNEG copies/mL Final   06/14/2016 BK Virus DNA Not Detected BKNEG copies/mL Final   05/17/2016 BK Virus DNA Not Detected BKNEG copies/mL Final   04/14/2016 BK Virus DNA Not Detected BKNEG copies/mL Final   03/18/2016 BK Virus DNA Not Detected BKNEG copies/mL Final   02/29/2016 BK Virus DNA Not Detected BKNEG copies/mL Final   02/12/2016 BK Virus DNA Not Detected BKNEG copies/mL Final   02/10/2016 BK Virus DNA Not Detected BKNEG copies/mL Final   01/22/2016 BK Virus DNA Not Detected BKNEG copies/mL Final   01/20/2016 BK Virus DNA Not Detected BKNEG copies/mL Final   10/19/2015 BK Virus DNA Not Detected BKNEG copies/mL Final   03/04/2015 BK Virus DNA Not Detected BKNEG copies/mL Final   02/16/2015 BK Virus DNA Not Detected BKNEG copies/mL Final   01/20/2015 BK Virus DNA Not Detected BKNEG copies/mL Final   01/09/2014 <1000 <1000 copies/mL Final   11/06/2013 <1000 <1000 copies/mL Final

## 2018-11-18 NOTE — PLAN OF CARE
Problem: Patient Care Overview  Goal: Plan of Care/Patient Progress Review  Outcome: No Change  [id number]: A&O x 4; BP max 146/93, oVSS on RA; BGs 145 and 91; c/o abdominal pain, administered Oxy x 2, Tylenol x 1; denies nausea and SOB; Regular diet; TLIJ saline locked; R.SARAH leaking at site, drain output minimal; 2 self-reported voids, not saving; No BM overnight; Up independently; Possible discharge today; Will continue to monitor and update Pancreas service with any significant changes.

## 2018-11-18 NOTE — PROGRESS NOTES
[facility], [place]   Transplant Nephrology Progress Note  Date of Admission:  2018    Assessment & Plan    # SPK: baseline Cr ~ yet to be determined; down to 1.1 mg / dl today.     # Pancreas Tx (SPK):Enteric drained     - Blood glucose: Euglycemia     - Pancreatic enzymes: up again today to 700's. Getting lipase in the drain fluid - elevated. Would recommend pancreas tx ultrasound to see if there is pocket of fluid that is not draining.     # Immunosuppression: Tacrolimus immediate release (goal  8-10) and Mycophenolate mofetil (goal  1-3.5)   - Changes: No     6.8 ng / ml today on 2.5 mg po bid. Increase to 3 mg po bid.     # Hypertension/Volume Status: Controlled; Goal BP: < 140/90   coreg to 12.5 mg po bid.     # Anemia in chronic renal disease: Hgb: Stable 8.7 g / dl.     # Mineral Bone Disorder:    - Secondary renal hyperparathyroidism; PTH level was 1217 prior to transplant.     - Calcium; level is: Low  - Phosphorus; level is: Low     On calcium, phos supplements currently with calcitriol. Monitor closely on discharge as high likelihood of HYPERcalcemia as OP.    #PCP prophylaxis: bactrim single strength tab daily    #CMV prophylaxis: valcyte 900 mg po daily x 3 months for R+    # Transplant History:  Etiology of kidney failure: polycystic kidney disease (PKD)  Tx: SPK  Transplant: 2018 (Kidney / Pancreas), 10/10/2013 (Kidney)  Donor Type:  - Brain Death Donor Class:    Recommendations were communicated to the primary team verbally.    Masoud Tate MD    Interval History   No cp, sob, no n/v/d, no f/s/c. Tmax: 98.7. I/O: 1620/1215. Lipase increasing. Drain leaking.    Review of Systems   4 point ROS was obtained and negative except as noted in the interval history    Physical Exam   Temp  Av.1  F (36.7  C)  Min: 92  F (33.3  C)  Max: 99.2  F (37.3  C)  Arterial Line MAP (mmHg)  Av.8 mmHg  Min: 93 mmHg  Max: 121 mmHg  Arterial Line BP  Min:  "132/64  Max: 178/88      Pulse  Av.1  Min: 50  Max: 110 Resp  Avg: 15.5  Min: 9  Max: 20  SpO2  Av.4 %  Min: 90 %  Max: 100 %    CVP (mmHg): 6 mmHgBP 125/75 (BP Location: Right arm)  Pulse 94  Temp 98.1  F (36.7  C) (Oral)  Resp 16  Ht 1.702 m (5' 7\")  Wt 83.2 kg (183 lb 8 oz)  SpO2 96%  BMI 28.74 kg/m2   Date 18 07 - 18 0659   Shift 7170-0178 1557-8464 6506-7239 24 Hour Total   I  N  T  A  K  E   P.O. 600   600    Shift Total  (mL/kg) 600  (7.19)   600  (7.19)   O  U  T  P  U  T   Urine 425   425    Drains 25   25    Stool 0   0    Shift Total  (mL/kg) 450  (5.39)   450  (5.39)   Weight (kg) 83.51 83.51 83.51 83.51     Admit Weight: 79.2 kg (174 lb 8 oz)   GENERAL APPEARANCE: alert and no distress  HENT: mouth without ulcers or lesions  LYMPHATICS: no cervical or supraclavicular nodes  RESP: lungs clear to auscultation - no rales, rhonchi or wheezes  CV: regular rhythm, normal rate, no rub, no murmur  EDEMA: no LE edema bilaterally  ABDOMEN: soft, nondistended, nontender, bowel sounds normal  MS: extremities normal - no gross deformities noted, no evidence of inflammation in joints, no muscle tenderness  SKIN: no rash    Data   All labs reviewed by me.  CMP    Recent Labs  Lab 18  0629 18  0547 18  0618 11/15/18  0547    138 146* 145*   POTASSIUM 4.0 4.1 3.9 4.4   CHLORIDE 107 108 114* 116*   CO2 22 23 24 21   ANIONGAP 8 8 8 8   GLC 77 88 91 96   BUN 22 22 30 32*   CR 1.09 1.17 1.46* 1.52*   GFRESTIMATED 70 65 50* 48*   GFRESTBLACK 85 78 61 58*   JE 8.2* 8.3* 8.4* 8.7   MAG 1.4* 1.4* 1.8 2.0   PHOS 2.4* 2.4* 2.3* 2.2*     CBC    Recent Labs  Lab 18  0629 18  0547 18  0618 11/15/18  0547   HGB 8.7* 8.4* 8.1* 9.0*   WBC 2.8* 2.1* 2.0* 2.9*   RBC 3.04* 2.98* 2.84* 3.14*   HCT 27.5* 27.3* 26.4* 29.0*   MCV 91 92 93 92   MCH 28.6 28.2 28.5 28.7   MCHC 31.6 30.8* 30.7* 31.0*   RDW 13.7 13.7 13.8 14.1   * 125* 118* 101*     INR    Recent " Labs  Lab 11/12/18  2102 11/12/18  1631   INR 1.31* 1.41*   PTT 33 34     ABG    Recent Labs  Lab 11/12/18  1530 11/12/18  1412 11/12/18  1102 11/12/18  0920   PH 7.35 7.30* 7.31* 7.29*   PCO2 33* 36 33* 38   PO2 136* 126* 82 185*   HCO3 18* 18* 17* 18*   O2PER 60 60.0 44.0 49.0      Urine Studies  Recent Labs   Lab Test  11/16/18   1110  11/11/18   1100  03/06/18   1410  05/31/17   0723   COLOR  Yellow  Light Yellow  Yellow  Straw   APPEARANCE  Clear  Clear  Clear  Clear   URINEGLC  Negative  >1000*  >=1000*  >499*   URINEBILI  Negative  Negative  Negative  Negative   URINEKETONE  Negative  Negative  Negative  Negative   SG  1.019  1.006  1.015  1.009   UBLD  Large*  Trace*  Moderate*  Small*   URINEPH  7.0  8.0*  6.0  6.0   PROTEIN  30*  30*  100*  100*   UROBILINOGEN   --    --   0.2   --    NITRITE  Negative  Negative  Negative  Negative   LEUKEST  Negative  Negative  Negative  Negative   RBCU  >182*  <1  2-5*  10*   WBCU  5  <1  0 - 5  1     Recent Labs   Lab Test  11/11/18   1100  05/24/17   1424  02/01/17   1413  12/29/16   1420  11/29/16   1432  11/15/16   1401  09/28/16   0700  02/12/16   0719  02/10/16   0822  10/19/15   1521  02/03/14   1627   UTPG  1.96*  1.72*  1.06*  0.65*  0.67*  1.02*  0.75*  Lost  RUBA NGUYỄN IN [facility] @1425 2/13/16 BY [facility]    0.31*  0.13  0.07     PTH  Recent Labs   Lab Test  11/13/18   0834  03/06/18   1354  08/03/17   1440  02/01/17   1412  11/18/16   1448  11/15/16   1402  10/14/13   0718   PTHI  1217*  1238*  790*  719*  985*  816*  466*     Iron Studies  Recent Labs   Lab Test  09/11/18   1402  08/03/17   1440  11/18/16   1448  11/15/16   1402  03/31/16   1316  10/14/13   0718   IRON  85  79  82  94  45  51   FEB  232*  230*  241  240  217*   --    IRONSAT  37  34  34  39  21   --    TIMOTHY  761*  736*   --   655*  837*  578*     MEDICATIONS:  Current Facility-Administered Medications   Medication     acetaminophen (TYLENOL) tablet 975 mg     artificial saliva (BIOTENE DRY  MOUTHWASH) liquid 5-10 mL     aspirin EC tablet 81 mg     atorvastatin (LIPITOR) half-tab 5 mg     bisacodyl (DULCOLAX) Suppository 10 mg     calcitRIOL (ROCALTROL) capsule 0.25 mcg     calcium carbonate 500 mg-vitamin D 200 units (OSCAL with D;OYSTER SHELL CALCIUM) per tablet 1 tablet     carvedilol (COREG) tablet 6.25 mg     cholecalciferol (vitamin D3) tablet 2,000 Units     cloNIDine (CATAPRES) tablet 0.1 mg     dextrose 10 % 1,000 mL infusion     glucose gel 15-30 g    Or     dextrose 50 % injection 25-50 mL    Or     glucagon injection 1 mg     diphenhydrAMINE (BENADRYL) capsule 25 mg     insulin aspart (NovoLOG) inj (RAPID ACTING)     Lidocaine (LIDOCARE) 4 % Patch 2 patch    And     lidocaine patch REMOVAL    And     lidocaine patch in PLACE     magnesium oxide (MAG-OX) tablet 400 mg     magnesium sulfate 2 g in water intermittent infusion     mycophenolic acid (MYFORTIC BRAND) EC tablet 720 mg     naloxone (NARCAN) injection 0.1-0.4 mg     NIFEdipine ER osmotic (PROCARDIA XL) 24 hr tablet 30 mg     nystatin (MYCOSTATIN) suspension 500,000 Units     octreotide (sandoSTATIN) injection 100 mcg     ondansetron (ZOFRAN-ODT) ODT tab 4 mg    Or     ondansetron (ZOFRAN) injection 4 mg     oxyCODONE IR (ROXICODONE) tablet 10-15 mg     pantoprazole (PROTONIX) EC tablet 40 mg     phosphorus tablet 250 mg (PHOSPHA 250 NEUTRAL) per tablet 500 mg     prochlorperazine (COMPAZINE) injection 5 mg     sennosides (SENOKOT) tablet 8.6 mg     sodium chloride (PF) 0.9% PF flush 3 mL     sodium chloride (PF) 0.9% PF flush 3 mL     sodium chloride (PF) 0.9% PF flush 3 mL     sodium chloride 0.9% infusion     sulfamethoxazole-trimethoprim (BACTRIM/SEPTRA) 400-80 MG per tablet 1 tablet     tacrolimus (GENERIC EQUIVALENT) capsule 3 mg     valGANciclovir (VALCYTE) tablet 900 mg

## 2018-11-18 NOTE — PLAN OF CARE
"Problem: Patient Care Overview  Goal: Individualization & Mutuality  Outcome: No Change  /84 (BP Location: Right arm)  Pulse 87  Temp 97.8  F (36.6  C) (Oral)  Resp 16  Ht 1.702 m (5' 7\")  Wt 83.2 kg (183 lb 8 oz)  SpO2 95%  BMI 28.74 kg/m2  Pt is on room air. Denies nausea and ate 100 % of both meals. Pain controlled with Oxycodone 15 mg X 2.   Magnesium 2 grams given IV in addition to his oral replacement for a serum level of 1.4. No orders to recheck tonight.   Blood glucose monitored and is WNL.   Good UOP. Independent to the bathroom and went for one walk. He has ate in the chair X 2 today.   No bowel movement reported.   Plan is to give Octreotide 150 mg subcutaneous (an increase) and to monitor Lipase in the am. Possible imaging tomorrow if there is no decrease in levels.       "

## 2018-11-19 ENCOUNTER — APPOINTMENT (OUTPATIENT)
Dept: CT IMAGING | Facility: CLINIC | Age: 53
DRG: 008 | End: 2018-11-19
Attending: SURGERY
Payer: COMMERCIAL

## 2018-11-19 LAB
AMYLASE SERPL-CCNC: 257 U/L (ref 30–110)
ANION GAP SERPL CALCULATED.3IONS-SCNC: 7 MMOL/L (ref 3–14)
BASOPHILS # BLD AUTO: 0 10E9/L (ref 0–0.2)
BASOPHILS NFR BLD AUTO: 0 %
BUN SERPL-MCNC: 21 MG/DL (ref 7–30)
CALCIUM SERPL-MCNC: 8 MG/DL (ref 8.5–10.1)
CHLORIDE SERPL-SCNC: 106 MMOL/L (ref 94–109)
CO2 SERPL-SCNC: 21 MMOL/L (ref 20–32)
CREAT SERPL-MCNC: 1.18 MG/DL (ref 0.66–1.25)
DIFFERENTIAL METHOD BLD: ABNORMAL
EOSINOPHIL # BLD AUTO: 0 10E9/L (ref 0–0.7)
EOSINOPHIL NFR BLD AUTO: 0.2 %
ERYTHROCYTE [DISTWIDTH] IN BLOOD BY AUTOMATED COUNT: 13.9 % (ref 10–15)
GFR SERPL CREATININE-BSD FRML MDRD: 64 ML/MIN/1.7M2
GLUCOSE SERPL-MCNC: 80 MG/DL (ref 70–99)
HCT VFR BLD AUTO: 28 % (ref 40–53)
HGB BLD-MCNC: 9 G/DL (ref 13.3–17.7)
IMM GRANULOCYTES # BLD: 0 10E9/L (ref 0–0.4)
IMM GRANULOCYTES NFR BLD: 0.3 %
LIPASE FLD-CCNC: 1010 U/L
LIPASE SERPL-CCNC: 1188 U/L (ref 73–393)
LYMPHOCYTES # BLD AUTO: 0.2 10E9/L (ref 0.8–5.3)
LYMPHOCYTES NFR BLD AUTO: 2.6 %
MAGNESIUM SERPL-MCNC: 1.5 MG/DL (ref 1.6–2.3)
MCH RBC QN AUTO: 28.8 PG (ref 26.5–33)
MCHC RBC AUTO-ENTMCNC: 32.1 G/DL (ref 31.5–36.5)
MCV RBC AUTO: 90 FL (ref 78–100)
MONOCYTES # BLD AUTO: 0.1 10E9/L (ref 0–1.3)
MONOCYTES NFR BLD AUTO: 1.9 %
NEUTROPHILS # BLD AUTO: 5.6 10E9/L (ref 1.6–8.3)
NEUTROPHILS NFR BLD AUTO: 95 %
NRBC # BLD AUTO: 0 10*3/UL
NRBC BLD AUTO-RTO: 0 /100
PHOSPHATE SERPL-MCNC: 2.9 MG/DL (ref 2.5–4.5)
PLATELET # BLD AUTO: 147 10E9/L (ref 150–450)
POTASSIUM SERPL-SCNC: 4.6 MMOL/L (ref 3.4–5.3)
RBC # BLD AUTO: 3.13 10E12/L (ref 4.4–5.9)
SODIUM SERPL-SCNC: 134 MMOL/L (ref 133–144)
SPECIMEN SOURCE FLD: NORMAL
TACROLIMUS BLD-MCNC: 6.1 UG/L (ref 5–15)
TME LAST DOSE: NORMAL H
WBC # BLD AUTO: 5.9 10E9/L (ref 4–11)

## 2018-11-19 PROCEDURE — 36592 COLLECT BLOOD FROM PICC: CPT | Performed by: SURGERY

## 2018-11-19 PROCEDURE — 82150 ASSAY OF AMYLASE: CPT | Performed by: SURGERY

## 2018-11-19 PROCEDURE — 83690 ASSAY OF LIPASE: CPT | Performed by: SURGERY

## 2018-11-19 PROCEDURE — 40000141 ZZH STATISTIC PERIPHERAL IV START W/O US GUIDANCE

## 2018-11-19 PROCEDURE — 74177 CT ABD & PELVIS W/CONTRAST: CPT

## 2018-11-19 PROCEDURE — 25000132 ZZH RX MED GY IP 250 OP 250 PS 637: Performed by: NURSE PRACTITIONER

## 2018-11-19 PROCEDURE — 85025 COMPLETE CBC W/AUTO DIFF WBC: CPT | Performed by: SURGERY

## 2018-11-19 PROCEDURE — 12000026 ZZH R&B TRANSPLANT

## 2018-11-19 PROCEDURE — 25000128 H RX IP 250 OP 636: Performed by: NURSE PRACTITIONER

## 2018-11-19 PROCEDURE — 25000128 H RX IP 250 OP 636: Performed by: RADIOLOGY

## 2018-11-19 PROCEDURE — 25000128 H RX IP 250 OP 636: Performed by: STUDENT IN AN ORGANIZED HEALTH CARE EDUCATION/TRAINING PROGRAM

## 2018-11-19 PROCEDURE — 25000131 ZZH RX MED GY IP 250 OP 636 PS 637: Performed by: PHYSICIAN ASSISTANT

## 2018-11-19 PROCEDURE — 25000131 ZZH RX MED GY IP 250 OP 636 PS 637: Performed by: NURSE PRACTITIONER

## 2018-11-19 PROCEDURE — 84100 ASSAY OF PHOSPHORUS: CPT | Performed by: SURGERY

## 2018-11-19 PROCEDURE — 25000132 ZZH RX MED GY IP 250 OP 250 PS 637: Performed by: PHYSICIAN ASSISTANT

## 2018-11-19 PROCEDURE — 25000132 ZZH RX MED GY IP 250 OP 250 PS 637: Performed by: SURGERY

## 2018-11-19 PROCEDURE — 83690 ASSAY OF LIPASE: CPT | Performed by: NURSE PRACTITIONER

## 2018-11-19 PROCEDURE — 83735 ASSAY OF MAGNESIUM: CPT | Performed by: SURGERY

## 2018-11-19 PROCEDURE — 80197 ASSAY OF TACROLIMUS: CPT | Performed by: NURSE PRACTITIONER

## 2018-11-19 PROCEDURE — 25000131 ZZH RX MED GY IP 250 OP 636 PS 637: Performed by: STUDENT IN AN ORGANIZED HEALTH CARE EDUCATION/TRAINING PROGRAM

## 2018-11-19 PROCEDURE — 80048 BASIC METABOLIC PNL TOTAL CA: CPT | Performed by: SURGERY

## 2018-11-19 PROCEDURE — 25000132 ZZH RX MED GY IP 250 OP 250 PS 637: Performed by: STUDENT IN AN ORGANIZED HEALTH CARE EDUCATION/TRAINING PROGRAM

## 2018-11-19 RX ORDER — POLYETHYLENE GLYCOL 3350 17 G/17G
17 POWDER, FOR SOLUTION ORAL 2 TIMES DAILY
Status: DISCONTINUED | OUTPATIENT
Start: 2018-11-19 | End: 2018-11-20 | Stop reason: HOSPADM

## 2018-11-19 RX ORDER — BISACODYL 10 MG
10 SUPPOSITORY, RECTAL RECTAL ONCE
Status: COMPLETED | OUTPATIENT
Start: 2018-11-19 | End: 2018-11-19

## 2018-11-19 RX ORDER — MAGNESIUM OXIDE 400 MG/1
400 TABLET ORAL 3 TIMES DAILY
Status: DISCONTINUED | OUTPATIENT
Start: 2018-11-19 | End: 2018-11-20 | Stop reason: HOSPADM

## 2018-11-19 RX ORDER — IOPAMIDOL 755 MG/ML
111 INJECTION, SOLUTION INTRAVASCULAR ONCE
Status: COMPLETED | OUTPATIENT
Start: 2018-11-19 | End: 2018-11-19

## 2018-11-19 RX ADMIN — NIFEDIPINE 30 MG: 30 TABLET, FILM COATED, EXTENDED RELEASE ORAL at 20:24

## 2018-11-19 RX ADMIN — MYCOPHENOLIC ACID 720 MG: 360 TABLET, DELAYED RELEASE ORAL at 18:09

## 2018-11-19 RX ADMIN — MYCOPHENOLIC ACID 720 MG: 360 TABLET, DELAYED RELEASE ORAL at 10:28

## 2018-11-19 RX ADMIN — OCTREOTIDE ACETATE 100 MCG: 100 INJECTION, SOLUTION INTRAVENOUS; SUBCUTANEOUS at 20:24

## 2018-11-19 RX ADMIN — TACROLIMUS 3 MG: 1 CAPSULE ORAL at 10:28

## 2018-11-19 RX ADMIN — OCTREOTIDE ACETATE 100 MCG: 100 INJECTION, SOLUTION INTRAVENOUS; SUBCUTANEOUS at 14:38

## 2018-11-19 RX ADMIN — OXYCODONE HYDROCHLORIDE 15 MG: 5 TABLET ORAL at 08:42

## 2018-11-19 RX ADMIN — TACROLIMUS 3.5 MG: 0.5 CAPSULE ORAL at 18:09

## 2018-11-19 RX ADMIN — OXYCODONE HYDROCHLORIDE 15 MG: 5 TABLET ORAL at 16:53

## 2018-11-19 RX ADMIN — CALCIUM CARBONATE-VITAMIN D TAB 500 MG-200 UNIT 1 TABLET: 500-200 TAB at 10:33

## 2018-11-19 RX ADMIN — CALCITRIOL CAPSULES 0.25 MCG 0.25 MCG: 0.25 CAPSULE ORAL at 10:33

## 2018-11-19 RX ADMIN — LIDOCAINE 2 PATCH: 560 PATCH PERCUTANEOUS; TOPICAL; TRANSDERMAL at 20:24

## 2018-11-19 RX ADMIN — SENNOSIDES 8.6 MG: 8.6 TABLET, FILM COATED ORAL at 10:33

## 2018-11-19 RX ADMIN — Medication 5 MG: at 20:24

## 2018-11-19 RX ADMIN — CALCIUM CARBONATE-VITAMIN D TAB 500 MG-200 UNIT 1 TABLET: 500-200 TAB at 18:09

## 2018-11-19 RX ADMIN — NIFEDIPINE 30 MG: 30 TABLET, FILM COATED, EXTENDED RELEASE ORAL at 10:33

## 2018-11-19 RX ADMIN — OCTREOTIDE ACETATE 100 MCG: 100 INJECTION, SOLUTION INTRAVENOUS; SUBCUTANEOUS at 10:47

## 2018-11-19 RX ADMIN — OXYCODONE HYDROCHLORIDE 15 MG: 5 TABLET ORAL at 12:45

## 2018-11-19 RX ADMIN — NYSTATIN 500000 UNITS: 500000 SUSPENSION ORAL at 20:23

## 2018-11-19 RX ADMIN — CARVEDILOL 6.25 MG: 6.25 TABLET, FILM COATED ORAL at 20:23

## 2018-11-19 RX ADMIN — OXYCODONE HYDROCHLORIDE 15 MG: 5 TABLET ORAL at 20:59

## 2018-11-19 RX ADMIN — NYSTATIN 500000 UNITS: 500000 SUSPENSION ORAL at 16:54

## 2018-11-19 RX ADMIN — IOPAMIDOL 11 ML: 755 INJECTION, SOLUTION INTRAVENOUS at 13:34

## 2018-11-19 RX ADMIN — OXYCODONE HYDROCHLORIDE 15 MG: 5 TABLET ORAL at 00:52

## 2018-11-19 RX ADMIN — CARVEDILOL 6.25 MG: 6.25 TABLET, FILM COATED ORAL at 10:32

## 2018-11-19 RX ADMIN — POLYETHYLENE GLYCOL 3350 17 G: 17 POWDER, FOR SOLUTION ORAL at 08:42

## 2018-11-19 RX ADMIN — NYSTATIN 500000 UNITS: 500000 SUSPENSION ORAL at 14:08

## 2018-11-19 RX ADMIN — ACETAMINOPHEN 975 MG: 325 TABLET, FILM COATED ORAL at 10:44

## 2018-11-19 RX ADMIN — VALGANCICLOVIR 900 MG: 450 TABLET, FILM COATED ORAL at 10:33

## 2018-11-19 RX ADMIN — MAGNESIUM HYDROXIDE 30 ML: 400 SUSPENSION ORAL at 16:53

## 2018-11-19 RX ADMIN — MAGNESIUM OXIDE TAB 400 MG (241.3 MG ELEMENTAL MG) 400 MG: 400 (241.3 MG) TAB at 22:37

## 2018-11-19 RX ADMIN — SULFAMETHOXAZOLE AND TRIMETHOPRIM 1 TABLET: 400; 80 TABLET ORAL at 10:33

## 2018-11-19 RX ADMIN — ACETAMINOPHEN 975 MG: 325 TABLET, FILM COATED ORAL at 22:53

## 2018-11-19 RX ADMIN — PROCHLORPERAZINE EDISYLATE 5 MG: 5 INJECTION INTRAMUSCULAR; INTRAVENOUS at 14:17

## 2018-11-19 RX ADMIN — MAGNESIUM OXIDE TAB 400 MG (241.3 MG ELEMENTAL MG) 400 MG: 400 (241.3 MG) TAB at 14:07

## 2018-11-19 RX ADMIN — BISACODYL 10 MG: 10 SUPPOSITORY RECTAL at 14:11

## 2018-11-19 RX ADMIN — NYSTATIN 500000 UNITS: 500000 SUSPENSION ORAL at 08:40

## 2018-11-19 RX ADMIN — DIBASIC SODIUM PHOSPHATE, MONOBASIC POTASSIUM PHOSPHATE AND MONOBASIC SODIUM PHOSPHATE 500 MG: 852; 155; 130 TABLET ORAL at 20:23

## 2018-11-19 RX ADMIN — POLYETHYLENE GLYCOL 3350 17 G: 17 POWDER, FOR SOLUTION ORAL at 20:23

## 2018-11-19 RX ADMIN — PANTOPRAZOLE SODIUM 40 MG: 40 TABLET, DELAYED RELEASE ORAL at 10:32

## 2018-11-19 RX ADMIN — SENNOSIDES 8.6 MG: 8.6 TABLET, FILM COATED ORAL at 20:24

## 2018-11-19 RX ADMIN — OXYCODONE HYDROCHLORIDE 15 MG: 5 TABLET ORAL at 05:01

## 2018-11-19 RX ADMIN — MAGNESIUM OXIDE TAB 400 MG (241.3 MG ELEMENTAL MG) 400 MG: 400 (241.3 MG) TAB at 20:24

## 2018-11-19 RX ADMIN — VITAMIN D, TAB 1000IU (100/BT) 2000 UNITS: 25 TAB at 10:33

## 2018-11-19 RX ADMIN — PROCHLORPERAZINE EDISYLATE 5 MG: 5 INJECTION INTRAMUSCULAR; INTRAVENOUS at 08:55

## 2018-11-19 ASSESSMENT — ACTIVITIES OF DAILY LIVING (ADL)
ADLS_ACUITY_SCORE: 11

## 2018-11-19 NOTE — PLAN OF CARE
Problem: Patient Care Overview  Goal: Plan of Care/Patient Progress Review  Outcome: No Change  Patient is VSS on RA. BG  98 and 103. Pain managed with Tylenol x1, oxy (15mg) x2, T-pump arrived on unit for back pain. Benadryl given for itchyness at incision site. No BM this shift, not passing gas. Adequate urine output, venkat in color. Patient on a regular diet and tolerating well. R internal jugular triple lumen-SL. Patient is up at rosalio and was walking the unit. SARAH draining serous fluid. Lisa Bolton RN on 11/18/2018 at 10:26 PM

## 2018-11-19 NOTE — PROGRESS NOTES
CLINICAL NUTRITION SERVICES - REASSESSMENT NOTE     Nutrition Prescription    Malnutrition Status:    Patient does not meet two of the criteria necessary for diagnosing malnutrition but is at risk for malnutrition    Recommendations already ordered by Registered Dietitian (RD):  Boost Plus BID between meals (to increase kcal/pro intake)    Future/Additional Recommendations:  Minimize diet restrictions as able d/t high calorie/protein needs post-transplant.  Oral supplements as needed to help meet nutritional needs.     High protein food choices with meals to help meet high needs post-transplant over the next 6-8 weeks.     Heart-healthy diet (low saturated fat, low sodium, high fiber) and food safety precautions long term due to immunosuppression regimen post-transplant         EVALUATION OF THE PROGRESS TOWARD GOALS   Diet: Regular (advanced 11/17)    Intake: Patient reports good intake (for the most part), % of meals.  Did have an episode of vomiting this morning.       NEW FINDINGS   Elevated lipase, team evaluating for pancreatic leak    MALNUTRITION  % Intake: < 75% for > 7 days (non-severe)  % Weight Loss: None noted  Subcutaneous Fat Loss: None observed  Muscle Loss: None observed  Fluid Accumulation/Edema: None noted  Malnutrition Diagnosis: Patient does not meet two of the above criteria necessary for diagnosing malnutrition but is at risk for malnutrition    Previous Goals   1. Patient will verbalize understanding of 3 important aspects of post-transplant diet guidelines. - MET  2. Diet adv v nutrition support within 4-5 days. - MET    Previous Nutrition Diagnosis  1. Food and nutrition related knowledge deficit - Improving  2. Inadequate energy-protein intake - Improving    CURRENT NUTRITION DIAGNOSIS  Predicted inadequate nutrient intake (kcal/pro) related to increased needs post-transplant      INTERVENTIONS  Implementation  Medical food supplement therapy - send trial of Boost Plus supplements  for increased kcal/protein intake    Nutrition education for recommended modifications - provided verbal review of post-transplant diet guidelines    Goals  Patient to consume % of nutritionally adequate meal trays TID, or the equivalent with supplements/snacks.    Monitoring/Evaluation  Progress toward goals will be monitored and evaluated per protocol.    Majo Martinez MS, RD, LD  Pager [phone]

## 2018-11-19 NOTE — PLAN OF CARE
Problem: Patient Care Overview  Goal: Individualization & Mutuality  Outcome: No Change  VSS on room air. Pt afebrile.   Nauseated and treated with Compazine 5 mg IV prior to am meds. Pt thought he could get the meds down but states, now, that he thinks he took too many at once. Pt's Octreotide 100 mg was also increased to TID. This may be contributing to his nausea as he did say to me that he was nauseated prior to am medications. Pt had a 400 ml emesis as charted with pills seen. Am meds redosed later.  Writer pre-medicated pt for his afternoon Octreotide per a one time order of Compazine 5 mg again and pt tolerated the injection well.   Pain controlled with Oxycodone 15 mg X 2.  Blood glucose monitored before meals; 83 & 95.  SARAH sent for abdominal fluid lipase which decreased to 1010.   Pt reported a bowel movement after giving Miralax and senna only. Writer gave Bisacodyl suppository and MOM after his Abd CT so that he would not have an accident during the test.   Adequate UOP.   Plan is to clean the colon and to continue monitoring.

## 2018-11-19 NOTE — PLAN OF CARE
Problem: Patient Care Overview  Goal: Individualization & Mutuality  Outcome: Improving  1487-5194: AVSS on RA. Blood sugars were in the 70s this shift. Voiding good amounts. No BM reported overnight. Abdomen continues to be distended. SARAH with small serosanguinous output. No other complaints and resting between cares. Continue with current plan of care and notify team with any concerns.

## 2018-11-19 NOTE — PROGRESS NOTES
[facility], [place]   Transplant Nephrology Progress Note  Date of Admission:  2018    Assessment & Plan    # SPK: baseline Cr ~ yet to be determined; stable at 1.1 mg / dl today.     # Pancreas Tx (SPK):Enteric drained     - Blood glucose: Euglycemia   - Pancreatic enzymes: Continue to trend up. Also having abdominal pain and vomiting- awaiting CT abdomen and pelvis. US of pancreas normal. Also being treated for constipation.     # Immunosuppression: Tacrolimus immediate release (goal  8-10) and Mycophenolate mofetil (goal  1-3.5)   - Changes: No . Prograf was increased to 3mg BID last evening for lower troughs.     # Hypertension/Volume Status: Controlled; Goal BP: < 140/90   Continue coreg to 12.5 mg po bid.     # Anemia in chronic renal disease: Hgb: Stable 9.0  g / dl.     # Mineral Bone Disorder:    - Secondary renal hyperparathyroidism; PTH level was 1217 prior to transplant.   - Calcium; level is: Low  - Phosphorus; level is: Low     On calcium, phos supplements currently with calcitriol. Monitor closely on discharge as high likelihood of HYPERcalcemia as OP.    #PCP prophylaxis: bactrim single strength tab daily    #CMV prophylaxis: valcyte 900 mg po daily x 3 months for R+    # Transplant History:  Etiology of kidney failure: polycystic kidney disease (PKD)  Tx: SPK  Transplant: 2018 (Kidney / Pancreas), 10/10/2013 (Kidney)  Donor Type:  - Brain Death Donor Class:    Recommendations were communicated to the primary team verbally.    Patrick Tolbert MD    Interval History   Feels worse today. Had one epsiode of NBNB vomiting. Continues to have left lower quadrant pain. No cp, sob, no n/v/d, no f/s/c. Lipase increasing.     Review of Systems   4 point ROS was obtained and negative except as noted in the interval history    Physical Exam   Temp  Av.1  F (36.7  C)  Min: 92  F (33.3  C)  Max: 99.2  F (37.3  C)  Arterial Line MAP (mmHg)  Av.8 mmHg  Min: 93  "mmHg  Max: 121 mmHg  Arterial Line BP  Min: 132/64  Max: 178/88      Pulse  Av.1  Min: 50  Max: 110 Resp  Avg: 15.5  Min: 9  Max: 20  SpO2  Av.4 %  Min: 90 %  Max: 100 %    CVP (mmHg): 6 mmHgBP 125/81 (BP Location: Right arm)  Pulse 84  Temp 98.1  F (36.7  C) (Oral)  Resp 14  Ht 1.702 m (5' 7\")  Wt 81.8 kg (180 lb 4.8 oz)  SpO2 98%  BMI 28.24 kg/m2   Date 18 07 - 18 0659   Shift 7014-0591 1354-3325 1492-3299 24 Hour Total   I  N  T  A  K  E   P.O. 600   600    Shift Total  (mL/kg) 600  (7.19)   600  (7.19)   O  U  T  P  U  T   Urine 425   425    Drains 25   25    Stool 0   0    Shift Total  (mL/kg) 450  (5.39)   450  (5.39)   Weight (kg) 83.51 83.51 83.51 83.51     Admit Weight: 79.2 kg (174 lb 8 oz)   GENERAL APPEARANCE: alert and no distress  HENT: mouth without ulcers or lesions  LYMPHATICS: no cervical or supraclavicular nodes  RESP: lungs clear to auscultation - no rales, rhonchi or wheezes  CV: regular rhythm, normal rate, no rub, no murmur  EDEMA: no LE edema bilaterally  ABDOMEN: soft, nondistended, nontender, bowel sounds normal. Surgical site in dressing- c/d/i  MS: extremities normal - no gross deformities noted, no evidence of inflammation in joints, no muscle tenderness  SKIN: no rash    Data   All labs reviewed by me.  CMP    Recent Labs  Lab 18  0639 18  0629 18  0547 18  0618    137 138 146*   POTASSIUM 4.6 4.0 4.1 3.9   CHLORIDE 106 107 108 114*   CO2 21 22 23 24   ANIONGAP 7 8 8 8   GLC 80 77 88 91   BUN 21 22 22 30   CR 1.18 1.09 1.17 1.46*   GFRESTIMATED 64 70 65 50*   GFRESTBLACK 78 85 78 61   JE 8.0* 8.2* 8.3* 8.4*   MAG 1.5* 1.4* 1.4* 1.8   PHOS 2.9 2.4* 2.4* 2.3*     CBC    Recent Labs  Lab 18  0639 18  0629 18  0547 18  0618   HGB 9.0* 8.7* 8.4* 8.1*   WBC 5.9 2.8* 2.1* 2.0*   RBC 3.13* 3.04* 2.98* 2.84*   HCT 28.0* 27.5* 27.3* 26.4*   MCV 90 91 92 93   MCH 28.8 28.6 28.2 28.5   MCHC 32.1 31.6 30.8* 30.7* "   RDW 13.9 13.7 13.7 13.8   * 132* 125* 118*     INR    Recent Labs  Lab 11/12/18  2102 11/12/18  1631   INR 1.31* 1.41*   PTT 33 34     ABG    Recent Labs  Lab 11/12/18  1530   PH 7.35   PCO2 33*   PO2 136*   HCO3 18*   O2PER 60      Urine Studies  Recent Labs   Lab Test  11/16/18   1110  11/11/18   1100  03/06/18   1410  05/31/17   0723   COLOR  Yellow  Light Yellow  Yellow  Straw   APPEARANCE  Clear  Clear  Clear  Clear   URINEGLC  Negative  >1000*  >=1000*  >499*   URINEBILI  Negative  Negative  Negative  Negative   URINEKETONE  Negative  Negative  Negative  Negative   SG  1.019  1.006  1.015  1.009   UBLD  Large*  Trace*  Moderate*  Small*   URINEPH  7.0  8.0*  6.0  6.0   PROTEIN  30*  30*  100*  100*   UROBILINOGEN   --    --   0.2   --    NITRITE  Negative  Negative  Negative  Negative   LEUKEST  Negative  Negative  Negative  Negative   RBCU  >182*  <1  2-5*  10*   WBCU  5  <1  0 - 5  1     Recent Labs   Lab Test  11/11/18   1100  05/24/17   1424  02/01/17   1413  12/29/16   1420  11/29/16   1432  11/15/16   1401  09/28/16   0700  02/12/16   0719  02/10/16   0822  10/19/15   1521  02/03/14   1627   UTPG  1.96*  1.72*  1.06*  0.65*  0.67*  1.02*  0.75*  Lost  RUBA NGUYỄN IN [facility] 2/13/16 BY [facility]    0.31*  0.13  0.07     PTH  Recent Labs   Lab Test  11/13/18   0834  03/06/18   1354  08/03/17   1440  02/01/17   1412  11/18/16   1448  11/15/16   1402  10/14/13   0718   PTHI  1217*  1238*  790*  719*  985*  816*  466*     Iron Studies  Recent Labs   Lab Test  09/11/18   1402  08/03/17   1440  11/18/16   1448  11/15/16   1402  03/31/16   1316  10/14/13   0718   IRON  85  79  82  94  45  51   FEB  232*  230*  241  240  217*   --    IRONSAT  37  34  34  39  21   --    TIMOTHY  761*  736*   --   655*  837*  578*     MEDICATIONS:  Current Facility-Administered Medications   Medication     acetaminophen (TYLENOL) tablet 975 mg     artificial saliva (BIOTENE DRY MOUTHWASH) liquid 5-10 mL     aspirin EC tablet 81  mg     atorvastatin (LIPITOR) half-tab 5 mg     bisacodyl (DULCOLAX) Suppository 10 mg     calcitRIOL (ROCALTROL) capsule 0.25 mcg     calcium carbonate 500 mg-vitamin D 200 units (OSCAL with D;OYSTER SHELL CALCIUM) per tablet 1 tablet     carvedilol (COREG) tablet 6.25 mg     cholecalciferol (vitamin D3) tablet 2,000 Units     cloNIDine (CATAPRES) tablet 0.1 mg     diphenhydrAMINE (BENADRYL) capsule 25 mg     Lidocaine (LIDOCARE) 4 % Patch 2 patch    And     lidocaine patch REMOVAL    And     lidocaine patch in PLACE     magnesium hydroxide (MILK OF MAGNESIA) suspension 30 mL     magnesium oxide (MAG-OX) tablet 400 mg     mycophenolic acid (MYFORTIC BRAND) EC tablet 720 mg     naloxone (NARCAN) injection 0.1-0.4 mg     NIFEdipine ER osmotic (PROCARDIA XL) 24 hr tablet 30 mg     nystatin (MYCOSTATIN) suspension 500,000 Units     octreotide (sandoSTATIN) injection 100 mcg     ondansetron (ZOFRAN-ODT) ODT tab 4 mg    Or     ondansetron (ZOFRAN) injection 4 mg     oxyCODONE IR (ROXICODONE) tablet 10-15 mg     pantoprazole (PROTONIX) EC tablet 40 mg     phosphorus tablet 250 mg (PHOSPHA 250 NEUTRAL) per tablet 500 mg     polyethylene glycol (MIRALAX/GLYCOLAX) Packet 17 g     prochlorperazine (COMPAZINE) injection 5 mg     sennosides (SENOKOT) tablet 8.6 mg     sodium chloride (PF) 0.9% PF flush 3 mL     sodium chloride (PF) 0.9% PF flush 3 mL     sodium chloride (PF) 0.9% PF flush 3 mL     sodium chloride (PF) 0.9% PF flush 78 mL     sulfamethoxazole-trimethoprim (BACTRIM/SEPTRA) 400-80 MG per tablet 1 tablet     tacrolimus (GENERIC EQUIVALENT) capsule 3.5 mg     valGANciclovir (VALCYTE) tablet 900 mg     Attestation:  This patient has been seen and evaluated by me, Masoud Tate MD.  I have reviewed the note and agree with plan of care as documented by the fellow.

## 2018-11-19 NOTE — PROGRESS NOTES
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 Underwent  donor pancreas transplant with enteric drainage, kidney transplant (with venous reconstruction, two arteries, and stent), appendectomy, and umbilical hernia repair on 18.    Graft function: POD #7  Pancreas: Lipase trending up, 735->1188. 11 Drain lipase 19K. Repeat drain lipase today. Continue octreotide. CT abd today to eval for pancreatic leak. Will also treat constipation.  Kidney: Cr 1.2 today, with acceptable UOP. Pt made normal urine volumes PTA.  Immunosuppression management: Induction with thymoglobulin and steroid pulse.  Thymo:  Completed 600mg, 7.6mg/kg  Methylpred:  500mg , 250mg , 100mg , 100mg 11/15 due to thymo reaction  MMF: Myfortic 720 mg BID d/t past GI intolerance.  Tacro:  Goal level 8-10.   Cyclo: On PTA, discontinued .  Complexity of management: High. Contributing factors: induction  Hematology:   Acute blood loss anemia: 300ml EBL in OR. Hgb 9 today, stable.  Thrombocytopenia: 2/2 thymo, monitor.  Vascular prophylaxis: ASA 81mg daily  Cardiorespiratory:   Hx HTN: On hydralazine, nifedipine, and PRN clonidine PTA.  Currently on carvedilol 6.25 mg BID and nifedipine. Does not tolerate amlodipine due to edema issues.   Hx CAD: On statin and coreg.  GI/Nutrition: Reg diet  Nausea: PRN zofran and compazine  Constipation: Suppository today. Enema if no results. Continue bowel regimen.  Endocrine: Poorly controlled DM prior to transplant. A1c 9.1-11.1% last few years.  Fluid/Electrolytes:   Hypomagnesemia: Increase MagOx.  Hypophosphatemia: On Phospha.  : No acute issues  Neuro:   Acute and chronic pain: Takes Norco (up to 8 tabs  "daily) at home for pain. Oxycodone 10-15 mg every 4 hours PRN. Scheduled tylenol, lidoderm patches.  Infectious disease: Afebrile  Prophylaxis: DVT, fall, GI, nystatin, micafungin x7d, bactrim, valcyte  Disposition: 7A.     Medical Decision Making: Medium  Subsequent visit 99954 (moderate level decision making)    ODALIS/Fellow/Resident Provider: Karyn Crystal NP [id number]    Faculty: Tobin Pal MD  _________________________________________________________________  Transplant History:   11/12/2018 (Kidney / Pancreas), 10/10/2013 (Kidney), Postoperative day: 7     Interval History: History is obtained from the patient  Overnight events: LLQ pain, vomited this morning, + flatus. No BM since 11/17.    ROS:   A 10-point review of systems was negative except as noted above.    Meds:    aspirin  81 mg Oral Daily     atorvastatin  5 mg Oral QPM     bisacodyl  10 mg Rectal Once     calcitRIOL  0.25 mcg Oral Daily     calcium carbonate 500 mg-vitamin D 200 units  1 tablet Oral BID w/meals     carvedilol  6.25 mg Oral BID     cholecalciferol  2,000 Units Oral Daily     lidocaine  2 patch Transdermal Q24h    And     lidocaine   Transdermal Q24H    And     lidocaine   Transdermal Q8H     magnesium hydroxide  30 mL Oral Once     magnesium oxide  400 mg Oral TID     mycophenolic acid  720 mg Oral BID IS     NIFEdipine ER osmotic  30 mg Oral BID     nystatin  500,000 Units Oral 4x Daily     octreotide  100 mcg Subcutaneous TID     pantoprazole  40 mg Oral QAM AC     phosphorus tablet 250 mg  500 mg Oral BID     polyethylene glycol  17 g Oral BID     sennosides  8.6 mg Oral BID     sodium chloride (PF)  3 mL Intravenous Q8H     sulfamethoxazole-trimethoprim  1 tablet Oral Daily     tacrolimus  3 mg Oral BID IS     valGANciclovir  900 mg Oral Daily       Physical Exam:     Admit Weight: 79.2 kg (174 lb 8 oz)    Current vitals:   /76 (BP Location: Right arm)  Pulse 87  Temp 98.2  F (36.8  C) (Oral)  Resp 14  Ht 1.702 m (5' 7\")  Wt " 81.8 kg (180 lb 4.8 oz)  SpO2 95%  BMI 28.24 kg/m2    Vital sign ranges:    Temp:  [97.8  F (36.6  C)-99  F (37.2  C)] 98.2  F (36.8  C)  Pulse:  [84-94] 87  Heart Rate:  [84-97] 97  Resp:  [14-16] 14  BP: (127-145)/(75-91) 135/76  SpO2:  [95 %-97 %] 95 %  Patient Vitals for the past 24 hrs:   BP Temp Temp src Pulse Heart Rate Resp SpO2 Weight   11/19/18 1030 135/76 - - - 97 - 95 % 81.8 kg (180 lb 4.8 oz)   11/19/18 0718 127/84 98.2  F (36.8  C) Oral 87 - 14 96 % -   11/19/18 0453 145/75 99  F (37.2  C) Oral - 90 16 96 % -   11/19/18 0053 (!) 145/91 98  F (36.7  C) Oral - 89 16 97 % -   11/18/18 1957 127/76 98.1  F (36.7  C) Oral 94 - 16 - -   11/18/18 1621 142/83 98.1  F (36.7  C) Oral 84 84 16 97 % -   11/18/18 1147 136/84 97.8  F (36.6  C) Oral 87 87 16 95 % -     General Appearance: in no apparent distress.   Skin: normal, warm, dry  Heart: RRR  Lungs: Unlabored, RA  Abdomen: The abdomen is rounded, mildly tender LLQ, SARAH serosang, incision CDI  : Voiding  Extremities: edema: absent.  Neurologic: awake, alert and oriented x4. Tremor absent.  CVC    Data:   CMP  Recent Labs  Lab 11/19/18  0639 11/18/18  0945 11/18/18  0629  11/12/18  1530 11/12/18  1412     --  137  < > 133 135   POTASSIUM 4.6  --  4.0  < > 3.6 3.7   CHLORIDE 106  --  107  < >  --   --    CO2 21  --  22  < >  --   --    GLC 80  --  77  < > 92 84   BUN 21  --  22  < >  --   --    CR 1.18  --  1.09  < >  --   --    GFRESTIMATED 64  --  70  < >  --   --    GFRESTBLACK 78  --  85  < >  --   --    JE 8.0*  --  8.2*  < >  --   --    ICAW  --   --   --   --  4.8 4.6   MAG 1.5*  --  1.4*  < >  --   --    PHOS 2.9  --  2.4*  < >  --   --    AMYLASE 257*  --  197*  < >  --   --    LIPASE 1188*  --  735*  < >  --   --    FLIPA  --  4818  --   < >  --   --    < > = values in this interval not displayed.  CBC    Recent Labs  Lab 11/19/18  0639 11/18/18  0629   HGB 9.0* 8.7*   WBC 5.9 2.8*   * 132*     COAGS    Recent Labs  Lab 11/12/18  7411  11/12/18  1631   INR 1.31* 1.41*   PTT 33 34      Urinalysis  Recent Labs   Lab Test  11/16/18   1110  11/11/18   1100   05/24/17   1424   COLOR  Yellow  Light Yellow   < >   --    APPEARANCE  Clear  Clear   < >   --    URINEGLC  Negative  >1000*   < >   --    URINEBILI  Negative  Negative   < >   --    URINEKETONE  Negative  Negative   < >   --    SG  1.019  1.006   < >   --    UBLD  Large*  Trace*   < >   --    URINEPH  7.0  8.0*   < >   --    PROTEIN  30*  30*   < >   --    NITRITE  Negative  Negative   < >   --    LEUKEST  Negative  Negative   < >   --    RBCU  >182*  <1   < >   --    WBCU  5  <1   < >   --    UTPG   --   1.96*   --   1.72*    < > = values in this interval not displayed.     Virology:  CMV DNA Quantitation Specimen   Date Value Ref Range Status   09/28/2016 Plasma  Final     CMV Quantitative   Date Value Ref Range Status   10/20/2014 <100 <100 Copies/mL Final   12/05/2013 <100 <100 Copies/mL Final   10/17/2013 <100 <100 Copies/mL Final     CMV IgG Antibody   Date Value Ref Range Status   10/09/2013 <0.20  Negative for anti-CMV IgG U/mL Final     EBV VCA IgG Antibody   Date Value Ref Range Status   10/09/2013 173.00 U/mL Final     Comment:     Positive, suggests immunologic exposure.     Hepatitis C Antibody   Date Value Ref Range Status   11/11/2018 Nonreactive NR^Nonreactive Final     Comment:     Assay performance characteristics have not been established for newborns,   infants, and children       Hep B Surface Sophia   Date Value Ref Range Status   10/09/2013 13.0  Final     Comment:     Positive, Patient is considered to be immune to infection with hepatitis B   when   the value is greater than or equal to 12.0 mlU/mL.     BK Virus Result   Date Value Ref Range Status   11/01/2018 BK Virus DNA Not Detected BKNEG^BK Virus DNA Not Detected copies/mL Final   05/31/2017 BK Virus DNA Not Detected BKNEG copies/mL Final   05/24/2017 BK Virus DNA Not Detected BKNEG copies/mL Final   11/15/2016 BK  Virus DNA Not Detected BKNEG copies/mL Final   09/28/2016 BK Virus DNA Not Detected BKNEG copies/mL Final   09/14/2016 BK Virus DNA Not Detected BKNEG copies/mL Final   06/14/2016 BK Virus DNA Not Detected BKNEG copies/mL Final   05/17/2016 BK Virus DNA Not Detected BKNEG copies/mL Final   04/14/2016 BK Virus DNA Not Detected BKNEG copies/mL Final   03/18/2016 BK Virus DNA Not Detected BKNEG copies/mL Final   02/29/2016 BK Virus DNA Not Detected BKNEG copies/mL Final   02/12/2016 BK Virus DNA Not Detected BKNEG copies/mL Final   02/10/2016 BK Virus DNA Not Detected BKNEG copies/mL Final   01/22/2016 BK Virus DNA Not Detected BKNEG copies/mL Final   01/20/2016 BK Virus DNA Not Detected BKNEG copies/mL Final   10/19/2015 BK Virus DNA Not Detected BKNEG copies/mL Final   03/04/2015 BK Virus DNA Not Detected BKNEG copies/mL Final   02/16/2015 BK Virus DNA Not Detected BKNEG copies/mL Final   01/20/2015 BK Virus DNA Not Detected BKNEG copies/mL Final   01/09/2014 <1000 <1000 copies/mL Final   11/06/2013 <1000 <1000 copies/mL Final

## 2018-11-20 ENCOUNTER — DOCUMENTATION ONLY (OUTPATIENT)
Dept: PHARMACY | Facility: CLINIC | Age: 53
End: 2018-11-20

## 2018-11-20 VITALS
WEIGHT: 180.4 LBS | HEART RATE: 69 BPM | OXYGEN SATURATION: 97 % | TEMPERATURE: 97.5 F | DIASTOLIC BLOOD PRESSURE: 73 MMHG | RESPIRATION RATE: 16 BRPM | HEIGHT: 67 IN | BODY MASS INDEX: 28.31 KG/M2 | SYSTOLIC BLOOD PRESSURE: 114 MMHG

## 2018-11-20 PROBLEM — K59.03 DRUG INDUCED CONSTIPATION: Status: ACTIVE | Noted: 2018-11-20

## 2018-11-20 PROBLEM — R11.0 NAUSEA: Status: ACTIVE | Noted: 2018-11-20

## 2018-11-20 PROBLEM — E83.39 HYPOPHOSPHATEMIA: Status: ACTIVE | Noted: 2018-11-20

## 2018-11-20 LAB
AMYLASE SERPL-CCNC: 198 U/L (ref 30–110)
ANION GAP SERPL CALCULATED.3IONS-SCNC: 5 MMOL/L (ref 3–14)
BUN SERPL-MCNC: 16 MG/DL (ref 7–30)
CALCIUM SERPL-MCNC: 8.1 MG/DL (ref 8.5–10.1)
CHLORIDE SERPL-SCNC: 103 MMOL/L (ref 94–109)
CO2 SERPL-SCNC: 22 MMOL/L (ref 20–32)
CREAT SERPL-MCNC: 1.18 MG/DL (ref 0.66–1.25)
ERYTHROCYTE [DISTWIDTH] IN BLOOD BY AUTOMATED COUNT: 14 % (ref 10–15)
GFR SERPL CREATININE-BSD FRML MDRD: 64 ML/MIN/1.7M2
GLUCOSE SERPL-MCNC: 84 MG/DL (ref 70–99)
HCT VFR BLD AUTO: 28.6 % (ref 40–53)
HGB BLD-MCNC: 9.1 G/DL (ref 13.3–17.7)
LIPASE SERPL-CCNC: 784 U/L (ref 73–393)
MAGNESIUM SERPL-MCNC: 1.7 MG/DL (ref 1.6–2.3)
MCH RBC QN AUTO: 28.5 PG (ref 26.5–33)
MCHC RBC AUTO-ENTMCNC: 31.8 G/DL (ref 31.5–36.5)
MCV RBC AUTO: 90 FL (ref 78–100)
PHOSPHATE SERPL-MCNC: 2.8 MG/DL (ref 2.5–4.5)
PLATELET # BLD AUTO: 165 10E9/L (ref 150–450)
POTASSIUM SERPL-SCNC: 5 MMOL/L (ref 3.4–5.3)
RBC # BLD AUTO: 3.19 10E12/L (ref 4.4–5.9)
SODIUM SERPL-SCNC: 131 MMOL/L (ref 133–144)
TACROLIMUS BLD-MCNC: 8.6 UG/L (ref 5–15)
TME LAST DOSE: NORMAL H
WBC # BLD AUTO: 5.2 10E9/L (ref 4–11)

## 2018-11-20 PROCEDURE — 80197 ASSAY OF TACROLIMUS: CPT | Performed by: NURSE PRACTITIONER

## 2018-11-20 PROCEDURE — 83735 ASSAY OF MAGNESIUM: CPT | Performed by: SURGERY

## 2018-11-20 PROCEDURE — 25000128 H RX IP 250 OP 636: Performed by: NURSE PRACTITIONER

## 2018-11-20 PROCEDURE — 85027 COMPLETE CBC AUTOMATED: CPT | Performed by: NURSE PRACTITIONER

## 2018-11-20 PROCEDURE — 25000132 ZZH RX MED GY IP 250 OP 250 PS 637: Performed by: NURSE PRACTITIONER

## 2018-11-20 PROCEDURE — 80048 BASIC METABOLIC PNL TOTAL CA: CPT | Performed by: SURGERY

## 2018-11-20 PROCEDURE — 25000131 ZZH RX MED GY IP 250 OP 636 PS 637: Performed by: NURSE PRACTITIONER

## 2018-11-20 PROCEDURE — 25000132 ZZH RX MED GY IP 250 OP 250 PS 637: Performed by: STUDENT IN AN ORGANIZED HEALTH CARE EDUCATION/TRAINING PROGRAM

## 2018-11-20 PROCEDURE — 36592 COLLECT BLOOD FROM PICC: CPT | Performed by: SURGERY

## 2018-11-20 PROCEDURE — 25000131 ZZH RX MED GY IP 250 OP 636 PS 637: Performed by: PHYSICIAN ASSISTANT

## 2018-11-20 PROCEDURE — 25000132 ZZH RX MED GY IP 250 OP 250 PS 637: Performed by: PHYSICIAN ASSISTANT

## 2018-11-20 PROCEDURE — 84100 ASSAY OF PHOSPHORUS: CPT | Performed by: SURGERY

## 2018-11-20 PROCEDURE — 82150 ASSAY OF AMYLASE: CPT | Performed by: SURGERY

## 2018-11-20 PROCEDURE — 83690 ASSAY OF LIPASE: CPT | Performed by: SURGERY

## 2018-11-20 PROCEDURE — 25000132 ZZH RX MED GY IP 250 OP 250 PS 637: Performed by: SURGERY

## 2018-11-20 PROCEDURE — 25000128 H RX IP 250 OP 636: Performed by: STUDENT IN AN ORGANIZED HEALTH CARE EDUCATION/TRAINING PROGRAM

## 2018-11-20 RX ORDER — NIFEDIPINE 30 MG/1
30 TABLET, EXTENDED RELEASE ORAL 2 TIMES DAILY
Qty: 60 TABLET | Refills: 3 | Status: SHIPPED | OUTPATIENT
Start: 2018-11-20 | End: 2019-01-03

## 2018-11-20 RX ORDER — HYDROCODONE BITARTRATE AND ACETAMINOPHEN 5; 325 MG/1; MG/1
TABLET ORAL
Start: 2018-11-20 | End: 2018-12-18

## 2018-11-20 RX ORDER — TACROLIMUS 0.5 MG/1
0.5 CAPSULE ORAL 2 TIMES DAILY
Qty: 60 CAPSULE | Refills: 11 | Status: SHIPPED | OUTPATIENT
Start: 2018-11-20 | End: 2018-11-21

## 2018-11-20 RX ORDER — POLYETHYLENE GLYCOL 3350 17 G/17G
1 POWDER, FOR SOLUTION ORAL 2 TIMES DAILY
Qty: 850 G | Refills: 0 | Status: SHIPPED | OUTPATIENT
Start: 2018-11-20 | End: 2019-04-29

## 2018-11-20 RX ORDER — OXYCODONE HYDROCHLORIDE 5 MG/1
TABLET ORAL
Qty: 108 TABLET | Refills: 0 | Status: SHIPPED | OUTPATIENT
Start: 2018-11-20 | End: 2018-12-18

## 2018-11-20 RX ORDER — ACETAMINOPHEN 325 MG/1
975 TABLET ORAL EVERY 8 HOURS PRN
Qty: 100 TABLET | Refills: 0 | Status: SHIPPED | OUTPATIENT
Start: 2018-11-20 | End: 2019-04-29

## 2018-11-20 RX ORDER — TACROLIMUS 0.5 MG/1
CAPSULE ORAL
Qty: 30 CAPSULE | Refills: 0 | Status: SHIPPED | OUTPATIENT
Start: 2018-11-20 | End: 2018-11-21

## 2018-11-20 RX ORDER — NYSTATIN 100000/ML
500000 SUSPENSION, ORAL (FINAL DOSE FORM) ORAL 4 TIMES DAILY
Qty: 600 ML | Refills: 2 | Status: SHIPPED | OUTPATIENT
Start: 2018-11-20 | End: 2019-02-08

## 2018-11-20 RX ORDER — PROCHLORPERAZINE MALEATE 5 MG
5 TABLET ORAL EVERY 6 HOURS PRN
Qty: 30 TABLET | Refills: 1 | Status: SHIPPED | OUTPATIENT
Start: 2018-11-20 | End: 2019-01-08

## 2018-11-20 RX ORDER — MAGNESIUM OXIDE 400 MG/1
400 TABLET ORAL 3 TIMES DAILY
Qty: 90 TABLET | Refills: 2 | Status: SHIPPED | OUTPATIENT
Start: 2018-11-20 | End: 2018-11-21

## 2018-11-20 RX ORDER — TACROLIMUS 1 MG/1
3 CAPSULE ORAL 2 TIMES DAILY
Qty: 180 CAPSULE | Refills: 11 | Status: SHIPPED | OUTPATIENT
Start: 2018-11-20 | End: 2018-11-21

## 2018-11-20 RX ORDER — SULFAMETHOXAZOLE AND TRIMETHOPRIM 400; 80 MG/1; MG/1
1 TABLET ORAL DAILY
Qty: 30 TABLET | Refills: 11 | Status: SHIPPED | OUTPATIENT
Start: 2018-11-21 | End: 2019-04-29

## 2018-11-20 RX ORDER — MYCOPHENOLIC ACID 360 MG/1
720 TABLET, DELAYED RELEASE ORAL 2 TIMES DAILY
Qty: 120 TABLET | Refills: 11 | Status: SHIPPED | OUTPATIENT
Start: 2018-11-20 | End: 2018-12-10

## 2018-11-20 RX ORDER — ONDANSETRON 4 MG/1
4 TABLET, ORALLY DISINTEGRATING ORAL EVERY 6 HOURS PRN
Qty: 30 TABLET | Refills: 1 | Status: SHIPPED | OUTPATIENT
Start: 2018-11-20 | End: 2019-01-08

## 2018-11-20 RX ORDER — LIDOCAINE 4 G/G
2 PATCH TOPICAL EVERY 24 HOURS
Qty: 10 PATCH | Refills: 1 | Status: SHIPPED | OUTPATIENT
Start: 2018-11-20 | End: 2019-01-08

## 2018-11-20 RX ORDER — CARVEDILOL 6.25 MG/1
6.25 TABLET ORAL 2 TIMES DAILY
Qty: 60 TABLET | Refills: 3 | Status: SHIPPED | OUTPATIENT
Start: 2018-11-20 | End: 2018-11-21

## 2018-11-20 RX ORDER — VALGANCICLOVIR 450 MG/1
900 TABLET, FILM COATED ORAL DAILY
Qty: 152 TABLET | Refills: 0 | Status: SHIPPED | OUTPATIENT
Start: 2018-11-21 | End: 2019-01-14

## 2018-11-20 RX ORDER — SENNOSIDES 8.6 MG
2 TABLET ORAL 2 TIMES DAILY
Qty: 120 EACH | Refills: 1 | Status: SHIPPED | OUTPATIENT
Start: 2018-11-20 | End: 2019-04-29

## 2018-11-20 RX ADMIN — OXYCODONE HYDROCHLORIDE 15 MG: 5 TABLET ORAL at 09:13

## 2018-11-20 RX ADMIN — ACETAMINOPHEN 975 MG: 325 TABLET, FILM COATED ORAL at 16:04

## 2018-11-20 RX ADMIN — MAGNESIUM HYDROXIDE 30 ML: 400 SUSPENSION ORAL at 10:32

## 2018-11-20 RX ADMIN — OXYCODONE HYDROCHLORIDE 15 MG: 5 TABLET ORAL at 05:06

## 2018-11-20 RX ADMIN — NYSTATIN 500000 UNITS: 500000 SUSPENSION ORAL at 07:52

## 2018-11-20 RX ADMIN — CALCIUM CARBONATE-VITAMIN D TAB 500 MG-200 UNIT 1 TABLET: 500-200 TAB at 07:52

## 2018-11-20 RX ADMIN — MYCOPHENOLIC ACID 720 MG: 360 TABLET, DELAYED RELEASE ORAL at 07:52

## 2018-11-20 RX ADMIN — SULFAMETHOXAZOLE AND TRIMETHOPRIM 1 TABLET: 400; 80 TABLET ORAL at 07:52

## 2018-11-20 RX ADMIN — OXYCODONE HYDROCHLORIDE 15 MG: 5 TABLET ORAL at 01:04

## 2018-11-20 RX ADMIN — VITAMIN D, TAB 1000IU (100/BT) 2000 UNITS: 25 TAB at 07:52

## 2018-11-20 RX ADMIN — SODIUM CHLORIDE, PRESERVATIVE FREE 500 ML: 5 INJECTION INTRAVENOUS at 09:14

## 2018-11-20 RX ADMIN — TACROLIMUS 3.5 MG: 0.5 CAPSULE ORAL at 07:51

## 2018-11-20 RX ADMIN — DIBASIC SODIUM PHOSPHATE, MONOBASIC POTASSIUM PHOSPHATE AND MONOBASIC SODIUM PHOSPHATE 500 MG: 852; 155; 130 TABLET ORAL at 07:52

## 2018-11-20 RX ADMIN — MAGNESIUM OXIDE TAB 400 MG (241.3 MG ELEMENTAL MG) 400 MG: 400 (241.3 MG) TAB at 13:25

## 2018-11-20 RX ADMIN — POLYETHYLENE GLYCOL 3350 17 G: 17 POWDER, FOR SOLUTION ORAL at 07:52

## 2018-11-20 RX ADMIN — MAGNESIUM OXIDE TAB 400 MG (241.3 MG ELEMENTAL MG) 400 MG: 400 (241.3 MG) TAB at 09:13

## 2018-11-20 RX ADMIN — NYSTATIN 500000 UNITS: 500000 SUSPENSION ORAL at 12:43

## 2018-11-20 RX ADMIN — OCTREOTIDE ACETATE 100 MCG: 100 INJECTION, SOLUTION INTRAVENOUS; SUBCUTANEOUS at 15:03

## 2018-11-20 RX ADMIN — VALGANCICLOVIR 900 MG: 450 TABLET, FILM COATED ORAL at 07:52

## 2018-11-20 RX ADMIN — PANTOPRAZOLE SODIUM 40 MG: 40 TABLET, DELAYED RELEASE ORAL at 07:52

## 2018-11-20 RX ADMIN — NIFEDIPINE 30 MG: 30 TABLET, FILM COATED, EXTENDED RELEASE ORAL at 07:52

## 2018-11-20 RX ADMIN — ACETAMINOPHEN 975 MG: 325 TABLET, FILM COATED ORAL at 05:06

## 2018-11-20 RX ADMIN — CALCITRIOL CAPSULES 0.25 MCG 0.25 MCG: 0.25 CAPSULE ORAL at 07:52

## 2018-11-20 RX ADMIN — Medication 12.5 MG: at 13:25

## 2018-11-20 RX ADMIN — OCTREOTIDE ACETATE 100 MCG: 100 INJECTION, SOLUTION INTRAVENOUS; SUBCUTANEOUS at 08:30

## 2018-11-20 RX ADMIN — CARVEDILOL 6.25 MG: 6.25 TABLET, FILM COATED ORAL at 07:51

## 2018-11-20 RX ADMIN — SENNOSIDES 8.6 MG: 8.6 TABLET, FILM COATED ORAL at 07:52

## 2018-11-20 RX ADMIN — ASPIRIN 81 MG: 81 TABLET, COATED ORAL at 07:52

## 2018-11-20 ASSESSMENT — ACTIVITIES OF DAILY LIVING (ADL)
ADLS_ACUITY_SCORE: 11

## 2018-11-20 ASSESSMENT — PAIN DESCRIPTION - DESCRIPTORS: DESCRIPTORS: ACHING

## 2018-11-20 NOTE — PROGRESS NOTES
[facility], [place]   Transplant Nephrology Progress Note  Date of Admission:  2018    Assessment & Plan    # SPK: baseline Cr ~ yet to be determined; stable at 1.1 mg / dl today.     # Pancreas Tx (SPK):Enteric drained     - Blood glucose: Euglycemia   - Pancreatic enzymes: Improved with bowel regimen. CT with no peripancreatic fluid.     # Immunosuppression: Tacrolimus immediate release (goal  8-10) and Mycophenolate mofetil (goal  1-3.5)   - Changes: No . Prograf was increased to 3.5 mg BID last evening for lower troughs.   todays levels okay at 8.6.     # Hypertension/Volume Status: Controlled; Goal BP: < 140/90   Continue coreg to 12.5 mg po bid and nifedipine 30 mg BID     # Anemia in chronic renal disease: Hgb: Stable 9.1  g / dl.     # Mineral Bone Disorder:    - Secondary renal hyperparathyroidism; PTH level was 1217 prior to transplant. On calcitriol 0.25mcg daily.   - Calcium; level is: Low but stable. On calcium supplement of 1 tab BID  - Phosphorus; level is: Normal - on 500mg BID    Monitor closely on discharge as high likelihood of HYPERcalcemia as OP.    #PCP prophylaxis: bactrim single strength tab daily    #CMV prophylaxis: valcyte 900 mg po daily x 3 months for R+    # Transplant History:  Etiology of kidney failure: polycystic kidney disease (PKD)  Tx: SPK  Transplant: 2018 (Kidney / Pancreas), 10/10/2013 (Kidney)  Donor Type:  - Brain Death Donor Class:    Recommendations were communicated to the primary team verbally.    Patrick Tolbert MD    Interval History   Patient today feels well. No more nausea or abdominal pain. Wanting to go home. Was given iVF for poor oral intake.   No fever, sweats or chills.  No leg swelling.  No pain or burning with urination.      Review of Systems   4 point ROS was obtained and negative except as noted in the interval history    Physical Exam   Temp  Av.1  F (36.7  C)  Min: 92  F (33.3  C)  Max: 99.2  F (37.3  " C)  Arterial Line MAP (mmHg)  Av.8 mmHg  Min: 93 mmHg  Max: 121 mmHg  Arterial Line BP  Min: 132/64  Max: 178/88      Pulse  Av.1  Min: 50  Max: 110 Resp  Avg: 15.5  Min: 9  Max: 20  SpO2  Av.4 %  Min: 90 %  Max: 100 %    CVP (mmHg): 6 mmHgBP 114/73 (BP Location: Right arm)  Pulse 69  Temp 97.5  F (36.4  C) (Oral)  Resp 16  Ht 1.702 m (5' 7\")  Wt 81.8 kg (180 lb 6.4 oz)  SpO2 97%  BMI 28.25 kg/m2   Date 18 07 - 18 0659   Shift 6728-5727 5583-8301 6600-3315 24 Hour Total   I  N  T  A  K  E   P.O. 600   600    Shift Total  (mL/kg) 600  (7.19)   600  (7.19)   O  U  T  P  U  T   Urine 425   425    Drains 25   25    Stool 0   0    Shift Total  (mL/kg) 450  (5.39)   450  (5.39)   Weight (kg) 83.51 83.51 83.51 83.51     Admit Weight: 79.2 kg (174 lb 8 oz)   GENERAL APPEARANCE: alert and no distress  HENT: mouth without ulcers or lesions  LYMPHATICS: no cervical or supraclavicular nodes  RESP: lungs clear to auscultation - no rales, rhonchi or wheezes  CV: regular rhythm, normal rate, no rub, no murmur  EDEMA: no LE edema bilaterally  ABDOMEN: soft, nondistended, nontender, bowel sounds normal. Surgical site in dressing- c/d/i  MS: extremities normal - no gross deformities noted, no evidence of inflammation in joints, no muscle tenderness  SKIN: no rash    Data   All labs reviewed by me.  CMP    Recent Labs  Lab 18  0523 18  0639 18  0629 18  0547   * 134 137 138   POTASSIUM 5.0 4.6 4.0 4.1   CHLORIDE 103 106 107 108   CO2 22 21 22 23   ANIONGAP 5 7 8 8   GLC 84 80 77 88   BUN 16 21 22 22   CR 1.18 1.18 1.09 1.17   GFRESTIMATED 64 64 70 65   GFRESTBLACK 78 78 85 78   JE 8.1* 8.0* 8.2* 8.3*   MAG 1.7 1.5* 1.4* 1.4*   PHOS 2.8 2.9 2.4* 2.4*     CBC    Recent Labs  Lab 18  0523 18  0639 18  0629 18  0547   HGB 9.1* 9.0* 8.7* 8.4*   WBC 5.2 5.9 2.8* 2.1*   RBC 3.19* 3.13* 3.04* 2.98*   HCT 28.6* 28.0* 27.5* 27.3*   MCV 90 90 91 92   MCH " 28.5 28.8 28.6 28.2   MCHC 31.8 32.1 31.6 30.8*   RDW 14.0 13.9 13.7 13.7    147* 132* 125*     INR  No lab results found in last 7 days.  ABG  No lab results found in last 7 days.   Urine Studies  Recent Labs   Lab Test  11/16/18   1110  11/11/18   1100  03/06/18   1410  05/31/17   0723   COLOR  Yellow  Light Yellow  Yellow  Straw   APPEARANCE  Clear  Clear  Clear  Clear   URINEGLC  Negative  >1000*  >=1000*  >499*   URINEBILI  Negative  Negative  Negative  Negative   URINEKETONE  Negative  Negative  Negative  Negative   SG  1.019  1.006  1.015  1.009   UBLD  Large*  Trace*  Moderate*  Small*   URINEPH  7.0  8.0*  6.0  6.0   PROTEIN  30*  30*  100*  100*   UROBILINOGEN   --    --   0.2   --    NITRITE  Negative  Negative  Negative  Negative   LEUKEST  Negative  Negative  Negative  Negative   RBCU  >182*  <1  2-5*  10*   WBCU  5  <1  0 - 5  1     Recent Labs   Lab Test  11/11/18   1100  05/24/17   1424  02/01/17   1413  12/29/16   1420  11/29/16   1432  11/15/16   1401  09/28/16   0700  02/12/16   0719  02/10/16   0822  10/19/15   1521  02/03/14   1627   UTPG  1.96*  1.72*  1.06*  0.65*  0.67*  1.02*  0.75*  Lost  RUBA NGUYỄN IN [facility] 2/13/16 BY HN    0.31*  0.13  0.07     PTH  Recent Labs   Lab Test  11/13/18   0834  03/06/18   1354  08/03/17   1440  02/01/17   1412  11/18/16   1448  11/15/16   1402  10/14/13   0718   PTHI  1217*  1238*  790*  719*  985*  816*  466*     Iron Studies  Recent Labs   Lab Test  09/11/18   1402  08/03/17   1440  11/18/16   1448  11/15/16   1402  03/31/16   1316  10/14/13   0718   IRON  85  79  82  94  45  51   FEB  232*  230*  241  240  217*   --    IRONSAT  37  34  34  39  21   --    TIMOTHY  761*  736*   --   655*  837*  578*     MEDICATIONS:  Current Facility-Administered Medications   Medication     acetaminophen (TYLENOL) tablet 975 mg     artificial saliva (BIOTENE DRY MOUTHWASH) liquid 5-10 mL     aspirin EC tablet 81 mg     atorvastatin (LIPITOR) half-tab 5 mg      bisacodyl (DULCOLAX) Suppository 10 mg     calcitRIOL (ROCALTROL) capsule 0.25 mcg     calcium carbonate 500 mg-vitamin D 200 units (OSCAL with D;OYSTER SHELL CALCIUM) per tablet 1 tablet     carvedilol (COREG) tablet 6.25 mg     cholecalciferol (vitamin D3) tablet 2,000 Units     cloNIDine (CATAPRES) tablet 0.1 mg     diphenhydrAMINE (BENADRYL) capsule 25 mg     Lidocaine (LIDOCARE) 4 % Patch 2 patch    And     lidocaine patch REMOVAL    And     lidocaine patch in PLACE     magnesium oxide (MAG-OX) tablet 400 mg     mycophenolic acid (MYFORTIC BRAND) EC tablet 720 mg     naloxone (NARCAN) injection 0.1-0.4 mg     NIFEdipine ER osmotic (PROCARDIA XL) 24 hr tablet 30 mg     nystatin (MYCOSTATIN) suspension 500,000 Units     octreotide (sandoSTATIN) injection 100 mcg     ondansetron (ZOFRAN-ODT) ODT tab 4 mg    Or     ondansetron (ZOFRAN) injection 4 mg     oxyCODONE IR (ROXICODONE) half-tab 10-12.5 mg     pantoprazole (PROTONIX) EC tablet 40 mg     phosphorus tablet 250 mg (PHOSPHA 250 NEUTRAL) per tablet 500 mg     polyethylene glycol (MIRALAX/GLYCOLAX) Packet 17 g     prochlorperazine (COMPAZINE) injection 5 mg     sennosides (SENOKOT) tablet 8.6 mg     sodium chloride (PF) 0.9% PF flush 3 mL     sodium chloride (PF) 0.9% PF flush 3 mL     sodium chloride (PF) 0.9% PF flush 3 mL     sulfamethoxazole-trimethoprim (BACTRIM/SEPTRA) 400-80 MG per tablet 1 tablet     tacrolimus (GENERIC EQUIVALENT) capsule 3.5 mg     valGANciclovir (VALCYTE) tablet 900 mg     Attestation:  This patient has been seen and evaluated by me, Masoud Tate MD.  I have reviewed the note and agree with plan of care as documented by the fellow.

## 2018-11-20 NOTE — CONSULTS
Consult for diabetes education not needed as patient is eucglycemic and not on any antidiabetes medications.  Ila Swan, Diabetes Clinical Nurse [employer]  [phone]

## 2018-11-20 NOTE — PROGRESS NOTES
CLINICAL NUTRITION SERVICES - DISCHARGE NOTE    Patient s discharge needs assessed and discharge planning has been conducted with the multidisciplinary transplant care team including physicians, pharmacy, social work and transplant coordinator.    Follow up/Monitoring:  Once discharged, place outpatient nutrition consult via the transplant team if nutrition concerns arise.    Majo Martinez MS, RD, LD  Pager [phone]

## 2018-11-20 NOTE — PROGRESS NOTES
Prior Authorization Approval    oxycodone 5 mg tabs  Date Initiated: 11/20/2018  Date Completed: 11/20/2018  Prior Auth Type: Quantity Limit     Status: Approved    Effective Date: 10/21/2018 - 11/20/2019  Copay: 0.00  [place] Filled: Yes    Insurance: [employer]  Ph: [phone] opt. 2  ID: [id number]  Case Number: [id number]  Submitted Via: Eleuterio Blair  [facility] Liaison  Ph: [phone] Page: [phone]

## 2018-11-20 NOTE — PLAN OF CARE
Problem: Patient Care Overview  Goal: Plan of Care/Patient Progress Review  Outcome: No Change  A&O, VSS, afebrile, RA. ACHS BG checks. No tx needed. Pain controlled with PRN PO oxy. Tolerating regular diet, denies nausea. BS hypoactive, abd distended & tender, given scheduled bowel regime meds, denies suppos on shift, states that will try tomorrow morning. Voiding. R internal jugular CDI, SL. R PIV CDI, SL. Abd incision is CDI, with staples, no drainage present. R SARAH has minimal drainage, dressing changed. Serosang fluid. Fistula CDI, thrill/bruits present. Up ad rosalio in room. Will continue to monitor pt per soc.

## 2018-11-20 NOTE — PROGRESS NOTES
[facility]  Met with pt to discuss plans for HC.  Pt to be discharged home 11/20 and has agreed to have [facility] follow with services of . Patient care support center processing referral.  Pt verbalized understanding that initial visit is scheduled for   approximately 11/26 or 11/28 after ATC.   Pt has 24 hour phone number for [facility] for any questions or concerns.    Thank you  Mignon Goldman RN, BSN  [facility] Liaison  [facility]  [phone]

## 2018-11-20 NOTE — PHARMACY-TRANSPLANT NOTE
Solid Organ Transplant Recipient Prior to Discharge Note    52 year old male s/p SPK transplant on 11/12/18.    Pharmacy has monitored for medication interactions and immunosuppression levels in conjunction with the multidisciplinary team. In anticipation for discharge, medication therapy needs have been addressed daily throughout the current admission via multidisciplinary rounds and/or discussions, order verification, daily clinical pharmacy review, and communication with prescribers.  Ivy Roque, PharmD

## 2018-11-20 NOTE — PLAN OF CARE
"Problem: Patient Care Overview  Goal: Plan of Care/Patient Progress Review  Outcome: Adequate for Discharge Date Met: 11/20/18  /73 (BP Location: Right arm)  Pulse 69  Temp 97.5  F (36.4  C) (Oral)  Resp 16  Ht 1.702 m (5' 7\")  Wt 81.8 kg (180 lb 6.4 oz)  SpO2 97%  BMI 28.25 kg/m2    Patient to be discharged home with daughter at 1600. Discharge instructions reviewed with patient and all questions answered. SARAH home care instructions and output chart given to and reviewed with patient. Supplies given to patient. Internal jugular removed without complications. PIV removed. Prescriptions have been sent to [facility]. This RN called and gave report to [facility].       "

## 2018-11-20 NOTE — PLAN OF CARE
Problem: Patient Care Overview  Goal: Plan of Care/Patient Progress Review  Outcome: Improving  Sean has been sleeping between cares, VSS, medicated for pain with 15mg of oxy. Every 4hrs and gave tylenol x1. Has been voiding adequate amounts of urine using his urinal. SARAH drained approx. 10cc of serosanguinous no leaking noted. Has also been sitting up in his chair a couple of times this shift. No bm noted. Will continue to monitor and report any significant changes.

## 2018-11-21 ENCOUNTER — OFFICE VISIT (OUTPATIENT)
Dept: PHARMACY | Facility: CLINIC | Age: 53
End: 2018-11-21
Payer: COMMERCIAL

## 2018-11-21 ENCOUNTER — INFUSION THERAPY VISIT (OUTPATIENT)
Dept: INFUSION THERAPY | Facility: CLINIC | Age: 53
End: 2018-11-21
Attending: INTERNAL MEDICINE
Payer: COMMERCIAL

## 2018-11-21 ENCOUNTER — OFFICE VISIT (OUTPATIENT)
Dept: INFUSION THERAPY | Facility: CLINIC | Age: 53
End: 2018-11-21
Attending: NURSE PRACTITIONER
Payer: COMMERCIAL

## 2018-11-21 ENCOUNTER — RADIANT APPOINTMENT (OUTPATIENT)
Dept: GENERAL RADIOLOGY | Facility: CLINIC | Age: 53
End: 2018-11-21
Payer: COMMERCIAL

## 2018-11-21 VITALS
HEART RATE: 88 BPM | SYSTOLIC BLOOD PRESSURE: 117 MMHG | DIASTOLIC BLOOD PRESSURE: 70 MMHG | WEIGHT: 179.68 LBS | BODY MASS INDEX: 28.14 KG/M2 | RESPIRATION RATE: 16 BRPM | TEMPERATURE: 98 F

## 2018-11-21 DIAGNOSIS — R06.00 DYSPNEA, UNSPECIFIED TYPE: ICD-10-CM

## 2018-11-21 DIAGNOSIS — R11.2 NON-INTRACTABLE VOMITING WITH NAUSEA, UNSPECIFIED VOMITING TYPE: ICD-10-CM

## 2018-11-21 DIAGNOSIS — Z94.0 KIDNEY REPLACED BY TRANSPLANT: ICD-10-CM

## 2018-11-21 DIAGNOSIS — N18.5 CKD (CHRONIC KIDNEY DISEASE) STAGE 5, GFR LESS THAN 15 ML/MIN (H): ICD-10-CM

## 2018-11-21 DIAGNOSIS — Z94.0 HYPERTENSION DUE TO KIDNEY TRANSPLANT: ICD-10-CM

## 2018-11-21 DIAGNOSIS — Z94.83 PANCREAS TRANSPLANTED (H): Primary | ICD-10-CM

## 2018-11-21 DIAGNOSIS — Z48.298 AFTERCARE FOLLOWING ORGAN TRANSPLANT: ICD-10-CM

## 2018-11-21 DIAGNOSIS — E55.9 VITAMIN D DEFICIENCY: ICD-10-CM

## 2018-11-21 DIAGNOSIS — Z94.83 STATUS POST SIMULTANEOUS KIDNEY AND PANCREAS TRANSPLANT (H): ICD-10-CM

## 2018-11-21 DIAGNOSIS — R11.0 NAUSEA: ICD-10-CM

## 2018-11-21 DIAGNOSIS — Z94.0 KIDNEY REPLACED BY TRANSPLANT: Primary | ICD-10-CM

## 2018-11-21 DIAGNOSIS — E83.39 HYPOPHOSPHATEMIA: ICD-10-CM

## 2018-11-21 DIAGNOSIS — G89.18 PAIN FOLLOWING SURGERY OR PROCEDURE: ICD-10-CM

## 2018-11-21 DIAGNOSIS — Z92.25 HISTORY OF IMMUNOSUPPRESSION THERAPY: ICD-10-CM

## 2018-11-21 DIAGNOSIS — Z94.83 PANCREAS TRANSPLANTED (H): ICD-10-CM

## 2018-11-21 DIAGNOSIS — Z94.0 STATUS POST SIMULTANEOUS KIDNEY AND PANCREAS TRANSPLANT (H): Chronic | ICD-10-CM

## 2018-11-21 DIAGNOSIS — Z94.0 STATUS POST SIMULTANEOUS KIDNEY AND PANCREAS TRANSPLANT (H): Primary | ICD-10-CM

## 2018-11-21 DIAGNOSIS — Z94.83 STATUS POST SIMULTANEOUS KIDNEY AND PANCREAS TRANSPLANT (H): Primary | ICD-10-CM

## 2018-11-21 DIAGNOSIS — K59.00 CONSTIPATION, UNSPECIFIED CONSTIPATION TYPE: ICD-10-CM

## 2018-11-21 DIAGNOSIS — E83.42 HYPOMAGNESEMIA: ICD-10-CM

## 2018-11-21 DIAGNOSIS — N25.81 SECONDARY RENAL HYPERPARATHYROIDISM (H): ICD-10-CM

## 2018-11-21 DIAGNOSIS — I15.1 HYPERTENSION DUE TO KIDNEY TRANSPLANT: ICD-10-CM

## 2018-11-21 DIAGNOSIS — Z94.0 STATUS POST SIMULTANEOUS KIDNEY AND PANCREAS TRANSPLANT (H): ICD-10-CM

## 2018-11-21 DIAGNOSIS — N25.0 RENAL OSTEODYSTROPHY: ICD-10-CM

## 2018-11-21 DIAGNOSIS — Z94.83 STATUS POST SIMULTANEOUS KIDNEY AND PANCREAS TRANSPLANT (H): Chronic | ICD-10-CM

## 2018-11-21 DIAGNOSIS — K21.9 GASTROESOPHAGEAL REFLUX DISEASE, ESOPHAGITIS PRESENCE NOT SPECIFIED: ICD-10-CM

## 2018-11-21 LAB
AMYLASE SERPL-CCNC: 137 U/L (ref 30–110)
ANION GAP SERPL CALCULATED.3IONS-SCNC: 9 MMOL/L (ref 3–14)
BASOPHILS # BLD AUTO: 0 10E9/L (ref 0–0.2)
BASOPHILS NFR BLD AUTO: 0.1 %
BUN SERPL-MCNC: 13 MG/DL (ref 7–30)
CALCIUM SERPL-MCNC: 8 MG/DL (ref 8.5–10.1)
CHLORIDE SERPL-SCNC: 102 MMOL/L (ref 94–109)
CO2 SERPL-SCNC: 22 MMOL/L (ref 20–32)
CREAT SERPL-MCNC: 1.17 MG/DL (ref 0.66–1.25)
DIFFERENTIAL METHOD BLD: ABNORMAL
EOSINOPHIL # BLD AUTO: 0 10E9/L (ref 0–0.7)
EOSINOPHIL NFR BLD AUTO: 0.5 %
ERYTHROCYTE [DISTWIDTH] IN BLOOD BY AUTOMATED COUNT: 13.8 % (ref 10–15)
GFR SERPL CREATININE-BSD FRML MDRD: 65 ML/MIN/1.7M2
GLUCOSE SERPL-MCNC: 91 MG/DL (ref 70–99)
HCT VFR BLD AUTO: 29.1 % (ref 40–53)
HGB BLD-MCNC: 9.3 G/DL (ref 13.3–17.7)
IMM GRANULOCYTES # BLD: 0.1 10E9/L (ref 0–0.4)
IMM GRANULOCYTES NFR BLD: 0.9 %
LIPASE FLD-CCNC: 628 U/L
LIPASE SERPL-CCNC: 584 U/L (ref 73–393)
LYMPHOCYTES # BLD AUTO: 0.1 10E9/L (ref 0.8–5.3)
LYMPHOCYTES NFR BLD AUTO: 0.6 %
MAGNESIUM SERPL-MCNC: 1.6 MG/DL (ref 1.6–2.3)
MCH RBC QN AUTO: 28.7 PG (ref 26.5–33)
MCHC RBC AUTO-ENTMCNC: 32 G/DL (ref 31.5–36.5)
MCV RBC AUTO: 90 FL (ref 78–100)
MONOCYTES # BLD AUTO: 0.2 10E9/L (ref 0–1.3)
MONOCYTES NFR BLD AUTO: 2.6 %
NEUTROPHILS # BLD AUTO: 7.6 10E9/L (ref 1.6–8.3)
NEUTROPHILS NFR BLD AUTO: 95.3 %
NRBC # BLD AUTO: 0 10*3/UL
NRBC BLD AUTO-RTO: 0 /100
PHOSPHATE SERPL-MCNC: 2.4 MG/DL (ref 2.5–4.5)
PLATELET # BLD AUTO: 251 10E9/L (ref 150–450)
POTASSIUM SERPL-SCNC: 5.2 MMOL/L (ref 3.4–5.3)
RBC # BLD AUTO: 3.24 10E12/L (ref 4.4–5.9)
SODIUM SERPL-SCNC: 133 MMOL/L (ref 133–144)
SPECIMEN SOURCE FLD: NORMAL
TACROLIMUS BLD-MCNC: 12 UG/L (ref 5–15)
TME LAST DOSE: NORMAL H
WBC # BLD AUTO: 8 10E9/L (ref 4–11)

## 2018-11-21 PROCEDURE — 85025 COMPLETE CBC W/AUTO DIFF WBC: CPT | Performed by: INTERNAL MEDICINE

## 2018-11-21 PROCEDURE — 83690 ASSAY OF LIPASE: CPT | Performed by: NURSE PRACTITIONER

## 2018-11-21 PROCEDURE — 83690 ASSAY OF LIPASE: CPT | Mod: 91 | Performed by: INTERNAL MEDICINE

## 2018-11-21 PROCEDURE — 99607 MTMS BY PHARM ADDL 15 MIN: CPT | Performed by: PHARMACIST

## 2018-11-21 PROCEDURE — 99605 MTMS BY PHARM NP 15 MIN: CPT | Performed by: PHARMACIST

## 2018-11-21 PROCEDURE — 80180 DRUG SCRN QUAN MYCOPHENOLATE: CPT | Performed by: INTERNAL MEDICINE

## 2018-11-21 PROCEDURE — G0463 HOSPITAL OUTPT CLINIC VISIT: HCPCS

## 2018-11-21 PROCEDURE — 84100 ASSAY OF PHOSPHORUS: CPT | Performed by: INTERNAL MEDICINE

## 2018-11-21 PROCEDURE — 82150 ASSAY OF AMYLASE: CPT | Performed by: INTERNAL MEDICINE

## 2018-11-21 PROCEDURE — 80197 ASSAY OF TACROLIMUS: CPT | Performed by: INTERNAL MEDICINE

## 2018-11-21 PROCEDURE — 80048 BASIC METABOLIC PNL TOTAL CA: CPT | Performed by: INTERNAL MEDICINE

## 2018-11-21 PROCEDURE — 83735 ASSAY OF MAGNESIUM: CPT | Performed by: INTERNAL MEDICINE

## 2018-11-21 RX ORDER — TACROLIMUS 1 MG/1
3 CAPSULE ORAL EVERY 12 HOURS
Qty: 180 CAPSULE | Refills: 11 | Status: SHIPPED | OUTPATIENT
Start: 2018-11-21 | End: 2018-11-22

## 2018-11-21 RX ORDER — PANTOPRAZOLE SODIUM 20 MG/1
40 TABLET, DELAYED RELEASE ORAL
Qty: 90 TABLET | Refills: 3 | Status: SHIPPED | OUTPATIENT
Start: 2018-11-21 | End: 2019-01-08

## 2018-11-21 RX ORDER — CALCITRIOL 0.25 UG/1
0.25 CAPSULE, LIQUID FILLED ORAL DAILY
Qty: 90 CAPSULE | Refills: 1 | Status: SHIPPED | OUTPATIENT
Start: 2018-11-21 | End: 2019-03-08

## 2018-11-21 RX ORDER — TACROLIMUS 0.5 MG/1
CAPSULE ORAL
Qty: 60 CAPSULE | Refills: 11 | Status: SHIPPED | OUTPATIENT
Start: 2018-11-21 | End: 2018-11-22

## 2018-11-21 RX ORDER — MAGNESIUM OXIDE 400 MG/1
400 TABLET ORAL 2 TIMES DAILY
Qty: 90 TABLET | Refills: 2 | COMMUNITY
Start: 2018-11-21 | End: 2018-11-22

## 2018-11-21 NOTE — PATIENT INSTRUCTIONS
Recommendations from today's MTM visit:                                                    MTM (medication therapy management) is a service provided by a clinical pharmacist designed to help you get the most of out of your medicines.   Today we reviewed what your medicines are for, how to know if they are working, that your medicines are safe and how to make your medicine regimen as easy as possible.     1. Try filling Calcitriol 0.25mcg daily today on your way out.    2. Use the Miralax 17 grams daily to make sure you have normal bowel movements while on Oxycodone.     3. Use an alarm on your phone to remind you to take your medications on time.     4. Try using Compazine before your morning medications to help with nausea/ vomiting.     Next MTM visit: 2 months post transplant, call me if you have any questions!    To schedule another MTM appointment, please call the clinic directly or you may call the MTM scheduling line at [phone] or toll-free at [phone].     My Clinical Pharmacist's contact information:                                                      It was a pleasure talking with you today!  Please feel free to contact me with any questions or concerns you have.      Luke Tesfaye, PharmD  MTM Pharmacist    Phone: [phone]     You may receive a survey about the MTM services you received.  I would appreciate your feedback to help me serve you better in the future. Please fill it out and return it when you can. Your comments will be anonymous.

## 2018-11-21 NOTE — PROGRESS NOTES
ACUTE TRANSPLANT NEPHROLOGY VISIT    Assessment & Plan   # DDKT ([facility]): baseline Cr ~ 1.1-1.2; Stable   - Proteinuria: Not checked recently   - Latest DSA: No Date of DSA last checked: 2018   - BK: No   - Kidney Tx Biopsy: No    # Pancreas Tx ([facility]):Enteric drained     - Blood glucose: Euglycemia   - HbA1c: not checked   - Pancreatic enzymes: Decreased   - Date of DSA last checked: 2018 Latest DSA: No    # Immunosuppression: Tacrolimus immediate release (goal  8-10) and Mycophenolic acid (goal  1-3.5)   - Changes: No     # Dyspnea: concern for atelectasis.  Will check a 2v xray and supply an incentive spirometry with instructions to use hourly at home.    # Prophylaxis:   - PJP: TMP/Sulfa (Bactrim)   - CMV: Valcyte x3 months    # Hypertension: Controlled; Goal BP: < 130/80 on nifedipine 30 mg bid and carvedilol 6.25 mg bid   - Changes: Yes - heart rate currently slow at 47. Will hold bp medications including carvedilol and nifedipine.  If the systolic blood pressurei s above 150 mmhg this evening then ok to continue nifedipine.    # Anemia in chronic renal disease: Hgb: Increased   - Iron studies: Replete    # Mineral Bone Disorder:    - Secondary renal hyperparathyroidism; PTH level is: Significantly elevated   - Vitamin D; level is: Low   - Calcium; level is: Low   - Phosphorus; level is: Low.   - Continue calcitriol and cholecalciferol and phos supplementation, refills supplied   - Recheck levels at 4 months    # Electrolytes:   - Potassium; level: Normal  - Magnesium; level: Normal on supplementation  - Bicarbonate; level: Normal    Return visit: Return in about 1 day (around 2018).    # Transplant History:  Etiology of kidney failure: diabetic nephropathy  Tx: DDKT ([facility])  Transplant: 2018 (Kidney / Pancreas), 10/10/2013 (Kidney)  Donor Type:  - Brain Death Donor Class:   Crossmatch at time of Tx: negative  DSA at time of Tx: No  Significant changes in immunosuppression: None  CMV IgG Ab  Discordance (D+/R-): No  EBV IgG Ab Discordance (D+/R-): No  Significant transplant-related complications: None    Transplant Office Phone Number: [phone]    Assessment and plan was discussed with the patient and he voiced his understanding and agreement.    Viral Braxton De Los Santos MD    Chief Complaint   Mr. Lewis is a 52 year old here for hospital follow up following kidney transplant    History of Present Illness     Recent Hospitalizations:  [] No [x] Yes S/p kidney / panc transplant   New Medical Issues: [] No [x] Yes See below   Decreased energy: [x] No [] Yes He notes some improvement and good energy at times   Chest pain or SOB with exertion:  [] No [x] Yes He notes dyspnea since the time of transplant.  The dyspnea is episodic and not associated with fluid, o2 requirements, cough, or fever.  A prior xray on 11/12 showed streaky atelectasis bilaterally.   Appetite change or weight change: [x] No [] Yes    Nausea, vomiting or diarrhea:  [] No [x] Yes Good appetite but one episode of vomiting this am.  The patient has no diarrhea but is somewhat constipated he feels   Fever, sweats or chills: [x] No [] Yes    Leg swelling: [x] No [] Yes      Other medical issues:  No    Home BP: 110/70    Review of Systems   A comprehensive review of systems was obtained and negative, except as noted in the HPI or PMH.    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)     Diabetes mellitus with background retinopathy (H)     Polycystic kidney     NONSPECIFIC MEDICAL HISTORY     Coronary artery disease     Retinopathy     Hyperlipidemia LDL goal <70     Premature ventricular contractions (PVCs) (VPCs)     Kidney replaced by transplant     Immunosuppressed status (H)     Kidney transplant rejection     Hyperglycemia     Hypertension     Anemia in chronic renal disease     Care after organ transplant     Esophageal ulcer     Status post simultaneous kidney and  pancreas transplant (H)     Other chronic pain     Nausea     Drug induced constipation     Hypophosphatemia       Social History   Social History   Substance Use Topics     Smoking status: Never Smoker     Smokeless tobacco: Never Used     Alcohol use No       Allergies   Allergies   Allergen Reactions     No Known Allergies        Medications   Current Outpatient Prescriptions   Medication Sig     acetaminophen (TYLENOL) 325 MG tablet Take 3 tablets (975 mg) by mouth every 8 hours as needed for mild pain or fever (DO NOT USE WITH NORCO) (Patient not taking: Reported on 11/21/2018)     aspirin 81 MG EC tablet Take 1 tablet (81 mg) by mouth daily     atorvastatin (LIPITOR) 10 MG tablet Take 0.5 tablets (5 mg) by mouth daily     blood glucose monitoring (NO BRAND SPECIFIED) test strip Use to test blood sugar 4 times daily or as directed.     calcitRIOL (ROCALTROL) 0.25 MCG capsule Take 1 capsule (0.25 mcg) by mouth daily     calcium carbonate 500 mg-vitamin D 200 units (OSCAL WITH D;OYSTER SHELL CALCIUM) 500-200 MG-UNIT per tablet Take 1 tablet by mouth 2 times daily (with meals)     carvedilol (COREG) 6.25 MG tablet Take 1 tablet (6.25 mg) by mouth 2 times daily     cholecalciferol (VITAMIN  -D) 1000 UNITS capsule Take 2 capsules (2,000 Units) by mouth daily     HYDROcodone-acetaminophen (NORCO) 5-325 MG per tablet Stop taking until you have completed your oxycodone taper. DO NOT TAKE WITH OXYCODONE. (Patient not taking: Reported on 11/21/2018)     Lidocaine (LIDOCARE) 4 % Patch Place 2 patches onto the skin every 24 hours As needed for pain     magnesium oxide (MAG-OX) 400 MG tablet Take 1 tablet (400 mg) by mouth 3 times daily     mycophenolic acid (GENERIC EQUIVALENT) 360 MG EC tablet Take 2 tablets (720 mg) by mouth 2 times daily     NIFEdipine ER osmotic (PROCARDIA XL) 30 MG 24 hr tablet Take 1 tablet (30 mg) by mouth 2 times daily     nystatin (MYCOSTATIN) 468880 UNIT/ML suspension Take 5 mLs (500,000 Units)  by mouth 4 times daily     ondansetron (ZOFRAN-ODT) 4 MG ODT tab Take 1 tablet (4 mg) by mouth every 6 hours as needed for nausea or vomiting     oxyCODONE IR (ROXICODONE) 5 MG tablet Take 10-12.5mg (2-2.5 tabs) every 4 hours as needed for pain on 11/20-11/22  Take 5-10mg (1-2 tabs) every 4 hours as needed for pain on 11/23-11/25  Take 5-7.5mg (1-1.5 tabs) every 4 hours as needed for pain on 11/26-11/28  Starting 11/29 you may restart your regular dose of Norco. DO NOT USE OXYCODONE AND NORCO AT THE SAME TIME.     phosphorus tablet 250 mg (PHOSPHA 250 NEUTRAL) 250 MG per tablet Take 2 tablets (500 mg) by mouth 2 times daily     polyethylene glycol (MIRALAX) powder Take 17 g (1 capful) by mouth 2 times daily Hold for loose stools     prochlorperazine (COMPAZINE) 5 MG tablet Take 1 tablet (5 mg) by mouth every 6 hours as needed for nausea or vomiting     sennosides (SENOKOT) 8.6 MG tablet Take 2 tablets by mouth 2 times daily Hold for loose stools     sulfamethoxazole-trimethoprim (BACTRIM/SEPTRA) 400-80 MG per tablet Take 1 tablet by mouth daily     tacrolimus (GENERIC EQUIVALENT) 0.5 MG capsule Take 1 capsule (0.5 mg) by mouth 2 times daily Take with 1mg caps for a total of 3.5mg twice daily     tacrolimus (GENERIC EQUIVALENT) 0.5 MG capsule Take 1 cap by mouth twice daily as directed by Transplant Center for dose changes     tacrolimus (GENERIC EQUIVALENT) 1 MG capsule Take 3 capsules (3 mg) by mouth 2 times daily Take with 0.5mg caps for a total of 3.5mg twice daily     valGANciclovir (VALCYTE) 450 MG tablet Take 2 tablets (900 mg) by mouth daily     No current facility-administered medications for this visit.      There are no discontinued medications.    Physical Exam   Vital Signs: There were no vitals taken for this visit.    GENERAL APPEARANCE: alert and no distress  HENT: mouth without ulcers or lesions  LYMPHATICS: no cervical or supraclavicular nodes  RESP: crackles in right lung base on auscultation - no  rales, rhonchi or wheezes  CV: regular rhythm, normal rate, no rub, no murmur  EDEMA: no LE edema bilaterally  ABDOMEN: soft, + distended, nontender, bowel sounds normal  MS: extremities normal - no gross deformities noted, no evidence of inflammation in joints, no muscle tenderness  SKIN: no rash  TX KIDNEY: normal    Data     Renal Latest Ref Rng & Units 11/21/2018 11/20/2018 11/19/2018   Na 133 - 144 mmol/L 133 131(L) 134   K 3.4 - 5.3 mmol/L 5.2 5.0 4.6   Cl 94 - 109 mmol/L 102 103 106   CO2 20 - 32 mmol/L 22 22 21   BUN 7 - 30 mg/dL 13 16 21   Cr 0.66 - 1.25 mg/dL 1.17 1.18 1.18   Cr (external) 0.5 - 1.5 mg/dL - - -   Glucose 70 - 99 mg/dL 91 84 80   Ca  8.5 - 10.1 mg/dL 8.0(L) 8.1(L) 8.0(L)   Mg 1.6 - 2.3 mg/dL 1.6 1.7 1.5(L)     Bone Health Latest Ref Rng & Units 11/21/2018 11/20/2018 11/19/2018   Phos 2.5 - 4.5 mg/dL 2.4(L) 2.8 2.9   PTHi 18 - 80 pg/mL - - -   Vit D Def 20 - 75 ug/L - - -     Heme Latest Ref Rng & Units 11/21/2018 11/20/2018 11/19/2018   WBC 4.0 - 11.0 10e9/L 8.0 5.2 5.9   Hgb 13.3 - 17.7 g/dL 9.3(L) 9.1(L) 9.0(L)   Plt 150 - 450 10e9/L 251 165 147(L)     Liver Latest Ref Rng & Units 11/11/2018 6/27/2018 8/3/2017   AP 40 - 150 U/L 189(H) - -   TBili 0.2 - 1.3 mg/dL 0.3 - -   ALT 0 - 70 U/L 14 - -   AST 0 - 45 U/L 9 - -   Tot Protein 6.8 - 8.8 g/dL 7.6 - -   Albumin 3.4 - 5.0 g/dL 4.0 4.2 4.2     Pancreas Latest Ref Rng & Units 11/21/2018 11/20/2018 11/19/2018   A1C 0 - 5.6 % - - -   Amylase 30 - 110 U/L 137(H) 198(H) 257(H)   Lipase 73 - 393 U/L 584(H) 784(H) 1188(H)     Iron studies Latest Ref Rng & Units 9/11/2018 8/3/2017 11/18/2016   Iron 35 - 180 ug/dL 85 79 82   Iron sat 15 - 46 % 37 34 34   Ferritin 26 - 388 ng/mL 761(H) 736(H) -     [facility] Txp Virology Latest Ref Rng & Units 11/11/2018 11/1/2018 9/14/2018   CVM DNA Quant - - - -   CMV Quant <100 Copies/mL - - -   CMV QT Log <2.0 Log copies/mL - - -   BK Spec - - Plasma, EDTA anticoagulant -   BK Res BKNEG:BK Virus DNA Not Detected  copies/mL - BK Virus DNA Not Detected -   BK Log <2.7 Log copies/mL - Not Calculated -   Hep B Core NR:Nonreactive Nonreactive - Nonreactive   Hep B Surf - - - -   HIV 1&2 NEG - - -        Recent Labs   Lab Test  11/18/18   0629  11/19/18   0639  11/20/18   0523   DOSTAC  Not Provided  Not Provided  Not Provided   TACROL  6.8  6.1  8.6     Recent Labs   Lab Test  05/04/17   1408  05/24/17   1414  08/03/17   1441   DOSMPA  LAST DOSE 5/4/2017 AT 2:00 AM  Last dose 5/24/17 at 0430AM  08/03/2017 AT 0330 AM   MPACID  2.97  2.79  3.63*   MPAG  >200.0*  >200.0*  >200.0*

## 2018-11-21 NOTE — MR AVS SNAPSHOT
After Visit Summary   11/21/2018    Amadou Lewis    MRN: [id number]           Patient Information     Date Of Birth          1965        Visit Information        Provider Department      11/21/2018 8:30 AM [facility]        Care Instructions    Recommendations from today's MTM visit:                                                    MTM (medication therapy management) is a service provided by a clinical pharmacist designed to help you get the most of out of your medicines.   Today we reviewed what your medicines are for, how to know if they are working, that your medicines are safe and how to make your medicine regimen as easy as possible.     1. Try filling Calcitriol 0.25mcg daily today on your way out.    2. Use the Miralax 17 grams daily to make sure you have normal bowel movements while on Oxycodone.     3. Use an alarm on your phone to remind you to take your medications on time.     4. Try using Compazine before your morning medications to help with nausea/ vomiting.     Next MTM visit: 2 months post transplant, call me if you have any questions!    To schedule another MTM appointment, please call the clinic directly or you may call the MTM scheduling line at [phone] or toll-free at [phone].     My Clinical Pharmacist's contact information:                                                      It was a pleasure talking with you today!  Please feel free to contact me with any questions or concerns you have.      Luke Tesfaye, PharmD  MTM Pharmacist    Phone: [phone]     You may receive a survey about the MTM services you received.  I would appreciate your feedback to help me serve you better in the future. Please fill it out and return it when you can. Your comments will be anonymous.            Follow-ups after your visit        Your next 10 appointments already scheduled     Nov 22, 2018  7:00 AM CST   (Arrive by  6:45 AM)   New Transplant Visit with UC SPEC INFUSION, UC 43 ATC   [facility] Specialty and Procedure ([facility])    [address]  [address]  [facility] [address]   [phone]            Nov 23, 2018  7:00 AM CST   (Arrive by 6:45 AM)   New Transplant Visit with UC SPEC INFUSION, UC 43 ATC   [facility] Specialty [facility] Procedure ([facility])    [address]  [address]  [facility] [address]   [phone]            Nov 23, 2018  8:00 AM CST   (Arrive by 7:45 AM)   Kidney Transplant Discharge with Jose L De Los Santos MD   [facility] Specialty [facility] Procedure ([facility])    [address]  [address]  [facility] [address]   [phone]            Dec 03, 2018  2:30 PM CST   (Arrive by 2:15 PM)   Post-Op with Monique Luevano MD   [facility] Solid Organ Transplant ([facility])    [address]  [address]  [facility] [address]   [phone]            Dec 10, 2018  9:30 AM CST   (Arrive by 9:00 AM)   Return Kidney Transplant with Uc Early Post Transplant   [facility] Nephrology ([facility])    [address]  [address]  [facility] [address]   [phone]            Dec 20, 2018  9:00 AM CST   (Arrive by 8:45 AM)   Cystoscopy with Sandy Cole NP   [facility] Solid Organ Transplant ([facility])    [address]  [address]  [facility] [address]   [phone]            Jan 08, 2019 11:00 AM CST   (Arrive by 10:30 AM)   Return Kidney Transplant with Uc Early Post Transplant   [facility] Nephrology ([facility])    [address] [place] [address]  [address]  [facility] [address]   [phone]            Mar 04, 2019 11:00 AM CST   (Arrive by 10:30 AM)   Return Kidney Transplant with Uc Early Post Transplant   [facility]  Nephrology ([facility])    [address]  [address]  [facility] [address] [phone]              Who to contact     If you have questions or need follow up information about today's clinic visit or your schedule please contact [facility] MEDICATION THERAPY MANAGEMENT directly at [phone].  Normal or non-critical lab and imaging results will be communicated to you by MyChart, letter or phone within 4 business days after the clinic has received the results. If you do not hear from us within 7 days, please contact the clinic through [employer] or phone. If you have a critical or abnormal lab result, we will notify you by phone as soon as possible.  Submit refill requests through [employer] or call your pharmacy and they will forward the refill request to us. Please allow 3 business days for your refill to be completed.          Additional Information About Your Visit        [employer] Information     [employer] gives you secure access to your electronic health record. If you see a primary care provider, you can also send messages to your care team and make appointments. If you have questions, please call your primary care clinic.  If you do not have a primary care provider, please call [phone] and they will assist you.        Care EveryWhere ID     This is your Care EveryWhere ID. This could be used by other organizations to access your [facility] medical records  [id number]         Blood Pressure from Last 3 Encounters:   11/20/18 114/73   11/01/18 136/85   10/08/18 144/75    Weight from Last 3 Encounters:   11/21/18 (P) 179 lb 10.8 oz (81.5 kg)   11/20/18 180 lb 6.4 oz (81.8 kg)   11/01/18 175 lb (79.4 kg)              Today, you had the following     No orders found for display       Primary Care Provider Office Phone # Fax #    Masoud Valentin MD, [phone]       [address] [place] [address]        Equal Access to  Services     [facility]: Hadii aad ku hadrosysantiago Akilagenoveva, waaxda luqadaha, qaybta kaalmaantonio bueno, jack jeffers . So [facility] [phone].    ATENCIÓN: Si habla ant, tiene a tejeda disposición servicios gratuitos de asistencia lingüística. Llame al [phone].    We comply with applicable federal civil rights laws and [place] laws. We do not discriminate on the basis of race, color, national origin, age, disability, sex, sexual orientation, or gender identity.            Thank you!     Thank you for choosing [facility] MEDICATION THERAPY MANAGEMENT  for your care. Our goal is always to provide you with excellent care. Hearing back from our patients is one way we can continue to improve our services. Please take a few minutes to complete the written survey that you may receive in the mail after your visit with us. Thank you!             Your Updated Medication List - Protect others around you: Learn how to safely use, store and throw away your medicines at www.disposemymeds.org.          This list is accurate as of 11/21/18  8:58 AM.  Always use your most recent med list.                   Brand Name Dispense Instructions for use Diagnosis    acetaminophen 325 MG tablet    TYLENOL    100 tablet    Take 3 tablets (975 mg) by mouth every 8 hours as needed for mild pain or fever (DO NOT USE WITH NORCO)    Kidney replaced by transplant       aspirin 81 MG EC tablet     30 tablet    Take 1 tablet (81 mg) by mouth daily    Pancreas transplanted (H)       atorvastatin 10 MG tablet    LIPITOR    45 tablet    Take 0.5 tablets (5 mg) by mouth daily    Coronary artery disease involving native coronary artery of native heart without angina pectoris       blood glucose monitoring test strip    no brand specified    1 Box    Use to test blood sugar 4 times daily or as directed.    Diabetes mellitus with background retinopathy (H)       calcitRIOL 0.25 MCG capsule    ROCALTROL    90 capsule    Take 1  capsule (0.25 mcg) by mouth daily    Renal osteodystrophy       calcium carbonate 500 mg-vitamin D 200 units 500-200 MG-UNIT per tablet    OSCAL with D;OYSTER SHELL CALCIUM    60 tablet    Take 1 tablet by mouth 2 times daily (with meals)    Kidney replaced by transplant       carvedilol 6.25 MG tablet    COREG    60 tablet    Take 1 tablet (6.25 mg) by mouth 2 times daily    Hypertension secondary to other renal disorders       cholecalciferol 1000 units capsule    vitamin  -D    180 capsule    Take 2 capsules (2,000 Units) by mouth daily    CKD (chronic kidney disease) stage 5, GFR less than 15 ml/min (H)       HYDROcodone-acetaminophen 5-325 MG per tablet    NORCO     Stop taking until you have completed your oxycodone taper. DO NOT TAKE WITH OXYCODONE.    Other chronic pain       Lidocaine 4 % Patch    LIDOCARE    10 patch    Place 2 patches onto the skin every 24 hours As needed for pain    Pancreas transplanted (H)       magnesium oxide 400 MG tablet    MAG-OX    90 tablet    Take 1 tablet (400 mg) by mouth 3 times daily    Pancreas transplanted (H)       mycophenolic acid 360 MG EC tablet    GENERIC EQUIVALENT    120 tablet    Take 2 tablets (720 mg) by mouth 2 times daily    Pancreas transplanted (H)       NIFEdipine ER osmotic 30 MG 24 hr tablet    PROCARDIA XL    60 tablet    Take 1 tablet (30 mg) by mouth 2 times daily    Hypertension secondary to other renal disorders       nystatin [id number] UNIT/ML suspension    MYCOSTATIN    600 mL    Take 5 mLs (500,000 Units) by mouth 4 times daily    Immunosuppressed status (H)       ondansetron 4 MG ODT tab    ZOFRAN-ODT    30 tablet    Take 1 tablet (4 mg) by mouth every 6 hours as needed for nausea or vomiting    Nausea       oxyCODONE IR 5 MG tablet    ROXICODONE    108 tablet    Take 10-12.5mg (2-2.5 tabs) every 4 hours as needed for pain on 11/20-11/22 Take 5-10mg (1-2 tabs) every 4 hours as needed for pain on 11/23-11/25 Take 5-7.5mg (1-1.5 tabs) every 4 hours  as needed for pain on 11/26-11/28 Starting 11/29 you may restart your regular dose of Norco. DO NOT USE OXYCODONE AND NORCO AT THE SAME TIME.    Kidney replaced by transplant       phosphorus tablet 250 mg 250 MG per tablet    PHOSPHA 250 NEUTRAL    120 tablet    Take 2 tablets (500 mg) by mouth 2 times daily    Hypophosphatemia       polyethylene glycol powder    MIRALAX    850 g    Take 17 g (1 capful) by mouth 2 times daily Hold for loose stools    Drug-induced constipation       prochlorperazine 5 MG tablet    COMPAZINE    30 tablet    Take 1 tablet (5 mg) by mouth every 6 hours as needed for nausea or vomiting    Nausea       sennosides 8.6 MG tablet    SENOKOT    120 each    Take 2 tablets by mouth 2 times daily Hold for loose stools    Drug-induced constipation       sulfamethoxazole-trimethoprim 400-80 MG per tablet    BACTRIM/SEPTRA    30 tablet    Take 1 tablet by mouth daily    Pancreas transplanted (H), Kidney replaced by transplant       * tacrolimus 1 MG capsule    GENERIC EQUIVALENT    180 capsule    Take 3 capsules (3 mg) by mouth 2 times daily Take with 0.5mg caps for a total of 3.5mg twice daily    Pancreas transplanted (H)       * tacrolimus 0.5 MG capsule    GENERIC EQUIVALENT    60 capsule    Take 1 capsule (0.5 mg) by mouth 2 times daily Take with 1mg caps for a total of 3.5mg twice daily    Pancreas transplanted (H)       * tacrolimus 0.5 MG capsule    GENERIC EQUIVALENT    30 capsule    Take 1 cap by mouth twice daily as directed by Transplant Center for dose changes    Pancreas transplanted (H)       valGANciclovir 450 MG tablet    VALCYTE    152 tablet    Take 2 tablets (900 mg) by mouth daily    Kidney replaced by transplant, Pancreas transplanted (H)       * Notice:  This list has 3 medication(s) that are the same as other medications prescribed for you. Read the directions carefully, and ask your doctor or other care provider to review them with you.

## 2018-11-21 NOTE — PROGRESS NOTES
SUBJECTIVE/OBJECTIVE:                           Amadou Lewis is a 52 year old male coming in for an initial visit for Medication Therapy Management.  He was referred to me from txp team. Seen in [facility]    Chief Complaint: 9 days post transplant. Discharged yesterday.     Allergies/ADRs: Reviewed in Epic  Tobacco: No tobacco use  Alcohol: Less than 1 beverage / month  Caffeine: 1 cups/day of coffee  Activity: fatigued   PMH: Reviewed in Epic    Medication Adherence/Access:  Using two medicine jars, has to eat with his medications.   Patient takes medications 4 time(s) per day. 7AM and 7PM  Per patient, misses medication 0 times per week.   The patient fills medications at [place]: YES for txp medications.     Renal/ Panc Transplant:  Current immunosuppressants include Tacrolimus 3.5mg BID(0-6 months post tx, goal 8-10) and Myfortic 720mg BID.  Pt reports nausea after taking his meds in the morning.   Has Calcitriol on his list, but does not have to bottle today.   Has only had to use insulin once in the past week, previous DMII patient prior to txp.   Transplant date: Second kidney transplant (2013), this one 11/12/18 with kidney pancreas.   Estimated Creatinine Clearance: 73.9 mL/min (based on Cr of 1.17).  CMV prophylaxis: CrCl >/=60 mL/minute: Valcyte 900mg daily Treat 3 months post tx   PCP prophylaxis: Bactrim  S S daily  Antifungal Prophylaxis: Nystatin QID  PPI use: none  Current supplements for electrolyte replacement: Mag Oxide 400mg TID (2 dosesseparated 2 hours from MMF), one dose to be taken with transplant meds.  Magnesium   Date Value Ref Range Status   11/21/2018 1.6 1.6 - 2.3 mg/dL Final   Tx Coordinator: Zora Hartley, Tx MD: Dr. De Los Santos today, Using Med Card: Yes  Recent Infections:  None noted  Recent Hospitalizations: 9 day post txp, discharged yesterday, was waiting for him to have a BM.   Home BPs: not discussed  Skin care: not discussed.  Immunizations: annual flu shot unknown, [id number]:  1 dose 2010, due 6 months post txp; Prevnar 13: Due 6 months post txp if he has not gotten already. 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 Typically takes Norco for this as well. Taking APAP and Oxycodone 10mg every 4 hours, Lidocaine patches 4%, unsure if the patches are helping.    BMs: Hospital would not discharge until he had a BM, was discharged yesterday and had a BM this morning as well. States his stools are loose and is planning on stopping miralax and senna as he had been taking these scheduled.     Nausea/Vom: Complaining of nausea after taking his morning medications. Has to take these with food to reduce this. He vomited this morning unfortunately. Compazine is effective for him, but it makes him sleepy. Ondansetron does not improve symptoms.     Today's Vitals: There were no vitals taken for this visit.      ASSESSMENT:                             Current medications were reviewed today.     Medication Adherence: good, no issues identified. Recommended he start using a phone alarm to remind him to take his meds. This is his second transplant, and he has a good med system. He is familiar with the refill process at [facility] and calls them before he needs his meds.     Renal/ Panc Transplant:  Pt to order calcitriol for refill, he did not have this with his bottels today.. Discussed  at least 2 doses of magnesium from Myfortic. Can either take 800mg as one dose and 400mg as another, or take one dose with evening meds.     Surgical pain: Stable.      BMs: Considering his difficulty having BM and his schedueld opioids, recommended patient not discontinue both Miralax and Senna despite loose stools. At minimum, patient to continue Miralax daily, and add Senna back in if needed.      Nausea/Vom: Pt may take  Compazine before his morning medications to help with nausea over the next few days.     PLAN:                             Pt to..  1. Fill Calcitriol  2. Continue Miralax 17 grams daily  3. Set alarm on phone as a med reminder.   4. Use Compazine before his morning medications to improve morning nausea.    I spent 40 minutes with this patient today. I offer these suggestions for consideration by txp team. A copy of the visit note was provided to the patient's referring provider.    Will follow up in 2 months.    The patient was given a summary of these recommendations as an after visit summary.     Luke Tesfaye, PharmD  [facility] Pharmacist    Phone: [phone]

## 2018-11-21 NOTE — MR AVS SNAPSHOT
"              After Visit Summary   11/21/2018    Amadou Lewis    MRN: [id number]           Patient Information     Date Of Birth          1965        Visit Information        Provider Department      11/21/2018 7:00 AM UC 41 ATC; UC SPEC [facility] Specialty and Procedure        Today's Diagnoses     Kidney replaced by transplant    -  1    Status post simultaneous kidney and pancreas transplant (H)          Care Instructions    Dear Amadou Lewis    Thank you for choosing [facility] ([facility]) for your transplant cares.  The following information is a summary of our appointment as well as important reminders.      Please make sure your phone is available today because I will call to update you with your anti-rejection drug levels and possibly make changes to your anti-rejection dosages.    Please return tomorrow for labs and assessment at 0700.   Please bring your pill box and lab book to tomorrow's appt.    Please use your Incentive Spirometer every hour while awake.     Please watch \"Kidney and Pancreas Complications\" and \"General Health\" videos on the transplant website.     Check your blood glucose before meals and at bedtime.    Please  a new BP machine and your RX for Protonix, Calcitriol and Cholecalciferol at the outpatient pharmacy today.       We look forward in seeing you on your next appointment here at [facility].  Please don t hesitate to call us at [phone] to reschedule any of your appointments or to speak with one of the [facility] registered nurses.  It was a pleasure taking care of you today.    Sincerely,    [facility]  Special[facility]fusion [facility]  [address] [place] [address]  [address]  Phone:  [phone]            Follow-ups after your visit        Your next 10 appointments already scheduled     Nov 22, 2018  7:00 AM CST   (Arrive by 6:45 AM) "   New Transplant Visit with UC SPEC INFUSION, UC 43 ATC   [facility] Specialty and Procedure ([facility])    [address]  [address]  [facility] [address]   [phone]            Nov 23, 2018  7:00 AM CST   (Arrive by 6:45 AM)   New Transplant Visit with UC SPEC INFUSION, UC 43 ATC   [facility] Specialty and Procedure ([facility])    [address]  [address]  [facility] [facility] [address]   [phone]            Nov 23, 2018  8:00 AM CST   (Arrive by 7:45 AM)   Kidney Transplant Discharge with Jose L De Los Santos MD   [facility] Specialty and Procedure ([facility])    [address]  [address]  [facility] [facility] [address]   [phone]            Dec 03, 2018  2:30 PM CST   (Arrive by 2:15 PM)   Post-Op with Monique Luevano MD   [facility] Solid Organ Transplant ([facility])    [address]  [address]  [facility] [facility] [address]   [phone]            Dec 10, 2018  9:30 AM CST   (Arrive by 9:00 AM)   Return Kidney Transplant with Uc Early Post Transplant   [facility] Nephrology ([facility])    [address]  [address]  [facility] [facility] [place] [address]   [phone]            Dec 20, 2018  9:00 AM CST   (Arrive by 8:45 AM)   Cystoscopy with Sandy Cole NP   [facility] Solid Organ Transplant ([facility])    [address]  [address]  [facility] [facility] [place] [address]   [phone]            Jan 08, 2019  9:30 AM CST   (Arrive by 9:15 AM)   Office Visit with Luke Tesfaye RPH   [facility] Medication Therapy Management ([facility])    [address] [place] [address]  3rd Floor  [facility] [id number] [phone]           Bring a current list of meds and any records pertaining to this visit. For Physicals, please bring immunization  records and any forms needing to be filled out. Please arrive 10 minutes early to complete paperwork.            Jan 08, 2019 11:00 AM CST   (Arrive by 10:30 AM)   Return Kidney Transplant with  Early Post Transplant   [facility] Nephrology ([facility])    [address]  [address]  [place] [address] [phone]              Future tests that were ordered for you today     Open Future Orders        Priority Expected Expires Ordered    X-ray Chest 2 vws* Routine 11/21/2018 11/21/2019 11/21/2018            Who to contact     If you have questions or need follow up information about today's clinic visit or your schedule please contact [facility] SPECIALTY AND PROCEDURE directly at [phone].  Normal or non-critical lab and imaging results will be communicated to you by [employer], letter or phone within 4 business days after the clinic has received the results. If you do not hear from us within 7 days, please contact the clinic through [employer] or phone. If you have a critical or abnormal lab result, we will notify you by phone as soon as possible.  Submit refill requests through [employer] or call your pharmacy and they will forward the refill request to us. Please allow 3 business days for your refill to be completed.          Additional Information About Your Visit        [employer] Information     [employer] gives you secure access to your electronic health record. If you see a primary care provider, you can also send messages to your care team and make appointments. If you have questions, please call your primary care clinic.  If you do not have a primary care provider, please call [phone] and they will assist you.        Care EveryWhere ID     This is your Care EveryWhere ID. This could be used by other organizations to access your [place] medical records  [id number]        Your Vitals Were     Pulse Temperature Respirations BMI (Body Mass Index)           88 98  F (36.7  C) (Oral) 16 28.14 kg/m2         Blood Pressure from Last 3 Encounters:   11/21/18 117/70   11/20/18 114/73   11/01/18 136/85    Weight from Last 3 Encounters:   11/21/18 81.5 kg (179 lb 10.8 oz)   11/20/18 81.8 kg (180 lb 6.4 oz)   11/01/18 79.4 kg (175 lb)              We Performed the Following     Amylase     Basic metabolic panel     CBC with platelets differential     Lipase     Magnesium     Mycophenolic acid     Phosphorus     Tacrolimus level          Today's Medication Changes          These changes are accurate as of 11/21/18 10:04 AM.  If you have any questions, ask your nurse or doctor.               Start taking these medicines.        Dose/Directions    pantoprazole 20 MG EC tablet   Commonly known as:  PROTONIX   Used for:  Gastroesophageal reflux disease, esophagitis presence not specified   Started by:  Jose L De Los Santos MD        Dose:  40 mg   Take 2 tablets (40 mg) by mouth every morning (before breakfast)   Quantity:  90 tablet   Refills:  3         These medicines have changed or have updated prescriptions.        Dose/Directions    cholecalciferol 2000 units Caps   This may have changed:  medication strength   Used for:  Renal osteodystrophy, Vitamin D deficiency   Changed by:  Jose L De Los Santos MD        Dose:  2000 Units   Take 2,000 Units by mouth daily   Quantity:  90 capsule   Refills:  3       magnesium oxide 400 MG tablet   Commonly known as:  MAG-OX   This may have changed:  when to take this   Used for:  Pancreas transplanted (H)   Changed by:  Jose L De Los Santos MD        Dose:  400 mg   Take 1 tablet (400 mg) by mouth 2 times daily   Quantity:  90 tablet   Refills:  2         Stop taking these medicines if you haven't already. Please contact your care team if you have questions.     carvedilol 6.25 MG tablet   Commonly known as:  COREG   Stopped by:  Jose L De Los Santos MD                Where to get your medicines      These medications were sent to  [place], [place] - [address]  [address], [facility] [address]    Hours:  TRANSPLANT PHONE NUMBER [phone] Phone:  [phone]     calcitRIOL 0.25 MCG capsule    cholecalciferol 2000 units Caps    pantoprazole 20 MG EC tablet                Primary Care Provider Office Phone # Fax #    Masoud Valentin MD, [phone]       [address] [place] [address]        Equal Access to Services     [facility]: Hadii aad ku hadasho Soomaali, waaxda luqadaha, qaybta kaalmada adeegyada, waxlaureano sierra haykyaw adeyadiel jeffers . So [facility] [phone].    ATENCIÓN: Si habla español, tiene a tejeda disposición servicios gratuitos de asistencia lingüística. [facility] [phone].    We comply with applicable federal civil rights laws and [place] laws. We do not discriminate on the basis of race, color, national origin, age, disability, sex, sexual orientation, or gender identity.            Thank you!     Thank you for choosing [facility] SPECIALTY AND PROCEDURE  for your care. Our goal is always to provide you with excellent care. Hearing back from our patients is one way we can continue to improve our services. Please take a few minutes to complete the written survey that you may receive in the mail after your visit with us. Thank you!             Your Updated Medication List - Protect others around you: Learn how to safely use, store and throw away your medicines at www.disposemymeds.org.          This list is accurate as of 11/21/18 10:04 AM.  Always use your most recent med list.                   Brand Name Dispense Instructions for use Diagnosis    acetaminophen 325 MG tablet    TYLENOL    100 tablet    Take 3 tablets (975 mg) by mouth every 8 hours as needed for mild pain or fever (DO NOT USE WITH NORCO)    Kidney replaced by transplant       aspirin 81 MG EC tablet      30 tablet    Take 1 tablet (81 mg) by mouth daily    Pancreas transplanted (H)       atorvastatin 10 MG tablet    LIPITOR    45 tablet    Take 0.5 tablets (5 mg) by mouth daily    Coronary artery disease involving native coronary artery of native heart without angina pectoris       blood glucose monitoring test strip    no brand specified    1 Box    Use to test blood sugar 4 times daily or as directed.    Diabetes mellitus with background retinopathy (H)       calcitRIOL 0.25 MCG capsule    ROCALTROL    90 capsule    Take 1 capsule (0.25 mcg) by mouth daily    Renal osteodystrophy       calcium carbonate 500 mg-vitamin D 200 units 500-200 MG-UNIT per tablet    OSCAL with D;OYSTER SHELL CALCIUM    60 tablet    Take 1 tablet by mouth 2 times daily (with meals)    Kidney replaced by transplant       cholecalciferol 2000 units Caps     90 capsule    Take 2,000 Units by mouth daily    Renal osteodystrophy, Vitamin D deficiency       HYDROcodone-acetaminophen 5-325 MG per tablet    NORCO     Stop taking until you have completed your oxycodone taper. DO NOT TAKE WITH OXYCODONE.    Other chronic pain       Lidocaine 4 % Patch    LIDOCARE    10 patch    Place 2 patches onto the skin every 24 hours As needed for pain    Pancreas transplanted (H)       magnesium oxide 400 MG tablet    MAG-OX    90 tablet    Take 1 tablet (400 mg) by mouth 2 times daily    Pancreas transplanted (H)       mycophenolic acid 360 MG EC tablet    GENERIC EQUIVALENT    120 tablet    Take 2 tablets (720 mg) by mouth 2 times daily    Pancreas transplanted (H)       NIFEdipine ER osmotic 30 MG 24 hr tablet    PROCARDIA XL    60 tablet    Take 1 tablet (30 mg) by mouth 2 times daily    Hypertension secondary to other renal disorders       nystatin 426380 UNIT/ML suspension    MYCOSTATIN    600 mL    Take 5 mLs (500,000 Units) by mouth 4 times daily    Immunosuppressed status (H)       ondansetron 4 MG ODT tab    ZOFRAN-ODT    30 tablet    Take 1  tablet (4 mg) by mouth every 6 hours as needed for nausea or vomiting    Nausea       oxyCODONE IR 5 MG tablet    ROXICODONE    108 tablet    Take 10-12.5mg (2-2.5 tabs) every 4 hours as needed for pain on 11/20-11/22 Take 5-10mg (1-2 tabs) every 4 hours as needed for pain on 11/23-11/25 Take 5-7.5mg (1-1.5 tabs) every 4 hours as needed for pain on 11/26-11/28 Starting 11/29 you may restart your regular dose of Norco. DO NOT USE OXYCODONE AND NORCO AT THE SAME TIME.    Kidney replaced by transplant       pantoprazole 20 MG EC tablet    PROTONIX    90 tablet    Take 2 tablets (40 mg) by mouth every morning (before breakfast)    Gastroesophageal reflux disease, esophagitis presence not specified       phosphorus tablet 250 mg 250 MG per tablet    PHOSPHA 250 NEUTRAL    120 tablet    Take 2 tablets (500 mg) by mouth 2 times daily    Hypophosphatemia       polyethylene glycol powder    MIRALAX    850 g    Take 17 g (1 capful) by mouth 2 times daily Hold for loose stools    Drug-induced constipation       prochlorperazine 5 MG tablet    COMPAZINE    30 tablet    Take 1 tablet (5 mg) by mouth every 6 hours as needed for nausea or vomiting    Nausea       sennosides 8.6 MG tablet    SENOKOT    120 each    Take 2 tablets by mouth 2 times daily Hold for loose stools    Drug-induced constipation       sulfamethoxazole-trimethoprim 400-80 MG per tablet    BACTRIM/SEPTRA    30 tablet    Take 1 tablet by mouth daily    Pancreas transplanted (H), Kidney replaced by transplant       * tacrolimus 1 MG capsule    GENERIC EQUIVALENT    180 capsule    Take 3 capsules (3 mg) by mouth 2 times daily Take with 0.5mg caps for a total of 3.5mg twice daily    Pancreas transplanted (H)       * tacrolimus 0.5 MG capsule    GENERIC EQUIVALENT    60 capsule    Take 1 capsule (0.5 mg) by mouth 2 times daily Take with 1mg caps for a total of 3.5mg twice daily    Pancreas transplanted (H)       * tacrolimus 0.5 MG capsule    GENERIC EQUIVALENT     30 capsule    Take 1 cap by mouth twice daily as directed by Transplant Center for dose changes    Pancreas transplanted (H)       valGANciclovir 450 MG tablet    VALCYTE    152 tablet    Take 2 tablets (900 mg) by mouth daily    Kidney replaced by transplant, Pancreas transplanted (H)       * Notice:  This list has 3 medication(s) that are the same as other medications prescribed for you. Read the directions carefully, and ask your doctor or other care provider to review them with you.

## 2018-11-21 NOTE — PATIENT INSTRUCTIONS
"Dear Amadou Lewis    Thank you for choosing [facility] ([facility]) for your transplant cares.  The following information is a summary of our appointment as well as important reminders.      Please make sure your phone is available today because I will call to update you with your anti-rejection drug levels and possibly make changes to your anti-rejection dosages.    Please return tomorrow for labs and assessment at 0700.   Please bring your pill box and lab book to tomorrow's appt.    Please use your Incentive Spirometer every hour while awake.     Please watch \"Kidney and Pancreas Complications\" and \"General Health\" videos on the transplant website.     Check your blood glucose before meals and at bedtime.    Please  a new BP machine and your RX for Protonix, Calcitriol and Cholecalciferol at the outpatient pharmacy today.       We look forward in seeing you on your next appointment here at [facility].  Please don t hesitate to call us at [phone] to reschedule any of your appointments or to speak with one of the [facility] registered nurses.  It was a pleasure taking care of you today.    Sincerely,    [facility]  Specialty Infusion & Procedure Center  [address] [place] [address]  [address]  Phone:  [phone]    "

## 2018-11-21 NOTE — PROGRESS NOTES
"Amadou Lewis came to [facility] today for a lab and assess following a kidney/pancreas (enteric drained) transplant on 11/12/2018. 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     Discharge date: 11/20/2018  Transplant coordinator: Zora Hartley  Phone number patient can be reached at: [phone] pt's cell.       Physical Assessment:  See physical assessment located under \"Document Flowsheets\".  Incision site: Surgical incision closed with staples, no evidence of infection noted. Pt given MicroKlenz and instructed to use daily. Wearing Abd binder.   Lines: SARAH draining serosanguinous fluid. 15 ml out since 12 noon yesterday.   Ramirez: N/A   Urine clarity: per pt, urine is \"apple juice\" colored. No blood or clots noted. Pt denies pain, burning, or frequency with urination.  Hydration: We discussed the importance of adequate fluid intake as well as the importance of avoiding caffeine. Pt also aware of recommended daily fluid intake.  Nutrition: Pt states appetite is fair and denied any nausea on assessment this AM but had large amount of emesis at 0830. Took Compazine stating it usually works better for him than Zofran.    Last BM: 11/21/2018, both soft formed stool as well as loose stool. Pt currently holding stool softeners. We discussed in detail the importance of regular daily BM's and proper bowel care. Pt has Senna as well as Miralax.  Pain: Pt has history of chronic pain and is currently on a Oxycodone taper for pain control. He also has Lidocaine patches available. When pt is finished with Oxy taper he will return to PRN Sarles. Pt rates pain at surgical site 5/10 on assessment and took Oxy at around 0630 this morning.  Denies dizziness when standing.   Did not check his blood glucose last night but was reminded to check and record as instructed starting today.  MD aware of new complaint of shortness of breath since surgery, atelectasis noted, Incentive Spirometer provided and pt " "given instructions on use.    Labs drawn by [facility] staff Yes    Plan of care for today: Labs, assessment, and VS reviewed by Dr. De Los Santos who saw pt today in [facility].    Specialty pharmacy met with pt and reviewed medications extensively.     Pt's coordinator met with him today and discussed post [facility] plan.    Sandy Cole NP assessed pt today as well, SARAH fluids sent per her request.     Transplant checklist reviewed with the pt today. Pt is registered on the transplant website and was asked to watch \"kidney and pancreas complications\" and \"general health\".     Pt did not bring his pill box today. Was instructed to bring tomorrow if he would like assistance in setting it up. Pt did bring all his pill bottles which were reviewed by nurse and pharmacy.     Pt instructed to  a new BP machine (current machine not reading accurately) and new RX for Protonix, Calcitriol, and Cholecalciferol at the outpatient pharmacy.    [facility] to assess new complaint of SOB.     Medication changes:   1. Decrease magnesium to 400 mg BID.  2. Stop Coreg  3. Hold Nifedipine for SBP less than 150.     Pt aware of above med changes, med card updated.     Medications administered:      Patient education:    The following teaching topics were addressed: Importance of drinking 2L of non-caffeinated fluids daily, Incisional care, Signs/symptoms of infection, Good handwashing, Medications (purposes, doses and times of administration), Phone numbers to call with concenrs (Transplant coordinator, [facility] and [facility]), 7A discharge check list, Plan of care and Drain care   Patient verbalized understanding and all questions answered.    Drug level:  TAC level today reviewed with Dr De Los Santos who gave orders to instruct pt to decrease his Tacrolimus to 3 mg po every 12 hours.  Patient was updated with this information and verbalized understanding.    *Pt also stated he has not had any more episodes of emesis " but has been nauseated off and on. Nurse recommended pt use his Compazine as ordered for nausea.     Face to face time: 60 minutes  Discharge Plan    [facility]  Discharge instructions reviewed with patient: YES  Patient/Representative verbalized understanding, all questions answered: YES    Discharged from unit at 1015 with whom: family came to pick pt up to home.    Carrie Bardales RN

## 2018-11-21 NOTE — MR AVS SNAPSHOT
After Visit Summary   11/21/2018    Amadou Lewis    MRN: [id number]           Patient Information     Date Of Birth          1965        Visit Information        Provider Department      11/21/2018 8:00 AM Jose L De Los Santos MD [facility] Specialty and Procedure        Today's Diagnoses     Pancreas transplanted (H)    -  1    Renal osteodystrophy        CKD (chronic kidney disease) stage 5, GFR less than 15 ml/min (H)        Gastroesophageal reflux disease, esophagitis presence not specified        Vitamin D deficiency        Dyspnea, unspecified type        Kidney replaced by transplant        Status post simultaneous kidney and pancreas transplant (H)        Nausea        Hypophosphatemia        Aftercare following organ transplant        History of immunosuppression therapy        Hypertension due to kidney transplant        Secondary renal hyperparathyroidism (H)        Hypomagnesemia           Follow-ups after your visit        Follow-up notes from your care team     Return in about 1 day (around 11/22/2018).      Your next 10 appointments already scheduled     Nov 22, 2018  7:00 AM CST   (Arrive by 6:45 AM)   New Transplant Visit with UC SPEC INFUSION, UC 43 ATC   [facility] Specialty and Procedure ([facility])    [address] [place] [address]  [address] [place] [id number] [phone]            Nov 23, 2018  7:00 AM CST   (Arrive by 6:45 AM)   New Transplant Visit with UC SPEC INFUSION, UC 43 ATC   [facility]ty and Procedure ([facility])    [address] [place] [address]  [address] [place] [address] [phone]            Nov 23, 2018  8:00 AM CST   (Arrive by 7:45 AM)   Kidney Transplant Discharge with Jose L De Los Santos MD   [facility] Specialty and Procedure ([facility])    [address] [place] [address]  [address]  [facility] [address]   [phone]            Dec 03, 2018  2:30 PM CST   (Arrive by 2:15 PM)   Post-Op with Monique Luevano MD   [facility] Solid Organ Transplant ([facility])    [address]  [address]  [facility] [address] [phone]            Dec 10, 2018  9:30 AM CST   (Arrive by 9:00 AM)   Return Kidney Transplant with Uc Early Post Transplant   [facility] Nephrology ([facility])    [address]  [address]  [facility] [address]   [phone]            Dec 20, 2018  9:00 AM CST   (Arrive by 8:45 AM)   Cystoscopy with Sandy Cole NP   [facility] Organ Transplant ([facility])    [address] [place] [address]  [address]  [facility] [address]   [phone]            Jan 08, 2019  9:30 AM CST   (Arrive by 9:15 AM)   Office Visit with Luke Tesfaye RPH   [facility] Medication Therapy Management ([facility])    [address] [place] [address]  3rd Floor  [facility] [address] [phone]           Bring a current list of meds and any records pertaining to this visit. For Physicals, please bring immunization records and any forms needing to be filled out. Please arrive 10 minutes early to complete paperwork.            Jan 08, 2019 11:00 AM CST   (Arrive by 10:30 AM)   Return Kidney Transplant with Uc Early Post Transplant   [facility]phrology ([facility])    [address] [place] [address]  [address]  [facility] [address]   [phone]              Future tests that were ordered for you today     Open Future Orders        Priority Expected Expires Ordered    X-ray Chest 2 vws* Routine 11/21/2018 11/21/2019 11/21/2018            Who to contact     If you have questions or need follow up information about today's clinic visit or your schedule please contact [facility] SPECIALTY AND PROCEDURE directly at [phone].  Normal  or non-critical lab and imaging results will be communicated to you by [email or web address], letter or phone within 4 business days after the clinic has received the results. If you do not hear from us within 7 days, please contact the clinic through [employer] or phone. If you have a critical or abnormal lab result, we will notify you by phone as soon as possible.  Submit refill requests through [employer] or call your pharmacy and they will forward the refill request to us. Please allow 3 business days for your refill to be completed.          Additional Information About Your Visit        [employer] Information     [employer] gives you secure access to your electronic health record. If you see a primary care provider, you can also send messages to your care team and make appointments. If you have questions, please call your primary care clinic.  If you do not have a primary care provider, please call [phone] and they will assist you.        Care EveryWhere ID     This is your Care EveryWhere ID. This could be used by other organizations to access your [place] medical records  [id number]         Blood Pressure from Last 3 Encounters:   11/21/18 117/70   11/20/18 114/73   11/01/18 136/85    Weight from Last 3 Encounters:   11/21/18 81.5 kg (179 lb 10.8 oz)   11/20/18 81.8 kg (180 lb 6.4 oz)   11/01/18 79.4 kg (175 lb)              We Performed the Following     Lipase fluid          Today's Medication Changes          These changes are accurate as of 11/21/18 10:07 AM.  If you have any questions, ask your nurse or doctor.               Start taking these medicines.        Dose/Directions    pantoprazole 20 MG EC tablet   Commonly known as:  PROTONIX   Used for:  Gastroesophageal reflux disease, esophagitis presence not specified   Started by:  Jose L De Los Santos MD        Dose:  40 mg   Take 2 tablets (40 mg) by mouth every morning (before breakfast)   Quantity:  90 tablet   Refills:  3         These medicines have changed or  have updated prescriptions.        Dose/Directions    cholecalciferol 2000 units Caps   This may have changed:  medication strength   Used for:  Renal osteodystrophy, Vitamin D deficiency   Changed by:  Jose L De Los Santos MD        Dose:  2000 Units   Take 2,000 Units by mouth daily   Quantity:  90 capsule   Refills:  3       magnesium oxide 400 MG tablet   Commonly known as:  MAG-OX   This may have changed:  when to take this   Used for:  Pancreas transplanted (H)   Changed by:  Jose L De Los Santos MD        Dose:  400 mg   Take 1 tablet (400 mg) by mouth 2 times daily   Quantity:  90 tablet   Refills:  2         Stop taking these medicines if you haven't already. Please contact your care team if you have questions.     carvedilol 6.25 MG tablet   Commonly known as:  COREG   Stopped by:  Jose L De Los Santos MD                Where to get your medicines      These medications were sent to [facility] [address] [place] [address] [address] [address]    Hours:  TRANSPLANT PHONE NUMBER [phone] Phone:  [phone]     calcitRIOL 0.25 MCG capsule    cholecalciferol 2000 units Caps    pantoprazole 20 MG EC tablet                Primary Care Provider Office Phone # Fax #    Masoud Valentin MD, [phone]       [address] [place] [address]        Equal Access to Services     RENATA BENNETT AH: Hadii miguelina fulton Sogenoveva, waaxda luqadaha, qaybta kaalmada myles, jack lassiter. So [facility] [phone].    ATENCIÓN: Si habla español, tiene a tejeda disposición servicios gratuitos de asistencia lingüística. Makeda al [phone].    We comply with applicable federal civil rights laws and [place] laws. We do not discriminate on the basis of race, color, national origin, age, disability, sex, sexual orientation, or gender identity.            Thank you!     Thank  you for choosing [facility]ED[facility]  for your care. Our goal is always to provide you with excellent care. Hearing back from our patients is one way we can continue to improve our services. Please take a few minutes to complete the written survey that you may receive in the mail after your visit with us. Thank you!             Your Updated Medication List - Protect others around you: Learn how to safely use, store and throw away your medicines at www.disposemymeds.org.          This list is accurate as of 11/21/18 10:07 AM.  Always use your most recent med list.                   Brand Name Dispense Instructions for use Diagnosis    acetaminophen 325 MG tablet    TYLENOL    100 tablet    Take 3 tablets (975 mg) by mouth every 8 hours as needed for mild pain or fever (DO NOT USE WITH NORCO)    Kidney replaced by transplant       aspirin 81 MG EC tablet     30 tablet    Take 1 tablet (81 mg) by mouth daily    Pancreas transplanted (H)       atorvastatin 10 MG tablet    LIPITOR    45 tablet    Take 0.5 tablets (5 mg) by mouth daily    Coronary artery disease involving native coronary artery of native heart without angina pectoris       blood glucose monitoring test strip    no brand specified    1 Box    Use to test blood sugar 4 times daily or as directed.    Diabetes mellitus with background retinopathy (H)       calcitRIOL 0.25 MCG capsule    ROCALTROL    90 capsule    Take 1 capsule (0.25 mcg) by mouth daily    Renal osteodystrophy       calcium carbonate 500 mg-vitamin D 200 units 500-200 MG-UNIT per tablet    OSCAL with D;OYSTER SHELL CALCIUM    60 tablet    Take 1 tablet by mouth 2 times daily (with meals)    Kidney replaced by transplant       cholecalciferol 2000 units Caps     90 capsule    Take 2,000 Units by mouth daily    Renal osteodystrophy, Vitamin D deficiency       HYDROcodone-acetaminophen 5-325 MG tablet    NORCO     Stop taking until you have completed your  oxycodone taper. DO NOT TAKE WITH OXYCODONE.    Other chronic pain       Lidocaine 4 % Patch    LIDOCARE    10 patch    Place 2 patches onto the skin every 24 hours As needed for pain    Pancreas transplanted (H)       magnesium oxide 400 MG tablet    MAG-OX    90 tablet    Take 1 tablet (400 mg) by mouth 2 times daily    Pancreas transplanted (H)       mycophenolic acid 360 MG EC tablet    GENERIC EQUIVALENT    120 tablet    Take 2 tablets (720 mg) by mouth 2 times daily    Pancreas transplanted (H)       NIFEdipine ER osmotic 30 MG 24 hr tablet    PROCARDIA XL    60 tablet    Take 1 tablet (30 mg) by mouth 2 times daily    Hypertension secondary to other renal disorders       nystatin 445368 UNIT/ML suspension    MYCOSTATIN    600 mL    Take 5 mLs (500,000 Units) by mouth 4 times daily    Immunosuppressed status (H)       ondansetron 4 MG ODT tab    ZOFRAN-ODT    30 tablet    Take 1 tablet (4 mg) by mouth every 6 hours as needed for nausea or vomiting    Nausea       oxyCODONE 5 MG tablet    ROXICODONE    108 tablet    Take 10-12.5mg (2-2.5 tabs) every 4 hours as needed for pain on 11/20-11/22 Take 5-10mg (1-2 tabs) every 4 hours as needed for pain on 11/23-11/25 Take 5-7.5mg (1-1.5 tabs) every 4 hours as needed for pain on 11/26-11/28 Starting 11/29 you may restart your regular dose of Norco. DO NOT USE OXYCODONE AND NORCO AT THE SAME TIME.    Kidney replaced by transplant       pantoprazole 20 MG EC tablet    PROTONIX    90 tablet    Take 2 tablets (40 mg) by mouth every morning (before breakfast)    Gastroesophageal reflux disease, esophagitis presence not specified       phosphorus tablet 250 mg 250 MG per tablet    PHOSPHA 250 NEUTRAL    120 tablet    Take 2 tablets (500 mg) by mouth 2 times daily    Hypophosphatemia       polyethylene glycol powder    MIRALAX    850 g    Take 17 g (1 capful) by mouth 2 times daily Hold for loose stools    Drug-induced constipation       prochlorperazine 5 MG tablet     COMPAZINE    30 tablet    Take 1 tablet (5 mg) by mouth every 6 hours as needed for nausea or vomiting    Nausea       sennosides 8.6 MG tablet    SENOKOT    120 each    Take 2 tablets by mouth 2 times daily Hold for loose stools    Drug-induced constipation       sulfamethoxazole-trimethoprim 400-80 MG per tablet    BACTRIM/SEPTRA    30 tablet    Take 1 tablet by mouth daily    Pancreas transplanted (H), Kidney replaced by transplant       * tacrolimus 1 MG capsule    GENERIC EQUIVALENT    180 capsule    Take 3 capsules (3 mg) by mouth 2 times daily Take with 0.5mg caps for a total of 3.5mg twice daily    Pancreas transplanted (H)       * tacrolimus 0.5 MG capsule    GENERIC EQUIVALENT    60 capsule    Take 1 capsule (0.5 mg) by mouth 2 times daily Take with 1mg caps for a total of 3.5mg twice daily    Pancreas transplanted (H)       * tacrolimus 0.5 MG capsule    GENERIC EQUIVALENT    30 capsule    Take 1 cap by mouth twice daily as directed by Transplant Center for dose changes    Pancreas transplanted (H)       valGANciclovir 450 MG tablet    VALCYTE    152 tablet    Take 2 tablets (900 mg) by mouth daily    Kidney replaced by transplant, Pancreas transplanted (H)       * Notice:  This list has 3 medication(s) that are the same as other medications prescribed for you. Read the directions carefully, and ask your doctor or other care provider to review them with you.

## 2018-11-21 NOTE — MR AVS SNAPSHOT
After Visit Summary   11/21/2018    Amadou Lewis    MRN: [id number]           Patient Information     Date Of Birth          1965        Visit Information        Provider Department      11/21/2018 9:00 AM Sandy Cole NP [facility] [place] [facility]        Today's Diagnoses     Pancreas transplanted (H)    -  1    Aftercare following organ transplant        Kidney replaced by transplant           Follow-ups after your visit        Your next 10 appointments already scheduled     Dec 03, 2018  2:30 PM CST   (Arrive by 2:15 PM)   Post-Op with Monique Luevano MD   [facility] Solid Organ Transplant ([facility])    [address]  [address]  [place] [facility] [address]   [phone]            Dec 10, 2018  9:30 AM CST   (Arrive by 9:00 AM)   Return Kidney Transplant with Uc Early Post Transplant   [facility] Nephrology ([facility])    [address]  [address]  [place] [facility] [facility] [address]   [phone]            Dec 20, 2018  9:00 AM CST   (Arrive by 8:45 AM)   Cystoscopy with Sandy Cole NP   [facility] Solid Organ Transplant ([facility])    [address]  [address]  [place] [facility] [address]   [phone]            Jan 08, 2019  9:30 AM CST   (Arrive by 9:15 AM)   Office Visit with Luke Tesfaye [facility] Medication Therapy Management ([facility])    [address]  3rd [address]  [place] [facility] [address] [phone]           Bring a current list of meds and any records pertaining to this visit. For Physicals, please bring immunization records and any forms needing to be filled out. Please arrive 10 minutes early to complete paperwork.            Jan 08, 2019 11:00 AM CST   (Arrive by 10:30 AM)   Return Kidney Transplant with Uc Early Post Transplant   [facility] Nephrology ([facility])    [address]  [address]  [place] [address]   [phone]            Mar 04, 2019 11:00 AM CST   (Arrive by 10:30 AM)   Return Kidney Transplant with Uc Early Post Transplant   [facility] Nephrology ([facility])    [address]  [address]  [place] [address]   [phone]            Apr 29, 2019 11:00 AM CDT   (Arrive by 10:30 AM)   Return Kidney Transplant with Uc Early Post Transplant   [facility] Nephrology ([facility])    [address]  [address]  [place] [address]   [phone]              Who to contact     If you have questions or need follow up information about today's clinic visit or your schedule please contact [facility] SPECIALTY AND PROCEDURE directly at [phone].  Normal or non-critical lab and imaging results will be communicated to you by MyChart, letter or phone within 4 business days after the clinic has received the results. If you do not hear from us within 7 days, please contact the clinic through [employer] or phone. If you have a critical or abnormal lab result, we will notify you by phone as soon as possible.  Submit refill requests through [employer] or call your pharmacy and they will forward the refill request to us. Please allow 3 business days for your refill to be completed.          Additional Information About Your Visit        MyChart Information     [employer] gives you secure access to your electronic health record. If you see a primary care provider, you can also send messages to your care team and make appointments. If you have questions, please call your primary care clinic.  If you do not have a primary care provider, please call [phone] and they will assist you.        Care EveryWhere ID     This is your Care EveryWhere ID. This could be used by other organizations to access your [place] medical records  [id number]         Blood Pressure from Last 3  Encounters:   11/26/18 164/86   11/25/18 145/79   11/22/18 115/71    Weight from Last 3 Encounters:   11/26/18 81.1 kg (178 lb 12.7 oz)   11/25/18 79.8 kg (175 lb 14.4 oz)   11/23/18 80.3 kg (177 lb 1.6 oz)              Today, you had the following     No orders found for display         Today's Medication Changes          These changes are accurate as of 11/21/18 11:59 PM.  If you have any questions, ask your nurse or doctor.               Start taking these medicines.        Dose/Directions    pantoprazole 20 MG EC tablet   Commonly known as:  PROTONIX   Used for:  Gastroesophageal reflux disease, esophagitis presence not specified   Started by:  Jose L De Los Santos MD        Dose:  40 mg   Take 2 tablets (40 mg) by mouth every morning (before breakfast)   Quantity:  90 tablet   Refills:  3         These medicines have changed or have updated prescriptions.        Dose/Directions    cholecalciferol 2000 units Caps   This may have changed:  medication strength   Used for:  Renal osteodystrophy, Vitamin D deficiency   Changed by:  Jose L De Los Santos MD        Dose:  2000 Units   Take 2,000 Units by mouth daily   Quantity:  90 capsule   Refills:  3       magnesium oxide 400 MG tablet   Commonly known as:  MAG-OX   This may have changed:  when to take this   Used for:  Pancreas transplanted (H)   Changed by:  Jose L De Los Santos MD        Dose:  400 mg   Take 1 tablet (400 mg) by mouth 2 times daily   Quantity:  90 tablet   Refills:  2       NIFEdipine ER osmotic 30 MG 24 hr tablet   Commonly known as:  PROCARDIA XL   This may have changed:  additional instructions   Used for:  Hypertension secondary to other renal disorders        Dose:  30 mg   Take 1 tablet (30 mg) by mouth 2 times daily   Quantity:  60 tablet   Refills:  3       * tacrolimus 1 MG capsule   Commonly known as:  GENERIC EQUIVALENT   This may have changed:    - when to take this  - additional instructions   Used for:  Pancreas transplanted (H)         Dose:  3 mg   Take 3 capsules (3 mg) by mouth every 12 hours   Quantity:  180 capsule   Refills:  11       * tacrolimus 0.5 MG capsule   Commonly known as:  GENERIC EQUIVALENT   This may have changed:    - how much to take  - how to take this  - when to take this  - additional instructions   Used for:  Pancreas transplanted (H)        Use for dose adjustments.   Quantity:  60 capsule   Refills:  11       * Notice:  This list has 2 medication(s) that are the same as other medications prescribed for you. Read the directions carefully, and ask your doctor or other care provider to review them with you.      Stop taking these medicines if you haven't already. Please contact your care team if you have questions.     carvedilol 6.25 MG tablet   Commonly known as:  COREG   Stopped by:  Jose L De Los Santos MD                Where to get your medicines      These medications were sent to [place], [place] - [address]  [address] [address]     Phone:  [phone]     tacrolimus 0.5 MG capsule    tacrolimus 1 MG capsule         These medications were sent to [place], [place] - [address] [place] [address]    Hours:  TRANSPLANT PHONE NUMBER [phone] Phone:  [phone]     calcitRIOL 0.25 MCG capsule    cholecalciferol 2000 units Caps    pantoprazole 20 MG EC tablet                Primary Care Provider Office Phone # Fax #    Masoud Valentin MD, [phone]       [address] [place] [address] [place] [address]        Equal Access to Services     NASRA BENNETT AH: Hadii miguelina ku hadasho Soomaali, waaxda luqadaha, qaybta kaalmada adeegyada, waxay rigo lassiter. So [facility] [phone].    ATENCIÓN: Si habla español, tiene a tejeda disposición servicios gratuitos de asistencia lingüística. Llame al [phone].    We comply  with applicable federal civil rights laws and [place] laws. We do not discriminate on the basis of race, color, national origin, age, disability, sex, sexual orientation, or gender identity.            Thank you!     Thank you for choosing [facility] SPECIALTY AND PROCEDURE  for your care. Our goal is always to provide you with excellent care. Hearing back from our patients is one way we can continue to improve our services. Please take a few minutes to complete the written survey that you may receive in the mail after your visit with us. Thank you!             Your Updated Medication List - Protect others around you: Learn how to safely use, store and throw away your medicines at www.disposemymeds.org.          This list is accurate as of 11/21/18 11:59 PM.  Always use your most recent med list.                   Brand Name Dispense Instructions for use Diagnosis    acetaminophen 325 MG tablet    TYLENOL    100 tablet    Take 3 tablets (975 mg) by mouth every 8 hours as needed for mild pain or fever (DO NOT USE WITH NORCO)    Kidney replaced by transplant       aspirin 81 MG EC tablet    ASA    30 tablet    Take 1 tablet (81 mg) by mouth daily    Pancreas transplanted (H)       atorvastatin 10 MG tablet    LIPITOR    45 tablet    Take 0.5 tablets (5 mg) by mouth daily    Coronary artery disease involving native coronary artery of native heart without angina pectoris       blood glucose monitoring test strip    no brand specified    1 Box    Use to test blood sugar 4 times daily or as directed.    Diabetes mellitus with background retinopathy (H)       calcitRIOL 0.25 MCG capsule    ROCALTROL    90 capsule    Take 1 capsule (0.25 mcg) by mouth daily    Renal osteodystrophy       cholecalciferol 2000 units Caps     90 capsule    Take 2,000 Units by mouth daily    Renal osteodystrophy, Vitamin D deficiency       HYDROcodone-acetaminophen 5-325 MG tablet    NORCO     Stop taking until you have  completed your oxycodone taper. DO NOT TAKE WITH OXYCODONE.    Other chronic pain       Lidocaine 4 % Patch    LIDOCARE    10 patch    Place 2 patches onto the skin every 24 hours As needed for pain    Pancreas transplanted (H)       magnesium oxide 400 MG tablet    MAG-OX    90 tablet    Take 1 tablet (400 mg) by mouth 2 times daily    Pancreas transplanted (H)       mycophenolic acid 360 MG EC tablet    GENERIC EQUIVALENT    120 tablet    Take 2 tablets (720 mg) by mouth 2 times daily    Pancreas transplanted (H)       NIFEdipine ER osmotic 30 MG 24 hr tablet    PROCARDIA XL    60 tablet    Take 1 tablet (30 mg) by mouth 2 times daily    Hypertension secondary to other renal disorders       nystatin 162725 UNIT/ML suspension    MYCOSTATIN    600 mL    Take 5 mLs (500,000 Units) by mouth 4 times daily    Immunosuppressed status (H)       ondansetron 4 MG ODT tab    ZOFRAN-ODT    30 tablet    Take 1 tablet (4 mg) by mouth every 6 hours as needed for nausea or vomiting    Nausea       oxyCODONE 5 MG tablet    ROXICODONE    108 tablet    Take 10-12.5mg (2-2.5 tabs) every 4 hours as needed for pain on 11/20-11/22 Take 5-10mg (1-2 tabs) every 4 hours as needed for pain on 11/23-11/25 Take 5-7.5mg (1-1.5 tabs) every 4 hours as needed for pain on 11/26-11/28 Starting 11/29 you may restart your regular dose of Norco. DO NOT USE OXYCODONE AND NORCO AT THE SAME TIME.    Kidney replaced by transplant       pantoprazole 20 MG EC tablet    PROTONIX    90 tablet    Take 2 tablets (40 mg) by mouth every morning (before breakfast)    Gastroesophageal reflux disease, esophagitis presence not specified       polyethylene glycol powder    MIRALAX    850 g    Take 17 g (1 capful) by mouth 2 times daily Hold for loose stools    Drug-induced constipation       prochlorperazine 5 MG tablet    COMPAZINE    30 tablet    Take 1 tablet (5 mg) by mouth every 6 hours as needed for nausea or vomiting    Nausea       sennosides 8.6 MG tablet     SENOKOT    120 each    Take 2 tablets by mouth 2 times daily Hold for loose stools    Drug-induced constipation       sulfamethoxazole-trimethoprim 400-80 MG per tablet    BACTRIM/SEPTRA    30 tablet    Take 1 tablet by mouth daily    Pancreas transplanted (H), Kidney replaced by transplant       * tacrolimus 1 MG capsule    GENERIC EQUIVALENT    180 capsule    Take 3 capsules (3 mg) by mouth every 12 hours    Pancreas transplanted (H)       * tacrolimus 0.5 MG capsule    GENERIC EQUIVALENT    60 capsule    Use for dose adjustments.    Pancreas transplanted (H)       valGANciclovir 450 MG tablet    VALCYTE    152 tablet    Take 2 tablets (900 mg) by mouth daily    Kidney replaced by transplant, Pancreas transplanted (H)       * Notice:  This list has 2 medication(s) that are the same as other medications prescribed for you. Read the directions carefully, and ask your doctor or other care provider to review them with you.

## 2018-11-21 NOTE — PROGRESS NOTES
Prior Authorization Approval    mycophenolic 360mg tabs  Date Initiated: 11/20/2018  Date Completed: 11/20/2018  Prior Auth Type: Non-Formulary     Status: Approved    Effective Date: 10/20/2018 - 11/20/2019  Copay: 0.00  Conception Filled: Yes    Insurance: [employer]  Ph: [phone] opt. 2  ID: [id number]  Case Number: [id number]  Submitted Via: Eleuterio Blair  [facility] Liaison  Ph: [phone] Page: [phone]

## 2018-11-22 ENCOUNTER — INFUSION THERAPY VISIT (OUTPATIENT)
Dept: INFUSION THERAPY | Facility: CLINIC | Age: 53
End: 2018-11-22
Attending: INTERNAL MEDICINE
Payer: COMMERCIAL

## 2018-11-22 VITALS
DIASTOLIC BLOOD PRESSURE: 71 MMHG | BODY MASS INDEX: 27.99 KG/M2 | RESPIRATION RATE: 16 BRPM | SYSTOLIC BLOOD PRESSURE: 115 MMHG | WEIGHT: 178.7 LBS | OXYGEN SATURATION: 99 % | TEMPERATURE: 97.6 F

## 2018-11-22 DIAGNOSIS — Z94.0 KIDNEY REPLACED BY TRANSPLANT: ICD-10-CM

## 2018-11-22 DIAGNOSIS — Z94.83 PANCREAS REPLACED BY TRANSPLANT (H): Primary | ICD-10-CM

## 2018-11-22 DIAGNOSIS — E83.39 HYPOPHOSPHATEMIA: ICD-10-CM

## 2018-11-22 DIAGNOSIS — Z94.83 PANCREAS TRANSPLANTED (H): ICD-10-CM

## 2018-11-22 LAB
AMYLASE SERPL-CCNC: 120 U/L (ref 30–110)
ANION GAP SERPL CALCULATED.3IONS-SCNC: 8 MMOL/L (ref 3–14)
BASOPHILS # BLD AUTO: 0 10E9/L (ref 0–0.2)
BASOPHILS NFR BLD AUTO: 0.2 %
BUN SERPL-MCNC: 12 MG/DL (ref 7–30)
CALCIUM SERPL-MCNC: 8.1 MG/DL (ref 8.5–10.1)
CHLORIDE SERPL-SCNC: 103 MMOL/L (ref 94–109)
CO2 SERPL-SCNC: 23 MMOL/L (ref 20–32)
CREAT SERPL-MCNC: 1.32 MG/DL (ref 0.66–1.25)
DIFFERENTIAL METHOD BLD: ABNORMAL
EOSINOPHIL # BLD AUTO: 0.1 10E9/L (ref 0–0.7)
EOSINOPHIL NFR BLD AUTO: 1.1 %
ERYTHROCYTE [DISTWIDTH] IN BLOOD BY AUTOMATED COUNT: 13.8 % (ref 10–15)
GFR SERPL CREATININE-BSD FRML MDRD: 56 ML/MIN/1.7M2
GLUCOSE SERPL-MCNC: 84 MG/DL (ref 70–99)
HCT VFR BLD AUTO: 30.2 % (ref 40–53)
HGB BLD-MCNC: 9.6 G/DL (ref 13.3–17.7)
IMM GRANULOCYTES # BLD: 0.1 10E9/L (ref 0–0.4)
IMM GRANULOCYTES NFR BLD: 1 %
LIPASE SERPL-CCNC: 456 U/L (ref 73–393)
LYMPHOCYTES # BLD AUTO: 0.1 10E9/L (ref 0.8–5.3)
LYMPHOCYTES NFR BLD AUTO: 0.8 %
MAGNESIUM SERPL-MCNC: 1.6 MG/DL (ref 1.6–2.3)
MCH RBC QN AUTO: 28.7 PG (ref 26.5–33)
MCHC RBC AUTO-ENTMCNC: 31.8 G/DL (ref 31.5–36.5)
MCV RBC AUTO: 90 FL (ref 78–100)
MONOCYTES # BLD AUTO: 0.2 10E9/L (ref 0–1.3)
MONOCYTES NFR BLD AUTO: 3.1 %
NEUTROPHILS # BLD AUTO: 5.8 10E9/L (ref 1.6–8.3)
NEUTROPHILS NFR BLD AUTO: 93.8 %
NRBC # BLD AUTO: 0 10*3/UL
NRBC BLD AUTO-RTO: 0 /100
PHOSPHATE SERPL-MCNC: 3 MG/DL (ref 2.5–4.5)
PLATELET # BLD AUTO: 229 10E9/L (ref 150–450)
POTASSIUM SERPL-SCNC: 5 MMOL/L (ref 3.4–5.3)
RBC # BLD AUTO: 3.35 10E12/L (ref 4.4–5.9)
SODIUM SERPL-SCNC: 134 MMOL/L (ref 133–144)
TACROLIMUS BLD-MCNC: 14.9 UG/L (ref 5–15)
TME LAST DOSE: NORMAL H
WBC # BLD AUTO: 6.2 10E9/L (ref 4–11)

## 2018-11-22 PROCEDURE — 82150 ASSAY OF AMYLASE: CPT | Performed by: INTERNAL MEDICINE

## 2018-11-22 PROCEDURE — 36415 COLL VENOUS BLD VENIPUNCTURE: CPT

## 2018-11-22 PROCEDURE — G0463 HOSPITAL OUTPT CLINIC VISIT: HCPCS | Mod: 25

## 2018-11-22 PROCEDURE — 83735 ASSAY OF MAGNESIUM: CPT | Performed by: INTERNAL MEDICINE

## 2018-11-22 PROCEDURE — 84100 ASSAY OF PHOSPHORUS: CPT | Performed by: INTERNAL MEDICINE

## 2018-11-22 PROCEDURE — 80197 ASSAY OF TACROLIMUS: CPT | Performed by: INTERNAL MEDICINE

## 2018-11-22 PROCEDURE — 96360 HYDRATION IV INFUSION INIT: CPT

## 2018-11-22 PROCEDURE — 25000128 H RX IP 250 OP 636: Mod: ZF | Performed by: INTERNAL MEDICINE

## 2018-11-22 PROCEDURE — 83690 ASSAY OF LIPASE: CPT | Performed by: INTERNAL MEDICINE

## 2018-11-22 PROCEDURE — 80048 BASIC METABOLIC PNL TOTAL CA: CPT | Performed by: INTERNAL MEDICINE

## 2018-11-22 PROCEDURE — 85025 COMPLETE CBC W/AUTO DIFF WBC: CPT | Performed by: INTERNAL MEDICINE

## 2018-11-22 RX ORDER — TACROLIMUS 0.5 MG/1
CAPSULE ORAL
Qty: 60 CAPSULE | Refills: 11 | Status: SHIPPED | OUTPATIENT
Start: 2018-11-22 | End: 2018-11-23

## 2018-11-22 RX ORDER — MAGNESIUM OXIDE 400 MG/1
400 TABLET ORAL 2 TIMES DAILY
Qty: 90 TABLET | Refills: 2 | Status: SHIPPED | OUTPATIENT
Start: 2018-11-22 | End: 2018-11-23

## 2018-11-22 RX ORDER — TACROLIMUS 1 MG/1
2 CAPSULE ORAL EVERY 12 HOURS
Qty: 180 CAPSULE | Refills: 11 | Status: SHIPPED | OUTPATIENT
Start: 2018-11-22 | End: 2018-11-23

## 2018-11-22 RX ADMIN — SODIUM CHLORIDE 1000 ML: 9 INJECTION, SOLUTION INTRAVENOUS at 09:06

## 2018-11-22 ASSESSMENT — PAIN DESCRIPTION - DESCRIPTORS: DESCRIPTORS: ACHING

## 2018-11-22 NOTE — MR AVS SNAPSHOT
After Visit Summary   11/22/2018    Amadou Lewis    MRN: [id number]           Patient Information     Date Of Birth          1965        Visit Information        Provider Department      11/22/2018 7:00 AM UC 43 ATC; UC SPEC INFUSION [facility] Specialty and Procedure        Today's Diagnoses     Pancreas replaced by transplant (H)    -  1    Kidney replaced by transplant        Pancreas transplanted (H)        Hypophosphatemia           Follow-ups after your visit        Your next 10 appointments already scheduled     Nov 23, 2018  7:00 AM CST   (Arrive by 6:45 AM)   New Transplant Visit with UC SPEC INFUSION, UC 43 ATC   [facility] Specialty and Procedure ([facility])    [address]  [address]  [place] [address]   [phone]            Nov 23, 2018  8:00 AM CST   (Arrive by 7:45 AM)   Kidney Transplant Discharge with Jose L De Los Santos MD   [facility] Specialty and Procedure ([facility])    [address]  [address]  [place] [address]   [phone]            Dec 03, 2018  2:30 PM CST   (Arrive by 2:15 PM)   Post-Op with Monique Luevano MD   [facility] Solid Organ Transplant ([facility])    [address]  [address]  [place] [address]   [phone]            Dec 10, 2018  9:30 AM CST   (Arrive by 9:00 AM)   Return Kidney Transplant with Uc Early Post Transplant   [facility] Nephrology ([facility])    [address]  [address]  [place] [address]   [phone]            Dec 20, 2018  9:00 AM CST   (Arrive by 8:45 AM)   Cystoscopy with Sandy Cole NP   [facility] Solid Organ Transplant ([facility])    [address]  [address]  [place] [address]   [phone]            Jan 08, 2019  9:30 AM CST   (Arrive by 9:15 AM)    Office Visit with Luke Tesfaye [facility] Medication Therapy Management ([facility])    [address]  3rd Floor  [place] [id number] [phone]           Bring a current list of meds and any records pertaining to this visit. For Physicals, please bring immunization records and any forms needing to be filled out. Please arrive 10 minutes early to complete paperwork.            Jan 08, 2019 11:00 AM CST   (Arrive by 10:30 AM)   Return Kidney Transplant with  Early Post Transplant   [facility] Nephrology ([facility])    [address]  [address]  [place] [address] [phone]            Mar 04, 2019 11:00 AM CST   (Arrive by 10:30 AM)   Return Kidney Transplant with Uc Early Post Transplant   [facility] Nephrology ([facility])    [address]  [address]  [place] [id number] [phone]            Apr 29, 2019 11:00 AM CDT   (Arrive by 10:30 AM)   Return Kidney Transplant with  Early Post Transplant   [facility] Nephrology ([facility])    [address]  [address]  [place] [id number] [phone]              Who to contact     If you have questions or need follow up information about today's clinic visit or your schedule please contact [facility] SPECIALTY AND PROCEDURE directly at [phone].  Normal or non-critical lab and imaging results will be communicated to you by MyChart, letter or phone within 4 business days after the clinic has received the results. If you do not hear from us within 7 days, please contact the clinic through MyChart or phone. If you have a critical or abnormal lab result, we will notify you by phone as soon as possible.  Submit refill requests through [employer] or call your pharmacy and they will forward the refill request to us. Please allow 3 business days for your refill to be completed.           Additional Information About Your Visit        [employer] Information     [employer] gives you secure access to your electronic health record. If you see a primary care provider, you can also send messages to your care team and make appointments. If you have questions, please call your primary care clinic.  If you do not have a primary care provider, please call [phone] and they will assist you.        Care EveryWhere ID     This is your Care EveryWhere ID. This could be used by other organizations to access your [facility] medical records  [id number]        Your Vitals Were     Temperature Respirations Pulse Oximetry BMI (Body Mass Index)          97.6  F (36.4  C) (Oral) 16 99% 27.99 kg/m2         Blood Pressure from Last 3 Encounters:   11/22/18 115/71   11/21/18 117/70   11/20/18 114/73    Weight from Last 3 Encounters:   11/22/18 81.1 kg (178 lb 11.2 oz)   11/21/18 81.5 kg (179 lb 10.8 oz)   11/20/18 81.8 kg (180 lb 6.4 oz)              We Performed the Following     Amylase     Basic metabolic panel     CBC with platelets differential     Lipase     Magnesium     Phosphorus     Tacrolimus level          Today's Medication Changes          These changes are accurate as of 11/22/18  2:09 PM.  If you have any questions, ask your nurse or doctor.               These medicines have changed or have updated prescriptions.        Dose/Directions    calcium carbonate 500 mg-vitamin D 200 units 500-200 MG-UNIT per tablet   Commonly known as:  OSCAL with D;OYSTER SHELL CALCIUM   This may have changed:  additional instructions   Used for:  Kidney replaced by transplant        Dose:  1 tablet   Take 1 tablet by mouth 2 times daily (with meals) Hold at this time 11/22   Quantity:  60 tablet   Refills:  2       magnesium oxide 400 MG tablet   Commonly known as:  MAG-OX   This may have changed:  additional instructions   Used for:  Pancreas transplanted (H)        Dose:  400 mg   Take 1 tablet (400 mg) by mouth 2  times daily Take 2 tablets (800mg) at noon and 400mg at 6pm.   Quantity:  90 tablet   Refills:  2       NIFEdipine ER osmotic 30 MG 24 hr tablet   Commonly known as:  PROCARDIA XL   This may have changed:  additional instructions   Used for:  Hypertension secondary to other renal disorders        Dose:  30 mg   Take 1 tablet (30 mg) by mouth 2 times daily   Quantity:  60 tablet   Refills:  3       phosphorus tablet 250 mg 250 MG per tablet   Commonly known as:  PHOSPHA 250 NEUTRAL   This may have changed:  additional instructions   Used for:  Hypophosphatemia        Dose:  500 mg   Take 2 tablets (500 mg) by mouth 2 times daily Hold at this time 11/22   Quantity:  120 tablet   Refills:  1       * tacrolimus 0.5 MG capsule   Commonly known as:  GENERIC EQUIVALENT   This may have changed:  additional instructions   Used for:  Pancreas transplanted (H)        Take 1 capsule every 12 hours. Total dose = 2.5 mg, changed 11/22.   Quantity:  60 capsule   Refills:  11       * tacrolimus 1 MG capsule   Commonly known as:  GENERIC EQUIVALENT   This may have changed:    - how much to take  - additional instructions   Used for:  Pancreas transplanted (H)        Dose:  2 mg   Take 2 capsules (2 mg) by mouth every 12 hours 2.5mg total dose, changed 11/22   Quantity:  180 capsule   Refills:  11       * Notice:  This list has 2 medication(s) that are the same as other medications prescribed for you. Read the directions carefully, and ask your doctor or other care provider to review them with you.         Where to get your medicines      These medications were sent to [facility] [place], [place] - [address] [place] [address] [address] [address]    Hours:  TRANSPLANT PHONE NUMBER [phone] Phone:  [phone]     magnesium oxide 400 MG tablet    phosphorus tablet 250 mg 250 MG per tablet    tacrolimus 0.5 MG capsule    tacrolimus 1 MG capsule         Some of these will need a  paper prescription and others can be bought over the counter.  Ask your nurse if you have questions.     Bring a paper prescription for each of these medications     calcium carbonate 500 mg-vitamin D 200 units 500-200 MG-UNIT per tablet                Primary Care Provider Office Phone # Fax #    Masoud Valentin MD, [phone]       [address] [place] [address]        Equal Access to Services     [facility]INAKaiser Foundation HospitalBURKE : Hadii aad ku hadasho Soomaali, waaxda luqadaha, qaybta kaalmada adeegyada, waxay idiin hayaan adeeg kharash la'kyaw . So [facility] [phone].    ATENCIÓN: Si avivala espjunior, tiene a tejeda disposición servicios gratuitos de asistencia lingüística. [facility] [phone].    We comply with applicable federal civil rights laws and [place] laws. We do not discriminate on the basis of race, color, national origin, age, disability, sex, sexual orientation, or gender identity.            Thank you!     Thank you for choosing [facility] SPECIALTY AND PROCEDURE  for your care. Our goal is always to provide you with excellent care. Hearing back from our patients is one way we can continue to improve our services. Please take a few minutes to complete the written survey that you may receive in the mail after your visit with us. Thank you!             Your Updated Medication List - Protect others around you: Learn how to safely use, store and throw away your medicines at www.disposemymeds.org.          This list is accurate as of 11/22/18  2:09 PM.  Always use your most recent med list.                   Brand Name Dispense Instructions for use Diagnosis    acetaminophen 325 MG tablet    TYLENOL    100 tablet    Take 3 tablets (975 mg) by mouth every 8 hours as needed for mild pain or fever (DO NOT USE WITH NORCO)    Kidney replaced by transplant       aspirin 81 MG EC tablet    ASA    30 tablet    Take 1 tablet (81 mg) by mouth daily     Pancreas transplanted (H)       atorvastatin 10 MG tablet    LIPITOR    45 tablet    Take 0.5 tablets (5 mg) by mouth daily    Coronary artery disease involving native coronary artery of native heart without angina pectoris       blood glucose monitoring test strip    no brand specified    1 Box    Use to test blood sugar 4 times daily or as directed.    Diabetes mellitus with background retinopathy (H)       calcitRIOL 0.25 MCG capsule    ROCALTROL    90 capsule    Take 1 capsule (0.25 mcg) by mouth daily    Renal osteodystrophy       calcium carbonate 500 mg-vitamin D 200 units 500-200 MG-UNIT per tablet    OSCAL with D;OYSTER SHELL CALCIUM    60 tablet    Take 1 tablet by mouth 2 times daily (with meals) Hold at this time 11/22    Kidney replaced by transplant       cholecalciferol 2000 units Caps     90 capsule    Take 2,000 Units by mouth daily    Renal osteodystrophy, Vitamin D deficiency       HYDROcodone-acetaminophen 5-325 MG tablet    NORCO     Stop taking until you have completed your oxycodone taper. DO NOT TAKE WITH OXYCODONE.    Other chronic pain       Lidocaine 4 % Patch    LIDOCARE    10 patch    Place 2 patches onto the skin every 24 hours As needed for pain    Pancreas transplanted (H)       magnesium oxide 400 MG tablet    MAG-OX    90 tablet    Take 1 tablet (400 mg) by mouth 2 times daily Take 2 tablets (800mg) at noon and 400mg at 6pm.    Pancreas transplanted (H)       mycophenolic acid 360 MG EC tablet    GENERIC EQUIVALENT    120 tablet    Take 2 tablets (720 mg) by mouth 2 times daily    Pancreas transplanted (H)       NIFEdipine ER osmotic 30 MG 24 hr tablet    PROCARDIA XL    60 tablet    Take 1 tablet (30 mg) by mouth 2 times daily    Hypertension secondary to other renal disorders       nystatin 465205 UNIT/ML suspension    MYCOSTATIN    600 mL    Take 5 mLs (500,000 Units) by mouth 4 times daily    Immunosuppressed status (H)       ondansetron 4 MG ODT tab    ZOFRAN-ODT    30 tablet     Take 1 tablet (4 mg) by mouth every 6 hours as needed for nausea or vomiting    Nausea       oxyCODONE 5 MG tablet    ROXICODONE    108 tablet    Take 10-12.5mg (2-2.5 tabs) every 4 hours as needed for pain on 11/20-11/22 Take 5-10mg (1-2 tabs) every 4 hours as needed for pain on 11/23-11/25 Take 5-7.5mg (1-1.5 tabs) every 4 hours as needed for pain on 11/26-11/28 Starting 11/29 you may restart your regular dose of Norco. DO NOT USE OXYCODONE AND NORCO AT THE SAME TIME.    Kidney replaced by transplant       pantoprazole 20 MG EC tablet    PROTONIX    90 tablet    Take 2 tablets (40 mg) by mouth every morning (before breakfast)    Gastroesophageal reflux disease, esophagitis presence not specified       phosphorus tablet 250 mg 250 MG per tablet    PHOSPHA 250 NEUTRAL    120 tablet    Take 2 tablets (500 mg) by mouth 2 times daily Hold at this time 11/22    Hypophosphatemia       polyethylene glycol powder    MIRALAX    850 g    Take 17 g (1 capful) by mouth 2 times daily Hold for loose stools    Drug-induced constipation       prochlorperazine 5 MG tablet    COMPAZINE    30 tablet    Take 1 tablet (5 mg) by mouth every 6 hours as needed for nausea or vomiting    Nausea       sennosides 8.6 MG tablet    SENOKOT    120 each    Take 2 tablets by mouth 2 times daily Hold for loose stools    Drug-induced constipation       sulfamethoxazole-trimethoprim 400-80 MG per tablet    BACTRIM/SEPTRA    30 tablet    Take 1 tablet by mouth daily    Pancreas transplanted (H), Kidney replaced by transplant       * tacrolimus 0.5 MG capsule    GENERIC EQUIVALENT    60 capsule    Take 1 capsule every 12 hours. Total dose = 2.5 mg, changed 11/22.    Pancreas transplanted (H)       * tacrolimus 1 MG capsule    GENERIC EQUIVALENT    180 capsule    Take 2 capsules (2 mg) by mouth every 12 hours 2.5mg total dose, changed 11/22    Pancreas transplanted (H)       valGANciclovir 450 MG tablet    VALCYTE    152 tablet    Take 2 tablets (900  mg) by mouth daily    Kidney replaced by transplant, Pancreas transplanted (H)       * Notice:  This list has 2 medication(s) that are the same as other medications prescribed for you. Read the directions carefully, and ask your doctor or other care provider to review them with you.

## 2018-11-22 NOTE — PROGRESS NOTES
ACUTE TRANSPLANT NEPHROLOGY VISIT    Assessment & Plan   # DDKT ([facility]): baseline Cr ~ 1.1-1.2; Increased   - Proteinuria: Not checked recently   - Latest DSA: No Date of DSA last checked: 2018   - BK: No   - Kidney Tx Biopsy: No    # Pancreas Tx (K):Enteric drained     - Blood glucose: Euglycemia   - HbA1c: not checked   - Pancreatic enzymes: Decreased   - Date of DSA last checked: 2018 Latest DSA: No    # Immunosuppression: Tacrolimus immediate release (goal  8-10) and Mycophenolic acid (goal  1-3.5)   - Changes: Yes - elevated tacrolimus, we lowered the dose to 2.5 mg      # Dyspnea: resolved  # Prophylaxis:   - PJP: TMP/Sulfa (Bactrim)   - CMV: Valcyte x3 months    # Hypertension: Controlled; Goal BP: < 130/80 on nifedipine 30 mg bid    - Changes: No    # Anemia in chronic renal disease: Hgb: Increased   - Iron studies: Replete    # Mineral Bone Disorder:    - Secondary renal hyperparathyroidism; PTH level is: Significantly elevated   - Vitamin D; level is: Low   - Calcium; level is: Low   - Phosphorus; level is: Low.   - Continue calcitriol and cholecalciferol and phos supplementation, refills supplied   - Recheck levels at 4 months    # Electrolytes:   - Potassium; level: Normal  - Magnesium; level: Normal on supplementation which I increased   - Bicarbonate; level: Normal    Return visit: No Follow-up on file.    # Transplant History:  Etiology of kidney failure: diabetic nephropathy  Tx: DDKT ([facility])  Transplant: 2018 (Kidney / Pancreas), 10/10/2013 (Kidney)  Donor Type:  - Brain Death Donor Class:   Crossmatch at time of Tx: negative  DSA at time of Tx: No  Significant changes in immunosuppression: None  CMV IgG Ab Discordance (D+/R-): No  EBV IgG Ab Discordance (D+/R-): No  Significant transplant-related complications: None    Transplant Office Phone Number: [phone]    Assessment and plan was discussed with the patient and he voiced his understanding and agreement.    Deyvi Thurman  MD Kapil    Chief Complaint   Mr. Lewis is a 52 year old here for hospital follow up following kidney transplant    History of Present Illness     Recent Hospitalizations:  [] No [x] Yes S/p kidney / panc transplant   New Medical Issues: [] No [] Yes    Decreased energy: [x] No [] Yes    Chest pain or SOB with exertion:  [x] No [] Yes    Appetite change or weight change: [x] No [] Yes    Nausea, vomiting or diarrhea:  [x] No [] Yes    Fever, sweats or chills: [x] No [] Yes    Leg swelling: [x] No [] Yes      Other medical issues:  No     Drain output 15 mL    Home BP: 110/70    Review of Systems   A comprehensive review of systems was obtained and negative, except as noted in the HPI or PMH.    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)     Diabetes mellitus with background retinopathy (H)     Polycystic kidney     NONSPECIFIC MEDICAL HISTORY     Coronary artery disease     Retinopathy     Hyperlipidemia LDL goal <70     Premature ventricular contractions (PVCs) (VPCs)     Kidney replaced by transplant     Immunosuppressed status (H)     Kidney transplant rejection     Hyperglycemia     Hypertension     Anemia in chronic renal disease     Care after organ transplant     Esophageal ulcer     Status post simultaneous kidney and pancreas transplant (H)     Other chronic pain     Nausea     Drug induced constipation     Hypophosphatemia       Social History   Social History   Substance Use Topics     Smoking status: Never Smoker     Smokeless tobacco: Never Used     Alcohol use No       Allergies   Allergies   Allergen Reactions     No Known Allergies        Medications   Current Outpatient Prescriptions   Medication Sig     acetaminophen (TYLENOL) 325 MG tablet Take 3 tablets (975 mg) by mouth every 8 hours as needed for mild pain or fever (DO NOT USE WITH NORCO)     aspirin 81 MG EC tablet Take 1 tablet (81 mg) by mouth daily     atorvastatin  (LIPITOR) 10 MG tablet Take 0.5 tablets (5 mg) by mouth daily     blood glucose monitoring (NO BRAND SPECIFIED) test strip Use to test blood sugar 4 times daily or as directed.     calcitRIOL (ROCALTROL) 0.25 MCG capsule Take 1 capsule (0.25 mcg) by mouth daily     calcium carbonate 500 mg-vitamin D 200 units (OSCAL WITH D;OYSTER SHELL CALCIUM) 500-200 MG-UNIT per tablet Take 1 tablet by mouth 2 times daily (with meals)     cholecalciferol 2000 units CAPS Take 2,000 Units by mouth daily     HYDROcodone-acetaminophen (NORCO) 5-325 MG per tablet Stop taking until you have completed your oxycodone taper. DO NOT TAKE WITH OXYCODONE. (Patient not taking: Reported on 11/21/2018)     Lidocaine (LIDOCARE) 4 % Patch Place 2 patches onto the skin every 24 hours As needed for pain     magnesium oxide (MAG-OX) 400 MG tablet Take 1 tablet (400 mg) by mouth 2 times daily     mycophenolic acid (GENERIC EQUIVALENT) 360 MG EC tablet Take 2 tablets (720 mg) by mouth 2 times daily     NIFEdipine ER osmotic (PROCARDIA XL) 30 MG 24 hr tablet Take 1 tablet (30 mg) by mouth 2 times daily (Patient taking differently: Take 30 mg by mouth 2 times daily HOLD for SBP less than 150.)     nystatin (MYCOSTATIN) 140137 UNIT/ML suspension Take 5 mLs (500,000 Units) by mouth 4 times daily     ondansetron (ZOFRAN-ODT) 4 MG ODT tab Take 1 tablet (4 mg) by mouth every 6 hours as needed for nausea or vomiting (Patient not taking: Reported on 11/21/2018)     oxyCODONE IR (ROXICODONE) 5 MG tablet Take 10-12.5mg (2-2.5 tabs) every 4 hours as needed for pain on 11/20-11/22  Take 5-10mg (1-2 tabs) every 4 hours as needed for pain on 11/23-11/25  Take 5-7.5mg (1-1.5 tabs) every 4 hours as needed for pain on 11/26-11/28  Starting 11/29 you may restart your regular dose of Norco. DO NOT USE OXYCODONE AND NORCO AT THE SAME TIME.     pantoprazole (PROTONIX) 20 MG EC tablet Take 2 tablets (40 mg) by mouth every morning (before breakfast)     phosphorus tablet 250  mg (PHOSPHA 250 NEUTRAL) 250 MG per tablet Take 2 tablets (500 mg) by mouth 2 times daily     polyethylene glycol (MIRALAX) powder Take 17 g (1 capful) by mouth 2 times daily Hold for loose stools     prochlorperazine (COMPAZINE) 5 MG tablet Take 1 tablet (5 mg) by mouth every 6 hours as needed for nausea or vomiting     sennosides (SENOKOT) 8.6 MG tablet Take 2 tablets by mouth 2 times daily Hold for loose stools     sulfamethoxazole-trimethoprim (BACTRIM/SEPTRA) 400-80 MG per tablet Take 1 tablet by mouth daily     tacrolimus (GENERIC EQUIVALENT) 0.5 MG capsule Use for dose adjustments.     tacrolimus (GENERIC EQUIVALENT) 1 MG capsule Take 3 capsules (3 mg) by mouth every 12 hours     valGANciclovir (VALCYTE) 450 MG tablet Take 2 tablets (900 mg) by mouth daily     No current facility-administered medications for this visit.      There are no discontinued medications.    Physical Exam   Vital Signs: Temp 97.6  F (36.4  C) (Oral)  Resp 16  Wt 81.1 kg (178 lb 11.2 oz)  SpO2 99%  BMI 27.99 kg/m2    GENERAL APPEARANCE: alert and no distress  HENT: mouth without ulcers or lesions  LYMPHATICS: no cervical or supraclavicular nodes  RESP: crackles in right lung base on auscultation - no rales, rhonchi or wheezes  CV: regular rhythm, normal rate, no rub, no murmur  EDEMA: no LE edema bilaterally  ABDOMEN: soft, + distended, nontender, bowel sounds normal  MS: extremities normal - no gross deformities noted, no evidence of inflammation in joints, no muscle tenderness  SKIN: no rash  TX KIDNEY: normal    Data     Renal Latest Ref Rng & Units 11/22/2018 11/21/2018 11/20/2018   Na 133 - 144 mmol/L 134 133 131(L)   K 3.4 - 5.3 mmol/L 5.0 5.2 5.0   Cl 94 - 109 mmol/L 103 102 103   CO2 20 - 32 mmol/L 23 22 22   BUN 7 - 30 mg/dL 12 13 16   Cr 0.66 - 1.25 mg/dL 1.32(H) 1.17 1.18   Cr (external) 0.5 - 1.5 mg/dL - - -   Glucose 70 - 99 mg/dL 84 91 84   Ca  8.5 - 10.1 mg/dL 8.1(L) 8.0(L) 8.1(L)   Mg 1.6 - 2.3 mg/dL 1.6 1.6 1.7      Bone Health Latest Ref Rng & Units 11/22/2018 11/21/2018 11/20/2018   Phos 2.5 - 4.5 mg/dL 3.0 2.4(L) 2.8   PTHi 18 - 80 pg/mL - - -   Vit D Def 20 - 75 ug/L - - -     Heme Latest Ref Rng & Units 11/22/2018 11/21/2018 11/20/2018   WBC 4.0 - 11.0 10e9/L 6.2 8.0 5.2   Hgb 13.3 - 17.7 g/dL 9.6(L) 9.3(L) 9.1(L)   Plt 150 - 450 10e9/L 229 251 165     Liver Latest Ref Rng & Units 11/11/2018 6/27/2018 8/3/2017   AP 40 - 150 U/L 189(H) - -   TBili 0.2 - 1.3 mg/dL 0.3 - -   ALT 0 - 70 U/L 14 - -   AST 0 - 45 U/L 9 - -   Tot Protein 6.8 - 8.8 g/dL 7.6 - -   Albumin 3.4 - 5.0 g/dL 4.0 4.2 4.2     Pancreas Latest Ref Rng & Units 11/22/2018 11/21/2018 11/20/2018   A1C 0 - 5.6 % - - -   Amylase 30 - 110 U/L 120(H) 137(H) 198(H)   Lipase 73 - 393 U/L 456(H) 584(H) 784(H)     Iron studies Latest Ref Rng & Units 9/11/2018 8/3/2017 11/18/2016   Iron 35 - 180 ug/dL 85 79 82   Iron sat 15 - 46 % 37 34 34   Ferritin 26 - 388 ng/mL 761(H) 736(H) -     [facility] Txp Virology Latest Ref Rng & Units 11/11/2018 11/1/2018 9/14/2018   CVM DNA Quant - - - -   CMV Quant <100 Copies/mL - - -   CMV QT Log <2.0 Log copies/mL - - -   BK Spec - - Plasma, EDTA anticoagulant -   BK Res BKNEG:BK Virus DNA Not Detected copies/mL - BK Virus DNA Not Detected -   BK Log <2.7 Log copies/mL - Not Calculated -   Hep B Core NR:Nonreactive Nonreactive - Nonreactive   Hep B Surf - - - -   HIV 1&2 NEG - - -        Recent Labs   Lab Test  11/19/18   0639  11/20/18   0523  11/21/18   0720   DOSTAC  Not Provided  Not Provided  Not Provided   TACROL  6.1  8.6  12.0     Recent Labs   Lab Test  05/04/17   1408  05/24/17   1414  08/03/17   1441   DOSMPA  LAST DOSE 5/4/2017 AT 2:00 AM  Last dose 5/24/17 at 0430AM  08/03/2017 AT 0330 AM   MPACID  2.97  2.79  3.63*   MPAG  >200.0*  >200.0*  >200.0*

## 2018-11-22 NOTE — PROGRESS NOTES
"Amadou Lewis came to [facility] today for a lab and assess following a Pancreas transplant on 11/12/18.      Discharge date: 11/20/18  Transplant coordinator: Zora  Phone number patient can be reached at: [phone]      Physical Assessment:  See physical assessment located under \"Document Flowsheets\".  Incision site: Surgical incision closed with staples, C/D/I. Pt has Microklenz at home and knows how to use.  Lines: SARAH drain, 15mL of serosanguinous fluid in last 24 hours.  Armirez: n/a  Urine clarity: Clear, yellow, per pt.  Hydration: Patient educated on the importance of fluid intake. Pt reports drinking 2 large bottles of water yesterday.   Nutrition: Pt with decreased appetite yesterday after emesis. No vomiting since yesterday AM.   Last BM: 11/21 AM. Pt took 1 Senna this AM.  Pain: Pt has history of chronic pain, currently takng Oxycodone taper for pain control.  Pt rates pain 5/10 at incision site; pt took PRN Oxycodone and Tylenol with morning meds.    Patient with drop in orthostatic BP. Patient denies dizziness. Dr. Dick ordered 1L NS; this was infused over 1 hour.    Patient checked blood sugar last night, WNL.    Patient reports no shortness of breath today.    Labs drawn by [facility] staff Yes    Plan of care for today: Labs, assessment, and VS reviewed by Dr. Dick who saw patient today in [facility].    Medications put into pill box. Patient verbalizes understanding on how to do this.    Requested patient to bring BP cuff tomorrow to check it.    Medication changes:   1. Calcium Carbonate on hold.  2. Phosphorous on hold.  3. Magnesium increased to 800mg at noon, 400mg at 1800.  4. Tacrolimus decreased to 2.5mg Q12h.    Medications administered:  Administrations This Visit     0.9% sodium chloride BOLUS     Admin Date Action Dose Rate Route Administered By          11/22/2018 New Bag 1000 mL 1,000 mL/hr Intravenous Cheryl Salazar RN                         Patient education:    The following teaching topics " were addressed: Importance of drinking 2L of non-caffeinated fluids daily, Incisional care, Signs/symptoms of infection, Good handwashing, Medications (purposes, doses and times of administration), Phone numbers to call with concenrs (Transplant coordinator, [facility] and [facility]), Plan of care and Drain care   Patient verbalized understanding and all questions answered.    Drug level:  Tacrolimus level today of 14.9 reviewed with Dr Dick who gave orders to decrease Tacrolimus to 2.5mg.  Patient was updated with this information and verbalized understanding.    Face to face time: 60 minutes  Discharge Plan    Pt will follow up with [facility] tomorrow for LBA at 0700.  Discharge instructions reviewed with patient: YES  Patient/Representative verbalized understanding, all questions answered: YES    Discharged from unit at 1015 with whom: self to home.    Cheryl Salazar RN

## 2018-11-23 ENCOUNTER — OFFICE VISIT (OUTPATIENT)
Dept: INFUSION THERAPY | Facility: CLINIC | Age: 53
End: 2018-11-23
Attending: INTERNAL MEDICINE
Payer: COMMERCIAL

## 2018-11-23 VITALS
TEMPERATURE: 98.2 F | RESPIRATION RATE: 18 BRPM | BODY MASS INDEX: 27.74 KG/M2 | OXYGEN SATURATION: 100 % | WEIGHT: 177.1 LBS

## 2018-11-23 DIAGNOSIS — Z94.83 PANCREAS REPLACED BY TRANSPLANT (H): ICD-10-CM

## 2018-11-23 DIAGNOSIS — E87.5 HYPERKALEMIA: ICD-10-CM

## 2018-11-23 DIAGNOSIS — Z48.298 AFTERCARE FOLLOWING ORGAN TRANSPLANT: ICD-10-CM

## 2018-11-23 DIAGNOSIS — D84.9 IMMUNOSUPPRESSION (H): ICD-10-CM

## 2018-11-23 DIAGNOSIS — Z48.298 AFTERCARE FOLLOWING ORGAN TRANSPLANT: Primary | ICD-10-CM

## 2018-11-23 DIAGNOSIS — Z94.0 KIDNEY REPLACED BY TRANSPLANT: ICD-10-CM

## 2018-11-23 DIAGNOSIS — Z94.83 PANCREAS TRANSPLANTED (H): ICD-10-CM

## 2018-11-23 LAB
AMYLASE SERPL-CCNC: 103 U/L (ref 30–110)
ANION GAP SERPL CALCULATED.3IONS-SCNC: 7 MMOL/L (ref 3–14)
BASOPHILS # BLD AUTO: 0 10E9/L (ref 0–0.2)
BASOPHILS NFR BLD AUTO: 0.1 %
BUN SERPL-MCNC: 13 MG/DL (ref 7–30)
CALCIUM SERPL-MCNC: 8.5 MG/DL (ref 8.5–10.1)
CHLORIDE SERPL-SCNC: 102 MMOL/L (ref 94–109)
CO2 SERPL-SCNC: 22 MMOL/L (ref 20–32)
CREAT SERPL-MCNC: 1.31 MG/DL (ref 0.66–1.25)
DIFFERENTIAL METHOD BLD: ABNORMAL
EOSINOPHIL # BLD AUTO: 0.1 10E9/L (ref 0–0.7)
EOSINOPHIL NFR BLD AUTO: 1 %
ERYTHROCYTE [DISTWIDTH] IN BLOOD BY AUTOMATED COUNT: 13.9 % (ref 10–15)
GFR SERPL CREATININE-BSD FRML MDRD: 57 ML/MIN/1.7M2
GLUCOSE SERPL-MCNC: 91 MG/DL (ref 70–99)
HCT VFR BLD AUTO: 28.8 % (ref 40–53)
HGB BLD-MCNC: 9.1 G/DL (ref 13.3–17.7)
IMM GRANULOCYTES # BLD: 0.1 10E9/L (ref 0–0.4)
IMM GRANULOCYTES NFR BLD: 0.7 %
LIPASE SERPL-CCNC: 426 U/L (ref 73–393)
LYMPHOCYTES # BLD AUTO: 0.1 10E9/L (ref 0.8–5.3)
LYMPHOCYTES NFR BLD AUTO: 0.7 %
MAGNESIUM SERPL-MCNC: 1.5 MG/DL (ref 1.6–2.3)
MCH RBC QN AUTO: 28.4 PG (ref 26.5–33)
MCHC RBC AUTO-ENTMCNC: 31.6 G/DL (ref 31.5–36.5)
MCV RBC AUTO: 90 FL (ref 78–100)
MONOCYTES # BLD AUTO: 0.3 10E9/L (ref 0–1.3)
MONOCYTES NFR BLD AUTO: 3.5 %
NEUTROPHILS # BLD AUTO: 6.8 10E9/L (ref 1.6–8.3)
NEUTROPHILS NFR BLD AUTO: 94 %
NRBC # BLD AUTO: 0 10*3/UL
NRBC BLD AUTO-RTO: 0 /100
PHOSPHATE SERPL-MCNC: 2.9 MG/DL (ref 2.5–4.5)
PLATELET # BLD AUTO: 270 10E9/L (ref 150–450)
POTASSIUM SERPL-SCNC: 5.3 MMOL/L (ref 3.4–5.3)
RBC # BLD AUTO: 3.2 10E12/L (ref 4.4–5.9)
SODIUM SERPL-SCNC: 132 MMOL/L (ref 133–144)
TACROLIMUS BLD-MCNC: 15.9 UG/L (ref 5–15)
TME LAST DOSE: ABNORMAL H
WBC # BLD AUTO: 7.2 10E9/L (ref 4–11)

## 2018-11-23 PROCEDURE — 83735 ASSAY OF MAGNESIUM: CPT | Performed by: SURGERY

## 2018-11-23 PROCEDURE — 80197 ASSAY OF TACROLIMUS: CPT | Performed by: SURGERY

## 2018-11-23 PROCEDURE — 83690 ASSAY OF LIPASE: CPT | Performed by: SURGERY

## 2018-11-23 PROCEDURE — 82150 ASSAY OF AMYLASE: CPT | Performed by: SURGERY

## 2018-11-23 PROCEDURE — 96360 HYDRATION IV INFUSION INIT: CPT

## 2018-11-23 PROCEDURE — 25000128 H RX IP 250 OP 636: Mod: ZF | Performed by: INTERNAL MEDICINE

## 2018-11-23 PROCEDURE — 85025 COMPLETE CBC W/AUTO DIFF WBC: CPT | Performed by: SURGERY

## 2018-11-23 PROCEDURE — 84100 ASSAY OF PHOSPHORUS: CPT | Performed by: SURGERY

## 2018-11-23 PROCEDURE — G0463 HOSPITAL OUTPT CLINIC VISIT: HCPCS | Mod: 25

## 2018-11-23 PROCEDURE — 80048 BASIC METABOLIC PNL TOTAL CA: CPT | Performed by: SURGERY

## 2018-11-23 RX ORDER — MAGNESIUM OXIDE 400 MG/1
TABLET ORAL
Qty: 90 TABLET | Refills: 2 | COMMUNITY
Start: 2018-11-23 | End: 2018-11-23

## 2018-11-23 RX ORDER — TACROLIMUS 0.5 MG/1
CAPSULE ORAL
Qty: 60 CAPSULE | Refills: 11 | COMMUNITY
Start: 2018-11-23 | End: 2018-11-26

## 2018-11-23 RX ORDER — MAGNESIUM OXIDE 400 MG/1
TABLET ORAL
Qty: 90 TABLET | Refills: 2 | COMMUNITY
Start: 2018-11-23 | End: 2019-04-19

## 2018-11-23 RX ORDER — FLUDROCORTISONE ACETATE 0.1 MG/1
0.1 TABLET ORAL DAILY
Qty: 30 TABLET | Refills: 3 | Status: SHIPPED | OUTPATIENT
Start: 2018-11-23 | End: 2019-03-15

## 2018-11-23 RX ORDER — TACROLIMUS 1 MG/1
2 CAPSULE ORAL EVERY 12 HOURS
Qty: 180 CAPSULE | Refills: 11 | COMMUNITY
Start: 2018-11-23 | End: 2018-11-26

## 2018-11-23 RX ADMIN — SODIUM CHLORIDE 1000 ML: 9 INJECTION, SOLUTION INTRAVENOUS at 10:55

## 2018-11-23 ASSESSMENT — PAIN DESCRIPTION - DESCRIPTORS: DESCRIPTORS: SORE

## 2018-11-23 NOTE — PROGRESS NOTES
"Amadou Lewis came to [facility] today for a lab and assess following a Pancreas transplant on 11/12/18.      Discharge date: 11/20/18  Transplant coordinator: Zora  Phone number patient can be reached at: [phone]      Physical Assessment:  See physical assessment located under \"Document Flowsheets\".  Incision site: Incision edges approximated, staples intact, old crusted drainage around some of the staples. Patient using MicroKlenz at home.   Lines: SARAH drain, 15mL of serosanguinous fluid in last 24 hours again ( 45ml total over the past 3 days)  Noted slight redness and small amt of serous/serosangeous drainage near SARAH insertion site. Dressing changed over this.  Ramirez: n/a  Urine clarity: Voiding without issues, patient reports clear yellow urine.   Hydration: Patient reports drinking 3- 20oz bottles of water and 1 glass of OJ. Patient verbalized understanding the recommended 2-3 liters of non caffienated fluids daily and states he is working at this.  Nutrition: Still with decreased appetite. No vomiting but occasional nausea when taking oxycodone.  Last BM: 11/22/18- semiformed- some looser. Only 1 BM.   Pain: hx of chronic pain. Patient wanting to use his Norco instead of the oxycodone as the oxycodone causes nausea. Pt rates pain 6/10 at incision site, states \" it feels sore\"; Patient brought his prescribed pain meds and took them here during his appointment.    Orthostatics:  Sit:      134/82   93  Stand: 117/75   96    Patient reports blood sugars are 70-80's when he checks them at home.  Labs drawn by [facility] staff Yes    Plan of care for today:  1.) Labs, assessment, and VS reviewed by Dr. Dick who saw patient today in [facility]. Copy of labs given to patient.  2.) Reviewed transplant education checklist. Patient denies further questions.  3.) Verified patient made medication changes to pill box and card from yesterday.  4.) SARAH drain removed by surgery team today. Education given to patient on site care.  5.) " Orders received for 1 liter of fluids.  6.) Education given to patient on a low potassium diet per Dr. Dick's request.    Medication changes:   Magnesium changed to 800mg twice daily to be taken at 12pm and 6pm  Start taking Florinef once daily (Education given to patient on this)    Medications administered:  Administrations This Visit     0.9% sodium chloride BOLUS     Admin Date Action Dose Rate Route Administered By          11/23/2018 New Bag 1000 mL 1,000 mL/hr Intravenous Severson, Ericka, RN                         Patient education:    The following teaching topics were Reviewed: Importance of drinking 2L of non-caffeinated fluids daily, Incisional care, Signs/symptoms of infection, Good handwashing, Medications (purposes, doses and times of administration), Phone numbers to call with concenrs (Transplant coordinator, [facility] and [facility]), Plan of care and Drain care   Patient verbalized understanding and all questions answered.    Drug level:  Tacrolimus level today 15.9 reviewed with Dr Dick who gave orders decrease dose to 2mg every 12 hours.  Patient was updated with this information and verbalized understanding.    Face to face time: 20 minutes  Discharge Plan    [facility] for labs and assessment.  Discharge instructions reviewed with patient: YES  Patient/Representative verbalized understanding, all questions answered: YES    Discharged from unit at 1200 with whom: self to home.    Ericka Severson, RN

## 2018-11-23 NOTE — MR AVS SNAPSHOT
After Visit Summary   11/23/2018    Amadou Lewis    MRN: [id number]           Patient Information     Date Of Birth          1965        Visit Information        Provider Department      11/23/2018 8:00 AM Jose L De Los Santos MD [facility] [place] [facility]        Today's Diagnoses     Aftercare following organ transplant    -  1    Hyperkalemia        Immunosuppression (H)           Follow-ups after your visit        Your next 10 appointments already scheduled     Dec 03, 2018  2:30 PM CST   (Arrive by 2:15 PM)   Post-Op with Monique Luevano MD   [facility] Solid Organ Transplant ([facility])    [address]  [address]  [facility] [address]   [phone]            Dec 10, 2018  9:30 AM CST   (Arrive by 9:00 AM)   Return Kidney Transplant with Uc Early Post Transplant   [facility] Nephrology ([facility])    [address]  [address]  [facility] [address]   [phone]            Dec 20, 2018  9:00 AM CST   (Arrive by 8:45 AM)   Cystoscopy with Sandy Cole NP   [facility] Transplant ([facility])    [address]  [address]  [facility] [address]   [phone]            Jan 08, 2019  9:30 AM CST   (Arrive by 9:15 AM)   [facility]    [address]  3rd Floor  [facility] [address] [phone]           Bring a current list of meds and any records pertaining to this visit. For Physicals, please bring immunization records and any forms needing to be filled out. Please arrive 10 minutes early to complete paperwork.            Jan 08, 2019 11:00 AM CST   (Arrive by 10:30 AM)   Return Kidney Transplant with Uc Early Post Transplant   [facility] Nephrology ([facility])    [address] [place] [address]  [address]  [facility] [address]   [phone]            Mar 04, 2019 11:00 AM CST   (Arrive by 10:30 AM)   Return Kidney Transplant with Uc Early Post Transplant   [facility] Nephrology ([facility])    [address]  [address]  [facility] [address]   [phone]            Apr 29, 2019 11:00 AM CDT   (Arrive by 10:30 AM)   Return Kidney Transplant with  Early Post Transplant   [facility] Nephrology ([facility])    [address]  [address]  [facility] [address]   [phone]              Who to contact     If you have questions or need follow up information about today's clinic visit or your schedule please contact [facility] PROC[facility] directly at [phone].  Normal or non-critical lab and imaging results will be communicated to you by [employer], letter or phone within 4 business days after the clinic has received the results. If you do not hear from us within 7 days, please contact the clinic through [employer] or phone. If you have a critical or abnormal lab result, we will notify you by phone as soon as possible.  Submit refill requests through [employer] or call your pharmacy and they will forward the refill request to us. Please allow 3 business days for your refill to be completed.          Additional Information About Your Visit        [employer] Information     [employer] gives you secure access to your electronic health record. If you see a primary care provider, you can also send messages to your care team and make appointments. If you have questions, please call your primary care clinic.  If you do not have a primary care provider, please call [phone] and they will assist you.        Care EveryWhere ID     This is your Care EveryWhere ID. This could be used by other organizations to access your [place] medical records  [id number]         Blood Pressure from Last 3 Encounters:   11/22/18 115/71    11/21/18 117/70   11/20/18 114/73    Weight from Last 3 Encounters:   11/23/18 80.3 kg (177 lb 1.6 oz)   11/22/18 81.1 kg (178 lb 11.2 oz)   11/21/18 81.5 kg (179 lb 10.8 oz)              Today, you had the following     No orders found for display         Today's Medication Changes          These changes are accurate as of 11/23/18 11:48 AM.  If you have any questions, ask your nurse or doctor.               Start taking these medicines.        Dose/Directions    fludrocortisone 0.1 MG tablet   Commonly known as:  FLORINEF   Used for:  Hyperkalemia   Started by:  Jose L De Los Santos MD        Dose:  0.1 mg   Take 1 tablet (0.1 mg) by mouth daily   Quantity:  30 tablet   Refills:  3         These medicines have changed or have updated prescriptions.        Dose/Directions    magnesium oxide 400 MG tablet   Commonly known as:  MAG-OX   This may have changed:    - how much to take  - how to take this  - when to take this  - additional instructions   Used for:  Pancreas transplanted (H)        Take 2 tablets (800mg) at noon and 1 tablet (400mg) at 6pm.   Quantity:  90 tablet   Refills:  2       NIFEdipine ER osmotic 30 MG 24 hr tablet   Commonly known as:  PROCARDIA XL   This may have changed:  additional instructions   Used for:  Hypertension secondary to other renal disorders        Dose:  30 mg   Take 1 tablet (30 mg) by mouth 2 times daily   Quantity:  60 tablet   Refills:  3            Where to get your medicines      These medications were sent to [place] [address] [facility] [address] [place] [address] [address] [address]    Hours:  TRANSPLANT PHONE NUMBER [phone] Phone:  [phone]     fludrocortisone 0.1 MG tablet                Primary Care Provider Office Phone # Fax #    Masoud Valentin MD, [phone]       [address] [address] [address] [place] [address]        Equal Access to Services     [facility]  GAAR : Hadii aad ku donaldo Mchugh, waaxda luqadaha, qaybta kanarayan asharyantonio, waxlaureano rigo haykyaw douglassleonacr jeffers . So [facility] [phone].    ATENCIÓN: Si habla español, tiene a tejeda disposición servicios gratuitos de asistencia lingüística. Llame al [phone].    We comply with applicable federal civil rights laws and [place] laws. We do not discriminate on the basis of race, color, national origin, age, disability, sex, sexual orientation, or gender identity.            Thank you!     Thank you for choosing [facility] PROCED[facility]  for your care. Our goal is always to provide you with excellent care. Hearing back from our patients is one way we can continue to improve our services. Please take a few minutes to complete the written survey that you may receive in the mail after your visit with us. Thank you!             Your Updated Medication List - Protect others around you: Learn how to safely use, store and throw away your medicines at www.disposemymeds.org.          This list is accurate as of 11/23/18 11:48 AM.  Always use your most recent med list.                   Brand Name Dispense Instructions for use Diagnosis    acetaminophen 325 MG tablet    TYLENOL    100 tablet    Take 3 tablets (975 mg) by mouth every 8 hours as needed for mild pain or fever (DO NOT USE WITH NORCO)    Kidney replaced by transplant       aspirin 81 MG EC tablet    ASA    30 tablet    Take 1 tablet (81 mg) by mouth daily    Pancreas transplanted (H)       atorvastatin 10 MG tablet    LIPITOR    45 tablet    Take 0.5 tablets (5 mg) by mouth daily    Coronary artery disease involving native coronary artery of native heart without angina pectoris       blood glucose monitoring test strip    no brand specified    1 Box    Use to test blood sugar 4 times daily or as directed.    Diabetes mellitus with background retinopathy (H)       calcitRIOL 0.25 MCG capsule    ROCALTROL    90 capsule     Take 1 capsule (0.25 mcg) by mouth daily    Renal osteodystrophy       calcium carbonate 500 mg-vitamin D 200 units 500-200 MG-UNIT per tablet    OSCAL with D;OYSTER SHELL CALCIUM    60 tablet    Take 1 tablet by mouth 2 times daily (with meals) Hold at this time 11/22    Kidney replaced by transplant       cholecalciferol 2000 units Caps     90 capsule    Take 2,000 Units by mouth daily    Renal osteodystrophy, Vitamin D deficiency       fludrocortisone 0.1 MG tablet    FLORINEF    30 tablet    Take 1 tablet (0.1 mg) by mouth daily    Hyperkalemia       HYDROcodone-acetaminophen 5-325 MG tablet    NORCO     Stop taking until you have completed your oxycodone taper. DO NOT TAKE WITH OXYCODONE.    Other chronic pain       Lidocaine 4 % Patch    LIDOCARE    10 patch    Place 2 patches onto the skin every 24 hours As needed for pain    Pancreas transplanted (H)       magnesium oxide 400 MG tablet    MAG-OX    90 tablet    Take 2 tablets (800mg) at noon and 1 tablet (400mg) at 6pm.    Pancreas transplanted (H)       mycophenolic acid 360 MG EC tablet    GENERIC EQUIVALENT    120 tablet    Take 2 tablets (720 mg) by mouth 2 times daily    Pancreas transplanted (H)       NIFEdipine ER osmotic 30 MG 24 hr tablet    PROCARDIA XL    60 tablet    Take 1 tablet (30 mg) by mouth 2 times daily    Hypertension secondary to other renal disorders       nystatin 850620 UNIT/ML suspension    MYCOSTATIN    600 mL    Take 5 mLs (500,000 Units) by mouth 4 times daily    Immunosuppressed status (H)       ondansetron 4 MG ODT tab    ZOFRAN-ODT    30 tablet    Take 1 tablet (4 mg) by mouth every 6 hours as needed for nausea or vomiting    Nausea       oxyCODONE 5 MG tablet    ROXICODONE    108 tablet    Take 10-12.5mg (2-2.5 tabs) every 4 hours as needed for pain on 11/20-11/22 Take 5-10mg (1-2 tabs) every 4 hours as needed for pain on 11/23-11/25 Take 5-7.5mg (1-1.5 tabs) every 4 hours as needed for pain on 11/26-11/28 Starting 11/29 you  may restart your regular dose of Norco. DO NOT USE OXYCODONE AND NORCO AT THE SAME TIME.    Kidney replaced by transplant       pantoprazole 20 MG EC tablet    PROTONIX    90 tablet    Take 2 tablets (40 mg) by mouth every morning (before breakfast)    Gastroesophageal reflux disease, esophagitis presence not specified       phosphorus tablet 250 mg 250 MG per tablet    PHOSPHA 250 NEUTRAL    120 tablet    Take 2 tablets (500 mg) by mouth 2 times daily Hold at this time 11/22    Hypophosphatemia       polyethylene glycol powder    MIRALAX    850 g    Take 17 g (1 capful) by mouth 2 times daily Hold for loose stools    Drug-induced constipation       prochlorperazine 5 MG tablet    COMPAZINE    30 tablet    Take 1 tablet (5 mg) by mouth every 6 hours as needed for nausea or vomiting    Nausea       sennosides 8.6 MG tablet    SENOKOT    120 each    Take 2 tablets by mouth 2 times daily Hold for loose stools    Drug-induced constipation       sulfamethoxazole-trimethoprim 400-80 MG per tablet    BACTRIM/SEPTRA    30 tablet    Take 1 tablet by mouth daily    Pancreas transplanted (H), Kidney replaced by transplant       * tacrolimus 0.5 MG capsule    GENERIC EQUIVALENT    60 capsule    Take 1 capsule every 12 hours. Total dose = 2.5 mg, changed 11/22.    Pancreas transplanted (H)       * tacrolimus 1 MG capsule    GENERIC EQUIVALENT    180 capsule    Take 2 capsules (2 mg) by mouth every 12 hours 2.5mg total dose, changed 11/22    Pancreas transplanted (H)       valGANciclovir 450 MG tablet    VALCYTE    152 tablet    Take 2 tablets (900 mg) by mouth daily    Kidney replaced by transplant, Pancreas transplanted (H)       * Notice:  This list has 2 medication(s) that are the same as other medications prescribed for you. Read the directions carefully, and ask your doctor or other care provider to review them with you.

## 2018-11-23 NOTE — MR AVS SNAPSHOT
After Visit Summary   11/23/2018    Amadou Lewis    MRN: [id number]           Patient Information     Date Of Birth          1965        Visit Information        Provider Department      11/23/2018 7:00 AM  41 ATC;  [facility] [place] [facility]        Today's Diagnoses     Pancreas replaced by transplant (H)        Aftercare following organ transplant        Kidney replaced by transplant        Pancreas transplanted (H)          Care Instructions    Dear Amadou Lewis    Thank you for choosing [facility] ([facility]) for your transplant cares.  The following information is a summary of our appointment as well as important reminders.      Please make sure your phone is available today because I will call to update you with your anti-rejection drug levels and possibly make changes to your anti-rejection dosages.    1.) Please drink 2-3 liters of non caffeinated fluids daily.  2.) Return here Sunday 11/25/18 at 7am for labs and assessment.  3.)Eat a low potassium diet for now until instructed otherwise.    Medication changes:  -Increase Magnesium to 800mg twice daily to be taken at 12pm and 6pm.  -Start taking Florinef as prescribed and written on medication card. Please  this script at the pharmacy downstairs.       We look forward in seeing you on your next appointment here at [facility].  Please don t hesitate to call us at [phone] to reschedule any of your appointments or to speak with one of the [facility] registered nurses.  It was a pleasure taking care of you today.    Sincerely,    [facility]  Specialty Infusion & Procedure Center  [address] [place] [address]  [id number]  Phone:  [phone]      Discharge Instructions: Monitor post SARAH drain removal:     When to seek medical care  Call your healthcare provider right away if you have any of the  following:    New or increased pain around the tube insertion site    Redness, swelling, or warmth around the incision or old tube site    Drainage that is foul-smelling    Vomiting    Fever of 100.4 F (38 C)    Abdominal pain     Date Last Reviewed: 2/1/2017 2000-2018 [employer]. [address] [place] [address]. All rights reserved. This information is not intended as a substitute for professional medical care. Always follow your healthcare professional's instructions.        Discharge Instructions: Eating a Low-Potassium Diet  Your healthcare provider has prescribed a low-potassium diet for you. This kind of diet is advised for people who have certain kidney problems. Potassium is needed for muscle function. But too much potassium is a health risk. Potassium is found in many foods. Read below to find out how to change your diet.  Foods to limit  Some foods are high in potassium. Limit your daily intake of the foods in the list below.    Fruits: apricots (canned and fresh), bananas, cantaloupe, honeydew melon, kiwi, nectarines, pomegranates, oranges, orange juice, pears, dried fruits (apricots, dates, figs, prunes), and prune juice    Vegetables: asparagus, avocado, artichoke, bamboo shoots, beets, brussels sprouts, cabbage, celery, chard, okra, potatoes (white and sweet), pumpkin, rutabaga, spinach (cooked), squash, tomato, tomato sauce, tomato juice, and vegetable juice cocktail    Legumes: black-eyed peas, chickpeas, lentils, lima beans, navy beans, red kidney beans, soybeans, and split peas    Nuts and seeds: almonds, Brazil nuts, cashews, peanuts, peanut butter, pecans, pumpkin seeds, sunflower seeds, and walnuts    Breads and cereals: bran and whole-grain products    Dairy foods: milk, cheese, ice cream, yogurt    Animal protein: all forms of animal protein    Other: chocolate, cocoa, coconut milk, and molasses  Tips    Ask your healthcare provider how much potassium you are  allowed each day. This will help you figure out serving sizes for your needs.    Check labels for potassium. It may be listed as potassium chloride.    Do not use salt substitutes. These often have potassium in them.    Cook frozen fruits and vegetables in water. Rinse and drain them well before eating.    Drain liquid from all canned fruits and vegetables. Rinse them before eating.    Reduce the potassium in potatoes. Peel them, slice thinly, and soak in water for at least 4 hours.    Reduce the potassium in green leafy vegetables. Soak them in water for at least 4 hours.    Eat white rice and refined white flour products. These include white bread, pasta, and grits.  Follow-up  Make a follow-up appointment as advised by your healthcare provider.  When to call your healthcare provider  Call your healthcare provider right away if you have any of the following:    Fatigue    Shortness of breath    Chest pain    Slow, irregular heartbeat    Fainting    Dizziness    Lightheadedness    Confusion   Date Last Reviewed: 6/1/2017 2000-2018 [employer]. [address] [place] [address]. All rights reserved. This information is not intended as a substitute for professional medical care. Always follow your healthcare professional's instructions.      Fludrocortisone Acetate Oral tablet  What is this medicine?  FLUDROCORTISONE (floo droe KOR ti sone) is a corticosteroid. It is used to treat Tower's disease and to treat a salt losing condition called adrenogenital syndrome.  This medicine may be used for other purposes; ask your health care provider or pharmacist if you have questions.  What should I tell my health care provider before I take this medicine?  They need to know if you have any of these conditions:    Cushing's syndrome    diabetes    heart problems or disease    high blood pressure    infection like herpes, measles, tuberculosis, or chickenpox    liver disease    myasthenia  gravis    osteoporosis    stomach, ulcer or intestine disease including colitis and diverticulitis    thyroid problem    an unusual or allergic reaction to fludrocortisone, corticosteroids, other medicines, lactose, foods, dyes, or preservatives    pregnant or trying to get pregnant    breast-feeding  How should I use this medicine?  Take this medicine by mouth with a glass of water. Follow the directions on the prescription label. Take it with food or milk to avoid stomach upset. If you are taking this medicine once a day, take it in the morning. Do not take more medicine than you are told to take. Do not suddenly stop taking your medicine because you may develop a severe reaction. Your doctor will tell you how much medicine to take. If your doctor wants you to stop the medicine, the dose may be slowly lowered over time to avoid any side effects.  Talk to your pediatrician regarding the use of this medicine in children. Special care may be needed.  Patients over 65 years old may have a stronger reaction and need a smaller dose.  Overdosage: If you think you have taken too much of this medicine contact a poison control center or emergency room at once.  NOTE: This medicine is only for you. Do not share this medicine with others.  What if I miss a dose?  If you miss a dose, take it as soon as you can. If it is almost time for your next dose, take only that dose. Do not take double or extra doses.  What may interact with this medicine?  Do not take this medicine with any of the following medications:    mifepristone, RU-486  This medicine may also interact with the following medications:    amphotericin B    aspirin and aspirin-like drugs    barbiturates like phenobarbital    digoxin    diuretics    female hormones, like estrogens or progestins and birth control pills    male hormones    medicines for diabetes like insulin    medicines that treat or prevent blood clots like  warfarin    phenytoin    rifampin    vaccines  This list may not describe all possible interactions. Give your health care provider a list of all the medicines, herbs, non-prescription drugs, or dietary supplements you use. Also tell them if you smoke, drink alcohol, or use illegal drugs. Some items may interact with your medicine.  What should I watch for while using this medicine?  Visit your doctor or health care professional for regular checks on your progress. If you are taking this medicine over a prolonged period, carry an identification card with your name and address, the type and dose of your medicine, and your doctor's name and address.  This medicine may increase your risk of getting an infection. Stay away from people who are sick. Tell your doctor or health care professional if you are around anyone with measles or chickenpox.  If you are going to have surgery, tell your doctor or health care professional that you have taken this medicine within the last twelve months.  Ask your doctor or health care professional about your diet. You may need to lower the amount of salt you eat.  The medicine can increase your blood sugar. If you are a diabetic check with your doctor if you need help adjusting the dose of your diabetic medicine.  What side effects may I notice from receiving this medicine?  Side effects that you should report to your doctor or health care professional as soon as possible:    changes in vision    mental depression, mood swings, mistaken feelings of self importance or of being mistreated    sudden weight gain    swelling of the feet or lower legs    unusually weak or tired  Side effects that usually do not require medical attention (report to your doctor or health care professional if they continue or are bothersome):    dizziness    headache    loss of appetite    nausea, vomiting    trouble sleeping  This list may not describe all possible side effects. Call your doctor for medical  advice about side effects. You may report side effects to FDA at 2-010-TLR-3273.  Where should I keep my medicine?  Keep out of the reach of children.  Store at room temperature between 15 and 30 degrees C (59 and 86 degrees F). Protect from excessive heat. Throw away any unused medicine after the expiration date.  NOTE:This sheet is a summary. It may not cover all possible information. If you have questions about this medicine, talk to your doctor, pharmacist, or health care provider. Copyright  2016 [employer]                              Follow-ups after your visit        Your next 10 appointments already scheduled     Dec 03, 2018  2:30 PM CST   (Arrive by 2:15 PM)   Post-Op with Monique Luevano MD   [facility] Solid Organ Transplant ([facility])    [address] [place] [address]  [address]  [facility] [address]   [phone]            Dec 10, 2018  9:30 AM CST   (Arrive by 9:00 AM)   Return Kidney Transplant with Uc Early Post Transplant   [facility] Nephrology ([facility])    [address] [place] [address]  [address]  [facility] [address]   [phone]            Dec 20, 2018  9:00 AM CST   (Arrive by 8:45 AM)   Cystoscopy with Sandy Cole NP   [facility] Solid Organ Transplant ([facility])    [address] [place] [address]  [address]  [facility] [address]   [phone]            Jan 08, 2019  9:30 AM CST   (Arrive by 9:15 AM)   Office Visit with Luke Tesfaye RPH   [facility] Medication Therapy Management ([facility])    [address] [place] [address]  3rd Floor  [facility] [address] [phone]           Bring a current list of meds and any records pertaining to this visit. For Physicals, please bring immunization records and any forms needing to be filled out. Please arrive 10 minutes early to complete paperwork.            Jan 08, 2019 11:00 AM CST   (Arrive by 10:30 AM)   Return Kidney Transplant with Uc  Early Post Transplant   [facility] Nephrology ([facility])    [address]  [address]  [facility] [address]   [phone]            Mar 04, 2019 11:00 AM CST   (Arrive by 10:30 AM)   Return Kidney Transplant with  Early Post Transplant   [facility] Nephrology ([facility])    [address]  [address]  [facility] [address]   [phone]            Apr 29, 2019 11:00 AM CDT   (Arrive by 10:30 AM)   Return Kidney Transplant with  Early Post Transplant   [facility] Nephrology ([facility])    [address]  [address]  [facility] [address]   [phone]              Who to contact     If you have questions or need follow up information about today's clinic visit or your schedule please contact [facility] SPECIALTY AND PROCEDURE directly at [phone].  Normal or non-critical lab and imaging results will be communicated to you by [employer], letter or phone within 4 business days after the clinic has received the results. If you do not hear from us within 7 days, please contact the clinic through [employer] or phone. If you have a critical or abnormal lab result, we will notify you by phone as soon as possible.  Submit refill requests through [employer] or call your pharmacy and they will forward the refill request to us. Please allow 3 business days for your refill to be completed.          Additional Information About Your Visit        [employer] Information     [employer] gives you secure access to your electronic health record. If you see a primary care provider, you can also send messages to your care team and make appointments. If you have questions, please call your primary care clinic.  If you do not have a primary care provider, please call [phone] and they will assist you.        Care EveryWhere ID     This is your Care EveryWhere ID. This could be used by other organizations to access  your [facility] medical records  [id number]        Your Vitals Were     Temperature Respirations Pulse Oximetry BMI (Body Mass Index)          98.2  F (36.8  C) (Oral) 18 100% 27.74 kg/m2         Blood Pressure from Last 3 Encounters:   11/22/18 115/71   11/21/18 117/70   11/20/18 114/73    Weight from Last 3 Encounters:   11/23/18 80.3 kg (177 lb 1.6 oz)   11/22/18 81.1 kg (178 lb 11.2 oz)   11/21/18 81.5 kg (179 lb 10.8 oz)              We Performed the Following     Amylase     Basic metabolic panel     CBC with platelets differential     Lipase     Magnesium     Phosphorus     Tacrolimus level          Today's Medication Changes          These changes are accurate as of 11/23/18 11:28 AM.  If you have any questions, ask your nurse or doctor.               Start taking these medicines.        Dose/Directions    fludrocortisone 0.1 MG tablet   Commonly known as:  FLORINEF   Used for:  Hyperkalemia   Started by:  Jose L De Los Santos MD        Dose:  0.1 mg   Take 1 tablet (0.1 mg) by mouth daily   Quantity:  30 tablet   Refills:  3         These medicines have changed or have updated prescriptions.        Dose/Directions    magnesium oxide 400 MG tablet   Commonly known as:  MAG-OX   This may have changed:    - how much to take  - how to take this  - when to take this  - additional instructions   Used for:  Pancreas transplanted (H)        Take 2 tablets (800mg) at noon and 1 tablet (400mg) at 6pm.   Quantity:  90 tablet   Refills:  2       NIFEdipine ER osmotic 30 MG 24 hr tablet   Commonly known as:  PROCARDIA XL   This may have changed:  additional instructions   Used for:  Hypertension secondary to other renal disorders        Dose:  30 mg   Take 1 tablet (30 mg) by mouth 2 times daily   Quantity:  60 tablet   Refills:  3            Where to get your medicines      These medications were sent to [facility] [place], [place] - [address]  [address] [place] [address] [address],  [facility] [address]    Hours:  TRANSPLANT PHONE NUMBER [phone] Phone:  [phone]     fludrocortisone 0.1 MG tablet                Primary Care Provider Office Phone # Fax #    Masoud Valentin MD, [phone]       [address] [place] [address]        Equal Access to Services     RENATA BENNETT AH: Hadii aad ku hadasho Soomaali, waaxda luqadaha, qaybta kaalmada adeegyada, waxay idiin hayaan adeeg kharash la'aan ah. So [facility] [phone].    ATENCIÓN: Si avivala ant, tiene a tejeda disposición servicios gratuitos de asistencia lingüística. Llame al [phone].    We comply with applicable federal civil rights laws and [place] laws. We do not discriminate on the basis of race, color, national origin, age, disability, sex, sexual orientation, or gender identity.            Thank you!     Thank you for choosing [facility] SPECIALTY AND PROCEDURE  for your care. Our goal is always to provide you with excellent care. Hearing back from our patients is one way we can continue to improve our services. Please take a few minutes to complete the written survey that you may receive in the mail after your visit with us. Thank you!             Your Updated Medication List - Protect others around you: Learn how to safely use, store and throw away your medicines at www.disposemymeds.org.          This list is accurate as of 11/23/18 11:28 AM.  Always use your most recent med list.                   Brand Name Dispense Instructions for use Diagnosis    acetaminophen 325 MG tablet    TYLENOL    100 tablet    Take 3 tablets (975 mg) by mouth every 8 hours as needed for mild pain or fever (DO NOT USE WITH NORCO)    Kidney replaced by transplant       aspirin 81 MG EC tablet    ASA    30 tablet    Take 1 tablet (81 mg) by mouth daily    Pancreas transplanted (H)       atorvastatin 10 MG tablet    LIPITOR    45 tablet    Take 0.5 tablets (5 mg) by  mouth daily    Coronary artery disease involving native coronary artery of native heart without angina pectoris       blood glucose monitoring test strip    no brand specified    1 Box    Use to test blood sugar 4 times daily or as directed.    Diabetes mellitus with background retinopathy (H)       calcitRIOL 0.25 MCG capsule    ROCALTROL    90 capsule    Take 1 capsule (0.25 mcg) by mouth daily    Renal osteodystrophy       calcium carbonate 500 mg-vitamin D 200 units 500-200 MG-UNIT per tablet    OSCAL with D;OYSTER SHELL CALCIUM    60 tablet    Take 1 tablet by mouth 2 times daily (with meals) Hold at this time 11/22    Kidney replaced by transplant       cholecalciferol 2000 units Caps     90 capsule    Take 2,000 Units by mouth daily    Renal osteodystrophy, Vitamin D deficiency       fludrocortisone 0.1 MG tablet    FLORINEF    30 tablet    Take 1 tablet (0.1 mg) by mouth daily    Hyperkalemia       HYDROcodone-acetaminophen 5-325 MG tablet    NORCO     Stop taking until you have completed your oxycodone taper. DO NOT TAKE WITH OXYCODONE.    Other chronic pain       Lidocaine 4 % Patch    LIDOCARE    10 patch    Place 2 patches onto the skin every 24 hours As needed for pain    Pancreas transplanted (H)       magnesium oxide 400 MG tablet    MAG-OX    90 tablet    Take 2 tablets (800mg) at noon and 1 tablet (400mg) at 6pm.    Pancreas transplanted (H)       mycophenolic acid 360 MG EC tablet    GENERIC EQUIVALENT    120 tablet    Take 2 tablets (720 mg) by mouth 2 times daily    Pancreas transplanted (H)       NIFEdipine ER osmotic 30 MG 24 hr tablet    PROCARDIA XL    60 tablet    Take 1 tablet (30 mg) by mouth 2 times daily    Hypertension secondary to other renal disorders       nystatin 982415 UNIT/ML suspension    MYCOSTATIN    600 mL    Take 5 mLs (500,000 Units) by mouth 4 times daily    Immunosuppressed status (H)       ondansetron 4 MG ODT tab    ZOFRAN-ODT    30 tablet    Take 1 tablet (4 mg) by  mouth every 6 hours as needed for nausea or vomiting    Nausea       oxyCODONE 5 MG tablet    ROXICODONE    108 tablet    Take 10-12.5mg (2-2.5 tabs) every 4 hours as needed for pain on 11/20-11/22 Take 5-10mg (1-2 tabs) every 4 hours as needed for pain on 11/23-11/25 Take 5-7.5mg (1-1.5 tabs) every 4 hours as needed for pain on 11/26-11/28 Starting 11/29 you may restart your regular dose of Norco. DO NOT USE OXYCODONE AND NORCO AT THE SAME TIME.    Kidney replaced by transplant       pantoprazole 20 MG EC tablet    PROTONIX    90 tablet    Take 2 tablets (40 mg) by mouth every morning (before breakfast)    Gastroesophageal reflux disease, esophagitis presence not specified       phosphorus tablet 250 mg 250 MG per tablet    PHOSPHA 250 NEUTRAL    120 tablet    Take 2 tablets (500 mg) by mouth 2 times daily Hold at this time 11/22    Hypophosphatemia       polyethylene glycol powder    MIRALAX    850 g    Take 17 g (1 capful) by mouth 2 times daily Hold for loose stools    Drug-induced constipation       prochlorperazine 5 MG tablet    COMPAZINE    30 tablet    Take 1 tablet (5 mg) by mouth every 6 hours as needed for nausea or vomiting    Nausea       sennosides 8.6 MG tablet    SENOKOT    120 each    Take 2 tablets by mouth 2 times daily Hold for loose stools    Drug-induced constipation       sulfamethoxazole-trimethoprim 400-80 MG per tablet    BACTRIM/SEPTRA    30 tablet    Take 1 tablet by mouth daily    Pancreas transplanted (H), Kidney replaced by transplant       * tacrolimus 0.5 MG capsule    GENERIC EQUIVALENT    60 capsule    Take 1 capsule every 12 hours. Total dose = 2.5 mg, changed 11/22.    Pancreas transplanted (H)       * tacrolimus 1 MG capsule    GENERIC EQUIVALENT    180 capsule    Take 2 capsules (2 mg) by mouth every 12 hours 2.5mg total dose, changed 11/22    Pancreas transplanted (H)       valGANciclovir 450 MG tablet    VALCYTE    152 tablet    Take 2 tablets (900 mg) by mouth daily     Kidney replaced by transplant, Pancreas transplanted (H)       * Notice:  This list has 2 medication(s) that are the same as other medications prescribed for you. Read the directions carefully, and ask your doctor or other care provider to review them with you.

## 2018-11-23 NOTE — PROGRESS NOTES
ACUTE TRANSPLANT NEPHROLOGY VISIT    Assessment & Plan   # DDKT (K): baseline Cr ~ 1.1-1.3; Increased   - Proteinuria: Not checked recently   - Latest DSA: No Date of DSA last checked: 2018   - BK: No   - Kidney Tx Biopsy: No    # Pancreas Tx (K):Enteric drained     - Blood glucose: Euglycemia   - HbA1c: not checked   - Pancreatic enzymes: Decreased   - Date of DSA last checked: 2018 Latest DSA: No    # Immunosuppression: Tacrolimus immediate release (goal  8-10) and Mycophenolic acid (goal  1-3.5)   - Changes: No     # Dyspnea: resolved     # Prophylaxis:   - PJP: TMP/Sulfa (Bactrim)   - CMV: Valcyte x3 months    # Hypertension: Controlled; Goal BP: < 130/80 on nifedipine 30 mg bid    - Changes: No    # Anemia in chronic renal disease: Hgb: Increased   - Iron studies: Replete    # Mineral Bone Disorder:    - Secondary renal hyperparathyroidism; PTH level is: Significantly elevated   - Vitamin D; level is: Low   - Calcium; level is: Low   - Phosphorus; level is: Low.   - Continue calcitriol and cholecalciferol and phos supplementation, refills supplied   - Recheck levels at 4 months    # Electrolytes:   - Potassium; level: High will start florinef   - Magnesium; level: Normal on supplementation which I increased   - Bicarbonate; level: Normal    Return visit: No Follow-up on file.    # Transplant History:  Etiology of kidney failure: diabetic nephropathy  Tx: DDKT (K)  Transplant: 2018 (Kidney / Pancreas), 10/10/2013 (Kidney)  Donor Type:  - Brain Death Donor Class:   Crossmatch at time of Tx: negative  DSA at time of Tx: No  Significant changes in immunosuppression: None  CMV IgG Ab Discordance (D+/R-): No  EBV IgG Ab Discordance (D+/R-): No  Significant transplant-related complications: None    Transplant Office Phone Number: [phone]    Assessment and plan was discussed with the patient and he voiced his understanding and agreement.    Deyvi Dick MD    Chief Complaint     Debbie is a 52 year old here for hospital follow up following kidney transplant    History of Present Illness     Recent Hospitalizations:  [] No [x] Yes S/p kidney / panc transplant   New Medical Issues: [] No [] Yes    Decreased energy: [x] No [] Yes    Chest pain or SOB with exertion:  [x] No [] Yes    Appetite change or weight change: [x] No [] Yes    Nausea, vomiting or diarrhea:  [x] No [] Yes    Fever, sweats or chills: [x] No [] Yes    Leg swelling: [x] No [] Yes      Other medical issues:  No     Drain output 15 mL discussed with surgery and will remove today. He will get another liter bolus today      Home BP: 110/70    Review of Systems   A comprehensive review of systems was obtained and negative, except as noted in the HPI or PMH.    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)     Diabetes mellitus with background retinopathy (H)     Polycystic kidney     NONSPECIFIC MEDICAL HISTORY     Coronary artery disease     Retinopathy     Hyperlipidemia LDL goal <70     Premature ventricular contractions (PVCs) (VPCs)     Kidney replaced by transplant     Immunosuppressed status (H)     Kidney transplant rejection     Hyperglycemia     Hypertension     Anemia in chronic renal disease     Care after organ transplant     Esophageal ulcer     Status post simultaneous kidney and pancreas transplant (H)     Other chronic pain     Nausea     Drug induced constipation     Hypophosphatemia       Social History   Social History   Substance Use Topics     Smoking status: Never Smoker     Smokeless tobacco: Never Used     Alcohol use No       Allergies   Allergies   Allergen Reactions     No Known Allergies        Medications   Current Outpatient Prescriptions   Medication Sig     fludrocortisone (FLORINEF) 0.1 MG tablet Take 1 tablet (0.1 mg) by mouth daily     acetaminophen (TYLENOL) 325 MG tablet Take 3 tablets (975 mg) by mouth every 8 hours as  needed for mild pain or fever (DO NOT USE WITH NORCO)     aspirin 81 MG EC tablet Take 1 tablet (81 mg) by mouth daily     atorvastatin (LIPITOR) 10 MG tablet Take 0.5 tablets (5 mg) by mouth daily     blood glucose monitoring (NO BRAND SPECIFIED) test strip Use to test blood sugar 4 times daily or as directed.     calcitRIOL (ROCALTROL) 0.25 MCG capsule Take 1 capsule (0.25 mcg) by mouth daily     calcium carbonate 500 mg-vitamin D 200 units (OSCAL WITH D;OYSTER SHELL CALCIUM) 500-200 MG-UNIT per tablet Take 1 tablet by mouth 2 times daily (with meals) Hold at this time 11/22     cholecalciferol 2000 units CAPS Take 2,000 Units by mouth daily     HYDROcodone-acetaminophen (NORCO) 5-325 MG per tablet Stop taking until you have completed your oxycodone taper. DO NOT TAKE WITH OXYCODONE. (Patient not taking: Reported on 11/21/2018)     Lidocaine (LIDOCARE) 4 % Patch Place 2 patches onto the skin every 24 hours As needed for pain     magnesium oxide (MAG-OX) 400 MG tablet Take 2 tablets (800mg) at noon and 1 tablet (400mg) at 6pm.     mycophenolic acid (GENERIC EQUIVALENT) 360 MG EC tablet Take 2 tablets (720 mg) by mouth 2 times daily     NIFEdipine ER osmotic (PROCARDIA XL) 30 MG 24 hr tablet Take 1 tablet (30 mg) by mouth 2 times daily (Patient taking differently: Take 30 mg by mouth 2 times daily HOLD for SBP less than 150.)     nystatin (MYCOSTATIN) 305874 UNIT/ML suspension Take 5 mLs (500,000 Units) by mouth 4 times daily     ondansetron (ZOFRAN-ODT) 4 MG ODT tab Take 1 tablet (4 mg) by mouth every 6 hours as needed for nausea or vomiting (Patient not taking: Reported on 11/21/2018)     oxyCODONE IR (ROXICODONE) 5 MG tablet Take 10-12.5mg (2-2.5 tabs) every 4 hours as needed for pain on 11/20-11/22  Take 5-10mg (1-2 tabs) every 4 hours as needed for pain on 11/23-11/25  Take 5-7.5mg (1-1.5 tabs) every 4 hours as needed for pain on 11/26-11/28  Starting 11/29 you may restart your regular dose of Norco. DO NOT USE  OXYCODONE AND NORCO AT THE SAME TIME.     pantoprazole (PROTONIX) 20 MG EC tablet Take 2 tablets (40 mg) by mouth every morning (before breakfast)     phosphorus tablet 250 mg (PHOSPHA 250 NEUTRAL) 250 MG per tablet Take 2 tablets (500 mg) by mouth 2 times daily Hold at this time 11/22     polyethylene glycol (MIRALAX) powder Take 17 g (1 capful) by mouth 2 times daily Hold for loose stools     prochlorperazine (COMPAZINE) 5 MG tablet Take 1 tablet (5 mg) by mouth every 6 hours as needed for nausea or vomiting     sennosides (SENOKOT) 8.6 MG tablet Take 2 tablets by mouth 2 times daily Hold for loose stools     sulfamethoxazole-trimethoprim (BACTRIM/SEPTRA) 400-80 MG per tablet Take 1 tablet by mouth daily     tacrolimus (GENERIC EQUIVALENT) 0.5 MG capsule Take 1 capsule every 12 hours. Total dose = 2.5 mg, changed 11/22.     tacrolimus (GENERIC EQUIVALENT) 1 MG capsule Take 2 capsules (2 mg) by mouth every 12 hours 2.5mg total dose, changed 11/22     valGANciclovir (VALCYTE) 450 MG tablet Take 2 tablets (900 mg) by mouth daily     Current Facility-Administered Medications   Medication     0.9% sodium chloride BOLUS     There are no discontinued medications.    Physical Exam   Vital Signs: There were no vitals taken for this visit.    GENERAL APPEARANCE: alert and no distress  HENT: mouth without ulcers or lesions  LYMPHATICS: no cervical or supraclavicular nodes  RESP: crackles in right lung base on auscultation - no rales, rhonchi or wheezes  CV: regular rhythm, normal rate, no rub, no murmur  EDEMA: no LE edema bilaterally  ABDOMEN: soft, + distended, nontender, bowel sounds normal  MS: extremities normal - no gross deformities noted, no evidence of inflammation in joints, no muscle tenderness  SKIN: no rash  TX KIDNEY: normal    Data     Renal Latest Ref Rng & Units 11/23/2018 11/22/2018 11/21/2018   Na 133 - 144 mmol/L 132(L) 134 133   K 3.4 - 5.3 mmol/L 5.3 5.0 5.2   Cl 94 - 109 mmol/L 102 103 102   CO2 20 -  32 mmol/L 22 23 22   BUN 7 - 30 mg/dL 13 12 13   Cr 0.66 - 1.25 mg/dL 1.31(H) 1.32(H) 1.17   Cr (external) 0.5 - 1.5 mg/dL - - -   Glucose 70 - 99 mg/dL 91 84 91   Ca  8.5 - 10.1 mg/dL 8.5 8.1(L) 8.0(L)   Mg 1.6 - 2.3 mg/dL 1.5(L) 1.6 1.6     Bone Health Latest Ref Rng & Units 11/23/2018 11/22/2018 11/21/2018   Phos 2.5 - 4.5 mg/dL 2.9 3.0 2.4(L)   PTHi 18 - 80 pg/mL - - -   Vit D Def 20 - 75 ug/L - - -     Heme Latest Ref Rng & Units 11/23/2018 11/22/2018 11/21/2018   WBC 4.0 - 11.0 10e9/L 7.2 6.2 8.0   Hgb 13.3 - 17.7 g/dL 9.1(L) 9.6(L) 9.3(L)   Plt 150 - 450 10e9/L 270 229 251     Liver Latest Ref Rng & Units 11/11/2018 6/27/2018 8/3/2017   AP 40 - 150 U/L 189(H) - -   TBili 0.2 - 1.3 mg/dL 0.3 - -   ALT 0 - 70 U/L 14 - -   AST 0 - 45 U/L 9 - -   Tot Protein 6.8 - 8.8 g/dL 7.6 - -   Albumin 3.4 - 5.0 g/dL 4.0 4.2 4.2     Pancreas Latest Ref Rng & Units 11/23/2018 11/22/2018 11/21/2018   A1C 0 - 5.6 % - - -   Amylase 30 - 110 U/L 103 120(H) 137(H)   Lipase 73 - 393 U/L 426(H) 456(H) 584(H)     Iron studies Latest Ref Rng & Units 9/11/2018 8/3/2017 11/18/2016   Iron 35 - 180 ug/dL 85 79 82   Iron sat 15 - 46 % 37 34 34   Ferritin 26 - 388 ng/mL 761(H) 736(H) -     [facility] Txp Virology Latest Ref Rng & Units 11/11/2018 11/1/2018 9/14/2018   CVM DNA Quant - - - -   CMV Quant <100 Copies/mL - - -   CMV QT Log <2.0 Log copies/mL - - -   BK Spec - - Plasma, EDTA anticoagulant -   BK Res BKNEG:BK Virus DNA Not Detected copies/mL - BK Virus DNA Not Detected -   BK Log <2.7 Log copies/mL - Not Calculated -   Hep B Core NR:Nonreactive Nonreactive - Nonreactive   Hep B Surf - - - -   HIV 1&2 NEG - - -        Recent Labs   Lab Test  11/20/18   0523  11/21/18   0720  11/22/18   0717   DOSTAC  Not Provided  Not Provided  Not Provided   TACROL  8.6  12.0  14.9     Recent Labs   Lab Test  05/04/17   1408  05/24/17   1414  08/03/17   1441   DOSMPA  LAST DOSE 5/4/2017 AT 2:00 AM  Last dose 5/24/17 at 0430AM  08/03/2017 AT 0330 AM    MPACID  2.97  2.79  3.63*   MPAG  >200.0*  >200.0*  >200.0*

## 2018-11-23 NOTE — PATIENT INSTRUCTIONS
Dear Amadou Lewis    Thank you for choosing [facility] ([facility]) for your transplant cares.  The following information is a summary of our appointment as well as important reminders.      Please make sure your phone is available today because I will call to update you with your anti-rejection drug levels and possibly make changes to your anti-rejection dosages.    1.) Please drink 2-3 liters of non caffeinated fluids daily.  2.) Return here Sunday 11/25/18 at 7am for labs and assessment.  3.)Eat a low potassium diet for now until instructed otherwise.    Medication changes:  -Increase Magnesium to 800mg twice daily to be taken at 12pm and 6pm.  -Start taking Florinef as prescribed and written on medication card. Please  this script at the pharmacy downstairs.       We look forward in seeing you on your next appointment here at [facility].  Please don t hesitate to call us at [phone] to reschedule any of your appointments or to speak with one of the [facility] registered nurses.  It was a pleasure taking care of you today.    Sincerely,    [facility]  [address] [place] [address]  [id number]  Phone:  [phone]      Discharge Instructions: Monitor post SARAH drain removal:     When to seek medical care  Call your healthcare provider right away if you have any of the following:    New or increased pain around the tube insertion site    Redness, swelling, or warmth around the incision or old tube site    Drainage that is foul-smelling    Vomiting    Fever of 100.4 F (38 C)    Abdominal pain     Date Last Reviewed: 2/1/2017 2000-2018 [employer]. [address] [place] [id number]. All rights reserved. This information is not intended as a substitute for professional medical care. Always follow your healthcare professional's instructions.        Discharge  Instructions: Eating a Low-Potassium Diet  Your healthcare provider has prescribed a low-potassium diet for you. This kind of diet is advised for people who have certain kidney problems. Potassium is needed for muscle function. But too much potassium is a health risk. Potassium is found in many foods. Read below to find out how to change your diet.  Foods to limit  Some foods are high in potassium. Limit your daily intake of the foods in the list below.    Fruits: apricots (canned and fresh), bananas, cantaloupe, honeydew melon, kiwi, nectarines, pomegranates, oranges, orange juice, pears, dried fruits (apricots, dates, figs, prunes), and prune juice    Vegetables: asparagus, avocado, artichoke, bamboo shoots, beets, brussels sprouts, cabbage, celery, chard, okra, potatoes (white and sweet), pumpkin, rutabaga, spinach (cooked), squash, tomato, tomato sauce, tomato juice, and vegetable juice cocktail    Legumes: black-eyed peas, chickpeas, lentils, lima beans, navy beans, red kidney beans, soybeans, and split peas    Nuts and seeds: almonds, Brazil nuts, cashews, peanuts, peanut butter, pecans, pumpkin seeds, sunflower seeds, and walnuts    Breads and cereals: bran and whole-grain products    Dairy foods: milk, cheese, ice cream, yogurt    Animal protein: all forms of animal protein    Other: chocolate, cocoa, coconut milk, and molasses  Tips    Ask your healthcare provider how much potassium you are allowed each day. This will help you figure out serving sizes for your needs.    Check labels for potassium. It may be listed as potassium chloride.    Do not use salt substitutes. These often have potassium in them.    Cook frozen fruits and vegetables in water. Rinse and drain them well before eating.    Drain liquid from all canned fruits and vegetables. Rinse them before eating.    Reduce the potassium in potatoes. Peel them, slice thinly, and soak in water for at least 4 hours.    Reduce the potassium in green leafy  vegetables. Soak them in water for at least 4 hours.    Eat white rice and refined white flour products. These include white bread, pasta, and grits.  Follow-up  Make a follow-up appointment as advised by your healthcare provider.  When to call your healthcare provider  Call your healthcare provider right away if you have any of the following:    Fatigue    Shortness of breath    Chest pain    Slow, irregular heartbeat    Fainting    Dizziness    Lightheadedness    Confusion   Date Last Reviewed: 6/1/2017 2000-2018 [employer]. [address] [place] [address]. All rights reserved. This information is not intended as a substitute for professional medical care. Always follow your healthcare professional's instructions.      Fludrocortisone Acetate Oral tablet  What is this medicine?  FLUDROCORTISONE (floo droe KOR ti sone) is a corticosteroid. It is used to treat Eddi's disease and to treat a salt losing condition called adrenogenital syndrome.  This medicine may be used for other purposes; ask your health care provider or pharmacist if you have questions.  What should I tell my health care provider before I take this medicine?  They need to know if you have any of these conditions:    Cushing's syndrome    diabetes    heart problems or disease    high blood pressure    infection like herpes, measles, tuberculosis, or chickenpox    liver disease    myasthenia gravis    osteoporosis    stomach, ulcer or intestine disease including colitis and diverticulitis    thyroid problem    an unusual or allergic reaction to fludrocortisone, corticosteroids, other medicines, lactose, foods, dyes, or preservatives    pregnant or trying to get pregnant    breast-feeding  How should I use this medicine?  Take this medicine by mouth with a glass of water. Follow the directions on the prescription label. Take it with food or milk to avoid stomach upset. If you are taking this medicine once a day, take it  in the morning. Do not take more medicine than you are told to take. Do not suddenly stop taking your medicine because you may develop a severe reaction. Your doctor will tell you how much medicine to take. If your doctor wants you to stop the medicine, the dose may be slowly lowered over time to avoid any side effects.  Talk to your pediatrician regarding the use of this medicine in children. Special care may be needed.  Patients over 65 years old may have a stronger reaction and need a smaller dose.  Overdosage: If you think you have taken too much of this medicine contact a poison control center or emergency room at once.  NOTE: This medicine is only for you. Do not share this medicine with others.  What if I miss a dose?  If you miss a dose, take it as soon as you can. If it is almost time for your next dose, take only that dose. Do not take double or extra doses.  What may interact with this medicine?  Do not take this medicine with any of the following medications:    mifepristone, RU-486  This medicine may also interact with the following medications:    amphotericin B    aspirin and aspirin-like drugs    barbiturates like phenobarbital    digoxin    diuretics    female hormones, like estrogens or progestins and birth control pills    male hormones    medicines for diabetes like insulin    medicines that treat or prevent blood clots like warfarin    phenytoin    rifampin    vaccines  This list may not describe all possible interactions. Give your health care provider a list of all the medicines, herbs, non-prescription drugs, or dietary supplements you use. Also tell them if you smoke, drink alcohol, or use illegal drugs. Some items may interact with your medicine.  What should I watch for while using this medicine?  Visit your doctor or health care professional for regular checks on your progress. If you are taking this medicine over a prolonged period, carry an identification card with your name and  address, the type and dose of your medicine, and your doctor's name and address.  This medicine may increase your risk of getting an infection. Stay away from people who are sick. Tell your doctor or health care professional if you are around anyone with measles or chickenpox.  If you are going to have surgery, tell your doctor or health care professional that you have taken this medicine within the last twelve months.  Ask your doctor or health care professional about your diet. You may need to lower the amount of salt you eat.  The medicine can increase your blood sugar. If you are a diabetic check with your doctor if you need help adjusting the dose of your diabetic medicine.  What side effects may I notice from receiving this medicine?  Side effects that you should report to your doctor or health care professional as soon as possible:    changes in vision    mental depression, mood swings, mistaken feelings of self importance or of being mistreated    sudden weight gain    swelling of the feet or lower legs    unusually weak or tired  Side effects that usually do not require medical attention (report to your doctor or health care professional if they continue or are bothersome):    dizziness    headache    loss of appetite    nausea, vomiting    trouble sleeping  This list may not describe all possible side effects. Call your doctor for medical advice about side effects. You may report side effects to FDA at 6-804-FDA-2365.  Where should I keep my medicine?  Keep out of the reach of children.  Store at room temperature between 15 and 30 degrees C (59 and 86 degrees F). Protect from excessive heat. Throw away any unused medicine after the expiration date.  NOTE:This sheet is a summary. It may not cover all possible information. If you have questions about this medicine, talk to your doctor, pharmacist, or health care provider. Copyright  2016 [employer]

## 2018-11-25 ENCOUNTER — INFUSION THERAPY VISIT (OUTPATIENT)
Dept: INFUSION THERAPY | Facility: CLINIC | Age: 53
End: 2018-11-25
Attending: INTERNAL MEDICINE
Payer: COMMERCIAL

## 2018-11-25 VITALS
SYSTOLIC BLOOD PRESSURE: 145 MMHG | WEIGHT: 175.9 LBS | BODY MASS INDEX: 27.55 KG/M2 | TEMPERATURE: 97.5 F | DIASTOLIC BLOOD PRESSURE: 79 MMHG | RESPIRATION RATE: 16 BRPM | OXYGEN SATURATION: 100 %

## 2018-11-25 DIAGNOSIS — Z94.83 PANCREAS REPLACED BY TRANSPLANT (H): Primary | ICD-10-CM

## 2018-11-25 LAB
AMYLASE SERPL-CCNC: 95 U/L (ref 30–110)
ANION GAP SERPL CALCULATED.3IONS-SCNC: 7 MMOL/L (ref 3–14)
BASOPHILS # BLD AUTO: 0 10E9/L (ref 0–0.2)
BASOPHILS NFR BLD AUTO: 0.3 %
BUN SERPL-MCNC: 14 MG/DL (ref 7–30)
CALCIUM SERPL-MCNC: 8.6 MG/DL (ref 8.5–10.1)
CHLORIDE SERPL-SCNC: 104 MMOL/L (ref 94–109)
CO2 SERPL-SCNC: 23 MMOL/L (ref 20–32)
CREAT SERPL-MCNC: 1.24 MG/DL (ref 0.66–1.25)
DIFFERENTIAL METHOD BLD: ABNORMAL
EOSINOPHIL # BLD AUTO: 0.1 10E9/L (ref 0–0.7)
EOSINOPHIL NFR BLD AUTO: 1.6 %
ERYTHROCYTE [DISTWIDTH] IN BLOOD BY AUTOMATED COUNT: 14 % (ref 10–15)
GFR SERPL CREATININE-BSD FRML MDRD: 61 ML/MIN/1.7M2
GLUCOSE SERPL-MCNC: 91 MG/DL (ref 70–99)
HCT VFR BLD AUTO: 29.4 % (ref 40–53)
HGB BLD-MCNC: 9.3 G/DL (ref 13.3–17.7)
IMM GRANULOCYTES # BLD: 0.1 10E9/L (ref 0–0.4)
IMM GRANULOCYTES NFR BLD: 0.8 %
LIPASE SERPL-CCNC: 390 U/L (ref 73–393)
LYMPHOCYTES # BLD AUTO: 0.1 10E9/L (ref 0.8–5.3)
LYMPHOCYTES NFR BLD AUTO: 1.1 %
MAGNESIUM SERPL-MCNC: 1.5 MG/DL (ref 1.6–2.3)
MCH RBC QN AUTO: 28.4 PG (ref 26.5–33)
MCHC RBC AUTO-ENTMCNC: 31.6 G/DL (ref 31.5–36.5)
MCV RBC AUTO: 90 FL (ref 78–100)
MONOCYTES # BLD AUTO: 0.2 10E9/L (ref 0–1.3)
MONOCYTES NFR BLD AUTO: 3.8 %
NEUTROPHILS # BLD AUTO: 5.8 10E9/L (ref 1.6–8.3)
NEUTROPHILS NFR BLD AUTO: 92.4 %
NRBC # BLD AUTO: 0 10*3/UL
NRBC BLD AUTO-RTO: 0 /100
PHOSPHATE SERPL-MCNC: 2.9 MG/DL (ref 2.5–4.5)
PLATELET # BLD AUTO: 347 10E9/L (ref 150–450)
POTASSIUM SERPL-SCNC: 5.1 MMOL/L (ref 3.4–5.3)
RBC # BLD AUTO: 3.28 10E12/L (ref 4.4–5.9)
SODIUM SERPL-SCNC: 134 MMOL/L (ref 133–144)
TACROLIMUS BLD-MCNC: 11.9 UG/L (ref 5–15)
TME LAST DOSE: NORMAL H
WBC # BLD AUTO: 6.3 10E9/L (ref 4–11)

## 2018-11-25 PROCEDURE — 83735 ASSAY OF MAGNESIUM: CPT | Performed by: INTERNAL MEDICINE

## 2018-11-25 PROCEDURE — 84100 ASSAY OF PHOSPHORUS: CPT | Performed by: INTERNAL MEDICINE

## 2018-11-25 PROCEDURE — 83690 ASSAY OF LIPASE: CPT | Performed by: INTERNAL MEDICINE

## 2018-11-25 PROCEDURE — 80048 BASIC METABOLIC PNL TOTAL CA: CPT | Performed by: INTERNAL MEDICINE

## 2018-11-25 PROCEDURE — 80197 ASSAY OF TACROLIMUS: CPT | Performed by: INTERNAL MEDICINE

## 2018-11-25 PROCEDURE — 85025 COMPLETE CBC W/AUTO DIFF WBC: CPT | Performed by: INTERNAL MEDICINE

## 2018-11-25 PROCEDURE — 96360 HYDRATION IV INFUSION INIT: CPT

## 2018-11-25 PROCEDURE — 25000128 H RX IP 250 OP 636: Mod: ZF | Performed by: INTERNAL MEDICINE

## 2018-11-25 PROCEDURE — 82150 ASSAY OF AMYLASE: CPT | Performed by: INTERNAL MEDICINE

## 2018-11-25 RX ADMIN — SODIUM CHLORIDE 1000 ML: 9 INJECTION, SOLUTION INTRAVENOUS at 08:29

## 2018-11-25 ASSESSMENT — PAIN DESCRIPTION - DESCRIPTORS: DESCRIPTORS: SORE

## 2018-11-25 NOTE — PROGRESS NOTES
"Amadou Lewis came to [facility] today for a lab and assess following a Pancreas transplant on 11/12/18.      Discharge date: 11/20/18  Transplant coordinator: Zora  Phone number patient can be reached at: [phone]      Physical Assessment:  See physical assessment located under \"Document Flowsheets\".  Incision site: Staples, C/D/I  Lines: SARAH drain removed 11/23. Dressing C/D/I.  Ramirez: n/a  Urine clarity: clear, yellow per patient  Hydration: Patient reports drinking almost 2L of non-caffeinated fluid yesterday. No reports of dizziness. Orthostatic BPs did drop.  Nutrition: Pt reports having a great appetite yesterday, no nausea.   Last BM: Yesterday morning. Educated patient on keeping up with stool softeners to have 1-2 BMs per day.  Pain: Pt reports 5/10 pain at incision site d/t not taking pain meds yet this morning.     Labs drawn by [facility] staff Yes    Plan of care for today:   Labs, Vital Signs, and Assessment.  Assessment reported to Dr. Dick.  Patient received 1L NS per Dr. Dick's orders.    Medication changes: None    Medications administered:  Patient took morning medications.      Patient education:    The following teaching topics were addressed: Importance of drinking 2L of non-caffeinated fluids daily, Incisional care, Signs/symptoms of infection, Good handwashing, Medications (purposes, doses and times of administration) and Plan of care   Patient verbalized understanding and all questions answered.    Drug level:  Tacrolimus level of 11.9 today reviewed with Dr Dick who gave orders to keep Tacrolimus dose the same.  Patient was updated with this information and verbalized understanding.    Face to face time: 45 minutes  Discharge Plan    Pt will follow up with [facility] tomorrow at 0700.  Discharge instructions reviewed with patient: YES  Patient/Representative verbalized understanding, all questions answered: YES    Discharged from unit at 0940 with whom: self to home.    Cheryl Salazar RN     "

## 2018-11-25 NOTE — MR AVS SNAPSHOT
After Visit Summary   11/25/2018    Amadou Lewis    MRN: [id number]           Patient Information     Date Of Birth          1965        Visit Information        Provider Department      11/25/2018 7:00 AM UC 42 ATC; UC SPEC INFUSION 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 Specialty and Procedure ([facility])    [address]  [address]  [place] [address]   [phone]            Dec 03, 2018  2:30 PM CST   (Arrive by 2:15 PM)   Post-Op with Monique Luevano MD   [facility] Solid Organ Transplant ([facility])    [address]  [address]  [place] [id number] [phone]            Dec 10, 2018  9:30 AM CST   (Arrive by 9:00 AM)   Return Kidney Transplant with Uc Early Post Transplant   [facility] Nephrology ([facility])    [address]  [address]  [place] [id number] [phone]            Dec 20, 2018  9:00 AM CST   (Arrive by 8:45 AM)   Cystoscopy with Sandy Cole NP   [facility] Solid Organ Transplant ([facility])    [address]  [address]  [place] [address] [phone]            Jan 08, 2019  9:30 AM CST   (Arrive by 9:15 AM)   Office Visit with Luke Tesfaye RPH   [facility] Medication Therapy Management ([facility])    [address]  3rd Floor  [place] [id number] [phone]           Bring a current list of meds and any records pertaining to this visit. For Physicals, please bring immunization records and any forms needing to be filled out. Please arrive 10  minutes early to complete paperwork.            Jan 08, 2019 11:00 AM CST   (Arrive by 10:30 AM)   Return Kidney Transplant with Uc Early Post Transplant   [facility] Nephrology ([facility])    [address]  [address]  [place] [address] [phone]            Mar 04, 2019 11:00 AM CST   (Arrive by 10:30 AM)   Return Kidney Transplant with Uc Early Post Transplant   [facility] Nephrology ([facility])    [address]  [address]  [place] [id number] [phone]            Apr 29, 2019 11:00 AM CDT   (Arrive by 10:30 AM)   Return Kidney Transplant with Uc Early Post Transplant   [facility] Nephrology ([facility])    [address]  [address]  [place] [id number] [phone]              Who to contact     If you have questions or need follow up information about today's clinic visit or your schedule please contact [facility] SPECIALTY AND PROCEDURE directly at [phone].  Normal or non-critical lab and imaging results will be communicated to you by [employer], letter or phone within 4 business days after the clinic has received the results. If you do not hear from us within 7 days, please contact the clinic through [employer] or phone. If you have a critical or abnormal lab result, we will notify you by phone as soon as possible.  Submit refill requests through [employer] or call your pharmacy and they will forward the refill request to us. Please allow 3 business days for your refill to be completed.          Additional Information About Your Visit        [employer] Information     [employer] gives you secure access to your electronic health record. If you see a primary care provider, you can also send messages to your care team and make appointments. If you have questions, please call your primary care clinic.  If you do not have a primary care provider, please call [phone] and they  will assist you.        Care EveryWhere ID     This is your Care EveryWhere ID. This could be used by other organizations to access your [facility] medical records  [id number]        Your Vitals Were     Temperature Respirations Pulse Oximetry BMI (Body Mass Index)          97.5  F (36.4  C) (Oral) 16 100% 27.55 kg/m2         Blood Pressure from Last 3 Encounters:   11/25/18 145/79   11/22/18 115/71   11/21/18 117/70    Weight from Last 3 Encounters:   11/25/18 79.8 kg (175 lb 14.4 oz)   11/23/18 80.3 kg (177 lb 1.6 oz)   11/22/18 81.1 kg (178 lb 11.2 oz)              We Performed the Following     Amylase     Basic metabolic panel     CBC with platelets differential     Lipase     Magnesium     Phosphorus     Tacrolimus level          Today's Medication Changes          These changes are accurate as of 11/25/18  2:26 PM.  If you have any questions, ask your nurse or doctor.               These medicines have changed or have updated prescriptions.        Dose/Directions    NIFEdipine ER osmotic 30 MG 24 hr tablet   Commonly known as:  PROCARDIA XL   This may have changed:  additional instructions   Used for:  Hypertension secondary to other renal disorders        Dose:  30 mg   Take 1 tablet (30 mg) by mouth 2 times daily   Quantity:  60 tablet   Refills:  3                Primary Care Provider Office Phone # Fax #    Masoud Valentin MD, [phone]       [address] [place] [address]        Equal Access to Services     RENATA BENNETT AH: Hadii miguelina maloneyo Sogenoveva, waaxda luqadaha, qaybta kaalmada myles, waxay rigo lassiter. So [facility] [phone].    ATENCIÓN: Si habla español, tiene a tejeda disposición servicios gratuitos de asistencia lingüística. Llame al [phone].    We comply with applicable federal civil rights laws and [place] laws. We do not discriminate on the basis of race, color, national origin, age, disability, sex,  sexual orientation, or gender identity.            Thank you!     Thank you for choosing [facility] SPECIALTY AND PROCEDURE  for your care. Our goal is always to provide you with excellent care. Hearing back from our patients is one way we can continue to improve our services. Please take a few minutes to complete the written survey that you may receive in the mail after your visit with us. Thank you!             Your Updated Medication List - Protect others around you: Learn how to safely use, store and throw away your medicines at www.disposemymeds.org.          This list is accurate as of 11/25/18  2:26 PM.  Always use your most recent med list.                   Brand Name Dispense Instructions for use Diagnosis    acetaminophen 325 MG tablet    TYLENOL    100 tablet    Take 3 tablets (975 mg) by mouth every 8 hours as needed for mild pain or fever (DO NOT USE WITH NORCO)    Kidney replaced by transplant       aspirin 81 MG EC tablet    ASA    30 tablet    Take 1 tablet (81 mg) by mouth daily    Pancreas transplanted (H)       atorvastatin 10 MG tablet    LIPITOR    45 tablet    Take 0.5 tablets (5 mg) by mouth daily    Coronary artery disease involving native coronary artery of native heart without angina pectoris       blood glucose monitoring test strip    no brand specified    1 Box    Use to test blood sugar 4 times daily or as directed.    Diabetes mellitus with background retinopathy (H)       calcitRIOL 0.25 MCG capsule    ROCALTROL    90 capsule    Take 1 capsule (0.25 mcg) by mouth daily    Renal osteodystrophy       calcium carbonate 500 mg-vitamin D 200 units 500-200 MG-UNIT per tablet    OSCAL with D;OYSTER SHELL CALCIUM    60 tablet    Take 1 tablet by mouth 2 times daily (with meals) Hold at this time 11/22    Kidney replaced by transplant       cholecalciferol 2000 units Caps     90 capsule    Take 2,000 Units by mouth daily    Renal osteodystrophy, Vitamin D deficiency        fludrocortisone 0.1 MG tablet    FLORINEF    30 tablet    Take 1 tablet (0.1 mg) by mouth daily    Hyperkalemia       HYDROcodone-acetaminophen 5-325 MG tablet    NORCO     Stop taking until you have completed your oxycodone taper. DO NOT TAKE WITH OXYCODONE.    Other chronic pain       Lidocaine 4 % Patch    LIDOCARE    10 patch    Place 2 patches onto the skin every 24 hours As needed for pain    Pancreas transplanted (H)       magnesium oxide 400 MG tablet    MAG-OX    90 tablet    Take 2 tablets (800mg) at noon and 2 tablets (800mg) at 6pm.    Pancreas transplanted (H)       mycophenolic acid 360 MG EC tablet    GENERIC EQUIVALENT    120 tablet    Take 2 tablets (720 mg) by mouth 2 times daily    Pancreas transplanted (H)       NIFEdipine ER osmotic 30 MG 24 hr tablet    PROCARDIA XL    60 tablet    Take 1 tablet (30 mg) by mouth 2 times daily    Hypertension secondary to other renal disorders       nystatin 606011 UNIT/ML suspension    MYCOSTATIN    600 mL    Take 5 mLs (500,000 Units) by mouth 4 times daily    Immunosuppressed status (H)       ondansetron 4 MG ODT tab    ZOFRAN-ODT    30 tablet    Take 1 tablet (4 mg) by mouth every 6 hours as needed for nausea or vomiting    Nausea       oxyCODONE 5 MG tablet    ROXICODONE    108 tablet    Take 10-12.5mg (2-2.5 tabs) every 4 hours as needed for pain on 11/20-11/22 Take 5-10mg (1-2 tabs) every 4 hours as needed for pain on 11/23-11/25 Take 5-7.5mg (1-1.5 tabs) every 4 hours as needed for pain on 11/26-11/28 Starting 11/29 you may restart your regular dose of Norco. DO NOT USE OXYCODONE AND NORCO AT THE SAME TIME.    Kidney replaced by transplant       pantoprazole 20 MG EC tablet    PROTONIX    90 tablet    Take 2 tablets (40 mg) by mouth every morning (before breakfast)    Gastroesophageal reflux disease, esophagitis presence not specified       phosphorus tablet 250 mg 250 MG per tablet    PHOSPHA 250 NEUTRAL    120 tablet    Take 2 tablets (500 mg) by  mouth 2 times daily Hold at this time 11/22    Hypophosphatemia       polyethylene glycol powder    MIRALAX    850 g    Take 17 g (1 capful) by mouth 2 times daily Hold for loose stools    Drug-induced constipation       prochlorperazine 5 MG tablet    COMPAZINE    30 tablet    Take 1 tablet (5 mg) by mouth every 6 hours as needed for nausea or vomiting    Nausea       sennosides 8.6 MG tablet    SENOKOT    120 each    Take 2 tablets by mouth 2 times daily Hold for loose stools    Drug-induced constipation       sulfamethoxazole-trimethoprim 400-80 MG per tablet    BACTRIM/SEPTRA    30 tablet    Take 1 tablet by mouth daily    Pancreas transplanted (H), Kidney replaced by transplant       * tacrolimus 1 MG capsule    GENERIC EQUIVALENT    180 capsule    Take 2 capsules (2 mg) by mouth every 12 hours 2mg total dose, changed 11/23    Pancreas transplanted (H)       * tacrolimus 0.5 MG capsule    GENERIC EQUIVALENT    60 capsule    Use for titration. Not needed at this time- Total dose = 2mg, changed 11/23.    Pancreas transplanted (H)       valGANciclovir 450 MG tablet    VALCYTE    152 tablet    Take 2 tablets (900 mg) by mouth daily    Kidney replaced by transplant, Pancreas transplanted (H)       * Notice:  This list has 2 medication(s) that are the same as other medications prescribed for you. Read the directions carefully, and ask your doctor or other care provider to review them with you.

## 2018-11-26 ENCOUNTER — OFFICE VISIT (OUTPATIENT)
Dept: INFUSION THERAPY | Facility: CLINIC | Age: 53
End: 2018-11-26
Attending: INTERNAL MEDICINE
Payer: COMMERCIAL

## 2018-11-26 ENCOUNTER — INFUSION THERAPY VISIT (OUTPATIENT)
Dept: INFUSION THERAPY | Facility: CLINIC | Age: 53
End: 2018-11-26
Attending: SURGERY
Payer: COMMERCIAL

## 2018-11-26 VITALS
TEMPERATURE: 98.1 F | SYSTOLIC BLOOD PRESSURE: 164 MMHG | BODY MASS INDEX: 28 KG/M2 | DIASTOLIC BLOOD PRESSURE: 86 MMHG | HEART RATE: 89 BPM | WEIGHT: 178.79 LBS | RESPIRATION RATE: 16 BRPM

## 2018-11-26 DIAGNOSIS — Z94.0 STATUS POST SIMULTANEOUS KIDNEY AND PANCREAS TRANSPLANT (H): Primary | Chronic | ICD-10-CM

## 2018-11-26 DIAGNOSIS — Z94.83 STATUS POST SIMULTANEOUS KIDNEY AND PANCREAS TRANSPLANT (H): Primary | Chronic | ICD-10-CM

## 2018-11-26 DIAGNOSIS — D84.9 IMMUNOSUPPRESSED STATUS (H): Chronic | ICD-10-CM

## 2018-11-26 DIAGNOSIS — Z48.298 CARE AFTER ORGAN TRANSPLANT: ICD-10-CM

## 2018-11-26 DIAGNOSIS — Z94.83 PANCREAS TRANSPLANTED (H): ICD-10-CM

## 2018-11-26 DIAGNOSIS — Z94.0 KIDNEY REPLACED BY TRANSPLANT: Primary | ICD-10-CM

## 2018-11-26 LAB
AMYLASE SERPL-CCNC: 94 U/L (ref 30–110)
ANION GAP SERPL CALCULATED.3IONS-SCNC: 6 MMOL/L (ref 3–14)
BASOPHILS # BLD AUTO: 0 10E9/L (ref 0–0.2)
BASOPHILS NFR BLD AUTO: 0.3 %
BUN SERPL-MCNC: 12 MG/DL (ref 7–30)
CALCIUM SERPL-MCNC: 8.7 MG/DL (ref 8.5–10.1)
CHLORIDE SERPL-SCNC: 105 MMOL/L (ref 94–109)
CO2 SERPL-SCNC: 23 MMOL/L (ref 20–32)
CREAT SERPL-MCNC: 1.16 MG/DL (ref 0.66–1.25)
DIFFERENTIAL METHOD BLD: ABNORMAL
EOSINOPHIL # BLD AUTO: 0.1 10E9/L (ref 0–0.7)
EOSINOPHIL NFR BLD AUTO: 1.8 %
ERYTHROCYTE [DISTWIDTH] IN BLOOD BY AUTOMATED COUNT: 14.1 % (ref 10–15)
GFR SERPL CREATININE-BSD FRML MDRD: 65 ML/MIN/1.7M2
GLUCOSE SERPL-MCNC: 89 MG/DL (ref 70–99)
HCT VFR BLD AUTO: 29.4 % (ref 40–53)
HGB BLD-MCNC: 9.2 G/DL (ref 13.3–17.7)
IMM GRANULOCYTES # BLD: 0 10E9/L (ref 0–0.4)
IMM GRANULOCYTES NFR BLD: 0.5 %
LIPASE SERPL-CCNC: 375 U/L (ref 73–393)
LYMPHOCYTES # BLD AUTO: 0.1 10E9/L (ref 0.8–5.3)
LYMPHOCYTES NFR BLD AUTO: 1.2 %
MAGNESIUM SERPL-MCNC: 1.6 MG/DL (ref 1.6–2.3)
MCH RBC QN AUTO: 28 PG (ref 26.5–33)
MCHC RBC AUTO-ENTMCNC: 31.3 G/DL (ref 31.5–36.5)
MCV RBC AUTO: 90 FL (ref 78–100)
MONOCYTES # BLD AUTO: 0.2 10E9/L (ref 0–1.3)
MONOCYTES NFR BLD AUTO: 3.8 %
NEUTROPHILS # BLD AUTO: 5.6 10E9/L (ref 1.6–8.3)
NEUTROPHILS NFR BLD AUTO: 92.4 %
NRBC # BLD AUTO: 0 10*3/UL
NRBC BLD AUTO-RTO: 0 /100
PHOSPHATE SERPL-MCNC: 3.3 MG/DL (ref 2.5–4.5)
PLATELET # BLD AUTO: 318 10E9/L (ref 150–450)
POTASSIUM SERPL-SCNC: 5 MMOL/L (ref 3.4–5.3)
RBC # BLD AUTO: 3.28 10E12/L (ref 4.4–5.9)
SODIUM SERPL-SCNC: 134 MMOL/L (ref 133–144)
TACROLIMUS BLD-MCNC: 10 UG/L (ref 5–15)
TME LAST DOSE: NORMAL H
WBC # BLD AUTO: 6.1 10E9/L (ref 4–11)

## 2018-11-26 PROCEDURE — 83690 ASSAY OF LIPASE: CPT | Performed by: INTERNAL MEDICINE

## 2018-11-26 PROCEDURE — G0463 HOSPITAL OUTPT CLINIC VISIT: HCPCS

## 2018-11-26 PROCEDURE — 84100 ASSAY OF PHOSPHORUS: CPT | Performed by: INTERNAL MEDICINE

## 2018-11-26 PROCEDURE — 36592 COLLECT BLOOD FROM PICC: CPT

## 2018-11-26 PROCEDURE — 80180 DRUG SCRN QUAN MYCOPHENOLATE: CPT | Performed by: INTERNAL MEDICINE

## 2018-11-26 PROCEDURE — 80197 ASSAY OF TACROLIMUS: CPT | Performed by: INTERNAL MEDICINE

## 2018-11-26 PROCEDURE — 82150 ASSAY OF AMYLASE: CPT | Performed by: INTERNAL MEDICINE

## 2018-11-26 PROCEDURE — 83735 ASSAY OF MAGNESIUM: CPT | Performed by: INTERNAL MEDICINE

## 2018-11-26 PROCEDURE — 85025 COMPLETE CBC W/AUTO DIFF WBC: CPT | Performed by: INTERNAL MEDICINE

## 2018-11-26 PROCEDURE — 80048 BASIC METABOLIC PNL TOTAL CA: CPT | Performed by: INTERNAL MEDICINE

## 2018-11-26 RX ORDER — TACROLIMUS 1 MG/1
2 CAPSULE ORAL EVERY 12 HOURS
Qty: 180 CAPSULE | Refills: 11 | Status: SHIPPED | OUTPATIENT
Start: 2018-11-26 | End: 2018-11-29

## 2018-11-26 RX ORDER — TACROLIMUS 0.5 MG/1
0.5 CAPSULE ORAL EVERY EVENING
Qty: 60 CAPSULE | Refills: 11 | Status: SHIPPED | OUTPATIENT
Start: 2018-11-26 | End: 2018-11-29

## 2018-11-26 NOTE — PROGRESS NOTES
"Amadou Lewis came to [facility] today for a lab and assess following a kidney/pancreas transplant on 11/12/2018. Previous kidney transplant 2013.    Discharge date: 11/20/2018  Transplant coordinator: Zora Hartley  Phone number patient can be reached at: [phone]      Physical Assessment:  See physical assessment located under \"Document Flowsheets\".  Incision site: Surgical site closed with staples, pt using [employer]. No infection noted.  Lines: N/A  Ramirez: N/A  Urine clarity: per pt, urine is yellow, no blood or clots noted and pt denies any difficulty urinating.  Hydration: Pt states he drank over 64 ounces yesterday.   Nutrition: Pt states appetite is good, he is eating small more frequent meals due to feeling full quickly. Denies nausea at time of assessment.   Last BM: 11/25/2018, normal stool.  Pain: Pt rates surgical pain 5/10 on assessment, using Oxycodone for pain control at this time, feels Norco is not adequately covering pain. Pt states pain is effectively managed.  Denies dizziness when standing.  Blood glucose 80-90's     Labs drawn by [facility] staff Yes    Plan of care for today: Labs, assessment, and VS reviewed by Dr. Dick who also saw pt in [facility] today. No new orders. Pt discharged from [facility] and home care notified that services may start on Wednesday 11/28/2018.     Pt's coordinator also aware of plan of care. Lab letter provided to pt.       Patient education:    The following teaching topics were addressed: Plan of care   Patient verbalized understanding and all questions answered.    Drug level:  TAC level today reviewed with Dr Dick who gave orders to instruct pt to change Tacrolimus to 2 mg po every morning and 2.5 mg po every evening.  Patient was updated with this information and verbalized understanding.    Face to face time: 20 minutes  Discharge Plan    Pt will follow up with coordinator for any questions/concerns and with providers as scheduled.   Discharge instructions reviewed with " patient: YES  Patient/Representative verbalized understanding, all questions answered: YES    Discharged from unit at 0900 with whom: self to home.    Carrie Bardales RN

## 2018-11-26 NOTE — MR AVS SNAPSHOT
After Visit Summary   11/26/2018    Amadou Lewis    MRN: [id number]           Patient Information     Date Of Birth          1965        Visit Information        Provider Department      11/26/2018 8:00 AM Deyvi Dick MD [facility] Specialty and Procedure        Today's Diagnoses     Status post simultaneous kidney and pancreas transplant (H)    -  1    Immunosuppressed status (H)        Care after organ transplant           Follow-ups after your visit        Your next 10 appointments already scheduled     Dec 03, 2018  2:30 PM CST   (Arrive by 2:15 PM)   Post-Op with Monique Luevano MD   [facility] Solid Organ Transplant ([facility])    [address]  [address]  [place] [facility] [address]   [phone]            Dec 10, 2018  9:30 AM CST   (Arrive by 9:00 AM)   Return Kidney Transplant with Uc Early Post Transplant   [facility] Nephrology ([facility])    [address]  [address]  [facility] [address]   [phone]            Dec 20, 2018  9:00 AM CST   (Arrive by 8:45 AM)   Cystoscopy with Sandy Cole NP   [facility] Solid Organ Transplant ([facility])    [address]  [address]  [facility] [address]   [phone]            Jan 08, 2019  9:30 AM CST   (Arrive by 9:15 AM)   Office Visit with Luke Tesfaye [facility] Medication Therapy Management ([facility])    [address]  3rd Floor  [facility] [id number] [phone]           Bring a current list of meds and any records pertaining to this visit. For Physicals, please bring immunization records and any forms needing to be filled out. Please arrive 10 minutes early to complete paperwork.            Jan 08, 2019 11:00 AM CST   (Arrive by 10:30 AM)   Return Kidney Transplant with Uc Early Post Transplant   [facility] Nephrology ([facility])    [address]  [address]  [facility] [address]   [phone]            Mar 04, 2019 11:00 AM CST   (Arrive by 10:30 AM)   Return Kidney Transplant with Uc Early Post Transplant   [facility] Nephrology ([facility])    [address]  [address]  [facility] [address]   [phone]            Apr 29, 2019 11:00 AM CDT   (Arrive by 10:30 AM)   Return Kidney Transplant with Uc Early Post Transplant   [facility] Nephrology ([facility])    [address]  [address]  [facility] [address] [phone]              Who to contact     If you have questions or need follow up information about today's clinic visit or your schedule please contact [facility] SPECIALTY AND PROCEDURE directly at [phone].  Normal or non-critical lab and imaging results will be communicated to you by [employer], letter or phone within 4 business days after the clinic has received the results. If you do not hear from us within 7 days, please contact the clinic through [employer] or phone. If you have a critical or abnormal lab result, we will notify you by phone as soon as possible.  Submit refill requests through [email or web address] or call your pharmacy and they will forward the refill request to us. Please allow 3 business days for your refill to be completed.          Additional Information About Your Visit        [employer] Information     [email or web address] gives you secure access to your electronic health record. If you see a primary care provider, you can also send messages to your care team and make appointments. If you have questions, please call your primary care clinic.  If you do not have a primary care provider, please call [phone] and they will assist you.        Care EveryWhere ID     This is your Care EveryWhere ID. This could be used by other organizations to access your [place] medical records  [id number]         Blood Pressure  from Last 3 Encounters:   11/26/18 164/86   11/25/18 145/79   11/22/18 115/71    Weight from Last 3 Encounters:   11/26/18 81.1 kg (178 lb 12.7 oz)   11/25/18 79.8 kg (175 lb 14.4 oz)   11/23/18 80.3 kg (177 lb 1.6 oz)              Today, you had the following     No orders found for display         Today's Medication Changes          These changes are accurate as of 11/26/18  8:46 AM.  If you have any questions, ask your nurse or doctor.               These medicines have changed or have updated prescriptions.        Dose/Directions    NIFEdipine ER osmotic 30 MG 24 hr tablet   Commonly known as:  PROCARDIA XL   This may have changed:  additional instructions   Used for:  Hypertension secondary to other renal disorders        Dose:  30 mg   Take 1 tablet (30 mg) by mouth 2 times daily   Quantity:  60 tablet   Refills:  3                Primary Care Provider Office Phone # Fax #    Masoud Valentin MD, [phone]       [address] [place] [address]        Equal Access to Services     [facility]: Hadii miguelina luna hadasho Sogenoveva, waaxda luqadaha, qaybta kaalmaantonio bueno, jack lassiter. So [facility] [phone].    ATENCIÓN: Si habla español, tiene a tejeda disposición servicios gratuitos de asistencia lingüística. Makeda al [phone].    We comply with applicable federal civil rights laws and [place] laws. We do not discriminate on the basis of race, color, national origin, age, disability, sex, sexual orientation, or gender identity.            Thank you!     Thank you for choosing [facility] SPECIALTY AND PROCEDURE  for your care. Our goal is always to provide you with excellent care. Hearing back from our patients is one way we can continue to improve our services. Please take a few minutes to complete the written survey that you may receive in the mail after your visit with us. Thank you!              Your Updated Medication List - Protect others around you: Learn how to safely use, store and throw away your medicines at www.disposemymeds.org.          This list is accurate as of 11/26/18  8:46 AM.  Always use your most recent med list.                   Brand Name Dispense Instructions for use Diagnosis    acetaminophen 325 MG tablet    TYLENOL    100 tablet    Take 3 tablets (975 mg) by mouth every 8 hours as needed for mild pain or fever (DO NOT USE WITH NORCO)    Kidney replaced by transplant       aspirin 81 MG EC tablet    ASA    30 tablet    Take 1 tablet (81 mg) by mouth daily    Pancreas transplanted (H)       atorvastatin 10 MG tablet    LIPITOR    45 tablet    Take 0.5 tablets (5 mg) by mouth daily    Coronary artery disease involving native coronary artery of native heart without angina pectoris       blood glucose monitoring test strip    no brand specified    1 Box    Use to test blood sugar 4 times daily or as directed.    Diabetes mellitus with background retinopathy (H)       calcitRIOL 0.25 MCG capsule    ROCALTROL    90 capsule    Take 1 capsule (0.25 mcg) by mouth daily    Renal osteodystrophy       calcium carbonate 500 mg-vitamin D 200 units 500-200 MG-UNIT per tablet    OSCAL with D;OYSTER SHELL CALCIUM    60 tablet    Take 1 tablet by mouth 2 times daily (with meals) Hold at this time 11/22    Kidney replaced by transplant       cholecalciferol 2000 units Caps     90 capsule    Take 2,000 Units by mouth daily    Renal osteodystrophy, Vitamin D deficiency       fludrocortisone 0.1 MG tablet    FLORINEF    30 tablet    Take 1 tablet (0.1 mg) by mouth daily    Hyperkalemia       HYDROcodone-acetaminophen 5-325 MG tablet    NORCO     Stop taking until you have completed your oxycodone taper. DO NOT TAKE WITH OXYCODONE.    Other chronic pain       Lidocaine 4 % Patch    LIDOCARE    10 patch    Place 2 patches onto the skin every 24 hours As needed for pain    Pancreas transplanted (H)        magnesium oxide 400 MG tablet    MAG-OX    90 tablet    Take 2 tablets (800mg) at noon and 2 tablets (800mg) at 6pm.    Pancreas transplanted (H)       mycophenolic acid 360 MG EC tablet    GENERIC EQUIVALENT    120 tablet    Take 2 tablets (720 mg) by mouth 2 times daily    Pancreas transplanted (H)       NIFEdipine ER osmotic 30 MG 24 hr tablet    PROCARDIA XL    60 tablet    Take 1 tablet (30 mg) by mouth 2 times daily    Hypertension secondary to other renal disorders       nystatin 046492 UNIT/ML suspension    MYCOSTATIN    600 mL    Take 5 mLs (500,000 Units) by mouth 4 times daily    Immunosuppressed status (H)       ondansetron 4 MG ODT tab    ZOFRAN-ODT    30 tablet    Take 1 tablet (4 mg) by mouth every 6 hours as needed for nausea or vomiting    Nausea       oxyCODONE 5 MG tablet    ROXICODONE    108 tablet    Take 10-12.5mg (2-2.5 tabs) every 4 hours as needed for pain on 11/20-11/22 Take 5-10mg (1-2 tabs) every 4 hours as needed for pain on 11/23-11/25 Take 5-7.5mg (1-1.5 tabs) every 4 hours as needed for pain on 11/26-11/28 Starting 11/29 you may restart your regular dose of Norco. DO NOT USE OXYCODONE AND NORCO AT THE SAME TIME.    Kidney replaced by transplant       pantoprazole 20 MG EC tablet    PROTONIX    90 tablet    Take 2 tablets (40 mg) by mouth every morning (before breakfast)    Gastroesophageal reflux disease, esophagitis presence not specified       phosphorus tablet 250 mg 250 MG per tablet    PHOSPHA 250 NEUTRAL    120 tablet    Take 2 tablets (500 mg) by mouth 2 times daily Hold at this time 11/22    Hypophosphatemia       polyethylene glycol powder    MIRALAX    850 g    Take 17 g (1 capful) by mouth 2 times daily Hold for loose stools    Drug-induced constipation       prochlorperazine 5 MG tablet    COMPAZINE    30 tablet    Take 1 tablet (5 mg) by mouth every 6 hours as needed for nausea or vomiting    Nausea       sennosides 8.6 MG tablet    SENOKOT    120 each    Take 2 tablets  by mouth 2 times daily Hold for loose stools    Drug-induced constipation       sulfamethoxazole-trimethoprim 400-80 MG per tablet    BACTRIM/SEPTRA    30 tablet    Take 1 tablet by mouth daily    Pancreas transplanted (H), Kidney replaced by transplant       * tacrolimus 1 MG capsule    GENERIC EQUIVALENT    180 capsule    Take 2 capsules (2 mg) by mouth every 12 hours 2mg total dose, changed 11/23    Pancreas transplanted (H)       * tacrolimus 0.5 MG capsule    GENERIC EQUIVALENT    60 capsule    Use for titration. Not needed at this time- Total dose = 2mg, changed 11/23.    Pancreas transplanted (H)       valGANciclovir 450 MG tablet    VALCYTE    152 tablet    Take 2 tablets (900 mg) by mouth daily    Kidney replaced by transplant, Pancreas transplanted (H)       * Notice:  This list has 2 medication(s) that are the same as other medications prescribed for you. Read the directions carefully, and ask your doctor or other care provider to review them with you.

## 2018-11-26 NOTE — MR AVS SNAPSHOT
After Visit Summary   11/26/2018    Amadou Lewis    MRN: [id number]           Patient Information     Date Of Birth          1965        Visit Information        Provider Department      11/26/2018 7:00 AM UC 43 ATC; UC SPEC INFUSION [facility] [place] Specialty and Procedure        Today's Diagnoses     Kidney replaced by transplant    -  1      Care Instructions    Dear Amadou Lewis    Thank you for choosing [facility] Special[facility] Infusion [facility] ([facility]) for your transplant cares.  The following information is a summary of our appointment as well as important reminders.      Please make sure your phone is available today because I will call to update you with your anti-rejection drug levels and possibly make changes to your anti-rejection dosages., Please refer to your hospital discharge instructions for details on home care services, future appointments, phone numbers, and diet/activity levels.    [facility] [phone]    We look forward in seeing you on your next appointment here at [facility].  Please don t hesitate to call us at [phone] to reschedule any of your appointments or to speak with one of the [facility] registered nurses.  It was a pleasure taking care of you today.    Sincerely,    [facility]  Specialty Infusion & Procedure Center  [address] [place] [address]  [address]  Phone:  [phone]            Follow-ups after your visit        Your next 10 appointments already scheduled     Dec 03, 2018  2:30 PM CST   (Arrive by 2:15 PM)   Post-Op with Monique Luevano MD   [facility] Solid Organ Transplant ([facility])    [address] [place] [address]  [address] [place] [address] [phone]            Dec 10, 2018  9:30 AM CST   (Arrive by 9:00 AM)   Return Kidney Transplant with Germaine Early Post Transplant   [facility] Nephrology ([facility])     [address]  [address]  [facility] [address]   [phone]            Dec 20, 2018  9:00 AM CST   (Arrive by 8:45 AM)   Cystoscopy with Sandy Cole NP   [facility] Solid Organ Transplant ([facility])    [address]  [address]  [facility] [address]   [phone]            Jan 08, 2019  9:30 AM CST   (Arrive by 9:15 AM)   Office Visit with Luke Tesfaye RPH   [facility] Medication Therapy Management ([facility])    [address] [place] [address]  3rd Floor  [facility] [address] [phone]           Bring a current list of meds and any records pertaining to this visit. For Physicals, please bring immunization records and any forms needing to be filled out. Please arrive 10 minutes early to complete paperwork.            Jan 08, 2019 11:00 AM CST   (Arrive by 10:30 AM)   Return Kidney Transplant with Uc Early Post Transplant   [facility] Nephrology ([facility])    [address]  [address]  [facility] [address]   [phone]            Mar 04, 2019 11:00 AM CST   (Arrive by 10:30 AM)   Return Kidney Transplant with Uc Early Post Transplant   [facility] Nephrology ([facility])    [address] [place] [address]  [address]  [facility] [address]   [phone]            Apr 29, 2019 11:00 AM CDT   (Arrive by 10:30 AM)   Return Kidney Transplant with Uc Early Post Transplant   [facility] Nephrology ([facility])    [address] [place] [address]  [address]  [facility] [address]   [phone]              Who to contact     If you have questions or need follow up information about today's clinic visit or your schedule please contact [facility] TREATMENT [place] SPECIALTY AND PROCEDURE directly at [phone].  Normal or non-critical lab and imaging results will be communicated to you by MyChart, letter or phone within 4 business days after the clinic has  received the results. If you do not hear from us within 7 days, please contact the clinic through "[employer] or phone. If you have a critical or abnormal lab result, we will notify you by phone as soon as possible.  Submit refill requests through "[employer] or call your pharmacy and they will forward the refill request to us. Please allow 3 business days for your refill to be completed.          Additional Information About Your Visit        "[employer] Information     "[employer] gives you secure access to your electronic health record. If you see a primary care provider, you can also send messages to your care team and make appointments. If you have questions, please call your primary care clinic.  If you do not have a primary care provider, please call [phone] and they will assist you.        Care EveryWhere ID     This is your Care EveryWhere ID. This could be used by other organizations to access your [place] medical records  [id number]        Your Vitals Were     Pulse Temperature Respirations BMI (Body Mass Index)          89 98.1  F (36.7  C) (Oral) 16 28 kg/m2         Blood Pressure from Last 3 Encounters:   11/26/18 164/86   11/25/18 145/79   11/22/18 115/71    Weight from Last 3 Encounters:   11/26/18 81.1 kg (178 lb 12.7 oz)   11/25/18 79.8 kg (175 lb 14.4 oz)   11/23/18 80.3 kg (177 lb 1.6 oz)              We Performed the Following     Amylase     Basic metabolic panel     CBC with platelets differential     Lipase     Magnesium     Mycophenolic acid     Phosphorus     Tacrolimus level          Today's Medication Changes          These changes are accurate as of 11/26/18  8:50 AM.  If you have any questions, ask your nurse or doctor.               These medicines have changed or have updated prescriptions.        Dose/Directions    NIFEdipine ER osmotic 30 MG 24 hr tablet   Commonly known as:  PROCARDIA XL   This may have changed:  additional instructions   Used for:  Hypertension secondary to other renal  disorders        Dose:  30 mg   Take 1 tablet (30 mg) by mouth 2 times daily   Quantity:  60 tablet   Refills:  3                Primary Care Provider Office Phone # Fax #    Masoud Valentin MD, [phone]       [address] [address] [address] [place] [address]        Equal Access to Services     [facility]: Hadii aad ku hadasho Soomaali, waaxda luqadaha, qaybta kaalmada adeegyada, waxay idiin hayaan adeeg kharash la'aan . So [facility] [phone].    ATENCIÓN: Si habla español, tiene a tejeda disposición servicios gratuitos de asistencia lingüística. [facility] [phone].    We comply with applicable federal civil rights laws and [place] laws. We do not discriminate on the basis of race, color, national origin, age, disability, sex, sexual orientation, or gender identity.            Thank you!     Thank you for choosing [facility] SPECIALTY AND PROCEDURE  for your care. Our goal is always to provide you with excellent care. Hearing back from our patients is one way we can continue to improve our services. Please take a few minutes to complete the written survey that you may receive in the mail after your visit with us. Thank you!             Your Updated Medication List - Protect others around you: Learn how to safely use, store and throw away your medicines at www.disposemymeds.org.          This list is accurate as of 11/26/18  8:50 AM.  Always use your most recent med list.                   Brand Name Dispense Instructions for use Diagnosis    acetaminophen 325 MG tablet    TYLENOL    100 tablet    Take 3 tablets (975 mg) by mouth every 8 hours as needed for mild pain or fever (DO NOT USE WITH NORCO)    Kidney replaced by transplant       aspirin 81 MG EC tablet    ASA    30 tablet    Take 1 tablet (81 mg) by mouth daily    Pancreas transplanted (H)       atorvastatin 10 MG tablet    LIPITOR    45 tablet    Take 0.5 tablets (5 mg) by mouth daily     Coronary artery disease involving native coronary artery of native heart without angina pectoris       blood glucose monitoring test strip    no brand specified    1 Box    Use to test blood sugar 4 times daily or as directed.    Diabetes mellitus with background retinopathy (H)       calcitRIOL 0.25 MCG capsule    ROCALTROL    90 capsule    Take 1 capsule (0.25 mcg) by mouth daily    Renal osteodystrophy       calcium carbonate 500 mg-vitamin D 200 units 500-200 MG-UNIT per tablet    OSCAL with D;OYSTER SHELL CALCIUM    60 tablet    Take 1 tablet by mouth 2 times daily (with meals) Hold at this time 11/22    Kidney replaced by transplant       cholecalciferol 2000 units Caps     90 capsule    Take 2,000 Units by mouth daily    Renal osteodystrophy, Vitamin D deficiency       fludrocortisone 0.1 MG tablet    FLORINEF    30 tablet    Take 1 tablet (0.1 mg) by mouth daily    Hyperkalemia       HYDROcodone-acetaminophen 5-325 MG tablet    NORCO     Stop taking until you have completed your oxycodone taper. DO NOT TAKE WITH OXYCODONE.    Other chronic pain       Lidocaine 4 % Patch    LIDOCARE    10 patch    Place 2 patches onto the skin every 24 hours As needed for pain    Pancreas transplanted (H)       magnesium oxide 400 MG tablet    MAG-OX    90 tablet    Take 2 tablets (800mg) at noon and 2 tablets (800mg) at 6pm.    Pancreas transplanted (H)       mycophenolic acid 360 MG EC tablet    GENERIC EQUIVALENT    120 tablet    Take 2 tablets (720 mg) by mouth 2 times daily    Pancreas transplanted (H)       NIFEdipine ER osmotic 30 MG 24 hr tablet    PROCARDIA XL    60 tablet    Take 1 tablet (30 mg) by mouth 2 times daily    Hypertension secondary to other renal disorders       nystatin [id number] UNIT/ML suspension    MYCOSTATIN    600 mL    Take 5 mLs (500,000 Units) by mouth 4 times daily    Immunosuppressed status (H)       ondansetron 4 MG ODT tab    ZOFRAN-ODT    30 tablet    Take 1 tablet (4 mg) by mouth every 6  hours as needed for nausea or vomiting    Nausea       oxyCODONE 5 MG tablet    ROXICODONE    108 tablet    Take 10-12.5mg (2-2.5 tabs) every 4 hours as needed for pain on 11/20-11/22 Take 5-10mg (1-2 tabs) every 4 hours as needed for pain on 11/23-11/25 Take 5-7.5mg (1-1.5 tabs) every 4 hours as needed for pain on 11/26-11/28 Starting 11/29 you may restart your regular dose of Norco. DO NOT USE OXYCODONE AND NORCO AT THE SAME TIME.    Kidney replaced by transplant       pantoprazole 20 MG EC tablet    PROTONIX    90 tablet    Take 2 tablets (40 mg) by mouth every morning (before breakfast)    Gastroesophageal reflux disease, esophagitis presence not specified       phosphorus tablet 250 mg 250 MG per tablet    PHOSPHA 250 NEUTRAL    120 tablet    Take 2 tablets (500 mg) by mouth 2 times daily Hold at this time 11/22    Hypophosphatemia       polyethylene glycol powder    MIRALAX    850 g    Take 17 g (1 capful) by mouth 2 times daily Hold for loose stools    Drug-induced constipation       prochlorperazine 5 MG tablet    COMPAZINE    30 tablet    Take 1 tablet (5 mg) by mouth every 6 hours as needed for nausea or vomiting    Nausea       sennosides 8.6 MG tablet    SENOKOT    120 each    Take 2 tablets by mouth 2 times daily Hold for loose stools    Drug-induced constipation       sulfamethoxazole-trimethoprim 400-80 MG per tablet    BACTRIM/SEPTRA    30 tablet    Take 1 tablet by mouth daily    Pancreas transplanted (H), Kidney replaced by transplant       * tacrolimus 1 MG capsule    GENERIC EQUIVALENT    180 capsule    Take 2 capsules (2 mg) by mouth every 12 hours 2mg total dose, changed 11/23    Pancreas transplanted (H)       * tacrolimus 0.5 MG capsule    GENERIC EQUIVALENT    60 capsule    Use for titration. Not needed at this time- Total dose = 2mg, changed 11/23.    Pancreas transplanted (H)       valGANciclovir 450 MG tablet    VALCYTE    152 tablet    Take 2 tablets (900 mg) by mouth daily    Kidney  replaced by transplant, Pancreas transplanted (H)       * Notice:  This list has 2 medication(s) that are the same as other medications prescribed for you. Read the directions carefully, and ask your doctor or other care provider to review them with you.

## 2018-11-26 NOTE — NURSING NOTE
"   From: Schoenecker, Christine Sent: 11/26/2018   2:36 PM To: Christine Schoenecker, Thomas Klint Jr., RN Subject: Patient Race      Jarvis Rodríguez.  Looking for confirmation of patient race for this gentleman. 10/10/13 [place] - TCR reported as Black/  11/12/18 [place] - TCR reported as White Can you confirm with the coordinator who listed patient and let me know so we can get records straight?   Thank you!    From: Waqar Fuentes Jr., RN  Sent: 11/26/2018   4:10 PM To: Christine Schoenecker, Marilyn M Leister, RN Subject: RE: Patient Race    Lizette. Looks like you evaluated him and when listed back then looks like you entered him as white (6/17/17).  I tried finding info but unable too. You might need to call patient and confirm if you don't know for sure. Please let Rosana know when complete. Thank you, tk  I called Amadou Lewis and asked him what he states as race.  He states 'I am mixed,  and , I chose not to answer the race question.\" I informed him we have to chose a race.  I asked him to hold onto the phone while I talked with supervisor, Marcos.    He hung up.  I called Mr. Lewis back and informed him we can enter both  and [place] into [employer] and [employer].  I asked him to call Lizette back to confirm.  lorraine Montague.   "

## 2018-11-26 NOTE — PATIENT INSTRUCTIONS
Dear Amadou Lewis    Thank you for choosing [facility] ([facility]) for your transplant cares.  The following information is a summary of our appointment as well as important reminders.      Please make sure your phone is available today because I will call to update you with your anti-rejection drug levels and possibly make changes to your anti-rejection dosages., Please refer to your hospital discharge instructions for details on home care services, future appointments, phone numbers, and diet/activity levels.    [facility] [phone]    We look forward in seeing you on your next appointment here at [facility].  Please don t hesitate to call us at [phone] to reschedule any of your appointments or to speak with one of the [facility] registered nurses.  It was a pleasure taking care of you today.    Sincerely,    [facility]  [address] [place]  [address]  Phone:  [phone]

## 2018-11-26 NOTE — PROGRESS NOTES
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 He is s/p living donor kidney transplant in  complicated by BK viremia in , rejection in , and graft failure in 2018. Underwent  donor pancreas transplant with enteric drainage, kidney transplant (with venous reconstruction, two arteries, and stent), appendectomy, and umbilical hernia repair on 18.     No fevers, chills, nausea or vomiting.    No hematuria. Dysuria or frequency.    No chest pain.      Transplant History:    Transplant Type:  DDKT on 2018 and [place]  Donor Type:  - Brain Death   Transplant Date:  2018 (Kidney / Pancreas), 10/10/2013 (Kidney)   Ureteral Stent:  Yes   Crossmatch:  negative   DSA at Tx:  No  Baseline Cr: 1.1  DeNovo DSA: No    Acute Rejection Hx:  No    Present Maintenance Immunosuppression:  Tacrolimus and Mycophenolic acid    CMV IgG Ab Discordance:  No  EBV IgG Ab Discordance:  No    BK Viremia:  No  EBV Viremia:  No    Transplant Coordinator: Alesha Hartley     Transplant Office Phone Number: [phone]     Immunsupresent Medications     Immunosuppressive Agents Disp Start End    mycophenolic acid (GENERIC EQUIVALENT) 360 MG EC tablet 120 tablet 2018     Sig - Route: Take 2 tablets (720 mg) by mouth 2 times daily - Oral    Class: E-Prescribe    tacrolimus (GENERIC EQUIVALENT) 0.5 MG capsule 60 capsule 2018     Sig: Use for titration. Not needed at this time- Total dose = 2mg, changed .    Class: Historical    tacrolimus (GENERIC EQUIVALENT) 1 MG capsule 180 capsule 2018     Sig - Route: Take 2 capsules (2 mg) by mouth every 12 hours 2mg total dose, changed  - Oral    Class: Historical          Possible  Immunosuppression-related side effects:   []             headache  []             vivid dreams  []             irritability  []             cognitive difficuties  [x]             fine tremor  []             nausea  []             diarrhea  []             neuropathy      []             edema  []             renal calcineurin toxicity  []             hyperkalemia  []             post-transplant diabetes  []             decreased appetite  []             increased appetite  []             other:  []             none    Prescription Medications as of 11/26/2018             acetaminophen (TYLENOL) 325 MG tablet Take 3 tablets (975 mg) by mouth every 8 hours as needed for mild pain or fever (DO NOT USE WITH NORCO)    aspirin 81 MG EC tablet Take 1 tablet (81 mg) by mouth daily    atorvastatin (LIPITOR) 10 MG tablet Take 0.5 tablets (5 mg) by mouth daily    blood glucose monitoring (NO BRAND SPECIFIED) test strip Use to test blood sugar 4 times daily or as directed.    calcitRIOL (ROCALTROL) 0.25 MCG capsule Take 1 capsule (0.25 mcg) by mouth daily    cholecalciferol 2000 units CAPS Take 2,000 Units by mouth daily    fludrocortisone (FLORINEF) 0.1 MG tablet Take 1 tablet (0.1 mg) by mouth daily    HYDROcodone-acetaminophen (NORCO) 5-325 MG per tablet Stop taking until you have completed your oxycodone taper. DO NOT TAKE WITH OXYCODONE.    Lidocaine (LIDOCARE) 4 % Patch Place 2 patches onto the skin every 24 hours As needed for pain    magnesium oxide (MAG-OX) 400 MG tablet Take 2 tablets (800mg) at noon and 2 tablets (800mg) at 6pm.    mycophenolic acid (GENERIC EQUIVALENT) 360 MG EC tablet Take 2 tablets (720 mg) by mouth 2 times daily    NIFEdipine ER osmotic (PROCARDIA XL) 30 MG 24 hr tablet Take 1 tablet (30 mg) by mouth 2 times daily    nystatin (MYCOSTATIN) 603505 UNIT/ML suspension Take 5 mLs (500,000 Units) by mouth 4 times daily    ondansetron (ZOFRAN-ODT) 4 MG ODT tab Take 1 tablet (4 mg) by mouth every 6 hours  as needed for nausea or vomiting    oxyCODONE IR (ROXICODONE) 5 MG tablet Take 10-12.5mg (2-2.5 tabs) every 4 hours as needed for pain on 11/20-11/22  Take 5-10mg (1-2 tabs) every 4 hours as needed for pain on 11/23-11/25  Take 5-7.5mg (1-1.5 tabs) every 4 hours as needed for pain on 11/26-11/28  Starting 11/29 you may restart your regular dose of Norco. DO NOT USE OXYCODONE AND NORCO AT THE SAME TIME.    pantoprazole (PROTONIX) 20 MG EC tablet Take 2 tablets (40 mg) by mouth every morning (before breakfast)    polyethylene glycol (MIRALAX) powder Take 17 g (1 capful) by mouth 2 times daily Hold for loose stools    prochlorperazine (COMPAZINE) 5 MG tablet Take 1 tablet (5 mg) by mouth every 6 hours as needed for nausea or vomiting    sennosides (SENOKOT) 8.6 MG tablet Take 2 tablets by mouth 2 times daily Hold for loose stools    sulfamethoxazole-trimethoprim (BACTRIM/SEPTRA) 400-80 MG per tablet Take 1 tablet by mouth daily    tacrolimus (GENERIC EQUIVALENT) 0.5 MG capsule Use for titration. Not needed at this time- Total dose = 2mg, changed 11/23.    tacrolimus (GENERIC EQUIVALENT) 1 MG capsule Take 2 capsules (2 mg) by mouth every 12 hours 2mg total dose, changed 11/23    valGANciclovir (VALCYTE) 450 MG tablet Take 2 tablets (900 mg) by mouth daily          O:   Temp:  [98.1  F (36.7  C)] 98.1  F (36.7  C)  Pulse:  [89] 89  Resp:  [16] 16  BP: (164)/(86) 164/86  General Appearance: in no apparent distress.   Skin: Normal, no rashes or jaundice  Heart: regular rate and rhythm  Lungs: easy respirations, no audible wheezing.  Abdomen: flat, The wound is dry and intact, without hernia. The abdomen is non-tender. The kidney and pancreas graft is not tender.  There is no ascites.  Extremities: Tremor present bilaterally.   Edema: absent.     Transplant Immunosuppression Labs Latest Ref Rng & Units 11/26/20182018 11/25/2018 11/23/2018 11/22/2018 11/21/2018   Siro Level 5.0 - 15.0 ug/L - - - - -   Siro Level - - - - - -    Tacro Level 5.0 - 15.0 ug/L - 11.9 15.9(H) 14.9 12.0   Tacro Level - - Not Provided Not Provided Not Provided Not Provided   Cyclo Level 50 - 400 ug/L - - - - -   Cyclo Level - - - - - -   Creat 0.66 - 1.25 mg/dL 1.16 1.24 1.31(H) 1.32(H) 1.17   BUN 7 - 30 mg/dL 12 14 13 12 13   WBC 4.0 - 11.0 10e9/L 6.1 6.3 7.2 6.2 8.0   Neutrophil % 92.4 92.4 94.0 93.8 95.3   ANEU 1.6 - 8.3 10e9/L 5.6 5.8 6.8 5.8 7.6       Chemistries:   Recent Labs   Lab Test  11/26/18   0727   BUN  12   CR  1.16   GFRESTIMATED  65   GLC  89     Lab Results   Component Value Date    A1C 9.1 11/11/2018    CPEPT 2.8 05/31/2017     Recent Labs   Lab Test  11/11/18   0955   ALBUMIN  4.0   BILITOTAL  0.3   ALKPHOS  189*   AST  9   ALT  14     Urine Studies:  Recent Labs   Lab Test  11/16/18   1110   COLOR  Yellow   APPEARANCE  Clear   URINEGLC  Negative   URINEBILI  Negative   URINEKETONE  Negative   SG  1.019   UBLD  Large*   URINEPH  7.0   PROTEIN  30*   NITRITE  Negative   LEUKEST  Negative   RBCU  >182*   WBCU  5     Recent Labs   Lab Test  11/11/18   1100  05/24/17   1424   UTPG  1.96*  1.72*     Hematology:   Recent Labs   Lab Test  11/26/18   0727  11/25/18   0711  11/23/18   0720   HGB  9.2*  9.3*  9.1*   PLT  318  347  270   WBC  6.1  6.3  7.2     Coags:   Recent Labs   Lab Test  11/12/18   2102  11/12/18   1631   INR  1.31*  1.41*     HLA antibodies:   SA1 Hi Risk Sophia   Date Value Ref Range Status   11/11/2018 None  Final     SA1 Mod Risk Sophia   Date Value Ref Range Status   11/11/2018 None  Final     SA2 Hi Risk Sophia   Date Value Ref Range Status   11/11/2018 None  Final     SA2 Mod Risk Sophia   Date Value Ref Range Status   11/11/2018 None  Final       Assessment: Amadou Lewis is doing well s/p DDKT on 11/12/18 and [place]:  Issues we addressed during his visit include:    Plan:    1. Graft function: graft function is stable.    2. Immunosuppression Management: No change continue myfortic 720mg BID and prograf 2mg BID.  Complexity of  management:Medium.  Contributing factors: anemia  3. Incision:  C/D/I.  No drainage  4. Dyspnea:  Review x-ray with nephrology.  Likely atelectasis.  Use Incentive spirometer as directed   Followup: as directed    Total Time: 25 min,   Counselling Time: 15 min.

## 2018-11-26 NOTE — PROGRESS NOTES
ACUTE TRANSPLANT NEPHROLOGY VISIT    Assessment & Plan   # DDKT ([facility]): baseline Cr ~ 1.1-1.3; Increased   - Proteinuria: Not checked recently   - Latest DSA: No Date of DSA last checked: 2018   - BK: No   - Kidney Tx Biopsy: No    # Pancreas Tx (K):Enteric drained     - Blood glucose: Euglycemia   - HbA1c: not checked   - Pancreatic enzymes: Decreased   - Date of DSA last checked: 2018 Latest DSA: No    # Immunosuppression: Tacrolimus immediate release (goal  8-10) and Mycophenolic acid (goal  1-3.5)   - Changes: No currently on tacrolimus 2 mg po bid and myfortic 720 mg po bid     # Dyspnea: resolved     # Prophylaxis:   - PJP: TMP/Sulfa (Bactrim)   - CMV: Valcyte x3 months    # Hypertension: Controlled; Goal BP: < 130/80 on nifedipine 30 mg bid    - Changes: No occasionally elevated, encouraged the nifedipine     # Anemia in chronic renal disease: Hgb: Increased   - Iron studies: Replete    # Mineral Bone Disorder:    - Secondary renal hyperparathyroidism; PTH level is: Significantly elevated   - Vitamin D; level is: Low   - Calcium; level is: Normal   - Phosphorus; level is: Normal.   - Continue calcitriol and cholecalciferol and off phos supplementation, refills supplied   - Recheck levels at 4 months    # Electrolytes:   - Potassium; level: High will start florinef   - Magnesium; level: Normal on supplementation which I increased   - Bicarbonate; level: Normal    Return visit: No Follow-up on file.    # Transplant History:  Etiology of kidney failure: diabetic nephropathy  Tx: DDKT ([facility])  Transplant: 2018 (Kidney / Pancreas), 10/10/2013 (Kidney)  Donor Type:  - Brain Death Donor Class:   Crossmatch at time of Tx: negative  DSA at time of Tx: No  Significant changes in immunosuppression: None  CMV IgG Ab Discordance (D+/R-): No  EBV IgG Ab Discordance (D+/R-): No  Significant transplant-related complications: None    Transplant Office Phone Number: [phone]    Assessment and plan was  discussed with the patient and he voiced his understanding and agreement.    Deyvi Dick MD    Chief Complaint   Mr. Lewis is a 52 year old here for hospital follow up following kidney transplant    History of Present Illness     Recent Hospitalizations:  [] No [x] Yes S/p kidney / panc transplant   New Medical Issues: [x] No [] Yes    Decreased energy: [x] No [] Yes    Chest pain or SOB with exertion:  [x] No [] Yes    Appetite change or weight change: [x] No [] Yes    Nausea, vomiting or diarrhea:  [x] No [] Yes    Fever, sweats or chills: [x] No [] Yes    Leg swelling: [x] No [] Yes      Other medical issues:  No     Overall doing well. No specific complaints or concerns. No NVD. Occasional heart burn. No rashes or thrush.    Home BP: reviewed     Review of Systems   A comprehensive review of systems was obtained and negative, except as noted in the HPI or PMH.    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)     Diabetes mellitus with background retinopathy (H)     Polycystic kidney     NONSPECIFIC MEDICAL HISTORY     Coronary artery disease     Retinopathy     Hyperlipidemia LDL goal <70     Premature ventricular contractions (PVCs) (VPCs)     Kidney replaced by transplant     Immunosuppressed status (H)     Kidney transplant rejection     Hyperglycemia     Hypertension     Anemia in chronic renal disease     Care after organ transplant     Esophageal ulcer     Status post simultaneous kidney and pancreas transplant (H)     Other chronic pain     Nausea     Drug induced constipation     Hypophosphatemia       Social History   Social History   Substance Use Topics     Smoking status: Never Smoker     Smokeless tobacco: Never Used     Alcohol use No       Allergies   Allergies   Allergen Reactions     No Known Allergies        Medications   Current Outpatient Prescriptions   Medication Sig     acetaminophen (TYLENOL) 325 MG tablet Take 3  tablets (975 mg) by mouth every 8 hours as needed for mild pain or fever (DO NOT USE WITH NORCO)     aspirin 81 MG EC tablet Take 1 tablet (81 mg) by mouth daily     atorvastatin (LIPITOR) 10 MG tablet Take 0.5 tablets (5 mg) by mouth daily     blood glucose monitoring (NO BRAND SPECIFIED) test strip Use to test blood sugar 4 times daily or as directed.     calcitRIOL (ROCALTROL) 0.25 MCG capsule Take 1 capsule (0.25 mcg) by mouth daily     calcium carbonate 500 mg-vitamin D 200 units (OSCAL WITH D;OYSTER SHELL CALCIUM) 500-200 MG-UNIT per tablet Take 1 tablet by mouth 2 times daily (with meals) Hold at this time 11/22 (Patient not taking: Reported on 11/26/2018)     cholecalciferol 2000 units CAPS Take 2,000 Units by mouth daily     fludrocortisone (FLORINEF) 0.1 MG tablet Take 1 tablet (0.1 mg) by mouth daily     HYDROcodone-acetaminophen (NORCO) 5-325 MG per tablet Stop taking until you have completed your oxycodone taper. DO NOT TAKE WITH OXYCODONE. (Patient not taking: Reported on 11/21/2018)     Lidocaine (LIDOCARE) 4 % Patch Place 2 patches onto the skin every 24 hours As needed for pain     magnesium oxide (MAG-OX) 400 MG tablet Take 2 tablets (800mg) at noon and 2 tablets (800mg) at 6pm.     mycophenolic acid (GENERIC EQUIVALENT) 360 MG EC tablet Take 2 tablets (720 mg) by mouth 2 times daily     NIFEdipine ER osmotic (PROCARDIA XL) 30 MG 24 hr tablet Take 1 tablet (30 mg) by mouth 2 times daily (Patient taking differently: Take 30 mg by mouth 2 times daily HOLD for SBP less than 150.)     nystatin (MYCOSTATIN) 654344 UNIT/ML suspension Take 5 mLs (500,000 Units) by mouth 4 times daily     ondansetron (ZOFRAN-ODT) 4 MG ODT tab Take 1 tablet (4 mg) by mouth every 6 hours as needed for nausea or vomiting (Patient not taking: Reported on 11/21/2018)     oxyCODONE IR (ROXICODONE) 5 MG tablet Take 10-12.5mg (2-2.5 tabs) every 4 hours as needed for pain on 11/20-11/22  Take 5-10mg (1-2 tabs) every 4 hours as  needed for pain on 11/23-11/25  Take 5-7.5mg (1-1.5 tabs) every 4 hours as needed for pain on 11/26-11/28  Starting 11/29 you may restart your regular dose of Norco. DO NOT USE OXYCODONE AND NORCO AT THE SAME TIME.     pantoprazole (PROTONIX) 20 MG EC tablet Take 2 tablets (40 mg) by mouth every morning (before breakfast)     phosphorus tablet 250 mg (PHOSPHA 250 NEUTRAL) 250 MG per tablet Take 2 tablets (500 mg) by mouth 2 times daily Hold at this time 11/22 (Patient not taking: Reported on 11/26/2018)     polyethylene glycol (MIRALAX) powder Take 17 g (1 capful) by mouth 2 times daily Hold for loose stools     prochlorperazine (COMPAZINE) 5 MG tablet Take 1 tablet (5 mg) by mouth every 6 hours as needed for nausea or vomiting     sennosides (SENOKOT) 8.6 MG tablet Take 2 tablets by mouth 2 times daily Hold for loose stools     sulfamethoxazole-trimethoprim (BACTRIM/SEPTRA) 400-80 MG per tablet Take 1 tablet by mouth daily     tacrolimus (GENERIC EQUIVALENT) 0.5 MG capsule Use for titration. Not needed at this time- Total dose = 2mg, changed 11/23.     tacrolimus (GENERIC EQUIVALENT) 1 MG capsule Take 2 capsules (2 mg) by mouth every 12 hours 2mg total dose, changed 11/23     valGANciclovir (VALCYTE) 450 MG tablet Take 2 tablets (900 mg) by mouth daily     No current facility-administered medications for this visit.      There are no discontinued medications.    Physical Exam   Vital Signs: Reviewed     GENERAL APPEARANCE: alert and no distress  HENT: mouth without ulcers or lesions  LYMPHATICS: no cervical or supraclavicular nodes  RESP: crackles in right lung base on auscultation - no rales, rhonchi or wheezes  CV: regular rhythm, normal rate, no rub, no murmur  EDEMA: no LE edema bilaterally  ABDOMEN: soft, + distended, nontender, bowel sounds normal  MS: extremities normal - no gross deformities noted, no evidence of inflammation in joints, no muscle tenderness  SKIN: no rash  TX KIDNEY: normal    Data      Renal Latest Ref Rng & Units 11/26/2018 11/25/2018 11/23/2018   Na 133 - 144 mmol/L 134 134 132(L)   K 3.4 - 5.3 mmol/L 5.0 5.1 5.3   Cl 94 - 109 mmol/L 105 104 102   CO2 20 - 32 mmol/L 23 23 22   BUN 7 - 30 mg/dL 12 14 13   Cr 0.66 - 1.25 mg/dL 1.16 1.24 1.31(H)   Cr (external) 0.5 - 1.5 mg/dL - - -   Glucose 70 - 99 mg/dL 89 91 91   Ca  8.5 - 10.1 mg/dL 8.7 8.6 8.5   Mg 1.6 - 2.3 mg/dL 1.6 1.5(L) 1.5(L)     Bone Health Latest Ref Rng & Units 11/26/2018 11/25/2018 11/23/2018   Phos 2.5 - 4.5 mg/dL 3.3 2.9 2.9   PTHi 18 - 80 pg/mL - - -   Vit D Def 20 - 75 ug/L - - -     Heme Latest Ref Rng & Units 11/26/2018 11/25/2018 11/23/2018   WBC 4.0 - 11.0 10e9/L 6.1 6.3 7.2   Hgb 13.3 - 17.7 g/dL 9.2(L) 9.3(L) 9.1(L)   Plt 150 - 450 10e9/L 318 347 270     Liver Latest Ref Rng & Units 11/11/2018 6/27/2018 8/3/2017   AP 40 - 150 U/L 189(H) - -   TBili 0.2 - 1.3 mg/dL 0.3 - -   ALT 0 - 70 U/L 14 - -   AST 0 - 45 U/L 9 - -   Tot Protein 6.8 - 8.8 g/dL 7.6 - -   Albumin 3.4 - 5.0 g/dL 4.0 4.2 4.2     Pancreas Latest Ref Rng & Units 11/26/2018 11/25/2018 11/23/2018   A1C 0 - 5.6 % - - -   Amylase 30 - 110 U/L 94 95 103   Lipase 73 - 393 U/L 375 390 426(H)     Iron studies Latest Ref Rng & Units 9/11/2018 8/3/2017 11/18/2016   Iron 35 - 180 ug/dL 85 79 82   Iron sat 15 - 46 % 37 34 34   Ferritin 26 - 388 ng/mL 761(H) 736(H) -     [facility] Txp Virology Latest Ref Rng & Units 11/11/2018 11/1/2018 9/14/2018   CVM DNA Quant - - - -   CMV Quant <100 Copies/mL - - -   CMV QT Log <2.0 Log copies/mL - - -   BK Spec - - Plasma, EDTA anticoagulant -   BK Res BKNEG:BK Virus DNA Not Detected copies/mL - BK Virus DNA Not Detected -   BK Log <2.7 Log copies/mL - Not Calculated -   Hep B Core NR:Nonreactive Nonreactive - Nonreactive   Hep B Surf - - - -   HIV 1&2 NEG - - -        Recent Labs   Lab Test  11/22/18   0717  11/23/18   0720  11/25/18   0711   DOSTAC  Not Provided  Not Provided  Not Provided   TACROL  14.9  15.9*  11.9     Recent Labs    Lab Test  05/04/17   1408  05/24/17   1414  08/03/17   1441   DOSMPA  LAST DOSE 5/4/2017 AT 2:00 AM  Last dose 5/24/17 at 0430AM  08/03/2017 AT 0330 AM   MPACID  2.97  2.79  3.63*   MPAG  >200.0*  >200.0*  >200.0*

## 2018-11-27 LAB
MYCOPHENOLATE SERPL LC/MS/MS-MCNC: 2.28 MG/L (ref 1–3.5)
MYCOPHENOLATE SERPL LC/MS/MS-MCNC: 6 MG/L (ref 1–3.5)
MYCOPHENOLATE-G SERPL LC/MS/MS-MCNC: 74.6 MG/L (ref 30–95)
MYCOPHENOLATE-G SERPL LC/MS/MS-MCNC: 86.1 MG/L (ref 30–95)
TME LAST DOSE: ABNORMAL H
TME LAST DOSE: NORMAL H

## 2018-11-28 ENCOUNTER — TELEPHONE (OUTPATIENT)
Dept: TRANSPLANT | Facility: CLINIC | Age: 53
End: 2018-11-28

## 2018-11-28 ENCOUNTER — TEAM CONFERENCE (OUTPATIENT)
Dept: TRANSPLANT | Facility: CLINIC | Age: 53
End: 2018-11-28

## 2018-11-28 ENCOUNTER — MEDICAL CORRESPONDENCE (OUTPATIENT)
Dept: PHARMACY | Facility: CLINIC | Age: 53
End: 2018-11-28

## 2018-11-28 ENCOUNTER — CARE COORDINATION (OUTPATIENT)
Dept: TRANSPLANT | Facility: CLINIC | Age: 53
End: 2018-11-28

## 2018-11-28 LAB
AMYLASE SERPL-CCNC: 114 U/L (ref 30–110)
ANION GAP SERPL CALCULATED.3IONS-SCNC: 6 MMOL/L (ref 3–14)
BASOPHILS # BLD AUTO: 0 10E9/L (ref 0–0.2)
BASOPHILS NFR BLD AUTO: 0.5 %
BUN SERPL-MCNC: 19 MG/DL (ref 7–30)
CALCIUM SERPL-MCNC: 8.6 MG/DL (ref 8.5–10.1)
CHLORIDE SERPL-SCNC: 106 MMOL/L (ref 94–109)
CO2 SERPL-SCNC: 24 MMOL/L (ref 20–32)
CREAT SERPL-MCNC: 1.18 MG/DL (ref 0.66–1.25)
DIFFERENTIAL METHOD BLD: ABNORMAL
EOSINOPHIL # BLD AUTO: 0.1 10E9/L (ref 0–0.7)
EOSINOPHIL NFR BLD AUTO: 2.1 %
ERYTHROCYTE [DISTWIDTH] IN BLOOD BY AUTOMATED COUNT: 14.5 % (ref 10–15)
GFR SERPL CREATININE-BSD FRML MDRD: 64 ML/MIN/1.7M2
GLUCOSE SERPL-MCNC: 83 MG/DL (ref 70–99)
HCT VFR BLD AUTO: 27.7 % (ref 40–53)
HGB BLD-MCNC: 9.1 G/DL (ref 13.3–17.7)
IMM GRANULOCYTES # BLD: 0 10E9/L (ref 0–0.4)
IMM GRANULOCYTES NFR BLD: 0.5 %
LIPASE SERPL-CCNC: 485 U/L (ref 73–393)
LYMPHOCYTES # BLD AUTO: 0.1 10E9/L (ref 0.8–5.3)
LYMPHOCYTES NFR BLD AUTO: 2.5 %
MCH RBC QN AUTO: 29 PG (ref 26.5–33)
MCHC RBC AUTO-ENTMCNC: 32.9 G/DL (ref 31.5–36.5)
MCV RBC AUTO: 88 FL (ref 78–100)
MONOCYTES # BLD AUTO: 0.2 10E9/L (ref 0–1.3)
MONOCYTES NFR BLD AUTO: 2.8 %
NEUTROPHILS # BLD AUTO: 5.2 10E9/L (ref 1.6–8.3)
NEUTROPHILS NFR BLD AUTO: 91.6 %
NRBC # BLD AUTO: 0 10*3/UL
NRBC BLD AUTO-RTO: 0 /100
PLATELET # BLD AUTO: 294 10E9/L (ref 150–450)
POTASSIUM SERPL-SCNC: 4.9 MMOL/L (ref 3.4–5.3)
RBC # BLD AUTO: 3.14 10E12/L (ref 4.4–5.9)
SODIUM SERPL-SCNC: 136 MMOL/L (ref 133–144)
TACROLIMUS BLD-MCNC: 7.5 UG/L (ref 5–15)
TME LAST DOSE: NORMAL H
WBC # BLD AUTO: 5.7 10E9/L (ref 4–11)

## 2018-11-28 PROCEDURE — 80197 ASSAY OF TACROLIMUS: CPT | Performed by: SURGERY

## 2018-11-28 PROCEDURE — 80048 BASIC METABOLIC PNL TOTAL CA: CPT | Performed by: SURGERY

## 2018-11-28 PROCEDURE — 82150 ASSAY OF AMYLASE: CPT | Performed by: SURGERY

## 2018-11-28 PROCEDURE — 85025 COMPLETE CBC W/AUTO DIFF WBC: CPT | Performed by: SURGERY

## 2018-11-28 PROCEDURE — 83690 ASSAY OF LIPASE: CPT | Performed by: SURGERY

## 2018-11-28 NOTE — TELEPHONE ENCOUNTER
Post Kidney and Pancreas Transplant Team Conference  Date: 11/28/2018  Transplant Coordinator: Alesha Hartley     Attendees:  [x]  Dr. Wolfe [x] Becca Riojas, RN  [x] Shari Chapin LPN     []  Dr. Dick [] Anu Hanson RN [x] Shayna Chavez LPN   []  Dr. Tate [x] Glenis Ko RN    []  Dr. De Los Santos [x] Sailaja Choi RN    [] Dr. Jordan [x] Zora Hartley RN    [] Dr. Pal [] Joey Goldberg RN    [x] Dr. Luevano [x] Viv Wick RN    [] Surgery Fellow [x] Linh Oates RN    [] Sandy Cole NP              Verbal Plan Read Back:   No change to medication or lab schedule  Will continue to monitor    Routed to RN Coordinator   Shayna Chavez

## 2018-11-28 NOTE — TELEPHONE ENCOUNTER
Small elevation in enzymes.   Is drain out?      Deyvi Dick MD Schindelholz, Alanna, RN                     Slight increase in the enzymes, I called no fever or abdominal pain. Lets monitor.

## 2018-11-28 NOTE — PROGRESS NOTES
Kidney and Pancreas Transplant Coordinator Transition to Outpatient Discharge Note    Pt Name: Amadou Lewis  : 1965    Transplant Date: 18  Hospital Discharge Date: 18  Initial date of Encounter: 18    Surgeon: Bruce  Transplant Coordinator: Alesha Hartley    Amadou Lewis SPK d/t PKD / DM2, left side side   Stent WAS placed.  Pancreas Drainage: enteric    CMV: D+/R+ : Valcyte 3 months; renal dosing  EBV: D+/R+    High Risk DSA: No.  PHS Increased Risk: Yes.    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)     Diabetes mellitus with background retinopathy (H)     Polycystic kidney     NONSPECIFIC MEDICAL HISTORY     Coronary artery disease     Retinopathy     Hyperlipidemia LDL goal <70     Premature ventricular contractions (PVCs) (VPCs)     Kidney replaced by transplant     Immunosuppressed status (H)     Kidney transplant rejection     Hyperglycemia     Hypertension     Anemia in chronic renal disease     Care after organ transplant     Esophageal ulcer     Status post simultaneous kidney and pancreas transplant (H)     Other chronic pain     Nausea     Drug induced constipation     Hypophosphatemia       Education:  Writer met with Amadou Lewis. We discussed immunosuppression medications, indications, side effects, dose adjustments, and lab monitoring. Lab draw schedule was given to pt and fully explained - Mailers not given; no questions. Further education was provided on typical post transplant course,  labs examined with thresholds, biopsy, infection prevention, office contact numbers, and when to call.     Pt questions for follow up: Patient decliens    Discharge Transition Plan:   Pt will transition home, with assistance from Kisha, his daughter. Pt will have home care [facility].   Pt states having working BP monitor, scale, and thermometer at home.      Stent removal date: 12/20 with Sandy Cole NP (Location: clinic)      Patient has viewed [facility], and has no additional questions at this time. RNCC encouraged on-going review.  Patient has voiced understanding of ability to utilize [facility] for self-review of lab values.    Special/Additional needs: No.    Alesha Hartley  Post-Kidney Transplant Coordinator  (ph) [phone]

## 2018-11-29 ENCOUNTER — TELEPHONE (OUTPATIENT)
Dept: TRANSPLANT | Facility: CLINIC | Age: 53
End: 2018-11-29

## 2018-11-29 DIAGNOSIS — Z94.83 PANCREAS TRANSPLANTED (H): ICD-10-CM

## 2018-11-29 RX ORDER — TACROLIMUS 0.5 MG/1
0.5 CAPSULE ORAL 2 TIMES DAILY
Qty: 60 CAPSULE | Refills: 11 | Status: SHIPPED | OUTPATIENT
Start: 2018-11-29 | End: 2018-12-03

## 2018-11-29 RX ORDER — TACROLIMUS 1 MG/1
2 CAPSULE ORAL 2 TIMES DAILY
Qty: 120 CAPSULE | Refills: 11 | Status: SHIPPED | OUTPATIENT
Start: 2018-11-29 | End: 2018-12-03

## 2018-11-29 NOTE — TELEPHONE ENCOUNTER
ISSUE:   Tacrolimus level 7.5 on 11/28/18, goal 8-10, dose 2mg AM, 2.5 mg PM    PLAN:   Please call pt and confirm this was a good 12-hour trough. Verify dose 2mg AM, 2.5 mg PM. Confirm no new medications or illness (trina. Diarrhea). If good trough, increase dose to 2.5 mg BID and recheck level with next scheduled lab draw.    OUTCOME: Sean confirmed the above and voiced understanding of drug dose change.      Sean did endorse slight constipation. Will increase miralax to BID to try to clean out bowels before next lab draw.

## 2018-11-30 LAB
AMYLASE SERPL-CCNC: 105 U/L (ref 30–110)
ANION GAP SERPL CALCULATED.3IONS-SCNC: 6 MMOL/L (ref 3–14)
BUN SERPL-MCNC: 16 MG/DL (ref 7–30)
CALCIUM SERPL-MCNC: 8.5 MG/DL (ref 8.5–10.1)
CHLORIDE SERPL-SCNC: 107 MMOL/L (ref 94–109)
CO2 SERPL-SCNC: 23 MMOL/L (ref 20–32)
CREAT SERPL-MCNC: 1.1 MG/DL (ref 0.66–1.25)
ERYTHROCYTE [DISTWIDTH] IN BLOOD BY AUTOMATED COUNT: 14.6 % (ref 10–15)
GFR SERPL CREATININE-BSD FRML MDRD: 70 ML/MIN/1.7M2
GLUCOSE SERPL-MCNC: 84 MG/DL (ref 70–99)
HCT VFR BLD AUTO: 28.4 % (ref 40–53)
HGB BLD-MCNC: 9.3 G/DL (ref 13.3–17.7)
LIPASE SERPL-CCNC: 400 U/L (ref 73–393)
MCH RBC QN AUTO: 29 PG (ref 26.5–33)
MCHC RBC AUTO-ENTMCNC: 32.7 G/DL (ref 31.5–36.5)
MCV RBC AUTO: 89 FL (ref 78–100)
PLATELET # BLD AUTO: 282 10E9/L (ref 150–450)
POTASSIUM SERPL-SCNC: 5 MMOL/L (ref 3.4–5.3)
RBC # BLD AUTO: 3.21 10E12/L (ref 4.4–5.9)
SODIUM SERPL-SCNC: 136 MMOL/L (ref 133–144)
TACROLIMUS BLD-MCNC: 6 UG/L (ref 5–15)
TME LAST DOSE: NORMAL H
WBC # BLD AUTO: 4.6 10E9/L (ref 4–11)

## 2018-11-30 PROCEDURE — 82150 ASSAY OF AMYLASE: CPT | Performed by: SURGERY

## 2018-11-30 PROCEDURE — 80197 ASSAY OF TACROLIMUS: CPT | Performed by: SURGERY

## 2018-11-30 PROCEDURE — 80048 BASIC METABOLIC PNL TOTAL CA: CPT | Performed by: SURGERY

## 2018-11-30 PROCEDURE — 85027 COMPLETE CBC AUTOMATED: CPT | Performed by: SURGERY

## 2018-11-30 PROCEDURE — 83690 ASSAY OF LIPASE: CPT | Performed by: SURGERY

## 2018-12-01 ENCOUNTER — APPOINTMENT (OUTPATIENT)
Dept: LAB | Facility: CLINIC | Age: 53
End: 2018-12-01
Attending: SURGERY
Payer: MEDICARE

## 2018-12-03 ENCOUNTER — TELEPHONE (OUTPATIENT)
Dept: TRANSPLANT | Facility: CLINIC | Age: 53
End: 2018-12-03

## 2018-12-03 ENCOUNTER — OFFICE VISIT (OUTPATIENT)
Dept: TRANSPLANT | Facility: CLINIC | Age: 53
End: 2018-12-03
Attending: SURGERY
Payer: COMMERCIAL

## 2018-12-03 VITALS
WEIGHT: 173.8 LBS | DIASTOLIC BLOOD PRESSURE: 73 MMHG | OXYGEN SATURATION: 97 % | TEMPERATURE: 98.5 F | BODY MASS INDEX: 27.28 KG/M2 | SYSTOLIC BLOOD PRESSURE: 123 MMHG | HEART RATE: 96 BPM | HEIGHT: 67 IN

## 2018-12-03 DIAGNOSIS — Z94.83 PANCREAS TRANSPLANTED (H): Primary | ICD-10-CM

## 2018-12-03 DIAGNOSIS — Z94.83 PANCREAS TRANSPLANTED (H): ICD-10-CM

## 2018-12-03 LAB
AMYLASE SERPL-CCNC: 91 U/L (ref 30–110)
ANION GAP SERPL CALCULATED.3IONS-SCNC: 8 MMOL/L (ref 3–14)
BASOPHILS # BLD AUTO: 0 10E9/L (ref 0–0.2)
BASOPHILS NFR BLD AUTO: 0.4 %
BUN SERPL-MCNC: 16 MG/DL (ref 7–30)
CALCIUM SERPL-MCNC: 8.8 MG/DL (ref 8.5–10.1)
CHLORIDE SERPL-SCNC: 106 MMOL/L (ref 94–109)
CO2 SERPL-SCNC: 24 MMOL/L (ref 20–32)
CREAT SERPL-MCNC: 1.15 MG/DL (ref 0.66–1.25)
DIFFERENTIAL METHOD BLD: ABNORMAL
EOSINOPHIL # BLD AUTO: 0.2 10E9/L (ref 0–0.7)
EOSINOPHIL NFR BLD AUTO: 2.7 %
ERYTHROCYTE [DISTWIDTH] IN BLOOD BY AUTOMATED COUNT: 14.4 % (ref 10–15)
GFR SERPL CREATININE-BSD FRML MDRD: 66 ML/MIN/1.7M2
GLUCOSE SERPL-MCNC: 83 MG/DL (ref 70–99)
HCT VFR BLD AUTO: 30.7 % (ref 40–53)
HGB BLD-MCNC: 9.4 G/DL (ref 13.3–17.7)
IMM GRANULOCYTES # BLD: 0 10E9/L (ref 0–0.4)
IMM GRANULOCYTES NFR BLD: 0.7 %
LIPASE SERPL-CCNC: 343 U/L (ref 73–393)
LYMPHOCYTES # BLD AUTO: 0.1 10E9/L (ref 0.8–5.3)
LYMPHOCYTES NFR BLD AUTO: 1.8 %
MCH RBC QN AUTO: 28.4 PG (ref 26.5–33)
MCHC RBC AUTO-ENTMCNC: 30.6 G/DL (ref 31.5–36.5)
MCV RBC AUTO: 93 FL (ref 78–100)
MONOCYTES # BLD AUTO: 0.1 10E9/L (ref 0–1.3)
MONOCYTES NFR BLD AUTO: 2.5 %
NEUTROPHILS # BLD AUTO: 5.1 10E9/L (ref 1.6–8.3)
NEUTROPHILS NFR BLD AUTO: 91.9 %
NRBC # BLD AUTO: 0 10*3/UL
NRBC BLD AUTO-RTO: 0 /100
PLATELET # BLD AUTO: 272 10E9/L (ref 150–450)
POTASSIUM SERPL-SCNC: 5.1 MMOL/L (ref 3.4–5.3)
RBC # BLD AUTO: 3.31 10E12/L (ref 4.4–5.9)
SODIUM SERPL-SCNC: 138 MMOL/L (ref 133–144)
TACROLIMUS BLD-MCNC: 8.6 UG/L (ref 5–15)
TME LAST DOSE: NORMAL H
WBC # BLD AUTO: 5.5 10E9/L (ref 4–11)

## 2018-12-03 PROCEDURE — 80048 BASIC METABOLIC PNL TOTAL CA: CPT | Performed by: SURGERY

## 2018-12-03 PROCEDURE — 83690 ASSAY OF LIPASE: CPT | Performed by: SURGERY

## 2018-12-03 PROCEDURE — 82150 ASSAY OF AMYLASE: CPT | Performed by: SURGERY

## 2018-12-03 PROCEDURE — 80197 ASSAY OF TACROLIMUS: CPT | Performed by: SURGERY

## 2018-12-03 PROCEDURE — G0463 HOSPITAL OUTPT CLINIC VISIT: HCPCS | Mod: ZF

## 2018-12-03 PROCEDURE — 85025 COMPLETE CBC W/AUTO DIFF WBC: CPT | Performed by: SURGERY

## 2018-12-03 RX ORDER — TACROLIMUS 0.5 MG/1
CAPSULE ORAL
Qty: 60 CAPSULE | Refills: 11 | Status: SHIPPED | OUTPATIENT
Start: 2018-12-03 | End: 2018-12-10

## 2018-12-03 RX ORDER — TACROLIMUS 1 MG/1
3 CAPSULE ORAL 2 TIMES DAILY
Qty: 180 CAPSULE | Refills: 11 | Status: SHIPPED | OUTPATIENT
Start: 2018-12-03 | End: 2018-12-10

## 2018-12-03 ASSESSMENT — PAIN SCALES - GENERAL: PAINLEVEL: MILD PAIN (3)

## 2018-12-03 NOTE — TELEPHONE ENCOUNTER
ISSUE:   Tacrolimus level 6.0 on 11/30.18, goal 8-10, dose 2.5 mg BID  Tac level low, despite most recent dose increase.     PLAN:   Please call pt and confirm this was a good 12-hour trough. Verify dose 2.5 mg BID. Confirm no new medications or illness (trina. Diarrhea). If good trough, increase dose to 3 mg BID and recheck level with next scheduled lab draw.   Take additional tac NOW to = 3mg starting this AM.    OUTCOME:   Sean confirmed all of the above, was reluctant, but agreed to increase dose to 3mg BID.

## 2018-12-03 NOTE — LETTER
12/3/2018      RE: Amadou Lewis  [address]  [place] [address]       Transplant Surgery Progress Note    Transplants:  2018 (Kidney / Pancreas), 10/10/2013 (Kidney); Postoperative day:  21  S: no complaints post Kp tx  Transplant History:    Transplant Type:  SPK  Donor Type:  - Brain Death   Transplant Date:  2018 (Kidney / Pancreas), 10/10/2013 (Kidney)   Ureteral Stent:  yes   Crossmatch:  negative   DSA at Tx:  No  Baseline Cr: 1.1   DeNovo DSA: No    Acute Rejection Hx:  No    Present Maintenance Immunosuppression:  Tacrolimus and Mycophenolic acid      Transplant Coordinator: Alesha Hartley     Transplant Office Phone Number: [phone]     Immunsupresent Medications     Immunosuppressive Agents Disp Start End    mycophenolic acid (GENERIC EQUIVALENT) 360 MG EC tablet 120 tablet 2018     Sig - Route: Take 2 tablets (720 mg) by mouth 2 times daily - Oral    Class: E-Prescribe    tacrolimus (GENERIC EQUIVALENT) 0.5 MG capsule 60 capsule 12/3/2018     Sig: Hold, for dose change.    Class: E-Prescribe    tacrolimus (GENERIC EQUIVALENT) 1 MG capsule 180 capsule 12/3/2018     Sig - Route: Take 3 capsules (3 mg) by mouth 2 times daily - Oral    Class: E-Prescribe          Possible Immunosuppression-related side effects:   []             headache  []             vivid dreams  []             irritability  []             cognitive difficuties  []             fine tremor  []             nausea  []             diarrhea  []             neuropathy      []             edema  []             renal calcineurin toxicity  []             hyperkalemia  []             post-transplant diabetes  []             decreased appetite  []             increased appetite  []             other:  []             none    Prescription Medications as of 12/3/2018             acetaminophen (TYLENOL) 325 MG tablet Take 3 tablets (975 mg) by mouth every 8 hours as needed for mild pain or fever (DO NOT  USE WITH NORCO)    aspirin 81 MG EC tablet Take 1 tablet (81 mg) by mouth daily    atorvastatin (LIPITOR) 10 MG tablet Take 0.5 tablets (5 mg) by mouth daily    blood glucose monitoring (NO BRAND SPECIFIED) test strip Use to test blood sugar 4 times daily or as directed.    calcitRIOL (ROCALTROL) 0.25 MCG capsule Take 1 capsule (0.25 mcg) by mouth daily    cholecalciferol 2000 units CAPS Take 2,000 Units by mouth daily    fludrocortisone (FLORINEF) 0.1 MG tablet Take 1 tablet (0.1 mg) by mouth daily    HYDROcodone-acetaminophen (NORCO) 5-325 MG per tablet Stop taking until you have completed your oxycodone taper. DO NOT TAKE WITH OXYCODONE.    Lidocaine (LIDOCARE) 4 % Patch Place 2 patches onto the skin every 24 hours As needed for pain    magnesium oxide (MAG-OX) 400 MG tablet Take 2 tablets (800mg) at noon and 2 tablets (800mg) at 6pm.    mycophenolic acid (GENERIC EQUIVALENT) 360 MG EC tablet Take 2 tablets (720 mg) by mouth 2 times daily    NIFEdipine ER osmotic (PROCARDIA XL) 30 MG 24 hr tablet Take 1 tablet (30 mg) by mouth 2 times daily    nystatin (MYCOSTATIN) 813693 UNIT/ML suspension Take 5 mLs (500,000 Units) by mouth 4 times daily    ondansetron (ZOFRAN-ODT) 4 MG ODT tab Take 1 tablet (4 mg) by mouth every 6 hours as needed for nausea or vomiting    oxyCODONE IR (ROXICODONE) 5 MG tablet Take 10-12.5mg (2-2.5 tabs) every 4 hours as needed for pain on 11/20-11/22  Take 5-10mg (1-2 tabs) every 4 hours as needed for pain on 11/23-11/25  Take 5-7.5mg (1-1.5 tabs) every 4 hours as needed for pain on 11/26-11/28  Starting 11/29 you may restart your regular dose of Norco. DO NOT USE OXYCODONE AND NORCO AT THE SAME TIME.    pantoprazole (PROTONIX) 20 MG EC tablet Take 2 tablets (40 mg) by mouth every morning (before breakfast)    polyethylene glycol (MIRALAX) powder Take 17 g (1 capful) by mouth 2 times daily Hold for loose stools    prochlorperazine (COMPAZINE) 5 MG tablet Take 1 tablet (5 mg) by mouth every 6  hours as needed for nausea or vomiting    sennosides (SENOKOT) 8.6 MG tablet Take 2 tablets by mouth 2 times daily Hold for loose stools    sulfamethoxazole-trimethoprim (BACTRIM/SEPTRA) 400-80 MG per tablet Take 1 tablet by mouth daily    tacrolimus (GENERIC EQUIVALENT) 0.5 MG capsule Hold, for dose change.    tacrolimus (GENERIC EQUIVALENT) 1 MG capsule Take 3 capsules (3 mg) by mouth 2 times daily    valGANciclovir (VALCYTE) 450 MG tablet Take 2 tablets (900 mg) by mouth daily          O:   Temp:  [98.5  F (36.9  C)] 98.5  F (36.9  C)  Pulse:  [96] 96  BP: (123)/(73) 123/73  SpO2:  [97 %] 97 %  General Appearance: in no apparent distress.   Skin: Normal, no rashes or jaundice  Heart: regular rate and rhythm  Lungs: easy respirations, no audible wheezing.  Abdomen: rounded, The wound is dry and intact, without hernia. The abdomen is non-tender. The kidney and pancreas graft is not tender.  There is no ascites.  Extremities: Tremor absent.   Edema: absent. none    Transplant Immunosuppression Labs Latest Ref Rng & Units 12/3/2018 11/30/2018 11/28/2018 11/26/2018 11/25/2018   Siro Level 5.0 - 15.0 ug/L - - - - -   Siro Level - - - - - -   Tacro Level 5.0 - 15.0 ug/L - 6.0 7.5 10.0 11.9   Tacro Level - - [id number] Unknown Not Provided Not Provided   Cyclo Level 50 - 400 ug/L - - - - -   Cyclo Level - - - - - -   Creat 0.66 - 1.25 mg/dL 1.15 1.10 1.18 1.16 1.24   BUN 7 - 30 mg/dL 16 16 19 12 14   WBC 4.0 - 11.0 10e9/L 5.5 4.6 5.7 6.1 6.3   Neutrophil % 91.9 - 91.6 92.4 92.4   ANEU 1.6 - 8.3 10e9/L 5.1 - 5.2 5.6 5.8       Chemistries:   Recent Labs   Lab Test  12/03/18   0820   BUN  16   CR  1.15   GFRESTIMATED  66   GLC  83     Lab Results   Component Value Date    A1C 9.1 11/11/2018    CPEPT 2.8 05/31/2017     Recent Labs   Lab Test  11/11/18   0955   ALBUMIN  4.0   BILITOTAL  0.3   ALKPHOS  189*   AST  9   ALT  14     Urine Studies:  Recent Labs   Lab Test  11/16/18   1110   COLOR  Yellow   APPEARANCE  Clear    URINEGLC  Negative   URINEBILI  Negative   URINEKETONE  Negative   SG  1.019   UBLD  Large*   URINEPH  7.0   PROTEIN  30*   NITRITE  Negative   LEUKEST  Negative   RBCU  >182*   WBCU  5     Recent Labs   Lab Test  11/11/18   1100  05/24/17   1424   UTPG  1.96*  1.72*     Hematology:   Recent Labs   Lab Test  12/03/18   0820  11/30/18   0815  11/28/18   0815   HGB  9.4*  9.3*  9.1*   PLT  272  282  294   WBC  5.5  4.6  5.7     Coags:   Recent Labs   Lab Test  11/12/18   2102  11/12/18   1631   INR  1.31*  1.41*     HLA antibodies:   SA1 Hi Risk Sophia   Date Value Ref Range Status   11/11/2018 None  Final     SA1 Mod Risk Sophia   Date Value Ref Range Status   11/11/2018 None  Final     SA2 Hi Risk Sophia   Date Value Ref Range Status   11/11/2018 None  Final     SA2 Mod Risk Sophia   Date Value Ref Range Status   11/11/2018 None  Final       Assessment: Amadou Lewis is doing well s/p SPK:  Issues we addressed during his visit include:    Plan:    1. Graft function: good  2. Immunosuppression Management: increase tac for level of 6 to 3/3 .  Complexity of management:Medium.  Contributing factors: recent tx  3. Remove staples today    Immunosuppressive regimen, management and long term risks discussed in detail. Changes, when applicable made as per orders.      Total Time: 15 min,   Counselling Time: 10 min.    .

## 2018-12-03 NOTE — MR AVS SNAPSHOT
After Visit Summary   12/3/2018    Amadou Lewis    MRN: [id number]           Patient Information     Date Of Birth          1965        Visit Information        Provider Department      12/3/2018 2:30 PM Monique Luevano MD [facility] Solid Organ Transplant        Today's Diagnoses     Pancreas transplanted (H)    -  1       Follow-ups after your visit        Your next 10 appointments already scheduled     Dec 10, 2018  9:30 AM CST   (Arrive by 9:00 AM)   Return Kidney Transplant with Uc Early Post Transplant   [facility] Nephrology ([facility])    [address]  [address]  [facility] [address]   [phone]            Dec 20, 2018  9:00 AM CST   (Arrive by 8:45 AM)   Cystoscopy with Sandy Cole NP   [facility] Solid Organ Transplant ([facility])    [address] [place] [address]  [address] [place] [address]   [phone]            Jan 08, 2019  9:30 AM CST   (Arrive by 9:15 AM)   Office Visit with Luke Tesfaye [facility] Medication Therapy Management ([facility])    [address] [place] [address]  3rd Floor  [facility] [address]   [phone]           Bring a current list of meds and any records pertaining to this visit. For Physicals, please bring immunization records and any forms needing to be filled out. Please arrive 10 minutes early to complete paperwork.            Jan 08, 2019 11:00 AM CST   (Arrive by 10:30 AM)   Return Kidney Transplant with Uc Early Post Transplant   [facility] ([facility])    [address] [place] [address]  [address]  [facility] [address]   [phone]            Mar 04, 2019 11:00 AM CST   (Arrive by 10:30 AM)   Return Kidney Transplant with Uc Early Post Transplant   [facility] ([facility])    [address] [place] [address]  [address]  [facility] [address]   [phone]            Apr 29, 2019 11:00 AM  "CDT   (Arrive by 10:30 AM)   Return Kidney Transplant with  Early Post Transplant   [facility] ([facility])    [address]  [address]  [facility] [address] [phone]              Who to contact     If you have questions or need follow up information about today's clinic visit or your schedule please contact [facility] SOLID ORGAN TRANSPLANT directly at [phone].  Normal or non-critical lab and imaging results will be communicated to you by Negoramahart, letter or phone within 4 business days after the clinic has received the results. If you do not hear from us within 7 days, please contact the clinic through Casero or phone. If you have a critical or abnormal lab result, we will notify you by phone as soon as possible.  Submit refill requests through Casero or call your pharmacy and they will forward the refill request to us. Please allow 3 business days for your refill to be completed.          Additional Information About Your Visit        Casero Information     [facility]ero gives you secure access to your electronic health record. If you see a primary care provider, you can also send messages to your care team and make appointments. If you have questions, please call your primary care clinic.  If you do not have a primary care provider, please call [phone] and they will assist you.        Care EveryWhere ID     This is your Care EveryWhere ID. This could be used by other organizations to access your [place] medical records  [id number]        Your Vitals Were     Pulse Temperature Height Pulse Oximetry BMI (Body Mass Index)       96 98.5  F (36.9  C) 1.702 m (5' 7\") 97% 27.22 kg/m2        Blood Pressure from Last 3 Encounters:   12/03/18 123/73   11/26/18 164/86   11/25/18 145/79    Weight from Last 3 Encounters:   12/03/18 78.8 kg (173 lb 12.8 oz)   11/26/18 81.1 kg (178 lb 12.7 oz)   11/25/18 79.8 kg (175 lb 14.4 oz)              Today, you had the " following     No orders found for display         Today's Medication Changes          These changes are accurate as of 12/3/18 11:59 PM.  If you have any questions, ask your nurse or doctor.               These medicines have changed or have updated prescriptions.        Dose/Directions    NIFEdipine ER OSMOTIC 30 MG 24 hr tablet   Commonly known as:  PROCARDIA XL   This may have changed:  additional instructions   Used for:  Hypertension secondary to other renal disorders        Dose:  30 mg   Take 1 tablet (30 mg) by mouth 2 times daily   Quantity:  60 tablet   Refills:  3       * tacrolimus 0.5 MG capsule   Commonly known as:  GENERIC EQUIVALENT   This may have changed:    - how much to take  - how to take this  - when to take this  - additional instructions   Used for:  Pancreas transplanted (H)   Changed by:  Alesha Hartley RN        Hold, for dose change.   Quantity:  60 capsule   Refills:  11       * tacrolimus 1 MG capsule   Commonly known as:  GENERIC EQUIVALENT   This may have changed:  how much to take   Used for:  Pancreas transplanted (H)   Changed by:  Alesha Hartley RN        Dose:  3 mg   Take 3 capsules (3 mg) by mouth 2 times daily   Quantity:  180 capsule   Refills:  11       * Notice:  This list has 2 medication(s) that are the same as other medications prescribed for you. Read the directions carefully, and ask your doctor or other care provider to review them with you.         Where to get your medicines      These medications were sent to [id number] - [place], [place] - [address] AT [facility] [place] [address]  [address], [place] [address]     Phone:  [phone]     tacrolimus 0.5 MG capsule    tacrolimus 1 MG capsule                Primary Care Provider Office Phone # Fax #    Masoud Valentin MD, [phone]       [address] [place] [address]        Equal Access to Services      RENATA BENNETT : Hadii aad ku donaldo Mchugh, waaxda luqadaha, qaybta kaalmada aderyantonio, jack rigo haykyaw douglassleonacr jeffers . So [facility] [phone].    ATENCIÓN: Si habla español, tiene a tejeda disposición servicios gratuitos de asistencia lingüística. Llame al [phone].    We comply with applicable federal civil rights laws and [place] laws. We do not discriminate on the basis of race, color, national origin, age, disability, sex, sexual orientation, or gender identity.            Thank you!     Thank you for choosing [facility] SOLID ORGAN TRANSPLANT  for your care. Our goal is always to provide you with excellent care. Hearing back from our patients is one way we can continue to improve our services. Please take a few minutes to complete the written survey that you may receive in the mail after your visit with us. Thank you!             Your Updated Medication List - Protect others around you: Learn how to safely use, store and throw away your medicines at www.disposemymeds.org.          This list is accurate as of 12/3/18 11:59 PM.  Always use your most recent med list.                   Brand Name Dispense Instructions for use Diagnosis    acetaminophen 325 MG tablet    TYLENOL    100 tablet    Take 3 tablets (975 mg) by mouth every 8 hours as needed for mild pain or fever (DO NOT USE WITH NORCO)    Kidney replaced by transplant       aspirin 81 MG EC tablet    ASA    30 tablet    Take 1 tablet (81 mg) by mouth daily    Pancreas transplanted (H)       atorvastatin 10 MG tablet    LIPITOR    45 tablet    Take 0.5 tablets (5 mg) by mouth daily    Coronary artery disease involving native coronary artery of native heart without angina pectoris       blood glucose monitoring test strip    NO BRAND SPECIFIED    1 Box    Use to test blood sugar 4 times daily or as directed.    Diabetes mellitus with background retinopathy (H)       calcitRIOL 0.25 MCG capsule    ROCALTROL    90 capsule    Take 1 capsule (0.25  mcg) by mouth daily    Renal osteodystrophy       cholecalciferol 2000 units Caps     90 capsule    Take 2,000 Units by mouth daily    Renal osteodystrophy, Vitamin D deficiency       fludrocortisone 0.1 MG tablet    FLORINEF    30 tablet    Take 1 tablet (0.1 mg) by mouth daily    Hyperkalemia       HYDROcodone-acetaminophen 5-325 MG tablet    NORCO     Stop taking until you have completed your oxycodone taper. DO NOT TAKE WITH OXYCODONE.    Other chronic pain       Lidocaine 4 % Patch    LIDOCARE    10 patch    Place 2 patches onto the skin every 24 hours As needed for pain    Pancreas transplanted (H)       magnesium oxide 400 MG tablet    MAG-OX    90 tablet    Take 2 tablets (800mg) at noon and 2 tablets (800mg) at 6pm.    Pancreas transplanted (H)       mycophenolic acid 360 MG EC tablet    GENERIC EQUIVALENT    120 tablet    Take 2 tablets (720 mg) by mouth 2 times daily    Pancreas transplanted (H)       NIFEdipine ER OSMOTIC 30 MG 24 hr tablet    PROCARDIA XL    60 tablet    Take 1 tablet (30 mg) by mouth 2 times daily    Hypertension secondary to other renal disorders       nystatin 204867 UNIT/ML suspension    MYCOSTATIN    600 mL    Take 5 mLs (500,000 Units) by mouth 4 times daily    Immunosuppressed status (H)       ondansetron 4 MG ODT tab    ZOFRAN-ODT    30 tablet    Take 1 tablet (4 mg) by mouth every 6 hours as needed for nausea or vomiting    Nausea       oxyCODONE 5 MG tablet    ROXICODONE    108 tablet    Take 10-12.5mg (2-2.5 tabs) every 4 hours as needed for pain on 11/20-11/22 Take 5-10mg (1-2 tabs) every 4 hours as needed for pain on 11/23-11/25 Take 5-7.5mg (1-1.5 tabs) every 4 hours as needed for pain on 11/26-11/28 Starting 11/29 you may restart your regular dose of Norco. DO NOT USE OXYCODONE AND NORCO AT THE SAME TIME.    Kidney replaced by transplant       pantoprazole 20 MG EC tablet    PROTONIX    90 tablet    Take 2 tablets (40 mg) by mouth every morning (before breakfast)     Gastroesophageal reflux disease, esophagitis presence not specified       polyethylene glycol powder    MIRALAX    850 g    Take 17 g (1 capful) by mouth 2 times daily Hold for loose stools    Drug-induced constipation       prochlorperazine 5 MG tablet    COMPAZINE    30 tablet    Take 1 tablet (5 mg) by mouth every 6 hours as needed for nausea or vomiting    Nausea       sennosides 8.6 MG tablet    SENOKOT    120 each    Take 2 tablets by mouth 2 times daily Hold for loose stools    Drug-induced constipation       sulfamethoxazole-trimethoprim 400-80 MG tablet    BACTRIM/SEPTRA    30 tablet    Take 1 tablet by mouth daily    Pancreas transplanted (H), Kidney replaced by transplant       * tacrolimus 0.5 MG capsule    GENERIC EQUIVALENT    60 capsule    Hold, for dose change.    Pancreas transplanted (H)       * tacrolimus 1 MG capsule    GENERIC EQUIVALENT    180 capsule    Take 3 capsules (3 mg) by mouth 2 times daily    Pancreas transplanted (H)       valGANciclovir 450 MG tablet    VALCYTE    152 tablet    Take 2 tablets (900 mg) by mouth daily    Kidney replaced by transplant, Pancreas transplanted (H)       * Notice:  This list has 2 medication(s) that are the same as other medications prescribed for you. Read the directions carefully, and ask your doctor or other care provider to review them with you.

## 2018-12-03 NOTE — NURSING NOTE
"Chief Complaint   Patient presents with     Post-op Visit     2 week     Blood pressure 123/73, pulse 96, temperature 98.5  F (36.9  C), height 1.702 m (5' 7\"), weight 78.8 kg (173 lb 12.8 oz), SpO2 97 %.    Zahra Thomas LPN    "

## 2018-12-03 NOTE — PROGRESS NOTES
Transplant Surgery Progress Note    Transplants:  2018 (Kidney / Pancreas), 10/10/2013 (Kidney); Postoperative day:  21  S: no complaints post Kp tx  Transplant History:    Transplant Type:  SPK  Donor Type:  - Brain Death   Transplant Date:  2018 (Kidney / Pancreas), 10/10/2013 (Kidney)   Ureteral Stent:  yes   Crossmatch:  negative   DSA at Tx:  No  Baseline Cr: 1.1   DeNovo DSA: No    Acute Rejection Hx:  No    Present Maintenance Immunosuppression:  Tacrolimus and Mycophenolic acid      Transplant Coordinator: Alesha Hartley     Transplant Office Phone Number: [phone]     Immunsupresent Medications     Immunosuppressive Agents Disp Start End    mycophenolic acid (GENERIC EQUIVALENT) 360 MG EC tablet 120 tablet 2018     Sig - Route: Take 2 tablets (720 mg) by mouth 2 times daily - Oral    Class: E-Prescribe    tacrolimus (GENERIC EQUIVALENT) 0.5 MG capsule 60 capsule 12/3/2018     Sig: Hold, for dose change.    Class: E-Prescribe    tacrolimus (GENERIC EQUIVALENT) 1 MG capsule 180 capsule 12/3/2018     Sig - Route: Take 3 capsules (3 mg) by mouth 2 times daily - Oral    Class: E-Prescribe          Possible Immunosuppression-related side effects:   []             headache  []             vivid dreams  []             irritability  []             cognitive difficuties  []             fine tremor  []             nausea  []             diarrhea  []             neuropathy      []             edema  []             renal calcineurin toxicity  []             hyperkalemia  []             post-transplant diabetes  []             decreased appetite  []             increased appetite  []             other:  []             none    Prescription Medications as of 12/3/2018             acetaminophen (TYLENOL) 325 MG tablet Take 3 tablets (975 mg) by mouth every 8 hours as needed for mild pain or fever (DO NOT USE WITH NORCO)    aspirin 81 MG EC tablet Take 1 tablet (81 mg) by mouth daily     atorvastatin (LIPITOR) 10 MG tablet Take 0.5 tablets (5 mg) by mouth daily    blood glucose monitoring (NO BRAND SPECIFIED) test strip Use to test blood sugar 4 times daily or as directed.    calcitRIOL (ROCALTROL) 0.25 MCG capsule Take 1 capsule (0.25 mcg) by mouth daily    cholecalciferol 2000 units CAPS Take 2,000 Units by mouth daily    fludrocortisone (FLORINEF) 0.1 MG tablet Take 1 tablet (0.1 mg) by mouth daily    HYDROcodone-acetaminophen (NORCO) 5-325 MG per tablet Stop taking until you have completed your oxycodone taper. DO NOT TAKE WITH OXYCODONE.    Lidocaine (LIDOCARE) 4 % Patch Place 2 patches onto the skin every 24 hours As needed for pain    magnesium oxide (MAG-OX) 400 MG tablet Take 2 tablets (800mg) at noon and 2 tablets (800mg) at 6pm.    mycophenolic acid (GENERIC EQUIVALENT) 360 MG EC tablet Take 2 tablets (720 mg) by mouth 2 times daily    NIFEdipine ER osmotic (PROCARDIA XL) 30 MG 24 hr tablet Take 1 tablet (30 mg) by mouth 2 times daily    nystatin (MYCOSTATIN) 358631 UNIT/ML suspension Take 5 mLs (500,000 Units) by mouth 4 times daily    ondansetron (ZOFRAN-ODT) 4 MG ODT tab Take 1 tablet (4 mg) by mouth every 6 hours as needed for nausea or vomiting    oxyCODONE IR (ROXICODONE) 5 MG tablet Take 10-12.5mg (2-2.5 tabs) every 4 hours as needed for pain on 11/20-11/22  Take 5-10mg (1-2 tabs) every 4 hours as needed for pain on 11/23-11/25  Take 5-7.5mg (1-1.5 tabs) every 4 hours as needed for pain on 11/26-11/28  Starting 11/29 you may restart your regular dose of Norco. DO NOT USE OXYCODONE AND NORCO AT THE SAME TIME.    pantoprazole (PROTONIX) 20 MG EC tablet Take 2 tablets (40 mg) by mouth every morning (before breakfast)    polyethylene glycol (MIRALAX) powder Take 17 g (1 capful) by mouth 2 times daily Hold for loose stools    prochlorperazine (COMPAZINE) 5 MG tablet Take 1 tablet (5 mg) by mouth every 6 hours as needed for nausea or vomiting    sennosides (SENOKOT) 8.6 MG tablet Take 2  tablets by mouth 2 times daily Hold for loose stools    sulfamethoxazole-trimethoprim (BACTRIM/SEPTRA) 400-80 MG per tablet Take 1 tablet by mouth daily    tacrolimus (GENERIC EQUIVALENT) 0.5 MG capsule Hold, for dose change.    tacrolimus (GENERIC EQUIVALENT) 1 MG capsule Take 3 capsules (3 mg) by mouth 2 times daily    valGANciclovir (VALCYTE) 450 MG tablet Take 2 tablets (900 mg) by mouth daily          O:   Temp:  [98.5  F (36.9  C)] 98.5  F (36.9  C)  Pulse:  [96] 96  BP: (123)/(73) 123/73  SpO2:  [97 %] 97 %  General Appearance: in no apparent distress.   Skin: Normal, no rashes or jaundice  Heart: regular rate and rhythm  Lungs: easy respirations, no audible wheezing.  Abdomen: rounded, The wound is dry and intact, without hernia. The abdomen is non-tender. The kidney and pancreas graft is not tender.  There is no ascites.  Extremities: Tremor absent.   Edema: absent. none    Transplant Immunosuppression Labs Latest Ref Rng & Units 12/3/2018 11/30/2018 11/28/2018 11/26/2018 11/25/2018   Siro Level 5.0 - 15.0 ug/L - - - - -   Siro Level - - - - - -   Tacro Level 5.0 - 15.0 ug/L - 6.0 7.5 10.0 11.9   Tacro Level - - [id number] 2000 Unknown Not Provided Not Provided   Cyclo Level 50 - 400 ug/L - - - - -   Cyclo Level - - - - - -   Creat 0.66 - 1.25 mg/dL 1.15 1.10 1.18 1.16 1.24   BUN 7 - 30 mg/dL 16 16 19 12 14   WBC 4.0 - 11.0 10e9/L 5.5 4.6 5.7 6.1 6.3   Neutrophil % 91.9 - 91.6 92.4 92.4   ANEU 1.6 - 8.3 10e9/L 5.1 - 5.2 5.6 5.8       Chemistries:   Recent Labs   Lab Test  12/03/18   0820   BUN  16   CR  1.15   GFRESTIMATED  66   GLC  83     Lab Results   Component Value Date    A1C 9.1 11/11/2018    CPEPT 2.8 05/31/2017     Recent Labs   Lab Test  11/11/18   0955   ALBUMIN  4.0   BILITOTAL  0.3   ALKPHOS  189*   AST  9   ALT  14     Urine Studies:  Recent Labs   Lab Test  11/16/18   1110   COLOR  Yellow   APPEARANCE  Clear   URINEGLC  Negative   URINEBILI  Negative   URINEKETONE  Negative   SG  1.019   UBLD   Large*   URINEPH  7.0   PROTEIN  30*   NITRITE  Negative   LEUKEST  Negative   RBCU  >182*   WBCU  5     Recent Labs   Lab Test  11/11/18   1100  05/24/17   1424   UTPG  1.96*  1.72*     Hematology:   Recent Labs   Lab Test  12/03/18   0820  11/30/18   0815  11/28/18   0815   HGB  9.4*  9.3*  9.1*   PLT  272  282  294   WBC  5.5  4.6  5.7     Coags:   Recent Labs   Lab Test  11/12/18   2102  11/12/18   1631   INR  1.31*  1.41*     HLA antibodies:   SA1 Hi Risk Sophia   Date Value Ref Range Status   11/11/2018 None  Final     SA1 Mod Risk Sophia   Date Value Ref Range Status   11/11/2018 None  Final     SA2 Hi Risk Sophia   Date Value Ref Range Status   11/11/2018 None  Final     SA2 Mod Risk Sophia   Date Value Ref Range Status   11/11/2018 None  Final       Assessment: Amadou Lewis is doing well s/p SPK:  Issues we addressed during his visit include:    Plan:    1. Graft function: good  2. Immunosuppression Management: increase tac for level of 6 to 3/3 .  Complexity of management:Medium.  Contributing factors: recent tx  3. Remove staples today    Immunosuppressive regimen, management and long term risks discussed in detail. Changes, when applicable made as per orders.      Total Time: 15 min,   Counselling Time: 10 min.    .

## 2018-12-05 ENCOUNTER — TELEPHONE (OUTPATIENT)
Dept: TRANSPLANT | Facility: CLINIC | Age: 53
End: 2018-12-05

## 2018-12-05 ENCOUNTER — TEAM CONFERENCE (OUTPATIENT)
Dept: TRANSPLANT | Facility: CLINIC | Age: 53
End: 2018-12-05

## 2018-12-05 LAB
AMYLASE SERPL-CCNC: 81 U/L (ref 30–110)
ANION GAP SERPL CALCULATED.3IONS-SCNC: 5 MMOL/L (ref 3–14)
BASOPHILS # BLD AUTO: 0 10E9/L (ref 0–0.2)
BASOPHILS NFR BLD AUTO: 0.4 %
BUN SERPL-MCNC: 16 MG/DL (ref 7–30)
CALCIUM SERPL-MCNC: 8.8 MG/DL (ref 8.5–10.1)
CHLORIDE SERPL-SCNC: 107 MMOL/L (ref 94–109)
CO2 SERPL-SCNC: 23 MMOL/L (ref 20–32)
CREAT SERPL-MCNC: 1.06 MG/DL (ref 0.66–1.25)
DIFFERENTIAL METHOD BLD: ABNORMAL
EOSINOPHIL # BLD AUTO: 0.2 10E9/L (ref 0–0.7)
EOSINOPHIL NFR BLD AUTO: 3.3 %
ERYTHROCYTE [DISTWIDTH] IN BLOOD BY AUTOMATED COUNT: 14.4 % (ref 10–15)
GFR SERPL CREATININE-BSD FRML MDRD: 73 ML/MIN/1.7M2
GLUCOSE SERPL-MCNC: 88 MG/DL (ref 70–99)
HCT VFR BLD AUTO: 28.2 % (ref 40–53)
HGB BLD-MCNC: 9.2 G/DL (ref 13.3–17.7)
IMM GRANULOCYTES # BLD: 0 10E9/L (ref 0–0.4)
IMM GRANULOCYTES NFR BLD: 0.4 %
LIPASE SERPL-CCNC: 307 U/L (ref 73–393)
LYMPHOCYTES # BLD AUTO: 0.1 10E9/L (ref 0.8–5.3)
LYMPHOCYTES NFR BLD AUTO: 2.4 %
MCH RBC QN AUTO: 28.4 PG (ref 26.5–33)
MCHC RBC AUTO-ENTMCNC: 32.6 G/DL (ref 31.5–36.5)
MCV RBC AUTO: 87 FL (ref 78–100)
MONOCYTES # BLD AUTO: 0.2 10E9/L (ref 0–1.3)
MONOCYTES NFR BLD AUTO: 3.3 %
NEUTROPHILS # BLD AUTO: 4.6 10E9/L (ref 1.6–8.3)
NEUTROPHILS NFR BLD AUTO: 90.2 %
NRBC # BLD AUTO: 0 10*3/UL
NRBC BLD AUTO-RTO: 0 /100
PLATELET # BLD AUTO: 284 10E9/L (ref 150–450)
POTASSIUM SERPL-SCNC: 4.9 MMOL/L (ref 3.4–5.3)
RBC # BLD AUTO: 3.24 10E12/L (ref 4.4–5.9)
SODIUM SERPL-SCNC: 135 MMOL/L (ref 133–144)
TACROLIMUS BLD-MCNC: 9.5 UG/L (ref 5–15)
TME LAST DOSE: NORMAL H
WBC # BLD AUTO: 5.1 10E9/L (ref 4–11)

## 2018-12-05 PROCEDURE — 80048 BASIC METABOLIC PNL TOTAL CA: CPT | Performed by: SURGERY

## 2018-12-05 PROCEDURE — 85025 COMPLETE CBC W/AUTO DIFF WBC: CPT | Performed by: SURGERY

## 2018-12-05 PROCEDURE — 80197 ASSAY OF TACROLIMUS: CPT | Performed by: SURGERY

## 2018-12-05 PROCEDURE — 82150 ASSAY OF AMYLASE: CPT | Performed by: SURGERY

## 2018-12-05 PROCEDURE — 83690 ASSAY OF LIPASE: CPT | Performed by: SURGERY

## 2018-12-05 NOTE — TELEPHONE ENCOUNTER
"Labs with improvement. Sean feels well and wants to increase his activity - RNCC advised this will be beneficial, but \"listen to your body\" rest when needing rest and increase walking / activity when feeling well.  Sean voiced understanding.     Discussed importance of each follow up visit.    Confirmed drain has been pulled.   "

## 2018-12-07 ENCOUNTER — TELEPHONE (OUTPATIENT)
Dept: NEPHROLOGY | Facility: CLINIC | Age: 53
End: 2018-12-07

## 2018-12-07 LAB
AMYLASE SERPL-CCNC: 70 U/L (ref 30–110)
ANION GAP SERPL CALCULATED.3IONS-SCNC: 7 MMOL/L (ref 3–14)
BASOPHILS # BLD AUTO: 0 10E9/L (ref 0–0.2)
BASOPHILS NFR BLD AUTO: 0.4 %
BUN SERPL-MCNC: 14 MG/DL (ref 7–30)
CALCIUM SERPL-MCNC: 9 MG/DL (ref 8.5–10.1)
CHLORIDE SERPL-SCNC: 106 MMOL/L (ref 94–109)
CO2 SERPL-SCNC: 22 MMOL/L (ref 20–32)
CREAT SERPL-MCNC: 1.13 MG/DL (ref 0.66–1.25)
DIFFERENTIAL METHOD BLD: ABNORMAL
EOSINOPHIL # BLD AUTO: 0.3 10E9/L (ref 0–0.7)
EOSINOPHIL NFR BLD AUTO: 4.2 %
ERYTHROCYTE [DISTWIDTH] IN BLOOD BY AUTOMATED COUNT: 14.2 % (ref 10–15)
GFR SERPL CREATININE-BSD FRML MDRD: 67 ML/MIN/1.7M2
GLUCOSE SERPL-MCNC: 86 MG/DL (ref 70–99)
HCT VFR BLD AUTO: 29 % (ref 40–53)
HGB BLD-MCNC: 9.1 G/DL (ref 13.3–17.7)
IMM GRANULOCYTES # BLD: 0.1 10E9/L (ref 0–0.4)
IMM GRANULOCYTES NFR BLD: 1 %
LIPASE SERPL-CCNC: 280 U/L (ref 73–393)
LYMPHOCYTES # BLD AUTO: 0.1 10E9/L (ref 0.8–5.3)
LYMPHOCYTES NFR BLD AUTO: 1.9 %
MAGNESIUM SERPL-MCNC: 1.8 MG/DL (ref 1.6–2.3)
MCH RBC QN AUTO: 28.5 PG (ref 26.5–33)
MCHC RBC AUTO-ENTMCNC: 31.4 G/DL (ref 31.5–36.5)
MCV RBC AUTO: 91 FL (ref 78–100)
MONOCYTES # BLD AUTO: 0.4 10E9/L (ref 0–1.3)
MONOCYTES NFR BLD AUTO: 6.1 %
NEUTROPHILS # BLD AUTO: 5.8 10E9/L (ref 1.6–8.3)
NEUTROPHILS NFR BLD AUTO: 86.4 %
NRBC # BLD AUTO: 0 10*3/UL
NRBC BLD AUTO-RTO: 0 /100
PHOSPHATE SERPL-MCNC: 3.2 MG/DL (ref 2.5–4.5)
PLATELET # BLD AUTO: 471 10E9/L (ref 150–450)
PLATELET # BLD EST: ABNORMAL 10*3/UL
POTASSIUM SERPL-SCNC: 4.8 MMOL/L (ref 3.4–5.3)
RBC # BLD AUTO: 3.19 10E12/L (ref 4.4–5.9)
RBC MORPH BLD: ABNORMAL
SODIUM SERPL-SCNC: 135 MMOL/L (ref 133–144)
WBC # BLD AUTO: 6.7 10E9/L (ref 4–11)

## 2018-12-07 PROCEDURE — 36415 COLL VENOUS BLD VENIPUNCTURE: CPT | Performed by: SURGERY

## 2018-12-07 PROCEDURE — 80048 BASIC METABOLIC PNL TOTAL CA: CPT | Performed by: SURGERY

## 2018-12-07 PROCEDURE — 83735 ASSAY OF MAGNESIUM: CPT | Performed by: SURGERY

## 2018-12-07 PROCEDURE — 83690 ASSAY OF LIPASE: CPT | Performed by: SURGERY

## 2018-12-07 PROCEDURE — 80197 ASSAY OF TACROLIMUS: CPT | Performed by: SURGERY

## 2018-12-07 PROCEDURE — 85025 COMPLETE CBC W/AUTO DIFF WBC: CPT | Performed by: SURGERY

## 2018-12-07 PROCEDURE — 82150 ASSAY OF AMYLASE: CPT | Performed by: SURGERY

## 2018-12-07 PROCEDURE — 84100 ASSAY OF PHOSPHORUS: CPT | Performed by: SURGERY

## 2018-12-08 LAB
TACROLIMUS BLD-MCNC: 13.4 UG/L (ref 5–15)
TME LAST DOSE: NORMAL H

## 2018-12-10 ENCOUNTER — OFFICE VISIT (OUTPATIENT)
Dept: NEPHROLOGY | Facility: CLINIC | Age: 53
End: 2018-12-10
Attending: INTERNAL MEDICINE
Payer: COMMERCIAL

## 2018-12-10 VITALS
HEART RATE: 106 BPM | BODY MASS INDEX: 26.33 KG/M2 | OXYGEN SATURATION: 96 % | DIASTOLIC BLOOD PRESSURE: 72 MMHG | WEIGHT: 168.1 LBS | SYSTOLIC BLOOD PRESSURE: 105 MMHG | TEMPERATURE: 98.3 F

## 2018-12-10 DIAGNOSIS — D64.89 ANEMIA DUE TO OTHER CAUSE, NOT CLASSIFIED: ICD-10-CM

## 2018-12-10 DIAGNOSIS — R11.0 NAUSEA: ICD-10-CM

## 2018-12-10 DIAGNOSIS — Z94.0 KIDNEY REPLACED BY TRANSPLANT: Primary | ICD-10-CM

## 2018-12-10 DIAGNOSIS — Z94.83 PANCREAS REPLACED BY TRANSPLANT (H): ICD-10-CM

## 2018-12-10 DIAGNOSIS — I15.1 HTN, KIDNEY TRANSPLANT RELATED: ICD-10-CM

## 2018-12-10 DIAGNOSIS — Z94.0 KIDNEY REPLACED BY TRANSPLANT: ICD-10-CM

## 2018-12-10 DIAGNOSIS — Z94.83 PANCREAS TRANSPLANTED (H): ICD-10-CM

## 2018-12-10 DIAGNOSIS — Z94.0 HTN, KIDNEY TRANSPLANT RELATED: ICD-10-CM

## 2018-12-10 DIAGNOSIS — D84.9 IMMUNOSUPPRESSION (H): ICD-10-CM

## 2018-12-10 DIAGNOSIS — Z48.298 AFTERCARE FOLLOWING ORGAN TRANSPLANT: ICD-10-CM

## 2018-12-10 DIAGNOSIS — Z29.89 NEED FOR PNEUMOCYSTIS PROPHYLAXIS: ICD-10-CM

## 2018-12-10 DIAGNOSIS — Z29.89 NEED FOR CMV IMMUNOTHERAPY: ICD-10-CM

## 2018-12-10 DIAGNOSIS — N25.81 SECONDARY RENAL HYPERPARATHYROIDISM (H): ICD-10-CM

## 2018-12-10 PROBLEM — E83.39 HYPOPHOSPHATEMIA: Status: RESOLVED | Noted: 2018-11-20 | Resolved: 2018-12-10

## 2018-12-10 LAB
AMYLASE SERPL-CCNC: 48 U/L (ref 30–110)
ANION GAP SERPL CALCULATED.3IONS-SCNC: 10 MMOL/L (ref 3–14)
BASOPHILS # BLD AUTO: 0 10E9/L (ref 0–0.2)
BASOPHILS NFR BLD AUTO: 0.4 %
BUN SERPL-MCNC: 16 MG/DL (ref 7–30)
CALCIUM SERPL-MCNC: 8.9 MG/DL (ref 8.5–10.1)
CHLORIDE SERPL-SCNC: 105 MMOL/L (ref 94–109)
CO2 SERPL-SCNC: 21 MMOL/L (ref 20–32)
CREAT SERPL-MCNC: 1.2 MG/DL (ref 0.66–1.25)
DIFFERENTIAL METHOD BLD: ABNORMAL
EOSINOPHIL # BLD AUTO: 0.2 10E9/L (ref 0–0.7)
EOSINOPHIL NFR BLD AUTO: 4.4 %
ERYTHROCYTE [DISTWIDTH] IN BLOOD BY AUTOMATED COUNT: 14.2 % (ref 10–15)
GFR SERPL CREATININE-BSD FRML MDRD: 63 ML/MIN/1.7M2
GLUCOSE SERPL-MCNC: 94 MG/DL (ref 70–99)
HBA1C MFR BLD: NORMAL % (ref 0–5.6)
HCT VFR BLD AUTO: 31.4 % (ref 40–53)
HGB BLD-MCNC: 9.8 G/DL (ref 13.3–17.7)
IMM GRANULOCYTES # BLD: 0.1 10E9/L (ref 0–0.4)
IMM GRANULOCYTES NFR BLD: 1.5 %
LIPASE SERPL-CCNC: 148 U/L (ref 73–393)
LYMPHOCYTES # BLD AUTO: 0.1 10E9/L (ref 0.8–5.3)
LYMPHOCYTES NFR BLD AUTO: 1.6 %
MAGNESIUM SERPL-MCNC: 1.9 MG/DL (ref 1.6–2.3)
MCH RBC QN AUTO: 27.4 PG (ref 26.5–33)
MCHC RBC AUTO-ENTMCNC: 31.2 G/DL (ref 31.5–36.5)
MCV RBC AUTO: 88 FL (ref 78–100)
MONOCYTES # BLD AUTO: 0.5 10E9/L (ref 0–1.3)
MONOCYTES NFR BLD AUTO: 8.7 %
NEUTROPHILS # BLD AUTO: 4.6 10E9/L (ref 1.6–8.3)
NEUTROPHILS NFR BLD AUTO: 83.4 %
NRBC # BLD AUTO: 0 10*3/UL
NRBC BLD AUTO-RTO: 0 /100
PHOSPHATE SERPL-MCNC: 3.2 MG/DL (ref 2.5–4.5)
PLATELET # BLD AUTO: 450 10E9/L (ref 150–450)
POTASSIUM SERPL-SCNC: 4.8 MMOL/L (ref 3.4–5.3)
RBC # BLD AUTO: 3.58 10E12/L (ref 4.4–5.9)
SODIUM SERPL-SCNC: 136 MMOL/L (ref 133–144)
TACROLIMUS BLD-MCNC: 14 UG/L (ref 5–15)
TME LAST DOSE: NORMAL H
WBC # BLD AUTO: 5.5 10E9/L (ref 4–11)

## 2018-12-10 PROCEDURE — 84100 ASSAY OF PHOSPHORUS: CPT | Performed by: SURGERY

## 2018-12-10 PROCEDURE — 80048 BASIC METABOLIC PNL TOTAL CA: CPT | Performed by: SURGERY

## 2018-12-10 PROCEDURE — 80180 DRUG SCRN QUAN MYCOPHENOLATE: CPT | Performed by: SURGERY

## 2018-12-10 PROCEDURE — 83735 ASSAY OF MAGNESIUM: CPT | Performed by: SURGERY

## 2018-12-10 PROCEDURE — 87799 DETECT AGENT NOS DNA QUANT: CPT | Performed by: SURGERY

## 2018-12-10 PROCEDURE — 85025 COMPLETE CBC W/AUTO DIFF WBC: CPT | Performed by: SURGERY

## 2018-12-10 PROCEDURE — 86828 HLA CLASS I&II ANTIBODY QUAL: CPT | Mod: XU | Performed by: SURGERY

## 2018-12-10 PROCEDURE — 86833 HLA CLASS II HIGH DEFIN QUAL: CPT | Performed by: SURGERY

## 2018-12-10 PROCEDURE — G0463 HOSPITAL OUTPT CLINIC VISIT: HCPCS | Mod: ZF

## 2018-12-10 PROCEDURE — 36415 COLL VENOUS BLD VENIPUNCTURE: CPT | Performed by: SURGERY

## 2018-12-10 PROCEDURE — 82150 ASSAY OF AMYLASE: CPT | Performed by: SURGERY

## 2018-12-10 PROCEDURE — 86832 HLA CLASS I HIGH DEFIN QUAL: CPT | Performed by: SURGERY

## 2018-12-10 PROCEDURE — 83690 ASSAY OF LIPASE: CPT | Performed by: SURGERY

## 2018-12-10 PROCEDURE — 80197 ASSAY OF TACROLIMUS: CPT | Performed by: SURGERY

## 2018-12-10 RX ORDER — TACROLIMUS 1 MG/1
2 CAPSULE ORAL 2 TIMES DAILY
Qty: 120 CAPSULE | Refills: 11 | Status: SHIPPED | OUTPATIENT
Start: 2018-12-10 | End: 2018-12-18

## 2018-12-10 RX ORDER — TACROLIMUS 0.5 MG/1
0.5 CAPSULE ORAL 2 TIMES DAILY
Qty: 60 CAPSULE | Refills: 11 | Status: SHIPPED | OUTPATIENT
Start: 2018-12-10 | End: 2018-12-18

## 2018-12-10 RX ORDER — MYCOPHENOLIC ACID 180 MG/1
540 TABLET, DELAYED RELEASE ORAL 2 TIMES DAILY
Qty: 540 TABLET | Refills: 3 | Status: SHIPPED | OUTPATIENT
Start: 2018-12-10 | End: 2018-12-10

## 2018-12-10 RX ORDER — MYCOPHENOLIC ACID 180 MG/1
540 TABLET, DELAYED RELEASE ORAL 2 TIMES DAILY
Qty: 540 TABLET | Refills: 3 | Status: SHIPPED | OUTPATIENT
Start: 2018-12-10 | End: 2019-09-26

## 2018-12-10 ASSESSMENT — PAIN SCALES - GENERAL: PAINLEVEL: NO PAIN (0)

## 2018-12-10 NOTE — PROGRESS NOTES
ACUTE TRANSPLANT NEPHROLOGY VISIT    Assessment & Plan   # SPK: baseline Cr ~ 1.1 mg / dl; Stable   - Proteinuria: Not checked recently   - Latest DSA: No Date of DSA last checked: none yet   - BK: No   - Kidney Tx Biopsy: No    # Pancreas Tx (SPK):Enteric drained     - Blood glucose: Euglycemia   - HbA1c: not checked yet   - Pancreatic enzymes: improving.    # Immunosuppression: Tacrolimus immediate release (goal  8-10) and Mycophenolic acid (goal  1-3.5)   - Changes: Yes - reduce mpa to 540 mg po bid.     # Prophylaxis:   - PJP: TMP/Sulfa (Bactrim)   - CMV: Valcyte  900 mg po daily x 3 months for CMV R+    # Hypertension: Controlled; Goal BP: < 130/80   - Changes: No     On fludrocortisone.     # Anemia in chronic renal disease: Hgb: Stable 9.8 g / dl     # Mineral Bone Disorder:    - Secondary renal hyperparathyroidism; PTH level is: Significantly elevated  - Vitamin D; level is: Normal  - Calcium; level is: Normal  - Phosphorus; level is: Normal    Monitor pth at 3 months. Continue calcitriol for now.    # nausea: The patient is having poor oral intake and nausea along with GI disturbance.  This is likely medicine mediated in the setting of mycophenolate acid use.  I will reduce his level to 540 mg p.o. twice daily.  If this does not solve his problems, I have recommended upper endoscopy to evaluate for fungal and viral esophagitis as well as taking random biopsy to rule out lymphocytic infiltrate consistent with MPA toxicity.    # Electrolytes:   - Potassium; level: Normal  - Magnesium; level: Normal  - Bicarbonate; level: Normal     # Medical Compliance: Yes    Return visit: 1 month.    # Transplant History:  Etiology of kidney failure: diabetes mellitus type 1  Tx: SPK  Transplant: 2018 (Kidney / Pancreas), 10/10/2013 (Kidney)  Donor Type:  - Brain Death Donor Class:   Crossmatch at time of Tx: negative  DSA at time of Tx: No  Significant changes in immunosuppression: None  CMV IgG Ab  Discordance (D+/R-): No  EBV IgG Ab Discordance (D+/R-): No  Significant transplant-related complications: None    Transplant Office Phone Number: [phone]    Assessment and plan was discussed with the patient and he voiced his understanding and agreement.    Masoud Tate MD    Chief Complaint   Mr. Lewis is a 52 year old here for f/u SPK.    The patient is a 52-year-old male with history of type 1 diabetes status post simultaneous pancreas and kidney transplant on 11/12/2018 here for follow-up of his simultaneous pancreas and kidney transplants.    The patient has been having rather severe GI distress.  He notes that even drinking water will lead to abdominal discomfort and nausea.  He currently is taking Myfortic 720 mg p.o. twice daily.    Last week he followed up with Dr. Luevano who noted a low tacrolimus level and increased tacrolimus to 3 mg p.o. twice daily.  Of late, his tacrolimus now is supratherapeutic.  We will await today's trough and adjust likely down to 2.5 mill grams p.o. twice daily with a goal level of 8-10 ng per male.    He denies any chest pain or breathing difficulties.  He has no fevers but does complain of some chills.  He had no bowel movement yesterday.    History of Present Illness     Recent Hospitalizations:  [x] No [] Yes    New Medical Issues: [x] No [] Yes    Decreased energy: [x] No [] Yes    Chest pain or SOB with exertion:  [x] No [] Yes    Appetite change or weight change: [] No [x] Yes nausea   Nausea, vomiting or diarrhea:  [] No [x] Yes    Fever, sweats or chills: [x] No [] Yes    Leg swelling: [x] No [] Yes      Other medical issues:  No    Home BP: at goal    Review of Systems   A comprehensive review of systems was obtained and negative, except as noted in the HPI or PMH.    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)     Diabetes mellitus with background retinopathy (H)     Polycystic kidney      NONSPECIFIC MEDICAL HISTORY     Coronary artery disease     Retinopathy     Hyperlipidemia LDL goal <70     Premature ventricular contractions (PVCs) (VPCs)     Kidney replaced by transplant     Immunosuppressed status (H)     Kidney transplant rejection     Hyperglycemia     Hypertension     Anemia in chronic renal disease     Care after organ transplant     Esophageal ulcer     Status post simultaneous kidney and pancreas transplant (H)     Other chronic pain     Nausea     Drug induced constipation     Hypophosphatemia     Social History   Social History     Tobacco Use     Smoking status: Never Smoker     Smokeless tobacco: Never Used   Substance Use Topics     Alcohol use: No     Drug use: No     Allergies   Allergies   Allergen Reactions     No Known Allergies      Medications   Current Outpatient Medications   Medication Sig     HYDROcodone-acetaminophen (NORCO) 5-325 MG per tablet Stop taking until you have completed your oxycodone taper. DO NOT TAKE WITH OXYCODONE.     acetaminophen (TYLENOL) 325 MG tablet Take 3 tablets (975 mg) by mouth every 8 hours as needed for mild pain or fever (DO NOT USE WITH NORCO)     aspirin 81 MG EC tablet Take 1 tablet (81 mg) by mouth daily     atorvastatin (LIPITOR) 10 MG tablet Take 0.5 tablets (5 mg) by mouth daily     blood glucose monitoring (NO BRAND SPECIFIED) test strip Use to test blood sugar 4 times daily or as directed.     calcitRIOL (ROCALTROL) 0.25 MCG capsule Take 1 capsule (0.25 mcg) by mouth daily     cholecalciferol 2000 units CAPS Take 2,000 Units by mouth daily     fludrocortisone (FLORINEF) 0.1 MG tablet Take 1 tablet (0.1 mg) by mouth daily     Lidocaine (LIDOCARE) 4 % Patch Place 2 patches onto the skin every 24 hours As needed for pain     magnesium oxide (MAG-OX) 400 MG tablet Take 2 tablets (800mg) at noon and 2 tablets (800mg) at 6pm.     mycophenolic acid (GENERIC EQUIVALENT) 360 MG EC tablet Take 2 tablets (720 mg) by mouth 2 times daily      NIFEdipine ER osmotic (PROCARDIA XL) 30 MG 24 hr tablet Take 1 tablet (30 mg) by mouth 2 times daily (Patient taking differently: Take 30 mg by mouth 2 times daily HOLD for SBP less than 150.)     nystatin (MYCOSTATIN) 273139 UNIT/ML suspension Take 5 mLs (500,000 Units) by mouth 4 times daily     ondansetron (ZOFRAN-ODT) 4 MG ODT tab Take 1 tablet (4 mg) by mouth every 6 hours as needed for nausea or vomiting (Patient not taking: Reported on 11/21/2018)     oxyCODONE IR (ROXICODONE) 5 MG tablet Take 10-12.5mg (2-2.5 tabs) every 4 hours as needed for pain on 11/20-11/22  Take 5-10mg (1-2 tabs) every 4 hours as needed for pain on 11/23-11/25  Take 5-7.5mg (1-1.5 tabs) every 4 hours as needed for pain on 11/26-11/28  Starting 11/29 you may restart your regular dose of Norco. DO NOT USE OXYCODONE AND NORCO AT THE SAME TIME. (Patient not taking: Reported on 12/10/2018)     pantoprazole (PROTONIX) 20 MG EC tablet Take 2 tablets (40 mg) by mouth every morning (before breakfast)     polyethylene glycol (MIRALAX) powder Take 17 g (1 capful) by mouth 2 times daily Hold for loose stools     prochlorperazine (COMPAZINE) 5 MG tablet Take 1 tablet (5 mg) by mouth every 6 hours as needed for nausea or vomiting     sennosides (SENOKOT) 8.6 MG tablet Take 2 tablets by mouth 2 times daily Hold for loose stools     sulfamethoxazole-trimethoprim (BACTRIM/SEPTRA) 400-80 MG per tablet Take 1 tablet by mouth daily     tacrolimus (GENERIC EQUIVALENT) 0.5 MG capsule Hold, for dose change.     tacrolimus (GENERIC EQUIVALENT) 1 MG capsule Take 3 capsules (3 mg) by mouth 2 times daily     valGANciclovir (VALCYTE) 450 MG tablet Take 2 tablets (900 mg) by mouth daily     No current facility-administered medications for this visit.      There are no discontinued medications.    Physical Exam   Vital Signs: /72 (BP Location: Right arm)   Pulse 106   Temp 98.3  F (36.8  C) (Oral)   Wt 76.2 kg (168 lb 1.6 oz)   SpO2 96%   BMI 26.33 kg/m       GENERAL APPEARANCE: alert and no distress  HENT: mouth without ulcers or lesions  LYMPHATICS: no cervical or supraclavicular nodes  RESP: lungs clear to auscultation - no rales, rhonchi or wheezes  CV: regular rhythm, normal rate, no rub, no murmur  EDEMA: no LE edema bilaterally  ABDOMEN: soft, nondistended, nontender, bowel sounds normal  MS: extremities normal - no gross deformities noted, no evidence of inflammation in joints, no muscle tenderness  SKIN: no rash    Data     Renal Latest Ref Rng & Units 12/7/2018 12/5/2018 12/3/2018   Na 133 - 144 mmol/L 135 135 138   K 3.4 - 5.3 mmol/L 4.8 4.9 5.1   Cl 94 - 109 mmol/L 106 107 106   CO2 20 - 32 mmol/L 22 23 24   BUN 7 - 30 mg/dL 14 16 16   Cr 0.66 - 1.25 mg/dL 1.13 1.06 1.15   Cr (external) 0.5 - 1.5 mg/dL - - -   Glucose 70 - 99 mg/dL 86 88 83   Ca  8.5 - 10.1 mg/dL 9.0 8.8 8.8   Mg 1.6 - 2.3 mg/dL 1.8 - -     Bone Health Latest Ref Rng & Units 12/7/2018 11/26/2018 11/25/2018   Phos 2.5 - 4.5 mg/dL 3.2 3.3 2.9   PTHi 18 - 80 pg/mL - - -   Vit D Def 20 - 75 ug/L - - -     Heme Latest Ref Rng & Units 12/10/2018 12/7/2018 12/5/2018   WBC 4.0 - 11.0 10e9/L 5.5 6.7 5.1   Hgb 13.3 - 17.7 g/dL 9.8(L) 9.1(L) 9.2(L)   Plt 150 - 450 10e9/L 450 471(H) 284     Liver Latest Ref Rng & Units 11/11/2018 6/27/2018 8/3/2017   AP 40 - 150 U/L 189(H) - -   TBili 0.2 - 1.3 mg/dL 0.3 - -   ALT 0 - 70 U/L 14 - -   AST 0 - 45 U/L 9 - -   Tot Protein 6.8 - 8.8 g/dL 7.6 - -   Albumin 3.4 - 5.0 g/dL 4.0 4.2 4.2     Pancreas Latest Ref Rng & Units 12/7/2018 12/5/2018 12/3/2018   A1C 0 - 5.6 % - - -   Amylase 30 - 110 U/L 70 81 91   Lipase 73 - 393 U/L 280 307 343     Iron studies Latest Ref Rng & Units 9/11/2018 8/3/2017 11/18/2016   Iron 35 - 180 ug/dL 85 79 82   Iron sat 15 - 46 % 37 34 34   Ferritin 26 - 388 ng/mL 761(H) 736(H) -     [facility] Txp Virology Latest Ref Rng & Units 11/11/2018 11/1/2018 9/14/2018   CVM DNA Quant - - - -   CMV Quant <100 Copies/mL - - -   CMV QT Log <2.0 Log  copies/mL - - -   BK Spec - - Plasma, EDTA anticoagulant -   BK Res BKNEG:BK Virus DNA Not Detected copies/mL - BK Virus DNA Not Detected -   BK Log <2.7 Log copies/mL - Not Calculated -   Hep B Core NR:Nonreactive Nonreactive - Nonreactive   Hep B Surf - - - -   HIV 1&2 NEG - - -        Recent Labs   Lab Test 12/03/18  0820 12/05/18  0815 12/07/18  1000   DOSTAC  2000 12/2/2018 2100 ON [id number] 2000 12/06/2018   TACROL 8.6 9.5 13.4     Recent Labs   Lab Test 08/03/17  1441 11/21/18  0720 11/26/18  0727   DOSMPA 08/03/2017 AT 0330 AM Not Provided Not Provided   MPACID 3.63* 6.00* 2.28   MPAG >200.0* 74.6 86.1

## 2018-12-10 NOTE — TELEPHONE ENCOUNTER
ISSUE:   Tacrolimus level 13.x on 12/7/18, goal 8-10, dose 3 mg BID    PLAN:   Please call pt and confirm this was a good 12-hour trough. Verify dose 3 mg BID. Confirm no new medications or illness (trina. Diarrhea). If good trough, decrease dose to 2.5 mg BID and recheck level in with next scheduled lab draw.

## 2018-12-10 NOTE — LETTER
12/10/2018      RE: Amadou Lewis  [address]  [place] [address]       ACUTE TRANSPLANT NEPHROLOGY VISIT    Assessment & Plan   # SPK: baseline Cr ~ 1.1 mg / dl; Stable   - Proteinuria: Not checked recently   - Latest DSA: No Date of DSA last checked: none yet   - BK: No   - Kidney Tx Biopsy: No    # Pancreas Tx (SPK):Enteric drained     - Blood glucose: Euglycemia   - HbA1c: not checked yet   - Pancreatic enzymes: improving.    # Immunosuppression: Tacrolimus immediate release (goal  8-10) and Mycophenolic acid (goal  1-3.5)   - Changes: Yes - reduce mpa to 540 mg po bid.     # Prophylaxis:   - PJP: TMP/Sulfa (Bactrim)   - CMV: Valcyte  900 mg po daily x 3 months for CMV R+    # Hypertension: Controlled; Goal BP: < 130/80   - Changes: No     On fludrocortisone.     # Anemia in chronic renal disease: Hgb: Stable 9.8 g / dl     # Mineral Bone Disorder:    - Secondary renal hyperparathyroidism; PTH level is: Significantly elevated  - Vitamin D; level is: Normal  - Calcium; level is: Normal  - Phosphorus; level is: Normal    Monitor pth at 3 months. Continue calcitriol for now.    # nausea: The patient is having poor oral intake and nausea along with GI disturbance.  This is likely medicine mediated in the setting of mycophenolate acid use.  I will reduce his level to 540 mg p.o. twice daily.  If this does not solve his problems, I have recommended upper endoscopy to evaluate for fungal and viral esophagitis as well as taking random biopsy to rule out lymphocytic infiltrate consistent with MPA toxicity.    # Electrolytes:   - Potassium; level: Normal  - Magnesium; level: Normal  - Bicarbonate; level: Normal     # Medical Compliance: Yes    Return visit: 1 month.    # Transplant History:  Etiology of kidney failure: diabetes mellitus type 1  Tx: SPK  Transplant: 2018 (Kidney / Pancreas), 10/10/2013 (Kidney)  Donor Type:  - Brain Death Donor Class:   Crossmatch at time of Tx: negative  DSA  at time of Tx: No  Significant changes in immunosuppression: None  CMV IgG Ab Discordance (D+/R-): No  EBV IgG Ab Discordance (D+/R-): No  Significant transplant-related complications: None    Transplant Office Phone Number: [phone]    Assessment and plan was discussed with the patient and he voiced his understanding and agreement.    Masoud Tate MD    Chief Complaint   Mr. Lewis is a 52 year old here for f/u SPK.    The patient is a 52-year-old male with history of type 1 diabetes status post simultaneous pancreas and kidney transplant on 11/12/2018 here for follow-up of his simultaneous pancreas and kidney transplants.    The patient has been having rather severe GI distress.  He notes that even drinking water will lead to abdominal discomfort and nausea.  He currently is taking Myfortic 720 mg p.o. twice daily.    Last week he followed up with Dr. Luevano who noted a low tacrolimus level and increased tacrolimus to 3 mg p.o. twice daily.  Of late, his tacrolimus now is supratherapeutic.  We will await today's trough and adjust likely down to 2.5 mill grams p.o. twice daily with a goal level of 8-10 ng per male.    He denies any chest pain or breathing difficulties.  He has no fevers but does complain of some chills.  He had no bowel movement yesterday.    History of Present Illness     Recent Hospitalizations:  [x] No [] Yes    New Medical Issues: [x] No [] Yes    Decreased energy: [x] No [] Yes    Chest pain or SOB with exertion:  [x] No [] Yes    Appetite change or weight change: [] No [x] Yes nausea   Nausea, vomiting or diarrhea:  [] No [x] Yes    Fever, sweats or chills: [x] No [] Yes    Leg swelling: [x] No [] Yes      Other medical issues:  No    Home BP: at goal    Review of Systems   A comprehensive review of systems was obtained and negative, except as noted in the HPI or PMH.    Problem List   Patient Active Problem List   Diagnosis     Type II diabetes mellitus with renal manifestations  (H)     Diabetes mellitus with background retinopathy (H)     Polycystic kidney     NONSPECIFIC MEDICAL HISTORY     Coronary artery disease     Retinopathy     Hyperlipidemia LDL goal <70     Premature ventricular contractions (PVCs) (VPCs)     Kidney replaced by transplant     Immunosuppressed status (H)     Kidney transplant rejection     Hyperglycemia     Hypertension     Anemia in chronic renal disease     Care after organ transplant     Esophageal ulcer     Status post simultaneous kidney and pancreas transplant (H)     Other chronic pain     Nausea     Drug induced constipation     Hypophosphatemia     Social History   Social History     Tobacco Use     Smoking status: Never Smoker     Smokeless tobacco: Never Used   Substance Use Topics     Alcohol use: No     Drug use: No     Allergies   Allergies   Allergen Reactions     No Known Allergies      Medications   Current Outpatient Medications   Medication Sig     HYDROcodone-acetaminophen (NORCO) 5-325 MG per tablet Stop taking until you have completed your oxycodone taper. DO NOT TAKE WITH OXYCODONE.     acetaminophen (TYLENOL) 325 MG tablet Take 3 tablets (975 mg) by mouth every 8 hours as needed for mild pain or fever (DO NOT USE WITH NORCO)     aspirin 81 MG EC tablet Take 1 tablet (81 mg) by mouth daily     atorvastatin (LIPITOR) 10 MG tablet Take 0.5 tablets (5 mg) by mouth daily     blood glucose monitoring (NO BRAND SPECIFIED) test strip Use to test blood sugar 4 times daily or as directed.     calcitRIOL (ROCALTROL) 0.25 MCG capsule Take 1 capsule (0.25 mcg) by mouth daily     cholecalciferol 2000 units CAPS Take 2,000 Units by mouth daily     fludrocortisone (FLORINEF) 0.1 MG tablet Take 1 tablet (0.1 mg) by mouth daily     Lidocaine (LIDOCARE) 4 % Patch Place 2 patches onto the skin every 24 hours As needed for pain     magnesium oxide (MAG-OX) 400 MG tablet Take 2 tablets (800mg) at noon and 2 tablets (800mg) at 6pm.     mycophenolic acid (GENERIC  EQUIVALENT) 360 MG EC tablet Take 2 tablets (720 mg) by mouth 2 times daily     NIFEdipine ER osmotic (PROCARDIA XL) 30 MG 24 hr tablet Take 1 tablet (30 mg) by mouth 2 times daily (Patient taking differently: Take 30 mg by mouth 2 times daily HOLD for SBP less than 150.)     nystatin (MYCOSTATIN) 294946 UNIT/ML suspension Take 5 mLs (500,000 Units) by mouth 4 times daily     ondansetron (ZOFRAN-ODT) 4 MG ODT tab Take 1 tablet (4 mg) by mouth every 6 hours as needed for nausea or vomiting (Patient not taking: Reported on 11/21/2018)     oxyCODONE IR (ROXICODONE) 5 MG tablet Take 10-12.5mg (2-2.5 tabs) every 4 hours as needed for pain on 11/20-11/22  Take 5-10mg (1-2 tabs) every 4 hours as needed for pain on 11/23-11/25  Take 5-7.5mg (1-1.5 tabs) every 4 hours as needed for pain on 11/26-11/28  Starting 11/29 you may restart your regular dose of Norco. DO NOT USE OXYCODONE AND NORCO AT THE SAME TIME. (Patient not taking: Reported on 12/10/2018)     pantoprazole (PROTONIX) 20 MG EC tablet Take 2 tablets (40 mg) by mouth every morning (before breakfast)     polyethylene glycol (MIRALAX) powder Take 17 g (1 capful) by mouth 2 times daily Hold for loose stools     prochlorperazine (COMPAZINE) 5 MG tablet Take 1 tablet (5 mg) by mouth every 6 hours as needed for nausea or vomiting     sennosides (SENOKOT) 8.6 MG tablet Take 2 tablets by mouth 2 times daily Hold for loose stools     sulfamethoxazole-trimethoprim (BACTRIM/SEPTRA) 400-80 MG per tablet Take 1 tablet by mouth daily     tacrolimus (GENERIC EQUIVALENT) 0.5 MG capsule Hold, for dose change.     tacrolimus (GENERIC EQUIVALENT) 1 MG capsule Take 3 capsules (3 mg) by mouth 2 times daily     valGANciclovir (VALCYTE) 450 MG tablet Take 2 tablets (900 mg) by mouth daily     No current facility-administered medications for this visit.      There are no discontinued medications.    Physical Exam   Vital Signs: /72 (BP Location: Right arm)   Pulse 106   Temp 98.3   F (36.8  C) (Oral)   Wt 76.2 kg (168 lb 1.6 oz)   SpO2 96%   BMI 26.33 kg/m       GENERAL APPEARANCE: alert and no distress  HENT: mouth without ulcers or lesions  LYMPHATICS: no cervical or supraclavicular nodes  RESP: lungs clear to auscultation - no rales, rhonchi or wheezes  CV: regular rhythm, normal rate, no rub, no murmur  EDEMA: no LE edema bilaterally  ABDOMEN: soft, nondistended, nontender, bowel sounds normal  MS: extremities normal - no gross deformities noted, no evidence of inflammation in joints, no muscle tenderness  SKIN: no rash    Data     Renal Latest Ref Rng & Units 12/7/2018 12/5/2018 12/3/2018   Na 133 - 144 mmol/L 135 135 138   K 3.4 - 5.3 mmol/L 4.8 4.9 5.1   Cl 94 - 109 mmol/L 106 107 106   CO2 20 - 32 mmol/L 22 23 24   BUN 7 - 30 mg/dL 14 16 16   Cr 0.66 - 1.25 mg/dL 1.13 1.06 1.15   Cr (external) 0.5 - 1.5 mg/dL - - -   Glucose 70 - 99 mg/dL 86 88 83   Ca  8.5 - 10.1 mg/dL 9.0 8.8 8.8   Mg 1.6 - 2.3 mg/dL 1.8 - -     Bone Health Latest Ref Rng & Units 12/7/2018 11/26/2018 11/25/2018   Phos 2.5 - 4.5 mg/dL 3.2 3.3 2.9   PTHi 18 - 80 pg/mL - - -   Vit D Def 20 - 75 ug/L - - -     Heme Latest Ref Rng & Units 12/10/2018 12/7/2018 12/5/2018   WBC 4.0 - 11.0 10e9/L 5.5 6.7 5.1   Hgb 13.3 - 17.7 g/dL 9.8(L) 9.1(L) 9.2(L)   Plt 150 - 450 10e9/L 450 471(H) 284     Liver Latest Ref Rng & Units 11/11/2018 6/27/2018 8/3/2017   AP 40 - 150 U/L 189(H) - -   TBili 0.2 - 1.3 mg/dL 0.3 - -   ALT 0 - 70 U/L 14 - -   AST 0 - 45 U/L 9 - -   Tot Protein 6.8 - 8.8 g/dL 7.6 - -   Albumin 3.4 - 5.0 g/dL 4.0 4.2 4.2     Pancreas Latest Ref Rng & Units 12/7/2018 12/5/2018 12/3/2018   A1C 0 - 5.6 % - - -   Amylase 30 - 110 U/L 70 81 91   Lipase 73 - 393 U/L 280 307 343     Iron studies Latest Ref Rng & Units 9/11/2018 8/3/2017 11/18/2016   Iron 35 - 180 ug/dL 85 79 82   Iron sat 15 - 46 % 37 34 34   Ferritin 26 - 388 ng/mL 761(H) 736(H) -     [facility] Txp Virology Latest Ref Rng & Units 11/11/2018 11/1/2018  9/14/2018   CVM DNA Quant - - - -   CMV Quant <100 Copies/mL - - -   CMV QT Log <2.0 Log copies/mL - - -   BK Spec - - Plasma, EDTA anticoagulant -   BK Res BKNEG:BK Virus DNA Not Detected copies/mL - BK Virus DNA Not Detected -   BK Log <2.7 Log copies/mL - Not Calculated -   Hep B Core NR:Nonreactive Nonreactive - Nonreactive   Hep B Surf - - - -   HIV 1&2 NEG - - -        Recent Labs   Lab Test 12/03/18  0820 12/05/18  0815 12/07/18  1000   DOSTAC  2000 12/2/2018 2100 ON [id number] 2000 12/06/2018   TACROL 8.6 9.5 13.4     Recent Labs   Lab Test 08/03/17  1441 11/21/18  0720 11/26/18  0727   DOSMPA 08/03/2017 AT 0330 AM Not Provided Not Provided   MPACID 3.63* 6.00* 2.28   MPAG >200.0* 74.6 86.1       Early Post Transplant

## 2018-12-10 NOTE — PATIENT INSTRUCTIONS
DECREASE Myfortic to 540 mg twice a day.     We will follow up the tacrolimus level, but I anticipate that we will need to decrease this too.    We will get you set up for an upper endoscopy if the changes do not result in marked improvement.     Call with questions. [phone].

## 2018-12-10 NOTE — NURSING NOTE
"Chief Complaint   Patient presents with     RECHECK     1 Month Follow up Kidney TX     Vital signs:  Temp: 98.3  F (36.8  C) Temp src: Oral BP: 105/72 Pulse: 106     SpO2: 96 %       Weight: 76.2 kg (168 lb 1.6 oz)  Estimated body mass index is 26.33 kg/m  as calculated from the following:    Height as of 12/3/18: 1.702 m (5' 7\").    Weight as of this encounter: 76.2 kg (168 lb 1.6 oz).      Kristen Jeter    "

## 2018-12-10 NOTE — TELEPHONE ENCOUNTER
Spoke to patient regarding Tacrolimus level = 13, above goal.  Patient confirms current Tacrolimus dose and good 12 hour trough level.  Patient verbalizes understanding to decrease Tacrolimus dose to 2.5 mg BID.

## 2018-12-11 LAB
DONOR IDENTIFICATION: NORMAL
DSA COMMENTS: NORMAL
DSA PRESENT: NO
DSA TEST METHOD: NORMAL
INTERFERING SUBST TEST METHOD: NORMAL
INTERFERING SUBSTANCE COMMENT: NORMAL
INTERFERING SUBSTANCE RESULT: NORMAL
INTERFERING SUBSTANCE: NORMAL
MYCOPHENOLATE SERPL LC/MS/MS-MCNC: 1.64 MG/L (ref 1–3.5)
MYCOPHENOLATE-G SERPL LC/MS/MS-MCNC: 61.2 MG/L (ref 30–95)
ORGAN: NORMAL
SA1 CELL: NORMAL
SA1 COMMENTS: NORMAL
SA1 HI RISK ABY: NORMAL
SA1 MOD RISK ABY: NORMAL
SA1 TEST METHOD: NORMAL
SA2 CELL: NORMAL
SA2 COMMENTS: NORMAL
SA2 HI RISK ABY UA: NORMAL
SA2 MOD RISK ABY: NORMAL
SA2 TEST METHOD: NORMAL
TME LAST DOSE: NORMAL H
UNACCEPTABLE ANTIGEN: NORMAL
UNOS CPRA: 10

## 2018-12-12 ENCOUNTER — TELEPHONE (OUTPATIENT)
Dept: TRANSPLANT | Facility: CLINIC | Age: 53
End: 2018-12-12

## 2018-12-12 LAB
AMYLASE SERPL-CCNC: 57 U/L (ref 30–110)
ANION GAP SERPL CALCULATED.3IONS-SCNC: 8 MMOL/L (ref 3–14)
BASOPHILS # BLD AUTO: 0.1 10E9/L (ref 0–0.2)
BASOPHILS NFR BLD AUTO: 0.6 %
BKV DNA # SPEC NAA+PROBE: NORMAL COPIES/ML
BKV DNA SPEC NAA+PROBE-LOG#: NORMAL LOG COPIES/ML
BUN SERPL-MCNC: 20 MG/DL (ref 7–30)
BURR CELLS BLD QL SMEAR: SLIGHT
CALCIUM SERPL-MCNC: 8.9 MG/DL (ref 8.5–10.1)
CHLORIDE SERPL-SCNC: 108 MMOL/L (ref 94–109)
CO2 SERPL-SCNC: 21 MMOL/L (ref 20–32)
CREAT SERPL-MCNC: 1.26 MG/DL (ref 0.66–1.25)
DIFFERENTIAL METHOD BLD: ABNORMAL
EOSINOPHIL # BLD AUTO: 0.2 10E9/L (ref 0–0.7)
EOSINOPHIL NFR BLD AUTO: 2.4 %
ERYTHROCYTE [DISTWIDTH] IN BLOOD BY AUTOMATED COUNT: 14.4 % (ref 10–15)
GFR SERPL CREATININE-BSD FRML MDRD: 59 ML/MIN/1.7M2
GLUCOSE SERPL-MCNC: 89 MG/DL (ref 70–99)
HCT VFR BLD AUTO: 30.6 % (ref 40–53)
HGB BLD-MCNC: 9.4 G/DL (ref 13.3–17.7)
HYPOCHROMIA BLD QL: PRESENT
IMM GRANULOCYTES # BLD: 0.1 10E9/L (ref 0–0.4)
IMM GRANULOCYTES NFR BLD: 1.7 %
LIPASE SERPL-CCNC: 212 U/L (ref 73–393)
LYMPHOCYTES # BLD AUTO: 0.2 10E9/L (ref 0.8–5.3)
LYMPHOCYTES NFR BLD AUTO: 1.8 %
MCH RBC QN AUTO: 27.5 PG (ref 26.5–33)
MCHC RBC AUTO-ENTMCNC: 30.7 G/DL (ref 31.5–36.5)
MCV RBC AUTO: 90 FL (ref 78–100)
MONOCYTES # BLD AUTO: 0.6 10E9/L (ref 0–1.3)
MONOCYTES NFR BLD AUTO: 7.8 %
NEUTROPHILS # BLD AUTO: 7 10E9/L (ref 1.6–8.3)
NEUTROPHILS NFR BLD AUTO: 85.7 %
NRBC # BLD AUTO: 0 10*3/UL
NRBC BLD AUTO-RTO: 0 /100
PLATELET # BLD AUTO: 647 10E9/L (ref 150–450)
PLATELET # BLD EST: ABNORMAL 10*3/UL
POTASSIUM SERPL-SCNC: 4.9 MMOL/L (ref 3.4–5.3)
RBC # BLD AUTO: 3.42 10E12/L (ref 4.4–5.9)
SODIUM SERPL-SCNC: 137 MMOL/L (ref 133–144)
SPECIMEN SOURCE: NORMAL
WBC # BLD AUTO: 8.2 10E9/L (ref 4–11)

## 2018-12-12 PROCEDURE — 82150 ASSAY OF AMYLASE: CPT | Performed by: SURGERY

## 2018-12-12 PROCEDURE — 85025 COMPLETE CBC W/AUTO DIFF WBC: CPT | Performed by: SURGERY

## 2018-12-12 PROCEDURE — 80048 BASIC METABOLIC PNL TOTAL CA: CPT | Performed by: SURGERY

## 2018-12-12 PROCEDURE — 83690 ASSAY OF LIPASE: CPT | Performed by: SURGERY

## 2018-12-12 PROCEDURE — 80197 ASSAY OF TACROLIMUS: CPT | Performed by: SURGERY

## 2018-12-12 NOTE — TELEPHONE ENCOUNTER
Spoke to patient and confirmed that short term disability paperwork was faxed to his employer on 12/11/2018.  Mailed copy of paperwork to patient today.

## 2018-12-12 NOTE — TELEPHONE ENCOUNTER
Jump in creatinine (may still be due to tac) and lipase.    Sean reports he had been having nausea but since the myfortic dose reduction his nausea has improved. His hydration was poor during this period.    Tremors improved (awiating today's tac level which should reflect most recent dose reduction).    Lipase - Sean reports loose BMs 1-2x daily. Will monitor.   Denies any abd pain or fever.

## 2018-12-12 NOTE — TELEPHONE ENCOUNTER
Patient Call: General  Route to LPN    Reason for call: Pt would like an update regarding some of his paperwork: return to work information and date, plus his  FMLA/Shor-term disability paperwork    Call back needed? Yes    Return Call Needed  Same as documented in contacts section  When to return call?: Greater than one day: Route standard priority

## 2018-12-13 LAB
TACROLIMUS BLD-MCNC: 13.1 UG/L (ref 5–15)
TME LAST DOSE: NORMAL H

## 2018-12-17 ENCOUNTER — TELEPHONE (OUTPATIENT)
Dept: NEPHROLOGY | Facility: CLINIC | Age: 53
End: 2018-12-17

## 2018-12-17 ENCOUNTER — TELEPHONE (OUTPATIENT)
Dept: TRANSPLANT | Facility: CLINIC | Age: 53
End: 2018-12-17

## 2018-12-17 DIAGNOSIS — Z94.83 PANCREAS REPLACED BY TRANSPLANT (H): ICD-10-CM

## 2018-12-17 DIAGNOSIS — Z94.0 KIDNEY REPLACED BY TRANSPLANT: ICD-10-CM

## 2018-12-17 DIAGNOSIS — Z94.83 PANCREAS TRANSPLANTED (H): ICD-10-CM

## 2018-12-17 LAB
AMYLASE SERPL-CCNC: 88 U/L (ref 30–110)
ANION GAP SERPL CALCULATED.3IONS-SCNC: 9 MMOL/L (ref 3–14)
BASOPHILS # BLD AUTO: 0.1 10E9/L (ref 0–0.2)
BASOPHILS NFR BLD AUTO: 1 %
BUN SERPL-MCNC: 17 MG/DL (ref 7–30)
CALCIUM SERPL-MCNC: 9.1 MG/DL (ref 8.5–10.1)
CHLORIDE SERPL-SCNC: 106 MMOL/L (ref 94–109)
CO2 SERPL-SCNC: 23 MMOL/L (ref 20–32)
CREAT SERPL-MCNC: 1.05 MG/DL (ref 0.66–1.25)
DIFFERENTIAL METHOD BLD: ABNORMAL
EOSINOPHIL # BLD AUTO: 0.1 10E9/L (ref 0–0.7)
EOSINOPHIL NFR BLD AUTO: 1 %
ERYTHROCYTE [DISTWIDTH] IN BLOOD BY AUTOMATED COUNT: 14.6 % (ref 10–15)
GFR SERPL CREATININE-BSD FRML MDRD: 73 ML/MIN/1.7M2
GLUCOSE SERPL-MCNC: 82 MG/DL (ref 70–99)
HCT VFR BLD AUTO: 33.9 % (ref 40–53)
HGB BLD-MCNC: 10.3 G/DL (ref 13.3–17.7)
IMM GRANULOCYTES # BLD: 0.2 10E9/L (ref 0–0.4)
IMM GRANULOCYTES NFR BLD: 2.2 %
LIPASE SERPL-CCNC: 325 U/L (ref 73–393)
LYMPHOCYTES # BLD AUTO: 0.1 10E9/L (ref 0.8–5.3)
LYMPHOCYTES NFR BLD AUTO: 1.9 %
MAGNESIUM SERPL-MCNC: 1.8 MG/DL (ref 1.6–2.3)
MCH RBC QN AUTO: 27.2 PG (ref 26.5–33)
MCHC RBC AUTO-ENTMCNC: 30.4 G/DL (ref 31.5–36.5)
MCV RBC AUTO: 90 FL (ref 78–100)
MONOCYTES # BLD AUTO: 0.4 10E9/L (ref 0–1.3)
MONOCYTES NFR BLD AUTO: 4.8 %
NEUTROPHILS # BLD AUTO: 6.6 10E9/L (ref 1.6–8.3)
NEUTROPHILS NFR BLD AUTO: 89.1 %
NRBC # BLD AUTO: 0 10*3/UL
NRBC BLD AUTO-RTO: 0 /100
PHOSPHATE SERPL-MCNC: 3.7 MG/DL (ref 2.5–4.5)
PLATELET # BLD AUTO: 656 10E9/L (ref 150–450)
POTASSIUM SERPL-SCNC: 5.3 MMOL/L (ref 3.4–5.3)
RBC # BLD AUTO: 3.78 10E12/L (ref 4.4–5.9)
SODIUM SERPL-SCNC: 138 MMOL/L (ref 133–144)
WBC # BLD AUTO: 7.4 10E9/L (ref 4–11)

## 2018-12-17 PROCEDURE — 83690 ASSAY OF LIPASE: CPT | Performed by: SURGERY

## 2018-12-17 PROCEDURE — 80048 BASIC METABOLIC PNL TOTAL CA: CPT | Performed by: SURGERY

## 2018-12-17 PROCEDURE — 80197 ASSAY OF TACROLIMUS: CPT | Performed by: SURGERY

## 2018-12-17 PROCEDURE — 84100 ASSAY OF PHOSPHORUS: CPT | Performed by: SURGERY

## 2018-12-17 PROCEDURE — 82150 ASSAY OF AMYLASE: CPT | Performed by: SURGERY

## 2018-12-17 PROCEDURE — 85025 COMPLETE CBC W/AUTO DIFF WBC: CPT | Performed by: SURGERY

## 2018-12-17 PROCEDURE — 80180 DRUG SCRN QUAN MYCOPHENOLATE: CPT | Performed by: SURGERY

## 2018-12-17 PROCEDURE — 83735 ASSAY OF MAGNESIUM: CPT | Performed by: SURGERY

## 2018-12-17 NOTE — TELEPHONE ENCOUNTER
Left message for patient regarding:  ISSUE:   Tacrolimus level 12.x on 12/14/18, goal 8-10, dose = 2.5 mg BID     PLAN:   Please call pt and confirm this was a good 12-hour trough. Verify dose 2.5 mg BID. Confirm no new medications or illness (trina. Diarrhea). If good trough, decrease dose to 2 mg BID and recheck level as scheduled.      REMINDER - labs are now twice weekly (Mon / Thur when possible).    Lipase elevated - any constipation?

## 2018-12-17 NOTE — TELEPHONE ENCOUNTER
Patient had labs completed today and would like to wait to see results before changing current dose of 2.5 mg BID.  Patient confirms current dose and good 12 hour trough level.  Patient also confirms 1 BM daily, slightly loose.  Patient states that this is normal for him.

## 2018-12-17 NOTE — TELEPHONE ENCOUNTER
ISSUE:   Tacrolimus level 12.x on 12/14/18, goal 8-10, dose = 2.5 mg BID    PLAN:   Please call pt and confirm this was a good 12-hour trough. Verify dose 2.5 mg BID. Confirm no new medications or illness (trina. Diarrhea). If good trough, decrease dose to 2 mg BID and recheck level as scheduled.     REMINDER - labs are now twice weekly (Mon / Thur when possible).

## 2018-12-18 LAB
MYCOPHENOLATE SERPL LC/MS/MS-MCNC: 1.24 MG/L (ref 1–3.5)
MYCOPHENOLATE-G SERPL LC/MS/MS-MCNC: 58.5 MG/L (ref 30–95)
TACROLIMUS BLD-MCNC: 12.9 UG/L (ref 5–15)
TME LAST DOSE: NORMAL H
TME LAST DOSE: NORMAL H

## 2018-12-18 RX ORDER — TACROLIMUS 0.5 MG/1
CAPSULE ORAL
Qty: 60 CAPSULE | Refills: 11 | Status: SHIPPED | OUTPATIENT
Start: 2018-12-18 | End: 2018-12-26

## 2018-12-18 RX ORDER — TACROLIMUS 1 MG/1
2 CAPSULE ORAL 2 TIMES DAILY
Qty: 120 CAPSULE | Refills: 11 | Status: SHIPPED | OUTPATIENT
Start: 2018-12-18 | End: 2018-12-26

## 2018-12-18 NOTE — TELEPHONE ENCOUNTER
No change in repeat drug level, sean voiced understanding to decrease tac to 2mg BID.    Sean will increase bowel regimen to try to clean out before lab draw on Thursday.

## 2018-12-19 ENCOUNTER — TEAM CONFERENCE (OUTPATIENT)
Dept: NEPHROLOGY | Facility: CLINIC | Age: 53
End: 2018-12-19

## 2018-12-19 NOTE — TELEPHONE ENCOUNTER
Post Kidney and Pancreas Transplant Team Conference  Date: 12/19/2018  Transplant Coordinator: Alesha Hartley     Attendees:  [x]  Dr. Wolfe [x] Becca Riojas, RN  [] Shari Chapin LPN     [x]  Dr. Dick [] Anu Hanson, JANICE [x] Shayna Chavez LPN   [x]  Dr. Tate [x] Glenis Ko, JANICE    [x]  Dr. De Los Santos [x] Sailaja Choi RN    [x] Dr. Pal [x] Zora Hartley, JANICE    [x] Dr. Luevano [] Joey Goldberg RN    [x] Surgery Fellow [x] Viv Wick RN    [] Sandy Cole, RAFA [x] Linh Oates RN    [] Luke Tesfaye, PharmD [x] Angella Multani, JANICE     [] Dwight Mariano RN     [] Becca Gregory RN        Verbal Plan Read Back:   Repeat Lipase level   If increased consider biopsy     Routed to RN Coordinator   Shayna Chavez    Repeat lipase = 304 (325). Continue to monitor.

## 2018-12-20 ENCOUNTER — OFFICE VISIT (OUTPATIENT)
Dept: TRANSPLANT | Facility: CLINIC | Age: 53
End: 2018-12-20
Attending: NURSE PRACTITIONER
Payer: COMMERCIAL

## 2018-12-20 ENCOUNTER — RESULTS ONLY (OUTPATIENT)
Dept: OTHER | Facility: CLINIC | Age: 53
End: 2018-12-20

## 2018-12-20 VITALS
HEART RATE: 97 BPM | HEIGHT: 67 IN | WEIGHT: 168.8 LBS | DIASTOLIC BLOOD PRESSURE: 80 MMHG | SYSTOLIC BLOOD PRESSURE: 121 MMHG | OXYGEN SATURATION: 99 % | BODY MASS INDEX: 26.49 KG/M2

## 2018-12-20 DIAGNOSIS — Z94.0 KIDNEY REPLACED BY TRANSPLANT: ICD-10-CM

## 2018-12-20 DIAGNOSIS — Z94.83 PANCREAS REPLACED BY TRANSPLANT (H): ICD-10-CM

## 2018-12-20 DIAGNOSIS — Z96.0 URETERAL STENT RETAINED: Primary | ICD-10-CM

## 2018-12-20 DIAGNOSIS — Z48.298 AFTERCARE FOLLOWING ORGAN TRANSPLANT: ICD-10-CM

## 2018-12-20 LAB
AMYLASE SERPL-CCNC: 88 U/L (ref 30–110)
ANION GAP SERPL CALCULATED.3IONS-SCNC: 9 MMOL/L (ref 3–14)
BASOPHILS # BLD AUTO: 0.1 10E9/L (ref 0–0.2)
BASOPHILS NFR BLD AUTO: 0.9 %
BUN SERPL-MCNC: 21 MG/DL (ref 7–30)
CALCIUM SERPL-MCNC: 9.1 MG/DL (ref 8.5–10.1)
CHLORIDE SERPL-SCNC: 107 MMOL/L (ref 94–109)
CO2 SERPL-SCNC: 23 MMOL/L (ref 20–32)
CREAT SERPL-MCNC: 1.13 MG/DL (ref 0.66–1.25)
DIFFERENTIAL METHOD BLD: ABNORMAL
EOSINOPHIL # BLD AUTO: 0 10E9/L (ref 0–0.7)
EOSINOPHIL NFR BLD AUTO: 0.7 %
ERYTHROCYTE [DISTWIDTH] IN BLOOD BY AUTOMATED COUNT: 15 % (ref 10–15)
GFR SERPL CREATININE-BSD FRML MDRD: 73 ML/MIN/{1.73_M2}
GLUCOSE SERPL-MCNC: 88 MG/DL (ref 70–99)
HCT VFR BLD AUTO: 33.1 % (ref 40–53)
HGB BLD-MCNC: 10.4 G/DL (ref 13.3–17.7)
IMM GRANULOCYTES # BLD: 0.1 10E9/L (ref 0–0.4)
IMM GRANULOCYTES NFR BLD: 1.9 %
LIPASE SERPL-CCNC: 304 U/L (ref 73–393)
LYMPHOCYTES # BLD AUTO: 0.1 10E9/L (ref 0.8–5.3)
LYMPHOCYTES NFR BLD AUTO: 2.2 %
MAGNESIUM SERPL-MCNC: 1.8 MG/DL (ref 1.6–2.3)
MCH RBC QN AUTO: 28 PG (ref 26.5–33)
MCHC RBC AUTO-ENTMCNC: 31.4 G/DL (ref 31.5–36.5)
MCV RBC AUTO: 89 FL (ref 78–100)
MONOCYTES # BLD AUTO: 0.3 10E9/L (ref 0–1.3)
MONOCYTES NFR BLD AUTO: 4.8 %
NEUTROPHILS # BLD AUTO: 5.2 10E9/L (ref 1.6–8.3)
NEUTROPHILS NFR BLD AUTO: 89.5 %
NRBC # BLD AUTO: 0 10*3/UL
NRBC BLD AUTO-RTO: 0 /100
PHOSPHATE SERPL-MCNC: 4.4 MG/DL (ref 2.5–4.5)
PLATELET # BLD AUTO: 467 10E9/L (ref 150–450)
POTASSIUM SERPL-SCNC: 4.8 MMOL/L (ref 3.4–5.3)
RBC # BLD AUTO: 3.71 10E12/L (ref 4.4–5.9)
SODIUM SERPL-SCNC: 138 MMOL/L (ref 133–144)
TACROLIMUS BLD-MCNC: 9.6 UG/L (ref 5–15)
TME LAST DOSE: NORMAL H
WBC # BLD AUTO: 5.8 10E9/L (ref 4–11)

## 2018-12-20 PROCEDURE — 87799 DETECT AGENT NOS DNA QUANT: CPT | Performed by: SURGERY

## 2018-12-20 PROCEDURE — 83690 ASSAY OF LIPASE: CPT | Performed by: SURGERY

## 2018-12-20 PROCEDURE — 83735 ASSAY OF MAGNESIUM: CPT | Performed by: SURGERY

## 2018-12-20 PROCEDURE — 85025 COMPLETE CBC W/AUTO DIFF WBC: CPT | Performed by: SURGERY

## 2018-12-20 PROCEDURE — 80180 DRUG SCRN QUAN MYCOPHENOLATE: CPT | Performed by: SURGERY

## 2018-12-20 PROCEDURE — 36415 COLL VENOUS BLD VENIPUNCTURE: CPT | Performed by: SURGERY

## 2018-12-20 PROCEDURE — 86828 HLA CLASS I&II ANTIBODY QUAL: CPT | Performed by: SURGERY

## 2018-12-20 PROCEDURE — 86833 HLA CLASS II HIGH DEFIN QUAL: CPT | Performed by: SURGERY

## 2018-12-20 PROCEDURE — 80197 ASSAY OF TACROLIMUS: CPT | Performed by: SURGERY

## 2018-12-20 PROCEDURE — 52310 CYSTOSCOPY AND TREATMENT: CPT | Mod: ZF | Performed by: NURSE PRACTITIONER

## 2018-12-20 PROCEDURE — 80048 BASIC METABOLIC PNL TOTAL CA: CPT | Performed by: SURGERY

## 2018-12-20 PROCEDURE — G0463 HOSPITAL OUTPT CLINIC VISIT: HCPCS | Mod: ZF

## 2018-12-20 PROCEDURE — 86832 HLA CLASS I HIGH DEFIN QUAL: CPT | Performed by: SURGERY

## 2018-12-20 PROCEDURE — 84100 ASSAY OF PHOSPHORUS: CPT | Performed by: SURGERY

## 2018-12-20 PROCEDURE — 25000132 ZZH RX MED GY IP 250 OP 250 PS 637: Mod: ZF | Performed by: NURSE PRACTITIONER

## 2018-12-20 PROCEDURE — 25000125 ZZHC RX 250: Mod: ZF | Performed by: NURSE PRACTITIONER

## 2018-12-20 PROCEDURE — 82150 ASSAY OF AMYLASE: CPT | Performed by: SURGERY

## 2018-12-20 RX ORDER — LEVOFLOXACIN 250 MG/1
500 TABLET, FILM COATED ORAL ONCE
Status: COMPLETED | OUTPATIENT
Start: 2018-12-20 | End: 2018-12-20

## 2018-12-20 RX ADMIN — LEVOFLOXACIN 500 MG: 250 TABLET, FILM COATED ORAL at 10:37

## 2018-12-20 RX ADMIN — LIDOCAINE HYDROCHLORIDE 10 ML: 20 JELLY TOPICAL at 10:37

## 2018-12-20 ASSESSMENT — MIFFLIN-ST. JEOR: SCORE: 1564.3

## 2018-12-20 ASSESSMENT — PAIN SCALES - GENERAL: PAINLEVEL: NO PAIN (0)

## 2018-12-20 NOTE — NURSING NOTE
Patient was cleaned and draped for cystoscope , I assisted jessica Cole and talked with patient during the cysto   GEORGES JAIME CMA

## 2018-12-20 NOTE — LETTER
2018      RE: Amadou Lewis  [address] [place] [address]         Transplant Surgery  Operative Note    Preop dx: Status post  Donor kidney transplant.  Post op dx: same   Procedure: Flexible cystoscopy and ureteral stent removal   Surgeon: Sandy Gandhi CNP  Assistant: Tammi Kemp MA  Anesthesia: local  EBL: 0   Specimens: none.  Findings: none abnormal. Stent inspected and noted to be intact.   Indication: The patient is status post kidney transplant and the ureteral stent is no longer needed.    Procedure: The patient was positioned supine on the table.  The groin was sterilely prepped and draped in the usual fashion. Time out was done. Urojet was applied to the urethra. A flexible cystoscope was inserted and advanced thru the urethra into the bladder. The stent was visualized and grasped. The cystoscope, grasper and stent were removed en-mass. The stent was visualized to be intact.  The bladder mucosa was normal in appearance. Antibiotics were administered. The patient tolerated the procedure well and was asked to void before leaving the clinic.    Sandy Cole, RAFA

## 2018-12-20 NOTE — NURSING NOTE
"Chief Complaint   Patient presents with     Cystoscopy     stent removal     Blood pressure 121/80, pulse 97, height 1.702 m (5' 7\"), weight 76.6 kg (168 lb 12.8 oz), SpO2 99 %.    GEORGES JAIME CMA    "

## 2018-12-20 NOTE — PROGRESS NOTES
Transplant Surgery  Operative Note    Preop dx: Status post  Donor kidney transplant.  Post op dx: same   Procedure: Flexible cystoscopy and ureteral stent removal   Surgeon: Sandy Gandhi CNP  Assistant: Tammi Kemp MA  Anesthesia: local  EBL: 0   Specimens: none.  Findings: none abnormal. Stent inspected and noted to be intact.   Indication: The patient is status post kidney transplant and the ureteral stent is no longer needed.    Procedure: The patient was positioned supine on the table.  The groin was sterilely prepped and draped in the usual fashion. Time out was done. Urojet was applied to the urethra. A flexible cystoscope was inserted and advanced thru the urethra into the bladder. The stent was visualized and grasped. The cystoscope, grasper and stent were removed en-mass. The stent was visualized to be intact.  The bladder mucosa was normal in appearance. Antibiotics were administered. The patient tolerated the procedure well and was asked to void before leaving the clinic.

## 2018-12-21 LAB
BKV DNA # SPEC NAA+PROBE: NORMAL COPIES/ML
BKV DNA SPEC NAA+PROBE-LOG#: NORMAL LOG COPIES/ML
CELL TYPE ALLO: NORMAL
CELL TYPE AUTO: NORMAL
CHANNELSHIFTALLOB1: -43
CHANNELSHIFTALLOB1: -76
CHANNELSHIFTALLOB1: NORMAL
CHANNELSHIFTALLOB1: NORMAL
CHANNELSHIFTALLOB2: -45
CHANNELSHIFTALLOB2: -74
CHANNELSHIFTALLOB2: NORMAL
CHANNELSHIFTALLOB2: NORMAL
CHANNELSHIFTALLOT1: -26
CHANNELSHIFTALLOT1: -46
CHANNELSHIFTALLOT1: NORMAL
CHANNELSHIFTALLOT1: NORMAL
CHANNELSHIFTALLOT2: -48
CHANNELSHIFTALLOT2: -6
CHANNELSHIFTALLOT2: NORMAL
CHANNELSHIFTALLOT2: NORMAL
CHANNELSHIFTAUTOB1: -49
CHANNELSHIFTAUTOB2: -49
CHANNELSHIFTAUTOT1: -44
CHANNELSHIFTAUTOT2: -45
COMMENT ALLOB2: NORMAL
CROSSMATCHDATEALLO: NORMAL
CROSSMATCHDATEAUTO: NORMAL
DONOR ALLO: NORMAL
DONOR AUTO: NORMAL
DONORCELLDATE ALLO: NORMAL
DONORCELLDATE AUTO: NORMAL
POS CUT OFF ALLO B: >122
POS CUT OFF ALLO B: >122
POS CUT OFF ALLO B: NORMAL
POS CUT OFF ALLO B: NORMAL
POS CUT OFF ALLO T: >67
POS CUT OFF ALLO T: >67
POS CUT OFF ALLO T: NORMAL
POS CUT OFF ALLO T: NORMAL
POS CUT OFF AUTO B: >122
POS CUT OFF AUTO T: >67
PROTOCOL CUTOFF: NORMAL
RESULT ALLO B1: NORMAL
RESULT ALLO B2: NORMAL
RESULT ALLO T1: NORMAL
RESULT ALLO T2: NORMAL
RESULT AUTO B1: NORMAL
RESULT AUTO B2: NORMAL
RESULT AUTO T1: NORMAL
RESULT AUTO T2: NORMAL
SA1 CELL: NORMAL
SA1 COMMENTS: NORMAL
SA1 HI RISK ABY: NORMAL
SA1 MOD RISK ABY: NORMAL
SA1 TEST METHOD: NORMAL
SA2 CELL: NORMAL
SA2 COMMENTS: NORMAL
SA2 HI RISK ABY UA: NORMAL
SA2 MOD RISK ABY: NORMAL
SA2 TEST METHOD: NORMAL
SERUM DATE ALLO B1: NORMAL
SERUM DATE ALLO B2: NORMAL
SERUM DATE ALLO T1: NORMAL
SERUM DATE ALLO T2: NORMAL
SERUM DATE AUTO B1: NORMAL
SERUM DATE AUTO B2: NORMAL
SERUM DATE AUTO T1: NORMAL
SERUM DATE AUTO T2: NORMAL
SPECIMEN SOURCE: NORMAL
TESTMETHODALLO: NORMAL
TESTMETHODAUTO: NORMAL
TREATMENT ALLO B1: NORMAL
TREATMENT ALLO B2: NORMAL
TREATMENT ALLO T1: NORMAL
TREATMENT ALLO T2: NORMAL
TREATMENT AUTO B1: NORMAL
TREATMENT AUTO B2: NORMAL
TREATMENT AUTO T1: NORMAL
TREATMENT AUTO T2: NORMAL
UNACCEPTABLE ANTIGEN: NORMAL
UNOS CPRA: 0

## 2018-12-22 LAB
MYCOPHENOLATE SERPL LC/MS/MS-MCNC: 3.58 MG/L (ref 1–3.5)
MYCOPHENOLATE-G SERPL LC/MS/MS-MCNC: 70.8 MG/L (ref 30–95)
TME LAST DOSE: ABNORMAL H

## 2018-12-24 LAB
AMYLASE SERPL-CCNC: 86 U/L (ref 30–110)
ANION GAP SERPL CALCULATED.3IONS-SCNC: 7 MMOL/L (ref 3–14)
BASOPHILS # BLD AUTO: 0.1 10E9/L (ref 0–0.2)
BASOPHILS NFR BLD AUTO: 1.2 %
BUN SERPL-MCNC: 21 MG/DL (ref 7–30)
CALCIUM SERPL-MCNC: 9 MG/DL (ref 8.5–10.1)
CHLORIDE SERPL-SCNC: 109 MMOL/L (ref 94–109)
CO2 SERPL-SCNC: 23 MMOL/L (ref 20–32)
CREAT SERPL-MCNC: 1.07 MG/DL (ref 0.66–1.25)
DIFFERENTIAL METHOD BLD: ABNORMAL
EOSINOPHIL # BLD AUTO: 0 10E9/L (ref 0–0.7)
EOSINOPHIL NFR BLD AUTO: 0.5 %
ERYTHROCYTE [DISTWIDTH] IN BLOOD BY AUTOMATED COUNT: 15.7 % (ref 10–15)
GFR SERPL CREATININE-BSD FRML MDRD: 78 ML/MIN/{1.73_M2}
GLUCOSE SERPL-MCNC: 73 MG/DL (ref 70–99)
HCT VFR BLD AUTO: 34.6 % (ref 40–53)
HGB BLD-MCNC: 10.3 G/DL (ref 13.3–17.7)
IMM GRANULOCYTES # BLD: 0.1 10E9/L (ref 0–0.4)
IMM GRANULOCYTES NFR BLD: 1 %
LIPASE SERPL-CCNC: 318 U/L (ref 73–393)
LYMPHOCYTES # BLD AUTO: 0.1 10E9/L (ref 0.8–5.3)
LYMPHOCYTES NFR BLD AUTO: 2.1 %
MAGNESIUM SERPL-MCNC: 1.8 MG/DL (ref 1.6–2.3)
MCH RBC QN AUTO: 27.2 PG (ref 26.5–33)
MCHC RBC AUTO-ENTMCNC: 29.8 G/DL (ref 31.5–36.5)
MCV RBC AUTO: 92 FL (ref 78–100)
MONOCYTES # BLD AUTO: 0.2 10E9/L (ref 0–1.3)
MONOCYTES NFR BLD AUTO: 4 %
NEUTROPHILS # BLD AUTO: 5.2 10E9/L (ref 1.6–8.3)
NEUTROPHILS NFR BLD AUTO: 91.2 %
NRBC # BLD AUTO: 0 10*3/UL
NRBC BLD AUTO-RTO: 0 /100
PHOSPHATE SERPL-MCNC: 3.9 MG/DL (ref 2.5–4.5)
PLATELET # BLD AUTO: 427 10E9/L (ref 150–450)
POTASSIUM SERPL-SCNC: 5 MMOL/L (ref 3.4–5.3)
RBC # BLD AUTO: 3.78 10E12/L (ref 4.4–5.9)
SODIUM SERPL-SCNC: 139 MMOL/L (ref 133–144)
WBC # BLD AUTO: 5.7 10E9/L (ref 4–11)

## 2018-12-24 PROCEDURE — 80197 ASSAY OF TACROLIMUS: CPT | Performed by: SURGERY

## 2018-12-24 PROCEDURE — 80180 DRUG SCRN QUAN MYCOPHENOLATE: CPT | Performed by: SURGERY

## 2018-12-24 PROCEDURE — 83735 ASSAY OF MAGNESIUM: CPT | Performed by: SURGERY

## 2018-12-24 PROCEDURE — 85025 COMPLETE CBC W/AUTO DIFF WBC: CPT | Performed by: SURGERY

## 2018-12-24 PROCEDURE — 82150 ASSAY OF AMYLASE: CPT | Performed by: SURGERY

## 2018-12-24 PROCEDURE — 84100 ASSAY OF PHOSPHORUS: CPT | Performed by: SURGERY

## 2018-12-24 PROCEDURE — 83690 ASSAY OF LIPASE: CPT | Performed by: SURGERY

## 2018-12-24 PROCEDURE — 80048 BASIC METABOLIC PNL TOTAL CA: CPT | Performed by: SURGERY

## 2018-12-25 LAB
TACROLIMUS BLD-MCNC: 7.7 UG/L (ref 5–15)
TME LAST DOSE: NORMAL H

## 2018-12-26 DIAGNOSIS — Z94.83 PANCREAS TRANSPLANTED (H): Primary | ICD-10-CM

## 2018-12-26 DIAGNOSIS — Z94.0 KIDNEY REPLACED BY TRANSPLANT: ICD-10-CM

## 2018-12-26 DIAGNOSIS — Z94.83 PANCREAS REPLACED BY TRANSPLANT (H): ICD-10-CM

## 2018-12-26 LAB
DONOR IDENTIFICATION: NORMAL
DSA COMMENTS: NORMAL
DSA PRESENT: NO
DSA TEST METHOD: NORMAL
INTERFERING SUBST TEST METHOD: NORMAL
INTERFERING SUBST TEST METHOD: NORMAL
INTERFERING SUBSTANCE COMMENT: NORMAL
INTERFERING SUBSTANCE COMMENT: NORMAL
INTERFERING SUBSTANCE RESULT: NORMAL
INTERFERING SUBSTANCE RESULT: NORMAL
INTERFERING SUBSTANCE: NORMAL
INTERFERING SUBSTANCE: NORMAL
ORGAN: NORMAL
SA1 CELL: NORMAL
SA1 COMMENTS: NORMAL
SA1 HI RISK ABY: NORMAL
SA1 MOD RISK ABY: NORMAL
SA1 TEST METHOD: NORMAL
SA2 CELL: NORMAL
SA2 COMMENTS: NORMAL
SA2 HI RISK ABY UA: NORMAL
SA2 MOD RISK ABY: NORMAL
SA2 TEST METHOD: NORMAL
UNACCEPTABLE ANTIGEN: NORMAL
UNOS CPRA: 22

## 2018-12-26 RX ORDER — TACROLIMUS 1 MG/1
2 CAPSULE ORAL 2 TIMES DAILY
Qty: 120 CAPSULE | Refills: 11 | Status: SHIPPED | OUTPATIENT
Start: 2018-12-26 | End: 2019-01-02

## 2018-12-26 RX ORDER — TACROLIMUS 0.5 MG/1
0.5 CAPSULE ORAL 2 TIMES DAILY
Qty: 60 CAPSULE | Refills: 11 | Status: SHIPPED | OUTPATIENT
Start: 2018-12-26 | End: 2019-01-02

## 2018-12-26 NOTE — TELEPHONE ENCOUNTER
Spoke to patient regarding tac level = 7.7, below goal.  Patient confirms current tacrolimus dose and good 12 hour trough level.  Patient denies any recent illness or new medications.  Patient verbalizes understanding to increase Tacrolimus dose to 2.5 mg BID.  Patient ensures 1 BM daily.

## 2018-12-26 NOTE — TELEPHONE ENCOUNTER
ISSUE:   Tacrolimus level 7.7 on 12/24/18, goal 8-10, dose 2 mg BID    Lipase remains 300s.    PLAN:   Please call pt and confirm this was a good 12-hour trough. Verify dose 2 mg BID. Confirm no new medications or illness (trina. Diarrhea). If good trough, increase dose to 2.5 mg BID and recheck level as scheduled.    Ensure good BMs - if only daily BMs, increase use of bowel regimen.

## 2018-12-27 LAB
AMYLASE SERPL-CCNC: 82 U/L (ref 30–110)
ANION GAP SERPL CALCULATED.3IONS-SCNC: 7 MMOL/L (ref 3–14)
BASOPHILS # BLD AUTO: 0.1 10E9/L (ref 0–0.2)
BASOPHILS NFR BLD AUTO: 1.5 %
BUN SERPL-MCNC: 16 MG/DL (ref 7–30)
CALCIUM SERPL-MCNC: 8.9 MG/DL (ref 8.5–10.1)
CHLORIDE SERPL-SCNC: 108 MMOL/L (ref 94–109)
CO2 SERPL-SCNC: 25 MMOL/L (ref 20–32)
CREAT SERPL-MCNC: 1 MG/DL (ref 0.66–1.25)
DIFFERENTIAL METHOD BLD: ABNORMAL
EOSINOPHIL # BLD AUTO: 0.1 10E9/L (ref 0–0.7)
EOSINOPHIL NFR BLD AUTO: 0.9 %
ERYTHROCYTE [DISTWIDTH] IN BLOOD BY AUTOMATED COUNT: 15.9 % (ref 10–15)
GFR SERPL CREATININE-BSD FRML MDRD: 84 ML/MIN/{1.73_M2}
GLUCOSE SERPL-MCNC: 78 MG/DL (ref 70–99)
HCT VFR BLD AUTO: 36.5 % (ref 40–53)
HGB BLD-MCNC: 10.9 G/DL (ref 13.3–17.7)
IMM GRANULOCYTES # BLD: 0 10E9/L (ref 0–0.4)
IMM GRANULOCYTES NFR BLD: 0.6 %
LIPASE SERPL-CCNC: 280 U/L (ref 73–393)
LYMPHOCYTES # BLD AUTO: 0.1 10E9/L (ref 0.8–5.3)
LYMPHOCYTES NFR BLD AUTO: 2.3 %
MCH RBC QN AUTO: 27.9 PG (ref 26.5–33)
MCHC RBC AUTO-ENTMCNC: 29.9 G/DL (ref 31.5–36.5)
MCV RBC AUTO: 93 FL (ref 78–100)
MONOCYTES # BLD AUTO: 0.3 10E9/L (ref 0–1.3)
MONOCYTES NFR BLD AUTO: 5.3 %
MYCOPHENOLATE SERPL LC/MS/MS-MCNC: 1.01 MG/L (ref 1–3.5)
MYCOPHENOLATE-G SERPL LC/MS/MS-MCNC: 54.1 MG/L (ref 30–95)
NEUTROPHILS # BLD AUTO: 4.7 10E9/L (ref 1.6–8.3)
NEUTROPHILS NFR BLD AUTO: 89.4 %
NRBC # BLD AUTO: 0 10*3/UL
NRBC BLD AUTO-RTO: 0 /100
PLATELET # BLD AUTO: 368 10E9/L (ref 150–450)
POTASSIUM SERPL-SCNC: 4.9 MMOL/L (ref 3.4–5.3)
RBC # BLD AUTO: 3.91 10E12/L (ref 4.4–5.9)
SODIUM SERPL-SCNC: 140 MMOL/L (ref 133–144)
TACROLIMUS BLD-MCNC: 8.9 UG/L (ref 5–15)
TME LAST DOSE: NORMAL H
TME LAST DOSE: NORMAL H
WBC # BLD AUTO: 5.3 10E9/L (ref 4–11)

## 2018-12-27 PROCEDURE — 82150 ASSAY OF AMYLASE: CPT | Performed by: SURGERY

## 2018-12-27 PROCEDURE — 83690 ASSAY OF LIPASE: CPT | Performed by: SURGERY

## 2018-12-27 PROCEDURE — 85025 COMPLETE CBC W/AUTO DIFF WBC: CPT | Performed by: SURGERY

## 2018-12-27 PROCEDURE — 80048 BASIC METABOLIC PNL TOTAL CA: CPT | Performed by: SURGERY

## 2018-12-27 PROCEDURE — 80197 ASSAY OF TACROLIMUS: CPT | Performed by: SURGERY

## 2018-12-31 LAB
AMYLASE SERPL-CCNC: 73 U/L (ref 30–110)
ANION GAP SERPL CALCULATED.3IONS-SCNC: 8 MMOL/L (ref 3–14)
BASOPHILS # BLD AUTO: 0.1 10E9/L (ref 0–0.2)
BASOPHILS NFR BLD AUTO: 1.5 %
BUN SERPL-MCNC: 16 MG/DL (ref 7–30)
CALCIUM SERPL-MCNC: 9 MG/DL (ref 8.5–10.1)
CHLORIDE SERPL-SCNC: 109 MMOL/L (ref 94–109)
CO2 SERPL-SCNC: 22 MMOL/L (ref 20–32)
CREAT SERPL-MCNC: 1.05 MG/DL (ref 0.66–1.25)
DIFFERENTIAL METHOD BLD: ABNORMAL
EOSINOPHIL # BLD AUTO: 0.1 10E9/L (ref 0–0.7)
EOSINOPHIL NFR BLD AUTO: 1.3 %
ERYTHROCYTE [DISTWIDTH] IN BLOOD BY AUTOMATED COUNT: 15.7 % (ref 10–15)
GFR SERPL CREATININE-BSD FRML MDRD: 80 ML/MIN/{1.73_M2}
GLUCOSE SERPL-MCNC: 66 MG/DL (ref 70–99)
HCT VFR BLD AUTO: 36.4 % (ref 40–53)
HGB BLD-MCNC: 10.9 G/DL (ref 13.3–17.7)
IMM GRANULOCYTES # BLD: 0 10E9/L (ref 0–0.4)
IMM GRANULOCYTES NFR BLD: 0.5 %
LIPASE SERPL-CCNC: 224 U/L (ref 73–393)
LYMPHOCYTES # BLD AUTO: 0.1 10E9/L (ref 0.8–5.3)
LYMPHOCYTES NFR BLD AUTO: 3.3 %
MAGNESIUM SERPL-MCNC: 1.8 MG/DL (ref 1.6–2.3)
MCH RBC QN AUTO: 27.4 PG (ref 26.5–33)
MCHC RBC AUTO-ENTMCNC: 29.9 G/DL (ref 31.5–36.5)
MCV RBC AUTO: 92 FL (ref 78–100)
MONOCYTES # BLD AUTO: 0.3 10E9/L (ref 0–1.3)
MONOCYTES NFR BLD AUTO: 6.8 %
NEUTROPHILS # BLD AUTO: 3.4 10E9/L (ref 1.6–8.3)
NEUTROPHILS NFR BLD AUTO: 86.6 %
NRBC # BLD AUTO: 0 10*3/UL
NRBC BLD AUTO-RTO: 0 /100
PHOSPHATE SERPL-MCNC: 3.8 MG/DL (ref 2.5–4.5)
PLATELET # BLD AUTO: 344 10E9/L (ref 150–450)
POTASSIUM SERPL-SCNC: 4.7 MMOL/L (ref 3.4–5.3)
RBC # BLD AUTO: 3.98 10E12/L (ref 4.4–5.9)
SODIUM SERPL-SCNC: 139 MMOL/L (ref 133–144)
WBC # BLD AUTO: 4 10E9/L (ref 4–11)

## 2018-12-31 PROCEDURE — 82150 ASSAY OF AMYLASE: CPT | Performed by: SURGERY

## 2018-12-31 PROCEDURE — 83735 ASSAY OF MAGNESIUM: CPT | Performed by: SURGERY

## 2018-12-31 PROCEDURE — 80048 BASIC METABOLIC PNL TOTAL CA: CPT | Performed by: SURGERY

## 2018-12-31 PROCEDURE — 80197 ASSAY OF TACROLIMUS: CPT | Performed by: SURGERY

## 2018-12-31 PROCEDURE — 84100 ASSAY OF PHOSPHORUS: CPT | Performed by: SURGERY

## 2018-12-31 PROCEDURE — 80180 DRUG SCRN QUAN MYCOPHENOLATE: CPT | Performed by: SURGERY

## 2018-12-31 PROCEDURE — 85025 COMPLETE CBC W/AUTO DIFF WBC: CPT | Performed by: SURGERY

## 2018-12-31 PROCEDURE — 83690 ASSAY OF LIPASE: CPT | Performed by: SURGERY

## 2019-01-02 DIAGNOSIS — Z94.83 PANCREAS TRANSPLANTED (H): ICD-10-CM

## 2019-01-02 DIAGNOSIS — Z94.83 PANCREAS REPLACED BY TRANSPLANT (H): ICD-10-CM

## 2019-01-02 DIAGNOSIS — Z94.0 KIDNEY REPLACED BY TRANSPLANT: Primary | ICD-10-CM

## 2019-01-02 RX ORDER — TACROLIMUS 0.5 MG/1
CAPSULE ORAL
Qty: 60 CAPSULE | Refills: 11
Start: 2019-01-02 | End: 2019-01-08

## 2019-01-02 RX ORDER — TACROLIMUS 1 MG/1
2 CAPSULE ORAL 2 TIMES DAILY
Qty: 120 CAPSULE | Refills: 11 | Status: SHIPPED | OUTPATIENT
Start: 2019-01-02 | End: 2019-01-08

## 2019-01-02 NOTE — TELEPHONE ENCOUNTER
ISSUE:   Tacrolimus level 12.1 on 12/31/18, goal 8-10, dose 2.5 mg BID    PLAN:   Please call pt and confirm this was a good 12-hour trough. Verify dose 2.5 mg BID. Confirm no new medications or illness (trina. Diarrhea). If good trough, decrease dose to 2 mg BID and recheck level as scheduled.

## 2019-01-02 NOTE — TELEPHONE ENCOUNTER
Patient left message and confirmed current Tacrolimus dose and good 12 hour trough level  Patient verbalizes understanding to decrease Tacrolimus dose to 2 mg BID.

## 2019-01-02 NOTE — TELEPHONE ENCOUNTER
Left message for patient regarding:  ISSUE:   Tacrolimus level 12.1 on 12/31/18, goal 8-10, dose 2.5 mg BID     PLAN:   Please call pt and confirm this was a good 12-hour trough. Verify dose 2.5 mg BID. Confirm no new medications or illness (trina. Diarrhea). If good trough, decrease dose to 2 mg BID and recheck level as scheduled

## 2019-01-03 ENCOUNTER — TELEPHONE (OUTPATIENT)
Dept: TRANSPLANT | Facility: CLINIC | Age: 54
End: 2019-01-03

## 2019-01-03 DIAGNOSIS — E21.3 HYPERPARATHYROIDISM (H): Primary | ICD-10-CM

## 2019-01-03 LAB
AMYLASE SERPL-CCNC: 79 U/L (ref 30–110)
ANION GAP SERPL CALCULATED.3IONS-SCNC: 7 MMOL/L (ref 3–14)
BASOPHILS # BLD AUTO: 0.1 10E9/L (ref 0–0.2)
BASOPHILS NFR BLD AUTO: 1.6 %
BUN SERPL-MCNC: 23 MG/DL (ref 7–30)
CALCIUM SERPL-MCNC: 9.6 MG/DL (ref 8.5–10.1)
CHLORIDE SERPL-SCNC: 108 MMOL/L (ref 94–109)
CO2 SERPL-SCNC: 24 MMOL/L (ref 20–32)
CREAT SERPL-MCNC: 1.1 MG/DL (ref 0.66–1.25)
DIFFERENTIAL METHOD BLD: ABNORMAL
EOSINOPHIL # BLD AUTO: 0 10E9/L (ref 0–0.7)
EOSINOPHIL NFR BLD AUTO: 1.3 %
ERYTHROCYTE [DISTWIDTH] IN BLOOD BY AUTOMATED COUNT: 16 % (ref 10–15)
GFR SERPL CREATININE-BSD FRML MDRD: 75 ML/MIN/{1.73_M2}
GLUCOSE SERPL-MCNC: 70 MG/DL (ref 70–99)
HCT VFR BLD AUTO: 40.5 % (ref 40–53)
HGB BLD-MCNC: 12.1 G/DL (ref 13.3–17.7)
IMM GRANULOCYTES # BLD: 0 10E9/L (ref 0–0.4)
IMM GRANULOCYTES NFR BLD: 0.9 %
LIPASE SERPL-CCNC: 275 U/L (ref 73–393)
LYMPHOCYTES # BLD AUTO: 0.1 10E9/L (ref 0.8–5.3)
LYMPHOCYTES NFR BLD AUTO: 3.8 %
MCH RBC QN AUTO: 27.6 PG (ref 26.5–33)
MCHC RBC AUTO-ENTMCNC: 29.9 G/DL (ref 31.5–36.5)
MCV RBC AUTO: 93 FL (ref 78–100)
MONOCYTES # BLD AUTO: 0.2 10E9/L (ref 0–1.3)
MONOCYTES NFR BLD AUTO: 6.3 %
NEUTROPHILS # BLD AUTO: 2.8 10E9/L (ref 1.6–8.3)
NEUTROPHILS NFR BLD AUTO: 86.1 %
NRBC # BLD AUTO: 0 10*3/UL
NRBC BLD AUTO-RTO: 0 /100
PLATELET # BLD AUTO: 344 10E9/L (ref 150–450)
POTASSIUM SERPL-SCNC: 4.8 MMOL/L (ref 3.4–5.3)
RBC # BLD AUTO: 4.38 10E12/L (ref 4.4–5.9)
SODIUM SERPL-SCNC: 139 MMOL/L (ref 133–144)
WBC # BLD AUTO: 3.2 10E9/L (ref 4–11)

## 2019-01-03 PROCEDURE — 83690 ASSAY OF LIPASE: CPT | Performed by: SURGERY

## 2019-01-03 PROCEDURE — 80197 ASSAY OF TACROLIMUS: CPT | Performed by: SURGERY

## 2019-01-03 PROCEDURE — 82150 ASSAY OF AMYLASE: CPT | Performed by: SURGERY

## 2019-01-03 PROCEDURE — 80048 BASIC METABOLIC PNL TOTAL CA: CPT | Performed by: SURGERY

## 2019-01-03 PROCEDURE — 85025 COMPLETE CBC W/AUTO DIFF WBC: CPT | Performed by: SURGERY

## 2019-01-03 NOTE — TELEPHONE ENCOUNTER
Post Kidney and Pancreas Transplant Team Conference  Date: 1/3/2019  Transplant Coordinator: Alesha Hartley     Attendees:  [x]  Dr. Wolfe [] Becca Riojas, RN  [] Shari Chapin LPN     []  Dr. Dick [] Anu Hanson, RN [] Shayna Chavez LPN   []  Dr. Tate [] Glenis Ko RN    []  Dr. De Los Santos [] Sailaja Choi RN    [] Dr. Pal [x] Zora Hartley RN    [] Dr. Luevano [] Joey Goldberg RN    [] Surgery Fellow [] Viv Wick RN    [] Sandy Cole, RAFA [] Linh Oates RN    [] Luke Tesfaye, PharmD [] Angella Multani RN     [] Dwight Mariano, RN     [] Becca Gregoyr RN        Verbal Plan Read Back:   Complex carbs - last sugar was 66.  Get pth at 4 months.  Double check BPs - florinef and nifedipine.    If on florinef for hyper k, okay to reduce MWF - NO CHANGE, see below.    Routed to RN Coordinator   Alesha Hartley    Blood sits in tube after home care lab draw - Sean reports no low sugars on his meter that he check regularly.    [facility] confirmed NO nifedipine use, med list updated.     BPs running as low as 92/56 up to 132/80.    Sean has good oral hydration.

## 2019-01-04 LAB
TACROLIMUS BLD-MCNC: 11.1 UG/L (ref 5–15)
TME LAST DOSE: NORMAL H

## 2019-01-07 DIAGNOSIS — Z48.298 AFTERCARE FOLLOWING ORGAN TRANSPLANT: ICD-10-CM

## 2019-01-07 DIAGNOSIS — Z94.83 PANCREAS REPLACED BY TRANSPLANT (H): ICD-10-CM

## 2019-01-07 DIAGNOSIS — Z94.0 KIDNEY REPLACED BY TRANSPLANT: ICD-10-CM

## 2019-01-07 LAB
BASOPHILS # BLD AUTO: 0 10E9/L (ref 0–0.2)
BASOPHILS NFR BLD AUTO: 0.3 %
DIFFERENTIAL METHOD BLD: ABNORMAL
EOSINOPHIL # BLD AUTO: 0 10E9/L (ref 0–0.7)
EOSINOPHIL NFR BLD AUTO: 0.8 %
ERYTHROCYTE [DISTWIDTH] IN BLOOD BY AUTOMATED COUNT: 16.2 % (ref 10–15)
HCT VFR BLD AUTO: 38.9 % (ref 40–53)
HGB BLD-MCNC: 12.5 G/DL (ref 13.3–17.7)
LIPASE SERPL-CCNC: 232 U/L (ref 73–393)
LYMPHOCYTES # BLD AUTO: 0.2 10E9/L (ref 0.8–5.3)
LYMPHOCYTES NFR BLD AUTO: 4.7 %
MCH RBC QN AUTO: 28.4 PG (ref 26.5–33)
MCHC RBC AUTO-ENTMCNC: 32.1 G/DL (ref 31.5–36.5)
MCV RBC AUTO: 88 FL (ref 78–100)
MONOCYTES # BLD AUTO: 0.2 10E9/L (ref 0–1.3)
MONOCYTES NFR BLD AUTO: 4.4 %
NEUTROPHILS # BLD AUTO: 3.5 10E9/L (ref 1.6–8.3)
NEUTROPHILS NFR BLD AUTO: 89.8 %
PLATELET # BLD AUTO: 293 10E9/L (ref 150–450)
RBC # BLD AUTO: 4.4 10E12/L (ref 4.4–5.9)
WBC # BLD AUTO: 3.9 10E9/L (ref 4–11)

## 2019-01-07 PROCEDURE — 85025 COMPLETE CBC W/AUTO DIFF WBC: CPT | Performed by: SURGERY

## 2019-01-07 PROCEDURE — 82150 ASSAY OF AMYLASE: CPT | Performed by: SURGERY

## 2019-01-07 PROCEDURE — 80048 BASIC METABOLIC PNL TOTAL CA: CPT | Performed by: SURGERY

## 2019-01-07 PROCEDURE — 36415 COLL VENOUS BLD VENIPUNCTURE: CPT | Performed by: SURGERY

## 2019-01-07 PROCEDURE — 83690 ASSAY OF LIPASE: CPT | Performed by: SURGERY

## 2019-01-07 PROCEDURE — 80197 ASSAY OF TACROLIMUS: CPT | Performed by: SURGERY

## 2019-01-08 ENCOUNTER — TELEPHONE (OUTPATIENT)
Dept: TRANSPLANT | Facility: CLINIC | Age: 54
End: 2019-01-08

## 2019-01-08 ENCOUNTER — OFFICE VISIT (OUTPATIENT)
Dept: PHARMACY | Facility: CLINIC | Age: 54
End: 2019-01-08
Payer: COMMERCIAL

## 2019-01-08 ENCOUNTER — OFFICE VISIT (OUTPATIENT)
Dept: NEPHROLOGY | Facility: CLINIC | Age: 54
End: 2019-01-08
Attending: INTERNAL MEDICINE
Payer: COMMERCIAL

## 2019-01-08 VITALS
SYSTOLIC BLOOD PRESSURE: 130 MMHG | HEART RATE: 89 BPM | OXYGEN SATURATION: 99 % | BODY MASS INDEX: 27.63 KG/M2 | WEIGHT: 176.4 LBS | DIASTOLIC BLOOD PRESSURE: 85 MMHG | TEMPERATURE: 98.5 F

## 2019-01-08 DIAGNOSIS — E55.9 VITAMIN D DEFICIENCY: ICD-10-CM

## 2019-01-08 DIAGNOSIS — D64.89 ANEMIA DUE TO OTHER CAUSE, NOT CLASSIFIED: ICD-10-CM

## 2019-01-08 DIAGNOSIS — Z48.298 AFTERCARE FOLLOWING ORGAN TRANSPLANT: Primary | ICD-10-CM

## 2019-01-08 DIAGNOSIS — I25.10 CORONARY ARTERY DISEASE INVOLVING NATIVE CORONARY ARTERY OF NATIVE HEART WITHOUT ANGINA PECTORIS: ICD-10-CM

## 2019-01-08 DIAGNOSIS — E78.2 MIXED HYPERLIPIDEMIA: ICD-10-CM

## 2019-01-08 DIAGNOSIS — D84.9 IMMUNOSUPPRESSION (H): ICD-10-CM

## 2019-01-08 DIAGNOSIS — Z94.83 PANCREAS REPLACED BY TRANSPLANT (H): ICD-10-CM

## 2019-01-08 DIAGNOSIS — I15.1 HTN, KIDNEY TRANSPLANT RELATED: ICD-10-CM

## 2019-01-08 DIAGNOSIS — Z94.0 KIDNEY REPLACED BY TRANSPLANT: ICD-10-CM

## 2019-01-08 DIAGNOSIS — Z29.89 NEED FOR PNEUMOCYSTIS PROPHYLAXIS: ICD-10-CM

## 2019-01-08 DIAGNOSIS — Z94.0 HTN, KIDNEY TRANSPLANT RELATED: ICD-10-CM

## 2019-01-08 DIAGNOSIS — K59.00 CONSTIPATION, UNSPECIFIED CONSTIPATION TYPE: ICD-10-CM

## 2019-01-08 DIAGNOSIS — G89.18 PAIN FOLLOWING SURGERY OR PROCEDURE: ICD-10-CM

## 2019-01-08 DIAGNOSIS — R11.0 NAUSEA: ICD-10-CM

## 2019-01-08 DIAGNOSIS — Z94.83 PANCREAS TRANSPLANTED (H): ICD-10-CM

## 2019-01-08 DIAGNOSIS — K21.9 GASTROESOPHAGEAL REFLUX DISEASE, ESOPHAGITIS PRESENCE NOT SPECIFIED: ICD-10-CM

## 2019-01-08 DIAGNOSIS — I15.1 HYPERTENSION SECONDARY TO OTHER RENAL DISORDERS: ICD-10-CM

## 2019-01-08 DIAGNOSIS — Z29.89 NEED FOR CMV IMMUNOTHERAPY: ICD-10-CM

## 2019-01-08 DIAGNOSIS — D84.9 IMMUNOSUPPRESSED STATUS (H): ICD-10-CM

## 2019-01-08 DIAGNOSIS — Q61.3 POLYCYSTIC KIDNEY: ICD-10-CM

## 2019-01-08 DIAGNOSIS — Z94.0 KIDNEY REPLACED BY TRANSPLANT: Primary | ICD-10-CM

## 2019-01-08 DIAGNOSIS — N25.81 SECONDARY RENAL HYPERPARATHYROIDISM (H): ICD-10-CM

## 2019-01-08 LAB
AMYLASE SERPL-CCNC: 70 U/L (ref 30–110)
ANION GAP SERPL CALCULATED.3IONS-SCNC: 6 MMOL/L (ref 3–14)
BUN SERPL-MCNC: 17 MG/DL (ref 7–30)
CALCIUM SERPL-MCNC: 9.2 MG/DL (ref 8.5–10.1)
CHLORIDE SERPL-SCNC: 109 MMOL/L (ref 94–109)
CO2 SERPL-SCNC: 23 MMOL/L (ref 20–32)
CREAT SERPL-MCNC: 1.14 MG/DL (ref 0.66–1.25)
GFR SERPL CREATININE-BSD FRML MDRD: 72 ML/MIN/{1.73_M2}
GLUCOSE SERPL-MCNC: 124 MG/DL (ref 70–99)
POTASSIUM SERPL-SCNC: 4.7 MMOL/L (ref 3.4–5.3)
SODIUM SERPL-SCNC: 138 MMOL/L (ref 133–144)
TACROLIMUS BLD-MCNC: 8.3 UG/L (ref 5–15)
TME LAST DOSE: NORMAL H

## 2019-01-08 PROCEDURE — 99606 MTMS BY PHARM EST 15 MIN: CPT | Performed by: PHARMACIST

## 2019-01-08 PROCEDURE — 99607 MTMS BY PHARM ADDL 15 MIN: CPT | Performed by: PHARMACIST

## 2019-01-08 PROCEDURE — G0463 HOSPITAL OUTPT CLINIC VISIT: HCPCS | Mod: ZF

## 2019-01-08 RX ORDER — ATORVASTATIN CALCIUM 20 MG/1
20 TABLET, FILM COATED ORAL DAILY
Qty: 90 TABLET | Refills: 3 | Status: SHIPPED | OUTPATIENT
Start: 2019-01-08 | End: 2022-05-11

## 2019-01-08 RX ORDER — TACROLIMUS 0.5 MG/1
CAPSULE ORAL
Qty: 60 CAPSULE | Refills: 11 | COMMUNITY
Start: 2019-01-08 | End: 2019-01-15

## 2019-01-08 RX ORDER — HYDROCODONE BITARTRATE AND ACETAMINOPHEN 5; 325 MG/1; MG/1
1 TABLET ORAL EVERY 6 HOURS PRN
COMMUNITY
End: 2023-10-04

## 2019-01-08 RX ORDER — TACROLIMUS 1 MG/1
2 CAPSULE ORAL 2 TIMES DAILY
Qty: 120 CAPSULE | Refills: 11 | COMMUNITY
Start: 2019-01-08 | End: 2019-01-15

## 2019-01-08 ASSESSMENT — PAIN SCALES - GENERAL: PAINLEVEL: NO PAIN (0)

## 2019-01-08 NOTE — PROGRESS NOTES
ACUTE TRANSPLANT NEPHROLOGY VISIT    Assessment & Plan   # DDKT and SPK: baseline Cr ~ 1.1; Stable   - Proteinuria: Not checked recently   - Latest DSA: No Date of DSA last checked: 18   - BK: No   - Kidney Tx Biopsy: No    # Pancreas Tx (SPK):Enteric drained     - Blood glucose: Euglycemia   - HbA1c: Stable last checked 9.1 prior to transplant (2018)   - Pancreatic enzymes: Stable   - Date of DSA last checked: 18 Latest DSA: No    # Immunosuppression: Tacrolimus immediate release (goal  8-10) and Mycophenolic acid (goal  1-3.5)   - Changes: No, MPA at 540mg BiD and tacrolimus 2mg BiD   - Tacrolimus levels 11-12 at 2.5mg BiD and more close to 8 with 2 mg BiD    # Prophylaxis:   - PJP: TMP/Sulfa (Bactrim)   - CMV: Valcyte, 900mg completed [id number]    # Hypertension: Controlled; Goal BP: < 130/80   - Changes: No    - was on coreg and Nifedipine now discontinued since Tx    # Anemia in chronic renal disease: Hgb: Stable   - Iron studies: Replete    # Mineral Bone Disorder:    - Secondary renal hyperparathyroidism; PTH level is: Significantly elevated at 1217 (2018) on calcitriol , repeat PTH next month labs for follow up  - Vitamin D; level is: Normal  - Calcium; level is: Normal  - Phosphorus; level is: Normal    # Electrolytes:   - Potassium; level: Normal  - Magnesium; level: Normal    # Medical Compliance: Yes      Return visit: 2 months follow up      # Transplant History:  Etiology of kidney failure: diabetic nephropathy and diabetes mellitus type 1  Tx: SPK  Transplant: 2018 (Kidney / Pancreas), 10/10/2013 (Kidney)  Donor Type:  - Brain Death   Crossmatch at time of Tx: negative  DSA at time of Tx: No  Significant changes in immunosuppression: None  CMV IgG Ab Discordance (D+/R-): No  EBV IgG Ab Discordance (D+/R-): No  Significant transplant-related complications: None    Transplant Office Phone Number: [phone]    Assessment and plan was discussed with the patient and  he voiced his understanding and agreement.    Ashleigh Moon MD    Chief Complaint   Mr. 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 He has a PMH of uncontrolled DM HbA1c 10.5, CAD sp ENE 2010, significant HTN priro to transplant on clonidine , hydralazine and nifedipine, on IS with CsA and MMF prior to kidney Pancreas Tx on 11/12/18 .     His post transplant course has been stable other then some nausea and , he has been off his BP meds now.   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)     Diabetes mellitus with background retinopathy (H)     Polycystic kidney     NONSPECIFIC MEDICAL HISTORY     Coronary artery disease     Retinopathy     Hyperlipidemia LDL goal <70     Premature ventricular contractions (PVCs) (VPCs)     Kidney replaced by transplant     Immunosuppressed status (H)     Kidney transplant rejection     Hypertension     Anemia in chronic renal disease     Care  after organ transplant     Esophageal ulcer     Status post simultaneous kidney and pancreas transplant (H)     Other chronic pain     Nausea     Drug induced constipation       Social History   Social History     Tobacco Use     Smoking status: Never Smoker     Smokeless tobacco: Never Used   Substance Use Topics     Alcohol use: No     Drug use: No       Allergies   Allergies   Allergen Reactions     No Known Allergies        Medications   Current Outpatient Medications   Medication Sig     acetaminophen (TYLENOL) 325 MG tablet Take 3 tablets (975 mg) by mouth every 8 hours as needed for mild pain or fever (DO NOT USE WITH NORCO) (Patient not taking: Reported on 1/8/2019)     aspirin 81 MG EC tablet Take 1 tablet (81 mg) by mouth daily     atorvastatin (LIPITOR) 20 MG tablet Take 1 tablet (20 mg) by mouth daily     blood glucose monitoring (NO BRAND SPECIFIED) test strip Use to test blood sugar 4 times daily or as directed.     calcitRIOL (ROCALTROL) 0.25 MCG capsule Take 1 capsule (0.25 mcg) by mouth daily     cholecalciferol 2000 units CAPS Take 2,000 Units by mouth daily     fludrocortisone (FLORINEF) 0.1 MG tablet Take 1 tablet (0.1 mg) by mouth daily     HYDROcodone-acetaminophen (NORCO) 5-325 MG tablet Take 1 tablet by mouth every 6 hours as needed for severe pain     magnesium oxide (MAG-OX) 400 MG tablet Take 2 tablets (800mg) at noon and 2 tablets (800mg) at 6pm.     mycophenolic acid (GENERIC EQUIVALENT) 180 MG EC tablet Take 3 tablets (540 mg) by mouth 2 times daily     nystatin (MYCOSTATIN) 859621 UNIT/ML suspension Take 5 mLs (500,000 Units) by mouth 4 times daily     polyethylene glycol (MIRALAX) powder Take 17 g (1 capful) by mouth 2 times daily Hold for loose stools     sennosides (SENOKOT) 8.6 MG tablet Take 2 tablets by mouth 2 times daily Hold for loose stools (Patient taking differently: Take 1 tablet by mouth daily Hold for loose stools)     sulfamethoxazole-trimethoprim (BACTRIM/SEPTRA)  400-80 MG per tablet Take 1 tablet by mouth daily     tacrolimus (GENERIC EQUIVALENT) 0.5 MG capsule HOLD     tacrolimus (GENERIC EQUIVALENT) 1 MG capsule Take 2 capsules (2 mg) by mouth 2 times daily     valGANciclovir (VALCYTE) 450 MG tablet Take 2 tablets (900 mg) by mouth daily     No current facility-administered medications for this visit.      There are no discontinued medications.    Physical Exam   Vital Signs: There were no vitals taken for this visit.    GENERAL APPEARANCE: alert and no distress  HENT: mouth without ulcers or lesions  LYMPHATICS: no cervical or supraclavicular nodes  RESP: lungs clear to auscultation - no rales, rhonchi or wheezes  CV: regular rhythm, normal rate, no rub, no murmur  EDEMA: no LE edema bilaterally  ABDOMEN: soft, nondistended, nontender, bowel sounds normal  MS: extremities normal - no gross deformities noted, no evidence of inflammation in joints, no muscle tenderness  SKIN: no rash    Data     Renal Latest Ref Rng & Units 1/7/2019 1/3/2019 12/31/2018   Na 133 - 144 mmol/L 138 139 139   K 3.4 - 5.3 mmol/L 4.7 4.8 4.7   Cl 94 - 109 mmol/L 109 108 109   CO2 20 - 32 mmol/L 23 24 22   BUN 7 - 30 mg/dL 17 23 16   Cr 0.66 - 1.25 mg/dL 1.14 1.10 1.05   Cr (external) 0.5 - 1.5 mg/dL - - -   Glucose 70 - 99 mg/dL 124(H) 70 66(L)   Ca  8.5 - 10.1 mg/dL 9.2 9.6 9.0   Mg 1.6 - 2.3 mg/dL - - 1.8     Bone Health Latest Ref Rng & Units 12/31/2018 12/24/2018 12/20/2018   Phos 2.5 - 4.5 mg/dL 3.8 3.9 4.4   PTHi 18 - 80 pg/mL - - -   Vit D Def 20 - 75 ug/L - - -     Heme Latest Ref Rng & Units 1/7/2019 1/3/2019 12/31/2018   WBC 4.0 - 11.0 10e9/L 3.9(L) 3.2(L) 4.0   Hgb 13.3 - 17.7 g/dL 12.5(L) 12.1(L) 10.9(L)   Plt 150 - 450 10e9/L 293 344 344     Liver Latest Ref Rng & Units 11/11/2018 6/27/2018 8/3/2017   AP 40 - 150 U/L 189(H) - -   TBili 0.2 - 1.3 mg/dL 0.3 - -   ALT 0 - 70 U/L 14 - -   AST 0 - 45 U/L 9 - -   Tot Protein 6.8 - 8.8 g/dL 7.6 - -   Albumin 3.4 - 5.0 g/dL 4.0 4.2 4.2      Pancreas Latest Ref Rng & Units 1/7/2019 1/3/2019 12/31/2018   A1C 0 - 5.6 % - - -   Amylase 30 - 110 U/L 70 79 73   Lipase 73 - 393 U/L 232 275 224     Iron studies Latest Ref Rng & Units 9/11/2018 8/3/2017 11/18/2016   Iron 35 - 180 ug/dL 85 79 82   Iron sat 15 - 46 % 37 34 34   Ferritin 26 - 388 ng/mL 761(H) 736(H) -     [facility] Txp Virology Latest Ref Rng & Units 12/20/2018 12/10/2018 11/11/2018   CVM DNA Quant - - - -   CMV Quant <100 Copies/mL - - -   CMV QT Log <2.0 Log copies/mL - - -   BK Spec - Plasma Plasma -   BK Res BKNEG:BK Virus DNA Not Detected copies/mL BK Virus DNA Not Detected BK Virus DNA Not Detected -   BK Log <2.7 Log copies/mL Not Calculated Not Calculated -   Hep B Core NR:Nonreactive - - Nonreactive   Hep B Surf - - - -   HIV 1&2 NEG - - -        Recent Labs   Lab Test 12/31/18  0830 01/03/19  0830 01/07/19  1047   DOSTAC 2000 12/30/18 20:00 01/02/2019 1/6/19 @2130   TACROL 12.1 11.1 8.3     Recent Labs   Lab Test 12/20/18  0755 12/24/18  0820 12/31/18  0830   DOSMPA Plasma, EDTA anticoagulant 2000 12/23/18 2000 12/30/18   MPACID 3.58* 1.01 1.01   MPAG 70.8 54.1 43.6     Attestation:  This patient has been seen and evaluated by me, Jose L De Los Santos MD.  I have reviewed the note and agree with plan of care as documented by the fellow.

## 2019-01-08 NOTE — NURSING NOTE
"Chief Complaint   Patient presents with     RECHECK     Follow up Kidney TX     Vital signs:  Temp: 98.5  F (36.9  C)   BP: 130/85 Pulse: 89     SpO2: 99 %       Weight: 80 kg (176 lb 6.4 oz)  Estimated body mass index is 27.63 kg/m  as calculated from the following:    Height as of 12/20/18: 1.702 m (5' 7\").    Weight as of this encounter: 80 kg (176 lb 6.4 oz).        Kristen Jeter    "

## 2019-01-08 NOTE — PATIENT INSTRUCTIONS
Recommendations from today's MTM visit:                                                    MTM (medication therapy management) is a service provided by a clinical pharmacist designed to help you get the most of out of your medicines.     1. Make sure you are taking Valcyte 450mg 2 tablets once daily    2. Talk with transplant doctor about stopping your Pantoprazole.     3. I will talk to the team about increasing your Atorvastatin to 20mg daily for cardiovascular health.     Next MTM visit: 4 months. 3/4/19 at 11:30am.     To schedule another MTM appointment, please call the clinic directly or you may call the MTM scheduling line at [phone] or toll-free at [phone].     My Clinical Pharmacist's contact information:                                                      It was a pleasure talking with you today!  Please feel free to contact me with any questions or concerns you have.      Luke Tesfaye, PharmD  MTM Pharmacist    Phone: [phone]     You may receive a survey about the MTM services you received.  I would appreciate your feedback to help me serve you better in the future. Please fill it out and return it when you can. Your comments will be anonymous.

## 2019-01-08 NOTE — PROGRESS NOTES
SUBJECTIVE/OBJECTIVE:                           Amadou Lewis is a 52 year old male coming in for a follow up visit for Medication Therapy Management.  He was referred to me from txp team.    Chief Complaint: 2 months post txp.    Allergies/ADRs: Reviewed in Epic  Tobacco: No tobacco use  Alcohol: Less than 1 beverage / month  Caffeine: 1 cups/day of coffee  Activity: , works for the [employer], there is some psychical work to it. .   PMH: Reviewed in Epic    Medication Adherence/Access:  Using pill box, has to eat with his medications.   Patient takes medications 4 time(s) per day. 8AM and 8PM  Per patient, misses medication 0 times per week.   The patient fills medications at [facility]: YES for txp medications.     Renal/ Panc Transplant:  Current immunosuppressants include Tacrolimus 2mg BID(0-6 months post tx, goal 8-10) and Myfortic 540mg BID.  Pt reports Tremors when on the 3mg, but not at current TAC dose..   Transplant date: first kidney transplant  and 18 with kidney/pancreas.   Estimated Creatinine Clearance: 79.3 mL/min (based on SCr of 1.14 mg/dL).  CMV prophylaxis: CrCl >/=60 mL/minute: Valcyte 450mg daily ( pt stated he is taking 450mg, maybe 900mg, he is unsure) Treat 3 months post tx   PCP prophylaxis: Bactrim  S S daily  Antifungal Prophylaxis: Nystatin QID x 3 months  PPI use: Pantoprazole 6-8 weeks typically, no GERD sx.   Current supplements for electrolyte replacement: Mag Oxide 800mg BID ( 2 hours from MMF)  Magnesium   Date Value Ref Range Status   2018 1.8 1.6 - 2.3 mg/dL Final   Tx Coordinator: Zora Hartley Tx MD: Dr. De Los Santos today, Using Med Card: Yes  Recent Infections:  None noted  Home BPs: not discussed  B-120  Skin care: not discussed.  Immunizations: annual flu shot unknown, [id number]: 1 dose , due 6 months post txp; Prevnar 13: Due 6 months post txp if he has not gotten already. TDaP:  Due     Hyperlipidemia: Current therapy includes  Atorvastatin 5mg daily, Aspirin 81mg daily (no bleeds).  Pt reports no significant myalgias or other side effects. Two stents placed 2005.   The 10-year ASCVD risk score (Guero SINGH Jr., et al., 2013) is: 9.4%    Values used to calculate the score:      Age: 53 years      Sex: Male      Is Non- : No      Diabetic: Yes      Tobacco smoker: No      Systolic Blood Pressure: 121 mmHg      Is BP treated: No      HDL Cholesterol: 34 mg/dL      Total Cholesterol: 147 mg/dL    Polycystic kidney disease pain: Pt is taking Norco 5/325mg 2 tablets BID-TID for polycystic pain. Does not use Lidocaine patches or APAP.    BMs: Miralax 17 g BID, Senna 1 tablet once daily. 1 BM daily. Didn't have a BM for a week after surgery.     Nausea/Vom/ GERD: denies nausea/ GERD symptoms. Taking Pantoprazole 40mg daily.      Today's Vitals: There were no vitals taken for this visit.      ASSESSMENT:                             Current medications were reviewed today from memory, pt did not bring in med card.     Medication Adherence: good, no issues identified. Unclear valcyte dose.     Renal/ Panc Transplant:  Pt to double check his Valcyte dose when he is home, make sure he is taking 900mg daily.    Hyperlipidemia: Pt has history of CVD, and is post kidney txp. He was on 5 mg of Atorvastatin since he was taking Cyclosporine for his previous txp (DDIx with Atorvastatin). We will increase to Atorvastatin 20mg daily for now to see if he tolerates it. Could consider increasing to high intensity statin in the future. Approved verbally by Dr. De Los Santos.     Polycystic kidney disease pain: Stable.     BMs: Miralax 17 g BID, Senna 1 tablet once daily. 1 BM daily. Didn't have a BM for a week after surgery.     Nausea/Vom/ GERD: Needs improvement. Pt has completed 8 weeks of Pantoprazole, he does not need to continue PPI. Spoke with Dr. De Los Santos who approved DCing this medication.     PLAN:                            Pt to..  1. Stop  Pantoprazole, reach out if he has rebound GERD symptoms.  2. Increase Atorvastatin to 20mg daily.   3. Double check Valcyte dose, should be 900mg daily, can discontinue on 2/12/19    I spent 45 minutes with this patient today. Plan was verbally approved by Dr. De Los Santos. A copy of the visit note was provided to the patient's referring provider.    Will follow up in 2 months.    The patient was given a summary of these recommendations as an after visit summary.     Luke Tesfaye, PharmD  [facility] Pharmacist    Phone: [phone]

## 2019-01-08 NOTE — TELEPHONE ENCOUNTER
Spoke to patient who reports recent BP readings: 130/60 and 125/67, patient denies any lightheadedness or dizziness.

## 2019-01-08 NOTE — LETTER
2019      RE: Amadou Lewis  [address]  [place] [address]       ACUTE TRANSPLANT NEPHROLOGY VISIT    Assessment & Plan   # DDKT and SPK: baseline Cr ~ 1.1; Stable   - Proteinuria: Not checked recently   - Latest DSA: No Date of DSA last checked: 18   - BK: No   - Kidney Tx Biopsy: No    # Pancreas Tx (SPK):Enteric drained     - Blood glucose: Euglycemia   - HbA1c: Stable last checked 9.1 prior to transplant (2018)   - Pancreatic enzymes: Stable   - Date of DSA last checked: 18 Latest DSA: No    # Immunosuppression: Tacrolimus immediate release (goal  8-10) and Mycophenolic acid (goal  1-3.5)   - Changes: No, MPA at 540mg BiD and tacrolimus 2mg BiD   - Tacrolimus levels 11-12 at 2.5mg BiD and more close to 8 with 2 mg BiD    # Prophylaxis:   - PJP: TMP/Sulfa (Bactrim)   - CMV: Valcyte, 900mg completed [id number]    # Hypertension: Controlled; Goal BP: < 130/80   - Changes: No    - was on coreg and Nifedipine now discontinued since Tx    # Anemia in chronic renal disease: Hgb: Stable   - Iron studies: Replete    # Mineral Bone Disorder:    - Secondary renal hyperparathyroidism; PTH level is: Significantly elevated at 1217 (2018) on calcitriol , repeat PTH next month labs for follow up  - Vitamin D; level is: Normal  - Calcium; level is: Normal  - Phosphorus; level is: Normal    # Electrolytes:   - Potassium; level: Normal  - Magnesium; level: Normal    # Medical Compliance: Yes      Return visit: 2 months follow up      # Transplant History:  Etiology of kidney failure: diabetic nephropathy and diabetes mellitus type 1  Tx: SPK  Transplant: 2018 (Kidney / Pancreas), 10/10/2013 (Kidney)  Donor Type:  - Brain Death   Crossmatch at time of Tx: negative  DSA at time of Tx: No  Significant changes in immunosuppression: None  CMV IgG Ab Discordance (D+/R-): No  EBV IgG Ab Discordance (D+/R-): No  Significant transplant-related complications: None    Transplant Office  Phone Number: [phone]    Assessment and plan was discussed with the patient and he voiced his understanding and agreement.    Ashleigh Moon MD    Chief Complaint   Mr. 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 He has a PMH of uncontrolled DM HbA1c 10.5, CAD sp ENE 2010, significant HTN priro to transplant on clonidine , hydralazine and nifedipine, on IS with CsA and MMF prior to kidney Pancreas Tx on 11/12/18 .     His post transplant course has been stable other then some nausea and , he has been off his BP meds now.   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)     Diabetes mellitus with background retinopathy (H)     Polycystic kidney     NONSPECIFIC MEDICAL HISTORY     Coronary artery disease     Retinopathy     Hyperlipidemia LDL goal <70     Premature ventricular contractions (PVCs) (VPCs)     Kidney replaced by transplant     Immunosuppressed status (H)     Kidney  transplant rejection     Hypertension     Anemia in chronic renal disease     Care after organ transplant     Esophageal ulcer     Status post simultaneous kidney and pancreas transplant (H)     Other chronic pain     Nausea     Drug induced constipation       Social History   Social History     Tobacco Use     Smoking status: Never Smoker     Smokeless tobacco: Never Used   Substance Use Topics     Alcohol use: No     Drug use: No       Allergies   Allergies   Allergen Reactions     No Known Allergies        Medications   Current Outpatient Medications   Medication Sig     acetaminophen (TYLENOL) 325 MG tablet Take 3 tablets (975 mg) by mouth every 8 hours as needed for mild pain or fever (DO NOT USE WITH NORCO) (Patient not taking: Reported on 1/8/2019)     aspirin 81 MG EC tablet Take 1 tablet (81 mg) by mouth daily     atorvastatin (LIPITOR) 20 MG tablet Take 1 tablet (20 mg) by mouth daily     blood glucose monitoring (NO BRAND SPECIFIED) test strip Use to test blood sugar 4 times daily or as directed.     calcitRIOL (ROCALTROL) 0.25 MCG capsule Take 1 capsule (0.25 mcg) by mouth daily     cholecalciferol 2000 units CAPS Take 2,000 Units by mouth daily     fludrocortisone (FLORINEF) 0.1 MG tablet Take 1 tablet (0.1 mg) by mouth daily     HYDROcodone-acetaminophen (NORCO) 5-325 MG tablet Take 1 tablet by mouth every 6 hours as needed for severe pain     magnesium oxide (MAG-OX) 400 MG tablet Take 2 tablets (800mg) at noon and 2 tablets (800mg) at 6pm.     mycophenolic acid (GENERIC EQUIVALENT) 180 MG EC tablet Take 3 tablets (540 mg) by mouth 2 times daily     nystatin (MYCOSTATIN) 021103 UNIT/ML suspension Take 5 mLs (500,000 Units) by mouth 4 times daily     polyethylene glycol (MIRALAX) powder Take 17 g (1 capful) by mouth 2 times daily Hold for loose stools     sennosides (SENOKOT) 8.6 MG tablet Take 2 tablets by mouth 2 times daily Hold for loose stools (Patient taking differently: Take 1 tablet by mouth  daily Hold for loose stools)     sulfamethoxazole-trimethoprim (BACTRIM/SEPTRA) 400-80 MG per tablet Take 1 tablet by mouth daily     tacrolimus (GENERIC EQUIVALENT) 0.5 MG capsule HOLD     tacrolimus (GENERIC EQUIVALENT) 1 MG capsule Take 2 capsules (2 mg) by mouth 2 times daily     valGANciclovir (VALCYTE) 450 MG tablet Take 2 tablets (900 mg) by mouth daily     No current facility-administered medications for this visit.      There are no discontinued medications.    Physical Exam   Vital Signs: There were no vitals taken for this visit.    GENERAL APPEARANCE: alert and no distress  HENT: mouth without ulcers or lesions  LYMPHATICS: no cervical or supraclavicular nodes  RESP: lungs clear to auscultation - no rales, rhonchi or wheezes  CV: regular rhythm, normal rate, no rub, no murmur  EDEMA: no LE edema bilaterally  ABDOMEN: soft, nondistended, nontender, bowel sounds normal  MS: extremities normal - no gross deformities noted, no evidence of inflammation in joints, no muscle tenderness  SKIN: no rash    Data     Renal Latest Ref Rng & Units 1/7/2019 1/3/2019 12/31/2018   Na 133 - 144 mmol/L 138 139 139   K 3.4 - 5.3 mmol/L 4.7 4.8 4.7   Cl 94 - 109 mmol/L 109 108 109   CO2 20 - 32 mmol/L 23 24 22   BUN 7 - 30 mg/dL 17 23 16   Cr 0.66 - 1.25 mg/dL 1.14 1.10 1.05   Cr (external) 0.5 - 1.5 mg/dL - - -   Glucose 70 - 99 mg/dL 124(H) 70 66(L)   Ca  8.5 - 10.1 mg/dL 9.2 9.6 9.0   Mg 1.6 - 2.3 mg/dL - - 1.8     Bone Health Latest Ref Rng & Units 12/31/2018 12/24/2018 12/20/2018   Phos 2.5 - 4.5 mg/dL 3.8 3.9 4.4   PTHi 18 - 80 pg/mL - - -   Vit D Def 20 - 75 ug/L - - -     Heme Latest Ref Rng & Units 1/7/2019 1/3/2019 12/31/2018   WBC 4.0 - 11.0 10e9/L 3.9(L) 3.2(L) 4.0   Hgb 13.3 - 17.7 g/dL 12.5(L) 12.1(L) 10.9(L)   Plt 150 - 450 10e9/L 293 344 344     Liver Latest Ref Rng & Units 11/11/2018 6/27/2018 8/3/2017   AP 40 - 150 U/L 189(H) - -   TBili 0.2 - 1.3 mg/dL 0.3 - -   ALT 0 - 70 U/L 14 - -   AST 0 - 45 U/L 9 - -    Tot Protein 6.8 - 8.8 g/dL 7.6 - -   Albumin 3.4 - 5.0 g/dL 4.0 4.2 4.2     Pancreas Latest Ref Rng & Units 1/7/2019 1/3/2019 12/31/2018   A1C 0 - 5.6 % - - -   Amylase 30 - 110 U/L 70 79 73   Lipase 73 - 393 U/L 232 275 224     Iron studies Latest Ref Rng & Units 9/11/2018 8/3/2017 11/18/2016   Iron 35 - 180 ug/dL 85 79 82   Iron sat 15 - 46 % 37 34 34   Ferritin 26 - 388 ng/mL 761(H) 736(H) -     [facility] Txp Virology Latest Ref Rng & Units 12/20/2018 12/10/2018 11/11/2018   CVM DNA Quant - - - -   CMV Quant <100 Copies/mL - - -   CMV QT Log <2.0 Log copies/mL - - -   BK Spec - Plasma Plasma -   BK Res BKNEG:BK Virus DNA Not Detected copies/mL BK Virus DNA Not Detected BK Virus DNA Not Detected -   BK Log <2.7 Log copies/mL Not Calculated Not Calculated -   Hep B Core NR:Nonreactive - - Nonreactive   Hep B Surf - - - -   HIV 1&2 NEG - - -        Recent Labs   Lab Test 12/31/18  0830 01/03/19  0830 01/07/19  1047   DOSTAC 2000 12/30/18 20:00 01/02/2019 1/6/19 @2130   TACROL 12.1 11.1 8.3     Recent Labs   Lab Test 12/20/18  0755 12/24/18  0820 12/31/18  0830   DOSMPA Plasma, EDTA anticoagulant 2000 12/23/18 2000 12/30/18   MPACID 3.58* 1.01 1.01   MPAG 70.8 54.1 43.6     Attestation:  This patient has been seen and evaluated by me, Jose L De Los Santos MD.  I have reviewed the note and agree with plan of care as documented by the fellow.       Early Post Transplant

## 2019-01-08 NOTE — Clinical Note
FYI: Stopped Pantoprazole, increased Atorvastatin to 20mg for now (he was on low dose because he was on CSA for previous transplant).

## 2019-01-08 NOTE — TELEPHONE ENCOUNTER
Please call Sean to record most recent BPs (lowest and highest).  Please ensure Sean has no lightheadedness or dizziness.

## 2019-01-10 DIAGNOSIS — Z48.298 AFTERCARE FOLLOWING ORGAN TRANSPLANT: ICD-10-CM

## 2019-01-10 DIAGNOSIS — Z94.0 KIDNEY REPLACED BY TRANSPLANT: ICD-10-CM

## 2019-01-10 DIAGNOSIS — Z94.83 PANCREAS REPLACED BY TRANSPLANT (H): ICD-10-CM

## 2019-01-10 LAB
AMYLASE SERPL-CCNC: 71 U/L (ref 30–110)
ANION GAP SERPL CALCULATED.3IONS-SCNC: 9 MMOL/L (ref 3–14)
BASOPHILS # BLD AUTO: 0 10E9/L (ref 0–0.2)
BASOPHILS NFR BLD AUTO: 0.6 %
BUN SERPL-MCNC: 17 MG/DL (ref 7–30)
CALCIUM SERPL-MCNC: 9.3 MG/DL (ref 8.5–10.1)
CHLORIDE SERPL-SCNC: 110 MMOL/L (ref 94–109)
CO2 SERPL-SCNC: 20 MMOL/L (ref 20–32)
CREAT SERPL-MCNC: 1.13 MG/DL (ref 0.66–1.25)
DIFFERENTIAL METHOD BLD: ABNORMAL
EOSINOPHIL # BLD AUTO: 0 10E9/L (ref 0–0.7)
EOSINOPHIL NFR BLD AUTO: 0.9 %
ERYTHROCYTE [DISTWIDTH] IN BLOOD BY AUTOMATED COUNT: 16.6 % (ref 10–15)
GFR SERPL CREATININE-BSD FRML MDRD: 73 ML/MIN/{1.73_M2}
GLUCOSE SERPL-MCNC: 98 MG/DL (ref 70–99)
HCT VFR BLD AUTO: 38.2 % (ref 40–53)
HGB BLD-MCNC: 12.1 G/DL (ref 13.3–17.7)
LIPASE SERPL-CCNC: 243 U/L (ref 73–393)
LYMPHOCYTES # BLD AUTO: 0.2 10E9/L (ref 0.8–5.3)
LYMPHOCYTES NFR BLD AUTO: 5 %
MAGNESIUM SERPL-MCNC: 1.8 MG/DL (ref 1.6–2.3)
MCH RBC QN AUTO: 28.1 PG (ref 26.5–33)
MCHC RBC AUTO-ENTMCNC: 31.7 G/DL (ref 31.5–36.5)
MCV RBC AUTO: 89 FL (ref 78–100)
MONOCYTES # BLD AUTO: 0.2 10E9/L (ref 0–1.3)
MONOCYTES NFR BLD AUTO: 5.6 %
NEUTROPHILS # BLD AUTO: 2.8 10E9/L (ref 1.6–8.3)
NEUTROPHILS NFR BLD AUTO: 87.9 %
PHOSPHATE SERPL-MCNC: 4.4 MG/DL (ref 2.5–4.5)
PLATELET # BLD AUTO: 262 10E9/L (ref 150–450)
POTASSIUM SERPL-SCNC: 5 MMOL/L (ref 3.4–5.3)
RBC # BLD AUTO: 4.31 10E12/L (ref 4.4–5.9)
SODIUM SERPL-SCNC: 139 MMOL/L (ref 133–144)
WBC # BLD AUTO: 3.2 10E9/L (ref 4–11)

## 2019-01-10 PROCEDURE — 80180 DRUG SCRN QUAN MYCOPHENOLATE: CPT | Performed by: SURGERY

## 2019-01-10 PROCEDURE — 85025 COMPLETE CBC W/AUTO DIFF WBC: CPT | Performed by: SURGERY

## 2019-01-10 PROCEDURE — 80048 BASIC METABOLIC PNL TOTAL CA: CPT | Performed by: SURGERY

## 2019-01-10 PROCEDURE — 84100 ASSAY OF PHOSPHORUS: CPT | Performed by: SURGERY

## 2019-01-10 PROCEDURE — 36415 COLL VENOUS BLD VENIPUNCTURE: CPT | Performed by: SURGERY

## 2019-01-10 PROCEDURE — 80197 ASSAY OF TACROLIMUS: CPT | Performed by: SURGERY

## 2019-01-10 PROCEDURE — 83690 ASSAY OF LIPASE: CPT | Performed by: SURGERY

## 2019-01-10 PROCEDURE — 82150 ASSAY OF AMYLASE: CPT | Performed by: SURGERY

## 2019-01-10 PROCEDURE — 83735 ASSAY OF MAGNESIUM: CPT | Performed by: SURGERY

## 2019-01-11 LAB
MYCOPHENOLATE SERPL LC/MS/MS-MCNC: 1.8 MG/L (ref 1–3.5)
MYCOPHENOLATE-G SERPL LC/MS/MS-MCNC: 49.8 MG/L (ref 30–95)
TACROLIMUS BLD-MCNC: 9 UG/L (ref 5–15)
TME LAST DOSE: NORMAL H
TME LAST DOSE: NORMAL H

## 2019-01-14 DIAGNOSIS — Z94.0 KIDNEY REPLACED BY TRANSPLANT: ICD-10-CM

## 2019-01-14 DIAGNOSIS — Z94.83 PANCREAS TRANSPLANTED (H): Primary | ICD-10-CM

## 2019-01-14 DIAGNOSIS — Z94.83 PANCREAS REPLACED BY TRANSPLANT (H): ICD-10-CM

## 2019-01-14 DIAGNOSIS — Z48.298 AFTERCARE FOLLOWING ORGAN TRANSPLANT: ICD-10-CM

## 2019-01-14 LAB
AMYLASE SERPL-CCNC: 82 U/L (ref 30–110)
ANION GAP SERPL CALCULATED.3IONS-SCNC: 7 MMOL/L (ref 3–14)
BASOPHILS # BLD AUTO: 0 10E9/L (ref 0–0.2)
BASOPHILS NFR BLD AUTO: 1 %
BUN SERPL-MCNC: 25 MG/DL (ref 7–30)
CALCIUM SERPL-MCNC: 9.2 MG/DL (ref 8.5–10.1)
CHLORIDE SERPL-SCNC: 108 MMOL/L (ref 94–109)
CO2 SERPL-SCNC: 21 MMOL/L (ref 20–32)
CREAT SERPL-MCNC: 1.17 MG/DL (ref 0.66–1.25)
DIFFERENTIAL METHOD BLD: ABNORMAL
EOSINOPHIL # BLD AUTO: 0 10E9/L (ref 0–0.7)
EOSINOPHIL NFR BLD AUTO: 1 %
ERYTHROCYTE [DISTWIDTH] IN BLOOD BY AUTOMATED COUNT: 16.7 % (ref 10–15)
GFR SERPL CREATININE-BSD FRML MDRD: 70 ML/MIN/{1.73_M2}
GLUCOSE SERPL-MCNC: 89 MG/DL (ref 70–99)
HCT VFR BLD AUTO: 39.7 % (ref 40–53)
HGB BLD-MCNC: 12.3 G/DL (ref 13.3–17.7)
LIPASE SERPL-CCNC: 323 U/L (ref 73–393)
LYMPHOCYTES # BLD AUTO: 0.2 10E9/L (ref 0.8–5.3)
LYMPHOCYTES NFR BLD AUTO: 5.2 %
MAGNESIUM SERPL-MCNC: 2 MG/DL (ref 1.6–2.3)
MCH RBC QN AUTO: 27.6 PG (ref 26.5–33)
MCHC RBC AUTO-ENTMCNC: 31 G/DL (ref 31.5–36.5)
MCV RBC AUTO: 89 FL (ref 78–100)
MONOCYTES # BLD AUTO: 0.2 10E9/L (ref 0–1.3)
MONOCYTES NFR BLD AUTO: 6.6 %
NEUTROPHILS # BLD AUTO: 2.5 10E9/L (ref 1.6–8.3)
NEUTROPHILS NFR BLD AUTO: 86.2 %
PHOSPHATE SERPL-MCNC: 4.2 MG/DL (ref 2.5–4.5)
PLATELET # BLD AUTO: 228 10E9/L (ref 150–450)
POTASSIUM SERPL-SCNC: 4.8 MMOL/L (ref 3.4–5.3)
RBC # BLD AUTO: 4.46 10E12/L (ref 4.4–5.9)
SODIUM SERPL-SCNC: 136 MMOL/L (ref 133–144)
WBC # BLD AUTO: 2.9 10E9/L (ref 4–11)

## 2019-01-14 PROCEDURE — 84100 ASSAY OF PHOSPHORUS: CPT | Performed by: SURGERY

## 2019-01-14 PROCEDURE — 80180 DRUG SCRN QUAN MYCOPHENOLATE: CPT | Performed by: SURGERY

## 2019-01-14 PROCEDURE — 80197 ASSAY OF TACROLIMUS: CPT | Performed by: SURGERY

## 2019-01-14 PROCEDURE — 83735 ASSAY OF MAGNESIUM: CPT | Performed by: SURGERY

## 2019-01-14 PROCEDURE — 36415 COLL VENOUS BLD VENIPUNCTURE: CPT | Performed by: SURGERY

## 2019-01-14 PROCEDURE — 83690 ASSAY OF LIPASE: CPT | Performed by: SURGERY

## 2019-01-14 PROCEDURE — 80048 BASIC METABOLIC PNL TOTAL CA: CPT | Performed by: SURGERY

## 2019-01-14 PROCEDURE — 82150 ASSAY OF AMYLASE: CPT | Performed by: SURGERY

## 2019-01-14 PROCEDURE — 85025 COMPLETE CBC W/AUTO DIFF WBC: CPT | Performed by: SURGERY

## 2019-01-14 RX ORDER — VALGANCICLOVIR 450 MG/1
900 TABLET, FILM COATED ORAL DAILY
Qty: 60 TABLET | Refills: 0 | Status: SHIPPED | OUTPATIENT
Start: 2019-01-14 | End: 2019-02-08

## 2019-01-15 DIAGNOSIS — Z94.83 PANCREAS TRANSPLANTED (H): Primary | ICD-10-CM

## 2019-01-15 DIAGNOSIS — Z94.0 KIDNEY REPLACED BY TRANSPLANT: ICD-10-CM

## 2019-01-15 DIAGNOSIS — Z94.83 PANCREAS REPLACED BY TRANSPLANT (H): ICD-10-CM

## 2019-01-15 LAB
TACROLIMUS BLD-MCNC: 10.5 UG/L (ref 5–15)
TME LAST DOSE: NORMAL H

## 2019-01-15 RX ORDER — TACROLIMUS 0.5 MG/1
CAPSULE ORAL
Qty: 60 CAPSULE | Refills: 11
Start: 2019-01-15 | End: 2019-01-22

## 2019-01-15 RX ORDER — TACROLIMUS 1 MG/1
2 CAPSULE ORAL 2 TIMES DAILY
Qty: 120 CAPSULE | Refills: 11 | Status: SHIPPED | OUTPATIENT
Start: 2019-01-15 | End: 2019-01-22

## 2019-01-15 NOTE — TELEPHONE ENCOUNTER
ISSUE:   Tacrolimus level 10.5 on 1/14/19, goal 8-10, dose 2.5mg AM, 2 mg PM    Creatinine slightly elevated, lipase slightly elevated.    PLAN:   Please call pt and confirm this was a good 12-hour trough. Verify dose as above. Confirm no new medications or illness (trina. Diarrhea). If good trough, decrease dose to 2 mg BID and recheck level as scheduled.    Ensure good oral hydration, no diarrhea and no fevers or abdominal pain.

## 2019-01-15 NOTE — TELEPHONE ENCOUNTER
Spoke to patient who confirms current Tacrolimus dose and good 12 hour trough level.  Patient denies any new medications or recent illness.  Patient verbalizes understanding to decrease dose to 2 mg BID.  Patient ensures good oral hydration, denies any N/V/D, fevers or abdominal pain.

## 2019-01-16 LAB
MYCOPHENOLATE SERPL LC/MS/MS-MCNC: 1.23 MG/L (ref 1–3.5)
MYCOPHENOLATE-G SERPL LC/MS/MS-MCNC: 29.7 MG/L (ref 30–95)
TME LAST DOSE: ABNORMAL H

## 2019-01-17 DIAGNOSIS — Z94.83 PANCREAS REPLACED BY TRANSPLANT (H): ICD-10-CM

## 2019-01-17 DIAGNOSIS — Z94.0 KIDNEY REPLACED BY TRANSPLANT: ICD-10-CM

## 2019-01-17 DIAGNOSIS — Z48.298 AFTERCARE FOLLOWING ORGAN TRANSPLANT: ICD-10-CM

## 2019-01-17 LAB
AMYLASE SERPL-CCNC: 84 U/L (ref 30–110)
ANION GAP SERPL CALCULATED.3IONS-SCNC: 9 MMOL/L (ref 3–14)
BASOPHILS # BLD AUTO: 0 10E9/L (ref 0–0.2)
BASOPHILS NFR BLD AUTO: 0.7 %
BUN SERPL-MCNC: 23 MG/DL (ref 7–30)
CALCIUM SERPL-MCNC: 9.3 MG/DL (ref 8.5–10.1)
CHLORIDE SERPL-SCNC: 109 MMOL/L (ref 94–109)
CO2 SERPL-SCNC: 21 MMOL/L (ref 20–32)
CREAT SERPL-MCNC: 1.01 MG/DL (ref 0.66–1.25)
DIFFERENTIAL METHOD BLD: ABNORMAL
EOSINOPHIL # BLD AUTO: 0.1 10E9/L (ref 0–0.7)
EOSINOPHIL NFR BLD AUTO: 1.8 %
ERYTHROCYTE [DISTWIDTH] IN BLOOD BY AUTOMATED COUNT: 16.9 % (ref 10–15)
GFR SERPL CREATININE-BSD FRML MDRD: 83 ML/MIN/{1.73_M2}
GLUCOSE SERPL-MCNC: 81 MG/DL (ref 70–99)
HCT VFR BLD AUTO: 40.4 % (ref 40–53)
HGB BLD-MCNC: 12.8 G/DL (ref 13.3–17.7)
LIPASE SERPL-CCNC: 302 U/L (ref 73–393)
LYMPHOCYTES # BLD AUTO: 0.2 10E9/L (ref 0.8–5.3)
LYMPHOCYTES NFR BLD AUTO: 5.5 %
MAGNESIUM SERPL-MCNC: 1.7 MG/DL (ref 1.6–2.3)
MCH RBC QN AUTO: 28.3 PG (ref 26.5–33)
MCHC RBC AUTO-ENTMCNC: 31.7 G/DL (ref 31.5–36.5)
MCV RBC AUTO: 89 FL (ref 78–100)
MONOCYTES # BLD AUTO: 0.2 10E9/L (ref 0–1.3)
MONOCYTES NFR BLD AUTO: 7.7 %
NEUTROPHILS # BLD AUTO: 2.3 10E9/L (ref 1.6–8.3)
NEUTROPHILS NFR BLD AUTO: 84.3 %
PHOSPHATE SERPL-MCNC: 4.2 MG/DL (ref 2.5–4.5)
PLATELET # BLD AUTO: 246 10E9/L (ref 150–450)
POTASSIUM SERPL-SCNC: 4.6 MMOL/L (ref 3.4–5.3)
RBC # BLD AUTO: 4.53 10E12/L (ref 4.4–5.9)
SODIUM SERPL-SCNC: 139 MMOL/L (ref 133–144)
TACROLIMUS BLD-MCNC: 8.3 UG/L (ref 5–15)
TME LAST DOSE: NORMAL H
WBC # BLD AUTO: 2.7 10E9/L (ref 4–11)

## 2019-01-17 PROCEDURE — 80048 BASIC METABOLIC PNL TOTAL CA: CPT | Performed by: SURGERY

## 2019-01-17 PROCEDURE — 36415 COLL VENOUS BLD VENIPUNCTURE: CPT | Performed by: SURGERY

## 2019-01-17 PROCEDURE — 83690 ASSAY OF LIPASE: CPT | Performed by: SURGERY

## 2019-01-17 PROCEDURE — 83735 ASSAY OF MAGNESIUM: CPT | Performed by: SURGERY

## 2019-01-17 PROCEDURE — 80180 DRUG SCRN QUAN MYCOPHENOLATE: CPT | Performed by: SURGERY

## 2019-01-17 PROCEDURE — 85025 COMPLETE CBC W/AUTO DIFF WBC: CPT | Performed by: SURGERY

## 2019-01-17 PROCEDURE — 84100 ASSAY OF PHOSPHORUS: CPT | Performed by: SURGERY

## 2019-01-17 PROCEDURE — 80197 ASSAY OF TACROLIMUS: CPT | Performed by: SURGERY

## 2019-01-17 PROCEDURE — 82150 ASSAY OF AMYLASE: CPT | Performed by: SURGERY

## 2019-01-19 LAB
MYCOPHENOLATE SERPL LC/MS/MS-MCNC: 9.34 MG/L (ref 1–3.5)
MYCOPHENOLATE-G SERPL LC/MS/MS-MCNC: 36.6 MG/L (ref 30–95)
TME LAST DOSE: ABNORMAL H

## 2019-01-21 DIAGNOSIS — Z48.298 AFTERCARE FOLLOWING ORGAN TRANSPLANT: ICD-10-CM

## 2019-01-21 DIAGNOSIS — Z94.0 KIDNEY REPLACED BY TRANSPLANT: ICD-10-CM

## 2019-01-21 DIAGNOSIS — Z94.83 PANCREAS REPLACED BY TRANSPLANT (H): ICD-10-CM

## 2019-01-21 LAB
AMYLASE SERPL-CCNC: 82 U/L (ref 30–110)
ANION GAP SERPL CALCULATED.3IONS-SCNC: 6 MMOL/L (ref 3–14)
BASOPHILS # BLD AUTO: 0 10E9/L (ref 0–0.2)
BASOPHILS NFR BLD AUTO: 1.1 %
BUN SERPL-MCNC: 22 MG/DL (ref 7–30)
CALCIUM SERPL-MCNC: 9 MG/DL (ref 8.5–10.1)
CHLORIDE SERPL-SCNC: 111 MMOL/L (ref 94–109)
CO2 SERPL-SCNC: 21 MMOL/L (ref 20–32)
CREAT SERPL-MCNC: 0.99 MG/DL (ref 0.66–1.25)
DIFFERENTIAL METHOD BLD: ABNORMAL
EOSINOPHIL # BLD AUTO: 0.1 10E9/L (ref 0–0.7)
EOSINOPHIL NFR BLD AUTO: 2.1 %
ERYTHROCYTE [DISTWIDTH] IN BLOOD BY AUTOMATED COUNT: 16.8 % (ref 10–15)
GFR SERPL CREATININE-BSD FRML MDRD: 86 ML/MIN/{1.73_M2}
GLUCOSE SERPL-MCNC: 91 MG/DL (ref 70–99)
HCT VFR BLD AUTO: 41 % (ref 40–53)
HGB BLD-MCNC: 12.6 G/DL (ref 13.3–17.7)
LIPASE SERPL-CCNC: 299 U/L (ref 73–393)
LYMPHOCYTES # BLD AUTO: 0.2 10E9/L (ref 0.8–5.3)
LYMPHOCYTES NFR BLD AUTO: 6.4 %
MAGNESIUM SERPL-MCNC: 1.6 MG/DL (ref 1.6–2.3)
MCH RBC QN AUTO: 27.5 PG (ref 26.5–33)
MCHC RBC AUTO-ENTMCNC: 30.7 G/DL (ref 31.5–36.5)
MCV RBC AUTO: 89 FL (ref 78–100)
MONOCYTES # BLD AUTO: 0.3 10E9/L (ref 0–1.3)
MONOCYTES NFR BLD AUTO: 12 %
NEUTROPHILS # BLD AUTO: 2.2 10E9/L (ref 1.6–8.3)
NEUTROPHILS NFR BLD AUTO: 78.4 %
PHOSPHATE SERPL-MCNC: 3.7 MG/DL (ref 2.5–4.5)
PLATELET # BLD AUTO: 255 10E9/L (ref 150–450)
POTASSIUM SERPL-SCNC: 4.9 MMOL/L (ref 3.4–5.3)
RBC # BLD AUTO: 4.59 10E12/L (ref 4.4–5.9)
SODIUM SERPL-SCNC: 138 MMOL/L (ref 133–144)
TACROLIMUS BLD-MCNC: 7.6 UG/L (ref 5–15)
TME LAST DOSE: NORMAL H
WBC # BLD AUTO: 2.8 10E9/L (ref 4–11)

## 2019-01-21 PROCEDURE — 80048 BASIC METABOLIC PNL TOTAL CA: CPT | Performed by: SURGERY

## 2019-01-21 PROCEDURE — 80180 DRUG SCRN QUAN MYCOPHENOLATE: CPT | Performed by: SURGERY

## 2019-01-21 PROCEDURE — 80197 ASSAY OF TACROLIMUS: CPT | Performed by: SURGERY

## 2019-01-21 PROCEDURE — 36415 COLL VENOUS BLD VENIPUNCTURE: CPT | Performed by: SURGERY

## 2019-01-21 PROCEDURE — 82150 ASSAY OF AMYLASE: CPT | Performed by: SURGERY

## 2019-01-21 PROCEDURE — 85025 COMPLETE CBC W/AUTO DIFF WBC: CPT | Performed by: SURGERY

## 2019-01-21 PROCEDURE — 83690 ASSAY OF LIPASE: CPT | Performed by: SURGERY

## 2019-01-21 PROCEDURE — 83735 ASSAY OF MAGNESIUM: CPT | Performed by: SURGERY

## 2019-01-21 PROCEDURE — 84100 ASSAY OF PHOSPHORUS: CPT | Performed by: SURGERY

## 2019-01-22 DIAGNOSIS — Z94.83 PANCREAS TRANSPLANTED (H): Primary | ICD-10-CM

## 2019-01-22 DIAGNOSIS — Z94.0 KIDNEY REPLACED BY TRANSPLANT: ICD-10-CM

## 2019-01-22 DIAGNOSIS — Z94.83 PANCREAS REPLACED BY TRANSPLANT (H): ICD-10-CM

## 2019-01-22 RX ORDER — TACROLIMUS 1 MG/1
2 CAPSULE ORAL 2 TIMES DAILY
Qty: 120 CAPSULE | Refills: 11 | Status: SHIPPED | OUTPATIENT
Start: 2019-01-22 | End: 2019-01-29

## 2019-01-22 RX ORDER — TACROLIMUS 0.5 MG/1
0.5 CAPSULE ORAL 2 TIMES DAILY
Qty: 60 CAPSULE | Refills: 11 | Status: SHIPPED | OUTPATIENT
Start: 2019-01-22 | End: 2019-01-31

## 2019-01-22 NOTE — TELEPHONE ENCOUNTER
ISSUE:   Tacrolimus level 7.6 on 1/21/19, goal 8-10, dose 2 mg BID    PLAN:   Please call pt and confirm this was a good 12-hour trough. Verify dose 2 mg BID. Confirm no new medications or illness (trina. Diarrhea). If good trough, increase dose to 2.5 mg BID and recheck level as scheduled.

## 2019-01-22 NOTE — TELEPHONE ENCOUNTER
Spoke to patient who confirms current Tacrolimus dose and good 12 hour trough level.  Patient denies any recent illness or new medications.  Patient verbalizes understanding to increase Tacrolimus dose to 2.5 mg BID and will repeat txp labs on 1/24/2019.

## 2019-01-23 ENCOUNTER — TEAM CONFERENCE (OUTPATIENT)
Dept: NEPHROLOGY | Facility: CLINIC | Age: 54
End: 2019-01-23

## 2019-01-23 LAB
MYCOPHENOLATE SERPL LC/MS/MS-MCNC: 1.91 MG/L (ref 1–3.5)
MYCOPHENOLATE-G SERPL LC/MS/MS-MCNC: 44.1 MG/L (ref 30–95)
TME LAST DOSE: NORMAL H

## 2019-01-23 NOTE — TELEPHONE ENCOUNTER
Post Kidney and Pancreas Transplant Team Conference  Date: 1/23/2019  Transplant Coordinator: Alesha Hartley     Attendees:  [x]  Dr. Wolfe [x] Becca Riojas, RN  [] Shari Chapin LPN     [x]  Dr. Dick [] Anu Hanson, JANICE [x] Shayna Chavez LPN   [x]  Dr. Tate [] Glenis Ko RN    []  Dr. De Los Santos [] Sailaja Choi RN    [x] Dr. Pal [x] Zora Hartley RN    [x] Dr. Luevano [] Joey Goldberg RN    [] Surgery Fellow [x] Viv Wick RN    [] Sandy Cole, RAFA [x] Linh Oates RN    [] Luke Tesfaye, PharmD [x] Angella Multani, JANICE     [] Dwight Mariano RN     [] Becca Gregory RN         Verbal Plan Read Back:   No change to medication or lab schedule.   Will continue to monitor    Routed to RN Coordinator   Shayna Chavez

## 2019-01-24 DIAGNOSIS — Z94.0 KIDNEY REPLACED BY TRANSPLANT: ICD-10-CM

## 2019-01-24 DIAGNOSIS — Z94.83 PANCREAS REPLACED BY TRANSPLANT (H): ICD-10-CM

## 2019-01-24 DIAGNOSIS — Z48.298 AFTERCARE FOLLOWING ORGAN TRANSPLANT: ICD-10-CM

## 2019-01-24 LAB
BASOPHILS # BLD AUTO: 0 10E9/L (ref 0–0.2)
BASOPHILS NFR BLD AUTO: 1 %
DIFFERENTIAL METHOD BLD: ABNORMAL
EOSINOPHIL # BLD AUTO: 0.1 10E9/L (ref 0–0.7)
EOSINOPHIL NFR BLD AUTO: 1.7 %
ERYTHROCYTE [DISTWIDTH] IN BLOOD BY AUTOMATED COUNT: 16.9 % (ref 10–15)
HCT VFR BLD AUTO: 40.4 % (ref 40–53)
HGB BLD-MCNC: 12.7 G/DL (ref 13.3–17.7)
LIPASE SERPL-CCNC: 302 U/L (ref 73–393)
LYMPHOCYTES # BLD AUTO: 0.2 10E9/L (ref 0.8–5.3)
LYMPHOCYTES NFR BLD AUTO: 6.8 %
MCH RBC QN AUTO: 27.9 PG (ref 26.5–33)
MCHC RBC AUTO-ENTMCNC: 31.4 G/DL (ref 31.5–36.5)
MCV RBC AUTO: 89 FL (ref 78–100)
MONOCYTES # BLD AUTO: 0.4 10E9/L (ref 0–1.3)
MONOCYTES NFR BLD AUTO: 12.2 %
NEUTROPHILS # BLD AUTO: 2.3 10E9/L (ref 1.6–8.3)
NEUTROPHILS NFR BLD AUTO: 78.3 %
PLATELET # BLD AUTO: 243 10E9/L (ref 150–450)
RBC # BLD AUTO: 4.56 10E12/L (ref 4.4–5.9)
WBC # BLD AUTO: 2.9 10E9/L (ref 4–11)

## 2019-01-24 PROCEDURE — 82150 ASSAY OF AMYLASE: CPT | Performed by: SURGERY

## 2019-01-24 PROCEDURE — 80197 ASSAY OF TACROLIMUS: CPT | Performed by: SURGERY

## 2019-01-24 PROCEDURE — 85025 COMPLETE CBC W/AUTO DIFF WBC: CPT | Performed by: SURGERY

## 2019-01-24 PROCEDURE — 80180 DRUG SCRN QUAN MYCOPHENOLATE: CPT | Performed by: SURGERY

## 2019-01-24 PROCEDURE — 83735 ASSAY OF MAGNESIUM: CPT | Performed by: SURGERY

## 2019-01-24 PROCEDURE — 83690 ASSAY OF LIPASE: CPT | Performed by: SURGERY

## 2019-01-24 PROCEDURE — 84100 ASSAY OF PHOSPHORUS: CPT | Performed by: SURGERY

## 2019-01-24 PROCEDURE — 36415 COLL VENOUS BLD VENIPUNCTURE: CPT | Performed by: SURGERY

## 2019-01-24 PROCEDURE — 80048 BASIC METABOLIC PNL TOTAL CA: CPT | Performed by: SURGERY

## 2019-01-25 LAB
AMYLASE SERPL-CCNC: 78 U/L (ref 30–110)
ANION GAP SERPL CALCULATED.3IONS-SCNC: 8 MMOL/L (ref 3–14)
BUN SERPL-MCNC: 23 MG/DL (ref 7–30)
CALCIUM SERPL-MCNC: 8.8 MG/DL (ref 8.5–10.1)
CHLORIDE SERPL-SCNC: 108 MMOL/L (ref 94–109)
CO2 SERPL-SCNC: 24 MMOL/L (ref 20–32)
CREAT SERPL-MCNC: 1.16 MG/DL (ref 0.66–1.25)
GFR SERPL CREATININE-BSD FRML MDRD: 70 ML/MIN/{1.73_M2}
GLUCOSE SERPL-MCNC: 89 MG/DL (ref 70–99)
MAGNESIUM SERPL-MCNC: 1.8 MG/DL (ref 1.6–2.3)
PHOSPHATE SERPL-MCNC: 4.4 MG/DL (ref 2.5–4.5)
POTASSIUM SERPL-SCNC: 4.9 MMOL/L (ref 3.4–5.3)
SODIUM SERPL-SCNC: 140 MMOL/L (ref 133–144)
TACROLIMUS BLD-MCNC: 9.1 UG/L (ref 5–15)
TME LAST DOSE: NORMAL H

## 2019-01-28 ENCOUNTER — DOCUMENTATION ONLY (OUTPATIENT)
Dept: TRANSPLANT | Facility: CLINIC | Age: 54
End: 2019-01-28

## 2019-01-28 DIAGNOSIS — Z94.0 KIDNEY REPLACED BY TRANSPLANT: ICD-10-CM

## 2019-01-28 DIAGNOSIS — Z94.83 PANCREAS REPLACED BY TRANSPLANT (H): ICD-10-CM

## 2019-01-28 DIAGNOSIS — Z48.298 AFTERCARE FOLLOWING ORGAN TRANSPLANT: ICD-10-CM

## 2019-01-28 LAB
AMYLASE SERPL-CCNC: 74 U/L (ref 30–110)
ANION GAP SERPL CALCULATED.3IONS-SCNC: 5 MMOL/L (ref 3–14)
BASOPHILS # BLD AUTO: 0 10E9/L (ref 0–0.2)
BASOPHILS NFR BLD AUTO: 1 %
BUN SERPL-MCNC: 25 MG/DL (ref 7–30)
CALCIUM SERPL-MCNC: 9.2 MG/DL (ref 8.5–10.1)
CHLORIDE SERPL-SCNC: 110 MMOL/L (ref 94–109)
CO2 SERPL-SCNC: 22 MMOL/L (ref 20–32)
CREAT SERPL-MCNC: 1.03 MG/DL (ref 0.66–1.25)
DIFFERENTIAL METHOD BLD: ABNORMAL
EOSINOPHIL # BLD AUTO: 0.1 10E9/L (ref 0–0.7)
EOSINOPHIL NFR BLD AUTO: 2.7 %
ERYTHROCYTE [DISTWIDTH] IN BLOOD BY AUTOMATED COUNT: 16.9 % (ref 10–15)
GFR SERPL CREATININE-BSD FRML MDRD: 81 ML/MIN/{1.73_M2}
GLUCOSE SERPL-MCNC: 90 MG/DL (ref 70–99)
HCT VFR BLD AUTO: 41.4 % (ref 40–53)
HGB BLD-MCNC: 13.2 G/DL (ref 13.3–17.7)
LIPASE SERPL-CCNC: 256 U/L (ref 73–393)
LYMPHOCYTES # BLD AUTO: 0.2 10E9/L (ref 0.8–5.3)
LYMPHOCYTES NFR BLD AUTO: 6.8 %
MAGNESIUM SERPL-MCNC: 1.7 MG/DL (ref 1.6–2.3)
MCH RBC QN AUTO: 28.3 PG (ref 26.5–33)
MCHC RBC AUTO-ENTMCNC: 31.9 G/DL (ref 31.5–36.5)
MCV RBC AUTO: 89 FL (ref 78–100)
MONOCYTES # BLD AUTO: 0.2 10E9/L (ref 0–1.3)
MONOCYTES NFR BLD AUTO: 7.1 %
NEUTROPHILS # BLD AUTO: 2.4 10E9/L (ref 1.6–8.3)
NEUTROPHILS NFR BLD AUTO: 82.4 %
PHOSPHATE SERPL-MCNC: 3.9 MG/DL (ref 2.5–4.5)
PLATELET # BLD AUTO: 251 10E9/L (ref 150–450)
POTASSIUM SERPL-SCNC: 4.8 MMOL/L (ref 3.4–5.3)
RBC # BLD AUTO: 4.67 10E12/L (ref 4.4–5.9)
SODIUM SERPL-SCNC: 137 MMOL/L (ref 133–144)
TACROLIMUS BLD-MCNC: 9.4 UG/L (ref 5–15)
TME LAST DOSE: NORMAL H
WBC # BLD AUTO: 3 10E9/L (ref 4–11)

## 2019-01-28 PROCEDURE — 36415 COLL VENOUS BLD VENIPUNCTURE: CPT | Performed by: SURGERY

## 2019-01-28 PROCEDURE — 82150 ASSAY OF AMYLASE: CPT | Performed by: SURGERY

## 2019-01-28 PROCEDURE — 80048 BASIC METABOLIC PNL TOTAL CA: CPT | Performed by: SURGERY

## 2019-01-28 PROCEDURE — 83735 ASSAY OF MAGNESIUM: CPT | Performed by: SURGERY

## 2019-01-28 PROCEDURE — 84100 ASSAY OF PHOSPHORUS: CPT | Performed by: SURGERY

## 2019-01-28 PROCEDURE — 83690 ASSAY OF LIPASE: CPT | Performed by: SURGERY

## 2019-01-28 PROCEDURE — 85025 COMPLETE CBC W/AUTO DIFF WBC: CPT | Performed by: SURGERY

## 2019-01-28 PROCEDURE — 80180 DRUG SCRN QUAN MYCOPHENOLATE: CPT | Performed by: SURGERY

## 2019-01-28 PROCEDURE — 80197 ASSAY OF TACROLIMUS: CPT | Performed by: SURGERY

## 2019-01-28 NOTE — PROGRESS NOTES
IMMUNOSUPPRESSION  Mycophenolic acid (MPA) level reported as 13.83  Goal for 12-hour trough 1.0-3.5  Current mycophenolate sodium TBEC dose 540 mg twice daily    ASSESSMENT  Suspect this MPA level is a peak and not a 12-hour trough  Mycophenolic acid level drawn this morning in process in lab    PLAN  No dose change for now  Follow up MPA level in process

## 2019-01-29 DIAGNOSIS — Z94.83 PANCREAS REPLACED BY TRANSPLANT (H): ICD-10-CM

## 2019-01-29 DIAGNOSIS — Z94.0 KIDNEY REPLACED BY TRANSPLANT: ICD-10-CM

## 2019-01-29 DIAGNOSIS — Z94.83 PANCREAS TRANSPLANTED (H): Primary | ICD-10-CM

## 2019-01-29 LAB
MYCOPHENOLATE SERPL LC/MS/MS-MCNC: 13.92 MG/L (ref 1–3.5)
MYCOPHENOLATE-G SERPL LC/MS/MS-MCNC: 64.4 MG/L (ref 30–95)
TME LAST DOSE: ABNORMAL H

## 2019-01-29 RX ORDER — TACROLIMUS 1 MG/1
2 CAPSULE ORAL 2 TIMES DAILY
Qty: 120 CAPSULE | Refills: 11 | Status: SHIPPED | OUTPATIENT
Start: 2019-01-29 | End: 2019-03-01

## 2019-01-29 NOTE — TELEPHONE ENCOUNTER
We also need a new rx for Tacrolimus 0.5 mg - unable to transfer from previous pharmacy due to medicare guidelines.      Rx's needed today.      Thank you.

## 2019-01-29 NOTE — TELEPHONE ENCOUNTER
Patient has Medicare insurance which means we cannot transfer from previous pharmacy.      Please send new rx for Tacrolimus 1 mg to [facility]. Thank you.    Rx needed today for delivery.

## 2019-01-30 LAB
MYCOPHENOLATE SERPL LC/MS/MS-MCNC: 3.09 MG/L (ref 1–3.5)
MYCOPHENOLATE-G SERPL LC/MS/MS-MCNC: 53.5 MG/L (ref 30–95)
TME LAST DOSE: NORMAL H

## 2019-01-31 DIAGNOSIS — Z94.83 PANCREAS REPLACED BY TRANSPLANT (H): ICD-10-CM

## 2019-01-31 DIAGNOSIS — Z94.0 KIDNEY REPLACED BY TRANSPLANT: ICD-10-CM

## 2019-01-31 DIAGNOSIS — Z94.83 PANCREAS TRANSPLANTED (H): Primary | ICD-10-CM

## 2019-01-31 RX ORDER — TACROLIMUS 0.5 MG/1
0.5 CAPSULE ORAL 2 TIMES DAILY
Qty: 60 CAPSULE | Refills: 11 | Status: SHIPPED | OUTPATIENT
Start: 2019-01-31 | End: 2019-03-01

## 2019-02-04 DIAGNOSIS — Z48.298 AFTERCARE FOLLOWING ORGAN TRANSPLANT: ICD-10-CM

## 2019-02-04 DIAGNOSIS — Z94.0 KIDNEY REPLACED BY TRANSPLANT: ICD-10-CM

## 2019-02-04 DIAGNOSIS — Z94.83 PANCREAS REPLACED BY TRANSPLANT (H): ICD-10-CM

## 2019-02-04 LAB
AMYLASE SERPL-CCNC: 73 U/L (ref 30–110)
ANION GAP SERPL CALCULATED.3IONS-SCNC: 5 MMOL/L (ref 3–14)
BASOPHILS # BLD AUTO: 0 10E9/L (ref 0–0.2)
BASOPHILS NFR BLD AUTO: 0.8 %
BUN SERPL-MCNC: 23 MG/DL (ref 7–30)
CALCIUM SERPL-MCNC: 9.3 MG/DL (ref 8.5–10.1)
CHLORIDE SERPL-SCNC: 110 MMOL/L (ref 94–109)
CO2 SERPL-SCNC: 22 MMOL/L (ref 20–32)
CREAT SERPL-MCNC: 1.13 MG/DL (ref 0.66–1.25)
DIFFERENTIAL METHOD BLD: ABNORMAL
EOSINOPHIL # BLD AUTO: 0.1 10E9/L (ref 0–0.7)
EOSINOPHIL NFR BLD AUTO: 2.7 %
ERYTHROCYTE [DISTWIDTH] IN BLOOD BY AUTOMATED COUNT: 17 % (ref 10–15)
GFR SERPL CREATININE-BSD FRML MDRD: 73 ML/MIN/{1.73_M2}
GLUCOSE SERPL-MCNC: 86 MG/DL (ref 70–99)
HCT VFR BLD AUTO: 43.9 % (ref 40–53)
HGB BLD-MCNC: 14 G/DL (ref 13.3–17.7)
LIPASE SERPL-CCNC: 212 U/L (ref 73–393)
LYMPHOCYTES # BLD AUTO: 0.2 10E9/L (ref 0.8–5.3)
LYMPHOCYTES NFR BLD AUTO: 8.1 %
MCH RBC QN AUTO: 28.3 PG (ref 26.5–33)
MCHC RBC AUTO-ENTMCNC: 31.9 G/DL (ref 31.5–36.5)
MCV RBC AUTO: 89 FL (ref 78–100)
MONOCYTES # BLD AUTO: 0.3 10E9/L (ref 0–1.3)
MONOCYTES NFR BLD AUTO: 10.5 %
NEUTROPHILS # BLD AUTO: 2 10E9/L (ref 1.6–8.3)
NEUTROPHILS NFR BLD AUTO: 77.9 %
PLATELET # BLD AUTO: 259 10E9/L (ref 150–450)
POTASSIUM SERPL-SCNC: 4.8 MMOL/L (ref 3.4–5.3)
RBC # BLD AUTO: 4.95 10E12/L (ref 4.4–5.9)
SODIUM SERPL-SCNC: 137 MMOL/L (ref 133–144)
WBC # BLD AUTO: 2.6 10E9/L (ref 4–11)

## 2019-02-04 PROCEDURE — 85025 COMPLETE CBC W/AUTO DIFF WBC: CPT | Performed by: SURGERY

## 2019-02-04 PROCEDURE — 36415 COLL VENOUS BLD VENIPUNCTURE: CPT | Performed by: SURGERY

## 2019-02-04 PROCEDURE — 80197 ASSAY OF TACROLIMUS: CPT | Performed by: SURGERY

## 2019-02-04 PROCEDURE — 83690 ASSAY OF LIPASE: CPT | Performed by: SURGERY

## 2019-02-04 PROCEDURE — 80048 BASIC METABOLIC PNL TOTAL CA: CPT | Performed by: SURGERY

## 2019-02-04 PROCEDURE — 82150 ASSAY OF AMYLASE: CPT | Performed by: SURGERY

## 2019-02-04 PROCEDURE — 87799 DETECT AGENT NOS DNA QUANT: CPT | Performed by: SURGERY

## 2019-02-05 LAB
TACROLIMUS BLD-MCNC: 9.2 UG/L (ref 5–15)
TME LAST DOSE: NORMAL H

## 2019-02-07 DIAGNOSIS — Z94.83 PANCREAS REPLACED BY TRANSPLANT (H): ICD-10-CM

## 2019-02-07 DIAGNOSIS — Z94.0 KIDNEY REPLACED BY TRANSPLANT: ICD-10-CM

## 2019-02-07 DIAGNOSIS — Z48.298 AFTERCARE FOLLOWING ORGAN TRANSPLANT: ICD-10-CM

## 2019-02-07 LAB
BASOPHILS # BLD AUTO: 0 10E9/L (ref 0–0.2)
BASOPHILS NFR BLD AUTO: 0.7 %
DIFFERENTIAL METHOD BLD: ABNORMAL
EOSINOPHIL # BLD AUTO: 0.1 10E9/L (ref 0–0.7)
EOSINOPHIL NFR BLD AUTO: 2.1 %
ERYTHROCYTE [DISTWIDTH] IN BLOOD BY AUTOMATED COUNT: 16.8 % (ref 10–15)
HCT VFR BLD AUTO: 43.1 % (ref 40–53)
HGB BLD-MCNC: 13.7 G/DL (ref 13.3–17.7)
LIPASE SERPL-CCNC: 201 U/L (ref 73–393)
LYMPHOCYTES # BLD AUTO: 0.2 10E9/L (ref 0.8–5.3)
LYMPHOCYTES NFR BLD AUTO: 6.7 %
MCH RBC QN AUTO: 28 PG (ref 26.5–33)
MCHC RBC AUTO-ENTMCNC: 31.8 G/DL (ref 31.5–36.5)
MCV RBC AUTO: 88 FL (ref 78–100)
MONOCYTES # BLD AUTO: 0.2 10E9/L (ref 0–1.3)
MONOCYTES NFR BLD AUTO: 8.5 %
NEUTROPHILS # BLD AUTO: 2.3 10E9/L (ref 1.6–8.3)
NEUTROPHILS NFR BLD AUTO: 82 %
PLATELET # BLD AUTO: 232 10E9/L (ref 150–450)
RBC # BLD AUTO: 4.9 10E12/L (ref 4.4–5.9)
WBC # BLD AUTO: 2.8 10E9/L (ref 4–11)

## 2019-02-07 PROCEDURE — 83735 ASSAY OF MAGNESIUM: CPT | Performed by: SURGERY

## 2019-02-07 PROCEDURE — 80048 BASIC METABOLIC PNL TOTAL CA: CPT | Performed by: SURGERY

## 2019-02-07 PROCEDURE — 83690 ASSAY OF LIPASE: CPT | Performed by: SURGERY

## 2019-02-07 PROCEDURE — 82150 ASSAY OF AMYLASE: CPT | Performed by: SURGERY

## 2019-02-07 PROCEDURE — 36415 COLL VENOUS BLD VENIPUNCTURE: CPT | Performed by: SURGERY

## 2019-02-07 PROCEDURE — 80197 ASSAY OF TACROLIMUS: CPT | Performed by: SURGERY

## 2019-02-07 PROCEDURE — 85025 COMPLETE CBC W/AUTO DIFF WBC: CPT | Performed by: SURGERY

## 2019-02-07 PROCEDURE — 80180 DRUG SCRN QUAN MYCOPHENOLATE: CPT | Performed by: SURGERY

## 2019-02-07 PROCEDURE — 84100 ASSAY OF PHOSPHORUS: CPT | Performed by: SURGERY

## 2019-02-08 ENCOUNTER — TELEPHONE (OUTPATIENT)
Dept: TRANSPLANT | Facility: CLINIC | Age: 54
End: 2019-02-08

## 2019-02-08 LAB
AMYLASE SERPL-CCNC: 57 U/L (ref 30–110)
ANION GAP SERPL CALCULATED.3IONS-SCNC: 5 MMOL/L (ref 3–14)
BUN SERPL-MCNC: 26 MG/DL (ref 7–30)
CALCIUM SERPL-MCNC: 8.8 MG/DL (ref 8.5–10.1)
CHLORIDE SERPL-SCNC: 110 MMOL/L (ref 94–109)
CO2 SERPL-SCNC: 23 MMOL/L (ref 20–32)
CREAT SERPL-MCNC: 1.15 MG/DL (ref 0.66–1.25)
GFR SERPL CREATININE-BSD FRML MDRD: 71 ML/MIN/{1.73_M2}
GLUCOSE SERPL-MCNC: 84 MG/DL (ref 70–99)
MAGNESIUM SERPL-MCNC: 1.8 MG/DL (ref 1.6–2.3)
PHOSPHATE SERPL-MCNC: 3.2 MG/DL (ref 2.5–4.5)
POTASSIUM SERPL-SCNC: 4.8 MMOL/L (ref 3.4–5.3)
SODIUM SERPL-SCNC: 138 MMOL/L (ref 133–144)
TACROLIMUS BLD-MCNC: 10.9 UG/L (ref 5–15)
TME LAST DOSE: NORMAL H

## 2019-02-09 LAB
MYCOPHENOLATE SERPL LC/MS/MS-MCNC: 7.7 MG/L (ref 1–3.5)
MYCOPHENOLATE-G SERPL LC/MS/MS-MCNC: 63.4 MG/L (ref 30–95)
TME LAST DOSE: ABNORMAL H

## 2019-02-11 ENCOUNTER — TELEPHONE (OUTPATIENT)
Dept: TRANSPLANT | Facility: CLINIC | Age: 54
End: 2019-02-11

## 2019-02-11 DIAGNOSIS — Z48.298 AFTERCARE FOLLOWING ORGAN TRANSPLANT: ICD-10-CM

## 2019-02-11 DIAGNOSIS — Z94.83 PANCREAS REPLACED BY TRANSPLANT (H): ICD-10-CM

## 2019-02-11 DIAGNOSIS — Z94.0 KIDNEY REPLACED BY TRANSPLANT: ICD-10-CM

## 2019-02-11 LAB
AMYLASE SERPL-CCNC: 69 U/L (ref 30–110)
ANION GAP SERPL CALCULATED.3IONS-SCNC: 6 MMOL/L (ref 3–14)
BASOPHILS # BLD AUTO: 0 10E9/L (ref 0–0.2)
BASOPHILS NFR BLD AUTO: 0.8 %
BUN SERPL-MCNC: 22 MG/DL (ref 7–30)
CALCIUM SERPL-MCNC: 8.9 MG/DL (ref 8.5–10.1)
CHLORIDE SERPL-SCNC: 110 MMOL/L (ref 94–109)
CO2 SERPL-SCNC: 22 MMOL/L (ref 20–32)
CREAT SERPL-MCNC: 1.09 MG/DL (ref 0.66–1.25)
DIFFERENTIAL METHOD BLD: ABNORMAL
EOSINOPHIL # BLD AUTO: 0.1 10E9/L (ref 0–0.7)
EOSINOPHIL NFR BLD AUTO: 3.1 %
ERYTHROCYTE [DISTWIDTH] IN BLOOD BY AUTOMATED COUNT: 16.9 % (ref 10–15)
GFR SERPL CREATININE-BSD FRML MDRD: 76 ML/MIN/{1.73_M2}
GLUCOSE SERPL-MCNC: 85 MG/DL (ref 70–99)
HCT VFR BLD AUTO: 44.4 % (ref 40–53)
HGB BLD-MCNC: 14.1 G/DL (ref 13.3–17.7)
LIPASE SERPL-CCNC: 181 U/L (ref 73–393)
LYMPHOCYTES # BLD AUTO: 0.2 10E9/L (ref 0.8–5.3)
LYMPHOCYTES NFR BLD AUTO: 7.8 %
MAGNESIUM SERPL-MCNC: 1.5 MG/DL (ref 1.6–2.3)
MCH RBC QN AUTO: 27.8 PG (ref 26.5–33)
MCHC RBC AUTO-ENTMCNC: 31.8 G/DL (ref 31.5–36.5)
MCV RBC AUTO: 88 FL (ref 78–100)
MONOCYTES # BLD AUTO: 0.2 10E9/L (ref 0–1.3)
MONOCYTES NFR BLD AUTO: 8.1 %
NEUTROPHILS # BLD AUTO: 2.1 10E9/L (ref 1.6–8.3)
NEUTROPHILS NFR BLD AUTO: 80.2 %
PHOSPHATE SERPL-MCNC: 3.6 MG/DL (ref 2.5–4.5)
PLATELET # BLD AUTO: 222 10E9/L (ref 150–450)
POTASSIUM SERPL-SCNC: 4.8 MMOL/L (ref 3.4–5.3)
RBC # BLD AUTO: 5.07 10E12/L (ref 4.4–5.9)
SODIUM SERPL-SCNC: 138 MMOL/L (ref 133–144)
TACROLIMUS BLD-MCNC: 8.9 UG/L (ref 5–15)
TME LAST DOSE: NORMAL H
WBC # BLD AUTO: 2.6 10E9/L (ref 4–11)

## 2019-02-11 PROCEDURE — 80180 DRUG SCRN QUAN MYCOPHENOLATE: CPT | Performed by: SURGERY

## 2019-02-11 PROCEDURE — 84100 ASSAY OF PHOSPHORUS: CPT | Performed by: SURGERY

## 2019-02-11 PROCEDURE — 83735 ASSAY OF MAGNESIUM: CPT | Performed by: SURGERY

## 2019-02-11 PROCEDURE — 83690 ASSAY OF LIPASE: CPT | Performed by: SURGERY

## 2019-02-11 PROCEDURE — 80197 ASSAY OF TACROLIMUS: CPT | Performed by: SURGERY

## 2019-02-11 PROCEDURE — 82150 ASSAY OF AMYLASE: CPT | Performed by: SURGERY

## 2019-02-11 PROCEDURE — 80048 BASIC METABOLIC PNL TOTAL CA: CPT | Performed by: SURGERY

## 2019-02-11 PROCEDURE — 85025 COMPLETE CBC W/AUTO DIFF WBC: CPT | Performed by: SURGERY

## 2019-02-11 PROCEDURE — 36415 COLL VENOUS BLD VENIPUNCTURE: CPT | Performed by: SURGERY

## 2019-02-11 NOTE — TELEPHONE ENCOUNTER
Difficulty swallowing started 1.5 weeks ago - Sean attributes this to his weight gain. He will try to get in with his PCP and will let me know if he cannot see them within the next couple of weeks.     Labs looking good - all lab questions were answered.     valcyte recently stopped - will monitor WBCs in setting of elevated MPA level, already on reduced dose.

## 2019-02-11 NOTE — TELEPHONE ENCOUNTER
Reviewed 3 month post transplant items with patient.  Sent [facility] packet to patient in the mail.

## 2019-02-13 LAB
MYCOPHENOLATE SERPL LC/MS/MS-MCNC: 2.04 MG/L (ref 1–3.5)
MYCOPHENOLATE-G SERPL LC/MS/MS-MCNC: 50.8 MG/L (ref 30–95)
TME LAST DOSE: NORMAL H

## 2019-02-14 DIAGNOSIS — Z94.0 KIDNEY REPLACED BY TRANSPLANT: ICD-10-CM

## 2019-02-14 DIAGNOSIS — Z48.298 AFTERCARE FOLLOWING ORGAN TRANSPLANT: ICD-10-CM

## 2019-02-14 DIAGNOSIS — Z94.83 PANCREAS REPLACED BY TRANSPLANT (H): ICD-10-CM

## 2019-02-14 LAB
AMYLASE SERPL-CCNC: 66 U/L (ref 30–110)
ANION GAP SERPL CALCULATED.3IONS-SCNC: 5 MMOL/L (ref 3–14)
BASOPHILS # BLD AUTO: 0 10E9/L (ref 0–0.2)
BASOPHILS NFR BLD AUTO: 0.6 %
BUN SERPL-MCNC: 25 MG/DL (ref 7–30)
CALCIUM SERPL-MCNC: 9.3 MG/DL (ref 8.5–10.1)
CHLORIDE SERPL-SCNC: 108 MMOL/L (ref 94–109)
CO2 SERPL-SCNC: 23 MMOL/L (ref 20–32)
CREAT SERPL-MCNC: 1.19 MG/DL (ref 0.66–1.25)
DIFFERENTIAL METHOD BLD: ABNORMAL
EOSINOPHIL # BLD AUTO: 0.1 10E9/L (ref 0–0.7)
EOSINOPHIL NFR BLD AUTO: 2 %
ERYTHROCYTE [DISTWIDTH] IN BLOOD BY AUTOMATED COUNT: 17 % (ref 10–15)
GFR SERPL CREATININE-BSD FRML MDRD: 68 ML/MIN/{1.73_M2}
GLUCOSE SERPL-MCNC: 93 MG/DL (ref 70–99)
HCT VFR BLD AUTO: 43.7 % (ref 40–53)
HGB BLD-MCNC: 14.6 G/DL (ref 13.3–17.7)
LIPASE SERPL-CCNC: 214 U/L (ref 73–393)
LYMPHOCYTES # BLD AUTO: 0.2 10E9/L (ref 0.8–5.3)
LYMPHOCYTES NFR BLD AUTO: 6.9 %
MAGNESIUM SERPL-MCNC: 2 MG/DL (ref 1.6–2.3)
MCH RBC QN AUTO: 28.7 PG (ref 26.5–33)
MCHC RBC AUTO-ENTMCNC: 33.4 G/DL (ref 31.5–36.5)
MCV RBC AUTO: 86 FL (ref 78–100)
MONOCYTES # BLD AUTO: 0.3 10E9/L (ref 0–1.3)
MONOCYTES NFR BLD AUTO: 8.1 %
NEUTROPHILS # BLD AUTO: 2.9 10E9/L (ref 1.6–8.3)
NEUTROPHILS NFR BLD AUTO: 82.4 %
PHOSPHATE SERPL-MCNC: 4.1 MG/DL (ref 2.5–4.5)
PLATELET # BLD AUTO: 230 10E9/L (ref 150–450)
POTASSIUM SERPL-SCNC: 4.7 MMOL/L (ref 3.4–5.3)
RBC # BLD AUTO: 5.08 10E12/L (ref 4.4–5.9)
SODIUM SERPL-SCNC: 136 MMOL/L (ref 133–144)
TACROLIMUS BLD-MCNC: 9.8 UG/L (ref 5–15)
TME LAST DOSE: NORMAL H
WBC # BLD AUTO: 3.5 10E9/L (ref 4–11)

## 2019-02-14 PROCEDURE — 85025 COMPLETE CBC W/AUTO DIFF WBC: CPT | Performed by: SURGERY

## 2019-02-14 PROCEDURE — 83690 ASSAY OF LIPASE: CPT | Performed by: SURGERY

## 2019-02-14 PROCEDURE — 80197 ASSAY OF TACROLIMUS: CPT | Performed by: SURGERY

## 2019-02-14 PROCEDURE — 36415 COLL VENOUS BLD VENIPUNCTURE: CPT | Performed by: SURGERY

## 2019-02-14 PROCEDURE — 82150 ASSAY OF AMYLASE: CPT | Performed by: SURGERY

## 2019-02-14 PROCEDURE — 80048 BASIC METABOLIC PNL TOTAL CA: CPT | Performed by: SURGERY

## 2019-02-14 PROCEDURE — 83735 ASSAY OF MAGNESIUM: CPT | Performed by: SURGERY

## 2019-02-14 PROCEDURE — 80180 DRUG SCRN QUAN MYCOPHENOLATE: CPT | Performed by: SURGERY

## 2019-02-14 PROCEDURE — 84100 ASSAY OF PHOSPHORUS: CPT | Performed by: SURGERY

## 2019-02-16 LAB
MYCOPHENOLATE SERPL LC/MS/MS-MCNC: 3.64 MG/L (ref 1–3.5)
MYCOPHENOLATE-G SERPL LC/MS/MS-MCNC: 43.4 MG/L (ref 30–95)
TME LAST DOSE: ABNORMAL H

## 2019-02-20 ENCOUNTER — TEAM CONFERENCE (OUTPATIENT)
Dept: TRANSPLANT | Facility: CLINIC | Age: 54
End: 2019-02-20

## 2019-02-21 DIAGNOSIS — Z94.83 PANCREAS REPLACED BY TRANSPLANT (H): ICD-10-CM

## 2019-02-21 DIAGNOSIS — Z94.0 KIDNEY REPLACED BY TRANSPLANT: ICD-10-CM

## 2019-02-21 DIAGNOSIS — Z48.298 AFTERCARE FOLLOWING ORGAN TRANSPLANT: ICD-10-CM

## 2019-02-21 LAB
AMYLASE SERPL-CCNC: 75 U/L (ref 30–110)
ANION GAP SERPL CALCULATED.3IONS-SCNC: 6 MMOL/L (ref 3–14)
BASOPHILS # BLD AUTO: 0 10E9/L (ref 0–0.2)
BASOPHILS NFR BLD AUTO: 0.9 %
BUN SERPL-MCNC: 24 MG/DL (ref 7–30)
CALCIUM SERPL-MCNC: 9.2 MG/DL (ref 8.5–10.1)
CHLORIDE SERPL-SCNC: 111 MMOL/L (ref 94–109)
CO2 SERPL-SCNC: 22 MMOL/L (ref 20–32)
CREAT SERPL-MCNC: 1.16 MG/DL (ref 0.66–1.25)
DIFFERENTIAL METHOD BLD: ABNORMAL
EOSINOPHIL # BLD AUTO: 0.1 10E9/L (ref 0–0.7)
EOSINOPHIL NFR BLD AUTO: 2.4 %
ERYTHROCYTE [DISTWIDTH] IN BLOOD BY AUTOMATED COUNT: 16.9 % (ref 10–15)
GFR SERPL CREATININE-BSD FRML MDRD: 70 ML/MIN/{1.73_M2}
GLUCOSE SERPL-MCNC: 88 MG/DL (ref 70–99)
HCT VFR BLD AUTO: 45.1 % (ref 40–53)
HGB BLD-MCNC: 14.6 G/DL (ref 13.3–17.7)
LIPASE SERPL-CCNC: 210 U/L (ref 73–393)
LYMPHOCYTES # BLD AUTO: 0.4 10E9/L (ref 0.8–5.3)
LYMPHOCYTES NFR BLD AUTO: 10.7 %
MCH RBC QN AUTO: 28.5 PG (ref 26.5–33)
MCHC RBC AUTO-ENTMCNC: 32.4 G/DL (ref 31.5–36.5)
MCV RBC AUTO: 88 FL (ref 78–100)
MONOCYTES # BLD AUTO: 0.6 10E9/L (ref 0–1.3)
MONOCYTES NFR BLD AUTO: 19 %
NEUTROPHILS # BLD AUTO: 2.3 10E9/L (ref 1.6–8.3)
NEUTROPHILS NFR BLD AUTO: 67 %
PLATELET # BLD AUTO: 214 10E9/L (ref 150–450)
POTASSIUM SERPL-SCNC: 4.8 MMOL/L (ref 3.4–5.3)
RBC # BLD AUTO: 5.12 10E12/L (ref 4.4–5.9)
SODIUM SERPL-SCNC: 139 MMOL/L (ref 133–144)
TACROLIMUS BLD-MCNC: 8 UG/L (ref 5–15)
TME LAST DOSE: NORMAL H
WBC # BLD AUTO: 3.4 10E9/L (ref 4–11)

## 2019-02-21 PROCEDURE — 80180 DRUG SCRN QUAN MYCOPHENOLATE: CPT | Performed by: SURGERY

## 2019-02-21 PROCEDURE — 83690 ASSAY OF LIPASE: CPT | Performed by: SURGERY

## 2019-02-21 PROCEDURE — 80048 BASIC METABOLIC PNL TOTAL CA: CPT | Performed by: SURGERY

## 2019-02-21 PROCEDURE — 80197 ASSAY OF TACROLIMUS: CPT | Performed by: SURGERY

## 2019-02-21 PROCEDURE — 85025 COMPLETE CBC W/AUTO DIFF WBC: CPT | Performed by: SURGERY

## 2019-02-21 PROCEDURE — 82150 ASSAY OF AMYLASE: CPT | Performed by: SURGERY

## 2019-02-21 PROCEDURE — 36415 COLL VENOUS BLD VENIPUNCTURE: CPT | Performed by: SURGERY

## 2019-02-21 NOTE — TELEPHONE ENCOUNTER
Post Kidney and Pancreas Transplant Team Conference  Date: 2/20/2019  Transplant Coordinator: Alesha Hartley     Attendees:  [x]  Dr. Wolfe [x] Becca Riojas, RN  [] Shari Chapni LPN     [x]  Dr. Dick [] Anu Hanson, JANICE [x] Shayna Chavez LPN   [x]  Dr. Tate [] Glenis Ko RN    []  Dr. De Los Santos [x] Sailaja Choi RN    [x] Dr. Pal [] Zora Hartley, JANICE    [] Dr. Luevano [] Joey Goldberg RN    [] Surgery Fellow [x] Viv Wick RN    [] Sandy Cole, NP [x] Linh Oates RN    [] Luke Tesfaye, PharmD [x] Angella Multani, JANICE     [] Dwight Mariano RN     [] Becca Gregory RN        Verbal Plan Read Back:   DSA level per protocol   Review in one month    Routed to RN Coordinator   Shayna Chavez    Orders for DSA in place.  [facility] called drawing lab and made sure this will be drawn at next appt.

## 2019-02-22 ENCOUNTER — TELEPHONE (OUTPATIENT)
Dept: TRANSPLANT | Facility: CLINIC | Age: 54
End: 2019-02-22

## 2019-02-22 NOTE — TELEPHONE ENCOUNTER
Returned pt phone call.  Pt reports he saw his PCP as recommended by Zora d/raulito swallowing problems.  Pt verbalized his PCP is wondering if we would recommend barrium swallow study or EGD. Pt aware we will review with MD team next week and f/u with him once we have an answer.  Pt v/u.

## 2019-02-22 NOTE — TELEPHONE ENCOUNTER
Patient Call: Medication Clarification    Name of Medication: Unknown      Call back needed? Yes    Return Call Needed  Same as documented in contacts section  When to return call?: Same day: Route High Priority

## 2019-02-26 LAB
MYCOPHENOLATE SERPL LC/MS/MS-MCNC: 1.41 MG/L (ref 1–3.5)
MYCOPHENOLATE-G SERPL LC/MS/MS-MCNC: 60.7 MG/L (ref 30–95)
TME LAST DOSE: NORMAL H

## 2019-02-27 NOTE — TELEPHONE ENCOUNTER
Patient Call: General    Reason for call: patient called to follow up on the conversation from 2/22/19.    Call back needed? Yes    Return Call Needed  Same as documented in contacts section  When to return call?: Same day: Route High Priority

## 2019-02-27 NOTE — TELEPHONE ENCOUNTER
Aphagia - PCP would like to investigate and would like receommendation if barium swallow or EGD would be best.    Sean does not report any heartburn.     BP running 120 / 60.     Will discus with nephrology.

## 2019-02-28 ENCOUNTER — TELEPHONE (OUTPATIENT)
Dept: TRANSPLANT | Facility: CLINIC | Age: 54
End: 2019-02-28

## 2019-02-28 ENCOUNTER — RESULTS ONLY (OUTPATIENT)
Dept: OTHER | Facility: CLINIC | Age: 54
End: 2019-02-28

## 2019-02-28 DIAGNOSIS — Z94.83 PANCREAS REPLACED BY TRANSPLANT (H): ICD-10-CM

## 2019-02-28 DIAGNOSIS — Z94.0 KIDNEY REPLACED BY TRANSPLANT: ICD-10-CM

## 2019-02-28 DIAGNOSIS — Z48.298 AFTERCARE FOLLOWING ORGAN TRANSPLANT: ICD-10-CM

## 2019-02-28 LAB
AMYLASE SERPL-CCNC: 58 U/L (ref 30–110)
ANION GAP SERPL CALCULATED.3IONS-SCNC: 10 MMOL/L (ref 3–14)
BASOPHILS # BLD AUTO: 0 10E9/L (ref 0–0.2)
BASOPHILS NFR BLD AUTO: 0.2 %
BUN SERPL-MCNC: 19 MG/DL (ref 7–30)
CALCIUM SERPL-MCNC: 9 MG/DL (ref 8.5–10.1)
CHLORIDE SERPL-SCNC: 111 MMOL/L (ref 94–109)
CO2 SERPL-SCNC: 17 MMOL/L (ref 20–32)
CREAT SERPL-MCNC: 0.94 MG/DL (ref 0.66–1.25)
DIFFERENTIAL METHOD BLD: ABNORMAL
EOSINOPHIL # BLD AUTO: 0.1 10E9/L (ref 0–0.7)
EOSINOPHIL NFR BLD AUTO: 1.7 %
ERYTHROCYTE [DISTWIDTH] IN BLOOD BY AUTOMATED COUNT: 16.9 % (ref 10–15)
GFR SERPL CREATININE-BSD FRML MDRD: >90 ML/MIN/{1.73_M2}
GLUCOSE SERPL-MCNC: 90 MG/DL (ref 70–99)
HCT VFR BLD AUTO: 45.9 % (ref 40–53)
HGB BLD-MCNC: 14.6 G/DL (ref 13.3–17.7)
LIPASE SERPL-CCNC: 173 U/L (ref 73–393)
LYMPHOCYTES # BLD AUTO: 0.4 10E9/L (ref 0.8–5.3)
LYMPHOCYTES NFR BLD AUTO: 7.8 %
MCH RBC QN AUTO: 28.3 PG (ref 26.5–33)
MCHC RBC AUTO-ENTMCNC: 31.8 G/DL (ref 31.5–36.5)
MCV RBC AUTO: 89 FL (ref 78–100)
MONOCYTES # BLD AUTO: 0.7 10E9/L (ref 0–1.3)
MONOCYTES NFR BLD AUTO: 14.3 %
NEUTROPHILS # BLD AUTO: 3.5 10E9/L (ref 1.6–8.3)
NEUTROPHILS NFR BLD AUTO: 76 %
PLATELET # BLD AUTO: 205 10E9/L (ref 150–450)
POTASSIUM SERPL-SCNC: 4.6 MMOL/L (ref 3.4–5.3)
RBC # BLD AUTO: 5.15 10E12/L (ref 4.4–5.9)
SODIUM SERPL-SCNC: 137 MMOL/L (ref 133–144)
WBC # BLD AUTO: 4.6 10E9/L (ref 4–11)

## 2019-02-28 PROCEDURE — 80197 ASSAY OF TACROLIMUS: CPT | Performed by: SURGERY

## 2019-02-28 PROCEDURE — 80048 BASIC METABOLIC PNL TOTAL CA: CPT | Performed by: SURGERY

## 2019-02-28 PROCEDURE — 82150 ASSAY OF AMYLASE: CPT | Performed by: SURGERY

## 2019-02-28 PROCEDURE — 86833 HLA CLASS II HIGH DEFIN QUAL: CPT | Performed by: SURGERY

## 2019-02-28 PROCEDURE — 83690 ASSAY OF LIPASE: CPT | Performed by: SURGERY

## 2019-02-28 PROCEDURE — 85025 COMPLETE CBC W/AUTO DIFF WBC: CPT | Performed by: SURGERY

## 2019-02-28 PROCEDURE — 86832 HLA CLASS I HIGH DEFIN QUAL: CPT | Performed by: SURGERY

## 2019-02-28 PROCEDURE — 36415 COLL VENOUS BLD VENIPUNCTURE: CPT | Performed by: SURGERY

## 2019-02-28 NOTE — TELEPHONE ENCOUNTER
Post Kidney and Pancreas Transplant Team Conference  Date: 2/28/2019  Transplant Coordinator: Alesha Hartley     Attendees:  []  Dr. Wolfe [] Becca Riojas, RN  [] Shari Chapin LPN     []  Dr. Dick [] Anu Hanson RN [] Shayna Chavez LPN   []  Dr. Tate [] Glenis Ko RN    [x]  Dr. De Los Santos [] Sailaja Choi RN    [] Dr. Pal [x] Zora Hartley RN    [] Dr. Luevano [] Joey Goldberg RN    [] Dr. Caputo [] Viv Wick RN    [] Surgery Fellow [] Linh Oates RN    [] Sandy Cole NP [] Angella Multani RN    [] Luke Tesfaye, PharmD [] Dwight Mariano RN     [] Becca Gregory RN        Verbal Plan Read Back:   Okay for esophagram in setting of dysphagia.    Routed to RN Coordinator   Alesha Estrada voiced understanding.

## 2019-02-28 NOTE — TELEPHONE ENCOUNTER
Carbon dioxide level low - please ensure that Sean is not having diarrhea.    Okay for crystal light in his water, as this may increase his carbon dioxide levels.    Please have labs drawn next week Mon rather than Thursday so we can recheck this level.

## 2019-03-01 DIAGNOSIS — Z94.0 KIDNEY REPLACED BY TRANSPLANT: Primary | ICD-10-CM

## 2019-03-01 DIAGNOSIS — Z94.83 PANCREAS REPLACED BY TRANSPLANT (H): ICD-10-CM

## 2019-03-01 DIAGNOSIS — Z94.83 PANCREAS TRANSPLANTED (H): ICD-10-CM

## 2019-03-01 LAB
TACROLIMUS BLD-MCNC: 7.8 UG/L (ref 5–15)
TME LAST DOSE: NORMAL H

## 2019-03-01 RX ORDER — TACROLIMUS 1 MG/1
3 CAPSULE ORAL 2 TIMES DAILY
Qty: 180 CAPSULE | Refills: 11 | Status: SHIPPED | OUTPATIENT
Start: 2019-03-01 | End: 2019-03-05

## 2019-03-01 RX ORDER — TACROLIMUS 0.5 MG/1
CAPSULE ORAL
Qty: 60 CAPSULE | Refills: 11
Start: 2019-03-01 | End: 2019-04-09

## 2019-03-01 NOTE — TELEPHONE ENCOUNTER
ISSUE:   Tacrolimus level 7/8 on 2/28/19, goal 8-10, dose 2.5 mg BID    PLAN:   Please call pt and confirm this was a good 12-hour trough. Verify dose 2.5 mg BID. Confirm no new medications or illness (trina. Diarrhea). If good trough, increase dose to 3 mg BID and recheck level as scheduled.

## 2019-03-04 ENCOUNTER — OFFICE VISIT (OUTPATIENT)
Dept: NEPHROLOGY | Facility: CLINIC | Age: 54
End: 2019-03-04
Attending: INTERNAL MEDICINE
Payer: COMMERCIAL

## 2019-03-04 VITALS
BODY MASS INDEX: 28.41 KG/M2 | WEIGHT: 181.4 LBS | SYSTOLIC BLOOD PRESSURE: 133 MMHG | OXYGEN SATURATION: 94 % | TEMPERATURE: 98.5 F | DIASTOLIC BLOOD PRESSURE: 88 MMHG | HEART RATE: 99 BPM

## 2019-03-04 DIAGNOSIS — R63.5 WEIGHT GAIN: Primary | ICD-10-CM

## 2019-03-04 DIAGNOSIS — N25.81 SECONDARY RENAL HYPERPARATHYROIDISM (H): ICD-10-CM

## 2019-03-04 DIAGNOSIS — Z29.89 NEED FOR PNEUMOCYSTIS PROPHYLAXIS: ICD-10-CM

## 2019-03-04 DIAGNOSIS — Z94.83 PANCREAS TRANSPLANTED (H): ICD-10-CM

## 2019-03-04 DIAGNOSIS — E55.9 VITAMIN D DEFICIENCY: ICD-10-CM

## 2019-03-04 DIAGNOSIS — E21.3 HYPERPARATHYROIDISM (H): ICD-10-CM

## 2019-03-04 DIAGNOSIS — Z48.298 AFTERCARE FOLLOWING ORGAN TRANSPLANT: Primary | ICD-10-CM

## 2019-03-04 DIAGNOSIS — Z94.83 PANCREAS REPLACED BY TRANSPLANT (H): ICD-10-CM

## 2019-03-04 DIAGNOSIS — Z94.0 KIDNEY REPLACED BY TRANSPLANT: ICD-10-CM

## 2019-03-04 DIAGNOSIS — E87.20 METABOLIC ACIDOSIS: ICD-10-CM

## 2019-03-04 DIAGNOSIS — D84.9 IMMUNOSUPPRESSION (H): ICD-10-CM

## 2019-03-04 PROCEDURE — G0463 HOSPITAL OUTPT CLINIC VISIT: HCPCS | Mod: ZF

## 2019-03-04 RX ORDER — SODIUM BICARBONATE 650 MG/1
650 TABLET ORAL DAILY
Qty: 30 TABLET | Refills: 11 | Status: SHIPPED | OUTPATIENT
Start: 2019-03-04 | End: 2019-10-07

## 2019-03-04 ASSESSMENT — PAIN SCALES - GENERAL: PAINLEVEL: NO PAIN (0)

## 2019-03-04 NOTE — LETTER
3/4/2019      RE: Amadou Lewis  [address]  [place] [address]       ACUTE TRANSPLANT NEPHROLOGY VISIT    Assessment & Plan   # DDKT (K): baseline Cr ~ 1.1; Stable   - Proteinuria: Not checked recently   - Latest DSA: No Date of DSA last checked: 2018   - BK: No   - Kidney Tx Biopsy: No    # Pancreas Tx (K):Enteric drained     - Blood glucose: Euglycemia   - HbA1c: not checked recently   - Pancreatic enzymes: Stable   - Date of DSA last checked: 2018 Latest DSA: No    # Weight gain - patient notes central obesity and has hyperpigmented skin compared to prior to transplantation.  Check random cortisol level to screen for [place]'s disease.    # Immunosuppression: Tacrolimus immediate release (goal  8-10) and Mycophenolic acid (goal  1-3.5)   - Changes: No    # Prophylaxis:   - PJP: TMP/Sulfa (Bactrim)   - CMV: None    # Hypertension: Controlled; Goal BP: < 130/80   - Changes: No, continue florinef.  If BP consistently above 140 then would hold    # Mineral Bone Disorder:    - Secondary renal hyperparathyroidism; PTH level is: Significantly elevated  - Vitamin D; level is: Normal  - Calcium; level is: Normal  - Phosphorus; level is: Normal     - recheck PTH and vitamin D, if normal would hold calcitriol    # Electrolytes:   - Potassium; level: Normal  - Magnesium; level: Normal  - Bicarbonate; level: Low    Return visit: No Follow-up on file.    # Transplant History:  Etiology of kidney failure: diabetes mellitus type 1  Tx: DDKT (K)  Transplant: 2018 (Kidney / Pancreas), 10/10/2013 (Kidney)  Donor Type:  - Brain Death Donor Class:   Crossmatch at time of Tx: negative  DSA at time of Tx: No  Significant changes in immunosuppression: None  CMV IgG Ab Discordance (D+/R-): No  EBV IgG Ab Discordance (D+/R-): No  Significant transplant-related complications: None    Transplant Office Phone Number: [phone]    Assessment and plan was discussed with the patient and he voiced  "his understanding and agreement.    Viral Braxton De Los Santos MD    Chief Complaint   Mr. Lewis is a 53 year old here for routine follow up and hypertension    History of Present Illness     Recent Hospitalizations:  [x] No [] Yes    New Medical Issues: [] No [x] Yes 1 months - dysphagia, will get barium swallow with pcp    Also notes weight gain after transplant and central obesity.  People have noted his skin color is \"better\"   Decreased energy: [x] No [] Yes    Chest pain or SOB with exertion:  [x] No [] Yes    Appetite change or weight change: [x] No [] Yes    Nausea, vomiting or diarrhea:  [x] No [] Yes    Fever, sweats or chills: [x] No [] Yes    Leg swelling: [x] No [] Yes      Other medical issues:  No    Home BP: 120's systolic      Review of Systems   A comprehensive review of systems was obtained and negative, except as noted in the HPI or PMH.    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)     Diabetes mellitus with background retinopathy (H)     Polycystic kidney     NONSPECIFIC MEDICAL HISTORY     Coronary artery disease     Retinopathy     Hyperlipidemia LDL goal <70     Premature ventricular contractions (PVCs) (VPCs)     Kidney replaced by transplant     Immunosuppressed status (H)     Kidney transplant rejection     Hypertension     Anemia in chronic renal disease     Care after organ transplant     Esophageal ulcer     Status post simultaneous kidney and pancreas transplant (H)     Other chronic pain     Nausea     Drug induced constipation       Social History   Social History     Tobacco Use     Smoking status: Never Smoker     Smokeless tobacco: Never Used   Substance Use Topics     Alcohol use: No     Drug use: No       Allergies   Allergies   Allergen Reactions     No Known Allergies        Medications   Current Outpatient Medications   Medication Sig     aspirin 81 MG EC tablet Take 1 tablet (81 mg) by mouth daily     " atorvastatin (LIPITOR) 20 MG tablet Take 1 tablet (20 mg) by mouth daily     blood glucose monitoring (NO BRAND SPECIFIED) test strip Use to test blood sugar 4 times daily or as directed.     calcitRIOL (ROCALTROL) 0.25 MCG capsule Take 1 capsule (0.25 mcg) by mouth daily     cholecalciferol 2000 units CAPS Take 2,000 Units by mouth daily     fludrocortisone (FLORINEF) 0.1 MG tablet Take 1 tablet (0.1 mg) by mouth daily     HYDROcodone-acetaminophen (NORCO) 5-325 MG tablet Take 1 tablet by mouth every 6 hours as needed for severe pain     magnesium oxide (MAG-OX) 400 MG tablet Take 2 tablets (800mg) at noon and 2 tablets (800mg) at 6pm.     mycophenolic acid (GENERIC EQUIVALENT) 180 MG EC tablet Take 3 tablets (540 mg) by mouth 2 times daily     sulfamethoxazole-trimethoprim (BACTRIM/SEPTRA) 400-80 MG per tablet Take 1 tablet by mouth daily     tacrolimus (GENERIC EQUIVALENT) 0.5 MG capsule HOLD     tacrolimus (GENERIC EQUIVALENT) 1 MG capsule Take 3 capsules (3 mg) by mouth 2 times daily Total dose = 2.5 mg BID     acetaminophen (TYLENOL) 325 MG tablet Take 3 tablets (975 mg) by mouth every 8 hours as needed for mild pain or fever (DO NOT USE WITH NORCO) (Patient not taking: Reported on 1/8/2019)     polyethylene glycol (MIRALAX) powder Take 17 g (1 capful) by mouth 2 times daily Hold for loose stools (Patient not taking: Reported on 3/4/2019)     sennosides (SENOKOT) 8.6 MG tablet Take 2 tablets by mouth 2 times daily Hold for loose stools (Patient not taking: Reported on 3/4/2019)     No current facility-administered medications for this visit.      There are no discontinued medications.    Physical Exam   Vital Signs: /88 (BP Location: Right arm)   Pulse 99   Temp 98.5  F (36.9  C) (Oral)   Wt 82.3 kg (181 lb 6.4 oz)   SpO2 94%   BMI 28.41 kg/m       GENERAL APPEARANCE: alert and no distress  HENT: mouth without ulcers or lesions  LYMPHATICS: no cervical or supraclavicular nodes  RESP: lungs clear to  auscultation - no rales, rhonchi or wheezes  CV: regular rhythm, normal rate, no rub, no murmur  EDEMA: no LE edema bilaterally  ABDOMEN: soft, nondistended, nontender, bowel sounds normal  MS: extremities normal - no gross deformities noted, no evidence of inflammation in joints, no muscle tenderness  SKIN: no rash  TX KIDNEY: normal    Data     Renal Latest Ref Rng & Units 2/28/2019 2/21/2019 2/14/2019   Na 133 - 144 mmol/L 137 139 136   K 3.4 - 5.3 mmol/L 4.6 4.8 4.7   Cl 94 - 109 mmol/L 111(H) 111(H) 108   CO2 20 - 32 mmol/L 17(L) 22 23   BUN 7 - 30 mg/dL 19 24 25   Cr 0.66 - 1.25 mg/dL 0.94 1.16 1.19   Cr (external) 0.5 - 1.5 mg/dL - - -   Glucose 70 - 99 mg/dL 90 88 93   Ca  8.5 - 10.1 mg/dL 9.0 9.2 9.3   Mg 1.6 - 2.3 mg/dL - - 2.0     Bone Health Latest Ref Rng & Units 2/14/2019 2/11/2019 2/7/2019   Phos 2.5 - 4.5 mg/dL 4.1 3.6 3.2   PTHi 18 - 80 pg/mL - - -   Vit D Def 20 - 75 ug/L - - -     Heme Latest Ref Rng & Units 2/28/2019 2/21/2019 2/14/2019   WBC 4.0 - 11.0 10e9/L 4.6 3.4(L) 3.5(L)   Hgb 13.3 - 17.7 g/dL 14.6 14.6 14.6   Plt 150 - 450 10e9/L 205 214 230     Liver Latest Ref Rng & Units 11/11/2018 6/27/2018 8/3/2017   AP 40 - 150 U/L 189(H) - -   TBili 0.2 - 1.3 mg/dL 0.3 - -   ALT 0 - 70 U/L 14 - -   AST 0 - 45 U/L 9 - -   Tot Protein 6.8 - 8.8 g/dL 7.6 - -   Albumin 3.4 - 5.0 g/dL 4.0 4.2 4.2     Pancreas Latest Ref Rng & Units 2/28/2019 2/21/2019 2/14/2019   A1C 0 - 5.6 % - - -   Amylase 30 - 110 U/L 58 75 66   Lipase 73 - 393 U/L 173 210 214     Iron studies Latest Ref Rng & Units 9/11/2018 8/3/2017 11/18/2016   Iron 35 - 180 ug/dL 85 79 82   Iron sat 15 - 46 % 37 34 34   Ferritin 26 - 388 ng/mL 761(H) 736(H) -     [facility] Txp Virology Latest Ref Rng & Units 2/4/2019 12/20/2018 12/10/2018   CVM DNA Quant - - - -   CMV Quant <100 Copies/mL - - -   CMV QT Log <2.0 Log copies/mL - - -   BK Spec - Plasma Plasma Plasma   BK Res BKNEG:BK Virus DNA Not Detected copies/mL BK Virus DNA Not Detected BK Virus  DNA Not Detected BK Virus DNA Not Detected   BK Log <2.7 Log copies/mL Not Calculated Not Calculated Not Calculated   Hep B Core NR:Nonreactive - - -   Hep B Surf - - - -   HIV 1&2 NEG - - -        Recent Labs   Lab Test 02/14/19 0728 02/21/19  0749 02/28/19  0742   DOSTAC LAST DOSE 8:00PM 2/13/2019 02/20/2019 AT 0730 PM 02/27/2019 AT 0730 PM   TACROL 9.8 8.0 7.8     Recent Labs   Lab Test 02/11/19  0740 02/14/19 0728 02/21/19  0749   DOSMPA LAST DOSE 8:00PM AT 2.10.2019 LAST DOSE 8:00PM 2/13/2019 02/20/2019 AT 0730 PM   MPACID 2.04 3.64* 1.41   MPAG 50.8 43.4 60.7       Early Post Transplant

## 2019-03-04 NOTE — PROGRESS NOTES
ACUTE TRANSPLANT NEPHROLOGY VISIT    Assessment & Plan   # DDKT (K): baseline Cr ~ 1.1; Stable   - Proteinuria: Not checked recently   - Latest DSA: No Date of DSA last checked: 2018   - BK: No   - Kidney Tx Biopsy: No    # Pancreas Tx (K):Enteric drained     - Blood glucose: Euglycemia   - HbA1c: not checked recently   - Pancreatic enzymes: Stable   - Date of DSA last checked: 2018 Latest DSA: No    # Weight gain - patient notes central obesity and has hyperpigmented skin compared to prior to transplantation.  Check random cortisol level to screen for [place]'s disease.    # Immunosuppression: Tacrolimus immediate release (goal  8-10) and Mycophenolic acid (goal  1-3.5)   - Changes: No    # Prophylaxis:   - PJP: TMP/Sulfa (Bactrim)   - CMV: None    # Hypertension: Controlled; Goal BP: < 130/80   - Changes: No, continue florinef.  If BP consistently above 140 then would hold    # Mineral Bone Disorder:    - Secondary renal hyperparathyroidism; PTH level is: Significantly elevated  - Vitamin D; level is: Normal  - Calcium; level is: Normal  - Phosphorus; level is: Normal     - recheck PTH and vitamin D, if normal would hold calcitriol    # Electrolytes:   - Potassium; level: Normal  - Magnesium; level: Normal  - Bicarbonate; level: Low    Return visit: No Follow-up on file.    # Transplant History:  Etiology of kidney failure: diabetes mellitus type 1  Tx: DDKT (K)  Transplant: 2018 (Kidney / Pancreas), 10/10/2013 (Kidney)  Donor Type:  - Brain Death Donor Class:   Crossmatch at time of Tx: negative  DSA at time of Tx: No  Significant changes in immunosuppression: None  CMV IgG Ab Discordance (D+/R-): No  EBV IgG Ab Discordance (D+/R-): No  Significant transplant-related complications: None    Transplant Office Phone Number: [phone]    Assessment and plan was discussed with the patient and he voiced his understanding and agreement.    Viral Braxton De Los Santos MD    Chief Complaint     "Debbie is a 53 year old here for routine follow up and hypertension    History of Present Illness     Recent Hospitalizations:  [x] No [] Yes    New Medical Issues: [] No [x] Yes 1 months - dysphagia, will get barium swallow with pcp    Also notes weight gain after transplant and central obesity.  People have noted his skin color is \"better\"   Decreased energy: [x] No [] Yes    Chest pain or SOB with exertion:  [x] No [] Yes    Appetite change or weight change: [x] No [] Yes    Nausea, vomiting or diarrhea:  [x] No [] Yes    Fever, sweats or chills: [x] No [] Yes    Leg swelling: [x] No [] Yes      Other medical issues:  No    Home BP: 120's systolic      Review of Systems   A comprehensive review of systems was obtained and negative, except as noted in the HPI or PMH.    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)     Diabetes mellitus with background retinopathy (H)     Polycystic kidney     NONSPECIFIC MEDICAL HISTORY     Coronary artery disease     Retinopathy     Hyperlipidemia LDL goal <70     Premature ventricular contractions (PVCs) (VPCs)     Kidney replaced by transplant     Immunosuppressed status (H)     Kidney transplant rejection     Hypertension     Anemia in chronic renal disease     Care after organ transplant     Esophageal ulcer     Status post simultaneous kidney and pancreas transplant (H)     Other chronic pain     Nausea     Drug induced constipation       Social History   Social History     Tobacco Use     Smoking status: Never Smoker     Smokeless tobacco: Never Used   Substance Use Topics     Alcohol use: No     Drug use: No       Allergies   Allergies   Allergen Reactions     No Known Allergies        Medications   Current Outpatient Medications   Medication Sig     aspirin 81 MG EC tablet Take 1 tablet (81 mg) by mouth daily     atorvastatin (LIPITOR) 20 MG tablet Take 1 tablet (20 mg) by mouth daily     blood glucose " monitoring (NO BRAND SPECIFIED) test strip Use to test blood sugar 4 times daily or as directed.     calcitRIOL (ROCALTROL) 0.25 MCG capsule Take 1 capsule (0.25 mcg) by mouth daily     cholecalciferol 2000 units CAPS Take 2,000 Units by mouth daily     fludrocortisone (FLORINEF) 0.1 MG tablet Take 1 tablet (0.1 mg) by mouth daily     HYDROcodone-acetaminophen (NORCO) 5-325 MG tablet Take 1 tablet by mouth every 6 hours as needed for severe pain     magnesium oxide (MAG-OX) 400 MG tablet Take 2 tablets (800mg) at noon and 2 tablets (800mg) at 6pm.     mycophenolic acid (GENERIC EQUIVALENT) 180 MG EC tablet Take 3 tablets (540 mg) by mouth 2 times daily     sulfamethoxazole-trimethoprim (BACTRIM/SEPTRA) 400-80 MG per tablet Take 1 tablet by mouth daily     tacrolimus (GENERIC EQUIVALENT) 0.5 MG capsule HOLD     tacrolimus (GENERIC EQUIVALENT) 1 MG capsule Take 3 capsules (3 mg) by mouth 2 times daily Total dose = 2.5 mg BID     acetaminophen (TYLENOL) 325 MG tablet Take 3 tablets (975 mg) by mouth every 8 hours as needed for mild pain or fever (DO NOT USE WITH NORCO) (Patient not taking: Reported on 1/8/2019)     polyethylene glycol (MIRALAX) powder Take 17 g (1 capful) by mouth 2 times daily Hold for loose stools (Patient not taking: Reported on 3/4/2019)     sennosides (SENOKOT) 8.6 MG tablet Take 2 tablets by mouth 2 times daily Hold for loose stools (Patient not taking: Reported on 3/4/2019)     No current facility-administered medications for this visit.      There are no discontinued medications.    Physical Exam   Vital Signs: /88 (BP Location: Right arm)   Pulse 99   Temp 98.5  F (36.9  C) (Oral)   Wt 82.3 kg (181 lb 6.4 oz)   SpO2 94%   BMI 28.41 kg/m      GENERAL APPEARANCE: alert and no distress  HENT: mouth without ulcers or lesions  LYMPHATICS: no cervical or supraclavicular nodes  RESP: lungs clear to auscultation - no rales, rhonchi or wheezes  CV: regular rhythm, normal rate, no rub, no  murmur  EDEMA: no LE edema bilaterally  ABDOMEN: soft, nondistended, nontender, bowel sounds normal  MS: extremities normal - no gross deformities noted, no evidence of inflammation in joints, no muscle tenderness  SKIN: no rash  TX KIDNEY: normal    Data     Renal Latest Ref Rng & Units 2/28/2019 2/21/2019 2/14/2019   Na 133 - 144 mmol/L 137 139 136   K 3.4 - 5.3 mmol/L 4.6 4.8 4.7   Cl 94 - 109 mmol/L 111(H) 111(H) 108   CO2 20 - 32 mmol/L 17(L) 22 23   BUN 7 - 30 mg/dL 19 24 25   Cr 0.66 - 1.25 mg/dL 0.94 1.16 1.19   Cr (external) 0.5 - 1.5 mg/dL - - -   Glucose 70 - 99 mg/dL 90 88 93   Ca  8.5 - 10.1 mg/dL 9.0 9.2 9.3   Mg 1.6 - 2.3 mg/dL - - 2.0     Bone Health Latest Ref Rng & Units 2/14/2019 2/11/2019 2/7/2019   Phos 2.5 - 4.5 mg/dL 4.1 3.6 3.2   PTHi 18 - 80 pg/mL - - -   Vit D Def 20 - 75 ug/L - - -     Heme Latest Ref Rng & Units 2/28/2019 2/21/2019 2/14/2019   WBC 4.0 - 11.0 10e9/L 4.6 3.4(L) 3.5(L)   Hgb 13.3 - 17.7 g/dL 14.6 14.6 14.6   Plt 150 - 450 10e9/L 205 214 230     Liver Latest Ref Rng & Units 11/11/2018 6/27/2018 8/3/2017   AP 40 - 150 U/L 189(H) - -   TBili 0.2 - 1.3 mg/dL 0.3 - -   ALT 0 - 70 U/L 14 - -   AST 0 - 45 U/L 9 - -   Tot Protein 6.8 - 8.8 g/dL 7.6 - -   Albumin 3.4 - 5.0 g/dL 4.0 4.2 4.2     Pancreas Latest Ref Rng & Units 2/28/2019 2/21/2019 2/14/2019   A1C 0 - 5.6 % - - -   Amylase 30 - 110 U/L 58 75 66   Lipase 73 - 393 U/L 173 210 214     Iron studies Latest Ref Rng & Units 9/11/2018 8/3/2017 11/18/2016   Iron 35 - 180 ug/dL 85 79 82   Iron sat 15 - 46 % 37 34 34   Ferritin 26 - 388 ng/mL 761(H) 736(H) -     [facility] Txp Virology Latest Ref Rng & Units 2/4/2019 12/20/2018 12/10/2018   CVM DNA Quant - - - -   CMV Quant <100 Copies/mL - - -   CMV QT Log <2.0 Log copies/mL - - -   BK Spec - Plasma Plasma Plasma   BK Res BKNEG:BK Virus DNA Not Detected copies/mL BK Virus DNA Not Detected BK Virus DNA Not Detected BK Virus DNA Not Detected   BK Log <2.7 Log copies/mL Not Calculated Not  Calculated Not Calculated   Hep B Core NR:Nonreactive - - -   Hep B Surf - - - -   HIV 1&2 NEG - - -        Recent Labs   Lab Test 02/14/19  0728 02/21/19  0749 02/28/19  0742   DOSTAC LAST DOSE 8:00PM 2/13/2019 02/20/2019 AT 0730 PM 02/27/2019 AT 0730 PM   TACROL 9.8 8.0 7.8     Recent Labs   Lab Test 02/11/19  0740 02/14/19 0728 02/21/19  0749   DOSMPA LAST DOSE 8:00PM AT 2.10.2019 LAST DOSE 8:00PM 2/13/2019 02/20/2019 AT 0730 PM   MPACID 2.04 3.64* 1.41   MPAG 50.8 43.4 60.7

## 2019-03-04 NOTE — NURSING NOTE
"Chief Complaint   Patient presents with     RECHECK     Follow up [place]       Vital signs:  Temp: 98.5  F (36.9  C) Temp src: Oral BP: 133/88 Pulse: 99     SpO2: 94 %       Weight: 82.3 kg (181 lb 6.4 oz)  Estimated body mass index is 28.41 kg/m  as calculated from the following:    Height as of 12/20/18: 1.702 m (5' 7\").    Weight as of this encounter: 82.3 kg (181 lb 6.4 oz).      Kristen Jeter    "

## 2019-03-05 ENCOUNTER — TELEPHONE (OUTPATIENT)
Dept: TRANSPLANT | Facility: CLINIC | Age: 54
End: 2019-03-05

## 2019-03-05 DIAGNOSIS — Z12.5 SCREENING FOR PROSTATE CANCER: ICD-10-CM

## 2019-03-05 DIAGNOSIS — Z94.0 KIDNEY REPLACED BY TRANSPLANT: ICD-10-CM

## 2019-03-05 DIAGNOSIS — Z94.83 PANCREAS TRANSPLANTED (H): ICD-10-CM

## 2019-03-05 DIAGNOSIS — E78.5 HYPERLIPEMIA: Primary | ICD-10-CM

## 2019-03-05 DIAGNOSIS — Z94.83 PANCREAS REPLACED BY TRANSPLANT (H): ICD-10-CM

## 2019-03-05 LAB
DONOR IDENTIFICATION: NORMAL
DSA COMMENTS: NORMAL
DSA PRESENT: NO
DSA TEST METHOD: NORMAL
ORGAN: NORMAL

## 2019-03-05 RX ORDER — TACROLIMUS 1 MG/1
3 CAPSULE ORAL 2 TIMES DAILY
Qty: 180 CAPSULE | Refills: 11 | Status: SHIPPED | OUTPATIENT
Start: 2019-03-05 | End: 2019-04-09

## 2019-03-05 NOTE — TELEPHONE ENCOUNTER
Medication/Refill approved per CPA:    [facility] AGREEMENT FOR  IMMUNOSUPPRESSENT PRESCRIPTION MODIFICATION.      Routing encounter to Transplant as an FYI.    Thanks,  Samuel Martínez [facility]  Specialty Pharmacist [phone]

## 2019-03-06 LAB
SA1 CELL: NORMAL
SA1 COMMENTS: NORMAL
SA1 HI RISK ABY: NORMAL
SA1 MOD RISK ABY: NORMAL
SA1 TEST METHOD: NORMAL
SA2 CELL: NORMAL
SA2 COMMENTS: NORMAL
SA2 HI RISK ABY UA: NORMAL
SA2 MOD RISK ABY: NORMAL
SA2 TEST METHOD: NORMAL
UNACCEPTABLE ANTIGEN: NORMAL
UNOS CPRA: 22

## 2019-03-07 ENCOUNTER — TELEPHONE (OUTPATIENT)
Dept: TRANSPLANT | Facility: CLINIC | Age: 54
End: 2019-03-07

## 2019-03-07 DIAGNOSIS — Z94.0 KIDNEY REPLACED BY TRANSPLANT: ICD-10-CM

## 2019-03-07 DIAGNOSIS — Z94.83 PANCREAS REPLACED BY TRANSPLANT (H): ICD-10-CM

## 2019-03-07 DIAGNOSIS — Z12.5 SCREENING FOR PROSTATE CANCER: ICD-10-CM

## 2019-03-07 DIAGNOSIS — E78.5 HYPERLIPEMIA: ICD-10-CM

## 2019-03-07 DIAGNOSIS — E21.3 HYPERPARATHYROIDISM (H): ICD-10-CM

## 2019-03-07 DIAGNOSIS — Z48.298 AFTERCARE FOLLOWING ORGAN TRANSPLANT: ICD-10-CM

## 2019-03-07 DIAGNOSIS — R63.5 WEIGHT GAIN: ICD-10-CM

## 2019-03-07 LAB
AMYLASE SERPL-CCNC: 69 U/L (ref 30–110)
ANION GAP SERPL CALCULATED.3IONS-SCNC: 6 MMOL/L (ref 3–14)
BASOPHILS # BLD AUTO: 0 10E9/L (ref 0–0.2)
BASOPHILS NFR BLD AUTO: 0.6 %
BUN SERPL-MCNC: 23 MG/DL (ref 7–30)
CALCIUM SERPL-MCNC: 9.3 MG/DL (ref 8.5–10.1)
CHLORIDE SERPL-SCNC: 110 MMOL/L (ref 94–109)
CHOLEST SERPL-MCNC: 138 MG/DL
CO2 SERPL-SCNC: 22 MMOL/L (ref 20–32)
CORTIS SERPL-MCNC: 12.4 UG/DL (ref 4–22)
CREAT SERPL-MCNC: 1.06 MG/DL (ref 0.66–1.25)
DEPRECATED CALCIDIOL+CALCIFEROL SERPL-MC: 36 UG/L (ref 20–75)
DIFFERENTIAL METHOD BLD: ABNORMAL
EOSINOPHIL # BLD AUTO: 0.1 10E9/L (ref 0–0.7)
EOSINOPHIL NFR BLD AUTO: 3.3 %
ERYTHROCYTE [DISTWIDTH] IN BLOOD BY AUTOMATED COUNT: 16.7 % (ref 10–15)
GFR SERPL CREATININE-BSD FRML MDRD: 79 ML/MIN/{1.73_M2}
GLUCOSE SERPL-MCNC: 81 MG/DL (ref 70–99)
HCT VFR BLD AUTO: 46.1 % (ref 40–53)
HDLC SERPL-MCNC: 41 MG/DL
HGB BLD-MCNC: 14.9 G/DL (ref 13.3–17.7)
LDLC SERPL CALC-MCNC: 83 MG/DL
LIPASE SERPL-CCNC: 190 U/L (ref 73–393)
LYMPHOCYTES # BLD AUTO: 0.4 10E9/L (ref 0.8–5.3)
LYMPHOCYTES NFR BLD AUTO: 11.9 %
MCH RBC QN AUTO: 28.3 PG (ref 26.5–33)
MCHC RBC AUTO-ENTMCNC: 32.3 G/DL (ref 31.5–36.5)
MCV RBC AUTO: 88 FL (ref 78–100)
MONOCYTES # BLD AUTO: 0.7 10E9/L (ref 0–1.3)
MONOCYTES NFR BLD AUTO: 18.1 %
NEUTROPHILS # BLD AUTO: 2.4 10E9/L (ref 1.6–8.3)
NEUTROPHILS NFR BLD AUTO: 66.1 %
NONHDLC SERPL-MCNC: 97 MG/DL
PLATELET # BLD AUTO: 214 10E9/L (ref 150–450)
POTASSIUM SERPL-SCNC: 4.7 MMOL/L (ref 3.4–5.3)
PSA SERPL-ACNC: 0.22 UG/L (ref 0–4)
PTH-INTACT SERPL-MCNC: 104 PG/ML (ref 18–80)
RBC # BLD AUTO: 5.27 10E12/L (ref 4.4–5.9)
SODIUM SERPL-SCNC: 138 MMOL/L (ref 133–144)
TRIGL SERPL-MCNC: 68 MG/DL
WBC # BLD AUTO: 3.6 10E9/L (ref 4–11)

## 2019-03-07 PROCEDURE — 80048 BASIC METABOLIC PNL TOTAL CA: CPT | Performed by: SURGERY

## 2019-03-07 PROCEDURE — 83690 ASSAY OF LIPASE: CPT | Performed by: SURGERY

## 2019-03-07 PROCEDURE — 82150 ASSAY OF AMYLASE: CPT | Performed by: SURGERY

## 2019-03-07 PROCEDURE — 82306 VITAMIN D 25 HYDROXY: CPT | Performed by: SURGERY

## 2019-03-07 PROCEDURE — 82533 TOTAL CORTISOL: CPT | Performed by: INTERNAL MEDICINE

## 2019-03-07 PROCEDURE — 83970 ASSAY OF PARATHORMONE: CPT | Performed by: SURGERY

## 2019-03-07 PROCEDURE — 85025 COMPLETE CBC W/AUTO DIFF WBC: CPT | Performed by: SURGERY

## 2019-03-07 PROCEDURE — 36415 COLL VENOUS BLD VENIPUNCTURE: CPT | Performed by: SURGERY

## 2019-03-07 PROCEDURE — G0103 PSA SCREENING: HCPCS | Performed by: SURGERY

## 2019-03-07 PROCEDURE — 80197 ASSAY OF TACROLIMUS: CPT | Performed by: SURGERY

## 2019-03-07 PROCEDURE — 80061 LIPID PANEL: CPT | Performed by: SURGERY

## 2019-03-08 ENCOUNTER — TELEPHONE (OUTPATIENT)
Dept: TRANSPLANT | Facility: CLINIC | Age: 54
End: 2019-03-08

## 2019-03-08 LAB
TACROLIMUS BLD-MCNC: 31.1 UG/L (ref 5–15)
TME LAST DOSE: ABNORMAL H

## 2019-03-08 NOTE — TELEPHONE ENCOUNTER
Received call from lab that patient's tacrolimus level was 29.8 drawn this morning. Called patient who stated he probably took medication beforehand accidentally. Due to redraw labs in 1 week. Will forward to coordinator to see if they want labs sooner.

## 2019-03-08 NOTE — TELEPHONE ENCOUNTER
Post Kidney and Pancreas Transplant Team Conference  Date: 3/8/2019  Transplant Coordinator: Alesha Hartley     Attendees:  []  Dr. Wolfe [] Becca Riojas RN  [] Shari Chapin LPN     []  Dr. Dick [] Anu Hanson RN [] Shayna Chavez LPN   []  Dr. Tate [] Glenis Ko RN    [x]  Dr. De Los Santos [] Sailaja Choi RN    [] Dr. Pal [x] Zora Hartley RN    [] Dr. Luevano [] Joey Goldberg RN    [] Dr. Caputo [] Viv Wick RN    [] Surgery Fellow [] Linh Oates RN    [] Sandy Cole NP [] Angella Multani RN    [] Luke Tesfaye, PharmD [] Dwight Mariano RN     [] Becca Gregory RN        Verbal Plan Read Back:   Cortisol within normal limits  Improved parathyroid hormone.    STOP calcitriol.  Continue with good nutrition and exercise for weight management.     Routed to RN Coordinator   Alesha Hartley

## 2019-03-13 ENCOUNTER — TELEPHONE (OUTPATIENT)
Dept: TRANSPLANT | Facility: CLINIC | Age: 54
End: 2019-03-13

## 2019-03-13 NOTE — TELEPHONE ENCOUNTER
Provider Call: General  Route to LPN    Reason for call: Janette  called to touch base with coordinator and discuss lab work    Call back needed? Yes    Return Call Needed  Same as documented in contacts section  When to return call?: Greater than one day: Route standard priority

## 2019-03-14 DIAGNOSIS — Z94.0 KIDNEY REPLACED BY TRANSPLANT: ICD-10-CM

## 2019-03-14 DIAGNOSIS — Z48.298 AFTERCARE FOLLOWING ORGAN TRANSPLANT: ICD-10-CM

## 2019-03-14 DIAGNOSIS — Z94.83 PANCREAS REPLACED BY TRANSPLANT (H): ICD-10-CM

## 2019-03-14 LAB
AMYLASE SERPL-CCNC: 64 U/L (ref 30–110)
ANION GAP SERPL CALCULATED.3IONS-SCNC: 8 MMOL/L (ref 3–14)
BASOPHILS # BLD AUTO: 0 10E9/L (ref 0–0.2)
BASOPHILS NFR BLD AUTO: 0.3 %
BUN SERPL-MCNC: 27 MG/DL (ref 7–30)
CALCIUM SERPL-MCNC: 9.3 MG/DL (ref 8.5–10.1)
CHLORIDE SERPL-SCNC: 110 MMOL/L (ref 94–109)
CO2 SERPL-SCNC: 22 MMOL/L (ref 20–32)
CREAT SERPL-MCNC: 1.15 MG/DL (ref 0.66–1.25)
DIFFERENTIAL METHOD BLD: ABNORMAL
EOSINOPHIL # BLD AUTO: 0.1 10E9/L (ref 0–0.7)
EOSINOPHIL NFR BLD AUTO: 3.3 %
ERYTHROCYTE [DISTWIDTH] IN BLOOD BY AUTOMATED COUNT: 16 % (ref 10–15)
GFR SERPL CREATININE-BSD FRML MDRD: 71 ML/MIN/{1.73_M2}
GLUCOSE SERPL-MCNC: 80 MG/DL (ref 70–99)
HCT VFR BLD AUTO: 46.3 % (ref 40–53)
HGB BLD-MCNC: 14.9 G/DL (ref 13.3–17.7)
LIPASE SERPL-CCNC: 180 U/L (ref 73–393)
LYMPHOCYTES # BLD AUTO: 0.6 10E9/L (ref 0.8–5.3)
LYMPHOCYTES NFR BLD AUTO: 18.6 %
MCH RBC QN AUTO: 28.3 PG (ref 26.5–33)
MCHC RBC AUTO-ENTMCNC: 32.2 G/DL (ref 31.5–36.5)
MCV RBC AUTO: 88 FL (ref 78–100)
MONOCYTES # BLD AUTO: 0.6 10E9/L (ref 0–1.3)
MONOCYTES NFR BLD AUTO: 19.2 %
NEUTROPHILS # BLD AUTO: 1.8 10E9/L (ref 1.6–8.3)
NEUTROPHILS NFR BLD AUTO: 58.6 %
PLATELET # BLD AUTO: 229 10E9/L (ref 150–450)
POTASSIUM SERPL-SCNC: 4.7 MMOL/L (ref 3.4–5.3)
RBC # BLD AUTO: 5.27 10E12/L (ref 4.4–5.9)
SODIUM SERPL-SCNC: 139 MMOL/L (ref 133–144)
TACROLIMUS BLD-MCNC: 8.3 UG/L (ref 5–15)
TME LAST DOSE: NORMAL H
WBC # BLD AUTO: 3.1 10E9/L (ref 4–11)

## 2019-03-14 PROCEDURE — 80197 ASSAY OF TACROLIMUS: CPT | Performed by: SURGERY

## 2019-03-14 PROCEDURE — 85025 COMPLETE CBC W/AUTO DIFF WBC: CPT | Performed by: SURGERY

## 2019-03-14 PROCEDURE — 83690 ASSAY OF LIPASE: CPT | Performed by: SURGERY

## 2019-03-14 PROCEDURE — 36415 COLL VENOUS BLD VENIPUNCTURE: CPT | Performed by: SURGERY

## 2019-03-14 PROCEDURE — 80048 BASIC METABOLIC PNL TOTAL CA: CPT | Performed by: SURGERY

## 2019-03-14 PROCEDURE — 82150 ASSAY OF AMYLASE: CPT | Performed by: SURGERY

## 2019-03-15 DIAGNOSIS — E87.5 HYPERKALEMIA: ICD-10-CM

## 2019-03-15 RX ORDER — FLUDROCORTISONE ACETATE 0.1 MG/1
0.1 TABLET ORAL DAILY
Qty: 30 TABLET | Refills: 3 | Status: SHIPPED | OUTPATIENT
Start: 2019-03-15 | End: 2019-04-04

## 2019-03-15 NOTE — TELEPHONE ENCOUNTER
RNCC gave update that pancreatic and kidney function are superb. No concern for non-compliance. Follow up regularly approximately every 2 months.

## 2019-03-21 DIAGNOSIS — Z94.0 KIDNEY REPLACED BY TRANSPLANT: ICD-10-CM

## 2019-03-21 DIAGNOSIS — Z94.83 PANCREAS REPLACED BY TRANSPLANT (H): ICD-10-CM

## 2019-03-21 DIAGNOSIS — Z48.298 AFTERCARE FOLLOWING ORGAN TRANSPLANT: ICD-10-CM

## 2019-03-21 LAB
AMYLASE SERPL-CCNC: 58 U/L (ref 30–110)
ANION GAP SERPL CALCULATED.3IONS-SCNC: 9 MMOL/L (ref 3–14)
BASOPHILS # BLD AUTO: 0 10E9/L (ref 0–0.2)
BASOPHILS NFR BLD AUTO: 0.2 %
BUN SERPL-MCNC: 20 MG/DL (ref 7–30)
CALCIUM SERPL-MCNC: 9.3 MG/DL (ref 8.5–10.1)
CHLORIDE SERPL-SCNC: 109 MMOL/L (ref 94–109)
CO2 SERPL-SCNC: 20 MMOL/L (ref 20–32)
CREAT SERPL-MCNC: 1 MG/DL (ref 0.66–1.25)
DIFFERENTIAL METHOD BLD: ABNORMAL
EOSINOPHIL # BLD AUTO: 0.1 10E9/L (ref 0–0.7)
EOSINOPHIL NFR BLD AUTO: 2.2 %
ERYTHROCYTE [DISTWIDTH] IN BLOOD BY AUTOMATED COUNT: 15.9 % (ref 10–15)
GFR SERPL CREATININE-BSD FRML MDRD: 85 ML/MIN/{1.73_M2}
GLUCOSE SERPL-MCNC: 80 MG/DL (ref 70–99)
HCT VFR BLD AUTO: 45.6 % (ref 40–53)
HGB BLD-MCNC: 14.7 G/DL (ref 13.3–17.7)
LIPASE SERPL-CCNC: 158 U/L (ref 73–393)
LYMPHOCYTES # BLD AUTO: 0.6 10E9/L (ref 0.8–5.3)
LYMPHOCYTES NFR BLD AUTO: 15.4 %
MCH RBC QN AUTO: 28.3 PG (ref 26.5–33)
MCHC RBC AUTO-ENTMCNC: 32.2 G/DL (ref 31.5–36.5)
MCV RBC AUTO: 88 FL (ref 78–100)
MONOCYTES # BLD AUTO: 0.6 10E9/L (ref 0–1.3)
MONOCYTES NFR BLD AUTO: 14.9 %
NEUTROPHILS # BLD AUTO: 2.7 10E9/L (ref 1.6–8.3)
NEUTROPHILS NFR BLD AUTO: 67.3 %
PLATELET # BLD AUTO: 198 10E9/L (ref 150–450)
POTASSIUM SERPL-SCNC: 4.9 MMOL/L (ref 3.4–5.3)
RBC # BLD AUTO: 5.19 10E12/L (ref 4.4–5.9)
SODIUM SERPL-SCNC: 138 MMOL/L (ref 133–144)
TACROLIMUS BLD-MCNC: 7.8 UG/L (ref 5–15)
TME LAST DOSE: NORMAL H
WBC # BLD AUTO: 4 10E9/L (ref 4–11)

## 2019-03-21 PROCEDURE — 82150 ASSAY OF AMYLASE: CPT | Performed by: SURGERY

## 2019-03-21 PROCEDURE — 85025 COMPLETE CBC W/AUTO DIFF WBC: CPT | Performed by: SURGERY

## 2019-03-21 PROCEDURE — 36415 COLL VENOUS BLD VENIPUNCTURE: CPT | Performed by: SURGERY

## 2019-03-21 PROCEDURE — 80197 ASSAY OF TACROLIMUS: CPT | Performed by: SURGERY

## 2019-03-21 PROCEDURE — 80048 BASIC METABOLIC PNL TOTAL CA: CPT | Performed by: SURGERY

## 2019-03-21 PROCEDURE — 83690 ASSAY OF LIPASE: CPT | Performed by: SURGERY

## 2019-03-22 ENCOUNTER — TELEPHONE (OUTPATIENT)
Dept: TRANSPLANT | Facility: CLINIC | Age: 54
End: 2019-03-22

## 2019-03-22 DIAGNOSIS — R53.83 FATIGUE: Primary | ICD-10-CM

## 2019-03-22 DIAGNOSIS — Z94.0 KIDNEY REPLACED BY TRANSPLANT: ICD-10-CM

## 2019-03-22 DIAGNOSIS — Z94.83 PANCREAS REPLACED BY TRANSPLANT (H): ICD-10-CM

## 2019-03-22 NOTE — TELEPHONE ENCOUNTER
Call placed to patient. Patient confirms current tacrolimus dose at 2.5 mg bid and accurate trough level. Patient notes that he is extremely tired. Otherwise no recent illness, diarrhea or medication changes. Patient v\u to increase his tacrolimus dose to 3 mg bid and repeat level with schedule lab draw.

## 2019-03-22 NOTE — TELEPHONE ENCOUNTER
No leukopenia, not CMV / EBV discordant, but due to fatigue, will check CMV and EBV PCRs now that Sean has been off of valcyte.

## 2019-03-22 NOTE — TELEPHONE ENCOUNTER
ISSUE:   Tacrolimus level 7.8 on 3/21, goal 810, dose 3 mg BID    PLAN:   Please call pt and confirm this was a good 12-hour trough. Verify dose 3 mg BID. Confirm no new medications or illness (trina. Diarrhea). If good trough, increase dose to 3.5 mg BID and recheck level as scheduled.

## 2019-03-28 DIAGNOSIS — Z94.0 KIDNEY REPLACED BY TRANSPLANT: ICD-10-CM

## 2019-03-28 DIAGNOSIS — Z94.83 PANCREAS REPLACED BY TRANSPLANT (H): ICD-10-CM

## 2019-03-28 DIAGNOSIS — Z48.298 AFTERCARE FOLLOWING ORGAN TRANSPLANT: ICD-10-CM

## 2019-03-28 LAB
AMYLASE SERPL-CCNC: 63 U/L (ref 30–110)
ANION GAP SERPL CALCULATED.3IONS-SCNC: 4 MMOL/L (ref 3–14)
BASOPHILS # BLD AUTO: 0 10E9/L (ref 0–0.2)
BASOPHILS NFR BLD AUTO: 0.3 %
BUN SERPL-MCNC: 30 MG/DL (ref 7–30)
CALCIUM SERPL-MCNC: 9.2 MG/DL (ref 8.5–10.1)
CHLORIDE SERPL-SCNC: 109 MMOL/L (ref 94–109)
CO2 SERPL-SCNC: 24 MMOL/L (ref 20–32)
CREAT SERPL-MCNC: 1.1 MG/DL (ref 0.66–1.25)
DIFFERENTIAL METHOD BLD: ABNORMAL
EOSINOPHIL # BLD AUTO: 0.1 10E9/L (ref 0–0.7)
EOSINOPHIL NFR BLD AUTO: 2.6 %
ERYTHROCYTE [DISTWIDTH] IN BLOOD BY AUTOMATED COUNT: 16.1 % (ref 10–15)
GFR SERPL CREATININE-BSD FRML MDRD: 75 ML/MIN/{1.73_M2}
GLUCOSE SERPL-MCNC: 82 MG/DL (ref 70–99)
HCT VFR BLD AUTO: 47.4 % (ref 40–53)
HGB BLD-MCNC: 15.6 G/DL (ref 13.3–17.7)
LIPASE SERPL-CCNC: 150 U/L (ref 73–393)
LYMPHOCYTES # BLD AUTO: 0.8 10E9/L (ref 0.8–5.3)
LYMPHOCYTES NFR BLD AUTO: 26.3 %
MCH RBC QN AUTO: 28 PG (ref 26.5–33)
MCHC RBC AUTO-ENTMCNC: 32.9 G/DL (ref 31.5–36.5)
MCV RBC AUTO: 85 FL (ref 78–100)
MONOCYTES # BLD AUTO: 0.4 10E9/L (ref 0–1.3)
MONOCYTES NFR BLD AUTO: 13.2 %
NEUTROPHILS # BLD AUTO: 1.8 10E9/L (ref 1.6–8.3)
NEUTROPHILS NFR BLD AUTO: 57.6 %
PLATELET # BLD AUTO: 221 10E9/L (ref 150–450)
POTASSIUM SERPL-SCNC: 5 MMOL/L (ref 3.4–5.3)
RBC # BLD AUTO: 5.57 10E12/L (ref 4.4–5.9)
SODIUM SERPL-SCNC: 137 MMOL/L (ref 133–144)
TACROLIMUS BLD-MCNC: 10.2 UG/L (ref 5–15)
TME LAST DOSE: NORMAL H
WBC # BLD AUTO: 3 10E9/L (ref 4–11)

## 2019-03-28 PROCEDURE — 83690 ASSAY OF LIPASE: CPT | Performed by: SURGERY

## 2019-03-28 PROCEDURE — 82150 ASSAY OF AMYLASE: CPT | Performed by: SURGERY

## 2019-03-28 PROCEDURE — 85025 COMPLETE CBC W/AUTO DIFF WBC: CPT | Performed by: SURGERY

## 2019-03-28 PROCEDURE — 36415 COLL VENOUS BLD VENIPUNCTURE: CPT | Performed by: SURGERY

## 2019-03-28 PROCEDURE — 80197 ASSAY OF TACROLIMUS: CPT | Performed by: SURGERY

## 2019-03-28 PROCEDURE — 80048 BASIC METABOLIC PNL TOTAL CA: CPT | Performed by: SURGERY

## 2019-04-04 ENCOUNTER — TELEPHONE (OUTPATIENT)
Dept: TRANSPLANT | Facility: CLINIC | Age: 54
End: 2019-04-04

## 2019-04-04 ENCOUNTER — TEAM CONFERENCE (OUTPATIENT)
Dept: NEPHROLOGY | Facility: CLINIC | Age: 54
End: 2019-04-04

## 2019-04-04 DIAGNOSIS — Z94.0 KIDNEY REPLACED BY TRANSPLANT: ICD-10-CM

## 2019-04-04 DIAGNOSIS — Z94.83 PANCREAS REPLACED BY TRANSPLANT (H): ICD-10-CM

## 2019-04-04 DIAGNOSIS — Z48.298 AFTERCARE FOLLOWING ORGAN TRANSPLANT: ICD-10-CM

## 2019-04-04 DIAGNOSIS — R63.5 WEIGHT GAIN: Primary | ICD-10-CM

## 2019-04-04 DIAGNOSIS — E87.5 HYPERKALEMIA: ICD-10-CM

## 2019-04-04 LAB
AMYLASE SERPL-CCNC: 70 U/L (ref 30–110)
ANION GAP SERPL CALCULATED.3IONS-SCNC: 6 MMOL/L (ref 3–14)
BASOPHILS # BLD AUTO: 0 10E9/L (ref 0–0.2)
BASOPHILS NFR BLD AUTO: 0.3 %
BUN SERPL-MCNC: 19 MG/DL (ref 7–30)
CALCIUM SERPL-MCNC: 8.9 MG/DL (ref 8.5–10.1)
CHLORIDE SERPL-SCNC: 111 MMOL/L (ref 94–109)
CO2 SERPL-SCNC: 22 MMOL/L (ref 20–32)
CREAT SERPL-MCNC: 1.05 MG/DL (ref 0.66–1.25)
DIFFERENTIAL METHOD BLD: ABNORMAL
EOSINOPHIL # BLD AUTO: 0.1 10E9/L (ref 0–0.7)
EOSINOPHIL NFR BLD AUTO: 2.8 %
ERYTHROCYTE [DISTWIDTH] IN BLOOD BY AUTOMATED COUNT: 16 % (ref 10–15)
GFR SERPL CREATININE-BSD FRML MDRD: 79 ML/MIN/{1.73_M2}
GLUCOSE SERPL-MCNC: 85 MG/DL (ref 70–99)
HCT VFR BLD AUTO: 47.2 % (ref 40–53)
HGB BLD-MCNC: 15.6 G/DL (ref 13.3–17.7)
LIPASE SERPL-CCNC: 208 U/L (ref 73–393)
LYMPHOCYTES # BLD AUTO: 1 10E9/L (ref 0.8–5.3)
LYMPHOCYTES NFR BLD AUTO: 29.3 %
MCH RBC QN AUTO: 28.2 PG (ref 26.5–33)
MCHC RBC AUTO-ENTMCNC: 33.1 G/DL (ref 31.5–36.5)
MCV RBC AUTO: 85 FL (ref 78–100)
MONOCYTES # BLD AUTO: 0.4 10E9/L (ref 0–1.3)
MONOCYTES NFR BLD AUTO: 13.3 %
NEUTROPHILS # BLD AUTO: 1.8 10E9/L (ref 1.6–8.3)
NEUTROPHILS NFR BLD AUTO: 54.3 %
PLATELET # BLD AUTO: 201 10E9/L (ref 150–450)
POTASSIUM SERPL-SCNC: 4.8 MMOL/L (ref 3.4–5.3)
RBC # BLD AUTO: 5.54 10E12/L (ref 4.4–5.9)
SODIUM SERPL-SCNC: 139 MMOL/L (ref 133–144)
TACROLIMUS BLD-MCNC: 12.6 UG/L (ref 5–15)
TME LAST DOSE: NORMAL H
WBC # BLD AUTO: 3.2 10E9/L (ref 4–11)

## 2019-04-04 PROCEDURE — 36415 COLL VENOUS BLD VENIPUNCTURE: CPT | Performed by: SURGERY

## 2019-04-04 PROCEDURE — 87799 DETECT AGENT NOS DNA QUANT: CPT | Performed by: SURGERY

## 2019-04-04 PROCEDURE — 82150 ASSAY OF AMYLASE: CPT | Performed by: SURGERY

## 2019-04-04 PROCEDURE — 80048 BASIC METABOLIC PNL TOTAL CA: CPT | Performed by: SURGERY

## 2019-04-04 PROCEDURE — 83690 ASSAY OF LIPASE: CPT | Performed by: SURGERY

## 2019-04-04 PROCEDURE — 80197 ASSAY OF TACROLIMUS: CPT | Performed by: SURGERY

## 2019-04-04 PROCEDURE — 85025 COMPLETE CBC W/AUTO DIFF WBC: CPT | Performed by: SURGERY

## 2019-04-04 RX ORDER — FLUDROCORTISONE ACETATE 0.1 MG/1
0.1 TABLET ORAL
Qty: 15 TABLET | Refills: 11 | OUTPATIENT
Start: 2019-04-05 | End: 2019-12-18

## 2019-04-04 NOTE — TELEPHONE ENCOUNTER
Spoke with the patient to confirm weight management appointments on 4/29/19. He has his six-month nephrology appointments on the same day.  Informed patient an itinerary can be accessed on My [employer] and will be sent via mail.

## 2019-04-04 NOTE — LETTER
April 4, 2019    KIDNEY TRANSPLANT FOLLOW-UP SCHEDULE    Patient: Sean Lewis  MR#: [id number]  Coordinator: Zora Hartley [phone], option [phone]  :  Radha PARSONS [phone]    This is your schedule, please follow dates and times. You will receive reminder phone calls for other tests. If you have any questions about dates and times, please call us on the number(s) listed above. Thank you.     Your appointments will take place at the  [address]; [place] [address]    Day/Date: Monday, April 29, 2019   Time Location Activity   11:00 am Transplant Services  3rd floor; Clinic 3A Appointment with your nephrologist   11:30 am Transplant Services  3rd floor; Clinic 3A Appointment with Luke Tesfaye,  Pharmacist   1:00 pm Weight Management  4th floor Appointment with Leonila Dial PA-C,  Weight Management   2:00 pm Weight Management  4th floor Appointment with Agustina Mays  Registered Dietitian

## 2019-04-04 NOTE — TELEPHONE ENCOUNTER
Post Kidney and Pancreas Transplant Team Conference  Date: 4/3/2019  Transplant Coordinator: Alesha Hartley     Attendees:  [x]  Dr. Wolfe [] Becca Riojas, RN  [] Shari Chapin LPN     []  Dr. Dick [] Anu Hanson RN [x] Shayna Chavez LPN   [x]  Dr. Tate [] Glenis Ko RN    []  Dr. De Los Santos [x] Sailaja Choi RN    [] Dr. Pal [x] Zora Hartley RN    [x] Dr. Luevano [] Joey Goldberg RN    [] Dr. Caputo [x] Viv Wick RN    [x] Surgery Fellow [] Linh Oates RN    [] Sandy Cole, RAFA [x] Angella Multani RN    [] Luke Tesfaye, PharmD [] Dwight Mariano RN     [] Becca Gregory RN        Verbal Plan Read Back:   Recommend medical weight management  Decrease Florinef frequency to 3 x week    Routed to RN Coordinator   Shayna Estrada was agreeable and voiced understanding.

## 2019-04-04 NOTE — TELEPHONE ENCOUNTER
----- Message from Alesha Hartley RN sent at 4/4/2019 12:05 PM CDT -----  Please call Sean to schedule with medical weight management.  Thanks!

## 2019-04-05 DIAGNOSIS — Z94.0 KIDNEY REPLACED BY TRANSPLANT: Primary | ICD-10-CM

## 2019-04-05 DIAGNOSIS — Z94.83 PANCREAS TRANSPLANTED (H): ICD-10-CM

## 2019-04-05 DIAGNOSIS — Z94.83 PANCREAS REPLACED BY TRANSPLANT (H): ICD-10-CM

## 2019-04-05 NOTE — TELEPHONE ENCOUNTER
ISSUE:   Tacrolimus level 12.6 on 4/4/19, goal 8-10, dose 3 mg BID    PLAN:   Please call pt and confirm this was a good 12-hour trough. Verify dose 3 mg BID. Confirm no new medications or illness (trina. Diarrhea). If good trough, decrease dose to 2.5 mg BID and recheck level as scheduled.

## 2019-04-05 NOTE — TELEPHONE ENCOUNTER
Left message for patient regarding:  ISSUE:   Tacrolimus level 12.6 on 4/4/19, goal 8-10, dose 3 mg BID     PLAN:   Please call pt and confirm this was a good 12-hour trough. Verify dose 3 mg BID. Confirm no new medications or illness (trina. Diarrhea). If good trough, decrease dose to 2.5 mg BID and recheck level as scheduled.

## 2019-04-08 NOTE — TELEPHONE ENCOUNTER
Left message and sent [employer] message regarding:  Left message for patient regarding:  ISSUE:   Tacrolimus level 12.6 on 4/4/19, goal 8-10, dose 3 mg BID     PLAN:   Please call pt and confirm this was a good 12-hour trough. Verify dose 3 mg BID. Confirm no new medications or illness (trina. Diarrhea). If good trough, decrease dose to 2.5 mg BID and recheck level as scheduled.

## 2019-04-09 RX ORDER — TACROLIMUS 1 MG/1
2 CAPSULE ORAL 2 TIMES DAILY
Qty: 120 CAPSULE | Refills: 11 | Status: SHIPPED | OUTPATIENT
Start: 2019-04-09 | End: 2019-04-19

## 2019-04-09 RX ORDER — TACROLIMUS 0.5 MG/1
0.5 CAPSULE ORAL 2 TIMES DAILY
Qty: 60 CAPSULE | Refills: 11 | Status: SHIPPED | OUTPATIENT
Start: 2019-04-09 | End: 2019-04-19

## 2019-04-09 NOTE — TELEPHONE ENCOUNTER
Patient responded via mychart with the following:  Yes that was a good 12 hours, I have reduce my dosage to 2.5 twice a day

## 2019-04-18 DIAGNOSIS — Z94.83 PANCREAS REPLACED BY TRANSPLANT (H): ICD-10-CM

## 2019-04-18 DIAGNOSIS — Z94.83 PANCREAS TRANSPLANTED (H): ICD-10-CM

## 2019-04-18 DIAGNOSIS — Z48.298 AFTERCARE FOLLOWING ORGAN TRANSPLANT: ICD-10-CM

## 2019-04-18 DIAGNOSIS — Z94.0 KIDNEY REPLACED BY TRANSPLANT: ICD-10-CM

## 2019-04-18 LAB
AMYLASE SERPL-CCNC: 65 U/L (ref 30–110)
ANION GAP SERPL CALCULATED.3IONS-SCNC: 8 MMOL/L (ref 3–14)
BASOPHILS # BLD AUTO: 0 10E9/L (ref 0–0.2)
BASOPHILS NFR BLD AUTO: 0.3 %
BUN SERPL-MCNC: 20 MG/DL (ref 7–30)
CALCIUM SERPL-MCNC: 9 MG/DL (ref 8.5–10.1)
CHLORIDE SERPL-SCNC: 112 MMOL/L (ref 94–109)
CO2 SERPL-SCNC: 21 MMOL/L (ref 20–32)
CREAT SERPL-MCNC: 1.01 MG/DL (ref 0.66–1.25)
DIFFERENTIAL METHOD BLD: ABNORMAL
EOSINOPHIL # BLD AUTO: 0.1 10E9/L (ref 0–0.7)
EOSINOPHIL NFR BLD AUTO: 3.8 %
ERYTHROCYTE [DISTWIDTH] IN BLOOD BY AUTOMATED COUNT: 15.8 % (ref 10–15)
GFR SERPL CREATININE-BSD FRML MDRD: 83 ML/MIN/{1.73_M2}
GLUCOSE SERPL-MCNC: 88 MG/DL (ref 70–99)
HCT VFR BLD AUTO: 48.7 % (ref 40–53)
HGB BLD-MCNC: 16 G/DL (ref 13.3–17.7)
LIPASE SERPL-CCNC: 156 U/L (ref 73–393)
LYMPHOCYTES # BLD AUTO: 0.9 10E9/L (ref 0.8–5.3)
LYMPHOCYTES NFR BLD AUTO: 29.9 %
MCH RBC QN AUTO: 27.7 PG (ref 26.5–33)
MCHC RBC AUTO-ENTMCNC: 32.9 G/DL (ref 31.5–36.5)
MCV RBC AUTO: 84 FL (ref 78–100)
MONOCYTES # BLD AUTO: 0.4 10E9/L (ref 0–1.3)
MONOCYTES NFR BLD AUTO: 12.2 %
NEUTROPHILS # BLD AUTO: 1.6 10E9/L (ref 1.6–8.3)
NEUTROPHILS NFR BLD AUTO: 53.8 %
PLATELET # BLD AUTO: 193 10E9/L (ref 150–450)
POTASSIUM SERPL-SCNC: 4.9 MMOL/L (ref 3.4–5.3)
RBC # BLD AUTO: 5.78 10E12/L (ref 4.4–5.9)
SODIUM SERPL-SCNC: 141 MMOL/L (ref 133–144)
TACROLIMUS BLD-MCNC: 7.8 UG/L (ref 5–15)
TME LAST DOSE: NORMAL H
WBC # BLD AUTO: 2.9 10E9/L (ref 4–11)

## 2019-04-18 PROCEDURE — 83690 ASSAY OF LIPASE: CPT | Performed by: SURGERY

## 2019-04-18 PROCEDURE — 36415 COLL VENOUS BLD VENIPUNCTURE: CPT | Performed by: SURGERY

## 2019-04-18 PROCEDURE — 80197 ASSAY OF TACROLIMUS: CPT | Performed by: SURGERY

## 2019-04-18 PROCEDURE — 85025 COMPLETE CBC W/AUTO DIFF WBC: CPT | Performed by: SURGERY

## 2019-04-18 PROCEDURE — 82150 ASSAY OF AMYLASE: CPT | Performed by: SURGERY

## 2019-04-18 PROCEDURE — 80048 BASIC METABOLIC PNL TOTAL CA: CPT | Performed by: SURGERY

## 2019-04-19 ENCOUNTER — TELEPHONE (OUTPATIENT)
Dept: NEPHROLOGY | Facility: CLINIC | Age: 54
End: 2019-04-19

## 2019-04-19 DIAGNOSIS — Z94.83 PANCREAS REPLACED BY TRANSPLANT (H): ICD-10-CM

## 2019-04-19 DIAGNOSIS — Z94.83 PANCREAS TRANSPLANTED (H): ICD-10-CM

## 2019-04-19 DIAGNOSIS — Z94.0 KIDNEY REPLACED BY TRANSPLANT: ICD-10-CM

## 2019-04-19 RX ORDER — TACROLIMUS 1 MG/1
3 CAPSULE ORAL 2 TIMES DAILY
Qty: 180 CAPSULE | Refills: 11 | Status: SHIPPED | OUTPATIENT
Start: 2019-04-19 | End: 2019-05-31

## 2019-04-19 RX ORDER — MAGNESIUM OXIDE 400 MG/1
TABLET ORAL
Qty: 120 TABLET | Refills: 3 | Status: SHIPPED | OUTPATIENT
Start: 2019-04-19 | End: 2021-01-04

## 2019-04-19 RX ORDER — TACROLIMUS 0.5 MG/1
CAPSULE ORAL
Qty: 60 CAPSULE | Refills: 11
Start: 2019-04-19 | End: 2019-05-31

## 2019-04-19 NOTE — TELEPHONE ENCOUNTER
"Call placed to patient. Patient confirms current dose and accurate trough level. Patient v\u to increase tacrolimus dose to 3 mg bid and complete standing labs next week.     Patient would like  A return call from his RN CC. He notes that his fistula is no longer worker, \"it's not vibrating\".    Rx sent  "

## 2019-04-19 NOTE — TELEPHONE ENCOUNTER
----- Message from Deyvi Dick MD sent at 4/19/2019  2:09 PM CDT -----  Regarding: RE: mag 800 bid?  Should continue but needs to update the level.   Tac below goal on the most recent check?  ----- Message -----  From: Edna Ramirez LPN  Sent: 4/18/2019  11:34 AM  To: Deyvi Dick MD  Subject: mag 800 bid?                                     Hi Deyvi,    Please let me know if patient should continue Mag ox 800 BID???  Results for AMENA BRAGG (MRN [id number]) as of 4/18/2019 11:34    1/28/2019 08:23  Magnesium: 1.7    2/7/2019 15:05  Magnesium: 1.8    2/11/2019 07:40  Magnesium: 1.5 (L)    2/14/2019 07:28  Magnesium: 2.0

## 2019-04-19 NOTE — TELEPHONE ENCOUNTER
IMMUNOSUPPRESSION  Tacrolimus level 7.8, goal 8-10  Drug level reported as 13-hour trough  Current tacrolimus dose 2.5 mg twice daily    PLAN  Verify timing of tacrolimus drug level (11-13 hour trough acceptable)  Increase tacrolimus dose to 3 mg twice daily  Confirm that patient will get standing order transplant labs next week.    LPN TASK  Call with questions and instructions for dose change.

## 2019-04-22 DIAGNOSIS — Z94.0 KIDNEY TRANSPLANTED: Primary | ICD-10-CM

## 2019-04-25 DIAGNOSIS — Z94.0 KIDNEY TRANSPLANTED: ICD-10-CM

## 2019-04-25 DIAGNOSIS — Z94.0 KIDNEY REPLACED BY TRANSPLANT: ICD-10-CM

## 2019-04-25 DIAGNOSIS — Z94.83 PANCREAS REPLACED BY TRANSPLANT (H): ICD-10-CM

## 2019-04-25 DIAGNOSIS — Z48.298 AFTERCARE FOLLOWING ORGAN TRANSPLANT: ICD-10-CM

## 2019-04-25 LAB
AMYLASE SERPL-CCNC: 64 U/L (ref 30–110)
ANION GAP SERPL CALCULATED.3IONS-SCNC: 6 MMOL/L (ref 3–14)
BASOPHILS # BLD AUTO: 0 10E9/L (ref 0–0.2)
BASOPHILS NFR BLD AUTO: 0.3 %
BUN SERPL-MCNC: 22 MG/DL (ref 7–30)
CALCIUM SERPL-MCNC: 9.2 MG/DL (ref 8.5–10.1)
CHLORIDE SERPL-SCNC: 110 MMOL/L (ref 94–109)
CO2 SERPL-SCNC: 24 MMOL/L (ref 20–32)
CREAT SERPL-MCNC: 1.12 MG/DL (ref 0.66–1.25)
DIFFERENTIAL METHOD BLD: ABNORMAL
EOSINOPHIL # BLD AUTO: 0.1 10E9/L (ref 0–0.7)
EOSINOPHIL NFR BLD AUTO: 4.4 %
ERYTHROCYTE [DISTWIDTH] IN BLOOD BY AUTOMATED COUNT: 15.8 % (ref 10–15)
GFR SERPL CREATININE-BSD FRML MDRD: 73 ML/MIN/{1.73_M2}
GLUCOSE SERPL-MCNC: 88 MG/DL (ref 70–99)
HCT VFR BLD AUTO: 49.6 % (ref 40–53)
HGB BLD-MCNC: 16.8 G/DL (ref 13.3–17.7)
LIPASE SERPL-CCNC: 139 U/L (ref 73–393)
LYMPHOCYTES # BLD AUTO: 1.1 10E9/L (ref 0.8–5.3)
LYMPHOCYTES NFR BLD AUTO: 32.7 %
MAGNESIUM SERPL-MCNC: 1.9 MG/DL (ref 1.6–2.3)
MCH RBC QN AUTO: 28.3 PG (ref 26.5–33)
MCHC RBC AUTO-ENTMCNC: 33.9 G/DL (ref 31.5–36.5)
MCV RBC AUTO: 84 FL (ref 78–100)
MONOCYTES # BLD AUTO: 0.5 10E9/L (ref 0–1.3)
MONOCYTES NFR BLD AUTO: 14 %
NEUTROPHILS # BLD AUTO: 1.6 10E9/L (ref 1.6–8.3)
NEUTROPHILS NFR BLD AUTO: 48.6 %
PLATELET # BLD AUTO: 203 10E9/L (ref 150–450)
POTASSIUM SERPL-SCNC: 4.8 MMOL/L (ref 3.4–5.3)
RBC # BLD AUTO: 5.93 10E12/L (ref 4.4–5.9)
SODIUM SERPL-SCNC: 140 MMOL/L (ref 133–144)
TACROLIMUS BLD-MCNC: 9.6 UG/L (ref 5–15)
TME LAST DOSE: NORMAL H
WBC # BLD AUTO: 3.2 10E9/L (ref 4–11)

## 2019-04-25 PROCEDURE — 80048 BASIC METABOLIC PNL TOTAL CA: CPT | Performed by: SURGERY

## 2019-04-25 PROCEDURE — 36415 COLL VENOUS BLD VENIPUNCTURE: CPT | Performed by: SURGERY

## 2019-04-25 PROCEDURE — 80197 ASSAY OF TACROLIMUS: CPT | Performed by: SURGERY

## 2019-04-25 PROCEDURE — 83735 ASSAY OF MAGNESIUM: CPT | Performed by: SURGERY

## 2019-04-25 PROCEDURE — 83690 ASSAY OF LIPASE: CPT | Performed by: SURGERY

## 2019-04-25 PROCEDURE — 82150 ASSAY OF AMYLASE: CPT | Performed by: SURGERY

## 2019-04-25 PROCEDURE — 85025 COMPLETE CBC W/AUTO DIFF WBC: CPT | Performed by: SURGERY

## 2019-04-29 ENCOUNTER — OFFICE VISIT (OUTPATIENT)
Dept: NEPHROLOGY | Facility: CLINIC | Age: 54
End: 2019-04-29
Attending: INTERNAL MEDICINE
Payer: COMMERCIAL

## 2019-04-29 ENCOUNTER — ALLIED HEALTH/NURSE VISIT (OUTPATIENT)
Dept: SURGERY | Facility: CLINIC | Age: 54
End: 2019-04-29
Attending: INTERNAL MEDICINE
Payer: MEDICARE

## 2019-04-29 ENCOUNTER — OFFICE VISIT (OUTPATIENT)
Dept: PHARMACY | Facility: CLINIC | Age: 54
End: 2019-04-29
Payer: COMMERCIAL

## 2019-04-29 ENCOUNTER — OFFICE VISIT (OUTPATIENT)
Dept: ENDOCRINOLOGY | Facility: CLINIC | Age: 54
End: 2019-04-29
Attending: INTERNAL MEDICINE
Payer: MEDICARE

## 2019-04-29 VITALS
OXYGEN SATURATION: 94 % | HEART RATE: 89 BPM | DIASTOLIC BLOOD PRESSURE: 76 MMHG | WEIGHT: 181.2 LBS | HEIGHT: 67 IN | BODY MASS INDEX: 28.44 KG/M2 | SYSTOLIC BLOOD PRESSURE: 121 MMHG

## 2019-04-29 VITALS
TEMPERATURE: 98.1 F | HEIGHT: 67 IN | OXYGEN SATURATION: 94 % | DIASTOLIC BLOOD PRESSURE: 76 MMHG | WEIGHT: 181.2 LBS | SYSTOLIC BLOOD PRESSURE: 121 MMHG | BODY MASS INDEX: 28.44 KG/M2 | HEART RATE: 89 BPM

## 2019-04-29 DIAGNOSIS — E78.2 MIXED HYPERLIPIDEMIA: ICD-10-CM

## 2019-04-29 DIAGNOSIS — Z94.83 PANCREAS REPLACED BY TRANSPLANT (H): Primary | ICD-10-CM

## 2019-04-29 DIAGNOSIS — K21.9 GASTROESOPHAGEAL REFLUX DISEASE, ESOPHAGITIS PRESENCE NOT SPECIFIED: ICD-10-CM

## 2019-04-29 DIAGNOSIS — R63.5 WEIGHT GAIN: ICD-10-CM

## 2019-04-29 DIAGNOSIS — Z94.0 KIDNEY REPLACED BY TRANSPLANT: ICD-10-CM

## 2019-04-29 DIAGNOSIS — E10.21 TYPE 1 DIABETES MELLITUS WITH NEPHROPATHY (H): ICD-10-CM

## 2019-04-29 DIAGNOSIS — R11.0 NAUSEA: ICD-10-CM

## 2019-04-29 DIAGNOSIS — Z94.83 STATUS POST SIMULTANEOUS KIDNEY AND PANCREAS TRANSPLANT (H): Primary | ICD-10-CM

## 2019-04-29 DIAGNOSIS — Z94.83 PANCREAS TRANSPLANTED (H): ICD-10-CM

## 2019-04-29 DIAGNOSIS — Z94.83 PANCREAS REPLACED BY TRANSPLANT (H): ICD-10-CM

## 2019-04-29 DIAGNOSIS — Z94.0 STATUS POST SIMULTANEOUS KIDNEY AND PANCREAS TRANSPLANT (H): Primary | ICD-10-CM

## 2019-04-29 PROCEDURE — 99607 MTMS BY PHARM ADDL 15 MIN: CPT | Performed by: PHARMACIST

## 2019-04-29 PROCEDURE — G0463 HOSPITAL OUTPT CLINIC VISIT: HCPCS | Mod: ZF

## 2019-04-29 PROCEDURE — 99606 MTMS BY PHARM EST 15 MIN: CPT | Performed by: PHARMACIST

## 2019-04-29 RX ORDER — SULFAMETHOXAZOLE AND TRIMETHOPRIM 400; 80 MG/1; MG/1
1 TABLET ORAL DAILY
Qty: 30 TABLET | Refills: 11 | Status: SHIPPED | OUTPATIENT
Start: 2019-04-29 | End: 2019-11-13

## 2019-04-29 ASSESSMENT — MIFFLIN-ST. JEOR
SCORE: 1615.55
SCORE: 1615.55

## 2019-04-29 ASSESSMENT — PAIN SCALES - GENERAL: PAINLEVEL: NO PAIN (0)

## 2019-04-29 ASSESSMENT — PATIENT HEALTH QUESTIONNAIRE - PHQ9
SUM OF ALL RESPONSES TO PHQ QUESTIONS 1-9: 9
SUM OF ALL RESPONSES TO PHQ QUESTIONS 1-9: 9

## 2019-04-29 NOTE — LETTER
2019       RE: Amadou Lewis  [id number]  [place] [address]     Dear Colleague,    Thank you for referring your patient, Amadou Lewis, to the [facility] NEPHROLOGY at [facility]. Please see a copy of my visit note below.    ACUTE TRANSPLANT NEPHROLOGY VISIT    Assessment & Plan   # DDKT ([facility]): baseline Cr ~ 1.1; Stable   - Proteinuria: Not checked recently   - Latest DSA: No Date of DSA last checked: 2018   - BK: No   - Kidney Tx Biopsy: No    # Pancreas Tx (SPK):Enteric drained     - Blood glucose: Euglycemia   - HbA1c: not checked recently   - Pancreatic enzymes: Stable   - Date of DSA last checked: 2018 Latest DSA: No    # Weight gain - patient notes central obesity and has hyperpigmented skin compared to prior to transplantation.  Cortisol level normal, we reviewed his diet with him.     # Immunosuppression: Tacrolimus immediate release (goal  8-10) and Mycophenolic acid (goal  1-3.5)   - Changes: No    # Prophylaxis:   - PJP: TMP/Sulfa (Bactrim)   - CMV: None    # Hypertension: Controlled; Goal BP: < 130/80   - Changes: No, continue florinef.  If BP consistently above 140 then would hold florinef     # Mineral Bone Disorder:    - Secondary renal hyperparathyroidism; PTH level is: Minimally elevated  - Vitamin D; level is: Normal  - Calcium; level is: Normal  - Phosphorus; level is: Normal     # Electrolytes:   - Potassium; level: Normal  - Magnesium; level: Normal  - Bicarbonate; level: Normal on NaBicarb bid     Return visit: No follow-ups on file.    # Transplant History:  Etiology of kidney failure: diabetes mellitus type 1  Tx: DDKT ([facility])  Transplant: 2018 (Kidney / Pancreas), 10/10/2013 (Kidney)  Donor Type:  - Brain Death Donor Class:   Crossmatch at time of Tx: negative  DSA at time of Tx: No  Significant changes in immunosuppression: None  CMV IgG Ab Discordance (D+/R-): No  EBV IgG Ab Discordance (D+/R-): No  Significant  transplant-related complications: None    Transplant Office Phone Number: [phone]    Assessment and plan was discussed with the patient and he voiced his understanding and agreement.    Cristopher Kamara MD   Seen and discussed with Dr Dick     Chief Complaint   Mr. Lewis is a 53 year old here for routine follow up and hypertension    History of Present Illness   Since last visit he feels fine no new complaint, energy level is good, no limitation of activity, he did walk 1.5hr /day, appetite is good, no nausea or vomiting no diarrhea, no pain, his weight is stable but he was trying to loss some weight and was not able, his weight now stable at 181lbs, he want to get to 165-170lbs.    ROS: 10 point ROS neg other than the symptoms noted above in the HPI.  Recent Hospitalizations:  [x] No [] Yes    New Medical Issues: [x] No [] Yes    Decreased energy: [x] No [] Yes    Chest pain or SOB with exertion:  [x] No [] Yes    Appetite change or weight change: [x] No [] Yes    Nausea, vomiting or diarrhea:  [x] No [] Yes    Fever, sweats or chills: [x] No [] Yes    Leg swelling: [x] No [] Yes      Other medical issues:  No    Home BP: 120's systolic      Review of Systems   A comprehensive review of systems was obtained and negative, except as noted in the HPI or PMH.    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)     Diabetes mellitus with background retinopathy (H)     Polycystic kidney     NONSPECIFIC MEDICAL HISTORY     Coronary artery disease     Retinopathy     Hyperlipidemia LDL goal <70     Premature ventricular contractions (PVCs) (VPCs)     Kidney replaced by transplant     Immunosuppressed status (H)     Kidney transplant rejection     Hypertension     Anemia in chronic renal disease     Care after organ transplant     Esophageal ulcer     Status post simultaneous kidney and pancreas transplant (H)     Other chronic pain     Nausea     Drug  induced constipation       Social History   Social History     Tobacco Use     Smoking status: Never Smoker     Smokeless tobacco: Never Used   Substance Use Topics     Alcohol use: No     Drug use: No       Allergies   Allergies   Allergen Reactions     No Known Allergies        Medications   Current Outpatient Medications   Medication Sig     aspirin 81 MG EC tablet Take 1 tablet (81 mg) by mouth daily     atorvastatin (LIPITOR) 20 MG tablet Take 1 tablet (20 mg) by mouth daily     blood glucose monitoring (NO BRAND SPECIFIED) test strip Use to test blood sugar 4 times daily or as directed.     cholecalciferol 2000 units CAPS Take 2,000 Units by mouth daily     fludrocortisone (FLORINEF) 0.1 MG tablet Take 1 tablet (0.1 mg) by mouth three times a week     HYDROcodone-acetaminophen (NORCO) 5-325 MG tablet Take 1 tablet by mouth every 6 hours as needed for severe pain     magnesium oxide (MAG-OX) 400 MG tablet Take 2 tablets (800mg) at noon and 2 tablets (800mg) at 6pm.     mycophenolic acid (GENERIC EQUIVALENT) 180 MG EC tablet Take 3 tablets (540 mg) by mouth 2 times daily     sodium bicarbonate 650 MG tablet Take 1 tablet (650 mg) by mouth daily     tacrolimus (GENERIC EQUIVALENT) 0.5 MG capsule HOLD     tacrolimus (GENERIC EQUIVALENT) 1 MG capsule Take 3 capsules (3 mg) by mouth 2 times daily     acetaminophen (TYLENOL) 325 MG tablet Take 3 tablets (975 mg) by mouth every 8 hours as needed for mild pain or fever (DO NOT USE WITH NORCO) (Patient not taking: Reported on 1/8/2019)     polyethylene glycol (MIRALAX) powder Take 17 g (1 capful) by mouth 2 times daily Hold for loose stools (Patient not taking: Reported on 3/4/2019)     sennosides (SENOKOT) 8.6 MG tablet Take 2 tablets by mouth 2 times daily Hold for loose stools (Patient not taking: Reported on 3/4/2019)     sulfamethoxazole-trimethoprim (BACTRIM/SEPTRA) 400-80 MG per tablet Take 1 tablet by mouth daily (Patient not taking: Reported on 4/29/2019)  "    No current facility-administered medications for this visit.      There are no discontinued medications.    Physical Exam   Vital Signs: /76   Pulse 89   Temp 98.1  F (36.7  C) (Oral)   Ht 1.702 m (5' 7\")   Wt 82.2 kg (181 lb 3.2 oz)   SpO2 94%   BMI 28.38 kg/m       GENERAL APPEARANCE: alert and no distress  HENT: mouth without ulcers or lesions  LYMPHATICS: no cervical or supraclavicular nodes  RESP: lungs clear to auscultation - no rales, rhonchi or wheezes  CV: regular rhythm, normal rate, no rub, no murmur  EDEMA: no LE edema bilaterally  ABDOMEN: soft, nondistended, nontender, bowel sounds normal  MS: extremities normal - no gross deformities noted, no evidence of inflammation in joints, no muscle tenderness  SKIN: no rash  TX KIDNEY: normal  Access:     Data     Renal Latest Ref Rng & Units 4/25/2019 4/18/2019 4/4/2019   Na 133 - 144 mmol/L 140 141 139   K 3.4 - 5.3 mmol/L 4.8 4.9 4.8   Cl 94 - 109 mmol/L 110(H) 112(H) 111(H)   CO2 20 - 32 mmol/L 24 21 22   BUN 7 - 30 mg/dL 22 20 19   Cr 0.66 - 1.25 mg/dL 1.12 1.01 1.05   Cr (external) 0.5 - 1.5 mg/dL - - -   Glucose 70 - 99 mg/dL 88 88 85   Ca  8.5 - 10.1 mg/dL 9.2 9.0 8.9   Mg 1.6 - 2.3 mg/dL 1.9 - -     Bone Health Latest Ref Rng & Units 3/7/2019 2/14/2019 2/11/2019   Phos 2.5 - 4.5 mg/dL - 4.1 3.6   PTHi 18 - 80 pg/mL 104(H) - -   Vit D Def 20 - 75 ug/L 36 - -     Heme Latest Ref Rng & Units 4/25/2019 4/18/2019 4/4/2019   WBC 4.0 - 11.0 10e9/L 3.2(L) 2.9(L) 3.2(L)   Hgb 13.3 - 17.7 g/dL 16.8 16.0 15.6   Plt 150 - 450 10e9/L 203 193 201     Liver Latest Ref Rng & Units 11/11/2018 6/27/2018 8/3/2017   AP 40 - 150 U/L 189(H) - -   TBili 0.2 - 1.3 mg/dL 0.3 - -   ALT 0 - 70 U/L 14 - -   AST 0 - 45 U/L 9 - -   Tot Protein 6.8 - 8.8 g/dL 7.6 - -   Albumin 3.4 - 5.0 g/dL 4.0 4.2 4.2     Pancreas Latest Ref Rng & Units 4/25/2019 4/18/2019 4/4/2019   A1C 0 - 5.6 % - - -   Amylase 30 - 110 U/L 64 65 70   Lipase 73 - 393 U/L 139 156 208     Iron " studies Latest Ref Rng & Units 9/11/2018 8/3/2017 11/18/2016   Iron 35 - 180 ug/dL 85 79 82   Iron sat 15 - 46 % 37 34 34   Ferritin 26 - 388 ng/mL 761(H) 736(H) -     [facility] Txp Virology Latest Ref Rng & Units 4/4/2019 2/4/2019 12/20/2018   CVM DNA Quant - - - -   CMV Quant <100 Copies/mL - - -   CMV QT Log <2.0 Log copies/mL - - -   BK Spec - Plasma Plasma Plasma   BK Res BKNEG:BK Virus DNA Not Detected copies/mL BK Virus DNA Not Detected BK Virus DNA Not Detected BK Virus DNA Not Detected   BK Log <2.7 Log copies/mL Not Calculated Not Calculated Not Calculated   Hep B Core NR:Nonreactive - - -   Hep B Surf - - - -   HIV 1&2 NEG - - -        Recent Labs   Lab Test 04/04/19  0736 04/18/19  0730 04/25/19  0725   DOSTAC 4/3/19 1930 1830 4/17/19 04/24/2019 AT 7PM   TACROL 12.6 7.8 9.6     Recent Labs   Lab Test 02/11/19  0740 02/14/19  0728 02/21/19  0749   DOSMPA LAST DOSE 8:00PM AT 2.10.2019 LAST DOSE 8:00PM 2/13/2019 02/20/2019 AT 0730 PM   MPACID 2.04 3.64* 1.41   MPAG 50.8 43.4 60.7   Patient was seen and evaluated by me, Deyvi Dick MD. I have reviewed the note and agree with the the plan of care as documented by the fellow.      Again, thank you for allowing me to participate in the care of your patient.      Sincerely,    Early Post Transplant

## 2019-04-29 NOTE — PATIENT INSTRUCTIONS
Nutrition Goals  1) Stick to only 3 nights/week of cereal as bedtime snack on days where dinner does not include a carbohydrate   - Baby Bell cheese or cottage cheese and fruit or 3 Tbsp nuts + 2 small squares dark chocolate  2) Stick to 1 [employer] burrito for breakfast (can include a fruit or hard boiled egg)  3) Increase strength exercises as able    Follow up with RD in one month    Agustina Mays MS, RD, LD  If you need to schedule or reschedule with a dietitian please call [phone].

## 2019-04-29 NOTE — LETTER
2019      RE: Amadou Lewis  [address]  [place] [address]       ACUTE TRANSPLANT NEPHROLOGY VISIT    Assessment & Plan   # DDKT ([facility]): baseline Cr ~ 1.1; Stable   - Proteinuria: Not checked recently   - Latest DSA: No Date of DSA last checked: 2018   - BK: No   - Kidney Tx Biopsy: No    # Pancreas Tx (K):Enteric drained     - Blood glucose: Euglycemia   - HbA1c: not checked recently   - Pancreatic enzymes: Stable   - Date of DSA last checked: 2018 Latest DSA: No    # Weight gain - patient notes central obesity and has hyperpigmented skin compared to prior to transplantation.  Cortisol level normal, we reviewed his diet with him.     # Immunosuppression: Tacrolimus immediate release (goal  8-10) and Mycophenolic acid (goal  1-3.5)   - Changes: No    # Prophylaxis:   - PJP: TMP/Sulfa (Bactrim)   - CMV: None    # Hypertension: Controlled; Goal BP: < 130/80   - Changes: No, continue florinef.  If BP consistently above 140 then would hold florinef     # Mineral Bone Disorder:    - Secondary renal hyperparathyroidism; PTH level is: Minimally elevated  - Vitamin D; level is: Normal  - Calcium; level is: Normal  - Phosphorus; level is: Normal     # Electrolytes:   - Potassium; level: Normal  - Magnesium; level: Normal  - Bicarbonate; level: Normal on NaBicarb bid     Return visit: No follow-ups on file.    # Transplant History:  Etiology of kidney failure: diabetes mellitus type 1  Tx: DDKT ([facility])  Transplant: 2018 (Kidney / Pancreas), 10/10/2013 (Kidney)  Donor Type:  - Brain Death Donor Class:   Crossmatch at time of Tx: negative  DSA at time of Tx: No  Significant changes in immunosuppression: None  CMV IgG Ab Discordance (D+/R-): No  EBV IgG Ab Discordance (D+/R-): No  Significant transplant-related complications: None    Transplant Office Phone Number: [phone]    Assessment and plan was discussed with the patient and he voiced his understanding and agreement.    Cristopher  MD Asad   Seen and discussed with Dr Dick     Chief Complaint   Mr. Lewis is a 53 year old here for routine follow up and hypertension    History of Present Illness   Since last visit he feels fine no new complaint, energy level is good, no limitation of activity, he did walk 1.5hr /day, appetite is good, no nausea or vomiting no diarrhea, no pain, his weight is stable but he was trying to loss some weight and was not able, his weight now stable at 181lbs, he want to get to 165-170lbs.    ROS: 10 point ROS neg other than the symptoms noted above in the HPI.  Recent Hospitalizations:  [x] No [] Yes    New Medical Issues: [x] No [] Yes    Decreased energy: [x] No [] Yes    Chest pain or SOB with exertion:  [x] No [] Yes    Appetite change or weight change: [x] No [] Yes    Nausea, vomiting or diarrhea:  [x] No [] Yes    Fever, sweats or chills: [x] No [] Yes    Leg swelling: [x] No [] Yes      Other medical issues:  No    Home BP: 120's systolic      Review of Systems   A comprehensive review of systems was obtained and negative, except as noted in the HPI or PMH.    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)     Diabetes mellitus with background retinopathy (H)     Polycystic kidney     NONSPECIFIC MEDICAL HISTORY     Coronary artery disease     Retinopathy     Hyperlipidemia LDL goal <70     Premature ventricular contractions (PVCs) (VPCs)     Kidney replaced by transplant     Immunosuppressed status (H)     Kidney transplant rejection     Hypertension     Anemia in chronic renal disease     Care after organ transplant     Esophageal ulcer     Status post simultaneous kidney and pancreas transplant (H)     Other chronic pain     Nausea     Drug induced constipation       Social History   Social History     Tobacco Use     Smoking status: Never Smoker     Smokeless tobacco: Never Used   Substance Use Topics     Alcohol use: No     Drug use:  No       Allergies   Allergies   Allergen Reactions     No Known Allergies        Medications   Current Outpatient Medications   Medication Sig     aspirin 81 MG EC tablet Take 1 tablet (81 mg) by mouth daily     atorvastatin (LIPITOR) 20 MG tablet Take 1 tablet (20 mg) by mouth daily     blood glucose monitoring (NO BRAND SPECIFIED) test strip Use to test blood sugar 4 times daily or as directed.     cholecalciferol 2000 units CAPS Take 2,000 Units by mouth daily     fludrocortisone (FLORINEF) 0.1 MG tablet Take 1 tablet (0.1 mg) by mouth three times a week     HYDROcodone-acetaminophen (NORCO) 5-325 MG tablet Take 1 tablet by mouth every 6 hours as needed for severe pain     magnesium oxide (MAG-OX) 400 MG tablet Take 2 tablets (800mg) at noon and 2 tablets (800mg) at 6pm.     mycophenolic acid (GENERIC EQUIVALENT) 180 MG EC tablet Take 3 tablets (540 mg) by mouth 2 times daily     sodium bicarbonate 650 MG tablet Take 1 tablet (650 mg) by mouth daily     tacrolimus (GENERIC EQUIVALENT) 0.5 MG capsule HOLD     tacrolimus (GENERIC EQUIVALENT) 1 MG capsule Take 3 capsules (3 mg) by mouth 2 times daily     acetaminophen (TYLENOL) 325 MG tablet Take 3 tablets (975 mg) by mouth every 8 hours as needed for mild pain or fever (DO NOT USE WITH NORCO) (Patient not taking: Reported on 1/8/2019)     polyethylene glycol (MIRALAX) powder Take 17 g (1 capful) by mouth 2 times daily Hold for loose stools (Patient not taking: Reported on 3/4/2019)     sennosides (SENOKOT) 8.6 MG tablet Take 2 tablets by mouth 2 times daily Hold for loose stools (Patient not taking: Reported on 3/4/2019)     sulfamethoxazole-trimethoprim (BACTRIM/SEPTRA) 400-80 MG per tablet Take 1 tablet by mouth daily (Patient not taking: Reported on 4/29/2019)     No current facility-administered medications for this visit.      There are no discontinued medications.    Physical Exam   Vital Signs: /76   Pulse 89   Temp 98.1  F (36.7  C) (Oral)   Ht  "1.702 m (5' 7\")   Wt 82.2 kg (181 lb 3.2 oz)   SpO2 94%   BMI 28.38 kg/m       GENERAL APPEARANCE: alert and no distress  HENT: mouth without ulcers or lesions  LYMPHATICS: no cervical or supraclavicular nodes  RESP: lungs clear to auscultation - no rales, rhonchi or wheezes  CV: regular rhythm, normal rate, no rub, no murmur  EDEMA: no LE edema bilaterally  ABDOMEN: soft, nondistended, nontender, bowel sounds normal  MS: extremities normal - no gross deformities noted, no evidence of inflammation in joints, no muscle tenderness  SKIN: no rash  TX KIDNEY: normal  Access:     Data     Renal Latest Ref Rng & Units 4/25/2019 4/18/2019 4/4/2019   Na 133 - 144 mmol/L 140 141 139   K 3.4 - 5.3 mmol/L 4.8 4.9 4.8   Cl 94 - 109 mmol/L 110(H) 112(H) 111(H)   CO2 20 - 32 mmol/L 24 21 22   BUN 7 - 30 mg/dL 22 20 19   Cr 0.66 - 1.25 mg/dL 1.12 1.01 1.05   Cr (external) 0.5 - 1.5 mg/dL - - -   Glucose 70 - 99 mg/dL 88 88 85   Ca  8.5 - 10.1 mg/dL 9.2 9.0 8.9   Mg 1.6 - 2.3 mg/dL 1.9 - -     Bone Health Latest Ref Rng & Units 3/7/2019 2/14/2019 2/11/2019   Phos 2.5 - 4.5 mg/dL - 4.1 3.6   PTHi 18 - 80 pg/mL 104(H) - -   Vit D Def 20 - 75 ug/L 36 - -     Heme Latest Ref Rng & Units 4/25/2019 4/18/2019 4/4/2019   WBC 4.0 - 11.0 10e9/L 3.2(L) 2.9(L) 3.2(L)   Hgb 13.3 - 17.7 g/dL 16.8 16.0 15.6   Plt 150 - 450 10e9/L 203 193 201     Liver Latest Ref Rng & Units 11/11/2018 6/27/2018 8/3/2017   AP 40 - 150 U/L 189(H) - -   TBili 0.2 - 1.3 mg/dL 0.3 - -   ALT 0 - 70 U/L 14 - -   AST 0 - 45 U/L 9 - -   Tot Protein 6.8 - 8.8 g/dL 7.6 - -   Albumin 3.4 - 5.0 g/dL 4.0 4.2 4.2     Pancreas Latest Ref Rng & Units 4/25/2019 4/18/2019 4/4/2019   A1C 0 - 5.6 % - - -   Amylase 30 - 110 U/L 64 65 70   Lipase 73 - 393 U/L 139 156 208     Iron studies Latest Ref Rng & Units 9/11/2018 8/3/2017 11/18/2016   Iron 35 - 180 ug/dL 85 79 82   Iron sat 15 - 46 % 37 34 34   Ferritin 26 - 388 ng/mL 761(H) 736(H) -     [facility] Txp Virology Latest Ref Rng & " Units 4/4/2019 2/4/2019 12/20/2018   CVM DNA Quant - - - -   CMV Quant <100 Copies/mL - - -   CMV QT Log <2.0 Log copies/mL - - -   BK Spec - Plasma Plasma Plasma   BK Res BKNEG:BK Virus DNA Not Detected copies/mL BK Virus DNA Not Detected BK Virus DNA Not Detected BK Virus DNA Not Detected   BK Log <2.7 Log copies/mL Not Calculated Not Calculated Not Calculated   Hep B Core NR:Nonreactive - - -   Hep B Surf - - - -   HIV 1&2 NEG - - -        Recent Labs   Lab Test 04/04/19  0736 04/18/19  0730 04/25/19  0725   DOSTAC 4/3/19 1930 1830 4/17/19 04/24/2019 AT 7PM   TACROL 12.6 7.8 9.6     Recent Labs   Lab Test 02/11/19  0740 02/14/19  0728 02/21/19  0749   DOSMPA LAST DOSE 8:00PM AT 2.10.2019 LAST DOSE 8:00PM 2/13/2019 02/20/2019 AT 0730 PM   MPACID 2.04 3.64* 1.41   MPAG 50.8 43.4 60.7   Patient was seen and evaluated by me, Deyvi Dick MD. I have reviewed the note and agree with the the plan of care as documented by the fellow.      Early Post Transplant

## 2019-04-29 NOTE — PROGRESS NOTES
SUBJECTIVE/OBJECTIVE:                           Amadou Lewis is a 52 year old male coming in for a follow up visit for Medication Therapy Management.  He was referred to me from txp team.    Chief Complaint: 4 months post txp.    Allergies/ADRs: Reviewed in Epic  Tobacco: No tobacco use  Alcohol: Less than 1 beverage / month  Caffeine: 1 cups/day of coffee  Activity: works for the [employer], there is some psychical work to it.   PMH: Reviewed in Epic    Medication Adherence/Access:  Using pill box, has to eat with his medications.   Patient takes medications 4 time(s) per day. 7-7:30AM and 6-6:30onPM, magnesium around 12pm, 6pm  Per patient, misses medication 0 times per week.   The patient fills medications at [facility]: YES for txp medications.     Renal/ Panc Transplant:  Current immunosuppressants include Tacrolimus 3mg BID(0-6 months post tx, goal 8-10) and Myfortic 540mg BID.  Pt reports no side effects  Transplant date: first kidney transplant  and 18 with kidney/pancreas.   Estimated Creatinine Clearance: 76.4 mL/min (based on SCr of 1.12 mg/dL).  CMV prophylaxis: Completed  PCP prophylaxis: Bactrim  S S daily- pt isn't sure if he is taking this. Does recognize the generic name though.   Fludrocortisone 0.1mg three times per week.   Current supplements for electrolyte replacement: Mag Oxide 800mg BID ( 2 hours from MMF), sodium bicarb 650mg BID, Vitamin D 1000 units daily.   Magnesium   Date Value Ref Range Status   2019 1.9 1.6 - 2.3 mg/dL Final   Tx Coordinator: Zora Hartley Tx MD: Dr. De Los Santos today, Using Med Card: Yes  B-90s  Skin care: not discussed.  Immunizations: annual flu shot unknown, [id number]: 1 dose , due 6 months post txp; Prevnar 13: Due 6 months post txp if he has not gotten already. TDaP:  Due     Hyperlipidemia: Current therapy includes Atorvastatin 20mg daily, Aspirin 81mg daily (no bleeds).  Pt reports some increased muscle aches upper  back and shoulders. Two stents placed 2005.   The 10-year ASCVD risk score (Guero SAMANTHA Jr., et al., 2013) is: 7.5%    Values used to calculate the score:      Age: 53 years      Sex: Male      Is Non- : No      Diabetic: Yes      Tobacco smoker: No      Systolic Blood Pressure: 121 mmHg      Is BP treated: No      HDL Cholesterol: 41 mg/dL      Total Cholesterol: 138 mg/dL    Nausea/Vom/ GERD: denies nausea/ GERD symptoms. Is not taking any medications.     Today's Vitals: There were no vitals taken for this visit.    ASSESSMENT:                             Current medications were reviewed today from memory, pt did not bring in med card.     Medication Adherence: good, no issues identified.     Renal/ Panc Transplant:  Pt is due for Prevnar, pneumovax and Tetanus 6 months post txp. Some confusion surrounding his Bactrim use. Pt does not know brand name, but does recognize Sulfa/Trimetho. He believes he has been taking this.     Hyperlipidemia: Unusual area for statin induced myalgias, unlikely caused by statin. If they worsen we will try holding Atorvastatin for 2 weeks. Back pain started 2 months after initiating Atorvastatin.     Nausea/Vom/ GERD: Stable. No issues being off PPI. .    PLAN:                            Pt to..  1. Get Prevnar, Pneumovax, and Tetanus 6 months post txp  2. Assure he has been taking Bactrim (sulfa/Trimetho).     I spent 30 minutes with this patient today. Plan was verbally approved by Dr. De Los Santos. A copy of the visit note was provided to the patient's referring provider.    Will follow up in 6 months.    The patient was given a summary of these recommendations as an after visit summary.     Luke Tesfaye, PharmD  [facility] Pharmacist    Phone: [phone]
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
## 2019-04-29 NOTE — PROGRESS NOTES
"Amadou Lewis is a 53 year old male presents today for new weight management nutrition consultation. Referred by Leonila Dial PA-C on 4/29/19.    Admit Height: 5' 7\"    Admit Weight: 181.2 lbs    Admit BMI: 28.38 kg/m2      Nutrition history  See MD note for details.    Hx of kidney/pancreas transplant 11/2018.  Gained 10 lbs since transplant (used to be 165-170's lbs)  Has cut out ice cream, cookies, and cut down on portion sizes leading up to and after transplant    Recent Diet Recall:  B - either none or McDonalds burritos (2) 300 kcals/each OR 2-3 egg omelet with ham and cheese OR he will eat a \"brunch\" at 10-11am (eats his lunch early)  L - salad bar OR rosemary chicken breast and green beans OR plain chx breast and peas OR pork chop and mix vegetable OR mac and cheese with ham OR mixed nuts OR fresh spring rolls  D - meat loaf and mixed vegetables (typically 5 oz protein + vegetable and sometimes rice or starch ~1 cup)   Snacks - cereal (cheerios, wheaties, oatmeal) or 2 baby bell cheese at night before bed  Beverages - coffee, water with all meals, 2% milk with cereal  Alcohol - rarely (glass of wine 1x/month maybe)  Eating Out - rarely    Exercise - walks 30 minutes with 8 month old dog 3x/day and works in [employer] lifting, on feet all day    Nutrition Diagnosis  Food and nutrition related knowledge deficit r/t lack of prior exposure to portion control and nutrition education aeb pt unable to verbalize understanding of portion control for weight loss.    Nutrition Intervention  Materials/education provided on plate method with portion control and healthy food choices (plate method handout), meal and snack planning, sources of protein, and mindful eating. Encouraged pt to slow down when eating (he is already a slow eater) and discussed tips for replacing cereal snack with a different protein snack a couple nights a week. Also encouraged reducing portion size of starches to no more than 1/2-3/4 cup of rice " (rather than 1 cup). Also encouraged him to reduce McDonalds wraps to 1 wrap rather than 2 wraps and to increase strength exercises as able. Encouraged him to continue with walking as well. Also encouraged him to ensure 1/2 his plate is non-starchy vegetables at lunch and dinner. Provided pt with list of goals and RD contact information.    Patient Understanding: Good  Expected Compliance: Good     Nutrition Goals  1) Stick to only 3 nights/week of cereal as bedtime snack on days where dinner does not include a carbohydrate   - Baby Bell cheese or cottage cheese and fruit or 3 Tbsp nuts + 2 small squares dark chocolate  2) Stick to 1 McDonalds burrito for breakfast (can include a fruit or hard boiled egg)  3) Increase strength exercises as able    Follow-Up:  1 month or PRN    Time spent with patient: 30 minutes.  Agustina Mays MS, RD, LD

## 2019-04-29 NOTE — NURSING NOTE
"  Chief Complaint   Patient presents with     Weight Problem     [facility] consult     Vitals:    04/29/19 1315   BP: 121/76   Pulse: 89   SpO2: 94%   Weight: 82.2 kg (181 lb 3.2 oz)   Height: 1.702 m (5' 7\")     Body mass index is 28.38 kg/m .   Patient medication list, drug allergies and history was reviewed today at [facility].  Ron Stack CMA    "
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
## 2019-04-29 NOTE — PATIENT INSTRUCTIONS
Recommendations from today's MTM visit:                                                      1. 6 months post transplant you are due for the following vaccinations: Prevnar 13, 8 weeks later Pneumovax 23. Tetanus due before 2020.     2. Double check you are taking sulfamethoxazole-trimethoprim (BACTRIM/SEPTRA) 400-80 MG per tablet.     Next MTM visit: 1 year post transplant.     To schedule another MTM appointment, please call the clinic directly or you may call the MTM scheduling line at [phone] or toll-free at [phone].     My Clinical Pharmacist's contact information:                                                      It was a pleasure talking with you today!  Please feel free to contact me with any questions or concerns you have.      Luke Tesfaye, PharmD  MTM Pharmacist    Phone: [phone]     You may receive a survey about the MTM services you received by email and/or US Mail.  I would appreciate your feedback to help me serve you better in the future. Your comments will be anonymous.

## 2019-04-29 NOTE — NURSING NOTE
"Chief Complaint   Patient presents with     RECHECK     f/u post kidney tx     /76   Pulse 89   Temp 98.1  F (36.7  C) (Oral)   Ht 1.702 m (5' 7\")   Wt 82.2 kg (181 lb 3.2 oz)   SpO2 94%   BMI 28.38 kg/m    Cherie Veliz CMA    "

## 2019-04-29 NOTE — PROGRESS NOTES
ACUTE TRANSPLANT NEPHROLOGY VISIT    Assessment & Plan   # DDKT (K): baseline Cr ~ 1.1; Stable   - Proteinuria: Not checked recently   - Latest DSA: No Date of DSA last checked: 2018   - BK: No   - Kidney Tx Biopsy: No    # Pancreas Tx (K):Enteric drained     - Blood glucose: Euglycemia   - HbA1c: not checked recently   - Pancreatic enzymes: Stable   - Date of DSA last checked: 2018 Latest DSA: No    # Weight gain - patient notes central obesity and has hyperpigmented skin compared to prior to transplantation.  Cortisol level normal, we reviewed his diet with him.     # Immunosuppression: Tacrolimus immediate release (goal  8-10) and Mycophenolic acid (goal  1-3.5)   - Changes: No    # Prophylaxis:   - PJP: TMP/Sulfa (Bactrim)   - CMV: None    # Hypertension: Controlled; Goal BP: < 130/80   - Changes: No, continue florinef.  If BP consistently above 140 then would hold florinef     # Mineral Bone Disorder:    - Secondary renal hyperparathyroidism; PTH level is: Minimally elevated  - Vitamin D; level is: Normal  - Calcium; level is: Normal  - Phosphorus; level is: Normal     # Electrolytes:   - Potassium; level: Normal  - Magnesium; level: Normal  - Bicarbonate; level: Normal on NaBicarb bid     Return visit: No follow-ups on file.    # Transplant History:  Etiology of kidney failure: diabetes mellitus type 1  Tx: DDKT ([facility])  Transplant: 2018 (Kidney / Pancreas), 10/10/2013 (Kidney)  Donor Type:  - Brain Death Donor Class:   Crossmatch at time of Tx: negative  DSA at time of Tx: No  Significant changes in immunosuppression: None  CMV IgG Ab Discordance (D+/R-): No  EBV IgG Ab Discordance (D+/R-): No  Significant transplant-related complications: None    Transplant Office Phone Number: [phone]    Assessment and plan was discussed with the patient and he voiced his understanding and agreement.    Cristopher Kamara MD   Seen and discussed with Dr Dick     Chief Complaint   Mr. Lewis is a 53  year old here for routine follow up and hypertension    History of Present Illness   Since last visit he feels fine no new complaint, energy level is good, no limitation of activity, he did walk 1.5hr /day, appetite is good, no nausea or vomiting no diarrhea, no pain, his weight is stable but he was trying to loss some weight and was not able, his weight now stable at 181lbs, he want to get to 165-170lbs.    ROS: 10 point ROS neg other than the symptoms noted above in the HPI.  Recent Hospitalizations:  [x] No [] Yes    New Medical Issues: [x] No [] Yes    Decreased energy: [x] No [] Yes    Chest pain or SOB with exertion:  [x] No [] Yes    Appetite change or weight change: [x] No [] Yes    Nausea, vomiting or diarrhea:  [x] No [] Yes    Fever, sweats or chills: [x] No [] Yes    Leg swelling: [x] No [] Yes      Other medical issues:  No    Home BP: 120's systolic      Review of Systems   A comprehensive review of systems was obtained and negative, except as noted in the HPI or PMH.    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)     Diabetes mellitus with background retinopathy (H)     Polycystic kidney     NONSPECIFIC MEDICAL HISTORY     Coronary artery disease     Retinopathy     Hyperlipidemia LDL goal <70     Premature ventricular contractions (PVCs) (VPCs)     Kidney replaced by transplant     Immunosuppressed status (H)     Kidney transplant rejection     Hypertension     Anemia in chronic renal disease     Care after organ transplant     Esophageal ulcer     Status post simultaneous kidney and pancreas transplant (H)     Other chronic pain     Nausea     Drug induced constipation       Social History   Social History     Tobacco Use     Smoking status: Never Smoker     Smokeless tobacco: Never Used   Substance Use Topics     Alcohol use: No     Drug use: No       Allergies   Allergies   Allergen Reactions     No Known Allergies   "      Medications   Current Outpatient Medications   Medication Sig     aspirin 81 MG EC tablet Take 1 tablet (81 mg) by mouth daily     atorvastatin (LIPITOR) 20 MG tablet Take 1 tablet (20 mg) by mouth daily     blood glucose monitoring (NO BRAND SPECIFIED) test strip Use to test blood sugar 4 times daily or as directed.     cholecalciferol 2000 units CAPS Take 2,000 Units by mouth daily     fludrocortisone (FLORINEF) 0.1 MG tablet Take 1 tablet (0.1 mg) by mouth three times a week     HYDROcodone-acetaminophen (NORCO) 5-325 MG tablet Take 1 tablet by mouth every 6 hours as needed for severe pain     magnesium oxide (MAG-OX) 400 MG tablet Take 2 tablets (800mg) at noon and 2 tablets (800mg) at 6pm.     mycophenolic acid (GENERIC EQUIVALENT) 180 MG EC tablet Take 3 tablets (540 mg) by mouth 2 times daily     sodium bicarbonate 650 MG tablet Take 1 tablet (650 mg) by mouth daily     tacrolimus (GENERIC EQUIVALENT) 0.5 MG capsule HOLD     tacrolimus (GENERIC EQUIVALENT) 1 MG capsule Take 3 capsules (3 mg) by mouth 2 times daily     acetaminophen (TYLENOL) 325 MG tablet Take 3 tablets (975 mg) by mouth every 8 hours as needed for mild pain or fever (DO NOT USE WITH NORCO) (Patient not taking: Reported on 1/8/2019)     polyethylene glycol (MIRALAX) powder Take 17 g (1 capful) by mouth 2 times daily Hold for loose stools (Patient not taking: Reported on 3/4/2019)     sennosides (SENOKOT) 8.6 MG tablet Take 2 tablets by mouth 2 times daily Hold for loose stools (Patient not taking: Reported on 3/4/2019)     sulfamethoxazole-trimethoprim (BACTRIM/SEPTRA) 400-80 MG per tablet Take 1 tablet by mouth daily (Patient not taking: Reported on 4/29/2019)     No current facility-administered medications for this visit.      There are no discontinued medications.    Physical Exam   Vital Signs: /76   Pulse 89   Temp 98.1  F (36.7  C) (Oral)   Ht 1.702 m (5' 7\")   Wt 82.2 kg (181 lb 3.2 oz)   SpO2 94%   BMI 28.38 kg/m  "     GENERAL APPEARANCE: alert and no distress  HENT: mouth without ulcers or lesions  LYMPHATICS: no cervical or supraclavicular nodes  RESP: lungs clear to auscultation - no rales, rhonchi or wheezes  CV: regular rhythm, normal rate, no rub, no murmur  EDEMA: no LE edema bilaterally  ABDOMEN: soft, nondistended, nontender, bowel sounds normal  MS: extremities normal - no gross deformities noted, no evidence of inflammation in joints, no muscle tenderness  SKIN: no rash  TX KIDNEY: normal  Access:     Data     Renal Latest Ref Rng & Units 4/25/2019 4/18/2019 4/4/2019   Na 133 - 144 mmol/L 140 141 139   K 3.4 - 5.3 mmol/L 4.8 4.9 4.8   Cl 94 - 109 mmol/L 110(H) 112(H) 111(H)   CO2 20 - 32 mmol/L 24 21 22   BUN 7 - 30 mg/dL 22 20 19   Cr 0.66 - 1.25 mg/dL 1.12 1.01 1.05   Cr (external) 0.5 - 1.5 mg/dL - - -   Glucose 70 - 99 mg/dL 88 88 85   Ca  8.5 - 10.1 mg/dL 9.2 9.0 8.9   Mg 1.6 - 2.3 mg/dL 1.9 - -     Bone Health Latest Ref Rng & Units 3/7/2019 2/14/2019 2/11/2019   Phos 2.5 - 4.5 mg/dL - 4.1 3.6   PTHi 18 - 80 pg/mL 104(H) - -   Vit D Def 20 - 75 ug/L 36 - -     Heme Latest Ref Rng & Units 4/25/2019 4/18/2019 4/4/2019   WBC 4.0 - 11.0 10e9/L 3.2(L) 2.9(L) 3.2(L)   Hgb 13.3 - 17.7 g/dL 16.8 16.0 15.6   Plt 150 - 450 10e9/L 203 193 201     Liver Latest Ref Rng & Units 11/11/2018 6/27/2018 8/3/2017   AP 40 - 150 U/L 189(H) - -   TBili 0.2 - 1.3 mg/dL 0.3 - -   ALT 0 - 70 U/L 14 - -   AST 0 - 45 U/L 9 - -   Tot Protein 6.8 - 8.8 g/dL 7.6 - -   Albumin 3.4 - 5.0 g/dL 4.0 4.2 4.2     Pancreas Latest Ref Rng & Units 4/25/2019 4/18/2019 4/4/2019   A1C 0 - 5.6 % - - -   Amylase 30 - 110 U/L 64 65 70   Lipase 73 - 393 U/L 139 156 208     Iron studies Latest Ref Rng & Units 9/11/2018 8/3/2017 11/18/2016   Iron 35 - 180 ug/dL 85 79 82   Iron sat 15 - 46 % 37 34 34   Ferritin 26 - 388 ng/mL 761(H) 736(H) -     [facility] Txp Virology Latest Ref Rng & Units 4/4/2019 2/4/2019 12/20/2018   CVM DNA Quant - - - -   CMV Quant <100  Copies/mL - - -   CMV QT Log <2.0 Log copies/mL - - -   BK Spec - Plasma Plasma Plasma   BK Res BKNEG:BK Virus DNA Not Detected copies/mL BK Virus DNA Not Detected BK Virus DNA Not Detected BK Virus DNA Not Detected   BK Log <2.7 Log copies/mL Not Calculated Not Calculated Not Calculated   Hep B Core NR:Nonreactive - - -   Hep B Surf - - - -   HIV 1&2 NEG - - -        Recent Labs   Lab Test 04/04/19  0736 04/18/19  0730 04/25/19  0725   DOSTAC 4/3/19 1930 1830 4/17/19 04/24/2019 AT 7PM   TACROL 12.6 7.8 9.6     Recent Labs   Lab Test 02/11/19  0740 02/14/19  0728 02/21/19  0749   DOSMPA LAST DOSE 8:00PM AT 2.10.2019 LAST DOSE 8:00PM 2/13/2019 02/20/2019 AT 0730 PM   MPACID 2.04 3.64* 1.41   MPAG 50.8 43.4 60.7   Patient was seen and evaluated by me, Deyvi Dick MD. I have reviewed the note and agree with the the plan of care as documented by the fellow.

## 2019-05-08 ENCOUNTER — TELEPHONE (OUTPATIENT)
Dept: TRANSPLANT | Facility: CLINIC | Age: 54
End: 2019-05-08

## 2019-05-08 DIAGNOSIS — Z94.0 KIDNEY REPLACED BY TRANSPLANT: ICD-10-CM

## 2019-05-08 DIAGNOSIS — Z94.83 PANCREAS REPLACED BY TRANSPLANT (H): Primary | ICD-10-CM

## 2019-05-08 DIAGNOSIS — R74.8 SERUM LIPASE ELEVATION: ICD-10-CM

## 2019-05-08 NOTE — TELEPHONE ENCOUNTER
Fistula questions -left lower forearm.  Sean reports that MD reported positive bruit. Sean reports there is no longer a thrill.    6 month Kidney and Pancreas Transplant Patient Phone Call    Congratulations on your 6 month post-transplant anniversary!    Please re-review with the patient how to contact the Transplant Center:  Best phone contact is [phone], as if the need is urgent, any care coordinator will be able to assist you.    Medications:  Now that you are 6 months post-transplant, please make the following medication changes:    CMV mismatch: No.    Please complete medication reconciliation (including confirmation of any magnesium or phosphorous supplements) and ensure the patient has an up to date medication card at home.     *ALWAYS BRING YOUR MED CARD TO APPOINTMENTS.*    Please confirm if the patient is independent with medication set up and administration: Yes:  Date  .  If no, who helps the patient with their medications?  NA  Have you missed any medication doses in the past week: No.  Have you missed any medication doses in the past month: No.  Contact your pharmacy first for refills.  CONTACT THE TRANSPLANT CENTER IF YOU RUN OUT OF YOUR IS MEDICATIONS.    Labs:  Labs will now be drawn EVERY OTHER WEEK through 9 months post-transplant.    Please try to have these labs drawn early in the week (Monday or Tuesday).  Contact the Transplant Center if additional lab orders are needed.   It is recommended that you take a copy of your lab letter to each lab draw.    If the patient does not have a copy of their lab letter, please send one now.  EBV mismatch: No.  Due for DSA - Sean voiced understanding of need to have this drawn with next lab draw.  Up to date for BK.    General Transplant Recommendations:    6 month and 1 year nephrology visits with our MDs.  -ensure patient has 1 year follow up scheduled    Frequent reviews of the transplant handbook and / or [facility].    Lab review on  MyChart.     It is now safe to resume your regular dental care.    Alesha Hartley RN, BSN

## 2019-05-09 DIAGNOSIS — E10.21 TYPE 1 DIABETES MELLITUS WITH NEPHROPATHY (H): ICD-10-CM

## 2019-05-09 DIAGNOSIS — Z94.0 KIDNEY TRANSPLANTED: ICD-10-CM

## 2019-05-09 DIAGNOSIS — Z94.0 KIDNEY REPLACED BY TRANSPLANT: ICD-10-CM

## 2019-05-09 DIAGNOSIS — Z94.83 PANCREAS REPLACED BY TRANSPLANT (H): ICD-10-CM

## 2019-05-09 DIAGNOSIS — Z48.298 AFTERCARE FOLLOWING ORGAN TRANSPLANT: ICD-10-CM

## 2019-05-09 LAB
AMYLASE SERPL-CCNC: 67 U/L (ref 30–110)
ANION GAP SERPL CALCULATED.3IONS-SCNC: 7 MMOL/L (ref 3–14)
BASOPHILS # BLD AUTO: 0 10E9/L (ref 0–0.2)
BASOPHILS NFR BLD AUTO: 0.3 %
BUN SERPL-MCNC: 33 MG/DL (ref 7–30)
CALCIUM SERPL-MCNC: 9.4 MG/DL (ref 8.5–10.1)
CHLORIDE SERPL-SCNC: 112 MMOL/L (ref 94–109)
CO2 SERPL-SCNC: 22 MMOL/L (ref 20–32)
CREAT SERPL-MCNC: 1.03 MG/DL (ref 0.66–1.25)
DIFFERENTIAL METHOD BLD: ABNORMAL
EOSINOPHIL # BLD AUTO: 0.1 10E9/L (ref 0–0.7)
EOSINOPHIL NFR BLD AUTO: 3.8 %
ERYTHROCYTE [DISTWIDTH] IN BLOOD BY AUTOMATED COUNT: 15.8 % (ref 10–15)
GFR SERPL CREATININE-BSD FRML MDRD: 81 ML/MIN/{1.73_M2}
GLUCOSE SERPL-MCNC: 88 MG/DL (ref 70–99)
HBA1C MFR BLD: 5.3 % (ref 0–5.6)
HCT VFR BLD AUTO: 48.7 % (ref 40–53)
HGB BLD-MCNC: 16.4 G/DL (ref 13.3–17.7)
LIPASE SERPL-CCNC: 293 U/L (ref 73–393)
LYMPHOCYTES # BLD AUTO: 0.9 10E9/L (ref 0.8–5.3)
LYMPHOCYTES NFR BLD AUTO: 22.8 %
MAGNESIUM SERPL-MCNC: 1.7 MG/DL (ref 1.6–2.3)
MCH RBC QN AUTO: 28.5 PG (ref 26.5–33)
MCHC RBC AUTO-ENTMCNC: 33.7 G/DL (ref 31.5–36.5)
MCV RBC AUTO: 85 FL (ref 78–100)
MONOCYTES # BLD AUTO: 0.5 10E9/L (ref 0–1.3)
MONOCYTES NFR BLD AUTO: 12.9 %
NEUTROPHILS # BLD AUTO: 2.3 10E9/L (ref 1.6–8.3)
NEUTROPHILS NFR BLD AUTO: 60.2 %
PLATELET # BLD AUTO: 191 10E9/L (ref 150–450)
POTASSIUM SERPL-SCNC: 4.5 MMOL/L (ref 3.4–5.3)
RBC # BLD AUTO: 5.76 10E12/L (ref 4.4–5.9)
SODIUM SERPL-SCNC: 141 MMOL/L (ref 133–144)
TACROLIMUS BLD-MCNC: 8.4 UG/L (ref 5–15)
TME LAST DOSE: NORMAL H
WBC # BLD AUTO: 3.7 10E9/L (ref 4–11)

## 2019-05-09 PROCEDURE — 36415 COLL VENOUS BLD VENIPUNCTURE: CPT | Performed by: SURGERY

## 2019-05-09 PROCEDURE — 83036 HEMOGLOBIN GLYCOSYLATED A1C: CPT | Performed by: SURGERY

## 2019-05-09 PROCEDURE — 82150 ASSAY OF AMYLASE: CPT | Performed by: SURGERY

## 2019-05-09 PROCEDURE — 80197 ASSAY OF TACROLIMUS: CPT | Performed by: SURGERY

## 2019-05-09 PROCEDURE — 85025 COMPLETE CBC W/AUTO DIFF WBC: CPT | Performed by: SURGERY

## 2019-05-09 PROCEDURE — 83690 ASSAY OF LIPASE: CPT | Performed by: SURGERY

## 2019-05-09 PROCEDURE — 80048 BASIC METABOLIC PNL TOTAL CA: CPT | Performed by: SURGERY

## 2019-05-09 PROCEDURE — 83735 ASSAY OF MAGNESIUM: CPT | Performed by: SURGERY

## 2019-05-09 PROCEDURE — 87799 DETECT AGENT NOS DNA QUANT: CPT | Performed by: SURGERY

## 2019-05-09 NOTE — TELEPHONE ENCOUNTER
Rise in lipase - Sean denies constipation, fevers, or abdominal pain. He does report that he ate an abnormal dinner the  night prior to lab draw consisting of a generous portion of meatloaf with pepperjack cheese, wrapped in amin.     Will recheck labs next week Monday.

## 2019-05-13 DIAGNOSIS — B34.8 BK VIREMIA: Primary | ICD-10-CM

## 2019-05-13 DIAGNOSIS — Z94.0 KIDNEY REPLACED BY TRANSPLANT: ICD-10-CM

## 2019-05-13 DIAGNOSIS — D84.9 IMMUNOSUPPRESSED STATUS (H): ICD-10-CM

## 2019-05-13 LAB
BKV DNA # SPEC NAA+PROBE: <500 COPIES/ML
BKV DNA SPEC NAA+PROBE-LOG#: <2.7 LOG COPIES/ML
SPECIMEN SOURCE: ABNORMAL

## 2019-05-15 ENCOUNTER — TELEPHONE (OUTPATIENT)
Dept: TRANSPLANT | Facility: CLINIC | Age: 54
End: 2019-05-15

## 2019-05-16 ENCOUNTER — TELEPHONE (OUTPATIENT)
Dept: TRANSPLANT | Facility: CLINIC | Age: 54
End: 2019-05-16

## 2019-05-16 DIAGNOSIS — Z94.0 KIDNEY REPLACED BY TRANSPLANT: ICD-10-CM

## 2019-05-16 DIAGNOSIS — Z48.298 AFTERCARE FOLLOWING ORGAN TRANSPLANT: ICD-10-CM

## 2019-05-16 DIAGNOSIS — Z94.83 PANCREAS REPLACED BY TRANSPLANT (H): ICD-10-CM

## 2019-05-16 LAB
AMYLASE SERPL-CCNC: 60 U/L (ref 30–110)
ANION GAP SERPL CALCULATED.3IONS-SCNC: 6 MMOL/L (ref 3–14)
BASOPHILS # BLD AUTO: 0 10E9/L (ref 0–0.2)
BASOPHILS NFR BLD AUTO: 0.3 %
BUN SERPL-MCNC: 21 MG/DL (ref 7–30)
CALCIUM SERPL-MCNC: 9.2 MG/DL (ref 8.5–10.1)
CHLORIDE SERPL-SCNC: 111 MMOL/L (ref 94–109)
CO2 SERPL-SCNC: 23 MMOL/L (ref 20–32)
CREAT SERPL-MCNC: 1.15 MG/DL (ref 0.66–1.25)
DIFFERENTIAL METHOD BLD: ABNORMAL
EOSINOPHIL # BLD AUTO: 0.1 10E9/L (ref 0–0.7)
EOSINOPHIL NFR BLD AUTO: 2.9 %
ERYTHROCYTE [DISTWIDTH] IN BLOOD BY AUTOMATED COUNT: 16.6 % (ref 10–15)
GFR SERPL CREATININE-BSD FRML MDRD: 71 ML/MIN/{1.73_M2}
GLUCOSE SERPL-MCNC: 96 MG/DL (ref 70–99)
HCT VFR BLD AUTO: 50.9 % (ref 40–53)
HGB BLD-MCNC: 17.4 G/DL (ref 13.3–17.7)
LIPASE SERPL-CCNC: 129 U/L (ref 73–393)
LYMPHOCYTES # BLD AUTO: 1 10E9/L (ref 0.8–5.3)
LYMPHOCYTES NFR BLD AUTO: 24.8 %
MCH RBC QN AUTO: 28.8 PG (ref 26.5–33)
MCHC RBC AUTO-ENTMCNC: 34.2 G/DL (ref 31.5–36.5)
MCV RBC AUTO: 84 FL (ref 78–100)
MONOCYTES # BLD AUTO: 0.4 10E9/L (ref 0–1.3)
MONOCYTES NFR BLD AUTO: 10.7 %
NEUTROPHILS # BLD AUTO: 2.4 10E9/L (ref 1.6–8.3)
NEUTROPHILS NFR BLD AUTO: 61.3 %
PLATELET # BLD AUTO: 201 10E9/L (ref 150–450)
POTASSIUM SERPL-SCNC: 4.7 MMOL/L (ref 3.4–5.3)
RBC # BLD AUTO: 6.05 10E12/L (ref 4.4–5.9)
SODIUM SERPL-SCNC: 140 MMOL/L (ref 133–144)
TACROLIMUS BLD-MCNC: 8.8 UG/L (ref 5–15)
TME LAST DOSE: NORMAL H
WBC # BLD AUTO: 3.8 10E9/L (ref 4–11)

## 2019-05-16 PROCEDURE — 80197 ASSAY OF TACROLIMUS: CPT | Performed by: SURGERY

## 2019-05-16 PROCEDURE — 82150 ASSAY OF AMYLASE: CPT | Performed by: SURGERY

## 2019-05-16 PROCEDURE — 80048 BASIC METABOLIC PNL TOTAL CA: CPT | Performed by: SURGERY

## 2019-05-16 PROCEDURE — 83690 ASSAY OF LIPASE: CPT | Performed by: SURGERY

## 2019-05-16 PROCEDURE — 85025 COMPLETE CBC W/AUTO DIFF WBC: CPT | Performed by: SURGERY

## 2019-05-16 PROCEDURE — 36415 COLL VENOUS BLD VENIPUNCTURE: CPT | Performed by: SURGERY

## 2019-05-16 NOTE — TELEPHONE ENCOUNTER
Small rise in creatinine, appears dehydrated on labs with rise in hemoglobin.    Lipase improved back to baseline.    RNCC advised Sean hydrate well over the weekend. No other changes at this time.

## 2019-05-30 DIAGNOSIS — B34.8 BK VIREMIA: ICD-10-CM

## 2019-05-30 DIAGNOSIS — D84.9 IMMUNOSUPPRESSED STATUS (H): ICD-10-CM

## 2019-05-30 DIAGNOSIS — R74.8 SERUM LIPASE ELEVATION: ICD-10-CM

## 2019-05-30 DIAGNOSIS — Z94.0 KIDNEY REPLACED BY TRANSPLANT: ICD-10-CM

## 2019-05-30 DIAGNOSIS — Z94.83 PANCREAS REPLACED BY TRANSPLANT (H): ICD-10-CM

## 2019-05-30 LAB
AMYLASE SERPL-CCNC: 73 U/L (ref 30–110)
ANION GAP SERPL CALCULATED.3IONS-SCNC: 11 MMOL/L (ref 3–14)
BUN SERPL-MCNC: 33 MG/DL (ref 7–30)
CALCIUM SERPL-MCNC: 9.1 MG/DL (ref 8.5–10.1)
CHLORIDE SERPL-SCNC: 111 MMOL/L (ref 94–109)
CO2 SERPL-SCNC: 19 MMOL/L (ref 20–32)
CREAT SERPL-MCNC: 1.28 MG/DL (ref 0.66–1.25)
ERYTHROCYTE [DISTWIDTH] IN BLOOD BY AUTOMATED COUNT: 15.8 % (ref 10–15)
GFR SERPL CREATININE-BSD FRML MDRD: 62 ML/MIN/{1.73_M2}
GLUCOSE SERPL-MCNC: 76 MG/DL (ref 70–99)
HCT VFR BLD AUTO: 50.1 % (ref 40–53)
HGB BLD-MCNC: 16.9 G/DL (ref 13.3–17.7)
LIPASE SERPL-CCNC: 175 U/L (ref 73–393)
MCH RBC QN AUTO: 28.6 PG (ref 26.5–33)
MCHC RBC AUTO-ENTMCNC: 33.7 G/DL (ref 31.5–36.5)
MCV RBC AUTO: 85 FL (ref 78–100)
PLATELET # BLD AUTO: 204 10E9/L (ref 150–450)
POTASSIUM SERPL-SCNC: 5 MMOL/L (ref 3.4–5.3)
RBC # BLD AUTO: 5.91 10E12/L (ref 4.4–5.9)
SODIUM SERPL-SCNC: 141 MMOL/L (ref 133–144)
TACROLIMUS BLD-MCNC: 12.9 UG/L (ref 5–15)
TME LAST DOSE: NORMAL H
WBC # BLD AUTO: 3.6 10E9/L (ref 4–11)

## 2019-05-30 PROCEDURE — 80197 ASSAY OF TACROLIMUS: CPT | Performed by: INTERNAL MEDICINE

## 2019-05-30 PROCEDURE — 80048 BASIC METABOLIC PNL TOTAL CA: CPT | Performed by: INTERNAL MEDICINE

## 2019-05-30 PROCEDURE — 85027 COMPLETE CBC AUTOMATED: CPT | Performed by: INTERNAL MEDICINE

## 2019-05-30 PROCEDURE — 82150 ASSAY OF AMYLASE: CPT | Performed by: INTERNAL MEDICINE

## 2019-05-30 PROCEDURE — 83690 ASSAY OF LIPASE: CPT | Performed by: INTERNAL MEDICINE

## 2019-05-30 PROCEDURE — 87799 DETECT AGENT NOS DNA QUANT: CPT | Performed by: INTERNAL MEDICINE

## 2019-05-30 PROCEDURE — 36415 COLL VENOUS BLD VENIPUNCTURE: CPT | Performed by: INTERNAL MEDICINE

## 2019-05-31 ENCOUNTER — TELEPHONE (OUTPATIENT)
Dept: PHARMACY | Facility: CLINIC | Age: 54
End: 2019-05-31

## 2019-05-31 ENCOUNTER — TELEPHONE (OUTPATIENT)
Dept: TRANSPLANT | Facility: CLINIC | Age: 54
End: 2019-05-31

## 2019-05-31 DIAGNOSIS — Z94.83 PANCREAS REPLACED BY TRANSPLANT (H): Primary | ICD-10-CM

## 2019-05-31 DIAGNOSIS — Z94.0 KIDNEY REPLACED BY TRANSPLANT: ICD-10-CM

## 2019-05-31 DIAGNOSIS — Z94.83 PANCREAS TRANSPLANTED (H): ICD-10-CM

## 2019-05-31 RX ORDER — TACROLIMUS 0.5 MG/1
0.5 CAPSULE ORAL 2 TIMES DAILY
Qty: 60 CAPSULE | Refills: 11 | Status: SHIPPED | OUTPATIENT
Start: 2019-05-31 | End: 2019-06-05

## 2019-05-31 RX ORDER — TACROLIMUS 1 MG/1
2 CAPSULE ORAL 2 TIMES DAILY
Qty: 120 CAPSULE | Refills: 11 | Status: SHIPPED | OUTPATIENT
Start: 2019-05-31 | End: 2019-06-05

## 2019-05-31 NOTE — TELEPHONE ENCOUNTER
Deyvi Dick MD Schindelholz, Alanna, RN             Creatinine is elevated, looks volume depleted   BK will need q 2 weeks      Denies lightheadedness / dizziness.  BP has not been checked recently, but was running 120s / 60s last week.  Sean is drinking 1/2 gallon to 3/4 gallon daily.   Sean denies any diarrhea.  Denies any abdominal pain / fevers.     ISSUE:   Tacrolimus level 12.9 on 5/30/19, goal 8-10, dose 3 mg BID    PLAN:   Please call pt and confirm this was a good 12-hour trough. Verify dose 3 mg BID. Confirm no new medications or illness (trina. Diarrhea). If good trough, decrease dose to 2.5 mg BID and recheck level in 1 week (orders entered).

## 2019-06-05 DIAGNOSIS — Z94.83 PANCREAS TRANSPLANTED (H): Primary | ICD-10-CM

## 2019-06-05 DIAGNOSIS — Z94.0 KIDNEY REPLACED BY TRANSPLANT: ICD-10-CM

## 2019-06-05 DIAGNOSIS — Z94.83 PANCREAS REPLACED BY TRANSPLANT (H): ICD-10-CM

## 2019-06-05 NOTE — TELEPHONE ENCOUNTER
Pt wants to fill tacrolimus through [facility], can't transfer rx's due to Medicare Part B  Please send new rx's    Thank you!  [facility]

## 2019-06-06 ENCOUNTER — RESULTS ONLY (OUTPATIENT)
Dept: OTHER | Facility: CLINIC | Age: 54
End: 2019-06-06

## 2019-06-06 DIAGNOSIS — Z94.0 KIDNEY REPLACED BY TRANSPLANT: ICD-10-CM

## 2019-06-06 DIAGNOSIS — Z94.83 PANCREAS REPLACED BY TRANSPLANT (H): ICD-10-CM

## 2019-06-06 DIAGNOSIS — Z48.298 AFTERCARE FOLLOWING ORGAN TRANSPLANT: ICD-10-CM

## 2019-06-06 LAB
AMYLASE SERPL-CCNC: 63 U/L (ref 30–110)
ANION GAP SERPL CALCULATED.3IONS-SCNC: 5 MMOL/L (ref 3–14)
BASOPHILS # BLD AUTO: 0 10E9/L (ref 0–0.2)
BASOPHILS NFR BLD AUTO: 0.9 %
BUN SERPL-MCNC: 25 MG/DL (ref 7–30)
CALCIUM SERPL-MCNC: 8.8 MG/DL (ref 8.5–10.1)
CHLORIDE SERPL-SCNC: 107 MMOL/L (ref 94–109)
CO2 SERPL-SCNC: 24 MMOL/L (ref 20–32)
CREAT SERPL-MCNC: 1.11 MG/DL (ref 0.66–1.25)
DIFFERENTIAL METHOD BLD: ABNORMAL
EOSINOPHIL # BLD AUTO: 0.1 10E9/L (ref 0–0.7)
EOSINOPHIL NFR BLD AUTO: 3.3 %
ERYTHROCYTE [DISTWIDTH] IN BLOOD BY AUTOMATED COUNT: 15.5 % (ref 10–15)
GFR SERPL CREATININE-BSD FRML MDRD: 74 ML/MIN/{1.73_M2}
GLUCOSE SERPL-MCNC: 83 MG/DL (ref 70–99)
HCT VFR BLD AUTO: 52.1 % (ref 40–53)
HGB BLD-MCNC: 16.6 G/DL (ref 13.3–17.7)
IMM GRANULOCYTES # BLD: 0 10E9/L (ref 0–0.4)
IMM GRANULOCYTES NFR BLD: 0.3 %
LIPASE SERPL-CCNC: 141 U/L (ref 73–393)
LYMPHOCYTES # BLD AUTO: 0.8 10E9/L (ref 0.8–5.3)
LYMPHOCYTES NFR BLD AUTO: 24.6 %
MCH RBC QN AUTO: 28.7 PG (ref 26.5–33)
MCHC RBC AUTO-ENTMCNC: 31.9 G/DL (ref 31.5–36.5)
MCV RBC AUTO: 90 FL (ref 78–100)
MONOCYTES # BLD AUTO: 0.4 10E9/L (ref 0–1.3)
MONOCYTES NFR BLD AUTO: 10.5 %
NEUTROPHILS # BLD AUTO: 2 10E9/L (ref 1.6–8.3)
NEUTROPHILS NFR BLD AUTO: 60.4 %
NRBC # BLD AUTO: 0 10*3/UL
NRBC BLD AUTO-RTO: 0 /100
PLATELET # BLD AUTO: 185 10E9/L (ref 150–450)
POTASSIUM SERPL-SCNC: 4.9 MMOL/L (ref 3.4–5.3)
RBC # BLD AUTO: 5.78 10E12/L (ref 4.4–5.9)
SODIUM SERPL-SCNC: 136 MMOL/L (ref 133–144)
TACROLIMUS BLD-MCNC: 8.8 UG/L (ref 5–15)
TME LAST DOSE: NORMAL H
WBC # BLD AUTO: 3.3 10E9/L (ref 4–11)

## 2019-06-06 RX ORDER — TACROLIMUS 0.5 MG/1
0.5 CAPSULE ORAL 2 TIMES DAILY
Qty: 60 CAPSULE | Refills: 11 | Status: SHIPPED | OUTPATIENT
Start: 2019-06-06 | End: 2019-07-01

## 2019-06-06 RX ORDER — TACROLIMUS 1 MG/1
2 CAPSULE ORAL 2 TIMES DAILY
Qty: 120 CAPSULE | Refills: 11 | Status: SHIPPED | OUTPATIENT
Start: 2019-06-06 | End: 2019-07-01

## 2019-06-12 LAB
DONOR IDENTIFICATION: NORMAL
DSA COMMENTS: NORMAL
DSA PRESENT: NO
DSA TEST METHOD: NORMAL
ORGAN: NORMAL
SA1 CELL: NORMAL
SA1 COMMENTS: NORMAL
SA1 HI RISK ABY: NORMAL
SA1 MOD RISK ABY: NORMAL
SA1 TEST METHOD: NORMAL
SA2 CELL: NORMAL
SA2 COMMENTS: NORMAL
SA2 HI RISK ABY UA: NORMAL
SA2 MOD RISK ABY: NORMAL
SA2 TEST METHOD: NORMAL
UNACCEPTABLE ANTIGEN: NORMAL
UNOS CPRA: 22

## 2019-06-24 ENCOUNTER — TELEPHONE (OUTPATIENT)
Dept: TRANSPLANT | Facility: CLINIC | Age: 54
End: 2019-06-24

## 2019-06-27 DIAGNOSIS — Z94.0 KIDNEY REPLACED BY TRANSPLANT: ICD-10-CM

## 2019-06-27 DIAGNOSIS — Z48.298 AFTERCARE FOLLOWING ORGAN TRANSPLANT: ICD-10-CM

## 2019-06-27 DIAGNOSIS — Z94.83 PANCREAS REPLACED BY TRANSPLANT (H): ICD-10-CM

## 2019-06-27 LAB
AMYLASE SERPL-CCNC: 70 U/L (ref 30–110)
ANION GAP SERPL CALCULATED.3IONS-SCNC: 8 MMOL/L (ref 3–14)
BASOPHILS # BLD AUTO: 0 10E9/L (ref 0–0.2)
BASOPHILS NFR BLD AUTO: 0.5 %
BUN SERPL-MCNC: 25 MG/DL (ref 7–30)
CALCIUM SERPL-MCNC: 8.9 MG/DL (ref 8.5–10.1)
CHLORIDE SERPL-SCNC: 107 MMOL/L (ref 94–109)
CO2 SERPL-SCNC: 20 MMOL/L (ref 20–32)
CREAT SERPL-MCNC: 1.08 MG/DL (ref 0.66–1.25)
CREAT UR-MCNC: 123 MG/DL
DIFFERENTIAL METHOD BLD: ABNORMAL
EOSINOPHIL # BLD AUTO: 0.1 10E9/L (ref 0–0.7)
EOSINOPHIL NFR BLD AUTO: 2.3 %
ERYTHROCYTE [DISTWIDTH] IN BLOOD BY AUTOMATED COUNT: 16.2 % (ref 10–15)
GFR SERPL CREATININE-BSD FRML MDRD: 77 ML/MIN/{1.73_M2}
GLUCOSE SERPL-MCNC: 71 MG/DL (ref 70–99)
HCT VFR BLD AUTO: 49 % (ref 40–53)
HGB BLD-MCNC: 16.5 G/DL (ref 13.3–17.7)
LIPASE SERPL-CCNC: 181 U/L (ref 73–393)
LYMPHOCYTES # BLD AUTO: 1.1 10E9/L (ref 0.8–5.3)
LYMPHOCYTES NFR BLD AUTO: 26.9 %
MCH RBC QN AUTO: 28.7 PG (ref 26.5–33)
MCHC RBC AUTO-ENTMCNC: 33.7 G/DL (ref 31.5–36.5)
MCV RBC AUTO: 85 FL (ref 78–100)
MICROALBUMIN UR-MCNC: 12 MG/L
MICROALBUMIN/CREAT UR: 9.35 MG/G CR (ref 0–17)
MONOCYTES # BLD AUTO: 0.5 10E9/L (ref 0–1.3)
MONOCYTES NFR BLD AUTO: 11.8 %
NEUTROPHILS # BLD AUTO: 2.3 10E9/L (ref 1.6–8.3)
NEUTROPHILS NFR BLD AUTO: 58.5 %
PLATELET # BLD AUTO: 194 10E9/L (ref 150–450)
POTASSIUM SERPL-SCNC: 5.5 MMOL/L (ref 3.4–5.3)
RBC # BLD AUTO: 5.75 10E12/L (ref 4.4–5.9)
SODIUM SERPL-SCNC: 135 MMOL/L (ref 133–144)
TACROLIMUS BLD-MCNC: 12.5 UG/L (ref 5–15)
TME LAST DOSE: NORMAL H
WBC # BLD AUTO: 3.9 10E9/L (ref 4–11)

## 2019-06-27 PROCEDURE — 82043 UR ALBUMIN QUANTITATIVE: CPT | Performed by: SURGERY

## 2019-06-27 PROCEDURE — 80048 BASIC METABOLIC PNL TOTAL CA: CPT | Performed by: SURGERY

## 2019-06-27 PROCEDURE — 83690 ASSAY OF LIPASE: CPT | Performed by: SURGERY

## 2019-06-27 PROCEDURE — 80197 ASSAY OF TACROLIMUS: CPT | Performed by: SURGERY

## 2019-06-27 PROCEDURE — 85025 COMPLETE CBC W/AUTO DIFF WBC: CPT | Performed by: SURGERY

## 2019-06-27 PROCEDURE — 36415 COLL VENOUS BLD VENIPUNCTURE: CPT | Performed by: SURGERY

## 2019-06-27 PROCEDURE — 82150 ASSAY OF AMYLASE: CPT | Performed by: SURGERY

## 2019-06-28 ENCOUNTER — TELEPHONE (OUTPATIENT)
Dept: TRANSPLANT | Facility: CLINIC | Age: 54
End: 2019-06-28

## 2019-06-28 DIAGNOSIS — Z94.83 PANCREAS REPLACED BY TRANSPLANT (H): ICD-10-CM

## 2019-06-28 DIAGNOSIS — Z94.0 KIDNEY REPLACED BY TRANSPLANT: Primary | ICD-10-CM

## 2019-06-28 NOTE — TELEPHONE ENCOUNTER
K+ level 5.5, continue low K diet.    ISSUE:   Tacrolimus level 12/x on 6/27, goal 8-10, dose 2.5 mg BID    PLAN:   Please call pt and confirm this was a good 12-hour trough. Verify dose 2.5 mg BID. Confirm no new medications or illness (trian. Diarrhea). If good trough, decrease dose to 2 mg BID and recheck level in 1 week (orders to be entered).      RNCC left detailed VM about high K - follow low K diet and med dose change. RNCC requested phone call back Monday to confirm this change was made.     UPDATE 7/2/2019:  RNCC received message from Dr. Dick that Sean did reduce his tacrolimus dose to 2mg BID.

## 2019-07-01 ENCOUNTER — OFFICE VISIT (OUTPATIENT)
Dept: NEPHROLOGY | Facility: CLINIC | Age: 54
End: 2019-07-01
Attending: INTERNAL MEDICINE
Payer: COMMERCIAL

## 2019-07-01 VITALS
BODY MASS INDEX: 28.38 KG/M2 | TEMPERATURE: 98.1 F | WEIGHT: 180.8 LBS | DIASTOLIC BLOOD PRESSURE: 76 MMHG | SYSTOLIC BLOOD PRESSURE: 111 MMHG | OXYGEN SATURATION: 94 % | RESPIRATION RATE: 20 BRPM | HEART RATE: 90 BPM | HEIGHT: 67 IN

## 2019-07-01 DIAGNOSIS — Z94.83 PANCREAS REPLACED BY TRANSPLANT (H): Primary | ICD-10-CM

## 2019-07-01 DIAGNOSIS — Z94.83 PANCREAS TRANSPLANTED (H): ICD-10-CM

## 2019-07-01 DIAGNOSIS — Z94.0 KIDNEY REPLACED BY TRANSPLANT: ICD-10-CM

## 2019-07-01 PROCEDURE — G0463 HOSPITAL OUTPT CLINIC VISIT: HCPCS | Mod: ZF

## 2019-07-01 RX ORDER — TACROLIMUS 1 MG/1
2 CAPSULE ORAL 2 TIMES DAILY
Qty: 120 CAPSULE | Refills: 11 | Status: SHIPPED | OUTPATIENT
Start: 2019-07-01 | End: 2019-07-02

## 2019-07-01 SDOH — HEALTH STABILITY: MENTAL HEALTH: HOW OFTEN DO YOU HAVE A DRINK CONTAINING ALCOHOL?: MONTHLY OR LESS

## 2019-07-01 ASSESSMENT — MIFFLIN-ST. JEOR: SCORE: 1613.73

## 2019-07-01 ASSESSMENT — PAIN SCALES - GENERAL: PAINLEVEL: NO PAIN (0)

## 2019-07-01 NOTE — PROGRESS NOTES
CHRONIC TRANSPLANT NEPHROLOGY VISIT    Assessment & Plan   # DDKT (K): Stable   - Baseline Cr ~ 1.0 mg/dL   - Proteinuria: Not checked recently   - Date DSA Last Checked: Dec/2018       Latest DSA: No   - BK Viremia: No   - Kidney Tx Biopsy: No    # Pancreas Tx (SPK):   - Pancreatic Exocrine Drainage: Enteric drained     - Blood glucose: Euglycemia   - HbA1c: Not checked recently       - Pancreatic enzymes: Stable   - Date DSA Last Checked: Dec/2018 Latest DSA: No   - Pancreas Tx Biopsy: No    # Immunosuppression: Tacrolimus immediate release (goal 8-10) and Mycophenolic acid (goal 1-3.5)   - Changes: No    # Prophylaxis:   - PJP: Sulfa/TMP (Bactrim)    - CMV: None    # Hypertension: Controlled; Goal BP: < 130/80   - Changes: No    - Continue Florinef. If BP is consistently above 140 hold Florinef    # Anemia in Chronic Renal Disease: Hgb: Stable      RERE: No   - Iron studies: Not checked recently    # Mineral Bone Disorder:   - Secondary renal hyperparathyroidism; PTH level: Minimally elevated ( pg/ml)   - Vitamin D; level: Normal   - Calcium; level: Normal   - Phosphorus; level: High    # Electrolytes:   - Potassium; level: High   - Magnesium; level: Normal   - Bicarbonate; level: Normal   - Sodium; level: Normal    # Swollen Kidneys:     # Skin Cancer Risk:    - Discussed sun protection and recommend regular follow up with Dermatology.    # Medical Compliance: Yes    # Transplant History:  Etiology of kidney failure: Diabetes mellitus type 1  Tx: DDKT (K)  Transplant: 2018 (Kidney / Pancreas), 10/10/2013 (Kidney)  Donor Type:  - Brain Death Donor Class:   Significant changes in immunosuppression: None  Significant transplant-related complications: None    Transplant Office Phone Number: [phone]    Assessment and plan was discussed with the patient and he voiced his understanding and agreement.    Return visit: No follow-ups on file.    Cheryl Toth    Chief Complaint   Mr. Lewis is  a 53 year old here for routine follow up. The patient was last evaluated by Dr. Kamara on 4/29/19. At the time no changes were made to medication. Please see that note for additional information.     History of Present Illness   Today, the patient is doing well; however, his work schedule recently changed, and he is unhappy with this change. He just returned from camping this weekend and has a tan; he last saw his dermatologist less than a year ago. He reports that he is tired more often. He is not constipated, and he does not eat potassium rich foods (bannanas, watermelon). He is trying to loose weight and attributes abdominal weight to swollen kidneys.    Recent Hospitalizations:  [x] No [] Yes    New Medical Issues: [x] No [] Yes    Decreased energy: [x] No [] Yes    Chest pain or SOB with exertion:  [x] No [] Yes    Appetite change or weight change: [x] No [] Yes    Nausea, vomiting or diarrhea:  [x] No [] Yes    Fever, sweats or chills: [x] No [] Yes    Leg swelling: [x] No [] Yes      Home BP: Not checked    Review of Systems   A comprehensive review of systems was obtained and negative, except as noted in the HPI or PMH.    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)     Diabetes mellitus with background retinopathy (H)     Polycystic kidney     NONSPECIFIC MEDICAL HISTORY     Coronary artery disease     Retinopathy     Hyperlipidemia LDL goal <70     Premature ventricular contractions (PVCs) (VPCs)     Kidney replaced by transplant     Immunosuppressed status (H)     Kidney transplant rejection     Hypertension     Anemia in chronic renal disease     Care after organ transplant     Esophageal ulcer     Status post simultaneous kidney and pancreas transplant (H)     Other chronic pain     Nausea     Drug induced constipation       Social History   Social History     Tobacco Use     Smoking status: Never Smoker     Smokeless tobacco: Never  Used   Substance Use Topics     Alcohol use: Not Currently     Drug use: No       Allergies   Allergies   Allergen Reactions     No Known Allergies        Medications   Current Outpatient Medications   Medication Sig     aspirin 81 MG EC tablet Take 1 tablet (81 mg) by mouth daily     atorvastatin (LIPITOR) 20 MG tablet Take 1 tablet (20 mg) by mouth daily     blood glucose monitoring (NO BRAND SPECIFIED) test strip Use to test blood sugar 4 times daily or as directed.     cholecalciferol 2000 units CAPS Take 2,000 Units by mouth daily     fludrocortisone (FLORINEF) 0.1 MG tablet Take 1 tablet (0.1 mg) by mouth three times a week     HYDROcodone-acetaminophen (NORCO) 5-325 MG tablet Take 1 tablet by mouth every 6 hours as needed for severe pain     magnesium oxide (MAG-OX) 400 MG tablet Take 2 tablets (800mg) at noon and 2 tablets (800mg) at 6pm.     mycophenolic acid (GENERIC EQUIVALENT) 180 MG EC tablet Take 3 tablets (540 mg) by mouth 2 times daily     sodium bicarbonate 650 MG tablet Take 1 tablet (650 mg) by mouth daily     sulfamethoxazole-trimethoprim (BACTRIM/SEPTRA) 400-80 MG tablet Take 1 tablet by mouth daily     tacrolimus (GENERIC EQUIVALENT) 1 MG capsule Take 2 capsules (2 mg) by mouth 2 times daily Total dose = 2.5mg BID (Patient taking differently: Take 2 mg by mouth 2 times daily )     tacrolimus (GENERIC EQUIVALENT) 0.5 MG capsule Take 1 capsule (0.5 mg) by mouth 2 times daily Total dose = 2.5mg BID (Patient not taking: Reported on 7/1/2019)     No current facility-administered medications for this visit.      There are no discontinued medications.    Physical Exam   Vital Signs: There were no vitals taken for this visit.    GENERAL APPEARANCE: alert and no distress  HENT: mouth without ulcers or lesions  LYMPHATICS: no cervical or supraclavicular nodes  RESP: lungs clear to auscultation - no rales, rhonchi or wheezes  CV: regular rhythm, normal rate, no rub, no murmur  EDEMA: no LE edema  bilaterally  ABDOMEN: soft, nondistended, nontender, bowel sounds normal  MS: extremities normal - no gross deformities noted, no evidence of inflammation in joints, no muscle tenderness  SKIN: no rash      Data     Renal Latest Ref Rng & Units 6/27/2019 6/6/2019 5/30/2019   Na 133 - 144 mmol/L 135 136 141   K 3.4 - 5.3 mmol/L 5.5(H) 4.9 5.0   Cl 94 - 109 mmol/L 107 107 111(H)   CO2 20 - 32 mmol/L 20 24 19(L)   BUN 7 - 30 mg/dL 25 25 33(H)   Cr 0.66 - 1.25 mg/dL 1.08 1.11 1.28(H)   Cr (external) 0.5 - 1.5 mg/dL - - -   Glucose 70 - 99 mg/dL 71 83 76   Ca  8.5 - 10.1 mg/dL 8.9 8.8 9.1   Mg 1.6 - 2.3 mg/dL - - -     Bone Health Latest Ref Rng & Units 3/7/2019 2/14/2019 2/11/2019   Phos 2.5 - 4.5 mg/dL - 4.1 3.6   PTHi 18 - 80 pg/mL 104(H) - -   Vit D Def 20 - 75 ug/L 36 - -     Heme Latest Ref Rng & Units 6/27/2019 6/6/2019 5/30/2019   WBC 4.0 - 11.0 10e9/L 3.9(L) 3.3(L) 3.6(L)   Hgb 13.3 - 17.7 g/dL 16.5 16.6 16.9   Plt 150 - 450 10e9/L 194 185 204     Liver Latest Ref Rng & Units 11/11/2018 6/27/2018 8/3/2017   AP 40 - 150 U/L 189(H) - -   TBili 0.2 - 1.3 mg/dL 0.3 - -   ALT 0 - 70 U/L 14 - -   AST 0 - 45 U/L 9 - -   Tot Protein 6.8 - 8.8 g/dL 7.6 - -   Albumin 3.4 - 5.0 g/dL 4.0 4.2 4.2     Pancreas Latest Ref Rng & Units 6/27/2019 6/6/2019 5/30/2019   A1C 0 - 5.6 % - - -   Amylase 30 - 110 U/L 70 63 73   Lipase 73 - 393 U/L 181 141 175     Iron studies Latest Ref Rng & Units 9/11/2018 8/3/2017 11/18/2016   Iron 35 - 180 ug/dL 85 79 82   Iron sat 15 - 46 % 37 34 34   Ferritin 26 - 388 ng/mL 761(H) 736(H) -     [facility] Txp Virology Latest Ref Rng & Units 6/6/2019 5/30/2019 5/9/2019   CVM DNA Quant - - - -   CMV Quant <100 Copies/mL - - -   CMV QT Log <2.0 Log copies/mL - - -   BK Spec - Plasma Plasma Plasma   BK Res BKNEG:BK Virus DNA Not Detected copies/mL BK Virus DNA Not Detected <500(A) <500(A)   BK Log <2.7 Log copies/mL Not Calculated <2.7 <2.7   Hep B Core NR:Nonreactive - - -   Hep B Surf - - - -   HIV 1&2 NEG -  - -        Recent Labs   Lab Test 05/30/19  0724 06/06/19  0744 06/27/19  0733   DOSTAC last dose 7:30pm 5/29/19 8431956/5/2019 LAST DOSE 7:45 PM 06/26/19   TACROL 12.9 8.8 12.5     Recent Labs   Lab Test 02/11/19  0740 02/14/19  0728 02/21/19  0749   DOSMPA LAST DOSE 8:00PM AT 2.10.2019 LAST DOSE 8:00PM 2/13/2019 02/20/2019 AT 0730 PM   MPACID 2.04 3.64* 1.41   MPAG 50.8 43.4 60.7     Scribe Disclosure:  I, Cheryl Toth, am serving as a scribe to document services personally performed by Dr. Dick at this visit, based upon the provider's statements to me. All documentation has been reviewed by the aforementioned provider prior to being entered into the official medical record.

## 2019-07-01 NOTE — LETTER
2019      RE: Amadou Lewis  [id number]  [place] [address]       CHRONIC TRANSPLANT NEPHROLOGY VISIT    Assessment & Plan   # DDKT (SPK): Stable   - Baseline Cr ~ 1.0 mg/dL   - Proteinuria: Not checked recently   - Date DSA Last Checked: Dec/2018       Latest DSA: No   - BK Viremia: No   - Kidney Tx Biopsy: No    # Pancreas Tx (SPK):   - Pancreatic Exocrine Drainage: Enteric drained     - Blood glucose: Euglycemia   - HbA1c: Not checked recently       - Pancreatic enzymes: Stable   - Date DSA Last Checked: Dec/2018 Latest DSA: No   - Pancreas Tx Biopsy: No    # Immunosuppression: Tacrolimus immediate release (goal 8-10) and Mycophenolic acid (goal 1-3.5)   - Changes: No    # Prophylaxis:   - PJP: Sulfa/TMP (Bactrim)    - CMV: None    # Hypertension: Controlled; Goal BP: < 130/80   - Changes: No    - Continue Florinef. If BP is consistently above 140 hold Florinef    # Anemia in Chronic Renal Disease: Hgb: Stable      RERE: No   - Iron studies: Not checked recently    # Mineral Bone Disorder:   - Secondary renal hyperparathyroidism; PTH level: Minimally elevated ( pg/ml)   - Vitamin D; level: Normal   - Calcium; level: Normal   - Phosphorus; level: High    # Electrolytes:   - Potassium; level: High   - Magnesium; level: Normal   - Bicarbonate; level: Normal   - Sodium; level: Normal    # Swollen Kidneys:     # Skin Cancer Risk:    - Discussed sun protection and recommend regular follow up with Dermatology.    # Medical Compliance: Yes    # Transplant History:  Etiology of kidney failure: Diabetes mellitus type 1  Tx: DDKT (SPK)  Transplant: 2018 (Kidney / Pancreas), 10/10/2013 (Kidney)  Donor Type:  - Brain Death Donor Class:   Significant changes in immunosuppression: None  Significant transplant-related complications: None    Transplant Office Phone Number: [phone]    Assessment and plan was discussed with the patient and he voiced his understanding and  agreement.    Return visit: No follow-ups on file.    Cheryl Toth    Chief Complaint   Mr. Lewis is a 53 year old here for routine follow up. The patient was last evaluated by Dr. Kamara on 4/29/19. At the time no changes were made to medication. Please see that note for additional information.     History of Present Illness   Today, the patient is doing well; however, his work schedule recently changed, and he is unhappy with this change. He just returned from camping this weekend and has a tan; he last saw his dermatologist less than a year ago. He reports that he is tired more often. He is not constipated, and he does not eat potassium rich foods (bannanas, watermelon). He is trying to loose weight and attributes abdominal weight to swollen kidneys.    Recent Hospitalizations:  [x] No [] Yes    New Medical Issues: [x] No [] Yes    Decreased energy: [x] No [] Yes    Chest pain or SOB with exertion:  [x] No [] Yes    Appetite change or weight change: [x] No [] Yes    Nausea, vomiting or diarrhea:  [x] No [] Yes    Fever, sweats or chills: [x] No [] Yes    Leg swelling: [x] No [] Yes      Home BP: Not checked    Review of Systems   A comprehensive review of systems was obtained and negative, except as noted in the HPI or PMH.    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)     Diabetes mellitus with background retinopathy (H)     Polycystic kidney     NONSPECIFIC MEDICAL HISTORY     Coronary artery disease     Retinopathy     Hyperlipidemia LDL goal <70     Premature ventricular contractions (PVCs) (VPCs)     Kidney replaced by transplant     Immunosuppressed status (H)     Kidney transplant rejection     Hypertension     Anemia in chronic renal disease     Care after organ transplant     Esophageal ulcer     Status post simultaneous kidney and pancreas transplant (H)     Other chronic pain     Nausea     Drug induced constipation        Social History   Social History     Tobacco Use     Smoking status: Never Smoker     Smokeless tobacco: Never Used   Substance Use Topics     Alcohol use: Not Currently     Drug use: No       Allergies   Allergies   Allergen Reactions     No Known Allergies        Medications   Current Outpatient Medications   Medication Sig     aspirin 81 MG EC tablet Take 1 tablet (81 mg) by mouth daily     atorvastatin (LIPITOR) 20 MG tablet Take 1 tablet (20 mg) by mouth daily     blood glucose monitoring (NO BRAND SPECIFIED) test strip Use to test blood sugar 4 times daily or as directed.     cholecalciferol 2000 units CAPS Take 2,000 Units by mouth daily     fludrocortisone (FLORINEF) 0.1 MG tablet Take 1 tablet (0.1 mg) by mouth three times a week     HYDROcodone-acetaminophen (NORCO) 5-325 MG tablet Take 1 tablet by mouth every 6 hours as needed for severe pain     magnesium oxide (MAG-OX) 400 MG tablet Take 2 tablets (800mg) at noon and 2 tablets (800mg) at 6pm.     mycophenolic acid (GENERIC EQUIVALENT) 180 MG EC tablet Take 3 tablets (540 mg) by mouth 2 times daily     sodium bicarbonate 650 MG tablet Take 1 tablet (650 mg) by mouth daily     sulfamethoxazole-trimethoprim (BACTRIM/SEPTRA) 400-80 MG tablet Take 1 tablet by mouth daily     tacrolimus (GENERIC EQUIVALENT) 1 MG capsule Take 2 capsules (2 mg) by mouth 2 times daily Total dose = 2.5mg BID (Patient taking differently: Take 2 mg by mouth 2 times daily )     tacrolimus (GENERIC EQUIVALENT) 0.5 MG capsule Take 1 capsule (0.5 mg) by mouth 2 times daily Total dose = 2.5mg BID (Patient not taking: Reported on 7/1/2019)     No current facility-administered medications for this visit.      There are no discontinued medications.    Physical Exam   Vital Signs: There were no vitals taken for this visit.    GENERAL APPEARANCE: alert and no distress  HENT: mouth without ulcers or lesions  LYMPHATICS: no cervical or supraclavicular nodes  RESP: lungs clear to  auscultation - no rales, rhonchi or wheezes  CV: regular rhythm, normal rate, no rub, no murmur  EDEMA: no LE edema bilaterally  ABDOMEN: soft, nondistended, nontender, bowel sounds normal  MS: extremities normal - no gross deformities noted, no evidence of inflammation in joints, no muscle tenderness  SKIN: no rash      Data     Renal Latest Ref Rng & Units 6/27/2019 6/6/2019 5/30/2019   Na 133 - 144 mmol/L 135 136 141   K 3.4 - 5.3 mmol/L 5.5(H) 4.9 5.0   Cl 94 - 109 mmol/L 107 107 111(H)   CO2 20 - 32 mmol/L 20 24 19(L)   BUN 7 - 30 mg/dL 25 25 33(H)   Cr 0.66 - 1.25 mg/dL 1.08 1.11 1.28(H)   Cr (external) 0.5 - 1.5 mg/dL - - -   Glucose 70 - 99 mg/dL 71 83 76   Ca  8.5 - 10.1 mg/dL 8.9 8.8 9.1   Mg 1.6 - 2.3 mg/dL - - -     Bone Health Latest Ref Rng & Units 3/7/2019 2/14/2019 2/11/2019   Phos 2.5 - 4.5 mg/dL - 4.1 3.6   PTHi 18 - 80 pg/mL 104(H) - -   Vit D Def 20 - 75 ug/L 36 - -     Heme Latest Ref Rng & Units 6/27/2019 6/6/2019 5/30/2019   WBC 4.0 - 11.0 10e9/L 3.9(L) 3.3(L) 3.6(L)   Hgb 13.3 - 17.7 g/dL 16.5 16.6 16.9   Plt 150 - 450 10e9/L 194 185 204     Liver Latest Ref Rng & Units 11/11/2018 6/27/2018 8/3/2017   AP 40 - 150 U/L 189(H) - -   TBili 0.2 - 1.3 mg/dL 0.3 - -   ALT 0 - 70 U/L 14 - -   AST 0 - 45 U/L 9 - -   Tot Protein 6.8 - 8.8 g/dL 7.6 - -   Albumin 3.4 - 5.0 g/dL 4.0 4.2 4.2     Pancreas Latest Ref Rng & Units 6/27/2019 6/6/2019 5/30/2019   A1C 0 - 5.6 % - - -   Amylase 30 - 110 U/L 70 63 73   Lipase 73 - 393 U/L 181 141 175     Iron studies Latest Ref Rng & Units 9/11/2018 8/3/2017 11/18/2016   Iron 35 - 180 ug/dL 85 79 82   Iron sat 15 - 46 % 37 34 34   Ferritin 26 - 388 ng/mL 761(H) 736(H) -     [facility] Txp Virology Latest Ref Rng & Units 6/6/2019 5/30/2019 5/9/2019   CVM DNA Quant - - - -   CMV Quant <100 Copies/mL - - -   CMV QT Log <2.0 Log copies/mL - - -   BK Spec - Plasma Plasma Plasma   BK Res BKNEG:BK Virus DNA Not Detected copies/mL BK Virus DNA Not Detected <500(A) <500(A)   BK  Log <2.7 Log copies/mL Not Calculated <2.7 <2.7   Hep B Core NR:Nonreactive - - -   Hep B Surf - - - -   HIV 1&2 NEG - - -        Recent Labs   Lab Test 05/30/19  0724 06/06/19  0744 06/27/19  0733   DOSTAC last dose 7:30pm 5/29/19 [id number]/5/2019 LAST DOSE 7:45 PM 06/26/19   TACROL 12.9 8.8 12.5     Recent Labs   Lab Test 02/11/19  0740 02/14/19  0728 02/21/19  0749   DOSMPA LAST DOSE 8:00PM AT 2.10.2019 LAST DOSE 8:00PM 2/13/2019 02/20/2019 AT 0730 PM   MPACID 2.04 3.64* 1.41   MPAG 50.8 43.4 60.7     Scribe Disclosure:  I, Cheryl Toth, am serving as a scribe to document services personally performed by Dr. Dick at this visit, based upon the provider's statements to me. All documentation has been reviewed by the aforementioned provider prior to being entered into the official medical record.       Kidney/Pancreas Recipient

## 2019-07-01 NOTE — LETTER
2019       RE: Amadou Lewis  [address]  [place] [address]     Dear Colleague,    Thank you for referring your patient, Amadou Lewis, to the [facility] NEPHROLOGY at [facility]. Please see a copy of my visit note below.    CHRONIC TRANSPLANT NEPHROLOGY VISIT    Assessment & Plan   # DDKT (SPK): Stable   - Baseline Cr ~ 1.0 mg/dL   - Proteinuria: Not checked recently   - Date DSA Last Checked: Dec/2018       Latest DSA: No   - BK Viremia: No   - Kidney Tx Biopsy: No    # Pancreas Tx (SPK):   - Pancreatic Exocrine Drainage: Enteric drained     - Blood glucose: Euglycemia   - HbA1c: Not checked recently       - Pancreatic enzymes: Stable   - Date DSA Last Checked: Dec/2018 Latest DSA: No   - Pancreas Tx Biopsy: No    # Immunosuppression: Tacrolimus immediate release (goal 8-10) and Mycophenolic acid (goal 1-3.5)   - Changes: No    # Prophylaxis:   - PJP: Sulfa/TMP (Bactrim)    - CMV: None    # Hypertension: Controlled; Goal BP: < 130/80   - Changes: No    - Continue Florinef. If BP is consistently above 140 hold Florinef    # Anemia in Chronic Renal Disease: Hgb: Stable      RERE: No   - Iron studies: Not checked recently    # Mineral Bone Disorder:   - Secondary renal hyperparathyroidism; PTH level: Minimally elevated ( pg/ml)   - Vitamin D; level: Normal   - Calcium; level: Normal   - Phosphorus; level: High    # Electrolytes:   - Potassium; level: High   - Magnesium; level: Normal   - Bicarbonate; level: Normal   - Sodium; level: Normal    # Swollen Kidneys:     # Skin Cancer Risk:    - Discussed sun protection and recommend regular follow up with Dermatology.    # Medical Compliance: Yes    # Transplant History:  Etiology of kidney failure: Diabetes mellitus type 1  Tx: DDKT (SPK)  Transplant: 2018 (Kidney / Pancreas), 10/10/2013 (Kidney)  Donor Type:  - Brain Death Donor Class:   Significant changes in immunosuppression:  None  Significant transplant-related complications: None    Transplant Office Phone Number: [phone]    Assessment and plan was discussed with the patient and he voiced his understanding and agreement.    Return visit: No follow-ups on file.    Cheryl [place]    Chief Complaint   Mr. Lewis is a 53 year old here for routine follow up. The patient was last evaluated by Dr. Kamara on 4/29/19. At the time no changes were made to medication. Please see that note for additional information.     History of Present Illness   Today, the patient is doing well; however, his work schedule recently changed, and he is unhappy with this change. He just returned from camping this weekend and has a tan; he last saw his dermatologist less than a year ago. He reports that he is tired more often. He is not constipated, and he does not eat potassium rich foods (bannanas, watermelon). He is trying to loose weight and attributes abdominal weight to swollen kidneys.    Recent Hospitalizations:  [x] No [] Yes    New Medical Issues: [x] No [] Yes    Decreased energy: [x] No [] Yes    Chest pain or SOB with exertion:  [x] No [] Yes    Appetite change or weight change: [x] No [] Yes    Nausea, vomiting or diarrhea:  [x] No [] Yes    Fever, sweats or chills: [x] No [] Yes    Leg swelling: [x] No [] Yes      Home BP: Not checked    Review of Systems   A comprehensive review of systems was obtained and negative, except as noted in the HPI or PMH.    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)     Diabetes mellitus with background retinopathy (H)     Polycystic kidney     NONSPECIFIC MEDICAL HISTORY     Coronary artery disease     Retinopathy     Hyperlipidemia LDL goal <70     Premature ventricular contractions (PVCs) (VPCs)     Kidney replaced by transplant     Immunosuppressed status (H)     Kidney transplant rejection     Hypertension     Anemia in chronic renal  disease     Care after organ transplant     Esophageal ulcer     Status post simultaneous kidney and pancreas transplant (H)     Other chronic pain     Nausea     Drug induced constipation       Social History   Social History     Tobacco Use     Smoking status: Never Smoker     Smokeless tobacco: Never Used   Substance Use Topics     Alcohol use: Not Currently     Drug use: No       Allergies   Allergies   Allergen Reactions     No Known Allergies        Medications   Current Outpatient Medications   Medication Sig     aspirin 81 MG EC tablet Take 1 tablet (81 mg) by mouth daily     atorvastatin (LIPITOR) 20 MG tablet Take 1 tablet (20 mg) by mouth daily     blood glucose monitoring (NO BRAND SPECIFIED) test strip Use to test blood sugar 4 times daily or as directed.     cholecalciferol 2000 units CAPS Take 2,000 Units by mouth daily     fludrocortisone (FLORINEF) 0.1 MG tablet Take 1 tablet (0.1 mg) by mouth three times a week     HYDROcodone-acetaminophen (NORCO) 5-325 MG tablet Take 1 tablet by mouth every 6 hours as needed for severe pain     magnesium oxide (MAG-OX) 400 MG tablet Take 2 tablets (800mg) at noon and 2 tablets (800mg) at 6pm.     mycophenolic acid (GENERIC EQUIVALENT) 180 MG EC tablet Take 3 tablets (540 mg) by mouth 2 times daily     sodium bicarbonate 650 MG tablet Take 1 tablet (650 mg) by mouth daily     sulfamethoxazole-trimethoprim (BACTRIM/SEPTRA) 400-80 MG tablet Take 1 tablet by mouth daily     tacrolimus (GENERIC EQUIVALENT) 1 MG capsule Take 2 capsules (2 mg) by mouth 2 times daily Total dose = 2.5mg BID (Patient taking differently: Take 2 mg by mouth 2 times daily )     tacrolimus (GENERIC EQUIVALENT) 0.5 MG capsule Take 1 capsule (0.5 mg) by mouth 2 times daily Total dose = 2.5mg BID (Patient not taking: Reported on 7/1/2019)     No current facility-administered medications for this visit.      There are no discontinued medications.    Physical Exam   Vital Signs: There were no  vitals taken for this visit.    GENERAL APPEARANCE: alert and no distress  HENT: mouth without ulcers or lesions  LYMPHATICS: no cervical or supraclavicular nodes  RESP: lungs clear to auscultation - no rales, rhonchi or wheezes  CV: regular rhythm, normal rate, no rub, no murmur  EDEMA: no LE edema bilaterally  ABDOMEN: soft, nondistended, nontender, bowel sounds normal  MS: extremities normal - no gross deformities noted, no evidence of inflammation in joints, no muscle tenderness  SKIN: no rash      Data     Renal Latest Ref Rng & Units 6/27/2019 6/6/2019 5/30/2019   Na 133 - 144 mmol/L 135 136 141   K 3.4 - 5.3 mmol/L 5.5(H) 4.9 5.0   Cl 94 - 109 mmol/L 107 107 111(H)   CO2 20 - 32 mmol/L 20 24 19(L)   BUN 7 - 30 mg/dL 25 25 33(H)   Cr 0.66 - 1.25 mg/dL 1.08 1.11 1.28(H)   Cr (external) 0.5 - 1.5 mg/dL - - -   Glucose 70 - 99 mg/dL 71 83 76   Ca  8.5 - 10.1 mg/dL 8.9 8.8 9.1   Mg 1.6 - 2.3 mg/dL - - -     Bone Health Latest Ref Rng & Units 3/7/2019 2/14/2019 2/11/2019   Phos 2.5 - 4.5 mg/dL - 4.1 3.6   PTHi 18 - 80 pg/mL 104(H) - -   Vit D Def 20 - 75 ug/L 36 - -     Heme Latest Ref Rng & Units 6/27/2019 6/6/2019 5/30/2019   WBC 4.0 - 11.0 10e9/L 3.9(L) 3.3(L) 3.6(L)   Hgb 13.3 - 17.7 g/dL 16.5 16.6 16.9   Plt 150 - 450 10e9/L 194 185 204     Liver Latest Ref Rng & Units 11/11/2018 6/27/2018 8/3/2017   AP 40 - 150 U/L 189(H) - -   TBili 0.2 - 1.3 mg/dL 0.3 - -   ALT 0 - 70 U/L 14 - -   AST 0 - 45 U/L 9 - -   Tot Protein 6.8 - 8.8 g/dL 7.6 - -   Albumin 3.4 - 5.0 g/dL 4.0 4.2 4.2     Pancreas Latest Ref Rng & Units 6/27/2019 6/6/2019 5/30/2019   A1C 0 - 5.6 % - - -   Amylase 30 - 110 U/L 70 63 73   Lipase 73 - 393 U/L 181 141 175     Iron studies Latest Ref Rng & Units 9/11/2018 8/3/2017 11/18/2016   Iron 35 - 180 ug/dL 85 79 82   Iron sat 15 - 46 % 37 34 34   Ferritin 26 - 388 ng/mL 761(H) 736(H) -     [facility] Txp Virology Latest Ref Rng & Units 6/6/2019 5/30/2019 5/9/2019   CVM DNA Quant - - - -   CMV Quant <100  Copies/mL - - -   CMV QT Log <2.0 Log copies/mL - - -   BK Spec - Plasma Plasma Plasma   BK Res BKNEG:BK Virus DNA Not Detected copies/mL BK Virus DNA Not Detected <500(A) <500(A)   BK Log <2.7 Log copies/mL Not Calculated <2.7 <2.7   Hep B Core NR:Nonreactive - - -   Hep B Surf - - - -   HIV 1&2 NEG - - -        Recent Labs   Lab Test 05/30/19  0724 06/06/19  0744 06/27/19  0733   DOSTAC last dose 7:30pm 5/29/19 0260156/5/2019 LAST DOSE 7:45 PM 06/26/19   TACROL 12.9 8.8 12.5     Recent Labs   Lab Test 02/11/19  0740 02/14/19  0728 02/21/19  0749   DOSMPA LAST DOSE 8:00PM AT 2.10.2019 LAST DOSE 8:00PM 2/13/2019 02/20/2019 AT 0730 PM   MPACID 2.04 3.64* 1.41   MPAG 50.8 43.4 60.7     Scribe Disclosure:  I, Cheryl Toth, am serving as a scribe to document services personally performed by Dr. Dick at this visit, based upon the provider's statements to me. All documentation has been reviewed by the aforementioned provider prior to being entered into the official medical record.       Again, thank you for allowing me to participate in the care of your patient.      Sincerely,    Kidney/Pancreas Recipient

## 2019-07-02 DIAGNOSIS — Z94.0 KIDNEY REPLACED BY TRANSPLANT: ICD-10-CM

## 2019-07-02 DIAGNOSIS — Z94.83 PANCREAS REPLACED BY TRANSPLANT (H): ICD-10-CM

## 2019-07-02 DIAGNOSIS — Z94.83 PANCREAS TRANSPLANTED (H): ICD-10-CM

## 2019-07-02 RX ORDER — TACROLIMUS 1 MG/1
2 CAPSULE ORAL 2 TIMES DAILY
Qty: 120 CAPSULE | Refills: 11 | Status: SHIPPED | OUTPATIENT
Start: 2019-07-02 | End: 2019-07-09

## 2019-07-08 DIAGNOSIS — Z94.83 PANCREAS REPLACED BY TRANSPLANT (H): ICD-10-CM

## 2019-07-08 DIAGNOSIS — Z48.298 AFTERCARE FOLLOWING ORGAN TRANSPLANT: ICD-10-CM

## 2019-07-08 DIAGNOSIS — Z94.0 KIDNEY REPLACED BY TRANSPLANT: ICD-10-CM

## 2019-07-08 LAB
AMYLASE SERPL-CCNC: 69 U/L (ref 30–110)
ANION GAP SERPL CALCULATED.3IONS-SCNC: 6 MMOL/L (ref 3–14)
BUN SERPL-MCNC: 18 MG/DL (ref 7–30)
CALCIUM SERPL-MCNC: 9 MG/DL (ref 8.5–10.1)
CHLORIDE SERPL-SCNC: 112 MMOL/L (ref 94–109)
CO2 SERPL-SCNC: 21 MMOL/L (ref 20–32)
CREAT SERPL-MCNC: 1.03 MG/DL (ref 0.66–1.25)
ERYTHROCYTE [DISTWIDTH] IN BLOOD BY AUTOMATED COUNT: 16.3 % (ref 10–15)
GFR SERPL CREATININE-BSD FRML MDRD: 81 ML/MIN/{1.73_M2}
GLUCOSE SERPL-MCNC: 79 MG/DL (ref 70–99)
HCT VFR BLD AUTO: 48.4 % (ref 40–53)
HGB BLD-MCNC: 16.4 G/DL (ref 13.3–17.7)
LIPASE SERPL-CCNC: 136 U/L (ref 73–393)
MCH RBC QN AUTO: 28.8 PG (ref 26.5–33)
MCHC RBC AUTO-ENTMCNC: 33.9 G/DL (ref 31.5–36.5)
MCV RBC AUTO: 85 FL (ref 78–100)
PLATELET # BLD AUTO: 179 10E9/L (ref 150–450)
POTASSIUM SERPL-SCNC: 5.1 MMOL/L (ref 3.4–5.3)
RBC # BLD AUTO: 5.69 10E12/L (ref 4.4–5.9)
SODIUM SERPL-SCNC: 139 MMOL/L (ref 133–144)
WBC # BLD AUTO: 3.6 10E9/L (ref 4–11)

## 2019-07-08 PROCEDURE — 87799 DETECT AGENT NOS DNA QUANT: CPT | Performed by: SURGERY

## 2019-07-08 PROCEDURE — 83690 ASSAY OF LIPASE: CPT | Performed by: INTERNAL MEDICINE

## 2019-07-08 PROCEDURE — 82150 ASSAY OF AMYLASE: CPT | Performed by: INTERNAL MEDICINE

## 2019-07-08 PROCEDURE — 36415 COLL VENOUS BLD VENIPUNCTURE: CPT | Performed by: INTERNAL MEDICINE

## 2019-07-08 PROCEDURE — 85027 COMPLETE CBC AUTOMATED: CPT | Performed by: INTERNAL MEDICINE

## 2019-07-08 PROCEDURE — 80048 BASIC METABOLIC PNL TOTAL CA: CPT | Performed by: INTERNAL MEDICINE

## 2019-07-08 PROCEDURE — 80197 ASSAY OF TACROLIMUS: CPT | Performed by: INTERNAL MEDICINE

## 2019-07-08 NOTE — ADDENDUM NOTE
Addended by: JONES EMERSON on: 7/8/2019 11:30 AM     Modules accepted: Orders     Detail Level: Detailed

## 2019-07-09 DIAGNOSIS — Z94.83 PANCREAS REPLACED BY TRANSPLANT (H): ICD-10-CM

## 2019-07-09 DIAGNOSIS — Z94.83 PANCREAS TRANSPLANTED (H): ICD-10-CM

## 2019-07-09 DIAGNOSIS — Z94.0 KIDNEY REPLACED BY TRANSPLANT: Primary | ICD-10-CM

## 2019-07-09 LAB
TACROLIMUS BLD-MCNC: 7.8 UG/L (ref 5–15)
TME LAST DOSE: NORMAL H

## 2019-07-09 RX ORDER — TACROLIMUS 1 MG/1
2 CAPSULE ORAL 2 TIMES DAILY
Qty: 120 CAPSULE | Refills: 11 | Status: SHIPPED | OUTPATIENT
Start: 2019-07-09 | End: 2019-08-27

## 2019-07-09 RX ORDER — TACROLIMUS 0.5 MG/1
0.5 CAPSULE ORAL EVERY MORNING
Qty: 30 CAPSULE | Refills: 11 | Status: SHIPPED | OUTPATIENT
Start: 2019-07-09 | End: 2019-08-27

## 2019-07-09 NOTE — TELEPHONE ENCOUNTER
ISSUE:   Tacrolimus level 7.8 on 7/8, goal 8=10, dose 2 mg BID    PLAN:   Please call pt and confirm this was a good 12-hour trough. Verify dose 2 mg BID. Confirm no new medications or illness (trina. Diarrhea). If good trough, increase dose to 2.5 mg AM and 2mg PM. Recheck level as scheduled.

## 2019-07-09 NOTE — TELEPHONE ENCOUNTER
Left message for patient regarding:  Tacrolimus level 7.8 on 7/8, goal 8=10, dose 2 mg BID     PLAN:   Please call pt and confirm this was a good 12-hour trough. Verify dose 2 mg BID. Confirm no new medications or illness (trina. Diarrhea). If good trough, increase dose to 2.5 mg AM and 2mg PM. Recheck level as scheduled

## 2019-07-09 NOTE — TELEPHONE ENCOUNTER
Spoke to patient who confirms current Tacrolimus dose and good 12 hour trough level.  Patient verbalizes understanding to increase Tacrolimus dose to 2.5 mg in the AM and 2 mg in the PM.

## 2019-07-22 DIAGNOSIS — Z48.298 AFTERCARE FOLLOWING ORGAN TRANSPLANT: ICD-10-CM

## 2019-07-22 DIAGNOSIS — Z94.83 PANCREAS REPLACED BY TRANSPLANT (H): ICD-10-CM

## 2019-07-22 DIAGNOSIS — Z94.0 KIDNEY REPLACED BY TRANSPLANT: ICD-10-CM

## 2019-07-22 LAB
AMYLASE SERPL-CCNC: 63 U/L (ref 30–110)
ANION GAP SERPL CALCULATED.3IONS-SCNC: 8 MMOL/L (ref 3–14)
BASOPHILS # BLD AUTO: 0 10E9/L (ref 0–0.2)
BASOPHILS NFR BLD AUTO: 0.6 %
BUN SERPL-MCNC: 23 MG/DL (ref 7–30)
CALCIUM SERPL-MCNC: 8.4 MG/DL (ref 8.5–10.1)
CHLORIDE SERPL-SCNC: 111 MMOL/L (ref 94–109)
CO2 SERPL-SCNC: 20 MMOL/L (ref 20–32)
CREAT SERPL-MCNC: 0.97 MG/DL (ref 0.66–1.25)
DIFFERENTIAL METHOD BLD: ABNORMAL
EOSINOPHIL # BLD AUTO: 0.2 10E9/L (ref 0–0.7)
EOSINOPHIL NFR BLD AUTO: 4.5 %
ERYTHROCYTE [DISTWIDTH] IN BLOOD BY AUTOMATED COUNT: 15.9 % (ref 10–15)
GFR SERPL CREATININE-BSD FRML MDRD: 88 ML/MIN/{1.73_M2}
GLUCOSE SERPL-MCNC: 82 MG/DL (ref 70–99)
HCT VFR BLD AUTO: 48.9 % (ref 40–53)
HGB BLD-MCNC: 16.5 G/DL (ref 13.3–17.7)
LIPASE SERPL-CCNC: 158 U/L (ref 73–393)
LYMPHOCYTES # BLD AUTO: 0.9 10E9/L (ref 0.8–5.3)
LYMPHOCYTES NFR BLD AUTO: 26.2 %
MCH RBC QN AUTO: 28.7 PG (ref 26.5–33)
MCHC RBC AUTO-ENTMCNC: 33.7 G/DL (ref 31.5–36.5)
MCV RBC AUTO: 85 FL (ref 78–100)
MONOCYTES # BLD AUTO: 0.4 10E9/L (ref 0–1.3)
MONOCYTES NFR BLD AUTO: 12.1 %
NEUTROPHILS # BLD AUTO: 2 10E9/L (ref 1.6–8.3)
NEUTROPHILS NFR BLD AUTO: 56.6 %
PLATELET # BLD AUTO: 191 10E9/L (ref 150–450)
POTASSIUM SERPL-SCNC: 4.9 MMOL/L (ref 3.4–5.3)
RBC # BLD AUTO: 5.75 10E12/L (ref 4.4–5.9)
SODIUM SERPL-SCNC: 139 MMOL/L (ref 133–144)
TACROLIMUS BLD-MCNC: 10.2 UG/L (ref 5–15)
TME LAST DOSE: NORMAL H
WBC # BLD AUTO: 3.6 10E9/L (ref 4–11)

## 2019-07-22 PROCEDURE — 36415 COLL VENOUS BLD VENIPUNCTURE: CPT | Performed by: SURGERY

## 2019-07-22 PROCEDURE — 85025 COMPLETE CBC W/AUTO DIFF WBC: CPT | Performed by: SURGERY

## 2019-07-22 PROCEDURE — 82150 ASSAY OF AMYLASE: CPT | Performed by: SURGERY

## 2019-07-22 PROCEDURE — 80197 ASSAY OF TACROLIMUS: CPT | Performed by: SURGERY

## 2019-07-22 PROCEDURE — 80048 BASIC METABOLIC PNL TOTAL CA: CPT | Performed by: SURGERY

## 2019-07-22 PROCEDURE — 83690 ASSAY OF LIPASE: CPT | Performed by: SURGERY

## 2019-08-05 ENCOUNTER — TELEPHONE (OUTPATIENT)
Dept: TRANSPLANT | Facility: CLINIC | Age: 54
End: 2019-08-05

## 2019-08-05 DIAGNOSIS — Z94.0 KIDNEY REPLACED BY TRANSPLANT: ICD-10-CM

## 2019-08-05 DIAGNOSIS — Z48.298 AFTERCARE FOLLOWING ORGAN TRANSPLANT: ICD-10-CM

## 2019-08-05 DIAGNOSIS — Z94.83 PANCREAS REPLACED BY TRANSPLANT (H): ICD-10-CM

## 2019-08-05 LAB
AMYLASE SERPL-CCNC: 67 U/L (ref 30–110)
ANION GAP SERPL CALCULATED.3IONS-SCNC: 6 MMOL/L (ref 3–14)
BASOPHILS # BLD AUTO: 0 10E9/L (ref 0–0.2)
BASOPHILS NFR BLD AUTO: 0.2 %
BUN SERPL-MCNC: 19 MG/DL (ref 7–30)
CALCIUM SERPL-MCNC: 9.2 MG/DL (ref 8.5–10.1)
CHLORIDE SERPL-SCNC: 111 MMOL/L (ref 94–109)
CO2 SERPL-SCNC: 21 MMOL/L (ref 20–32)
CREAT SERPL-MCNC: 0.92 MG/DL (ref 0.66–1.25)
DIFFERENTIAL METHOD BLD: ABNORMAL
EOSINOPHIL # BLD AUTO: 0.1 10E9/L (ref 0–0.7)
EOSINOPHIL NFR BLD AUTO: 2.1 %
ERYTHROCYTE [DISTWIDTH] IN BLOOD BY AUTOMATED COUNT: 15.9 % (ref 10–15)
GFR SERPL CREATININE-BSD FRML MDRD: >90 ML/MIN/{1.73_M2}
GLUCOSE SERPL-MCNC: 90 MG/DL (ref 70–99)
HCT VFR BLD AUTO: 50.8 % (ref 40–53)
HGB BLD-MCNC: 17.3 G/DL (ref 13.3–17.7)
LIPASE SERPL-CCNC: 160 U/L (ref 73–393)
LYMPHOCYTES # BLD AUTO: 1 10E9/L (ref 0.8–5.3)
LYMPHOCYTES NFR BLD AUTO: 20.5 %
MCH RBC QN AUTO: 29.1 PG (ref 26.5–33)
MCHC RBC AUTO-ENTMCNC: 34.1 G/DL (ref 31.5–36.5)
MCV RBC AUTO: 85 FL (ref 78–100)
MONOCYTES # BLD AUTO: 0.5 10E9/L (ref 0–1.3)
MONOCYTES NFR BLD AUTO: 9.6 %
NEUTROPHILS # BLD AUTO: 3.2 10E9/L (ref 1.6–8.3)
NEUTROPHILS NFR BLD AUTO: 67.6 %
PLATELET # BLD AUTO: 197 10E9/L (ref 150–450)
POTASSIUM SERPL-SCNC: 5.2 MMOL/L (ref 3.4–5.3)
RBC # BLD AUTO: 5.95 10E12/L (ref 4.4–5.9)
SODIUM SERPL-SCNC: 138 MMOL/L (ref 133–144)
TACROLIMUS BLD-MCNC: 9.6 UG/L (ref 5–15)
TME LAST DOSE: NORMAL H
WBC # BLD AUTO: 4.7 10E9/L (ref 4–11)

## 2019-08-05 PROCEDURE — 87799 DETECT AGENT NOS DNA QUANT: CPT | Performed by: SURGERY

## 2019-08-05 PROCEDURE — 83690 ASSAY OF LIPASE: CPT | Performed by: SURGERY

## 2019-08-05 PROCEDURE — 36415 COLL VENOUS BLD VENIPUNCTURE: CPT | Performed by: SURGERY

## 2019-08-05 PROCEDURE — 85025 COMPLETE CBC W/AUTO DIFF WBC: CPT | Performed by: SURGERY

## 2019-08-05 PROCEDURE — 80048 BASIC METABOLIC PNL TOTAL CA: CPT | Performed by: SURGERY

## 2019-08-05 PROCEDURE — 82150 ASSAY OF AMYLASE: CPT | Performed by: SURGERY

## 2019-08-05 PROCEDURE — 80197 ASSAY OF TACROLIMUS: CPT | Performed by: SURGERY

## 2019-08-07 ENCOUNTER — COMMITTEE REVIEW (OUTPATIENT)
Dept: TRANSPLANT | Facility: CLINIC | Age: 54
End: 2019-08-07

## 2019-08-07 ENCOUNTER — TEAM CONFERENCE (OUTPATIENT)
Dept: TRANSPLANT | Facility: CLINIC | Age: 54
End: 2019-08-07

## 2019-08-07 DIAGNOSIS — D58.2 ELEVATED HEMOGLOBIN (H): Primary | ICD-10-CM

## 2019-08-07 DIAGNOSIS — Z94.0 KIDNEY REPLACED BY TRANSPLANT: ICD-10-CM

## 2019-08-08 NOTE — TELEPHONE ENCOUNTER
Post Kidney and Pancreas Transplant Team Conference  Date: 8/8/2019  Transplant Coordinator: Alesha Hartley     Attendees:  [x]  Dr. Wolfe [] Becca Riojas, RN  [] Shari Chapin LPN     []  Dr. Dick [] Anu Hanson, JANICE [x] Shayna Chavez LPN   [x]  Dr. Tate [] Glenis Ko, RN    []  Dr. De Los Santos [x] Sailaja Choi RN [] Luke Tesfaye, PharmD   [x] Dr. Pal [x] Zora Hartley, JANICE    [x] Dr. Luevano [] Joey Goldberg RN    [x] Dr. Reyes [x] Viv Wick RN    [] Dr. Caputo [x] Linh Oates RN    [] Dr. Mojica [x] Angella Multani RN    [] Surgery Fellow [] Dwight Mariano RN    [] Sandy Cole NP [] Becca Gregory RN        Verbal Plan Read Back:   Kidney ultrasound native and transplanted r\t increased hemoglobin    Routed to RN Coordinator   Shayna Chavez    Orders placed, message sent to scheduling to make appointment.     BP running 110 - 120. HR 95.    Sean is drinking 1-2 cups of coffee daily, otherwise water only (until he feels bloated).    RNCC discussed PTE and RCC, risk factors, etc.  Sean voiced understanding and will await phone call to get scheduled.

## 2019-08-12 ENCOUNTER — TELEPHONE (OUTPATIENT)
Dept: TRANSPLANT | Facility: CLINIC | Age: 54
End: 2019-08-12

## 2019-08-12 NOTE — TELEPHONE ENCOUNTER
Patient Call: Transplant Illness      Duration of illness: One full day bad head cold with blocked sinus not sleepy well last night  Transplanted organ? kidney  Illness: Other Head cold blocked sinus       Has US scheduled at 2:00pm and 3:15pm.,  I did not cancel the appts as he may need to reschedule  Please call Sean Lewis 3 [phone]

## 2019-08-12 NOTE — TELEPHONE ENCOUNTER
RNCC spoke with Sean - non urgent US was scheduled for today, but he was not feeling up to keeping these appts, as he did not feel well.    Message sent to scheduling to reschedule.    Sean will notify RNCC if no improvement in symptoms.

## 2019-08-17 ENCOUNTER — ANCILLARY PROCEDURE (OUTPATIENT)
Dept: ULTRASOUND IMAGING | Facility: CLINIC | Age: 54
End: 2019-08-17
Payer: MEDICARE

## 2019-08-17 DIAGNOSIS — Z94.0 KIDNEY REPLACED BY TRANSPLANT: ICD-10-CM

## 2019-08-17 DIAGNOSIS — D58.2 ELEVATED HEMOGLOBIN (H): ICD-10-CM

## 2019-08-20 ENCOUNTER — TELEPHONE (OUTPATIENT)
Dept: TRANSPLANT | Facility: CLINIC | Age: 54
End: 2019-08-20

## 2019-08-20 DIAGNOSIS — D84.9 IMMUNOSUPPRESSED STATUS (H): ICD-10-CM

## 2019-08-20 DIAGNOSIS — R53.83 FATIGUE: Primary | ICD-10-CM

## 2019-08-20 DIAGNOSIS — Z94.83 PANCREAS REPLACED BY TRANSPLANT (H): ICD-10-CM

## 2019-08-20 DIAGNOSIS — Z94.0 KIDNEY REPLACED BY TRANSPLANT: ICD-10-CM

## 2019-08-20 NOTE — TELEPHONE ENCOUNTER
Post Kidney and Pancreas Transplant Team Conference  Date: 8/20/2019  Transplant Coordinator: Alesha Hartley     Attendees, via teleconference:  []  Dr. Wolfe [] Becca Riojas, RN  [] Shrai Chapin LPN     [x]  Dr. Dick [] Anu Hanson, RN [] Shayna Chavez LPN   []  Dr. Tate [] Glenis Ko, JANICE    []  Dr. De Los Santos [] Sailaja Choi RN [] Luke Tesfaye, PharmD   [] Dr. Pal [x] Zora Hartley, JANICE    [] Dr. Luevano [] Joey Goldberg RN    [] Dr. Reyes [] Viv Wick RN    [] Dr. Caputo [] Linh Oates RN    [] Dr. Mojica [] Angella Multani RN    [] Surgery Fellow [] Dwight Mariano RN    [] Sandy Cole, NP [] Becca Gregory RN        Verbal Plan Read Back:   Review of US of natives and renal transplant to r/o RCC -   Consistent with PKD.  If need to repeat, obtain MRI r/t US.    Routed to RN Coordinator   Alesha Hartley    Sean voiced understanding.  Sean has c/o ongoing fatigue, despite restful nights sleep. Denies any diarrhea or sore throat.     Will check CMV / EBV with next set of labs.

## 2019-08-26 ENCOUNTER — RESULTS ONLY (OUTPATIENT)
Dept: OTHER | Facility: CLINIC | Age: 54
End: 2019-08-26

## 2019-08-26 DIAGNOSIS — Z94.83 PANCREAS TRANSPLANTED (H): Primary | ICD-10-CM

## 2019-08-26 DIAGNOSIS — Z94.0 KIDNEY REPLACED BY TRANSPLANT: ICD-10-CM

## 2019-08-26 DIAGNOSIS — Z48.298 AFTERCARE FOLLOWING ORGAN TRANSPLANT: ICD-10-CM

## 2019-08-26 DIAGNOSIS — Z94.83 PANCREAS REPLACED BY TRANSPLANT (H): ICD-10-CM

## 2019-08-26 DIAGNOSIS — R53.83 FATIGUE: ICD-10-CM

## 2019-08-26 DIAGNOSIS — D84.9 IMMUNOSUPPRESSED STATUS (H): ICD-10-CM

## 2019-08-26 LAB
AMYLASE SERPL-CCNC: 61 U/L (ref 30–110)
ANION GAP SERPL CALCULATED.3IONS-SCNC: 8 MMOL/L (ref 3–14)
BASOPHILS # BLD AUTO: 0 10E9/L (ref 0–0.2)
BASOPHILS NFR BLD AUTO: 0.2 %
BUN SERPL-MCNC: 22 MG/DL (ref 7–30)
CALCIUM SERPL-MCNC: 9.3 MG/DL (ref 8.5–10.1)
CHLORIDE SERPL-SCNC: 111 MMOL/L (ref 94–109)
CO2 SERPL-SCNC: 21 MMOL/L (ref 20–32)
CREAT SERPL-MCNC: 1.01 MG/DL (ref 0.66–1.25)
DIFFERENTIAL METHOD BLD: NORMAL
EOSINOPHIL # BLD AUTO: 0.1 10E9/L (ref 0–0.7)
EOSINOPHIL NFR BLD AUTO: 2.7 %
ERYTHROCYTE [DISTWIDTH] IN BLOOD BY AUTOMATED COUNT: 15 % (ref 10–15)
GFR SERPL CREATININE-BSD FRML MDRD: 83 ML/MIN/{1.73_M2}
GLUCOSE SERPL-MCNC: 79 MG/DL (ref 70–99)
HCT VFR BLD AUTO: 50.2 % (ref 40–53)
HGB BLD-MCNC: 17.3 G/DL (ref 13.3–17.7)
LIPASE SERPL-CCNC: 121 U/L (ref 73–393)
LYMPHOCYTES # BLD AUTO: 0.9 10E9/L (ref 0.8–5.3)
LYMPHOCYTES NFR BLD AUTO: 21.2 %
MCH RBC QN AUTO: 29.6 PG (ref 26.5–33)
MCHC RBC AUTO-ENTMCNC: 34.5 G/DL (ref 31.5–36.5)
MCV RBC AUTO: 86 FL (ref 78–100)
MONOCYTES # BLD AUTO: 0.4 10E9/L (ref 0–1.3)
MONOCYTES NFR BLD AUTO: 10 %
NEUTROPHILS # BLD AUTO: 2.7 10E9/L (ref 1.6–8.3)
NEUTROPHILS NFR BLD AUTO: 65.9 %
PLATELET # BLD AUTO: 182 10E9/L (ref 150–450)
POTASSIUM SERPL-SCNC: 5.1 MMOL/L (ref 3.4–5.3)
RBC # BLD AUTO: 5.84 10E12/L (ref 4.4–5.9)
SODIUM SERPL-SCNC: 140 MMOL/L (ref 133–144)
TACROLIMUS BLD-MCNC: 12.2 UG/L (ref 5–15)
TME LAST DOSE: NORMAL H
WBC # BLD AUTO: 4.1 10E9/L (ref 4–11)

## 2019-08-26 PROCEDURE — 86833 HLA CLASS II HIGH DEFIN QUAL: CPT | Performed by: SURGERY

## 2019-08-26 PROCEDURE — 85025 COMPLETE CBC W/AUTO DIFF WBC: CPT | Performed by: INTERNAL MEDICINE

## 2019-08-26 PROCEDURE — 83690 ASSAY OF LIPASE: CPT | Performed by: INTERNAL MEDICINE

## 2019-08-26 PROCEDURE — 82150 ASSAY OF AMYLASE: CPT | Performed by: INTERNAL MEDICINE

## 2019-08-26 PROCEDURE — 87799 DETECT AGENT NOS DNA QUANT: CPT | Performed by: INTERNAL MEDICINE

## 2019-08-26 PROCEDURE — 80197 ASSAY OF TACROLIMUS: CPT | Performed by: SURGERY

## 2019-08-26 PROCEDURE — 86832 HLA CLASS I HIGH DEFIN QUAL: CPT | Performed by: SURGERY

## 2019-08-26 PROCEDURE — 80048 BASIC METABOLIC PNL TOTAL CA: CPT | Performed by: INTERNAL MEDICINE

## 2019-08-26 PROCEDURE — 36415 COLL VENOUS BLD VENIPUNCTURE: CPT | Performed by: INTERNAL MEDICINE

## 2019-08-27 LAB
CMV DNA SPEC NAA+PROBE-ACNC: NORMAL [IU]/ML
CMV DNA SPEC NAA+PROBE-LOG#: NORMAL {LOG_IU}/ML
DONOR IDENTIFICATION: NORMAL
DSA COMMENTS: NORMAL
DSA PRESENT: NO
DSA TEST METHOD: NORMAL
EBV DNA # SPEC NAA+PROBE: NORMAL {COPIES}/ML
EBV DNA SPEC NAA+PROBE-LOG#: NORMAL {LOG_COPIES}/ML
ORGAN: NORMAL
SA1 CELL: NORMAL
SA1 COMMENTS: NORMAL
SA1 HI RISK ABY: NORMAL
SA1 MOD RISK ABY: NORMAL
SA1 TEST METHOD: NORMAL
SA2 CELL: NORMAL
SA2 COMMENTS: NORMAL
SA2 HI RISK ABY UA: NORMAL
SA2 MOD RISK ABY: NORMAL
SA2 TEST METHOD: NORMAL
SPECIMEN SOURCE: NORMAL
UNACCEPTABLE ANTIGEN: NORMAL
UNOS CPRA: 22

## 2019-08-27 RX ORDER — TACROLIMUS 1 MG/1
2 CAPSULE ORAL 2 TIMES DAILY
Qty: 120 CAPSULE | Refills: 11 | Status: SHIPPED | OUTPATIENT
Start: 2019-08-27 | End: 2019-10-08

## 2019-08-27 RX ORDER — TACROLIMUS 0.5 MG/1
CAPSULE ORAL
Qty: 30 CAPSULE | Refills: 11
Start: 2019-08-27 | End: 2019-10-08

## 2019-08-27 NOTE — TELEPHONE ENCOUNTER
Spoke to patient who confirms current Tacrolimus dose and good 12 hour trough level.  Patient verbalizes understanding to decrease Tacrolimus dose to 2 mg BID.

## 2019-08-27 NOTE — TELEPHONE ENCOUNTER
ISSUE:   Tacrolimus level 12 on 8/26, goal 8-10, dose 2.5mg AM, 2 mg PM    PLAN:   Please call pt and confirm this was a good 12-hour trough. Verify dose as above. Confirm no new medications or illness (trina. Diarrhea). If good trough, decrease dose to 2 mg BID and recheck level as scheduled.

## 2019-09-09 DIAGNOSIS — N18.6 ESRD ON DIALYSIS (H): ICD-10-CM

## 2019-09-09 DIAGNOSIS — Z94.83 PANCREAS REPLACED BY TRANSPLANT (H): ICD-10-CM

## 2019-09-09 DIAGNOSIS — Z76.82 ORGAN TRANSPLANT CANDIDATE: ICD-10-CM

## 2019-09-09 DIAGNOSIS — E11.9 DIABETES MELLITUS, TYPE 2 (H): ICD-10-CM

## 2019-09-09 DIAGNOSIS — Z48.298 AFTERCARE FOLLOWING ORGAN TRANSPLANT: ICD-10-CM

## 2019-09-09 DIAGNOSIS — Z99.2 ESRD ON DIALYSIS (H): ICD-10-CM

## 2019-09-09 DIAGNOSIS — Z94.0 KIDNEY REPLACED BY TRANSPLANT: ICD-10-CM

## 2019-09-09 LAB
AMYLASE SERPL-CCNC: 67 U/L (ref 30–110)
ANION GAP SERPL CALCULATED.3IONS-SCNC: 9 MMOL/L (ref 3–14)
BASOPHILS # BLD AUTO: 0 10E9/L (ref 0–0.2)
BASOPHILS NFR BLD AUTO: 0.2 %
BUN SERPL-MCNC: 17 MG/DL (ref 7–30)
CALCIUM SERPL-MCNC: 9.2 MG/DL (ref 8.5–10.1)
CHLORIDE SERPL-SCNC: 109 MMOL/L (ref 94–109)
CO2 SERPL-SCNC: 20 MMOL/L (ref 20–32)
CREAT SERPL-MCNC: 0.92 MG/DL (ref 0.66–1.25)
DIFFERENTIAL METHOD BLD: NORMAL
EOSINOPHIL # BLD AUTO: 0.1 10E9/L (ref 0–0.7)
EOSINOPHIL NFR BLD AUTO: 3 %
ERYTHROCYTE [DISTWIDTH] IN BLOOD BY AUTOMATED COUNT: 14.9 % (ref 10–15)
GFR SERPL CREATININE-BSD FRML MDRD: >90 ML/MIN/{1.73_M2}
GLUCOSE SERPL-MCNC: 88 MG/DL (ref 70–99)
HCT VFR BLD AUTO: 50.6 % (ref 40–53)
HGB BLD-MCNC: 16.8 G/DL (ref 13.3–17.7)
LIPASE SERPL-CCNC: 130 U/L (ref 73–393)
LYMPHOCYTES # BLD AUTO: 1 10E9/L (ref 0.8–5.3)
LYMPHOCYTES NFR BLD AUTO: 23.3 %
MCH RBC QN AUTO: 28.7 PG (ref 26.5–33)
MCHC RBC AUTO-ENTMCNC: 33.2 G/DL (ref 31.5–36.5)
MCV RBC AUTO: 86 FL (ref 78–100)
MONOCYTES # BLD AUTO: 0.4 10E9/L (ref 0–1.3)
MONOCYTES NFR BLD AUTO: 9.8 %
NEUTROPHILS # BLD AUTO: 2.7 10E9/L (ref 1.6–8.3)
NEUTROPHILS NFR BLD AUTO: 63.7 %
PLATELET # BLD AUTO: 185 10E9/L (ref 150–450)
POTASSIUM SERPL-SCNC: 4.9 MMOL/L (ref 3.4–5.3)
RBC # BLD AUTO: 5.86 10E12/L (ref 4.4–5.9)
SODIUM SERPL-SCNC: 138 MMOL/L (ref 133–144)
TACROLIMUS BLD-MCNC: 10.1 UG/L (ref 5–15)
TME LAST DOSE: 2100 H
WBC # BLD AUTO: 4.3 10E9/L (ref 4–11)

## 2019-09-09 PROCEDURE — 80197 ASSAY OF TACROLIMUS: CPT | Performed by: SURGERY

## 2019-09-09 PROCEDURE — 82150 ASSAY OF AMYLASE: CPT | Performed by: SURGERY

## 2019-09-09 PROCEDURE — 83690 ASSAY OF LIPASE: CPT | Performed by: SURGERY

## 2019-09-09 PROCEDURE — 87799 DETECT AGENT NOS DNA QUANT: CPT | Performed by: SURGERY

## 2019-09-09 PROCEDURE — 85025 COMPLETE CBC W/AUTO DIFF WBC: CPT | Performed by: SURGERY

## 2019-09-09 PROCEDURE — 80048 BASIC METABOLIC PNL TOTAL CA: CPT | Performed by: SURGERY

## 2019-09-23 DIAGNOSIS — Z48.298 AFTERCARE FOLLOWING ORGAN TRANSPLANT: ICD-10-CM

## 2019-09-23 DIAGNOSIS — Z94.0 KIDNEY REPLACED BY TRANSPLANT: ICD-10-CM

## 2019-09-23 DIAGNOSIS — Z94.83 PANCREAS REPLACED BY TRANSPLANT (H): ICD-10-CM

## 2019-09-23 LAB
AMYLASE SERPL-CCNC: 58 U/L (ref 30–110)
ANION GAP SERPL CALCULATED.3IONS-SCNC: 6 MMOL/L (ref 3–14)
BASOPHILS # BLD AUTO: 0 10E9/L (ref 0–0.2)
BASOPHILS NFR BLD AUTO: 0 %
BUN SERPL-MCNC: 15 MG/DL (ref 7–30)
CALCIUM SERPL-MCNC: 8.6 MG/DL (ref 8.5–10.1)
CHLORIDE SERPL-SCNC: 111 MMOL/L (ref 94–109)
CO2 SERPL-SCNC: 21 MMOL/L (ref 20–32)
CREAT SERPL-MCNC: 0.88 MG/DL (ref 0.66–1.25)
DIFFERENTIAL METHOD BLD: NORMAL
EOSINOPHIL # BLD AUTO: 0.1 10E9/L (ref 0–0.7)
EOSINOPHIL NFR BLD AUTO: 2.2 %
ERYTHROCYTE [DISTWIDTH] IN BLOOD BY AUTOMATED COUNT: 14.8 % (ref 10–15)
GFR SERPL CREATININE-BSD FRML MDRD: >90 ML/MIN/{1.73_M2}
GLUCOSE SERPL-MCNC: 90 MG/DL (ref 70–99)
HCT VFR BLD AUTO: 47.8 % (ref 40–53)
HGB BLD-MCNC: 16.1 G/DL (ref 13.3–17.7)
LIPASE SERPL-CCNC: 115 U/L (ref 73–393)
LYMPHOCYTES # BLD AUTO: 1.2 10E9/L (ref 0.8–5.3)
LYMPHOCYTES NFR BLD AUTO: 29.1 %
MCH RBC QN AUTO: 29.3 PG (ref 26.5–33)
MCHC RBC AUTO-ENTMCNC: 33.7 G/DL (ref 31.5–36.5)
MCV RBC AUTO: 87 FL (ref 78–100)
MONOCYTES # BLD AUTO: 0.4 10E9/L (ref 0–1.3)
MONOCYTES NFR BLD AUTO: 10.7 %
NEUTROPHILS # BLD AUTO: 2.4 10E9/L (ref 1.6–8.3)
NEUTROPHILS NFR BLD AUTO: 58 %
PLATELET # BLD AUTO: 166 10E9/L (ref 150–450)
POTASSIUM SERPL-SCNC: 4.7 MMOL/L (ref 3.4–5.3)
RBC # BLD AUTO: 5.5 10E12/L (ref 4.4–5.9)
SODIUM SERPL-SCNC: 138 MMOL/L (ref 133–144)
TACROLIMUS BLD-MCNC: 8.7 UG/L (ref 5–15)
TME LAST DOSE: 2130 H
WBC # BLD AUTO: 4.1 10E9/L (ref 4–11)

## 2019-09-23 PROCEDURE — 83690 ASSAY OF LIPASE: CPT | Performed by: SURGERY

## 2019-09-23 PROCEDURE — 80197 ASSAY OF TACROLIMUS: CPT | Performed by: SURGERY

## 2019-09-23 PROCEDURE — 82150 ASSAY OF AMYLASE: CPT | Performed by: SURGERY

## 2019-09-23 PROCEDURE — 80048 BASIC METABOLIC PNL TOTAL CA: CPT | Performed by: SURGERY

## 2019-09-23 PROCEDURE — 36415 COLL VENOUS BLD VENIPUNCTURE: CPT | Performed by: SURGERY

## 2019-09-23 PROCEDURE — 85025 COMPLETE CBC W/AUTO DIFF WBC: CPT | Performed by: SURGERY

## 2019-09-24 ENCOUNTER — TELEPHONE (OUTPATIENT)
Dept: TRANSPLANT | Facility: CLINIC | Age: 54
End: 2019-09-24

## 2019-09-24 DIAGNOSIS — Z94.0 KIDNEY REPLACED BY TRANSPLANT: ICD-10-CM

## 2019-09-24 DIAGNOSIS — D84.9 IMMUNOSUPPRESSED STATUS (H): Primary | ICD-10-CM

## 2019-09-24 DIAGNOSIS — Z11.59 ENCOUNTER FOR SCREENING FOR OTHER VIRAL DISEASES: ICD-10-CM

## 2019-09-24 NOTE — TELEPHONE ENCOUNTER
Post Kidney and Pancreas Transplant Team Conference  Date: 9/23/2019  Transplant Coordinator: Alesha Hartley     Attendees:  [x]  Dr. Wolfe  [] Shari Chapin LPN     []  Dr. Dick [] Anu Hanson RN [] Shayna Chavez LPN   []  Dr. Tate [] Glenis Ko RN     [] Sailaja Choi RN [] Luke Tesfaye, PharmD   [] Dr. Pal [x] Zora Hartley RN    [] Dr. Luevano [] Joey Goldberg RN    [] Dr. Reyes [] Viv Wick RN    [] Dr. Caputo [] Linh Oates RN     [] Angella Multani RN    [] Surgery Fellow [] Majo Weeks RN    [] Sandy Cole, NP [] Becca Gregory RN        Verbal Plan Read Back:   Due to donor serology, check hepatitis B core and surface antibody.    Routed to RN Coordinator    Alesha Hartley RN

## 2019-09-26 DIAGNOSIS — Z94.83 PANCREAS REPLACED BY TRANSPLANT (H): ICD-10-CM

## 2019-09-26 DIAGNOSIS — Z94.0 KIDNEY REPLACED BY TRANSPLANT: ICD-10-CM

## 2019-09-26 RX ORDER — MYCOPHENOLIC ACID 180 MG/1
540 TABLET, DELAYED RELEASE ORAL 2 TIMES DAILY
Qty: 540 TABLET | Refills: 3 | Status: SHIPPED | OUTPATIENT
Start: 2019-09-26 | End: 2020-01-17

## 2019-10-01 ENCOUNTER — HEALTH MAINTENANCE LETTER (OUTPATIENT)
Age: 54
End: 2019-10-01

## 2019-10-07 ENCOUNTER — OFFICE VISIT (OUTPATIENT)
Dept: NEPHROLOGY | Facility: CLINIC | Age: 54
End: 2019-10-07
Attending: INTERNAL MEDICINE
Payer: COMMERCIAL

## 2019-10-07 VITALS
BODY MASS INDEX: 29.08 KG/M2 | DIASTOLIC BLOOD PRESSURE: 74 MMHG | TEMPERATURE: 98.4 F | HEART RATE: 87 BPM | OXYGEN SATURATION: 92 % | SYSTOLIC BLOOD PRESSURE: 115 MMHG | WEIGHT: 185.7 LBS

## 2019-10-07 DIAGNOSIS — Z48.298 AFTERCARE FOLLOWING ORGAN TRANSPLANT: ICD-10-CM

## 2019-10-07 DIAGNOSIS — Z48.298 AFTERCARE FOLLOWING ORGAN TRANSPLANT: Primary | ICD-10-CM

## 2019-10-07 DIAGNOSIS — Z94.0 KIDNEY REPLACED BY TRANSPLANT: ICD-10-CM

## 2019-10-07 DIAGNOSIS — Z94.83 PANCREAS REPLACED BY TRANSPLANT (H): ICD-10-CM

## 2019-10-07 DIAGNOSIS — D84.9 IMMUNOSUPPRESSED STATUS (H): Chronic | ICD-10-CM

## 2019-10-07 DIAGNOSIS — I25.10 CORONARY ARTERY DISEASE INVOLVING NATIVE CORONARY ARTERY OF NATIVE HEART WITHOUT ANGINA PECTORIS: ICD-10-CM

## 2019-10-07 LAB
AMYLASE SERPL-CCNC: 48 U/L (ref 30–110)
ANION GAP SERPL CALCULATED.3IONS-SCNC: 7 MMOL/L (ref 3–14)
BASOPHILS # BLD AUTO: 0 10E9/L (ref 0–0.2)
BASOPHILS NFR BLD AUTO: 0.2 %
BUN SERPL-MCNC: 18 MG/DL (ref 7–30)
CALCIUM SERPL-MCNC: 8.9 MG/DL (ref 8.5–10.1)
CHLORIDE SERPL-SCNC: 107 MMOL/L (ref 94–109)
CO2 SERPL-SCNC: 24 MMOL/L (ref 20–32)
CREAT SERPL-MCNC: 1.07 MG/DL (ref 0.66–1.25)
DIFFERENTIAL METHOD BLD: NORMAL
EOSINOPHIL # BLD AUTO: 0.1 10E9/L (ref 0–0.7)
EOSINOPHIL NFR BLD AUTO: 0.8 %
ERYTHROCYTE [DISTWIDTH] IN BLOOD BY AUTOMATED COUNT: 14.5 % (ref 10–15)
GFR SERPL CREATININE-BSD FRML MDRD: 77 ML/MIN/{1.73_M2}
GLUCOSE SERPL-MCNC: 94 MG/DL (ref 70–99)
HCT VFR BLD AUTO: 48.4 % (ref 40–53)
HGB BLD-MCNC: 16.2 G/DL (ref 13.3–17.7)
LIPASE SERPL-CCNC: 84 U/L (ref 73–393)
LYMPHOCYTES # BLD AUTO: 1.4 10E9/L (ref 0.8–5.3)
LYMPHOCYTES NFR BLD AUTO: 21.5 %
MCH RBC QN AUTO: 29.2 PG (ref 26.5–33)
MCHC RBC AUTO-ENTMCNC: 33.5 G/DL (ref 31.5–36.5)
MCV RBC AUTO: 87 FL (ref 78–100)
MONOCYTES # BLD AUTO: 0.7 10E9/L (ref 0–1.3)
MONOCYTES NFR BLD AUTO: 11.4 %
NEUTROPHILS # BLD AUTO: 4.3 10E9/L (ref 1.6–8.3)
NEUTROPHILS NFR BLD AUTO: 66.1 %
PLATELET # BLD AUTO: 186 10E9/L (ref 150–450)
POTASSIUM SERPL-SCNC: 4.8 MMOL/L (ref 3.4–5.3)
RBC # BLD AUTO: 5.54 10E12/L (ref 4.4–5.9)
SODIUM SERPL-SCNC: 138 MMOL/L (ref 133–144)
TACROLIMUS BLD-MCNC: 7.3 UG/L (ref 5–15)
TME LAST DOSE: NORMAL H
WBC # BLD AUTO: 6.5 10E9/L (ref 4–11)

## 2019-10-07 PROCEDURE — 87799 DETECT AGENT NOS DNA QUANT: CPT | Performed by: SURGERY

## 2019-10-07 PROCEDURE — 85025 COMPLETE CBC W/AUTO DIFF WBC: CPT | Performed by: SURGERY

## 2019-10-07 PROCEDURE — G0463 HOSPITAL OUTPT CLINIC VISIT: HCPCS | Mod: ZF

## 2019-10-07 PROCEDURE — 80048 BASIC METABOLIC PNL TOTAL CA: CPT | Performed by: SURGERY

## 2019-10-07 PROCEDURE — 82150 ASSAY OF AMYLASE: CPT | Performed by: SURGERY

## 2019-10-07 PROCEDURE — 36415 COLL VENOUS BLD VENIPUNCTURE: CPT | Performed by: SURGERY

## 2019-10-07 PROCEDURE — 83690 ASSAY OF LIPASE: CPT | Performed by: SURGERY

## 2019-10-07 PROCEDURE — 80197 ASSAY OF TACROLIMUS: CPT | Performed by: SURGERY

## 2019-10-07 ASSESSMENT — PAIN SCALES - GENERAL: PAINLEVEL: NO PAIN (0)

## 2019-10-07 NOTE — LETTER
10/7/2019      RE: Amadou Lewis  [address]  [place] [address]       CHRONIC TRANSPLANT NEPHROLOGY VISIT    Assessment & Plan   # DDKT (SPK): Stable              - Baseline Cr ~ 1.0 mg/dL              - Proteinuria: Not checked recently              - Date DSA Last Checked: 2019       Latest DSA: No              - BK Viremia: No              - Kidney Tx Biopsy: No     # Pancreas Tx (SPK):              - Pancreatic Exocrine Drainage: Enteric drained                     - Blood glucose: Euglycemia              - HbA1c: Not checked recently                  - Pancreatic enzymes: Stable              - Date DSA Last Checked: 2019 Latest DSA: No              - Pancreas Tx Biopsy: No    # Immunosuppression: Tacrolimus immediate release (goal 6-8) and Mycophenolic acid (goal 1-3.5)   - Changes: No    # Prophylaxis:   - PJP: Sulfa/TMP (Bactrim)    - CMV: None    # Hypertension: Controlled; Goal BP: < 130/80   - Changes: No    # Anemia in Chronic Renal Disease: Hgb: Stable      RERE: No   - Iron studies: Not checked recently    # Mineral Bone Disorder:   - Secondary renal hyperparathyroidism; PTH level: Minimally elevated ( pg/ml)  - Vitamin D; level: Normal  - Calcium; level: Normal  - Phosphorus; level: Normal     # Electrolytes:   - Potassium; level: Normal  - Magnesium; level: Normal  - Bicarbonate; level: Normal. Will stop supplements.   - Sodium; level: Normal    # Skin Cancer Risk:    - Discussed sun protection and recommend regular follow up with Dermatology.    # Medical Compliance: Yes    # Transplant History:  Etiology of kidney failure: Diabetes mellitus type 1  Tx: DDKT (SPK)  Transplant: 2018 (Kidney / Pancreas), 10/10/2013 (Kidney)  Donor Type:  - Brain Death            Donor Class:   Significant changes in immunosuppression: None  Significant transplant-related complications: None    Transplant Office Phone Number: [phone]    Assessment and plan was  discussed with the patient and he voiced his understanding and agreement.    Return visit: 3 months  Chief Complaint   Mr. Lewis is a 53 year old here for routine follow up.    History of Present Illness    Mr. Lewis is a 53 year old here for routine follow up. The patient was last evaluated by Dr. Dick on 10/07/2019. At the time no changes were made to medication. Please see that note for additional information.  Since the no hospitalizations or new medical issues.     Today the patient is doing well although he still complains of weight gain due to swollen kidneys. He is very conscious of his portion control and his diet is sugar free. He exercises regularly and remains active with his work. No chest pain, shortness of breath, fever, sweats, or chills. No lower extremity edema.     Recent Hospitalizations:  [x] No [] Yes    New Medical Issues: [x] No [] Yes    Decreased energy: [x] No [] Yes    Chest pain or SOB with exertion:  [x] No [] Yes    Appetite change or weight change: [] No [x] Yes Increased.    Nausea, vomiting or diarrhea:  [x] No [] Yes    Fever, sweats or chills: [x] No [] Yes    Leg swelling: [x] No [] Yes      Home BP: goal    Review of Systems   A comprehensive review of systems was obtained and negative, except as noted in the HPI or PMH.    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)     Diabetes mellitus with background retinopathy (H)     Polycystic kidney     NONSPECIFIC MEDICAL HISTORY     Coronary artery disease     Retinopathy     Hyperlipidemia LDL goal <70     Premature ventricular contractions (PVCs) (VPCs)     Kidney replaced by transplant     Immunosuppressed status (H)     Kidney transplant rejection     Hypertension     Anemia in chronic renal disease     Care after organ transplant     Esophageal ulcer     Status post simultaneous kidney and pancreas transplant (H)     Other chronic pain     Nausea     Drug  induced constipation       Social History   Social History     Tobacco Use     Smoking status: Never Smoker     Smokeless tobacco: Never Used   Substance Use Topics     Alcohol use: Yes     Frequency: Monthly or less     Drug use: No       Allergies   Allergies   Allergen Reactions     No Known Allergies        Medications   Current Outpatient Medications   Medication Sig     aspirin 81 MG EC tablet Take 1 tablet (81 mg) by mouth daily     atorvastatin (LIPITOR) 20 MG tablet Take 1 tablet (20 mg) by mouth daily     blood glucose monitoring (NO BRAND SPECIFIED) test strip Use to test blood sugar 4 times daily or as directed.     cholecalciferol 2000 units CAPS Take 2,000 Units by mouth daily     fludrocortisone (FLORINEF) 0.1 MG tablet Take 1 tablet (0.1 mg) by mouth three times a week     HYDROcodone-acetaminophen (NORCO) 5-325 MG tablet Take 1 tablet by mouth every 6 hours as needed for severe pain     magnesium oxide (MAG-OX) 400 MG tablet Take 2 tablets (800mg) at noon and 2 tablets (800mg) at 6pm.     mycophenolic acid (GENERIC EQUIVALENT) 180 MG EC tablet Take 3 tablets (540 mg) by mouth 2 times daily     sodium bicarbonate 650 MG tablet Take 1 tablet (650 mg) by mouth daily     sulfamethoxazole-trimethoprim (BACTRIM/SEPTRA) 400-80 MG tablet Take 1 tablet by mouth daily     tacrolimus (GENERIC EQUIVALENT) 0.5 MG capsule HOLD     tacrolimus (GENERIC EQUIVALENT) 1 MG capsule Take 2 capsules (2 mg) by mouth 2 times daily     No current facility-administered medications for this visit.      There are no discontinued medications.    Physical Exam   Vital Signs: There were no vitals taken for this visit.    GENERAL APPEARANCE: alert and no distress  HENT: mouth without ulcers or lesions  LYMPHATICS: no cervical or supraclavicular nodes  RESP: lungs clear to auscultation - no rales, rhonchi or wheezes  CV: regular rhythm, normal rate, no rub, no murmur  EDEMA: no LE edema bilaterally  ABDOMEN: soft, nondistended,  nontender, bowel sounds normal  MS: extremities normal - no gross deformities noted, no evidence of inflammation in joints, no muscle tenderness  SKIN: no rash      Data     Renal Latest Ref Rng & Units 9/23/2019 9/9/2019 8/26/2019   Na 133 - 144 mmol/L 138 138 140   K 3.4 - 5.3 mmol/L 4.7 4.9 5.1   Cl 94 - 109 mmol/L 111(H) 109 111(H)   CO2 20 - 32 mmol/L 21 20 21   BUN 7 - 30 mg/dL 15 17 22   Cr 0.66 - 1.25 mg/dL 0.88 0.92 1.01   Cr (external) 0.5 - 1.5 mg/dL - - -   Glucose 70 - 99 mg/dL 90 88 79   Ca  8.5 - 10.1 mg/dL 8.6 9.2 9.3   Mg 1.6 - 2.3 mg/dL - - -     Bone Health Latest Ref Rng & Units 3/7/2019 2/14/2019 2/11/2019   Phos 2.5 - 4.5 mg/dL - 4.1 3.6   PTHi 18 - 80 pg/mL 104(H) - -   Vit D Def 20 - 75 ug/L 36 - -     Heme Latest Ref Rng & Units 10/7/2019 9/23/2019 9/9/2019   WBC 4.0 - 11.0 10e9/L 6.5 4.1 4.3   Hgb 13.3 - 17.7 g/dL 16.2 16.1 16.8   Plt 150 - 450 10e9/L 186 166 185     Liver Latest Ref Rng & Units 11/11/2018 6/27/2018 8/3/2017   AP 40 - 150 U/L 189(H) - -   TBili 0.2 - 1.3 mg/dL 0.3 - -   ALT 0 - 70 U/L 14 - -   AST 0 - 45 U/L 9 - -   Tot Protein 6.8 - 8.8 g/dL 7.6 - -   Albumin 3.4 - 5.0 g/dL 4.0 4.2 4.2     Pancreas Latest Ref Rng & Units 9/23/2019 9/9/2019 8/26/2019   A1C 0 - 5.6 % - - -   Amylase 30 - 110 U/L 58 67 61   Lipase 73 - 393 U/L 115 130 121     Iron studies Latest Ref Rng & Units 9/11/2018 8/3/2017 11/18/2016   Iron 35 - 180 ug/dL 85 79 82   Iron sat 15 - 46 % 37 34 34   Ferritin 26 - 388 ng/mL 761(H) 736(H) -     [facility] Txp Virology Latest Ref Rng & Units 9/9/2019 8/26/2019 8/5/2019   CVM DNA Quant - - Plasma -   CMV Quant <100 Copies/mL - - -   CMV QT Log <2.0 Log copies/mL - - -   BK Spec - Plasma - Plasma   BK Res BKNEG:BK Virus DNA Not Detected copies/mL BK Virus DNA Not Detected - BK Virus DNA Not Detected   BK Log <2.7 Log copies/mL Not Calculated - Not Calculated   Hep B Core NR:Nonreactive - - -   Hep B Surf - - - -   HIV 1&2 NEG - - -        Recent Labs   Lab Test  08/26/19  0926 09/09/19  0921 09/23/19  0918   DOSTAC 2130 8/25/19 2,100 2,130   TACROL 12.2 10.1 8.7     Recent Labs   Lab Test 02/11/19  0740 02/14/19  0728 02/21/19  0749   DOSMPA LAST DOSE 8:00PM AT 2.10.2019 LAST DOSE 8:00PM 2/13/2019 02/20/2019 AT 0730 PM   MPACID 2.04 3.64* 1.41   MPAG 50.8 43.4 60.7     Scribe Disclosure:  ISerge, am serving as a scribe to document services personally performed by Kidney/Pancreas Recipient at this visit, based upon the provider's statements to me. All documentation has been reviewed by the aforementioned provider prior to being entered into the official medical record.     Serge SWANN, a scribe, prepared the chart for today's encounter.       Kidney/Pancreas Recipient

## 2019-10-07 NOTE — PROGRESS NOTES
CHRONIC TRANSPLANT NEPHROLOGY VISIT    Assessment & Plan   # DDKT (SPK): Stable              - Baseline Cr ~ 1.0 mg/dL              - Proteinuria: Not checked recently              - Date DSA Last Checked: 2019       Latest DSA: No              - BK Viremia: No              - Kidney Tx Biopsy: No     # Pancreas Tx (SPK):              - Pancreatic Exocrine Drainage: Enteric drained                     - Blood glucose: Euglycemia              - HbA1c: Not checked recently                  - Pancreatic enzymes: Stable              - Date DSA Last Checked: 2019 Latest DSA: No              - Pancreas Tx Biopsy: No    # Immunosuppression: Tacrolimus immediate release (goal 6-8) and Mycophenolic acid (goal 1-3.5)   - Changes: No    # Prophylaxis:   - PJP: Sulfa/TMP (Bactrim)    - CMV: None    # Hypertension: Controlled; Goal BP: < 130/80   - Changes: No    # Anemia in Chronic Renal Disease: Hgb: Stable      RERE: No   - Iron studies: Not checked recently    # Mineral Bone Disorder:   - Secondary renal hyperparathyroidism; PTH level: Minimally elevated ( pg/ml)  - Vitamin D; level: Normal  - Calcium; level: Normal  - Phosphorus; level: Normal     # Electrolytes:   - Potassium; level: Normal  - Magnesium; level: Normal  - Bicarbonate; level: Normal. Will stop supplements.   - Sodium; level: Normal    # Skin Cancer Risk:    - Discussed sun protection and recommend regular follow up with Dermatology.    # Medical Compliance: Yes    # Transplant History:  Etiology of kidney failure: Diabetes mellitus type 1  Tx: DDKT (K)  Transplant: 2018 (Kidney / Pancreas), 10/10/2013 (Kidney)  Donor Type:  - Brain Death            Donor Class:   Significant changes in immunosuppression: None  Significant transplant-related complications: None    Transplant Office Phone Number: [phone]    Assessment and plan was discussed with the patient and he voiced his understanding and agreement.    Return visit: 3  months  Chief Complaint   Mr. Lewis is a 53 year old here for routine follow up.    History of Present Illness    Mr. Lewis is a 53 year old here for routine follow up. The patient was last evaluated by Dr. Dick on 10/07/2019. At the time no changes were made to medication. Please see that note for additional information. Since the no hospitalizations or new medical issues.     Today the patient is doing well although he still complains of weight gain due to swollen kidneys. He is very conscious of his portion control and his diet is sugar free. He exercises regularly and remains active with his work. No chest pain, shortness of breath, fever, sweats, or chills. No lower extremity edema.     Recent Hospitalizations:  [x] No [] Yes    New Medical Issues: [x] No [] Yes    Decreased energy: [x] No [] Yes    Chest pain or SOB with exertion:  [x] No [] Yes    Appetite change or weight change: [] No [x] Yes Increased.    Nausea, vomiting or diarrhea:  [x] No [] Yes    Fever, sweats or chills: [x] No [] Yes    Leg swelling: [x] No [] Yes      Home BP: goal    Review of Systems   A comprehensive review of systems was obtained and negative, except as noted in the HPI or PMH.    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)     Diabetes mellitus with background retinopathy (H)     Polycystic kidney     NONSPECIFIC MEDICAL HISTORY     Coronary artery disease     Retinopathy     Hyperlipidemia LDL goal <70     Premature ventricular contractions (PVCs) (VPCs)     Kidney replaced by transplant     Immunosuppressed status (H)     Kidney transplant rejection     Hypertension     Anemia in chronic renal disease     Care after organ transplant     Esophageal ulcer     Status post simultaneous kidney and pancreas transplant (H)     Other chronic pain     Nausea     Drug induced constipation       Social History   Social History     Tobacco Use     Smoking status:  Never Smoker     Smokeless tobacco: Never Used   Substance Use Topics     Alcohol use: Yes     Frequency: Monthly or less     Drug use: No       Allergies   Allergies   Allergen Reactions     No Known Allergies        Medications   Current Outpatient Medications   Medication Sig     aspirin 81 MG EC tablet Take 1 tablet (81 mg) by mouth daily     atorvastatin (LIPITOR) 20 MG tablet Take 1 tablet (20 mg) by mouth daily     blood glucose monitoring (NO BRAND SPECIFIED) test strip Use to test blood sugar 4 times daily or as directed.     cholecalciferol 2000 units CAPS Take 2,000 Units by mouth daily     fludrocortisone (FLORINEF) 0.1 MG tablet Take 1 tablet (0.1 mg) by mouth three times a week     HYDROcodone-acetaminophen (NORCO) 5-325 MG tablet Take 1 tablet by mouth every 6 hours as needed for severe pain     magnesium oxide (MAG-OX) 400 MG tablet Take 2 tablets (800mg) at noon and 2 tablets (800mg) at 6pm.     mycophenolic acid (GENERIC EQUIVALENT) 180 MG EC tablet Take 3 tablets (540 mg) by mouth 2 times daily     sodium bicarbonate 650 MG tablet Take 1 tablet (650 mg) by mouth daily     sulfamethoxazole-trimethoprim (BACTRIM/SEPTRA) 400-80 MG tablet Take 1 tablet by mouth daily     tacrolimus (GENERIC EQUIVALENT) 0.5 MG capsule HOLD     tacrolimus (GENERIC EQUIVALENT) 1 MG capsule Take 2 capsules (2 mg) by mouth 2 times daily     No current facility-administered medications for this visit.      There are no discontinued medications.    Physical Exam   Vital Signs: There were no vitals taken for this visit.    GENERAL APPEARANCE: alert and no distress  HENT: mouth without ulcers or lesions  LYMPHATICS: no cervical or supraclavicular nodes  RESP: lungs clear to auscultation - no rales, rhonchi or wheezes  CV: regular rhythm, normal rate, no rub, no murmur  EDEMA: no LE edema bilaterally  ABDOMEN: soft, nondistended, nontender, bowel sounds normal  MS: extremities normal - no gross deformities noted, no evidence of  inflammation in joints, no muscle tenderness  SKIN: no rash      Data     Renal Latest Ref Rng & Units 9/23/2019 9/9/2019 8/26/2019   Na 133 - 144 mmol/L 138 138 140   K 3.4 - 5.3 mmol/L 4.7 4.9 5.1   Cl 94 - 109 mmol/L 111(H) 109 111(H)   CO2 20 - 32 mmol/L 21 20 21   BUN 7 - 30 mg/dL 15 17 22   Cr 0.66 - 1.25 mg/dL 0.88 0.92 1.01   Cr (external) 0.5 - 1.5 mg/dL - - -   Glucose 70 - 99 mg/dL 90 88 79   Ca  8.5 - 10.1 mg/dL 8.6 9.2 9.3   Mg 1.6 - 2.3 mg/dL - - -     Bone Health Latest Ref Rng & Units 3/7/2019 2/14/2019 2/11/2019   Phos 2.5 - 4.5 mg/dL - 4.1 3.6   PTHi 18 - 80 pg/mL 104(H) - -   Vit D Def 20 - 75 ug/L 36 - -     Heme Latest Ref Rng & Units 10/7/2019 9/23/2019 9/9/2019   WBC 4.0 - 11.0 10e9/L 6.5 4.1 4.3   Hgb 13.3 - 17.7 g/dL 16.2 16.1 16.8   Plt 150 - 450 10e9/L 186 166 185     Liver Latest Ref Rng & Units 11/11/2018 6/27/2018 8/3/2017   AP 40 - 150 U/L 189(H) - -   TBili 0.2 - 1.3 mg/dL 0.3 - -   ALT 0 - 70 U/L 14 - -   AST 0 - 45 U/L 9 - -   Tot Protein 6.8 - 8.8 g/dL 7.6 - -   Albumin 3.4 - 5.0 g/dL 4.0 4.2 4.2     Pancreas Latest Ref Rng & Units 9/23/2019 9/9/2019 8/26/2019   A1C 0 - 5.6 % - - -   Amylase 30 - 110 U/L 58 67 61   Lipase 73 - 393 U/L 115 130 121     Iron studies Latest Ref Rng & Units 9/11/2018 8/3/2017 11/18/2016   Iron 35 - 180 ug/dL 85 79 82   Iron sat 15 - 46 % 37 34 34   Ferritin 26 - 388 ng/mL 761(H) 736(H) -     [facility] Txp Virology Latest Ref Rng & Units 9/9/2019 8/26/2019 8/5/2019   CVM DNA Quant - - Plasma -   CMV Quant <100 Copies/mL - - -   CMV QT Log <2.0 Log copies/mL - - -   BK Spec - Plasma - Plasma   BK Res BKNEG:BK Virus DNA Not Detected copies/mL BK Virus DNA Not Detected - BK Virus DNA Not Detected   BK Log <2.7 Log copies/mL Not Calculated - Not Calculated   Hep B Core NR:Nonreactive - - -   Hep B Surf - - - -   HIV 1&2 NEG - - -        Recent Labs   Lab Test 08/26/19  0926 09/09/19  0921 09/23/19  0918   DOSTAC 2130 8/25/19 2,100 2,130   TACROL 12.2 10.1 8.7      Recent Labs   Lab Test 02/11/19  0740 02/14/19  0728 02/21/19  0749   DOSMPA LAST DOSE 8:00PM AT 2.10.2019 LAST DOSE 8:00PM 2/13/2019 02/20/2019 AT 0730 PM   MPACID 2.04 3.64* 1.41   MPAG 50.8 43.4 60.7     Scribe Disclosure:  ISerge, am serving as a scribe to document services personally performed by Kidney/Pancreas Recipient at this visit, based upon the provider's statements to me. All documentation has been reviewed by the aforementioned provider prior to being entered into the official medical record.     ISerge, a scribe, prepared the chart for today's encounter.

## 2019-10-07 NOTE — NURSING NOTE
"Chief Complaint   Patient presents with     RECHECK     Follow up Kidney TX     Vital signs:  Temp: 98.4  F (36.9  C)   BP: 115/74 Pulse: 87     SpO2: 92 %       Weight: 84.2 kg (185 lb 11.2 oz)  Estimated body mass index is 29.08 kg/m  as calculated from the following:    Height as of 7/1/19: 1.702 m (5' 7\").    Weight as of this encounter: 84.2 kg (185 lb 11.2 oz).        Kristen Jeter, CMA    "

## 2019-10-08 ENCOUNTER — TELEPHONE (OUTPATIENT)
Dept: TRANSPLANT | Facility: CLINIC | Age: 54
End: 2019-10-08

## 2019-10-08 DIAGNOSIS — Z94.0 KIDNEY REPLACED BY TRANSPLANT: ICD-10-CM

## 2019-10-08 DIAGNOSIS — Z94.83 PANCREAS TRANSPLANTED (H): ICD-10-CM

## 2019-10-08 DIAGNOSIS — Z94.83 PANCREAS REPLACED BY TRANSPLANT (H): ICD-10-CM

## 2019-10-08 DIAGNOSIS — Z94.0 KIDNEY TRANSPLANTED: Primary | ICD-10-CM

## 2019-10-08 RX ORDER — TACROLIMUS 0.5 MG/1
0.5 CAPSULE ORAL 2 TIMES DAILY
Qty: 60 CAPSULE | Refills: 11 | Status: SHIPPED | OUTPATIENT
Start: 2019-10-08 | End: 2019-12-17

## 2019-10-08 RX ORDER — TACROLIMUS 1 MG/1
2 CAPSULE ORAL 2 TIMES DAILY
Qty: 120 CAPSULE | Refills: 11 | Status: SHIPPED | OUTPATIENT
Start: 2019-10-08 | End: 2019-12-17

## 2019-10-08 NOTE — TELEPHONE ENCOUNTER
ISSUE:   Tacrolimus level 7.3 on 10/7, goal 8-10, dose 2 mg BID    PLAN:   Please call pt and confirm this was a good 12-hour trough. Verify dose 2 mg BID. Confirm no new medications or illness (trina. Diarrhea). If good trough, increase dose to 2.5 mg BID and recheck level in 1 weeks (please enter orders).

## 2019-10-08 NOTE — TELEPHONE ENCOUNTER
Spoke to patient who confirms current Tacrolimus dose and good 12 hour trough level.  Patient verbalizes understanding to increase Tacrolimus dose to 2.5 mg BID and agrees to repeat Tacrolimus dose in 1 week.

## 2019-10-09 ENCOUNTER — TELEPHONE (OUTPATIENT)
Dept: TRANSPLANT | Facility: CLINIC | Age: 54
End: 2019-10-09

## 2019-10-09 DIAGNOSIS — Z94.0 TRANSPLANTED KIDNEY: ICD-10-CM

## 2019-10-09 DIAGNOSIS — E66.811 OBESITY (BMI 30.0-34.9): Primary | ICD-10-CM

## 2019-10-09 DIAGNOSIS — Z94.83 PANCREAS REPLACED BY TRANSPLANT (H): ICD-10-CM

## 2019-10-09 NOTE — TELEPHONE ENCOUNTER
Post Kidney and Pancreas Transplant Team Conference  Date: 10/9/2019  Transplant Coordinator: Alesha Hartley     Attendees:  [x]  Dr. Wolfe  [x] Shari Chapin LPN     []  Dr. Dick [x] Anu Hanson RN [] Shayna Chavez LPN   [x]  Dr. Tate [] Glenis Ko, RN     [] Sailaja Choi RN [x] Luke Tesfaye, CheriseD   [] Dr. Pal [x] Zora Hartley RN    [x] Dr. Luevano [] Joey Goldberg RN    [] Dr. Reyes [] Viv Wick RN    [] Dr. Caputo [] Linh Oates RN     [] Angella Multani RN    [] Surgery Fellow [] Majo Weeks RN    [] Sandy Cole NP [] Becca Gregory RN        Verbal Plan Read Back:   Discuss weight loss with pt.    Routed to RN Coordinator   Shari Chapin LPN    Sean reports that he has not made any improvement in his weight.  He was agreeable for weight management services referral and voiced understanding of need to reduce BMI to ensure maximal pancreatic transplant function's duration.

## 2019-10-10 ENCOUNTER — TELEPHONE (OUTPATIENT)
Dept: TRANSPLANT | Facility: CLINIC | Age: 54
End: 2019-10-10

## 2019-10-10 NOTE — TELEPHONE ENCOUNTER
Provider Call: General    Reason for call:  CM would like an update from the care coordinator regarding the pt and their most recent appt.     Call back needed? Yes    Return Call Needed  Same as documented in contacts section  When to return call?: Greater than one day: Route standard priority

## 2019-10-10 NOTE — TELEPHONE ENCOUNTER
RNCC gave update to Janette - current kidney and pancreas function as expected.  Recently referred to Medical weight management for rising BMI.

## 2019-10-11 ENCOUNTER — TELEPHONE (OUTPATIENT)
Dept: TRANSPLANT | Facility: CLINIC | Age: 54
End: 2019-10-11

## 2019-10-11 NOTE — TELEPHONE ENCOUNTER
Post Kidney and Pancreas Transplant Team Conference  Date: 10/11/2019  Transplant Coordinator: Zora Hartley RN     Attendees:  []  Dr. Wolfe  [] Shari Chapin LPN     []  Dr. Dick [] Anu Hanson RN [] Shayna Chavez LPN   [x]  Dr. Tate [] Glenis Ko RN     [] Sailaja Choi RN [] Luke Tesfaye, PharmD   [] Dr. Pal [x] Zora Hartley RN    [] Dr. Luevano [] Joey Goldberg RN    [] Dr. Reyes [] Viv Wick RN    [] Dr. Caputo [] Linh Oates RN     [] Angella Multani RN    [] Surgery Fellow [] Majo Weeks RN    [] Sandy Cole, NP [] Becca Gregory RN        Verbal Plan Read Back:   Patient unable to purse current medical management for weight loss due to financial constraints.  If unable to lose weight with medical management, bariatric surgery is recommended to ensure best pancreas transplant graft outcomes.    Encouraged to be seen by transplant surgeon.    Routed to RN Coordinator   Alesha Hartley RN      Scheduling to call with appt time to see Dr. Luevano.  Sean voiced understanding.

## 2019-10-14 ENCOUNTER — TELEPHONE (OUTPATIENT)
Dept: TRANSPLANT | Facility: CLINIC | Age: 54
End: 2019-10-14

## 2019-10-14 NOTE — TELEPHONE ENCOUNTER
----- Message from Alesha Hartley RN sent at 10/14/2019  8:29 AM CDT -----  Appointment Request: Dr. Luevano  Orders Placed: N/A   Patient Aware? Yes.  Physician Override Approved? No  Appointment Timeframe Requested: Next available - to discuss native nephrectomy and bariatric managment    Thank you,   Alesha Hartley RN

## 2019-10-16 ENCOUNTER — TELEPHONE (OUTPATIENT)
Dept: TRANSPLANT | Facility: CLINIC | Age: 54
End: 2019-10-16

## 2019-10-21 DIAGNOSIS — Z94.83 PANCREAS REPLACED BY TRANSPLANT (H): ICD-10-CM

## 2019-10-21 DIAGNOSIS — Z94.0 TRANSPLANTED KIDNEY: ICD-10-CM

## 2019-10-21 DIAGNOSIS — Z48.298 AFTERCARE FOLLOWING ORGAN TRANSPLANT: ICD-10-CM

## 2019-10-21 DIAGNOSIS — E66.811 OBESITY (BMI 30.0-34.9): ICD-10-CM

## 2019-10-21 DIAGNOSIS — Z94.0 KIDNEY REPLACED BY TRANSPLANT: ICD-10-CM

## 2019-10-21 LAB
AMYLASE SERPL-CCNC: 55 U/L (ref 30–110)
ANION GAP SERPL CALCULATED.3IONS-SCNC: 5 MMOL/L (ref 3–14)
BASOPHILS # BLD AUTO: 0 10E9/L (ref 0–0.2)
BASOPHILS NFR BLD AUTO: 0.3 %
BUN SERPL-MCNC: 23 MG/DL (ref 7–30)
CALCIUM SERPL-MCNC: 8.9 MG/DL (ref 8.5–10.1)
CHLORIDE SERPL-SCNC: 110 MMOL/L (ref 94–109)
CO2 SERPL-SCNC: 22 MMOL/L (ref 20–32)
CREAT SERPL-MCNC: 1.18 MG/DL (ref 0.66–1.25)
DIFFERENTIAL METHOD BLD: ABNORMAL
EOSINOPHIL # BLD AUTO: 0.1 10E9/L (ref 0–0.7)
EOSINOPHIL NFR BLD AUTO: 1.6 %
ERYTHROCYTE [DISTWIDTH] IN BLOOD BY AUTOMATED COUNT: 14.1 % (ref 10–15)
GFR SERPL CREATININE-BSD FRML MDRD: 69 ML/MIN/{1.73_M2}
GLUCOSE SERPL-MCNC: 83 MG/DL (ref 70–99)
HCT VFR BLD AUTO: 48.7 % (ref 40–53)
HGB BLD-MCNC: 16.4 G/DL (ref 13.3–17.7)
LIPASE SERPL-CCNC: 109 U/L (ref 73–393)
LYMPHOCYTES # BLD AUTO: 1.1 10E9/L (ref 0.8–5.3)
LYMPHOCYTES NFR BLD AUTO: 29.9 %
MCH RBC QN AUTO: 29 PG (ref 26.5–33)
MCHC RBC AUTO-ENTMCNC: 33.7 G/DL (ref 31.5–36.5)
MCV RBC AUTO: 86 FL (ref 78–100)
MONOCYTES # BLD AUTO: 0.3 10E9/L (ref 0–1.3)
MONOCYTES NFR BLD AUTO: 7.6 %
NEUTROPHILS # BLD AUTO: 2.2 10E9/L (ref 1.6–8.3)
NEUTROPHILS NFR BLD AUTO: 60.6 %
PLATELET # BLD AUTO: 215 10E9/L (ref 150–450)
POTASSIUM SERPL-SCNC: 4.6 MMOL/L (ref 3.4–5.3)
RBC # BLD AUTO: 5.65 10E12/L (ref 4.4–5.9)
SODIUM SERPL-SCNC: 137 MMOL/L (ref 133–144)
WBC # BLD AUTO: 3.7 10E9/L (ref 4–11)

## 2019-10-21 PROCEDURE — 36415 COLL VENOUS BLD VENIPUNCTURE: CPT | Performed by: SURGERY

## 2019-10-21 PROCEDURE — 80197 ASSAY OF TACROLIMUS: CPT | Performed by: SURGERY

## 2019-10-21 PROCEDURE — 80048 BASIC METABOLIC PNL TOTAL CA: CPT | Performed by: SURGERY

## 2019-10-21 PROCEDURE — 83690 ASSAY OF LIPASE: CPT | Performed by: SURGERY

## 2019-10-21 PROCEDURE — 82150 ASSAY OF AMYLASE: CPT | Performed by: SURGERY

## 2019-10-21 PROCEDURE — 85025 COMPLETE CBC W/AUTO DIFF WBC: CPT | Performed by: SURGERY

## 2019-10-22 LAB
TACROLIMUS BLD-MCNC: 9.7 UG/L (ref 5–15)
TME LAST DOSE: NORMAL H

## 2019-10-30 ENCOUNTER — TELEPHONE (OUTPATIENT)
Dept: TRANSPLANT | Facility: CLINIC | Age: 54
End: 2019-10-30

## 2019-10-30 NOTE — TELEPHONE ENCOUNTER
----- Message from Alesha Hartley RN sent at 10/30/2019  1:30 PM CDT -----  Appointment Request: routine transplant nephrology follow up  Orders Placed: N/A   Patient Aware? No.  Physician Override Approved? No  Appointment Timeframe Requested: next available    Thank you,   Alesha Hartley RN

## 2019-11-01 ENCOUNTER — TELEPHONE (OUTPATIENT)
Dept: NEPHROLOGY | Facility: CLINIC | Age: 54
End: 2019-11-01

## 2019-11-01 NOTE — TELEPHONE ENCOUNTER
----- Message from Alesha Hartley RN sent at 10/31/2019  1:03 PM CDT -----  I called Sean - he understands now that this is his routine appt, and I asked you to call since we're booking out so far.  He's not due until after the first of the year (even into March would be okay).    Thanks!  ----- Message -----  From: Faustina Zavaleta  Sent: 10/30/2019   1:53 PM CDT  To: Alesha Hartley RN    I spoke with Sean he is confused to why he has to see nephrology, please call him and get back to me therefore I can schedule his appointment.   ----- Message -----  From: Alesha Hartley RN  Sent: 10/30/2019   1:30 PM CDT  To: Post Transplant Scheduling Pool    Appointment Request: routine transplant nephrology follow up  Orders Placed: N/A   Patient Aware? No.  Physician Override Approved? No  Appointment Timeframe Requested: next available    Thank you,   Alesha Hartley RN

## 2019-11-04 ENCOUNTER — OFFICE VISIT (OUTPATIENT)
Dept: TRANSPLANT | Facility: CLINIC | Age: 54
End: 2019-11-04
Attending: SURGERY
Payer: COMMERCIAL

## 2019-11-04 ENCOUNTER — ANCILLARY PROCEDURE (OUTPATIENT)
Dept: CT IMAGING | Facility: CLINIC | Age: 54
End: 2019-11-04
Attending: SURGERY
Payer: MEDICARE

## 2019-11-04 VITALS
HEART RATE: 97 BPM | DIASTOLIC BLOOD PRESSURE: 74 MMHG | SYSTOLIC BLOOD PRESSURE: 126 MMHG | TEMPERATURE: 98.5 F | WEIGHT: 186.6 LBS | BODY MASS INDEX: 29.23 KG/M2 | OXYGEN SATURATION: 94 %

## 2019-11-04 DIAGNOSIS — Q61.2 POLYCYSTIC KIDNEY DISEASE, AUTOSOMAL DOMINANT: ICD-10-CM

## 2019-11-04 DIAGNOSIS — E08.29 DIABETES MELLITUS DUE TO UNDERLYING CONDITION WITH OTHER DIABETIC KIDNEY COMPLICATION (H): ICD-10-CM

## 2019-11-04 DIAGNOSIS — Q61.2 POLYCYSTIC KIDNEY DISEASE, AUTOSOMAL DOMINANT: Primary | ICD-10-CM

## 2019-11-04 DIAGNOSIS — Z94.83 PANCREAS REPLACED BY TRANSPLANT (H): ICD-10-CM

## 2019-11-04 PROCEDURE — G0463 HOSPITAL OUTPT CLINIC VISIT: HCPCS | Mod: ZF

## 2019-11-04 ASSESSMENT — PAIN SCALES - GENERAL: PAINLEVEL: MODERATE PAIN (4)

## 2019-11-04 NOTE — NURSING NOTE
Chief Complaint   Patient presents with     RECHECK     Post op panc/kid     Blood pressure 126/74, pulse 97, temperature 98.5  F (36.9  C), temperature source Oral, weight 84.6 kg (186 lb 9.6 oz), SpO2 94 %.    Abida Chatman on 11/4/2019 at 2:20 PM

## 2019-11-04 NOTE — PROGRESS NOTES
"Transplant Surgery Progress Note    Transplants:  2018 (Kidney / Pancreas), 10/10/2013 (Kidney); Postoperative day:  357  S: Patient concerned regarding weight gain and increased abdominal girth. Has gained ~10 pounds since his transplant. He denies any extremity swelling. He has increased his physical activity by walking his dog frequently. He has been trying to eat healthier and is restricting his calories to ~7699-5872 per day. He has no issues eating or drinking. He is tolerating his medications well. The increased abdominal girth seems to make his activities of daily living and work activities. He takes ~6 Norco daily for his \"kidney pain.\" His pain is overall stable. He was seen by nephrology recently and thinks this weight gain is due to increased size of his native polycystic kidneys.     As for glucose control, he regularly checks his sugars and usually are in the 80-90s. Last A1c checked in May 2019 (5.3).    Transplant History:    Transplant Type:  SPK  Donor Type:  - Brain Death   Transplant Date:  2018 (Kidney / Pancreas), 10/10/2013 (Kidney)   Ureteral Stent:  yes   Crossmatch:  negative   DSA at Tx:  No  Baseline Cr: 1.1   DeNovo DSA: No    Acute Rejection Hx:  No    Present Maintenance Immunosuppression:  Tacrolimus and Mycophenolic acid      Transplant Coordinator: Alesha Hartley     Transplant Office Phone Number: [phone]     Immunsupresent Medications     Immunosuppressive Agents Disp Start End    mycophenolic acid (GENERIC EQUIVALENT) 360 MG EC tablet 120 tablet 2018     Sig - Route: Take 2 tablets (720 mg) by mouth 2 times daily - Oral    Class: E-Prescribe    tacrolimus (GENERIC EQUIVALENT) 0.5 MG capsule 60 capsule 12/3/2018     Sig: Hold, for dose change.    Class: E-Prescribe    tacrolimus (GENERIC EQUIVALENT) 1 MG capsule 180 capsule 12/3/2018     Sig - Route: Take 3 capsules (3 mg) by mouth 2 times daily - Oral    Class: E-Prescribe          Possible " Immunosuppression-related side effects:   []             headache  []             vivid dreams  []             irritability  []             cognitive difficuties  []             fine tremor  []             nausea  []             diarrhea  []             neuropathy      []             edema  []             renal calcineurin toxicity  []             hyperkalemia  []             post-transplant diabetes  []             decreased appetite  []             increased appetite  []             other:  []             none    Prescription Medications as of 11/4/2019       Rx Number Disp Refills Start End Last Dispensed Date Next Fill Date Owning Pharmacy    aspirin 81 MG EC tablet  30 tablet 5 11/21/2018    [place], [place] - [address] [place] [address]    Sig: Take 1 tablet (81 mg) by mouth daily    Class: E-Prescribe    Route: Oral    atorvastatin (LIPITOR) 20 MG tablet  90 tablet 3 1/8/2019    [facility] - [place], [place] - [address] [place] [address]    Sig: Take 1 tablet (20 mg) by mouth daily    Class: E-Prescribe    Notes to Pharmacy: Replacing Atorvastatin 5mg daily    Route: Oral    blood glucose monitoring (NO BRAND SPECIFIED) test strip  1 Box prn 1/26/2016    [address]    Sig: Use to test blood sugar 4 times daily or as directed.    Class: E-Prescribe    cholecalciferol 2000 units CAPS  90 capsule 3 11/21/2018    [place], [place] - [address] [facility] [phone]    Sig: Take 2,000 Units by mouth daily    Class: E-Prescribe    Route: Oral    fludrocortisone (FLORINEF) 0.1 MG tablet  15 tablet 11 4/5/2019    [id number] - [place], [place] - [address] AT [facility] [address] [place] [address]    Sig: Take 1 tablet (0.1 mg) by mouth three times a week    Class: E-Prescribe    Route: Oral    HYDROcodone-acetaminophen (NORCO) 5-325 MG tablet            Sig: Take 1 tablet by mouth  every 6 hours as needed for severe pain    Class: Historical    Route: Oral    magnesium oxide (MAG-OX) 400 MG tablet  120 tablet 3 4/19/2019    [facility] [place] [address] [address]    Sig: Take 2 tablets (800mg) at noon and 2 tablets (800mg) at 6pm.    Class: E-Prescribe    mycophenolic acid (GENERIC EQUIVALENT) 180 MG EC tablet  540 tablet 3 9/26/2019    [facility] [place] [address]    Sig: Take 3 tablets (540 mg) by mouth 2 times daily    Class: E-Prescribe    Route: Oral    sulfamethoxazole-trimethoprim (BACTRIM/SEPTRA) 400-80 MG tablet  30 tablet 11 4/29/2019    [id number] - [place], [place] - [address] AT [facility] [address] [facility] [address]    Sig: Take 1 tablet by mouth daily    Class: E-Prescribe    Route: Oral    tacrolimus (GENERIC EQUIVALENT) 0.5 MG capsule  60 capsule 11 10/8/2019    [facility] [place], [place] - [facility] [address]    Sig: Take 1 capsule (0.5 mg) by mouth 2 times daily Total dose = 2.5 mg BID    Class: E-Prescribe    Notes to Pharmacy: TxpDate:11/12/2018(Kidney/Pancreas)Discharge Date:11/20/2018 ICD10:Z94.0 Location of Transplant: [facility]    Route: Oral    tacrolimus (GENERIC EQUIVALENT) 1 MG capsule  120 capsule 11 10/8/2019    [facility] [place] [address] [address]    Sig: Take 2 capsules (2 mg) by mouth 2 times daily Total dose = 2.5 mg BID    Class: E-Prescribe    Notes to Pharmacy: TxpDate:11/12/2018(Kidney/Pancreas)Discharge Date:11/20/2018 ICD10:Z94.0 Location of Transplant: [facility]    Route: Oral          O:   Temp:  [98.5  F (36.9  C)] 98.5  F (36.9  C)  Pulse:  [97] 97  BP: (126)/(74) 126/74  SpO2:  [94 %] 94 %  General Appearance: in no apparent distress.   Skin: Normal, no rashes or jaundice  Heart: regular rate and rhythm  Lungs: easy respirations, no audible wheezing.  Abdomen: rounded, The wound is dry and  intact, without hernia. The abdomen is non-tender. The kidney and pancreas graft is not tender.  There is no ascites.  Extremities: Tremor absent.   Edema: absent. none    Transplant Immunosuppression Labs Latest Ref Rng & Units 10/21/2019 10/7/2019 9/23/2019 9/9/2019 8/26/2019   Siro Level 5.0 - 15.0 ug/L - - - - -   Siro Level - - - - - -   Tacro Level 5.0 - 15.0 ug/L 9.7 7.3 8.7 10.1 12.2   Tacro Level - LAST DOSE 915 PM 10/20 last dose 930 pm 10/06 2,130 2,100 2130 8/25/19   Cyclo Level 50 - 400 ug/L - - - - -   Cyclo Level - - - - - -   Creat 0.66 - 1.25 mg/dL 1.18 1.07 0.88 0.92 1.01   BUN 7 - 30 mg/dL 23 18 15 17 22   WBC 4.0 - 11.0 10e9/L 3.7(L) 6.5 4.1 4.3 4.1   Neutrophil % 60.6 66.1 58.0 63.7 65.9   ANEU 1.6 - 8.3 10e9/L 2.2 4.3 2.4 2.7 2.7       Chemistries:   Recent Labs   Lab Test 10/21/19  0919   BUN 23   CR 1.18   GFRESTIMATED 69   GLC 83     Lab Results   Component Value Date    A1C 9.1 11/11/2018    CPEPT 2.8 05/31/2017     Recent Labs   Lab Test 11/11/18  0955   ALBUMIN 4.0   BILITOTAL 0.3   ALKPHOS 189*   AST 9   ALT 14     Urine Studies:  Recent Labs   Lab Test 11/16/18  1110   COLOR Yellow   APPEARANCE Clear   URINEGLC Negative   URINEBILI Negative   URINEKETONE Negative   SG 1.019   UBLD Large*   URINEPH 7.0   PROTEIN 30*   NITRITE Negative   LEUKEST Negative   RBCU >182*   WBCU 5     Recent Labs   Lab Test 11/11/18  1100 05/24/17  1424   UTPG 1.96* 1.72*     Hematology:   Recent Labs   Lab Test 10/21/19  0919 10/07/19  0912 09/23/19  0917   HGB 16.4 16.2 16.1    186 166   WBC 3.7* 6.5 4.1     Coags:   Recent Labs   Lab Test 11/12/18  2102 11/12/18  1631   INR 1.31* 1.41*     HLA antibodies:   SA1 Hi Risk Sophia   Date Value Ref Range Status   08/26/2019 None  Final     SA1 Mod Risk Sophia   Date Value Ref Range Status   08/26/2019 A:30  Final     SA2 Hi Risk Sophia   Date Value Ref Range Status   08/26/2019 No Specificity Defined  Final     SA2 Mod Risk Sophia   Date Value Ref Range Status    08/26/2019 None  Final       Assessment: Amadou Lewis is doing well s/p SPK:  Issues we addressed during his visit include:    Plan:    1. Graft function: good  2. Immunosuppression Management: no changes  3. CT A/P without contrast to evaluate interval enlargement of his native polycystic kidneys. Results to be communicated to the patient over the phone. Future plans regarding possible resection will be discussed at that time.  4. Recheck hemoglobin A1c (ordered)    Immunosuppressive regimen, management and long term risks discussed in detail. Changes, when applicable made as per orders.        Seen and discussed with Dr. Luevano.    Mega Chatman MD  General Surgery PGY-5    I have reviewed history, examined patient and discussed plan with the fellow/resident/ODALIS.  I concur with the findings in this note.    Immunosuppressive regimen, management and long term risks discussed in detail. Changes, when applicable made as per orders.    Risks of the surgical procedure including but not limited to the rare risk of mortality discussed in detail. Patient verbalized good understanding and had several pertinent questions which were answered satisfactorily.                Total Time: 25 min,   Counselling Time: 15 min.

## 2019-11-04 NOTE — PATIENT INSTRUCTIONS
Obtain CT of abdomen and pelvis to see if native polycystic kidneys are getting bigger, causing weight gain and increased abdomen size.     Obtain hemoglobin A1c in near future.    We will call you with the results and discuss possible resection.

## 2019-11-05 NOTE — TELEPHONE ENCOUNTER
DISCUSSED WITH DR. HICKMAN. PT'S ELIQUIS IS BEING MANAGED BY PCP. PT SEES DR. HICKMAN FOR VARICOSE VEINS. PT INFORMED SHE SHOULD CONTACT PCP FOR AUTHORIZATION TO HOLD ELIQUIS. REQUEST TO HOLD FAXED BACK TO PAIN CLINIC WITH SAID INFORMATION.    Post Kidney and Pancreas Transplant Team Conference  Date: 12/5/2018  Transplant Coordinator: Alesha Hartley     Attendees:  [x]  Dr. Wolfe [x] Becca Riojas, RN  [] Shari Chapin LPN     [x]  Dr. Dick [] Anu Hanson, JANICE [x] Shayna Chavez LPN   [x]  Dr. Tate [] Glenis Ko RN    [x]  Dr. De Los Santos [x] Sailaja Choi RN    [] Dr. Jordan [x] Zora Hartley, JANICE    [x] Dr. Pal [] Joey Goldberg RN    [x] Dr. Luevano [x] Viv Wick RN    [] Surgery Fellow [x] Linh Oates RN    [] Sandy Cole NP              Verbal Plan Read Back:   No change to medication or lab schedule.   Will continue to monitor    Routed to RN Coordinator   Shayna Chavez

## 2019-11-06 ENCOUNTER — TEAM CONFERENCE (OUTPATIENT)
Dept: TRANSPLANT | Facility: CLINIC | Age: 54
End: 2019-11-06

## 2019-11-07 NOTE — TELEPHONE ENCOUNTER
Post Kidney and Pancreas Transplant Team Conference  Date: 11/6/2019  Transplant Coordinator: Alesha Hartley     Attendees:  []  Dr. Wolfe  [] Shari Chapin LPN     [x]  Dr. Dick [] Anu Hanson RN [x] Shayna Chavez LPN   [x]  Dr. Tate [] Glenis Ko, JANICE     [x] Sailaja Choi RN [] Luke Tesfaye, CheriseD   [x] Dr. Pal [x] Zora Hartley, JANICE    [x] Dr. Luevano [] Joey Goldberg RN    [] Dr. Reyes [] Viv Wick RN    [] Dr. Caputo [x] Linh Oates RN     [x] Angella Multani RN    [] Surgery Fellow [x] Mjao Weeks RN    [] Sandy Cole, NP [] Becca Gregory RN        Verbal Plan Read Back:   No changes to lab or medication schedule.   Will continue to monitor    Routed to RN Coordinator   Shayna Chavez LPN

## 2019-11-08 ENCOUNTER — TELEPHONE (OUTPATIENT)
Dept: TRANSPLANT | Facility: CLINIC | Age: 54
End: 2019-11-08

## 2019-11-08 NOTE — TELEPHONE ENCOUNTER
Patient Call: General  Route to LPN    Reason for call: please connect with pt regarding the CT results    Call back needed? Yes    Return Call Needed  Same as documented in contacts section  When to return call?: Greater than one day: Route standard priority

## 2019-11-11 NOTE — TELEPHONE ENCOUNTER
Per Dr. Luevano,  Review of CT showed minimal change in size of polycystic kidneys, no physiologic reason to remove at this time, UNLESS, patient would like to pursue for symptom management.    RNCC relayed this to Sean, who reports that due to insurance issues, will not purse prior to the first of the year.    RNCC advised that Sean reach out to [facility] after the first of the year regarding B/L native nephrectomy.    Sean voiced understanding.

## 2019-11-13 DIAGNOSIS — N25.0 RENAL OSTEODYSTROPHY: ICD-10-CM

## 2019-11-13 DIAGNOSIS — Z94.83 PANCREAS TRANSPLANTED (H): ICD-10-CM

## 2019-11-13 DIAGNOSIS — E55.9 VITAMIN D DEFICIENCY: ICD-10-CM

## 2019-11-13 DIAGNOSIS — Z94.0 KIDNEY REPLACED BY TRANSPLANT: ICD-10-CM

## 2019-11-13 DIAGNOSIS — Z94.83 PANCREAS REPLACED BY TRANSPLANT (H): ICD-10-CM

## 2019-11-13 RX ORDER — SULFAMETHOXAZOLE AND TRIMETHOPRIM 400; 80 MG/1; MG/1
1 TABLET ORAL DAILY
Qty: 30 TABLET | Refills: 11 | Status: SHIPPED | OUTPATIENT
Start: 2019-11-13 | End: 2020-01-17

## 2019-11-21 ENCOUNTER — TELEPHONE (OUTPATIENT)
Dept: PHARMACY | Facility: CLINIC | Age: 54
End: 2019-11-21

## 2019-11-21 NOTE — TELEPHONE ENCOUNTER
Clinical Pharmacy Consult:                                                      Transplant Specific:   Date of Transplant: 11/12/2018  Type of Transplant: kidney and pancreas  First Transplant: yes and no  History of rejection: yes    Immunosuppression Regimen   TAC 2.5mg qAM & 2.5qPM and Myforitic 540mg qAM & 540qPM  Immunosuppressant Levels:  Therapeutic  9.7  Patient specific goal: 8-10  Pt adherent to lab draws: yes  Scr:   Lab Results   Component Value Date    CR 1.18 10/21/2019     Side effects: Fatigue and weight gain    Prophylactic Medications  Antibacterial:  Bactrim ss daily  Scheduled Discontinue Date: life long    Antiviral: completed    Acid Reducer: not on    Thrombosis Prevention: Aspirin 81 mg PO daily  Scheduled Discontinue Date: up to md        Med rec/DUR performed: yes  Med Rec Discrepancies: no    Sean reports feeling good.  Has fatigue and weight gain but is not too worried about it.He knew his meds very well with out med card and had no questions or concerns.    Samuel Martínez [facility]

## 2019-12-15 ENCOUNTER — HEALTH MAINTENANCE LETTER (OUTPATIENT)
Age: 54
End: 2019-12-15

## 2019-12-16 ENCOUNTER — TELEPHONE (OUTPATIENT)
Dept: TRANSPLANT | Facility: CLINIC | Age: 54
End: 2019-12-16

## 2019-12-16 DIAGNOSIS — Z94.83 PANCREAS REPLACED BY TRANSPLANT (H): Primary | ICD-10-CM

## 2019-12-16 DIAGNOSIS — Z94.83 PANCREAS REPLACED BY TRANSPLANT (H): ICD-10-CM

## 2019-12-16 DIAGNOSIS — Z94.0 KIDNEY REPLACED BY TRANSPLANT: ICD-10-CM

## 2019-12-16 DIAGNOSIS — E08.29 DIABETES MELLITUS DUE TO UNDERLYING CONDITION WITH OTHER DIABETIC KIDNEY COMPLICATION (H): ICD-10-CM

## 2019-12-16 LAB
ERYTHROCYTE [DISTWIDTH] IN BLOOD BY AUTOMATED COUNT: 14.9 % (ref 10–15)
HBA1C MFR BLD: 5.4 % (ref 0–5.6)
HCT VFR BLD AUTO: 49.4 % (ref 40–53)
HGB BLD-MCNC: 16.3 G/DL (ref 13.3–17.7)
LIPASE SERPL-CCNC: 126 U/L (ref 73–393)
MCH RBC QN AUTO: 28.8 PG (ref 26.5–33)
MCHC RBC AUTO-ENTMCNC: 33 G/DL (ref 31.5–36.5)
MCV RBC AUTO: 87 FL (ref 78–100)
PLATELET # BLD AUTO: 203 10E9/L (ref 150–450)
RBC # BLD AUTO: 5.66 10E12/L (ref 4.4–5.9)
WBC # BLD AUTO: 5.3 10E9/L (ref 4–11)

## 2019-12-16 PROCEDURE — 85027 COMPLETE CBC AUTOMATED: CPT | Performed by: INTERNAL MEDICINE

## 2019-12-16 PROCEDURE — 82150 ASSAY OF AMYLASE: CPT | Performed by: INTERNAL MEDICINE

## 2019-12-16 PROCEDURE — 83036 HEMOGLOBIN GLYCOSYLATED A1C: CPT | Performed by: SURGERY

## 2019-12-16 PROCEDURE — 80197 ASSAY OF TACROLIMUS: CPT | Performed by: INTERNAL MEDICINE

## 2019-12-16 PROCEDURE — 80048 BASIC METABOLIC PNL TOTAL CA: CPT | Performed by: INTERNAL MEDICINE

## 2019-12-16 PROCEDURE — 36415 COLL VENOUS BLD VENIPUNCTURE: CPT | Performed by: SURGERY

## 2019-12-16 PROCEDURE — 83690 ASSAY OF LIPASE: CPT | Performed by: INTERNAL MEDICINE

## 2019-12-17 ENCOUNTER — TELEPHONE (OUTPATIENT)
Dept: TRANSPLANT | Facility: CLINIC | Age: 54
End: 2019-12-17

## 2019-12-17 DIAGNOSIS — Z94.83 PANCREAS REPLACED BY TRANSPLANT (H): ICD-10-CM

## 2019-12-17 DIAGNOSIS — Z94.0 KIDNEY TRANSPLANTED: ICD-10-CM

## 2019-12-17 DIAGNOSIS — Z94.83 PANCREAS TRANSPLANTED (H): Primary | ICD-10-CM

## 2019-12-17 DIAGNOSIS — Z94.0 KIDNEY REPLACED BY TRANSPLANT: ICD-10-CM

## 2019-12-17 LAB
AMYLASE SERPL-CCNC: 66 U/L (ref 30–110)
ANION GAP SERPL CALCULATED.3IONS-SCNC: 5 MMOL/L (ref 3–14)
BUN SERPL-MCNC: 23 MG/DL (ref 7–30)
CALCIUM SERPL-MCNC: 9 MG/DL (ref 8.5–10.1)
CHLORIDE SERPL-SCNC: 113 MMOL/L (ref 94–109)
CO2 SERPL-SCNC: 23 MMOL/L (ref 20–32)
CREAT SERPL-MCNC: 1 MG/DL (ref 0.66–1.25)
GFR SERPL CREATININE-BSD FRML MDRD: 84 ML/MIN/{1.73_M2}
GLUCOSE SERPL-MCNC: 72 MG/DL (ref 70–99)
POTASSIUM SERPL-SCNC: 4.9 MMOL/L (ref 3.4–5.3)
SODIUM SERPL-SCNC: 141 MMOL/L (ref 133–144)
TACROLIMUS BLD-MCNC: 11.7 UG/L (ref 5–15)
TME LAST DOSE: NORMAL H

## 2019-12-17 RX ORDER — TACROLIMUS 0.5 MG/1
0.5 CAPSULE ORAL 2 TIMES DAILY
Qty: 60 CAPSULE | Refills: 11 | Status: SHIPPED | OUTPATIENT
Start: 2019-12-17 | End: 2020-01-17

## 2019-12-17 RX ORDER — TACROLIMUS 1 MG/1
1 CAPSULE ORAL 2 TIMES DAILY
Qty: 60 CAPSULE | Refills: 11 | Status: SHIPPED | OUTPATIENT
Start: 2019-12-17 | End: 2020-01-17

## 2019-12-17 NOTE — TELEPHONE ENCOUNTER
Spoke to patient who confirms current Tacrolimus dose of 2.5 mg BID and confirms good 12 hour trough level.  Patient denies any new medications, recent illness or missed doses.  Patient verbalizes understanding to decrease Tacrolimus dose to 1.5 mg BID and agrees to repeat txp labs in 2 weeks.  Lab orders updated.

## 2019-12-17 NOTE — TELEPHONE ENCOUNTER
ISSUE:   Tacrolimus IR level 11.2 on 12/16, goal 5-8, dose 2.5 mg BID.    PLAN:   Please call patient and confirm this was an accurate 12-hour trough. Verify Tacrolimus IR dose 2.5 mg BID. Confirm no new medications or illness. Confirm no missed doses. If accurate trough and accurate dose, decrease Tacrolimus IR dose to 1.5 mg BID and repeat labs in 2 week (please enter orders).

## 2019-12-18 DIAGNOSIS — E87.5 HYPERKALEMIA: ICD-10-CM

## 2019-12-18 RX ORDER — FLUDROCORTISONE ACETATE 0.1 MG/1
0.1 TABLET ORAL
Qty: 15 TABLET | Refills: 11 | Status: SHIPPED | OUTPATIENT
Start: 2019-12-18 | End: 2020-03-27

## 2020-01-03 ENCOUNTER — MEDICAL CORRESPONDENCE (OUTPATIENT)
Dept: HEALTH INFORMATION MANAGEMENT | Facility: CLINIC | Age: 55
End: 2020-01-03

## 2020-01-08 DIAGNOSIS — R79.89 LOW TESTOSTERONE: Primary | ICD-10-CM

## 2020-01-13 DIAGNOSIS — R79.89 LOW TESTOSTERONE: ICD-10-CM

## 2020-01-13 DIAGNOSIS — Z94.0 KIDNEY REPLACED BY TRANSPLANT: ICD-10-CM

## 2020-01-13 DIAGNOSIS — Z94.83 PANCREAS REPLACED BY TRANSPLANT (H): ICD-10-CM

## 2020-01-13 LAB
AMYLASE SERPL-CCNC: 71 U/L (ref 30–110)
ANION GAP SERPL CALCULATED.3IONS-SCNC: 7 MMOL/L (ref 3–14)
BUN SERPL-MCNC: 24 MG/DL (ref 7–30)
CALCIUM SERPL-MCNC: 9.5 MG/DL (ref 8.5–10.1)
CHLORIDE SERPL-SCNC: 110 MMOL/L (ref 94–109)
CO2 SERPL-SCNC: 22 MMOL/L (ref 20–32)
CREAT SERPL-MCNC: 1.03 MG/DL (ref 0.66–1.25)
ERYTHROCYTE [DISTWIDTH] IN BLOOD BY AUTOMATED COUNT: 14.9 % (ref 10–15)
ESTRADIOL SERPL-MCNC: 18 PG/ML (ref 6–50)
FSH SERPL-ACNC: 5 IU/L (ref 0.7–10.8)
GFR SERPL CREATININE-BSD FRML MDRD: 81 ML/MIN/{1.73_M2}
GLUCOSE SERPL-MCNC: 85 MG/DL (ref 70–99)
HCT VFR BLD AUTO: 52.5 % (ref 40–53)
HGB BLD-MCNC: 17.5 G/DL (ref 13.3–17.7)
LH SERPL-ACNC: 4.3 IU/L (ref 1.5–9.3)
LIPASE SERPL-CCNC: 147 U/L (ref 73–393)
MCH RBC QN AUTO: 29.1 PG (ref 26.5–33)
MCHC RBC AUTO-ENTMCNC: 33.3 G/DL (ref 31.5–36.5)
MCV RBC AUTO: 87 FL (ref 78–100)
PLATELET # BLD AUTO: 218 10E9/L (ref 150–450)
POTASSIUM SERPL-SCNC: 5 MMOL/L (ref 3.4–5.3)
RBC # BLD AUTO: 6.02 10E12/L (ref 4.4–5.9)
SODIUM SERPL-SCNC: 139 MMOL/L (ref 133–144)
TACROLIMUS BLD-MCNC: 8.8 UG/L (ref 5–15)
TME LAST DOSE: NORMAL H
WBC # BLD AUTO: 4.8 10E9/L (ref 4–11)

## 2020-01-13 PROCEDURE — 84270 ASSAY OF SEX HORMONE GLOBUL: CPT | Performed by: INTERNAL MEDICINE

## 2020-01-13 PROCEDURE — 83690 ASSAY OF LIPASE: CPT | Performed by: INTERNAL MEDICINE

## 2020-01-13 PROCEDURE — 82670 ASSAY OF TOTAL ESTRADIOL: CPT | Performed by: INTERNAL MEDICINE

## 2020-01-13 PROCEDURE — 82150 ASSAY OF AMYLASE: CPT | Performed by: INTERNAL MEDICINE

## 2020-01-13 PROCEDURE — 83001 ASSAY OF GONADOTROPIN (FSH): CPT | Performed by: INTERNAL MEDICINE

## 2020-01-13 PROCEDURE — 83002 ASSAY OF GONADOTROPIN (LH): CPT | Performed by: INTERNAL MEDICINE

## 2020-01-13 PROCEDURE — 85027 COMPLETE CBC AUTOMATED: CPT | Performed by: INTERNAL MEDICINE

## 2020-01-13 PROCEDURE — 84403 ASSAY OF TOTAL TESTOSTERONE: CPT | Performed by: INTERNAL MEDICINE

## 2020-01-13 PROCEDURE — 36415 COLL VENOUS BLD VENIPUNCTURE: CPT | Performed by: INTERNAL MEDICINE

## 2020-01-13 PROCEDURE — 80197 ASSAY OF TACROLIMUS: CPT | Performed by: INTERNAL MEDICINE

## 2020-01-13 PROCEDURE — 80048 BASIC METABOLIC PNL TOTAL CA: CPT | Performed by: INTERNAL MEDICINE

## 2020-01-15 LAB
SHBG SERPL-SCNC: 65 NMOL/L (ref 11–80)
TESTOST FREE SERPL-MCNC: 8.73 NG/DL (ref 4.7–24.4)
TESTOST SERPL-MCNC: 641 NG/DL (ref 240–950)

## 2020-01-16 ENCOUNTER — TELEPHONE (OUTPATIENT)
Dept: TRANSPLANT | Facility: CLINIC | Age: 55
End: 2020-01-16

## 2020-01-16 NOTE — TELEPHONE ENCOUNTER
RNCC received message from [facility] pharmacy that Sean was having difficulty filling his tacrolimus due to insurance changes.    RNCC spoke with Sean:  Sean will be out of his tac over the weekend.     Sean reports that he spent an hour and twenty minutes with medicare today to get this straightened out, but the Medicare rep was confident that the issue was resolved and that the pharmacy will need to re-run the script to fill.  Sean has a message out to [facility] pharmacy.    RNCC sent a high priority message to [facility] staff as well to ensure that the meds are sent out timely.

## 2020-01-17 ENCOUNTER — TELEPHONE (OUTPATIENT)
Dept: TRANSPLANT | Facility: CLINIC | Age: 55
End: 2020-01-17

## 2020-01-17 DIAGNOSIS — Z94.83 PANCREAS REPLACED BY TRANSPLANT (H): ICD-10-CM

## 2020-01-17 DIAGNOSIS — Z94.0 KIDNEY TRANSPLANTED: ICD-10-CM

## 2020-01-17 DIAGNOSIS — Z94.83 PANCREAS TRANSPLANTED (H): ICD-10-CM

## 2020-01-17 DIAGNOSIS — Z94.0 KIDNEY REPLACED BY TRANSPLANT: Primary | ICD-10-CM

## 2020-01-17 RX ORDER — TACROLIMUS 1 MG/1
1 CAPSULE ORAL 2 TIMES DAILY
Qty: 60 CAPSULE | Refills: 11 | Status: SHIPPED | OUTPATIENT
Start: 2020-01-17 | End: 2020-02-11

## 2020-01-17 RX ORDER — TACROLIMUS 0.5 MG/1
0.5 CAPSULE ORAL 2 TIMES DAILY
Qty: 60 CAPSULE | Refills: 11 | Status: SHIPPED | OUTPATIENT
Start: 2020-01-17 | End: 2020-02-11

## 2020-01-17 RX ORDER — SULFAMETHOXAZOLE AND TRIMETHOPRIM 400; 80 MG/1; MG/1
1 TABLET ORAL DAILY
Qty: 30 TABLET | Refills: 11 | Status: SHIPPED | OUTPATIENT
Start: 2020-01-17 | End: 2020-08-03

## 2020-01-17 RX ORDER — MYCOPHENOLIC ACID 180 MG/1
540 TABLET, DELAYED RELEASE ORAL 2 TIMES DAILY
Qty: 540 TABLET | Refills: 3 | Status: SHIPPED | OUTPATIENT
Start: 2020-01-17 | End: 2021-01-26

## 2020-01-17 NOTE — TELEPHONE ENCOUNTER
Patient Call: Medication Refill  Route to LPN  Instruct the patient to first contact their pharmacy. If they have called their pharmacy and require further assistance, route to LPN.    Pharmacy Name: [facility]   Pharmacy Location: [place] [address]  Name of Medication: tacrolimus (GENERIC EQUIVALENT) 1 MG capsule  tacrolimus (GENERIC EQUIVALENT) 0.5 MG capsule  sulfamethoxazole-trimethoprim (BACTRIM/SEPTRA) 400-80 MG tablet  mycophenolic acid (GENERIC EQUIVALENT) 180 MG EC tablet  When will the patient be out of this medication?: Less than 24 hours ([facility] LPN, then page if no answer)     [facility] This is a new pharmacy for him.

## 2020-01-17 NOTE — TELEPHONE ENCOUNTER
[facility]ri [facility] Phone Call      Data: Received call from patient asking about Medicare.  Intervention: Patient wanted to know if Medicare was primary to his private insurance  Assessment: Discussed Medicare will become primary 30 months after eligibility which would be 5/2021.  Patient indicated his insurance changed and he is not able to use [facility].  He is going to be out of medication.  Discussed cost of tacrolimus is 120.32 as cash price.  Discussed setting up payment plan or purchasing less than 30 days to ensure he has enough until new pharmacy is able to fill.  Patient indicated he has enough until Monday and has an appointment at [facility].  If needed he will purchase some at that time.  Also discussed if patient needed Medicare Part B.  Writer informed what Medicare Part B covered, when it becomes primary and to check with current insurance to learn if he is required to have Medicare Part B.  Patient indicated understanding.  Plan:  SW will remain available to assist as indicated.

## 2020-01-17 NOTE — TELEPHONE ENCOUNTER
Brooklynn Hill Alanna, RN; Josselin Chen, [facility]; Arvind Crisostomo, [facility]             I spoke with patient he wanted to wait until his insurance is fixed he did not want to cash out the tacrolimus. That being said he wont be able to get his tacrolimus for the weekend          RNCC discussed with Sean his plan to obtain his Tacrolimus IR.  Sean reports that he has been on the phone with [facility] to ensure that a supply of tacrolimus IR will be mailed out today.  RNCC did state that I did approve an Rx to [facility] this AM for his tacrolimus IR.  Saen is waiting for a phone call back from them to confirm that the meds have been shipped out.    RNCC advised that Sean notify [facility] EITHER WAY, if he receives their confirmation OR if he does not hear back from [facility].   Sean agreed to call back with updated.    Sean could not confirm how much supply he has left at home, as he was at work when we spoke. He did mention that best case scenario, he has enough medications through Monday. His plan is that if he does not receive a shipment from [facility] over the weekend, he will pay cash for tacrolimus IR at [address] [place] [address] Pharmacy on Monday, 1/20/2020 when he is there for his follow up appointment.    RNCC did discuss the potential effects if does of immunosuppression are missed: increased risk for rejection, hospitalization and treatment for rejection.  Sean voiced understanding and chooses to move forward with his plan for tacrolimus Rx receipt.

## 2020-01-20 ENCOUNTER — OFFICE VISIT (OUTPATIENT)
Dept: NEPHROLOGY | Facility: CLINIC | Age: 55
End: 2020-01-20
Attending: INTERNAL MEDICINE
Payer: COMMERCIAL

## 2020-01-20 VITALS
HEART RATE: 84 BPM | SYSTOLIC BLOOD PRESSURE: 120 MMHG | BODY MASS INDEX: 29.65 KG/M2 | DIASTOLIC BLOOD PRESSURE: 77 MMHG | OXYGEN SATURATION: 95 % | WEIGHT: 189.3 LBS

## 2020-01-20 DIAGNOSIS — Z23 ENCOUNTER FOR VACCINATION: ICD-10-CM

## 2020-01-20 DIAGNOSIS — G47.9 SLEEP DISORDER: Primary | ICD-10-CM

## 2020-01-20 PROCEDURE — G0463 HOSPITAL OUTPT CLINIC VISIT: HCPCS | Mod: 25

## 2020-01-20 PROCEDURE — 90471 IMMUNIZATION ADMIN: CPT

## 2020-01-20 PROCEDURE — 25000581 ZZH RX MED A9270 GY (STAT IND- M) 250: Mod: ZF | Performed by: INTERNAL MEDICINE

## 2020-01-20 PROCEDURE — 90750 HZV VACC RECOMBINANT IM: CPT | Mod: ZF | Performed by: INTERNAL MEDICINE

## 2020-01-20 RX ADMIN — ZOSTER VACCINE RECOMBINANT, ADJUVANTED 0.5 ML: KIT at 16:31

## 2020-01-20 ASSESSMENT — PAIN SCALES - GENERAL: PAINLEVEL: NO PAIN (0)

## 2020-01-20 NOTE — PROGRESS NOTES
"CHRONIC TRANSPLANT NEPHROLOGY VISIT    Assessment & Plan   # DDKT ([facility]): {trend:346884712}   - Baseline Cr ~ 1 mg/dL   - Proteinuria: { :102577477}   - Date DSA Last Checked: Aug/2019      Latest DSA: No   - BK Viremia: No   - Kidney Tx Biopsy: Sep 28, 2016; Result: No evidence of acute rejection. Mild capillaritis. Interstitial fibrosis and tubular atrophy. Severe arteriolar hyalinosis with focal acute tubular epithelial injury. See comment for additional details.     # Pancreas Tx ([facility]):   - Pancreatic Exocrine Drainage: {Drainage   :418623071}    - Blood glucose: { :548071900}      On insulin: {FV Renal Tx Insulin:949696::\"No\"}   - HbA1c: {FV RENAL TX STABLE, INC, DEC:159002000}      Latest HbA1c: ***%   - Pancreatic enzymes: {trend:167259148}   - Date DSA Last Checked: Aug/2019  Latest DSA: No   - Pancreas Tx Biopsy: Sep 28, 2016; Result: ***    # Immunosuppression: Tacrolimus immediate release (goal 6-8) and Mycophenolic acid (goal 1.0-3.5)   - Changes: { :388428}    # Infection Prophylaxis:   - PJP: Sulfa/TMP (Bactrim)   - CMV: None     # Hypertension: {BP Control:517379413};  Goal BP: { :142688351}   - Changes: { :226395}    # Anemia in Chronic Renal Disease: Hgb: { :749976007}      RERE: {YES / NO:787888::\"Yes\"}   - Iron studies: { :931426988}    # Mineral Bone Disorder:   - Vitamin D; level: Not checked recently        On Supplement: Yes  - Calcium; level: Normal        On Supplement: No    # Electrolytes:   - Potassium; level: Normal        On Supplement: No  - Magnesium; level: Not checked recently        On Supplement: Yes  - Bicarbonate; level: Normal        On Supplement: No  - Sodium; level: Normal        On Supplement: No    # ***: ***    # Skin Cancer Risk:    - Discussed sun protection and recommend regular follow up with Dermatology.    # Medical Compliance: { :598044529}    # Transplant History:  Etiology of Kidney Failure: Diabetes mellitus type 1  Tx: DDKT ([facility])  Transplant: 11/12/2018 (Kidney / " Pancreas), 10/10/2013 (Kidney)  Donor Type: Donation after Brain Death Donor Class:   Significant changes in immunosuppression: { :305384}  Significant transplant-related complications: { :645240359}    Transplant Office Phone Number: [phone]    Assessment and plan was discussed with the patient and he voiced his understanding and agreement.    Return visit: No follow-ups on file.    Chief Complaint   Mr. Lewis is a 54 year old here for routine follow up.    History of Present Illness   Mr. 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 He was last seen by Dr. Dick on 10/7/2019, see that note for further details.     Recent Hospitalizations:  [] No [] Yes    New Medical Issues: [] No [] Yes    Decreased energy: [] No [] Yes    Chest pain or SOB with exertion:  [] No [] Yes    Appetite change or weight change: [] No [] Yes    Nausea, vomiting or diarrhea:  [] No [] Yes    Fever, sweats or chills: [] No [] Yes    Leg swelling: [] No [] Yes      Home BP: { :18884703}    Review of Systems   A comprehensive review of systems was obtained and negative, except as noted in the HPI or PMH.    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)     Diabetes mellitus with background retinopathy (H)     Polycystic kidney     NONSPECIFIC MEDICAL HISTORY     Coronary artery disease     Retinopathy     Hyperlipidemia LDL goal <70     Premature ventricular contractions (PVCs) (VPCs)     Kidney replaced by transplant     Immunosuppressed status (H)     Kidney transplant rejection     Hypertension     Anemia in chronic renal disease     Care after organ transplant     Esophageal ulcer     Status post simultaneous kidney and pancreas transplant (H)     Other chronic pain     Nausea     Drug induced constipation       Social History   Social History     Tobacco Use     Smoking status: Never Smoker  "    Smokeless tobacco: Never Used   Substance Use Topics     Alcohol use: Yes     Frequency: Monthly or less     Drug use: No       Allergies   Allergies   Allergen Reactions     No Known Allergies        Medications   Current Outpatient Medications   Medication Sig     aspirin 81 MG EC tablet Take 1 tablet (81 mg) by mouth daily     atorvastatin (LIPITOR) 20 MG tablet Take 1 tablet (20 mg) by mouth daily     blood glucose monitoring (NO BRAND SPECIFIED) test strip Use to test blood sugar 4 times daily or as directed.     cholecalciferol 50 MCG (2000 UT) CAPS Take 2,000 Units by mouth daily     fludrocortisone (FLORINEF) 0.1 MG tablet Take 1 tablet (0.1 mg) by mouth three times a week     HYDROcodone-acetaminophen (NORCO) 5-325 MG tablet Take 1 tablet by mouth every 6 hours as needed for severe pain     magnesium oxide (MAG-OX) 400 MG tablet Take 2 tablets (800mg) at noon and 2 tablets (800mg) at 6pm.     mycophenolic acid (GENERIC EQUIVALENT) 180 MG EC tablet Take 3 tablets (540 mg) by mouth 2 times daily     sulfamethoxazole-trimethoprim (BACTRIM/SEPTRA) 400-80 MG tablet Take 1 tablet by mouth daily     tacrolimus (GENERIC EQUIVALENT) 0.5 MG capsule Take 1 capsule (0.5 mg) by mouth 2 times daily Total dose = 1.5 mg BID     tacrolimus (GENERIC EQUIVALENT) 1 MG capsule Take 1 capsule (1 mg) by mouth 2 times daily Total dose = 1.5 mg BID     No current facility-administered medications for this visit.      There are no discontinued medications.    Physical Exam   Vital Signs: There were no vitals taken for this visit.    {EXAM    :[id number]::\"GENERAL APPEARANCE: alert and no distress\",\"HENT: mouth without ulcers or lesions\",\"LYMPHATICS: no cervical or supraclavicular nodes\",\"RESP: lungs clear to auscultation - no rales, rhonchi or wheezes\",\"CV: regular rhythm, normal rate, no rub, no murmur\",\"EDEMA: no LE edema bilaterally\",\"ABDOMEN: soft, nondistended, nontender, bowel sounds normal\",\"MS: extremities normal - no " "gross deformities noted, no evidence of inflammation in joints, no muscle tenderness\",\"SKIN: no rash\"}      Data     Renal Latest Ref Rng & Units 1/13/2020 12/16/2019 10/21/2019   Na 133 - 144 mmol/L 139 141 137   K 3.4 - 5.3 mmol/L 5.0 4.9 4.6   Cl 94 - 109 mmol/L 110(H) 113(H) 110(H)   CO2 20 - 32 mmol/L 22 23 22   BUN 7 - 30 mg/dL 24 23 23   Cr 0.66 - 1.25 mg/dL 1.03 1.00 1.18   Cr (external) 0.5 - 1.5 mg/dL - - -   Glucose 70 - 99 mg/dL 85 72 83   Ca  8.5 - 10.1 mg/dL 9.5 9.0 8.9   Mg 1.6 - 2.3 mg/dL - - -     Bone Health Latest Ref Rng & Units 3/7/2019 2/14/2019 2/11/2019   Phos 2.5 - 4.5 mg/dL - 4.1 3.6   PTHi 18 - 80 pg/mL 104(H) - -   Vit D Def 20 - 75 ug/L 36 - -     Heme Latest Ref Rng & Units 1/13/2020 12/16/2019 10/21/2019   WBC 4.0 - 11.0 10e9/L 4.8 5.3 3.7(L)   Hgb 13.3 - 17.7 g/dL 17.5 16.3 16.4   Plt 150 - 450 10e9/L 218 203 215     Liver Latest Ref Rng & Units 11/11/2018 6/27/2018 8/3/2017   AP 40 - 150 U/L 189(H) - -   TBili 0.2 - 1.3 mg/dL 0.3 - -   ALT 0 - 70 U/L 14 - -   AST 0 - 45 U/L 9 - -   Tot Protein 6.8 - 8.8 g/dL 7.6 - -   Albumin 3.4 - 5.0 g/dL 4.0 4.2 4.2     Pancreas Latest Ref Rng & Units 1/13/2020 12/16/2019 10/21/2019   A1C 0 - 5.6 % - 5.4 -   Amylase 30 - 110 U/L 71 66 55   Lipase 73 - 393 U/L 147 126 109     Iron studies Latest Ref Rng & Units 9/11/2018 8/3/2017 11/18/2016   Iron 35 - 180 ug/dL 85 79 82   Iron sat 15 - 46 % 37 34 34   Ferritin 26 - 388 ng/mL 761(H) 736(H) -     [facility] Txp Virology Latest Ref Rng & Units 10/7/2019 9/9/2019 8/26/2019   CVM DNA Quant - - - Plasma   CMV Quant <100 Copies/mL - - -   CMV QT Log <2.0 Log copies/mL - - -   BK Spec - Plasma Plasma -   BK Res BKNEG:BK Virus DNA Not Detected copies/mL BK Virus DNA Not Detected BK Virus DNA Not Detected -   BK Log <2.7 Log copies/mL Not Calculated Not Calculated -   Hep B Core NR:Nonreactive - - -   Hep B Surf - - - -   HIV 1&2 NEG - - -        Recent Labs   Lab Test 10/21/19  0920 12/16/19  1015 01/13/20  0757 "   DOSTAC LAST DOSE 915 PM 10/20 12/15/19 2200 1/12/20@2000   TACROL 9.7 11.7 8.8     Recent Labs   Lab Test 02/11/19  0740 02/14/19  0728 02/21/19  0749   DOSMPA LAST DOSE 8:00PM AT 2.10.2019 LAST DOSE 8:00PM 2/13/2019 02/20/2019 AT 0730 PM   MPACID 2.04 3.64* 1.41   MPAG 50.8 43.4 60.7     Scribe Disclosure:  Júnior SWANN, am serving as a scribe to document services personally performed by Kidney/Pancreas Recipient at this visit, based upon the provider's statements to me. All documentation has been reviewed by the aforementioned provider prior to being entered into the official medical record. ***    Júnior SWANN, a scribe, prepared the chart for today's encounter.

## 2020-01-20 NOTE — NURSING NOTE
Chief Complaint   Patient presents with     RECHECK     kidney transplat     Blood pressure 120/77, pulse 84, weight 85.9 kg (189 lb 4.8 oz), SpO2 95 %.      Jarrod Conde/MARTA  January 20, 2020 3:49 PM

## 2020-01-20 NOTE — PROGRESS NOTES
CHRONIC TRANSPLANT NEPHROLOGY VISIT    Assessment & Plan   # DDKT (SPK): Stable              - Baseline Cr ~ 1.0 mg/dL              - Proteinuria: Not checked recently              - Date DSA Last Checked: 2019       Latest DSA: No              - BK Viremia: No              - Kidney Tx Biopsy: No     # Pancreas Tx (SPK):              - Pancreatic Exocrine Drainage: Enteric drained                     - Blood glucose: Euglycemia              - HbA1c: Not checked recently                  - Pancreatic enzymes: Stable              - Date DSA Last Checked: 2019 Latest DSA: No              - Pancreas Tx Biopsy: No    # Immunosuppression: Tacrolimus immediate release (goal 6-8) and Mycophenolic acid (goal 1-3.5)   - Changes: No    # Prophylaxis:   - PJP: Sulfa/TMP (Bactrim)    - CMV: None    # Hypertension: Controlled; Goal BP: < 130/80   - Changes: No    # Anemia in Chronic Renal Disease: Hgb: Stable      RERE: No   - Iron studies: Not checked recently    # Mineral Bone Disorder:   - Secondary renal hyperparathyroidism; PTH level: Minimally elevated ( pg/ml)  - Vitamin D; level: Normal  - Calcium; level: Normal  - Phosphorus; level: Normal     # Electrolytes:   - Potassium; level: Normal  - Magnesium; level: Normal  - Bicarbonate; level: Normal. Will stop supplements.   - Sodium; level: Normal    # Skin Cancer Risk:    - Discussed sun protection and recommend regular follow up with Dermatology.    # Medical Compliance: Yes    # Transplant History:  Etiology of kidney failure: Diabetes mellitus type 1  Tx: DDKT (K)  Transplant: 2018 (Kidney / Pancreas), 10/10/2013 (Kidney)  Donor Type:  - Brain Death            Donor Class:   Significant changes in immunosuppression: None  Significant transplant-related complications: None    Transplant Office Phone Number: [phone]    Assessment and plan was discussed with the patient and he voiced his understanding and agreement.    Return visit: 3  months  Chief Complaint   Mr. Lewis is a 54 year old here for routine follow up.    History of Present Illness    Mr. Lewis is a 53 year old here for routine follow up. The patient was last evaluated by Dr. Dick on 10/07/2019. At the time no changes were made to medication. Please see that note for additional information. Since the no hospitalizations or new medical issues.     Today the patient is doing well although he still complains of weight gain despite eating healthy and staying active. No chest pain, shortness of breath, fever, sweats, or chills. No lower extremity edema. His job is laborious but he does not go to the gym.     Recent Hospitalizations:  [x] No [] Yes    New Medical Issues: [x] No [] Yes    Decreased energy: [x] No [] Yes    Chest pain or SOB with exertion:  [x] No [] Yes    Appetite change or weight change: [x] No [] Yes    Nausea, vomiting or diarrhea:  [x] No [] Yes    Fever, sweats or chills: [x] No [] Yes    Leg swelling: [x] No [] Yes      Home BP: goal    Review of Systems   A comprehensive review of systems was obtained and negative, except as noted in the HPI or PMH.    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)     Diabetes mellitus with background retinopathy (H)     Polycystic kidney     NONSPECIFIC MEDICAL HISTORY     Coronary artery disease     Retinopathy     Hyperlipidemia LDL goal <70     Premature ventricular contractions (PVCs) (VPCs)     Kidney replaced by transplant     Immunosuppressed status (H)     Kidney transplant rejection     Hypertension     Anemia in chronic renal disease     Care after organ transplant     Esophageal ulcer     Status post simultaneous kidney and pancreas transplant (H)     Other chronic pain     Nausea     Drug induced constipation       Social History   Social History     Tobacco Use     Smoking status: Never Smoker     Smokeless tobacco: Never Used   Substance Use Topics      Alcohol use: Yes     Frequency: Monthly or less     Drug use: No       Allergies   Allergies   Allergen Reactions     No Known Allergies        Medications   Current Outpatient Medications   Medication Sig     aspirin 81 MG EC tablet Take 1 tablet (81 mg) by mouth daily     atorvastatin (LIPITOR) 20 MG tablet Take 1 tablet (20 mg) by mouth daily     blood glucose monitoring (NO BRAND SPECIFIED) test strip Use to test blood sugar 4 times daily or as directed.     cholecalciferol 50 MCG (2000 UT) CAPS Take 2,000 Units by mouth daily     fludrocortisone (FLORINEF) 0.1 MG tablet Take 1 tablet (0.1 mg) by mouth three times a week     HYDROcodone-acetaminophen (NORCO) 5-325 MG tablet Take 1 tablet by mouth every 6 hours as needed for severe pain     magnesium oxide (MAG-OX) 400 MG tablet Take 2 tablets (800mg) at noon and 2 tablets (800mg) at 6pm.     mycophenolic acid (GENERIC EQUIVALENT) 180 MG EC tablet Take 3 tablets (540 mg) by mouth 2 times daily     sulfamethoxazole-trimethoprim (BACTRIM/SEPTRA) 400-80 MG tablet Take 1 tablet by mouth daily     tacrolimus (GENERIC EQUIVALENT) 0.5 MG capsule Take 1 capsule (0.5 mg) by mouth 2 times daily Total dose = 1.5 mg BID     tacrolimus (GENERIC EQUIVALENT) 1 MG capsule Take 1 capsule (1 mg) by mouth 2 times daily Total dose = 1.5 mg BID     No current facility-administered medications for this visit.      There are no discontinued medications.    Physical Exam   Vital Signs: /77   Pulse 84   Wt 85.9 kg (189 lb 4.8 oz)   SpO2 95%   BMI 29.65 kg/m      GENERAL APPEARANCE: alert and no distress  HENT: mouth without ulcers or lesions  LYMPHATICS: no cervical or supraclavicular nodes  RESP: lungs clear to auscultation - no rales, rhonchi or wheezes  CV: regular rhythm, normal rate, no rub, no murmur  EDEMA: no LE edema bilaterally  ABDOMEN: soft, nondistended, nontender, bowel sounds normal  MS: extremities normal - no gross deformities noted, no evidence of  inflammation in joints, no muscle tenderness  SKIN: no rash      Data     Renal Latest Ref Rng & Units 1/13/2020 12/16/2019 10/21/2019   Na 133 - 144 mmol/L 139 141 137   K 3.4 - 5.3 mmol/L 5.0 4.9 4.6   Cl 94 - 109 mmol/L 110(H) 113(H) 110(H)   CO2 20 - 32 mmol/L 22 23 22   BUN 7 - 30 mg/dL 24 23 23   Cr 0.66 - 1.25 mg/dL 1.03 1.00 1.18   Cr (external) 0.5 - 1.5 mg/dL - - -   Glucose 70 - 99 mg/dL 85 72 83   Ca  8.5 - 10.1 mg/dL 9.5 9.0 8.9   Mg 1.6 - 2.3 mg/dL - - -     Bone Health Latest Ref Rng & Units 3/7/2019 2/14/2019 2/11/2019   Phos 2.5 - 4.5 mg/dL - 4.1 3.6   PTHi 18 - 80 pg/mL 104(H) - -   Vit D Def 20 - 75 ug/L 36 - -     Heme Latest Ref Rng & Units 1/13/2020 12/16/2019 10/21/2019   WBC 4.0 - 11.0 10e9/L 4.8 5.3 3.7(L)   Hgb 13.3 - 17.7 g/dL 17.5 16.3 16.4   Plt 150 - 450 10e9/L 218 203 215     Liver Latest Ref Rng & Units 11/11/2018 6/27/2018 8/3/2017   AP 40 - 150 U/L 189(H) - -   TBili 0.2 - 1.3 mg/dL 0.3 - -   ALT 0 - 70 U/L 14 - -   AST 0 - 45 U/L 9 - -   Tot Protein 6.8 - 8.8 g/dL 7.6 - -   Albumin 3.4 - 5.0 g/dL 4.0 4.2 4.2     Pancreas Latest Ref Rng & Units 1/13/2020 12/16/2019 10/21/2019   A1C 0 - 5.6 % - 5.4 -   Amylase 30 - 110 U/L 71 66 55   Lipase 73 - 393 U/L 147 126 109     Iron studies Latest Ref Rng & Units 9/11/2018 8/3/2017 11/18/2016   Iron 35 - 180 ug/dL 85 79 82   Iron sat 15 - 46 % 37 34 34   Ferritin 26 - 388 ng/mL 761(H) 736(H) -     [facility] Txp Virology Latest Ref Rng & Units 10/7/2019 9/9/2019 8/26/2019   CVM DNA Quant - - - Plasma   CMV Quant <100 Copies/mL - - -   CMV QT Log <2.0 Log copies/mL - - -   BK Spec - Plasma Plasma -   BK Res BKNEG:BK Virus DNA Not Detected copies/mL BK Virus DNA Not Detected BK Virus DNA Not Detected -   BK Log <2.7 Log copies/mL Not Calculated Not Calculated -   Hep B Core NR:Nonreactive - - -   Hep B Surf - - - -   HIV 1&2 NEG - - -        Recent Labs   Lab Test 10/21/19  0920 12/16/19  1015 01/13/20  0757   DOSTAC LAST DOSE 915 PM 10/20 12/15/19  2200 1/12/20@2000   TACROL 9.7 11.7 8.8     Recent Labs   Lab Test 02/11/19  0740 02/14/19  0728 02/21/19  0749   DOSMPA LAST DOSE 8:00PM AT 2.10.2019 LAST DOSE 8:00PM 2/13/2019 02/20/2019 AT 0730 PM   MPACID 2.04 3.64* 1.41   MPAG 50.8 43.4 60.7     Scribe Disclosure:  I, Serge Gonzalez, am serving as a scribe to document services personally performed by Kidney/Pancreas Recipient at this visit, based upon the provider's statements to me. All documentation has been reviewed by the aforementioned provider prior to being entered into the official medical record.     ISerge, a scribe, prepared the chart for today's encounter.

## 2020-01-20 NOTE — LETTER
2020       RE: Amadou Lewis  [address]  [place] [address]     Dear Colleague,    Thank you for referring your patient, Amadou Lewis, to the [facility] NEPHROLOGY at [facility]. Please see a copy of my visit note below.    CHRONIC TRANSPLANT NEPHROLOGY VISIT    Assessment & Plan   # DDKT (SPK): Stable              - Baseline Cr ~ 1.0 mg/dL              - Proteinuria: Not checked recently              - Date DSA Last Checked: 2019       Latest DSA: No              - BK Viremia: No              - Kidney Tx Biopsy: No     # Pancreas Tx (SPK):              - Pancreatic Exocrine Drainage: Enteric drained                     - Blood glucose: Euglycemia              - HbA1c: Not checked recently                  - Pancreatic enzymes: Stable              - Date DSA Last Checked: 2019 Latest DSA: No              - Pancreas Tx Biopsy: No    # Immunosuppression: Tacrolimus immediate release (goal 6-8) and Mycophenolic acid (goal 1-3.5)   - Changes: No    # Prophylaxis:   - PJP: Sulfa/TMP (Bactrim)    - CMV: None    # Hypertension: Controlled; Goal BP: < 130/80   - Changes: No    # Anemia in Chronic Renal Disease: Hgb: Stable      RERE: No   - Iron studies: Not checked recently    # Mineral Bone Disorder:   - Secondary renal hyperparathyroidism; PTH level: Minimally elevated ( pg/ml)  - Vitamin D; level: Normal  - Calcium; level: Normal  - Phosphorus; level: Normal     # Electrolytes:   - Potassium; level: Normal  - Magnesium; level: Normal  - Bicarbonate; level: Normal. Will stop supplements.   - Sodium; level: Normal    # Skin Cancer Risk:    - Discussed sun protection and recommend regular follow up with Dermatology.    # Medical Compliance: Yes    # Transplant History:  Etiology of kidney failure: Diabetes mellitus type 1  Tx: DDKT (SPK)  Transplant: 2018 (Kidney / Pancreas), 10/10/2013 (Kidney)  Donor Type:  - Brain Death             Donor Class:   Significant changes in immunosuppression: None  Significant transplant-related complications: None    Transplant Office Phone Number: [phone]    Assessment and plan was discussed with the patient and he voiced his understanding and agreement.    Return visit: 3 months  Chief Complaint   Mr. Lewis is a 54 year old here for routine follow up.    History of Present Illness    Mr. Lewis is a 53 year old here for routine follow up. The patient was last evaluated by Dr. Dick on 10/07/2019. At the time no changes were made to medication. Please see that note for additional information. Since the no hospitalizations or new medical issues.     Today the patient is doing well although he still complains of weight gain despite eating healthy and staying active. No chest pain, shortness of breath, fever, sweats, or chills. No lower extremity edema. His job is laborious but he does not go to the gym.     Recent Hospitalizations:  [x] No [] Yes    New Medical Issues: [x] No [] Yes    Decreased energy: [x] No [] Yes    Chest pain or SOB with exertion:  [x] No [] Yes    Appetite change or weight change: [x] No [] Yes    Nausea, vomiting or diarrhea:  [x] No [] Yes    Fever, sweats or chills: [x] No [] Yes    Leg swelling: [x] No [] Yes      Home BP: goal    Review of Systems   A comprehensive review of systems was obtained and negative, except as noted in the HPI or PMH.    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)     Diabetes mellitus with background retinopathy (H)     Polycystic kidney     NONSPECIFIC MEDICAL HISTORY     Coronary artery disease     Retinopathy     Hyperlipidemia LDL goal <70     Premature ventricular contractions (PVCs) (VPCs)     Kidney replaced by transplant     Immunosuppressed status (H)     Kidney transplant rejection     Hypertension     Anemia in chronic renal disease     Care after organ transplant      Esophageal ulcer     Status post simultaneous kidney and pancreas transplant (H)     Other chronic pain     Nausea     Drug induced constipation       Social History   Social History     Tobacco Use     Smoking status: Never Smoker     Smokeless tobacco: Never Used   Substance Use Topics     Alcohol use: Yes     Frequency: Monthly or less     Drug use: No       Allergies   Allergies   Allergen Reactions     No Known Allergies        Medications   Current Outpatient Medications   Medication Sig     aspirin 81 MG EC tablet Take 1 tablet (81 mg) by mouth daily     atorvastatin (LIPITOR) 20 MG tablet Take 1 tablet (20 mg) by mouth daily     blood glucose monitoring (NO BRAND SPECIFIED) test strip Use to test blood sugar 4 times daily or as directed.     cholecalciferol 50 MCG (2000 UT) CAPS Take 2,000 Units by mouth daily     fludrocortisone (FLORINEF) 0.1 MG tablet Take 1 tablet (0.1 mg) by mouth three times a week     HYDROcodone-acetaminophen (NORCO) 5-325 MG tablet Take 1 tablet by mouth every 6 hours as needed for severe pain     magnesium oxide (MAG-OX) 400 MG tablet Take 2 tablets (800mg) at noon and 2 tablets (800mg) at 6pm.     mycophenolic acid (GENERIC EQUIVALENT) 180 MG EC tablet Take 3 tablets (540 mg) by mouth 2 times daily     sulfamethoxazole-trimethoprim (BACTRIM/SEPTRA) 400-80 MG tablet Take 1 tablet by mouth daily     tacrolimus (GENERIC EQUIVALENT) 0.5 MG capsule Take 1 capsule (0.5 mg) by mouth 2 times daily Total dose = 1.5 mg BID     tacrolimus (GENERIC EQUIVALENT) 1 MG capsule Take 1 capsule (1 mg) by mouth 2 times daily Total dose = 1.5 mg BID     No current facility-administered medications for this visit.      There are no discontinued medications.    Physical Exam   Vital Signs: /77   Pulse 84   Wt 85.9 kg (189 lb 4.8 oz)   SpO2 95%   BMI 29.65 kg/m       GENERAL APPEARANCE: alert and no distress  HENT: mouth without ulcers or lesions  LYMPHATICS: no cervical or supraclavicular  nodes  RESP: lungs clear to auscultation - no rales, rhonchi or wheezes  CV: regular rhythm, normal rate, no rub, no murmur  EDEMA: no LE edema bilaterally  ABDOMEN: soft, nondistended, nontender, bowel sounds normal  MS: extremities normal - no gross deformities noted, no evidence of inflammation in joints, no muscle tenderness  SKIN: no rash      Data     Renal Latest Ref Rng & Units 1/13/2020 12/16/2019 10/21/2019   Na 133 - 144 mmol/L 139 141 137   K 3.4 - 5.3 mmol/L 5.0 4.9 4.6   Cl 94 - 109 mmol/L 110(H) 113(H) 110(H)   CO2 20 - 32 mmol/L 22 23 22   BUN 7 - 30 mg/dL 24 23 23   Cr 0.66 - 1.25 mg/dL 1.03 1.00 1.18   Cr (external) 0.5 - 1.5 mg/dL - - -   Glucose 70 - 99 mg/dL 85 72 83   Ca  8.5 - 10.1 mg/dL 9.5 9.0 8.9   Mg 1.6 - 2.3 mg/dL - - -     Bone Health Latest Ref Rng & Units 3/7/2019 2/14/2019 2/11/2019   Phos 2.5 - 4.5 mg/dL - 4.1 3.6   PTHi 18 - 80 pg/mL 104(H) - -   Vit D Def 20 - 75 ug/L 36 - -     Heme Latest Ref Rng & Units 1/13/2020 12/16/2019 10/21/2019   WBC 4.0 - 11.0 10e9/L 4.8 5.3 3.7(L)   Hgb 13.3 - 17.7 g/dL 17.5 16.3 16.4   Plt 150 - 450 10e9/L 218 203 215     Liver Latest Ref Rng & Units 11/11/2018 6/27/2018 8/3/2017   AP 40 - 150 U/L 189(H) - -   TBili 0.2 - 1.3 mg/dL 0.3 - -   ALT 0 - 70 U/L 14 - -   AST 0 - 45 U/L 9 - -   Tot Protein 6.8 - 8.8 g/dL 7.6 - -   Albumin 3.4 - 5.0 g/dL 4.0 4.2 4.2     Pancreas Latest Ref Rng & Units 1/13/2020 12/16/2019 10/21/2019   A1C 0 - 5.6 % - 5.4 -   Amylase 30 - 110 U/L 71 66 55   Lipase 73 - 393 U/L 147 126 109     Iron studies Latest Ref Rng & Units 9/11/2018 8/3/2017 11/18/2016   Iron 35 - 180 ug/dL 85 79 82   Iron sat 15 - 46 % 37 34 34   Ferritin 26 - 388 ng/mL 761(H) 736(H) -     [facility] Txp Virology Latest Ref Rng & Units 10/7/2019 9/9/2019 8/26/2019   CVM DNA Quant - - - Plasma   CMV Quant <100 Copies/mL - - -   CMV QT Log <2.0 Log copies/mL - - -   BK Spec - Plasma Plasma -   BK Res BKNEG:BK Virus DNA Not Detected copies/mL BK Virus DNA Not  Detected BK Virus DNA Not Detected -   BK Log <2.7 Log copies/mL Not Calculated Not Calculated -   Hep B Core NR:Nonreactive - - -   Hep B Surf - - - -   HIV 1&2 NEG - - -        Recent Labs   Lab Test 10/21/19  0920 12/16/19  1015 01/13/20  0757   DOSTAC LAST DOSE 915 PM 10/20 12/15/19 2200 1/12/20@2000   TACROL 9.7 11.7 8.8     Recent Labs   Lab Test 02/11/19  0740 02/14/19  0728 02/21/19  0749   DOSMPA LAST DOSE 8:00PM AT 2.10.2019 LAST DOSE 8:00PM 2/13/2019 02/20/2019 AT 0730 PM   MPACID 2.04 3.64* 1.41   MPAG 50.8 43.4 60.7     Scribe Disclosure:  ISerge, am serving as a scribe to document services personally performed by Kidney/Pancreas Recipient at this visit, based upon the provider's statements to me. All documentation has been reviewed by the aforementioned provider prior to being entered into the official medical record.     ISerge, a scribe, prepared the chart for today's encounter.     Again, thank you for allowing me to participate in the care of your patient.      Sincerely,    Kidney/Pancreas Recipient

## 2020-01-20 NOTE — LETTER
2020      RE: Amadou Lewis  [address]  [place] [address]       CHRONIC TRANSPLANT NEPHROLOGY VISIT    Assessment & Plan   # DDKT (SPK): Stable              - Baseline Cr ~ 1.0 mg/dL              - Proteinuria: Not checked recently              - Date DSA Last Checked: 2019       Latest DSA: No              - BK Viremia: No              - Kidney Tx Biopsy: No     # Pancreas Tx (SPK):              - Pancreatic Exocrine Drainage: Enteric drained                     - Blood glucose: Euglycemia              - HbA1c: Not checked recently                  - Pancreatic enzymes: Stable              - Date DSA Last Checked: 2019 Latest DSA: No              - Pancreas Tx Biopsy: No    # Immunosuppression: Tacrolimus immediate release (goal 6-8) and Mycophenolic acid (goal 1-3.5)   - Changes: No    # Prophylaxis:   - PJP: Sulfa/TMP (Bactrim)    - CMV: None    # Hypertension: Controlled; Goal BP: < 130/80   - Changes: No    # Anemia in Chronic Renal Disease: Hgb: Stable      RERE: No   - Iron studies: Not checked recently    # Mineral Bone Disorder:   - Secondary renal hyperparathyroidism; PTH level: Minimally elevated ( pg/ml)  - Vitamin D; level: Normal  - Calcium; level: Normal  - Phosphorus; level: Normal     # Electrolytes:   - Potassium; level: Normal  - Magnesium; level: Normal  - Bicarbonate; level: Normal. Will stop supplements.   - Sodium; level: Normal    # Skin Cancer Risk:    - Discussed sun protection and recommend regular follow up with Dermatology.    # Medical Compliance: Yes    # Transplant History:  Etiology of kidney failure: Diabetes mellitus type 1  Tx: DDKT (SPK)  Transplant: 2018 (Kidney / Pancreas), 10/10/2013 (Kidney)  Donor Type:  - Brain Death            Donor Class:   Significant changes in immunosuppression: None  Significant transplant-related complications: None    Transplant Office Phone Number: [phone]    Assessment and plan was  discussed with the patient and he voiced his understanding and agreement.    Return visit: 3 months  Chief Complaint   Mr. Lewis is a 54 year old here for routine follow up.    History of Present Illness    Mr. Lewis is a 53 year old here for routine follow up. The patient was last evaluated by Dr. Dick on 10/07/2019. At the time no changes were made to medication. Please see that note for additional information. Since the no hospitalizations or new medical issues.     Today the patient is doing well although he still complains of weight gain despite eating healthy and staying active. No chest pain, shortness of breath, fever, sweats, or chills. No lower extremity edema. His job is laborious but he does not go to the gym.     Recent Hospitalizations:  [x] No [] Yes    New Medical Issues: [x] No [] Yes    Decreased energy: [x] No [] Yes    Chest pain or SOB with exertion:  [x] No [] Yes    Appetite change or weight change: [x] No [] Yes    Nausea, vomiting or diarrhea:  [x] No [] Yes    Fever, sweats or chills: [x] No [] Yes    Leg swelling: [x] No [] Yes      Home BP: goal    Review of Systems   A comprehensive review of systems was obtained and negative, except as noted in the HPI or PMH.    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)     Diabetes mellitus with background retinopathy (H)     Polycystic kidney     NONSPECIFIC MEDICAL HISTORY     Coronary artery disease     Retinopathy     Hyperlipidemia LDL goal <70     Premature ventricular contractions (PVCs) (VPCs)     Kidney replaced by transplant     Immunosuppressed status (H)     Kidney transplant rejection     Hypertension     Anemia in chronic renal disease     Care after organ transplant     Esophageal ulcer     Status post simultaneous kidney and pancreas transplant (H)     Other chronic pain     Nausea     Drug induced constipation       Social History   Social History     Tobacco Use      Smoking status: Never Smoker     Smokeless tobacco: Never Used   Substance Use Topics     Alcohol use: Yes     Frequency: Monthly or less     Drug use: No       Allergies   Allergies   Allergen Reactions     No Known Allergies        Medications   Current Outpatient Medications   Medication Sig     aspirin 81 MG EC tablet Take 1 tablet (81 mg) by mouth daily     atorvastatin (LIPITOR) 20 MG tablet Take 1 tablet (20 mg) by mouth daily     blood glucose monitoring (NO BRAND SPECIFIED) test strip Use to test blood sugar 4 times daily or as directed.     cholecalciferol 50 MCG (2000 UT) CAPS Take 2,000 Units by mouth daily     fludrocortisone (FLORINEF) 0.1 MG tablet Take 1 tablet (0.1 mg) by mouth three times a week     HYDROcodone-acetaminophen (NORCO) 5-325 MG tablet Take 1 tablet by mouth every 6 hours as needed for severe pain     magnesium oxide (MAG-OX) 400 MG tablet Take 2 tablets (800mg) at noon and 2 tablets (800mg) at 6pm.     mycophenolic acid (GENERIC EQUIVALENT) 180 MG EC tablet Take 3 tablets (540 mg) by mouth 2 times daily     sulfamethoxazole-trimethoprim (BACTRIM/SEPTRA) 400-80 MG tablet Take 1 tablet by mouth daily     tacrolimus (GENERIC EQUIVALENT) 0.5 MG capsule Take 1 capsule (0.5 mg) by mouth 2 times daily Total dose = 1.5 mg BID     tacrolimus (GENERIC EQUIVALENT) 1 MG capsule Take 1 capsule (1 mg) by mouth 2 times daily Total dose = 1.5 mg BID     No current facility-administered medications for this visit.      There are no discontinued medications.    Physical Exam   Vital Signs: /77   Pulse 84   Wt 85.9 kg (189 lb 4.8 oz)   SpO2 95%   BMI 29.65 kg/m       GENERAL APPEARANCE: alert and no distress  HENT: mouth without ulcers or lesions  LYMPHATICS: no cervical or supraclavicular nodes  RESP: lungs clear to auscultation - no rales, rhonchi or wheezes  CV: regular rhythm, normal rate, no rub, no murmur  EDEMA: no LE edema bilaterally  ABDOMEN: soft, nondistended, nontender, bowel  sounds normal  MS: extremities normal - no gross deformities noted, no evidence of inflammation in joints, no muscle tenderness  SKIN: no rash      Data     Renal Latest Ref Rng & Units 1/13/2020 12/16/2019 10/21/2019   Na 133 - 144 mmol/L 139 141 137   K 3.4 - 5.3 mmol/L 5.0 4.9 4.6   Cl 94 - 109 mmol/L 110(H) 113(H) 110(H)   CO2 20 - 32 mmol/L 22 23 22   BUN 7 - 30 mg/dL 24 23 23   Cr 0.66 - 1.25 mg/dL 1.03 1.00 1.18   Cr (external) 0.5 - 1.5 mg/dL - - -   Glucose 70 - 99 mg/dL 85 72 83   Ca  8.5 - 10.1 mg/dL 9.5 9.0 8.9   Mg 1.6 - 2.3 mg/dL - - -     Bone Health Latest Ref Rng & Units 3/7/2019 2/14/2019 2/11/2019   Phos 2.5 - 4.5 mg/dL - 4.1 3.6   PTHi 18 - 80 pg/mL 104(H) - -   Vit D Def 20 - 75 ug/L 36 - -     Heme Latest Ref Rng & Units 1/13/2020 12/16/2019 10/21/2019   WBC 4.0 - 11.0 10e9/L 4.8 5.3 3.7(L)   Hgb 13.3 - 17.7 g/dL 17.5 16.3 16.4   Plt 150 - 450 10e9/L 218 203 215     Liver Latest Ref Rng & Units 11/11/2018 6/27/2018 8/3/2017   AP 40 - 150 U/L 189(H) - -   TBili 0.2 - 1.3 mg/dL 0.3 - -   ALT 0 - 70 U/L 14 - -   AST 0 - 45 U/L 9 - -   Tot Protein 6.8 - 8.8 g/dL 7.6 - -   Albumin 3.4 - 5.0 g/dL 4.0 4.2 4.2     Pancreas Latest Ref Rng & Units 1/13/2020 12/16/2019 10/21/2019   A1C 0 - 5.6 % - 5.4 -   Amylase 30 - 110 U/L 71 66 55   Lipase 73 - 393 U/L 147 126 109     Iron studies Latest Ref Rng & Units 9/11/2018 8/3/2017 11/18/2016   Iron 35 - 180 ug/dL 85 79 82   Iron sat 15 - 46 % 37 34 34   Ferritin 26 - 388 ng/mL 761(H) 736(H) -     [facility] Txp Virology Latest Ref Rng & Units 10/7/2019 9/9/2019 8/26/2019   CVM DNA Quant - - - Plasma   CMV Quant <100 Copies/mL - - -   CMV QT Log <2.0 Log copies/mL - - -   BK Spec - Plasma Plasma -   BK Res BKNEG:BK Virus DNA Not Detected copies/mL BK Virus DNA Not Detected BK Virus DNA Not Detected -   BK Log <2.7 Log copies/mL Not Calculated Not Calculated -   Hep B Core NR:Nonreactive - - -   Hep B Surf - - - -   HIV 1&2 NEG - - -        Recent Labs   Lab Test  10/21/19  0920 12/16/19  1015 01/13/20  0757   DOSTAC LAST DOSE 915 PM 10/20 12/15/19 2200 1/12/20@2000   TACROL 9.7 11.7 8.8     Recent Labs   Lab Test 02/11/19  0740 02/14/19  0728 02/21/19  0749   DOSMPA LAST DOSE 8:00PM AT 2.10.2019 LAST DOSE 8:00PM 2/13/2019 02/20/2019 AT 0730 PM   MPACID 2.04 3.64* 1.41   MPAG 50.8 43.4 60.7     Scribe Disclosure:  ISerge, am serving as a scribe to document services personally performed by Kidney/Pancreas Recipient at this visit, based upon the provider's statements to me. All documentation has been reviewed by the aforementioned provider prior to being entered into the official medical record.     Serge SWANN, a scribe, prepared the chart for today's encounter.     Kidney/Pancreas Recipient

## 2020-01-21 ENCOUNTER — TELEPHONE (OUTPATIENT)
Dept: TRANSPLANT | Facility: CLINIC | Age: 55
End: 2020-01-21

## 2020-01-21 NOTE — TELEPHONE ENCOUNTER
Sean did pay out of pocket to [facility] for a small supply of tacro, but he did receive all doses needed and expects to be reimbursed.  He will need to fill now through [facility].    Preferred pharmacy updated in [facility].

## 2020-02-10 DIAGNOSIS — Z94.83 PANCREAS REPLACED BY TRANSPLANT (H): ICD-10-CM

## 2020-02-10 DIAGNOSIS — G47.9 SLEEP DISORDER: ICD-10-CM

## 2020-02-10 DIAGNOSIS — Z94.0 KIDNEY REPLACED BY TRANSPLANT: ICD-10-CM

## 2020-02-10 LAB
AMYLASE SERPL-CCNC: 58 U/L (ref 30–110)
ANION GAP SERPL CALCULATED.3IONS-SCNC: 3 MMOL/L (ref 3–14)
BUN SERPL-MCNC: 18 MG/DL (ref 7–30)
CALCIUM SERPL-MCNC: 9.3 MG/DL (ref 8.5–10.1)
CHLORIDE SERPL-SCNC: 113 MMOL/L (ref 94–109)
CO2 SERPL-SCNC: 24 MMOL/L (ref 20–32)
CREAT SERPL-MCNC: 0.95 MG/DL (ref 0.66–1.25)
ERYTHROCYTE [DISTWIDTH] IN BLOOD BY AUTOMATED COUNT: 14.7 % (ref 10–15)
GFR SERPL CREATININE-BSD FRML MDRD: 89 ML/MIN/{1.73_M2}
GLUCOSE SERPL-MCNC: 95 MG/DL (ref 70–99)
HCT VFR BLD AUTO: 50.4 % (ref 40–53)
HGB BLD-MCNC: 16.6 G/DL (ref 13.3–17.7)
LIPASE SERPL-CCNC: 120 U/L (ref 73–393)
MCH RBC QN AUTO: 29.2 PG (ref 26.5–33)
MCHC RBC AUTO-ENTMCNC: 32.9 G/DL (ref 31.5–36.5)
MCV RBC AUTO: 89 FL (ref 78–100)
PLATELET # BLD AUTO: 199 10E9/L (ref 150–450)
POTASSIUM SERPL-SCNC: 4.7 MMOL/L (ref 3.4–5.3)
RBC # BLD AUTO: 5.69 10E12/L (ref 4.4–5.9)
SODIUM SERPL-SCNC: 140 MMOL/L (ref 133–144)
WBC # BLD AUTO: 4 10E9/L (ref 4–11)

## 2020-02-10 PROCEDURE — 36415 COLL VENOUS BLD VENIPUNCTURE: CPT | Performed by: INTERNAL MEDICINE

## 2020-02-10 PROCEDURE — 83690 ASSAY OF LIPASE: CPT | Performed by: INTERNAL MEDICINE

## 2020-02-10 PROCEDURE — 82150 ASSAY OF AMYLASE: CPT | Performed by: INTERNAL MEDICINE

## 2020-02-10 PROCEDURE — 80048 BASIC METABOLIC PNL TOTAL CA: CPT | Performed by: INTERNAL MEDICINE

## 2020-02-10 PROCEDURE — 80197 ASSAY OF TACROLIMUS: CPT | Performed by: INTERNAL MEDICINE

## 2020-02-10 PROCEDURE — 85027 COMPLETE CBC AUTOMATED: CPT | Performed by: INTERNAL MEDICINE

## 2020-02-11 ENCOUNTER — TELEPHONE (OUTPATIENT)
Dept: TRANSPLANT | Facility: CLINIC | Age: 55
End: 2020-02-11

## 2020-02-11 DIAGNOSIS — Z94.0 KIDNEY REPLACED BY TRANSPLANT: ICD-10-CM

## 2020-02-11 DIAGNOSIS — Z94.0 KIDNEY TRANSPLANTED: Primary | ICD-10-CM

## 2020-02-11 DIAGNOSIS — Z94.83 PANCREAS REPLACED BY TRANSPLANT (H): ICD-10-CM

## 2020-02-11 DIAGNOSIS — Z94.83 PANCREAS TRANSPLANTED (H): ICD-10-CM

## 2020-02-11 LAB
TACROLIMUS BLD-MCNC: 9.2 UG/L (ref 5–15)
TME LAST DOSE: NORMAL H

## 2020-02-11 RX ORDER — TACROLIMUS 0.5 MG/1
CAPSULE ORAL
Qty: 60 CAPSULE | Refills: 11 | Status: SHIPPED | OUTPATIENT
Start: 2020-02-11 | End: 2020-02-25

## 2020-02-11 RX ORDER — TACROLIMUS 1 MG/1
1 CAPSULE ORAL 2 TIMES DAILY
Qty: 60 CAPSULE | Refills: 11 | Status: SHIPPED | OUTPATIENT
Start: 2020-02-11 | End: 2020-02-25

## 2020-02-11 NOTE — TELEPHONE ENCOUNTER
ISSUE:   Tacrolimus IR level 9.2 on 2/10, goal 5-8, dose 1.5 mg BID.    PLAN:   Please call patient and confirm this was an accurate 12-hour trough. Verify Tacrolimus IR dose 1.5 mg BID. Confirm no new medications or illness. Confirm no missed doses. If accurate trough and accurate dose, decrease Tacrolimus IR dose to 1 mg BID and repeat labs in 2 weeks (please enter orders for BMP, amylase, lipase, CBC and tacro).    Alesha Hartley RN      OUTCOME:   Spoke with patient, they confirm accurate trough level and current dose 1.5 mg BID. Patient confirmed dose change to 1 mg BID and to repeat labs in 2 weeks. Orders sent to preferred pharmacy for dose change and lab for repeat labs. Patient voiced understanding of plan.

## 2020-02-16 NOTE — TELEPHONE ENCOUNTER
Overdue for labs - Sean reports he has a lab appt set for Friday. He has appt here next Monday, labs were not done last week as he was on vacation.   
RNCC left detailed VM - kidney and pancreas functioning well, not currently on any insulin. Direct line given, okay to call for further questions if need be.   
VM left 6/24/19 at 4:12p    MARCO MARAVILLA left a VM for the care coordinator looking for an update on the pts care. RN CM will be out of the office Tuesday 6/25/19.    
Name band;

## 2020-02-24 ENCOUNTER — OFFICE VISIT (OUTPATIENT)
Dept: ENDOCRINOLOGY | Facility: CLINIC | Age: 55
End: 2020-02-24
Payer: COMMERCIAL

## 2020-02-24 VITALS
OXYGEN SATURATION: 96 % | WEIGHT: 188.5 LBS | TEMPERATURE: 98.6 F | BODY MASS INDEX: 29.58 KG/M2 | DIASTOLIC BLOOD PRESSURE: 81 MMHG | HEART RATE: 92 BPM | SYSTOLIC BLOOD PRESSURE: 131 MMHG | HEIGHT: 67 IN

## 2020-02-24 DIAGNOSIS — E66.3 OVERWEIGHT (BMI 25.0-29.9): ICD-10-CM

## 2020-02-24 DIAGNOSIS — Z94.0 KIDNEY REPLACED BY TRANSPLANT: ICD-10-CM

## 2020-02-24 DIAGNOSIS — Z94.83 PANCREAS REPLACED BY TRANSPLANT (H): ICD-10-CM

## 2020-02-24 DIAGNOSIS — R63.5 WEIGHT GAIN: Primary | ICD-10-CM

## 2020-02-24 PROCEDURE — 80197 ASSAY OF TACROLIMUS: CPT | Performed by: INTERNAL MEDICINE

## 2020-02-24 PROCEDURE — 36415 COLL VENOUS BLD VENIPUNCTURE: CPT | Performed by: INTERNAL MEDICINE

## 2020-02-24 RX ORDER — TOPIRAMATE 25 MG/1
TABLET, FILM COATED ORAL
Qty: 90 TABLET | Refills: 1 | Status: CANCELLED | OUTPATIENT
Start: 2020-02-24

## 2020-02-24 ASSESSMENT — PATIENT HEALTH QUESTIONNAIRE - PHQ9
10. IF YOU CHECKED OFF ANY PROBLEMS, HOW DIFFICULT HAVE THESE PROBLEMS MADE IT FOR YOU TO DO YOUR WORK, TAKE CARE OF THINGS AT HOME, OR GET ALONG WITH OTHER PEOPLE: SOMEWHAT DIFFICULT
SUM OF ALL RESPONSES TO PHQ QUESTIONS 1-9: 12
SUM OF ALL RESPONSES TO PHQ QUESTIONS 1-9: 12

## 2020-02-24 ASSESSMENT — MIFFLIN-ST. JEOR: SCORE: 1643.66

## 2020-02-24 ASSESSMENT — PAIN SCALES - GENERAL: PAINLEVEL: MILD PAIN (3)

## 2020-02-24 NOTE — PROGRESS NOTES
Return Medical Weight Management Note     Amadou Lewis  MRN:  [id number]  :  1965  GIAN:  2020    Dear Dr Masoud Valentin,    I had the pleasure of seeing your patient Amadou Lewis. He is a 54 year old male who I am continuing to see for treatment of obesity related to:       2019   I have the following health issues associated with obesity: Type II Diabetes, Pre-Diabetes, Heart Disease, High Blood Pressure   I have the following symptoms associated with obesity: Depression       ASSESSMENT:   MWM follow up.  Hx of kidney/pancreas transplant with 18 lbs weight gain from his ethan weight 170 lbs.  Increased weight in abdomen is causing pain from enlarged kidneys to be worse.  He has made many diet changes and is very active but weight is still going up slowly.  He has gained 8 lbs since last visit.    PLAN:   Consider starting topiramate, will review with Dr Dick transplant nephrologist  Follow up [facility] pharmacist Lauren Bloch in 1 month  Follow up Leonila Dial in 3 months    INTERVAL HISTORY:  [facility] follow up.  Last visit 19 with me for New consult  2018 had kidney/pancreas transplant.  Was 170 lbs in Nov and has been gaining weight recently   Polycystic kidney and Type II DM was cause of kidney failure  He has worked with DM educators.    Struggling to get back to his ethan weight of 170 lbs.   Saw RD  He has gained 7 lbs since last visit  Weight thought to possibly be due to enlarging polycystic kidneys but recent CT scan kidney size appears stable  He is eating healthy and exercising but struggling with feeling hungry after his meals      CURRENT WEIGHT:   188 lbs 8 oz    Initial Weight (lbs): 181.2 lbs  Last Visits Weight: 82.2 kg (181 lb 3.2 oz)  Cumulative weight loss (lbs): -7.3  Weight Loss Percentage: -4.03%  Waist Circumference (cm): 113 cm    MEDICATIONS:   Current Outpatient Medications   Medication Sig Dispense Refill     aspirin 81 MG EC tablet Take 1 tablet (81 mg) by mouth  "daily 30 tablet 5     atorvastatin (LIPITOR) 20 MG tablet Take 1 tablet (20 mg) by mouth daily 90 tablet 3     blood glucose monitoring (NO BRAND SPECIFIED) test strip Use to test blood sugar 4 times daily or as directed. 1 Box prn     cholecalciferol 50 MCG (2000 UT) CAPS Take 2,000 Units by mouth daily 90 capsule 3     fludrocortisone (FLORINEF) 0.1 MG tablet Take 1 tablet (0.1 mg) by mouth three times a week 15 tablet 11     HYDROcodone-acetaminophen (NORCO) 5-325 MG tablet Take 1 tablet by mouth every 6 hours as needed for severe pain       magnesium oxide (MAG-OX) 400 MG tablet Take 2 tablets (800mg) at noon and 2 tablets (800mg) at 6pm. 120 tablet 3     mycophenolic acid (GENERIC EQUIVALENT) 180 MG EC tablet Take 3 tablets (540 mg) by mouth 2 times daily 540 tablet 3     sulfamethoxazole-trimethoprim (BACTRIM/SEPTRA) 400-80 MG tablet Take 1 tablet by mouth daily 30 tablet 11     tacrolimus (GENERIC EQUIVALENT) 0.5 MG capsule HOLD 60 capsule 11     tacrolimus (GENERIC EQUIVALENT) 1 MG capsule Take 1 capsule (1 mg) by mouth 2 times daily Total dose = 1.0 mg BID 60 capsule 11       Weight Loss Medication History Reviewed With Patient 2/24/2020   Which weight loss medications are you currently taking on a regular basis?  None   Are you having any side effects from the weight loss medication that we have prescribed you? No        VITALS:  /81 (BP Location: Left arm, Patient Position: Sitting, Cuff Size: Adult Large)   Pulse 92   Temp 98.6  F (37  C) (Oral)   Ht 1.702 m (5' 7\")   Wt 85.5 kg (188 lb 8 oz)   SpO2 96%   BMI 29.52 kg/m      FOLLOW-UP:    12 weeks.    Time: 15 min spent on evaluation, management, counseling, education, & motivational interviewing with greater than 50 % of the total time was spent on counseling and coordinating care    Sincerely,    Leonila Dial PA-C     Answers for HPI/ROS submitted by the patient on 2/24/2020   If you checked off any problems, how difficult have " these problems made it for you to do your work, take care of things at home, or get along with other people?: Somewhat difficult  PHQ9 TOTAL SCORE: 12

## 2020-02-24 NOTE — PATIENT INSTRUCTIONS
MEDICATION STARTED AT THIS APPOINTMENT  We are starting topiramate at bedtime.  Start one tab, 25 mg, for a week. Go up to 50 mg (2 tabs) for the next week. At the third week, take   3 tabs (75 mg).  Stay at 3 tabs until you are seen again. Call the nurse at [phone] if you have any questions or concerns. (Do not stop taking it if you don't think it's working. For some people it works even though they do not feel much different.)    Topiramate (Topamax) is a medication that is used most often to treat migraine headaches or for seizures. It has also been found to help with weight loss. Although it's not currently FDA approved for weight loss, it has been used safely for a number of years to help people who are carrying extra weight.     Just how topiramate helps with weight loss has not been exactly determined. However it seems to work on areas of the brain to quiet down signals related to eating.      Topiramate may make you:    >feel less interest in eating in between meals   >think less about food and eating   >find it easier to push the plate away   >find giving up pop easier    >have an easier time eating less    For some of our patients, the pills work right away. They feel and think quite differently about food. Other patients don't feel much of a change but find in fact they have lost weight! Like all weight loss medications, topiramate works best when you help it work.  This means:    1) Have less tempting high calorie (fattening) food around the house or office    2) Have lower calorie food (fruits, vegetables,low fat meats and dairy) for snacks    3) Eat out only one time or less each week.   4) Eat your meals at a table with the TV or computer off.    Side-effects. Topiramate is generally well tolerated. The main side-effects we see are:   Tingling in hands,feet, or face (usually not very troublesome)   Mental confusion and word finding trouble (about 10% of patients have this.)     Feeling sleepy  or a bit dopey- this goes away very soon after starting.    One of the dangers of topiramate is the possibility of birth defects--if you get pregnant when you are on it, there is the risk that your baby will be born with a cleft lip or palate.  If you are on topiramate and of child bearing age, you need to be on a reliable form of birth control or refrain from sexual intercourse.     Please refer to the pharmacy insert for more information on side-effects. Since many pharmacists are not familiar with the use of topiramate in weight loss, calling the clinic will get you the most accurate information on the use of this medication for weight loss.     In order to get refills of this or any medication we prescribe you must be seen in the medical weight mgmt clinic every 2-3 months. Please have your pharmacy fax a refill request to [phone].

## 2020-02-24 NOTE — LETTER
2020       RE: Amadou Lewis  [address]  [place] [address]     Dear Colleague,    Thank you for referring your patient, Amadou Lewis, to the [facility] MEDICAL WEIGHT MANAGEMENT at [facility]. Please see a copy of my visit note below.    Return Medical Weight Management Note     Amadou Lewis  MRN:  [id number]  :  1965  GIAN:  2020    Dear Dr Masoud Valentin,    I had the pleasure of seeing your patient Amadou Lewis. He is a 54 year old male who I am continuing to see for treatment of obesity related to:       2019   I have the following health issues associated with obesity: Type II Diabetes, Pre-Diabetes, Heart Disease, High Blood Pressure   I have the following symptoms associated with obesity: Depression       ASSESSMENT:   MWM follow up.  Hx of kidney/pancreas transplant with 18 lbs weight gain from his ethan weight 170 lbs.  Increased weight in abdomen is causing pain from enlarged kidneys to be worse.  He has made many diet changes and is very active but weight is still going up slowly.  He has gained 8 lbs since last visit.    PLAN:   Consider starting topiramate, will review with Dr Dick transplant nephrologist  Follow up [facility] pharmacist Lauren Bloch in 1 month  Follow up Leonila Dial in 3 months    INTERVAL HISTORY:  MW follow up.  Last visit 19 with me for New consult  2018 had kidney/pancreas transplant.  Was 170 lbs in Nov and has been gaining weight recently   Polycystic kidney and Type II DM was cause of kidney failure  He has worked with DM educators.    Struggling to get back to his ethan weight of 170 lbs.   Saw RD  He has gained 7 lbs since last visit  Weight thought to possibly be due to enlarging polycystic kidneys but recent CT scan kidney size appears stable  He is eating healthy and exercising but struggling with feeling hungry after his meals      CURRENT WEIGHT:   188 lbs 8 oz    Initial Weight (lbs):  "181.2 lbs  Last Visits Weight: 82.2 kg (181 lb 3.2 oz)  Cumulative weight loss (lbs): -7.3  Weight Loss Percentage: -4.03%  Waist Circumference (cm): 113 cm    MEDICATIONS:   Current Outpatient Medications   Medication Sig Dispense Refill     aspirin 81 MG EC tablet Take 1 tablet (81 mg) by mouth daily 30 tablet 5     atorvastatin (LIPITOR) 20 MG tablet Take 1 tablet (20 mg) by mouth daily 90 tablet 3     blood glucose monitoring (NO BRAND SPECIFIED) test strip Use to test blood sugar 4 times daily or as directed. 1 Box prn     cholecalciferol 50 MCG (2000 UT) CAPS Take 2,000 Units by mouth daily 90 capsule 3     fludrocortisone (FLORINEF) 0.1 MG tablet Take 1 tablet (0.1 mg) by mouth three times a week 15 tablet 11     HYDROcodone-acetaminophen (NORCO) 5-325 MG tablet Take 1 tablet by mouth every 6 hours as needed for severe pain       magnesium oxide (MAG-OX) 400 MG tablet Take 2 tablets (800mg) at noon and 2 tablets (800mg) at 6pm. 120 tablet 3     mycophenolic acid (GENERIC EQUIVALENT) 180 MG EC tablet Take 3 tablets (540 mg) by mouth 2 times daily 540 tablet 3     sulfamethoxazole-trimethoprim (BACTRIM/SEPTRA) 400-80 MG tablet Take 1 tablet by mouth daily 30 tablet 11     tacrolimus (GENERIC EQUIVALENT) 0.5 MG capsule HOLD 60 capsule 11     tacrolimus (GENERIC EQUIVALENT) 1 MG capsule Take 1 capsule (1 mg) by mouth 2 times daily Total dose = 1.0 mg BID 60 capsule 11       Weight Loss Medication History Reviewed With Patient 2/24/2020   Which weight loss medications are you currently taking on a regular basis?  None   Are you having any side effects from the weight loss medication that we have prescribed you? No        VITALS:  /81 (BP Location: Left arm, Patient Position: Sitting, Cuff Size: Adult Large)   Pulse 92   Temp 98.6  F (37  C) (Oral)   Ht 1.702 m (5' 7\")   Wt 85.5 kg (188 lb 8 oz)   SpO2 96%   BMI 29.52 kg/m       FOLLOW-UP:    12 weeks.    Time: 15 min spent on evaluation, management, " counseling, education, & motivational interviewing with greater than 50 % of the total time was spent on counseling and coordinating care    Sincerely,    Leonila Dial PA-C     Answers for HPI/ROS submitted by the patient on 2/24/2020   If you checked off any problems, how difficult have these problems made it for you to do your work, take care of things at home, or get along with other people?: Somewhat difficult  PHQ9 TOTAL SCORE: 12

## 2020-02-24 NOTE — NURSING NOTE
"(   Chief Complaint   Patient presents with     RECHECK     Weight management follow up.    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)     Diabetes mellitus with background retinopathy (H)     Polycystic kidney     NONSPECIFIC MEDICAL HISTORY     Coronary artery disease     Retinopathy     Hyperlipidemia LDL goal <70     Premature ventricular contractions (PVCs) (VPCs)     Kidney replaced by transplant     Immunosuppressed status (H)     Kidney transplant rejection     Hypertension     Anemia in chronic renal disease     Care after organ transplant     Esophageal ulcer     Status post simultaneous kidney and pancreas transplant (H)     Other chronic pain     Nausea     Drug induced constipation    )  (   Current Outpatient Medications   Medication Sig Dispense Refill     aspirin 81 MG EC tablet Take 1 tablet (81 mg) by mouth daily 30 tablet 5     atorvastatin (LIPITOR) 20 MG tablet Take 1 tablet (20 mg) by mouth daily 90 tablet 3     blood glucose monitoring (NO BRAND SPECIFIED) test strip Use to test blood sugar 4 times daily or as directed. 1 Box prn     cholecalciferol 50 MCG (2000 UT) CAPS Take 2,000 Units by mouth daily 90 capsule 3     fludrocortisone (FLORINEF) 0.1 MG tablet Take 1 tablet (0.1 mg) by mouth three times a week 15 tablet 11     HYDROcodone-acetaminophen (NORCO) 5-325 MG tablet Take 1 tablet by mouth every 6 hours as needed for severe pain       magnesium oxide (MAG-OX) 400 MG tablet Take 2 tablets (800mg) at noon and 2 tablets (800mg) at 6pm. 120 " tablet 3     mycophenolic acid (GENERIC EQUIVALENT) 180 MG EC tablet Take 3 tablets (540 mg) by mouth 2 times daily 540 tablet 3     sulfamethoxazole-trimethoprim (BACTRIM/SEPTRA) 400-80 MG tablet Take 1 tablet by mouth daily 30 tablet 11     tacrolimus (GENERIC EQUIVALENT) 0.5 MG capsule HOLD 60 capsule 11     tacrolimus (GENERIC EQUIVALENT) 1 MG capsule Take 1 capsule (1 mg) by mouth 2 times daily Total dose = 1.0 mg BID 60 capsule 11    )  ( Diabetes Eval:    )    ( Pain Eval:  Mild Pain (3) )    ( Wound Eval:       )    (   History   Smoking Status     Never Smoker   Smokeless Tobacco     Never Used    )    ( Signed By:  Edita Stanton CMA; February 24, 2020; 10:27 AM )

## 2020-02-25 DIAGNOSIS — E11.29 TYPE II DIABETES MELLITUS WITH RENAL MANIFESTATIONS (H): ICD-10-CM

## 2020-02-25 DIAGNOSIS — Z94.83 PANCREAS TRANSPLANTED (H): ICD-10-CM

## 2020-02-25 DIAGNOSIS — E66.9 OBESITY: Primary | ICD-10-CM

## 2020-02-25 DIAGNOSIS — Z94.83 PANCREAS REPLACED BY TRANSPLANT (H): ICD-10-CM

## 2020-02-25 DIAGNOSIS — Z94.0 KIDNEY TRANSPLANTED: ICD-10-CM

## 2020-02-25 DIAGNOSIS — Z94.0 KIDNEY REPLACED BY TRANSPLANT: Primary | ICD-10-CM

## 2020-02-25 LAB
TACROLIMUS BLD-MCNC: 4.8 UG/L (ref 5–15)
TME LAST DOSE: ABNORMAL H

## 2020-02-25 RX ORDER — TACROLIMUS 1 MG/1
1 CAPSULE ORAL 2 TIMES DAILY
Qty: 60 CAPSULE | Refills: 11 | Status: SHIPPED | OUTPATIENT
Start: 2020-02-25 | End: 2020-05-01

## 2020-02-25 RX ORDER — TOPIRAMATE 25 MG/1
TABLET, FILM COATED ORAL
Qty: 90 TABLET | Refills: 1 | Status: SHIPPED | OUTPATIENT
Start: 2020-02-25 | End: 2020-04-30

## 2020-02-25 RX ORDER — TACROLIMUS 0.5 MG/1
0.5 CAPSULE ORAL EVERY MORNING
Qty: 30 CAPSULE | Refills: 11 | Status: SHIPPED | OUTPATIENT
Start: 2020-02-25 | End: 2020-05-01

## 2020-02-25 NOTE — TELEPHONE ENCOUNTER
Tacrolimus IR level 4.8 on 2/24, goal 5-8, dose 1 mg BID.     PLAN:   Please call patient and confirm this was an accurate 12-hour trough. Verify Tacrolimus IR dose 1 mg BID. Confirm no new medications or illness. Confirm no missed doses. If accurate trough and accurate dose, increase Tacrolimus IR dose to 1.5mg AM, 1 mg PM and repeat labs in 2 week (as scheduled).

## 2020-02-25 NOTE — TELEPHONE ENCOUNTER
Per Leonila Dial PA-C and Dr. Dick ok to start Topiramate ramp to 75mg. Patient notified via [employer]. Patient should follow up with Lauren Bloch by phone in 1 month. Rx sent to pharmacy.

## 2020-03-23 ENCOUNTER — TELEPHONE (OUTPATIENT)
Dept: TRANSPLANT | Facility: CLINIC | Age: 55
End: 2020-03-23

## 2020-03-23 DIAGNOSIS — Z94.83 PANCREAS REPLACED BY TRANSPLANT (H): ICD-10-CM

## 2020-03-23 DIAGNOSIS — Z94.0 KIDNEY REPLACED BY TRANSPLANT: ICD-10-CM

## 2020-03-23 LAB
AMYLASE SERPL-CCNC: 64 U/L (ref 30–110)
ANION GAP SERPL CALCULATED.3IONS-SCNC: 4 MMOL/L (ref 3–14)
BUN SERPL-MCNC: 23 MG/DL (ref 7–30)
CALCIUM SERPL-MCNC: 9 MG/DL (ref 8.5–10.1)
CHLORIDE SERPL-SCNC: 113 MMOL/L (ref 94–109)
CO2 SERPL-SCNC: 22 MMOL/L (ref 20–32)
CREAT SERPL-MCNC: 1.19 MG/DL (ref 0.66–1.25)
ERYTHROCYTE [DISTWIDTH] IN BLOOD BY AUTOMATED COUNT: 15.8 % (ref 10–15)
GFR SERPL CREATININE-BSD FRML MDRD: 68 ML/MIN/{1.73_M2}
GLUCOSE SERPL-MCNC: 81 MG/DL (ref 70–99)
HCT VFR BLD AUTO: 51.8 % (ref 40–53)
HGB BLD-MCNC: 17.6 G/DL (ref 13.3–17.7)
LIPASE SERPL-CCNC: 151 U/L (ref 73–393)
MCH RBC QN AUTO: 29.4 PG (ref 26.5–33)
MCHC RBC AUTO-ENTMCNC: 34 G/DL (ref 31.5–36.5)
MCV RBC AUTO: 87 FL (ref 78–100)
PLATELET # BLD AUTO: 196 10E9/L (ref 150–450)
POTASSIUM SERPL-SCNC: 4.3 MMOL/L (ref 3.4–5.3)
RBC # BLD AUTO: 5.98 10E12/L (ref 4.4–5.9)
SODIUM SERPL-SCNC: 139 MMOL/L (ref 133–144)
TACROLIMUS BLD-MCNC: 8.2 UG/L (ref 5–15)
TME LAST DOSE: NORMAL H
WBC # BLD AUTO: 5.2 10E9/L (ref 4–11)

## 2020-03-23 PROCEDURE — 82150 ASSAY OF AMYLASE: CPT | Performed by: INTERNAL MEDICINE

## 2020-03-23 PROCEDURE — 83690 ASSAY OF LIPASE: CPT | Performed by: INTERNAL MEDICINE

## 2020-03-23 PROCEDURE — 85027 COMPLETE CBC AUTOMATED: CPT | Performed by: INTERNAL MEDICINE

## 2020-03-23 PROCEDURE — 80048 BASIC METABOLIC PNL TOTAL CA: CPT | Performed by: INTERNAL MEDICINE

## 2020-03-23 PROCEDURE — 80197 ASSAY OF TACROLIMUS: CPT | Performed by: INTERNAL MEDICINE

## 2020-03-23 PROCEDURE — 36415 COLL VENOUS BLD VENIPUNCTURE: CPT | Performed by: INTERNAL MEDICINE

## 2020-03-23 NOTE — TELEPHONE ENCOUNTER
ISSUE:  hgb continues to rise.  Rise in creatinine from baseline 1.0 to 1.2.    PLAN:  Obtain BPs.  If florinef not needed, wean.  If BPs WNL, discuss enalapril with MD.    OUTCOME:  Sean robin  reporting BPs to be 108 / 64.  Will make no changes at this time.

## 2020-03-31 ENCOUNTER — TELEPHONE (OUTPATIENT)
Dept: TRANSPLANT | Facility: CLINIC | Age: 55
End: 2020-03-31

## 2020-03-31 DIAGNOSIS — D75.1 PTE (POST-TRANSPLANT ERYTHROCYTOSIS): Primary | ICD-10-CM

## 2020-03-31 DIAGNOSIS — Z94.0 KIDNEY REPLACED BY TRANSPLANT: ICD-10-CM

## 2020-03-31 NOTE — TELEPHONE ENCOUNTER
Post Kidney and Pancreas Transplant Team Conference  Date: 3/31/2020  Transplant Coordinator: Alesha Hartley     Attendees, via telephone:  [x]  Dr. Wolfe  [] Shari Chapin LPN     []  Dr. Dick [] Anu Hanson, JANICE [] Shayna Chavez LPN   []  Dr. Tate [] Glenis Ko RN     [] Sailaja Choi RN [] Luke Tesfaye, PharmD   [] Dr. Pal [x] Zora Hartley RN    [] Dr. Luevano [] Joey Goldberg RN    [] Dr. Reyes [] Kell Perez RN    [] Dr. Caputo [] Linh Oates RN     [] Angella Multani RN    [] Surgery Fellow [] Majo Weeks RN    [] Sandy Cole NP [] Becca Gregory RN        Verbal Plan Read Back:   For elevate hemoglobin, will start enalapril 2.5mg at night.    Routed to RN Coordinator   JANICE Bojorquez voiced understanding. He will repeat labs in 1-2 weeks and call RNCC with update as to any unwanted side effects.  Rx sent to preferred pharmacy.

## 2020-04-01 RX ORDER — ENALAPRIL MALEATE 2.5 MG/1
2.5 TABLET ORAL AT BEDTIME
Qty: 30 TABLET | Refills: 11 | Status: SHIPPED | OUTPATIENT
Start: 2020-04-01 | End: 2020-04-20

## 2020-04-20 ENCOUNTER — TELEPHONE (OUTPATIENT)
Dept: TRANSPLANT | Facility: CLINIC | Age: 55
End: 2020-04-20

## 2020-04-20 NOTE — TELEPHONE ENCOUNTER
Sean reports that he started to get GI upset, diarrhea, then nausea and vomiting. This started about 4-5 days after starting the enalapril.    He has now been off of the enalapril for >1 week and GI issues are resolved.      RNCC did check Up to Date, which does list GI upset as a rare, but occurring side effect.    BPs are soft (no lower than 110) in the AM, 120s in the afternoon.    Sean does endorse that he has been having more headaches.    Sean will have labs done this week, message sent to schedulers for appt at [facility].    Will discuss with MD for alternative to enalapril.   DC instructions

## 2020-04-21 ENCOUNTER — TELEPHONE (OUTPATIENT)
Dept: TRANSPLANT | Facility: CLINIC | Age: 55
End: 2020-04-21

## 2020-04-21 NOTE — TELEPHONE ENCOUNTER
Sean was having pain in his neck, his PCP wants to prescribe 20mg pred for 5 days, but asked to clear it with transplant first.    RNCC advised that yes, this is okay for a short burst of prednisone.    Sean continues to have headaches, could be multiple contributing factors. RNCC advised that if headaches are not relieved after neck pain improved, then please notify RNCC.  He is on hydrocodone for kidney pain (per Dr. Rushing), but it does not help with his headaches.    Sean reports he has lost 11 lbs after starting with MWM.

## 2020-04-21 NOTE — TELEPHONE ENCOUNTER
Post Kidney and Pancreas Transplant Team Conference  Date: 4/20/2020  Transplant Coordinator: Alesha Hartley     Attendees:  []  Dr. Wolfe  [] Shari Chapin LPN     [x]  Dr. Dick [] Anu Hanson RN [] Shayna Chavez LPN   []  Dr. Tate [] Glenis Ko RN     [] Sailaja Choi RN [] Luke Tesfaye, PharmD   [] Dr. Pal [x] Zora Hartley RN    [] Dr. Luevano [] Joey Goldberg RN    [] Dr. Reyes [] Kell Perez RN    [] Dr. Caputo [] Linh Oates RN     [] Angella Multani RN    [] Surgery Fellow [] Majo Weeks RN    [] Sandy Cole, NP [] Becca Gregory RN        Verbal Plan Read Back:   Okay to stop enalapril d/t GI issues.  No need for additional PTE treatment at this time.     Routed to RN Coordinator   Alesha Hartley RN

## 2020-04-27 DIAGNOSIS — Z94.0 KIDNEY REPLACED BY TRANSPLANT: ICD-10-CM

## 2020-04-27 DIAGNOSIS — Z94.83 PANCREAS REPLACED BY TRANSPLANT (H): ICD-10-CM

## 2020-04-27 LAB
ERYTHROCYTE [DISTWIDTH] IN BLOOD BY AUTOMATED COUNT: 15.9 % (ref 10–15)
HCT VFR BLD AUTO: 50.3 % (ref 40–53)
HGB BLD-MCNC: 17.4 G/DL (ref 13.3–17.7)
LIPASE SERPL-CCNC: 150 U/L (ref 73–393)
MCH RBC QN AUTO: 29.8 PG (ref 26.5–33)
MCHC RBC AUTO-ENTMCNC: 34.6 G/DL (ref 31.5–36.5)
MCV RBC AUTO: 86 FL (ref 78–100)
PLATELET # BLD AUTO: 190 10E9/L (ref 150–450)
RBC # BLD AUTO: 5.83 10E12/L (ref 4.4–5.9)
TACROLIMUS BLD-MCNC: 5 UG/L (ref 5–15)
TME LAST DOSE: NORMAL H
WBC # BLD AUTO: 6.3 10E9/L (ref 4–11)

## 2020-04-27 PROCEDURE — 36415 COLL VENOUS BLD VENIPUNCTURE: CPT | Performed by: INTERNAL MEDICINE

## 2020-04-27 PROCEDURE — 80197 ASSAY OF TACROLIMUS: CPT | Performed by: INTERNAL MEDICINE

## 2020-04-27 PROCEDURE — 83690 ASSAY OF LIPASE: CPT | Performed by: INTERNAL MEDICINE

## 2020-04-27 PROCEDURE — 80048 BASIC METABOLIC PNL TOTAL CA: CPT | Performed by: INTERNAL MEDICINE

## 2020-04-27 PROCEDURE — 82150 ASSAY OF AMYLASE: CPT | Performed by: INTERNAL MEDICINE

## 2020-04-27 PROCEDURE — 85027 COMPLETE CBC AUTOMATED: CPT | Performed by: INTERNAL MEDICINE

## 2020-04-28 ENCOUNTER — TELEPHONE (OUTPATIENT)
Dept: TRANSPLANT | Facility: CLINIC | Age: 55
End: 2020-04-28

## 2020-04-28 DIAGNOSIS — E66.9 OBESITY: ICD-10-CM

## 2020-04-28 DIAGNOSIS — E11.29 TYPE II DIABETES MELLITUS WITH RENAL MANIFESTATIONS (H): ICD-10-CM

## 2020-04-28 LAB
AMYLASE SERPL-CCNC: 61 U/L (ref 30–110)
ANION GAP SERPL CALCULATED.3IONS-SCNC: 7 MMOL/L (ref 3–14)
BUN SERPL-MCNC: 25 MG/DL (ref 7–30)
CALCIUM SERPL-MCNC: 8.9 MG/DL (ref 8.5–10.1)
CHLORIDE SERPL-SCNC: 111 MMOL/L (ref 94–109)
CO2 SERPL-SCNC: 21 MMOL/L (ref 20–32)
CREAT SERPL-MCNC: 1.2 MG/DL (ref 0.66–1.25)
GFR SERPL CREATININE-BSD FRML MDRD: 67 ML/MIN/{1.73_M2}
GLUCOSE SERPL-MCNC: 84 MG/DL (ref 70–99)
POTASSIUM SERPL-SCNC: 4 MMOL/L (ref 3.4–5.3)
SODIUM SERPL-SCNC: 139 MMOL/L (ref 133–144)

## 2020-04-28 NOTE — TELEPHONE ENCOUNTER
ISSUE:  Creatinine 1.2, above baseline of 1.0    PLAN:  Ensure no dehydration.   Ensure no s/s of UTI.    OUTCOME:  Denies any recent illness.    Sean has decreased his caffeine intake, but he did note that he reduced his water intake as well and has had some intermittent bouts of lightheadedness.    RNCC advised to hydrate well and repeat labs on / around 5/11/2020.    He does have ongoing headaches (neck pain is ongoing). Dull.    Will review with MDs.

## 2020-04-29 ENCOUNTER — TELEPHONE (OUTPATIENT)
Dept: TRANSPLANT | Facility: CLINIC | Age: 55
End: 2020-04-29

## 2020-04-29 DIAGNOSIS — Q61.2 POLYCYSTIC KIDNEY, ADULT TYPE: ICD-10-CM

## 2020-04-29 DIAGNOSIS — Z94.83 PANCREAS TRANSPLANTED (H): ICD-10-CM

## 2020-04-29 DIAGNOSIS — Z94.0 KIDNEY REPLACED BY TRANSPLANT: ICD-10-CM

## 2020-04-29 DIAGNOSIS — Z94.83 PANCREAS REPLACED BY TRANSPLANT (H): ICD-10-CM

## 2020-04-29 DIAGNOSIS — Q61.3 PKD (POLYCYSTIC KIDNEY DISEASE): Primary | ICD-10-CM

## 2020-04-29 DIAGNOSIS — Z94.0 KIDNEY TRANSPLANTED: ICD-10-CM

## 2020-04-29 NOTE — TELEPHONE ENCOUNTER
Post Kidney and Pancreas Transplant Team Conference  Date: 4/28/2020  Transplant Coordinator: Alesha Hartley     Attendees, via telephone:  []  Dr. Wolfe  [] Shari Chapin LPN     [x]  Dr. Dick [] Anu Hanson, JANICE [] Shayna Chavez LPN   []  Dr. Tate [] Glenis Ko RN     [] Sailaja Choi RN [] Luke Tesfaye, PharmD   [] Dr. Pal [x] Zora Hartley RN    [] Dr. Luevano [] Joey Goldberg RN    [] Dr. Reyes [] Kell Perez RN    [] Dr. Caputo [] Linh Oates RN     [] Angella Multani RN    [] Surgery Fellow [] Majo Weeks RN    [] Sandy Cole NP [] Becca Gregory RN        Verbal Plan Read Back:   For ongoing headaches with PKD, check MRI / MRA of brain without contrast (for berry aneursym).    Keep tacro closer to 8.    Routed to RN Coordinator   Alesha Hartley RN    Sean reports that headaches are slightly getting better over the past couple of days.     ISSUE:   Tacrolimus IR level 5 on 4/27, goal 5-8, dose 1.5mg AM, 1 mg PM.    PLAN:   Please call patient and confirm this was an accurate 12-hour trough. Verify Tacrolimus IR dose as above. Confirm no new medications or illness. Confirm no missed doses. If accurate trough and accurate dose, increase Tacrolimus IR dose to 1.5 mg BID and repeat labs as scheduled (end of May).    OUTCOME:   Spoke with patient, they confirm accurate trough level and current dose as above.  Patient confirmed dose change to 1.5 mg BID. Orders sent to preferred pharmacy. Patient voiced understanding of plan.     Sean did ask if it is okay for him to donate blood. RNCC advised YES, if you pass the screening by the [facility], you are okay to give per SOT.  Okay for Sean to give blood.

## 2020-04-30 ENCOUNTER — MYC MEDICAL ADVICE (OUTPATIENT)
Dept: ENDOCRINOLOGY | Facility: CLINIC | Age: 55
End: 2020-04-30

## 2020-04-30 RX ORDER — TOPIRAMATE 25 MG/1
75 TABLET, FILM COATED ORAL AT BEDTIME
Qty: 90 TABLET | Refills: 0 | Status: SHIPPED | OUTPATIENT
Start: 2020-04-30 | End: 2020-05-04

## 2020-04-30 NOTE — TELEPHONE ENCOUNTER
TOPIRAMATE 25MG TABLETS   Last Written Prescription Date:  2/25/20  Last Fill Quantity: 90,   # refills: 1  Last Office Visit : 2/25/20  Future Office visit:  None    Routing refill request to provider for review/approval because:  No appt., kidney transplant patient,last prescribed as taper up to 75 mG per HS per SIG.  Scheduling has been notified to contact the pt for appointment.

## 2020-04-30 NOTE — TELEPHONE ENCOUNTER
Sending 1 month supply of Topiramate to pharmacy. [employer] message to patient to schedule virtual visit with Leonila for med check.

## 2020-05-01 RX ORDER — TACROLIMUS 0.5 MG/1
0.5 CAPSULE ORAL 2 TIMES DAILY
Qty: 60 CAPSULE | Refills: 11 | Status: SHIPPED | OUTPATIENT
Start: 2020-05-01 | End: 2021-05-03

## 2020-05-01 RX ORDER — TACROLIMUS 1 MG/1
1 CAPSULE ORAL 2 TIMES DAILY
Qty: 60 CAPSULE | Refills: 11 | Status: SHIPPED | OUTPATIENT
Start: 2020-05-01 | End: 2021-05-03

## 2020-05-04 ENCOUNTER — VIRTUAL VISIT (OUTPATIENT)
Dept: ENDOCRINOLOGY | Facility: CLINIC | Age: 55
End: 2020-05-04
Payer: MEDICARE

## 2020-05-04 VITALS — BODY MASS INDEX: 27.94 KG/M2 | WEIGHT: 178 LBS | HEIGHT: 67 IN

## 2020-05-04 DIAGNOSIS — E66.3 OVERWEIGHT (BMI 25.0-29.9): Primary | ICD-10-CM

## 2020-05-04 RX ORDER — TOPIRAMATE 25 MG/1
75 TABLET, FILM COATED ORAL AT BEDTIME
Qty: 90 TABLET | Refills: 3 | Status: SHIPPED | OUTPATIENT
Start: 2020-05-04 | End: 2020-07-06

## 2020-05-04 ASSESSMENT — MIFFLIN-ST. JEOR: SCORE: 1596.03

## 2020-05-04 ASSESSMENT — PAIN SCALES - GENERAL: PAINLEVEL: NO PAIN (0)

## 2020-05-04 NOTE — PATIENT INSTRUCTIONS
Continue 75mg topiramate dose for now  Will f/u with Dr Dick regarding recent increase in Cr from 1.0 to 1.2    FOLLOW-UP:    12 weeks with Leonila BRUNNER

## 2020-05-04 NOTE — NURSING NOTE
"(   Chief Complaint   Patient presents with     RECHECK     6 week weight managment follow up.    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)     Diabetes mellitus with background retinopathy (H)     Polycystic kidney     NONSPECIFIC MEDICAL HISTORY     Coronary artery disease     Retinopathy     Hyperlipidemia LDL goal <70     Premature ventricular contractions (PVCs) (VPCs)     Kidney replaced by transplant     Immunosuppressed status (H)     Kidney transplant rejection     Hypertension     Anemia in chronic renal disease     Care after organ transplant     Esophageal ulcer     Status post simultaneous kidney and pancreas transplant (H)     Other chronic pain     Nausea     Drug induced constipation    )  (   Current Outpatient Medications   Medication Sig Dispense Refill     aspirin 81 MG EC tablet Take 1 tablet (81 mg) by mouth daily 30 tablet 5     atorvastatin (LIPITOR) 20 MG tablet Take 1 tablet (20 mg) by mouth daily 90 tablet 3     blood glucose monitoring (NO BRAND SPECIFIED) test strip Use to test blood sugar 4 times daily or as directed. 1 Box prn     cholecalciferol 50 MCG (2000 UT) CAPS Take 2,000 Units by mouth daily 90 capsule 3     HYDROcodone-acetaminophen (NORCO) 5-325 MG tablet Take 1 tablet by mouth every 6 hours as needed for severe pain       magnesium oxide (MAG-OX) 400 MG tablet Take 2 tablets (800mg) at noon and 2 tablets (800mg) at 6pm. 120 tablet 3     mycophenolic acid (GENERIC EQUIVALENT) 180 MG EC tablet Take 3 tablets (540 mg) by mouth 2 times daily 540 tablet 3     sulfamethoxazole-trimethoprim " (BACTRIM/SEPTRA) 400-80 MG tablet Take 1 tablet by mouth daily 30 tablet 11     tacrolimus (GENERIC EQUIVALENT) 0.5 MG capsule Take 1 capsule (0.5 mg) by mouth 2 times daily Total dose = 1.5mg twice daily 60 capsule 11     tacrolimus (GENERIC EQUIVALENT) 1 MG capsule Take 1 capsule (1 mg) by mouth 2 times daily Total dose = 1.5 mg twice daily 60 capsule 11     topiramate (TOPAMAX) 25 MG tablet Take 3 tablets (75 mg) by mouth At Bedtime 90 tablet 0    )  ( Diabetes Eval:    )    ( Pain Eval:  No Pain (0) )    ( Wound Eval:       )    (   History   Smoking Status     Never Smoker   Smokeless Tobacco     Never Used    )    ( Signed By:  Edita Stanton CMA; May 4, 2020; 10:53 AM )

## 2020-05-04 NOTE — PROGRESS NOTES
"Amadou Lewis is a 54 year old male who is being evaluated via a billable telephone visit.      The patient has been notified of following:     \"This telephone visit will be conducted via a call between you and your physician/provider. We have found that certain health care needs can be provided without the need for a physical exam.  This service lets us provide the care you need with a short phone conversation.  If a prescription is necessary we can send it directly to your pharmacy.  If lab work is needed we can place an order for that and you can then stop by our lab to have the test done at a later time.    Telephone visits are billed at different rates depending on your insurance coverage. During this emergency period, for some insurers they may be billed the same as an in-person visit.  Please reach out to your insurance provider with any questions.    If during the course of the call the physician/provider feels a telephone visit is not appropriate, you will not be charged for this service.\"    Patient has given verbal consent for Telephone visit?  Yes    What phone number would you like to be contacted at? [phone]    How would you like to obtain your AVS? MyChart           Return Medical Weight Management Note     Amadou Lewis  MRN:  [id number]  :  1965  GIAN:  2020    Dear Masoud Valentin MD, MD,    I had the pleasure of seeing your patient Amadou Lewis. He is a 54 year old male who I am continuing to see for treatment of obesity related to:       2019   I have the following health issues associated with obesity: Type II Diabetes, Pre-Diabetes, Heart Disease, High Blood Pressure   I have the following symptoms associated with obesity: Depression       INTERVAL HISTORY:  Lost 10 lbs since last visit. Feeling less pressure on abdomen and sleeping better  Taking topiramate 75mg at night, takes it at bedtime. No side effects  He feels topiramate has been helping a lot by decreasing " "appetite.      CURRENT WEIGHT:   178 lbs 0 oz    Initial Weight (lbs): 181.2 lbs  Last Visits Weight: 85.5 kg (188 lb 8 oz)  Cumulative weight loss (lbs): 3.2  Weight Loss Percentage: 1.77%    No flowsheet data found.    VITALS:  Ht 1.702 m (5' 7\")   Wt 80.7 kg (178 lb)   BMI 27.88 kg/m      MEDICATIONS:   Current Outpatient Medications   Medication Sig Dispense Refill     aspirin 81 MG EC tablet Take 1 tablet (81 mg) by mouth daily 30 tablet 5     atorvastatin (LIPITOR) 20 MG tablet Take 1 tablet (20 mg) by mouth daily 90 tablet 3     blood glucose monitoring (NO BRAND SPECIFIED) test strip Use to test blood sugar 4 times daily or as directed. 1 Box prn     cholecalciferol 50 MCG (2000 UT) CAPS Take 2,000 Units by mouth daily 90 capsule 3     HYDROcodone-acetaminophen (NORCO) 5-325 MG tablet Take 1 tablet by mouth every 6 hours as needed for severe pain       magnesium oxide (MAG-OX) 400 MG tablet Take 2 tablets (800mg) at noon and 2 tablets (800mg) at 6pm. 120 tablet 3     mycophenolic acid (GENERIC EQUIVALENT) 180 MG EC tablet Take 3 tablets (540 mg) by mouth 2 times daily 540 tablet 3     sulfamethoxazole-trimethoprim (BACTRIM/SEPTRA) 400-80 MG tablet Take 1 tablet by mouth daily 30 tablet 11     tacrolimus (GENERIC EQUIVALENT) 0.5 MG capsule Take 1 capsule (0.5 mg) by mouth 2 times daily Total dose = 1.5mg twice daily 60 capsule 11     tacrolimus (GENERIC EQUIVALENT) 1 MG capsule Take 1 capsule (1 mg) by mouth 2 times daily Total dose = 1.5 mg twice daily 60 capsule 11     topiramate (TOPAMAX) 25 MG tablet Take 3 tablets (75 mg) by mouth At Bedtime 90 tablet 0       Weight Loss Medication History Reviewed With Patient 5/4/2020   Which weight loss medications are you currently taking on a regular basis?  Topamax (topiramate)   Are you having any side effects from the weight loss medication that we have prescribed you? No       ASSESSMENT/PLAN: Hx of kidney/pancreas transplant for Polycystic kidney.  Lost 10 " lbs since starting topiramate and feels it is effective.  No side effects.  Feels less pressure on abdomen and sleeping improved.    Continue 75mg topiramate dose for now  Will f/u with Dr Dick regarding recent increase in Cr from 1.0 to 1.2    FOLLOW-UP:    12 weeks with Leonila RUSSO      Sincerely,    Leonila Dial PA-C    Phone call duration: 10 minutes    Leonila Dial PA-C

## 2020-05-07 NOTE — TELEPHONE ENCOUNTER
MTM services not covered by patient's insurance and did just follow up with Leonila Dial PA-C regarding checking in on status with topiramate. Therefore, patient preferred not completing visit and reports he will call to reschedule in future if necessary, but otherwise will keep following up with Leonila Dial PA-C.     Lauren Bloch, PharmD  Medication Therapy Management Pharmacist   [facility]   Phone: [phone]

## 2020-05-15 ENCOUNTER — TELEPHONE (OUTPATIENT)
Dept: TRANSPLANT | Facility: CLINIC | Age: 55
End: 2020-05-15

## 2020-05-15 NOTE — TELEPHONE ENCOUNTER
Patient Call: Voicemail  Date/Time: 5/15/20 @ 2:26 PM  Reason for call: Patient called to request an A1C due to his BS levels. Patient will be getting labs on monday

## 2020-05-18 DIAGNOSIS — Z94.83 PANCREAS REPLACED BY TRANSPLANT (H): ICD-10-CM

## 2020-05-18 DIAGNOSIS — Z94.0 KIDNEY REPLACED BY TRANSPLANT: ICD-10-CM

## 2020-05-18 DIAGNOSIS — Z94.83 PANCREAS TRANSPLANTED (H): ICD-10-CM

## 2020-05-18 DIAGNOSIS — Z94.0 KIDNEY TRANSPLANTED: ICD-10-CM

## 2020-05-18 LAB
ERYTHROCYTE [DISTWIDTH] IN BLOOD BY AUTOMATED COUNT: 16.8 % (ref 10–15)
HBA1C MFR BLD: 5.3 % (ref 0–5.6)
HCT VFR BLD AUTO: 50.4 % (ref 40–53)
HGB BLD-MCNC: 17.3 G/DL (ref 13.3–17.7)
LIPASE SERPL-CCNC: 126 U/L (ref 73–393)
MCH RBC QN AUTO: 30.1 PG (ref 26.5–33)
MCHC RBC AUTO-ENTMCNC: 34.3 G/DL (ref 31.5–36.5)
MCV RBC AUTO: 88 FL (ref 78–100)
PLATELET # BLD AUTO: 192 10E9/L (ref 150–450)
RBC # BLD AUTO: 5.74 10E12/L (ref 4.4–5.9)
WBC # BLD AUTO: 5.4 10E9/L (ref 4–11)

## 2020-05-18 PROCEDURE — 87799 DETECT AGENT NOS DNA QUANT: CPT | Performed by: INTERNAL MEDICINE

## 2020-05-18 PROCEDURE — 80197 ASSAY OF TACROLIMUS: CPT | Performed by: INTERNAL MEDICINE

## 2020-05-18 PROCEDURE — 80048 BASIC METABOLIC PNL TOTAL CA: CPT | Performed by: INTERNAL MEDICINE

## 2020-05-18 PROCEDURE — 85027 COMPLETE CBC AUTOMATED: CPT | Performed by: INTERNAL MEDICINE

## 2020-05-18 PROCEDURE — 80061 LIPID PANEL: CPT | Performed by: INTERNAL MEDICINE

## 2020-05-18 PROCEDURE — 83036 HEMOGLOBIN GLYCOSYLATED A1C: CPT | Performed by: INTERNAL MEDICINE

## 2020-05-18 PROCEDURE — 36415 COLL VENOUS BLD VENIPUNCTURE: CPT | Performed by: INTERNAL MEDICINE

## 2020-05-18 PROCEDURE — 83690 ASSAY OF LIPASE: CPT | Performed by: INTERNAL MEDICINE

## 2020-05-18 PROCEDURE — 82150 ASSAY OF AMYLASE: CPT | Performed by: INTERNAL MEDICINE

## 2020-05-19 ENCOUNTER — TELEPHONE (OUTPATIENT)
Dept: TRANSPLANT | Facility: CLINIC | Age: 55
End: 2020-05-19

## 2020-05-19 LAB
AMYLASE SERPL-CCNC: 58 U/L (ref 30–110)
ANION GAP SERPL CALCULATED.3IONS-SCNC: 8 MMOL/L (ref 3–14)
BUN SERPL-MCNC: 17 MG/DL (ref 7–30)
CALCIUM SERPL-MCNC: 9.2 MG/DL (ref 8.5–10.1)
CHLORIDE SERPL-SCNC: 112 MMOL/L (ref 94–109)
CHOLEST SERPL-MCNC: 126 MG/DL
CO2 SERPL-SCNC: 19 MMOL/L (ref 20–32)
CREAT SERPL-MCNC: 1.16 MG/DL (ref 0.66–1.25)
GFR SERPL CREATININE-BSD FRML MDRD: 70 ML/MIN/{1.73_M2}
GLUCOSE SERPL-MCNC: 62 MG/DL (ref 70–99)
HDLC SERPL-MCNC: 49 MG/DL
LDLC SERPL CALC-MCNC: 59 MG/DL
NONHDLC SERPL-MCNC: 77 MG/DL
POTASSIUM SERPL-SCNC: 4.6 MMOL/L (ref 3.4–5.3)
SODIUM SERPL-SCNC: 139 MMOL/L (ref 133–144)
TACROLIMUS BLD-MCNC: 7.1 UG/L (ref 5–15)
TME LAST DOSE: NORMAL H
TRIGL SERPL-MCNC: 92 MG/DL

## 2020-05-19 NOTE — TELEPHONE ENCOUNTER
Spoke to patient regarding:  CO2 slightly low on labs, can be caused by diarrhea.  Patient denies any loose stools or diarrhea.    Patient verbalizes understanding to add crystal light to water, as this breaks down into a base and will neutralize your slightly acidic blood.  Patient did state that he has been having occasional feelings anxiety X 1 week, usually when @ work where there is a lot of stress.  Patient reports recent BP = 126/60's and pulse = 80's

## 2020-05-19 NOTE — TELEPHONE ENCOUNTER
ISSUE:  CO2 slightly low on labs, can be caused by diarrhea.    PLAN:  Add crystal light to water, as this breaks down into a base and will neutralize your slightly acidic blood.  Ensure patient is not experiencing diarrhea (loose / watery stools) more than 3x daily.

## 2020-05-20 NOTE — TELEPHONE ENCOUNTER
"RNCC spoke with Sean.  He was not sure if his feelings are really anxiety.  He doesn't feel under pressure or   In his chest he feels \"like a feeling of anxiety.\" Not tightness, but much like a feeling at being at the top of a roller coaster hill. \"On edge.\"  No heart racing.    Can come when he is at peace / rest.  He denies any loss of interest.    He does have a new relationship and work is picking up, but again, he does not feel pressure in these situation.    He knows the feeling of irregular heart beats, but this is not the same.    RNCC advised that if this sensation becomes more frequent or overwhelming, please reach out to your PCP immediately.    Sean voiced understanding.  "

## 2020-07-06 ENCOUNTER — VIRTUAL VISIT (OUTPATIENT)
Dept: NEPHROLOGY | Facility: CLINIC | Age: 55
End: 2020-07-06
Attending: INTERNAL MEDICINE
Payer: COMMERCIAL

## 2020-07-06 DIAGNOSIS — Z48.298 AFTERCARE FOLLOWING ORGAN TRANSPLANT: ICD-10-CM

## 2020-07-06 DIAGNOSIS — Z94.0 KIDNEY REPLACED BY TRANSPLANT: Primary | ICD-10-CM

## 2020-07-06 DIAGNOSIS — E55.9 VITAMIN D DEFICIENCY: ICD-10-CM

## 2020-07-06 DIAGNOSIS — D84.9 IMMUNOSUPPRESSION (H): ICD-10-CM

## 2020-07-06 DIAGNOSIS — N25.0 RENAL OSTEODYSTROPHY: ICD-10-CM

## 2020-07-06 DIAGNOSIS — Z94.83 PANCREAS REPLACED BY TRANSPLANT (H): ICD-10-CM

## 2020-07-06 PROBLEM — K59.03 DRUG INDUCED CONSTIPATION: Status: RESOLVED | Noted: 2018-11-20 | Resolved: 2020-07-06

## 2020-07-06 PROBLEM — R11.0 NAUSEA: Status: RESOLVED | Noted: 2018-11-20 | Resolved: 2020-07-06

## 2020-07-06 NOTE — LETTER
7/6/2020      RE: Amadou Lewis  [address] [place] [address]       Amadou Lewis is a 54 year old male who is being evaluated via a billable telephone visit.          Phone call duration: 14 minutes    Jori Wolfe MD      CHRONIC TRANSPLANT NEPHROLOGY VISIT    Assessment & Plan   # DDKT (SPK): Stable   - Baseline Cr ~ 1.0-1.2   - Proteinuria: Normal (<0.2 grams)   - Date DSA Last Checked: Aug/2019      Latest DSA: No   - BK Viremia: No   - Kidney Tx Biopsy: No    # Pancreas Tx (SPK):    - Pancreatic Exocrine Drainage: Enteric drained     - Blood glucose: Euglycemia      On insulin: No   - HbA1c: Stable      Latest HbA1c: 5.3%   - Pancreatic enzymes: Stable   - Date DSA Last Checked: Aug/2019  Latest DSA: No   - Pancreas Tx Biopsy: No    # Immunosuppression: Tacrolimus immediate release (goal 5-8) and Mycophenolic acid (dose 540 mg every 12 hours)   - Changes: No    # Infection Prophylaxis:   - PJP: Sulfa/TMP (Bactrim)    # Blood Pressure: Controlled;  Goal BP: < 130/80   - Changes: No    # Relative Erythrocytosis: Hgb: Stable;  On ACEI/ARB: No  Imaging: Yes - 8/2019    # Mineral Bone Disorder:   - Vitamin D; level: Not checked recently        On supplement: Yes  - Calcium; level: Normal        On supplement: No    # Electrolytes:   - Potassium; level: Normal        On supplement: No  - Magnesium; level: Not checked recently        On supplement: Yes  - Bicarbonate; level: Low        On supplement: No; If bicarbonate level remains low, would recommend starting sodium bicarbonate.    # Overweight: Weight is down ~ 15-20 lbs.   - Recommend weight loss for overall health by increasing exercise and watching caloric intake.    # Skin Cancer Risk:    - Discussed sun protection and recommend regular follow up with Dermatology.    # Medical Compliance: Yes     # COVID-19 Virus Review: Discussed COVID-19 virus and the potential medical risks.  Reviewed preventative health recommendations, which  includes washing hands for 20 seconds, avoid touching your face, and social distancing.  Asked patient to inform the transplant center if they are exposed or diagnosed with this virus.    # Transplant History:  Etiology of Kidney Failure: Polycystic kidney disease (PKD), Diabetes mellitus type 1  Tx: SPK  Transplant: 11/12/2018 (Kidney / Pancreas), 10/10/2013 (Kidney)  Donor Type: Donation after Brain Death Donor Class:   Significant changes in immunosuppression: None  Significant transplant-related complications: None    Transplant Office Phone Number: [phone]    Assessment and plan was discussed with the patient and he voiced his understanding and agreement.    Return visit: Return in about 6 months (around 1/6/2021).    Jori Wolfe MD    Chief Complaint   Mr. Lewis is a 54 year old here for kidney transplant, pancreas transplant and immunosuppression management.    History of Present Illness    Mr. Lewis reports feeling good overall with minimal medical complaints.  Since last clinic visit, patient reports no hospitalizations or new medical complaints and has been doing well overall.  His energy level is good and has been normal.  He is active and does get some exercise.  Patient had been going to the gym prior to the pandemic, but now just works a lot around the home.  Denies any chest pain or shortness of breath with exertion.  Appetite is good and he has been trying to lose weight.  Patient was being followed by Weight Management and started on topiramate with good results.  He is down ~ 15-20 lbs, but wanted to minimize his medications and stopped the topiramate.  So fare he has been able to maintain his weight.  No fever, sweats or chills.  No leg swelling.    Recent Hospitalizations:  [x] No [] Yes    New Medical Issues: [x] No [] Yes    Decreased energy: [x] No [] Yes    Chest pain or SOB with exertion:  [x] No [] Yes    Appetite change or weight change: [] No [x] Yes Weight is down ~ 15-20  lbs   Nausea, vomiting or diarrhea:  [x] No [] Yes    Fever, sweats or chills: [x] No [] Yes    Leg swelling: [x] No [] Yes      Home BP: 120/80s    Review of Systems   A comprehensive review of systems was obtained and negative, except as noted in the HPI or PMH.    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)     Diabetes mellitus with background retinopathy (H)     Polycystic kidney     CAD (coronary artery disease)     Retinopathy     Hyperlipidemia LDL goal <70     Premature ventricular contractions (PVCs) (VPCs)     Kidney replaced by transplant     Immunosuppression (H)     Kidney transplant rejection     Aftercare following organ transplant     Esophageal ulcer     Status post simultaneous kidney and pancreas transplant (H)     Other chronic pain     Pancreas replaced by transplant (H)     Vitamin D deficiency       Social History   Social History     Tobacco Use     Smoking status: Never Smoker     Smokeless tobacco: Never Used   Substance Use Topics     Alcohol use: Yes     Frequency: Monthly or less     Drug use: No       Allergies   Allergies   Allergen Reactions     No Known Allergies        Medications   Current Outpatient Medications   Medication Sig     cholecalciferol 50 MCG (2000 UT) CAPS Take 2,000 Units by mouth daily     aspirin 81 MG EC tablet Take 1 tablet (81 mg) by mouth daily     atorvastatin (LIPITOR) 20 MG tablet Take 1 tablet (20 mg) by mouth daily     blood glucose monitoring (NO BRAND SPECIFIED) test strip Use to test blood sugar 4 times daily or as directed.     HYDROcodone-acetaminophen (NORCO) 5-325 MG tablet Take 1 tablet by mouth every 6 hours as needed for severe pain     magnesium oxide (MAG-OX) 400 MG tablet Take 2 tablets (800mg) at noon and 2 tablets (800mg) at 6pm.     mycophenolic acid (GENERIC EQUIVALENT) 180 MG EC tablet Take 3 tablets (540 mg) by mouth 2 times daily     sulfamethoxazole-trimethoprim  (BACTRIM/SEPTRA) 400-80 MG tablet Take 1 tablet by mouth daily     tacrolimus (GENERIC EQUIVALENT) 0.5 MG capsule Take 1 capsule (0.5 mg) by mouth 2 times daily Total dose = 1.5mg twice daily     tacrolimus (GENERIC EQUIVALENT) 1 MG capsule Take 1 capsule (1 mg) by mouth 2 times daily Total dose = 1.5 mg twice daily     No current facility-administered medications for this visit.      Medications Discontinued During This Encounter   Medication Reason     topiramate (TOPAMAX) 25 MG tablet      cholecalciferol 50 MCG (2000 UT) CAPS Reorder       Physical Exam   Vital signs and physical exam were deferred for this telemedicine visit.      Data     Renal Latest Ref Rng & Units 5/18/2020 4/27/2020 3/23/2020   Na 133 - 144 mmol/L 139 139 139   K 3.4 - 5.3 mmol/L 4.6 4.0 4.3   Cl 94 - 109 mmol/L 112(H) 111(H) 113(H)   CO2 20 - 32 mmol/L 19(L) 21 22   BUN 7 - 30 mg/dL 17 25 23   Cr 0.66 - 1.25 mg/dL 1.16 1.20 1.19   Cr (external) 0.5 - 1.5 mg/dL - - -   Glucose 70 - 99 mg/dL 62(L) 84 81   Ca  8.5 - 10.1 mg/dL 9.2 8.9 9.0   Mg 1.6 - 2.3 mg/dL - - -     Bone Health Latest Ref Rng & Units 3/7/2019 2/14/2019 2/11/2019   Phos 2.5 - 4.5 mg/dL - 4.1 3.6   PTHi 18 - 80 pg/mL 104(H) - -   Vit D Def 20 - 75 ug/L 36 - -     Heme Latest Ref Rng & Units 5/18/2020 4/27/2020 3/23/2020   WBC 4.0 - 11.0 10e9/L 5.4 6.3 5.2   Hgb 13.3 - 17.7 g/dL 17.3 17.4 17.6   Plt 150 - 450 10e9/L 192 190 196   ABSOLUTE NEUTROPHIL 1.6 - 8.3 10e9/L - - -   ABSOLUTE LYMPHOCYTES 0.8 - 5.3 10e9/L - - -   ABSOLUTE MONOCYTES 0.0 - 1.3 10e9/L - - -   ABSOLUTE EOSINOPHILS 0.0 - 0.7 10e9/L - - -   ABSOLUTE BASOPHILS 0.0 - 0.2 10e9/L - - -   ABS IMMATURE GRANULOCYTES 0 - 0.4 10e9/L - - -   ABSOLUTE NUCLEATED RBC - - - -     Liver Latest Ref Rng & Units 11/11/2018 6/27/2018 8/3/2017   AP 40 - 150 U/L 189(H) - -   TBili 0.2 - 1.3 mg/dL 0.3 - -   ALT 0 - 70 U/L 14 - -   AST 0 - 45 U/L 9 - -   Tot Protein 6.8 - 8.8 g/dL 7.6 - -   Albumin 3.4 - 5.0 g/dL 4.0 4.2 4.2      Pancreas Latest Ref Rng & Units 5/18/2020 4/27/2020 3/23/2020   A1C 0 - 5.6 % 5.3 - -   Amylase 30 - 110 U/L 58 61 64   Lipase 73 - 393 U/L 126 150 151     Iron studies Latest Ref Rng & Units 9/11/2018 8/3/2017 11/18/2016   Iron 35 - 180 ug/dL 85 79 82   Iron sat 15 - 46 % 37 34 34   Ferritin 26 - 388 ng/mL 761(H) 736(H) -     [facility] Txp Virology Latest Ref Rng & Units 5/18/2020 10/7/2019 9/9/2019   CVM DNA Quant - - - -   CMV Quant <100 Copies/mL - - -   CMV QT Log <2.0 Log copies/mL - - -   CMV QUANT IU/ML CMVND:CMV DNA Not Detected [IU]/mL - - -   LOG IU/ML OF CMVQNT <2.1 [Log:IU]/mL - - -   BK Spec - Plasma Plasma Plasma   BK Res BKNEG:BK Virus DNA Not Detected copies/mL BK Virus DNA Not Detected BK Virus DNA Not Detected BK Virus DNA Not Detected   BK Log <2.7 Log copies/mL Not Calculated Not Calculated Not Calculated   EBV VCA IGG ANTIBODY U/mL - - -   EBV VCA IGM ANTIBODY U/mL - - -   EBV CAPSID ANTIBODY IGG 0.0 - 0.8 AI - - -   EBV DNA COPIES/ML EBVNEG:EBV DNA Not Detected [Copies]/mL - - -   EBV DNA LOG OF COPIES <2.7 [Log:copies]/mL - - -   Hep B Core NR:Nonreactive - - -   Hep B Surf - - - -   HIV 1&2 NEG - - -        Recent Labs   Lab Test 03/23/20  0850 04/27/20  1454 05/18/20  1501   DOSTAC 03/22/2020 at 0845 pm LAST DOSE 0330 AM  5.18.20 @ 3.30AM   TACROL 8.2 5.0 7.1     Recent Labs   Lab Test 02/11/19  0740 02/14/19  0728 02/21/19  0749   DOSMPA LAST DOSE 8:00PM AT 2.10.2019 LAST DOSE 8:00PM 2/13/2019 02/20/2019 AT 0730 PM   MPACID 2.04 3.64* 1.41   MPAG 50.8 43.4 60.7       Jori Wolfe MD

## 2020-07-06 NOTE — LETTER
"7/6/2020       RE: Amadou Lewis  [address]  [place] [address]     Dear Colleague,    Thank you for referring your patient, Amadou Lewis, to the [facility]ROLOGY at [facility]. Please see a copy of my visit note below.    Amadou Lewis is a 54 year old male who is being evaluated via a billable telephone visit.      The patient has been notified of following:     \"This telephone visit will be conducted via a call between you and your physician/provider. We have found that certain health care needs can be provided without the need for a physical exam.  This service lets us provide the care you need with a short phone conversation.  If a prescription is necessary we can send it directly to your pharmacy.  If lab work is needed we can place an order for that and you can then stop by our lab to have the test done at a later time.    Telephone visits are billed at different rates depending on your insurance coverage. During this emergency period, for some insurers they may be billed the same as an in-person visit.  Please reach out to your insurance provider with any questions.    If during the course of the call the physician/provider feels a telephone visit is not appropriate, you will not be charged for this service.\"    Patient has given verbal consent for Telephone visit?  Yes    What phone number would you like to be contacted at?     How would you like to obtain your AVS? Mail a copy    Phone call duration: 14 minutes    Jori Wolfe MD      CHRONIC TRANSPLANT NEPHROLOGY VISIT    Assessment & Plan   # DDKT (SPK): Stable   - Baseline Cr ~ 1.0-1.2   - Proteinuria: Normal (<0.2 grams)   - Date DSA Last Checked: Aug/2019      Latest DSA: No   - BK Viremia: No   - Kidney Tx Biopsy: No    # Pancreas Tx (SPK):    - Pancreatic Exocrine Drainage: Enteric drained     - Blood glucose: Euglycemia      On insulin: No   - HbA1c: Stable      Latest HbA1c: " 5.3%   - Pancreatic enzymes: Stable   - Date DSA Last Checked: Aug/2019  Latest DSA: No   - Pancreas Tx Biopsy: No    # Immunosuppression: Tacrolimus immediate release (goal 5-8) and Mycophenolic acid (dose 540 mg every 12 hours)   - Changes: No    # Infection Prophylaxis:   - PJP: Sulfa/TMP (Bactrim)    # Blood Pressure: Controlled;  Goal BP: < 130/80   - Changes: No    # Relative Erythrocytosis: Hgb: Stable;  On ACEI/ARB: No  Imaging: Yes - 8/2019    # Mineral Bone Disorder:   - Vitamin D; level: Not checked recently        On supplement: Yes  - Calcium; level: Normal        On supplement: No    # Electrolytes:   - Potassium; level: Normal        On supplement: No  - Magnesium; level: Not checked recently        On supplement: Yes  - Bicarbonate; level: Low        On supplement: No; If bicarbonate level remains low, would recommend starting sodium bicarbonate.    # Overweight: Weight is down ~ 15-20 lbs.   - Recommend weight loss for overall health by increasing exercise and watching caloric intake.    # Skin Cancer Risk:    - Discussed sun protection and recommend regular follow up with Dermatology.    # Medical Compliance: Yes     # COVID-19 Virus Review: Discussed COVID-19 virus and the potential medical risks.  Reviewed preventative health recommendations, which includes washing hands for 20 seconds, avoid touching your face, and social distancing.  Asked patient to inform the transplant center if they are exposed or diagnosed with this virus.    # Transplant History:  Etiology of Kidney Failure: Polycystic kidney disease (PKD), Diabetes mellitus type 1  Tx: SPK  Transplant: 11/12/2018 (Kidney / Pancreas), 10/10/2013 (Kidney)  Donor Type: Donation after Brain Death Donor Class:   Significant changes in immunosuppression: None  Significant transplant-related complications: None    Transplant Office Phone Number: [phone]    Assessment and plan was discussed with the patient and he voiced his understanding  and agreement.    Return visit: Return in about 6 months (around 1/6/2021).    Jori Wolfe MD    Chief Complaint   Mr. Lewis is a 54 year old here for kidney transplant, pancreas transplant and immunosuppression management.    History of Present Illness    Mr. Lewis reports feeling good overall with minimal medical complaints.  Since last clinic visit, patient reports no hospitalizations or new medical complaints and has been doing well overall.  His energy level is good and has been normal.  He is active and does get some exercise.  Patient had been going to the gym prior to the pandemic, but now just works a lot around the home.  Denies any chest pain or shortness of breath with exertion.  Appetite is good and he has been trying to lose weight.  Patient was being followed by Weight Management and started on topiramate with good results.  He is down ~ 15-20 lbs, but wanted to minimize his medications and stopped the topiramate.  So fare he has been able to maintain his weight.  No fever, sweats or chills.  No leg swelling.    Recent Hospitalizations:  [x] No [] Yes    New Medical Issues: [x] No [] Yes    Decreased energy: [x] No [] Yes    Chest pain or SOB with exertion:  [x] No [] Yes    Appetite change or weight change: [] No [x] Yes Weight is down ~ 15-20 lbs   Nausea, vomiting or diarrhea:  [x] No [] Yes    Fever, sweats or chills: [x] No [] Yes    Leg swelling: [x] No [] Yes      Home BP: 120/80s    Review of Systems   A comprehensive review of systems was obtained and negative, except as noted in the HPI or PMH.    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)     Diabetes mellitus with background retinopathy (H)     Polycystic kidney     CAD (coronary artery disease)     Retinopathy     Hyperlipidemia LDL goal <70     Premature ventricular contractions (PVCs) (VPCs)     Kidney replaced by transplant     Immunosuppression (H)      Kidney transplant rejection     Aftercare following organ transplant     Esophageal ulcer     Status post simultaneous kidney and pancreas transplant (H)     Other chronic pain     Pancreas replaced by transplant (H)     Vitamin D deficiency       Social History   Social History     Tobacco Use     Smoking status: Never Smoker     Smokeless tobacco: Never Used   Substance Use Topics     Alcohol use: Yes     Frequency: Monthly or less     Drug use: No       Allergies   Allergies   Allergen Reactions     No Known Allergies        Medications   Current Outpatient Medications   Medication Sig     cholecalciferol 50 MCG (2000 UT) CAPS Take 2,000 Units by mouth daily     aspirin 81 MG EC tablet Take 1 tablet (81 mg) by mouth daily     atorvastatin (LIPITOR) 20 MG tablet Take 1 tablet (20 mg) by mouth daily     blood glucose monitoring (NO BRAND SPECIFIED) test strip Use to test blood sugar 4 times daily or as directed.     HYDROcodone-acetaminophen (NORCO) 5-325 MG tablet Take 1 tablet by mouth every 6 hours as needed for severe pain     magnesium oxide (MAG-OX) 400 MG tablet Take 2 tablets (800mg) at noon and 2 tablets (800mg) at 6pm.     mycophenolic acid (GENERIC EQUIVALENT) 180 MG EC tablet Take 3 tablets (540 mg) by mouth 2 times daily     sulfamethoxazole-trimethoprim (BACTRIM/SEPTRA) 400-80 MG tablet Take 1 tablet by mouth daily     tacrolimus (GENERIC EQUIVALENT) 0.5 MG capsule Take 1 capsule (0.5 mg) by mouth 2 times daily Total dose = 1.5mg twice daily     tacrolimus (GENERIC EQUIVALENT) 1 MG capsule Take 1 capsule (1 mg) by mouth 2 times daily Total dose = 1.5 mg twice daily     No current facility-administered medications for this visit.      Medications Discontinued During This Encounter   Medication Reason     topiramate (TOPAMAX) 25 MG tablet      cholecalciferol 50 MCG (2000 UT) CAPS Reorder       Physical Exam   Vital signs and physical exam were deferred for this telemedicine visit.      Data     Renal  Latest Ref Rng & Units 5/18/2020 4/27/2020 3/23/2020   Na 133 - 144 mmol/L 139 139 139   K 3.4 - 5.3 mmol/L 4.6 4.0 4.3   Cl 94 - 109 mmol/L 112(H) 111(H) 113(H)   CO2 20 - 32 mmol/L 19(L) 21 22   BUN 7 - 30 mg/dL 17 25 23   Cr 0.66 - 1.25 mg/dL 1.16 1.20 1.19   Cr (external) 0.5 - 1.5 mg/dL - - -   Glucose 70 - 99 mg/dL 62(L) 84 81   Ca  8.5 - 10.1 mg/dL 9.2 8.9 9.0   Mg 1.6 - 2.3 mg/dL - - -     Bone Health Latest Ref Rng & Units 3/7/2019 2/14/2019 2/11/2019   Phos 2.5 - 4.5 mg/dL - 4.1 3.6   PTHi 18 - 80 pg/mL 104(H) - -   Vit D Def 20 - 75 ug/L 36 - -     Heme Latest Ref Rng & Units 5/18/2020 4/27/2020 3/23/2020   WBC 4.0 - 11.0 10e9/L 5.4 6.3 5.2   Hgb 13.3 - 17.7 g/dL 17.3 17.4 17.6   Plt 150 - 450 10e9/L 192 190 196   ABSOLUTE NEUTROPHIL 1.6 - 8.3 10e9/L - - -   ABSOLUTE LYMPHOCYTES 0.8 - 5.3 10e9/L - - -   ABSOLUTE MONOCYTES 0.0 - 1.3 10e9/L - - -   ABSOLUTE EOSINOPHILS 0.0 - 0.7 10e9/L - - -   ABSOLUTE BASOPHILS 0.0 - 0.2 10e9/L - - -   ABS IMMATURE GRANULOCYTES 0 - 0.4 10e9/L - - -   ABSOLUTE NUCLEATED RBC - - - -     Liver Latest Ref Rng & Units 11/11/2018 6/27/2018 8/3/2017   AP 40 - 150 U/L 189(H) - -   TBili 0.2 - 1.3 mg/dL 0.3 - -   ALT 0 - 70 U/L 14 - -   AST 0 - 45 U/L 9 - -   Tot Protein 6.8 - 8.8 g/dL 7.6 - -   Albumin 3.4 - 5.0 g/dL 4.0 4.2 4.2     Pancreas Latest Ref Rng & Units 5/18/2020 4/27/2020 3/23/2020   A1C 0 - 5.6 % 5.3 - -   Amylase 30 - 110 U/L 58 61 64   Lipase 73 - 393 U/L 126 150 151     Iron studies Latest Ref Rng & Units 9/11/2018 8/3/2017 11/18/2016   Iron 35 - 180 ug/dL 85 79 82   Iron sat 15 - 46 % 37 34 34   Ferritin 26 - 388 ng/mL 761(H) 736(H) -     [facility] Txp Virology Latest Ref Rng & Units 5/18/2020 10/7/2019 9/9/2019   CVM DNA Quant - - - -   CMV Quant <100 Copies/mL - - -   CMV QT Log <2.0 Log copies/mL - - -   CMV QUANT IU/ML CMVND:CMV DNA Not Detected [IU]/mL - - -   LOG IU/ML OF CMVQNT <2.1 [Log:IU]/mL - - -   BK Spec - Plasma Plasma Plasma   BK Res BKNEG:BK Virus DNA  Not Detected copies/mL BK Virus DNA Not Detected BK Virus DNA Not Detected BK Virus DNA Not Detected   BK Log <2.7 Log copies/mL Not Calculated Not Calculated Not Calculated   EBV VCA IGG ANTIBODY U/mL - - -   EBV VCA IGM ANTIBODY U/mL - - -   EBV CAPSID ANTIBODY IGG 0.0 - 0.8 AI - - -   EBV DNA COPIES/ML EBVNEG:EBV DNA Not Detected [Copies]/mL - - -   EBV DNA LOG OF COPIES <2.7 [Log:copies]/mL - - -   Hep B Core NR:Nonreactive - - -   Hep B Surf - - - -   HIV 1&2 NEG - - -        Recent Labs   Lab Test 03/23/20  0850 04/27/20  1454 05/18/20  1501   DOSTAC 03/22/2020 at 0845 pm LAST DOSE 0330 AM  5.18.20 @ 3.30AM   TACROL 8.2 5.0 7.1     Recent Labs   Lab Test 02/11/19  0740 02/14/19  0728 02/21/19  0749   DOSMPA LAST DOSE 8:00PM AT 2.10.2019 LAST DOSE 8:00PM 2/13/2019 02/20/2019 AT 0730 PM   MPACID 2.04 3.64* 1.41   MPAG 50.8 43.4 60.7       Again, thank you for allowing me to participate in the care of your patient.      Sincerely,    Kidney/Pancreas Recipient

## 2020-07-06 NOTE — PROGRESS NOTES
"Amadou Lewis is a 54 year old male who is being evaluated via a billable telephone visit.      The patient has been notified of following:     \"This telephone visit will be conducted via a call between you and your physician/provider. We have found that certain health care needs can be provided without the need for a physical exam.  This service lets us provide the care you need with a short phone conversation.  If a prescription is necessary we can send it directly to your pharmacy.  If lab work is needed we can place an order for that and you can then stop by our lab to have the test done at a later time.    Telephone visits are billed at different rates depending on your insurance coverage. During this emergency period, for some insurers they may be billed the same as an in-person visit.  Please reach out to your insurance provider with any questions.    If during the course of the call the physician/provider feels a telephone visit is not appropriate, you will not be charged for this service.\"    Patient has given verbal consent for Telephone visit?  Yes    What phone number would you like to be contacted at?     How would you like to obtain your AVS? Mail a copy    Phone call duration: 14 minutes    Jori Wolfe MD      CHRONIC TRANSPLANT NEPHROLOGY VISIT    Assessment & Plan   # DDKT (SPK): Stable   - Baseline Cr ~ 1.0-1.2   - Proteinuria: Normal (<0.2 grams)   - Date DSA Last Checked: Aug/2019      Latest DSA: No   - BK Viremia: No   - Kidney Tx Biopsy: No    # Pancreas Tx (SPK):    - Pancreatic Exocrine Drainage: Enteric drained     - Blood glucose: Euglycemia      On insulin: No   - HbA1c: Stable      Latest HbA1c: 5.3%   - Pancreatic enzymes: Stable   - Date DSA Last Checked: Aug/2019  Latest DSA: No   - Pancreas Tx Biopsy: No    # Immunosuppression: Tacrolimus immediate release (goal 5-8) and Mycophenolic acid (dose 540 mg every 12 hours)   - Changes: No    # Infection Prophylaxis:   - PJP: " Sulfa/TMP (Bactrim)    # Blood Pressure: Controlled;  Goal BP: < 130/80   - Changes: No    # Relative Erythrocytosis: Hgb: Stable;  On ACEI/ARB: No  Imaging: Yes - 8/2019    # Mineral Bone Disorder:   - Vitamin D; level: Not checked recently        On supplement: Yes  - Calcium; level: Normal        On supplement: No    # Electrolytes:   - Potassium; level: Normal        On supplement: No  - Magnesium; level: Not checked recently        On supplement: Yes  - Bicarbonate; level: Low        On supplement: No; If bicarbonate level remains low, would recommend starting sodium bicarbonate.    # Overweight: Weight is down ~ 15-20 lbs.   - Recommend weight loss for overall health by increasing exercise and watching caloric intake.    # Skin Cancer Risk:    - Discussed sun protection and recommend regular follow up with Dermatology.    # Medical Compliance: Yes     # COVID-19 Virus Review: Discussed COVID-19 virus and the potential medical risks.  Reviewed preventative health recommendations, which includes washing hands for 20 seconds, avoid touching your face, and social distancing.  Asked patient to inform the transplant center if they are exposed or diagnosed with this virus.    # Transplant History:  Etiology of Kidney Failure: Polycystic kidney disease (PKD), Diabetes mellitus type 1  Tx: SPK  Transplant: 11/12/2018 (Kidney / Pancreas), 10/10/2013 (Kidney)  Donor Type: Donation after Brain Death Donor Class:   Significant changes in immunosuppression: None  Significant transplant-related complications: None    Transplant Office Phone Number: [phone]    Assessment and plan was discussed with the patient and he voiced his understanding and agreement.    Return visit: Return in about 6 months (around 1/6/2021).    Jori Wolfe MD    Chief Complaint   Mr. Lewis is a 54 year old here for kidney transplant, pancreas transplant and immunosuppression management.    History of Present Illness    Mr. Lewis reports  feeling good overall with minimal medical complaints.  Since last clinic visit, patient reports no hospitalizations or new medical complaints and has been doing well overall.  His energy level is good and has been normal.  He is active and does get some exercise.  Patient had been going to the gym prior to the pandemic, but now just works a lot around the home.  Denies any chest pain or shortness of breath with exertion.  Appetite is good and he has been trying to lose weight.  Patient was being followed by Weight Management and started on topiramate with good results.  He is down ~ 15-20 lbs, but wanted to minimize his medications and stopped the topiramate.  So fare he has been able to maintain his weight.  No fever, sweats or chills.  No leg swelling.    Recent Hospitalizations:  [x] No [] Yes    New Medical Issues: [x] No [] Yes    Decreased energy: [x] No [] Yes    Chest pain or SOB with exertion:  [x] No [] Yes    Appetite change or weight change: [] No [x] Yes Weight is down ~ 15-20 lbs   Nausea, vomiting or diarrhea:  [x] No [] Yes    Fever, sweats or chills: [x] No [] Yes    Leg swelling: [x] No [] Yes      Home BP: 120/80s    Review of Systems   A comprehensive review of systems was obtained and negative, except as noted in the HPI or PMH.    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)     Diabetes mellitus with background retinopathy (H)     Polycystic kidney     CAD (coronary artery disease)     Retinopathy     Hyperlipidemia LDL goal <70     Premature ventricular contractions (PVCs) (VPCs)     Kidney replaced by transplant     Immunosuppression (H)     Kidney transplant rejection     Aftercare following organ transplant     Esophageal ulcer     Status post simultaneous kidney and pancreas transplant (H)     Other chronic pain     Pancreas replaced by transplant (H)     Vitamin D deficiency       Social History   Social History      Tobacco Use     Smoking status: Never Smoker     Smokeless tobacco: Never Used   Substance Use Topics     Alcohol use: Yes     Frequency: Monthly or less     Drug use: No       Allergies   Allergies   Allergen Reactions     No Known Allergies        Medications   Current Outpatient Medications   Medication Sig     cholecalciferol 50 MCG (2000 UT) CAPS Take 2,000 Units by mouth daily     aspirin 81 MG EC tablet Take 1 tablet (81 mg) by mouth daily     atorvastatin (LIPITOR) 20 MG tablet Take 1 tablet (20 mg) by mouth daily     blood glucose monitoring (NO BRAND SPECIFIED) test strip Use to test blood sugar 4 times daily or as directed.     HYDROcodone-acetaminophen (NORCO) 5-325 MG tablet Take 1 tablet by mouth every 6 hours as needed for severe pain     magnesium oxide (MAG-OX) 400 MG tablet Take 2 tablets (800mg) at noon and 2 tablets (800mg) at 6pm.     mycophenolic acid (GENERIC EQUIVALENT) 180 MG EC tablet Take 3 tablets (540 mg) by mouth 2 times daily     sulfamethoxazole-trimethoprim (BACTRIM/SEPTRA) 400-80 MG tablet Take 1 tablet by mouth daily     tacrolimus (GENERIC EQUIVALENT) 0.5 MG capsule Take 1 capsule (0.5 mg) by mouth 2 times daily Total dose = 1.5mg twice daily     tacrolimus (GENERIC EQUIVALENT) 1 MG capsule Take 1 capsule (1 mg) by mouth 2 times daily Total dose = 1.5 mg twice daily     No current facility-administered medications for this visit.      Medications Discontinued During This Encounter   Medication Reason     topiramate (TOPAMAX) 25 MG tablet      cholecalciferol 50 MCG (2000 UT) CAPS Reorder       Physical Exam   Vital signs and physical exam were deferred for this telemedicine visit.      Data     Renal Latest Ref Rng & Units 5/18/2020 4/27/2020 3/23/2020   Na 133 - 144 mmol/L 139 139 139   K 3.4 - 5.3 mmol/L 4.6 4.0 4.3   Cl 94 - 109 mmol/L 112(H) 111(H) 113(H)   CO2 20 - 32 mmol/L 19(L) 21 22   BUN 7 - 30 mg/dL 17 25 23   Cr 0.66 - 1.25 mg/dL 1.16 1.20 1.19   Cr (external) 0.5  - 1.5 mg/dL - - -   Glucose 70 - 99 mg/dL 62(L) 84 81   Ca  8.5 - 10.1 mg/dL 9.2 8.9 9.0   Mg 1.6 - 2.3 mg/dL - - -     Bone Health Latest Ref Rng & Units 3/7/2019 2/14/2019 2/11/2019   Phos 2.5 - 4.5 mg/dL - 4.1 3.6   PTHi 18 - 80 pg/mL 104(H) - -   Vit D Def 20 - 75 ug/L 36 - -     Heme Latest Ref Rng & Units 5/18/2020 4/27/2020 3/23/2020   WBC 4.0 - 11.0 10e9/L 5.4 6.3 5.2   Hgb 13.3 - 17.7 g/dL 17.3 17.4 17.6   Plt 150 - 450 10e9/L 192 190 196   ABSOLUTE NEUTROPHIL 1.6 - 8.3 10e9/L - - -   ABSOLUTE LYMPHOCYTES 0.8 - 5.3 10e9/L - - -   ABSOLUTE MONOCYTES 0.0 - 1.3 10e9/L - - -   ABSOLUTE EOSINOPHILS 0.0 - 0.7 10e9/L - - -   ABSOLUTE BASOPHILS 0.0 - 0.2 10e9/L - - -   ABS IMMATURE GRANULOCYTES 0 - 0.4 10e9/L - - -   ABSOLUTE NUCLEATED RBC - - - -     Liver Latest Ref Rng & Units 11/11/2018 6/27/2018 8/3/2017   AP 40 - 150 U/L 189(H) - -   TBili 0.2 - 1.3 mg/dL 0.3 - -   ALT 0 - 70 U/L 14 - -   AST 0 - 45 U/L 9 - -   Tot Protein 6.8 - 8.8 g/dL 7.6 - -   Albumin 3.4 - 5.0 g/dL 4.0 4.2 4.2     Pancreas Latest Ref Rng & Units 5/18/2020 4/27/2020 3/23/2020   A1C 0 - 5.6 % 5.3 - -   Amylase 30 - 110 U/L 58 61 64   Lipase 73 - 393 U/L 126 150 151     Iron studies Latest Ref Rng & Units 9/11/2018 8/3/2017 11/18/2016   Iron 35 - 180 ug/dL 85 79 82   Iron sat 15 - 46 % 37 34 34   Ferritin 26 - 388 ng/mL 761(H) 736(H) -     [facility] Txp Virology Latest Ref Rng & Units 5/18/2020 10/7/2019 9/9/2019   CVM DNA Quant - - - -   CMV Quant <100 Copies/mL - - -   CMV QT Log <2.0 Log copies/mL - - -   CMV QUANT IU/ML CMVND:CMV DNA Not Detected [IU]/mL - - -   LOG IU/ML OF CMVQNT <2.1 [Log:IU]/mL - - -   BK Spec - Plasma Plasma Plasma   BK Res BKNEG:BK Virus DNA Not Detected copies/mL BK Virus DNA Not Detected BK Virus DNA Not Detected BK Virus DNA Not Detected   BK Log <2.7 Log copies/mL Not Calculated Not Calculated Not Calculated   EBV VCA IGG ANTIBODY U/mL - - -   EBV VCA IGM ANTIBODY U/mL - - -   EBV CAPSID ANTIBODY IGG 0.0 - 0.8 AI -  - -   EBV DNA COPIES/ML EBVNEG:EBV DNA Not Detected [Copies]/mL - - -   EBV DNA LOG OF COPIES <2.7 [Log:copies]/mL - - -   Hep B Core NR:Nonreactive - - -   Hep B Surf - - - -   HIV 1&2 NEG - - -        Recent Labs   Lab Test 03/23/20  0850 04/27/20  1454 05/18/20  1501   DOSTAC 03/22/2020 at 0845 pm LAST DOSE 0330 AM  5.18.20 @ 3.30AM   TACROL 8.2 5.0 7.1     Recent Labs   Lab Test 02/11/19  0740 02/14/19  0728 02/21/19  0749   DOSMPA LAST DOSE 8:00PM AT 2.10.2019 LAST DOSE 8:00PM 2/13/2019 02/20/2019 AT 0730 PM   MPACID 2.04 3.64* 1.41   MPAG 50.8 43.4 60.7

## 2020-07-08 ENCOUNTER — TELEPHONE (OUTPATIENT)
Dept: TRANSPLANT | Facility: CLINIC | Age: 55
End: 2020-07-08

## 2020-07-08 DIAGNOSIS — E55.9 VITAMIN D DEFICIENCY, UNSPECIFIED: ICD-10-CM

## 2020-07-08 DIAGNOSIS — Z94.83 PANCREAS REPLACED BY TRANSPLANT (H): ICD-10-CM

## 2020-07-08 DIAGNOSIS — Z94.0 KIDNEY REPLACED BY TRANSPLANT: Primary | ICD-10-CM

## 2020-07-28 DIAGNOSIS — E55.9 VITAMIN D DEFICIENCY, UNSPECIFIED: ICD-10-CM

## 2020-07-28 DIAGNOSIS — Z94.0 KIDNEY REPLACED BY TRANSPLANT: ICD-10-CM

## 2020-07-28 DIAGNOSIS — Z94.83 PANCREAS REPLACED BY TRANSPLANT (H): ICD-10-CM

## 2020-07-28 LAB
AMYLASE SERPL-CCNC: 66 U/L (ref 30–110)
ANION GAP SERPL CALCULATED.3IONS-SCNC: 7 MMOL/L (ref 3–14)
BUN SERPL-MCNC: 21 MG/DL (ref 7–30)
CALCIUM SERPL-MCNC: 9.2 MG/DL (ref 8.5–10.1)
CHLORIDE SERPL-SCNC: 111 MMOL/L (ref 94–109)
CO2 SERPL-SCNC: 22 MMOL/L (ref 20–32)
CREAT SERPL-MCNC: 1.05 MG/DL (ref 0.66–1.25)
ERYTHROCYTE [DISTWIDTH] IN BLOOD BY AUTOMATED COUNT: 14.8 % (ref 10–15)
GFR SERPL CREATININE-BSD FRML MDRD: 79 ML/MIN/{1.73_M2}
GLUCOSE SERPL-MCNC: 66 MG/DL (ref 70–99)
HCT VFR BLD AUTO: 52 % (ref 40–53)
HGB BLD-MCNC: 17.6 G/DL (ref 13.3–17.7)
LIPASE SERPL-CCNC: 113 U/L (ref 73–393)
MAGNESIUM SERPL-MCNC: 1.9 MG/DL (ref 1.6–2.3)
MCH RBC QN AUTO: 29.5 PG (ref 26.5–33)
MCHC RBC AUTO-ENTMCNC: 33.8 G/DL (ref 31.5–36.5)
MCV RBC AUTO: 87 FL (ref 78–100)
PLATELET # BLD AUTO: 180 10E9/L (ref 150–450)
POTASSIUM SERPL-SCNC: 5.1 MMOL/L (ref 3.4–5.3)
RBC # BLD AUTO: 5.96 10E12/L (ref 4.4–5.9)
SODIUM SERPL-SCNC: 140 MMOL/L (ref 133–144)
TACROLIMUS BLD-MCNC: 7.1 UG/L (ref 5–15)
TME LAST DOSE: NORMAL H
WBC # BLD AUTO: 4.3 10E9/L (ref 4–11)

## 2020-07-28 PROCEDURE — 80048 BASIC METABOLIC PNL TOTAL CA: CPT | Performed by: INTERNAL MEDICINE

## 2020-07-28 PROCEDURE — 85027 COMPLETE CBC AUTOMATED: CPT | Performed by: INTERNAL MEDICINE

## 2020-07-28 PROCEDURE — 82150 ASSAY OF AMYLASE: CPT | Performed by: INTERNAL MEDICINE

## 2020-07-28 PROCEDURE — 83735 ASSAY OF MAGNESIUM: CPT | Performed by: INTERNAL MEDICINE

## 2020-07-28 PROCEDURE — 36415 COLL VENOUS BLD VENIPUNCTURE: CPT | Performed by: INTERNAL MEDICINE

## 2020-07-28 PROCEDURE — 82306 VITAMIN D 25 HYDROXY: CPT | Performed by: INTERNAL MEDICINE

## 2020-07-28 PROCEDURE — 83690 ASSAY OF LIPASE: CPT | Performed by: INTERNAL MEDICINE

## 2020-07-28 PROCEDURE — 80197 ASSAY OF TACROLIMUS: CPT | Performed by: INTERNAL MEDICINE

## 2020-07-29 LAB — DEPRECATED CALCIDIOL+CALCIFEROL SERPL-MC: 32 UG/L (ref 20–75)

## 2020-08-03 DIAGNOSIS — Z94.0 KIDNEY REPLACED BY TRANSPLANT: ICD-10-CM

## 2020-08-03 DIAGNOSIS — Z94.83 PANCREAS REPLACED BY TRANSPLANT (H): ICD-10-CM

## 2020-08-03 DIAGNOSIS — Z94.83 PANCREAS TRANSPLANTED (H): Primary | ICD-10-CM

## 2020-08-03 DIAGNOSIS — Z94.0 KIDNEY TRANSPLANTED: ICD-10-CM

## 2020-08-03 RX ORDER — SULFAMETHOXAZOLE AND TRIMETHOPRIM 400; 80 MG/1; MG/1
1 TABLET ORAL DAILY
Qty: 30 TABLET | Refills: 11 | Status: SHIPPED | OUTPATIENT
Start: 2020-08-03 | End: 2022-05-11

## 2020-10-26 ENCOUNTER — TELEPHONE (OUTPATIENT)
Dept: TRANSPLANT | Facility: CLINIC | Age: 55
End: 2020-10-26

## 2020-10-26 NOTE — TELEPHONE ENCOUNTER
ISSUE:  Overdue for transplant lab draw.    PLAN:  Notify patient of need to remain compliant with monthly lab draws.    OUTCOME:  RNCC spoke with Sean, he has labs scheduled for 11/2/2020.  He stated that he thought he was on a lab schedule for every 3 months. RNCC corrected Sean with current schedule, labs due monthly.    Sean voiced understanding and will have labs drawn again the first Monday in December.

## 2020-11-02 DIAGNOSIS — Z94.83 PANCREAS REPLACED BY TRANSPLANT (H): ICD-10-CM

## 2020-11-02 DIAGNOSIS — Z94.0 KIDNEY REPLACED BY TRANSPLANT: ICD-10-CM

## 2020-11-02 LAB
ERYTHROCYTE [DISTWIDTH] IN BLOOD BY AUTOMATED COUNT: 15.7 % (ref 10–15)
HBA1C MFR BLD: 5.5 % (ref 0–5.6)
HCT VFR BLD AUTO: 50.3 % (ref 40–53)
HGB BLD-MCNC: 17 G/DL (ref 13.3–17.7)
LIPASE SERPL-CCNC: 119 U/L (ref 73–393)
MCH RBC QN AUTO: 29.3 PG (ref 26.5–33)
MCHC RBC AUTO-ENTMCNC: 33.8 G/DL (ref 31.5–36.5)
MCV RBC AUTO: 87 FL (ref 78–100)
PLATELET # BLD AUTO: 206 10E9/L (ref 150–450)
RBC # BLD AUTO: 5.81 10E12/L (ref 4.4–5.9)
TACROLIMUS BLD-MCNC: 8.5 UG/L (ref 5–15)
TME LAST DOSE: NORMAL H
WBC # BLD AUTO: 6.2 10E9/L (ref 4–11)

## 2020-11-02 PROCEDURE — 36415 COLL VENOUS BLD VENIPUNCTURE: CPT | Performed by: INTERNAL MEDICINE

## 2020-11-02 PROCEDURE — 80197 ASSAY OF TACROLIMUS: CPT | Performed by: INTERNAL MEDICINE

## 2020-11-02 PROCEDURE — 80061 LIPID PANEL: CPT | Performed by: INTERNAL MEDICINE

## 2020-11-02 PROCEDURE — 82150 ASSAY OF AMYLASE: CPT | Performed by: INTERNAL MEDICINE

## 2020-11-02 PROCEDURE — 80048 BASIC METABOLIC PNL TOTAL CA: CPT | Performed by: INTERNAL MEDICINE

## 2020-11-02 PROCEDURE — 83690 ASSAY OF LIPASE: CPT | Performed by: INTERNAL MEDICINE

## 2020-11-02 PROCEDURE — 83036 HEMOGLOBIN GLYCOSYLATED A1C: CPT | Performed by: INTERNAL MEDICINE

## 2020-11-02 PROCEDURE — 85027 COMPLETE CBC AUTOMATED: CPT | Performed by: INTERNAL MEDICINE

## 2020-11-02 PROCEDURE — 87799 DETECT AGENT NOS DNA QUANT: CPT | Performed by: INTERNAL MEDICINE

## 2020-11-03 ENCOUNTER — TELEPHONE (OUTPATIENT)
Dept: TRANSPLANT | Facility: CLINIC | Age: 55
End: 2020-11-03

## 2020-11-03 DIAGNOSIS — Z94.0 KIDNEY TRANSPLANTED: Primary | ICD-10-CM

## 2020-11-03 LAB
AMYLASE SERPL-CCNC: 68 U/L (ref 30–110)
ANION GAP SERPL CALCULATED.3IONS-SCNC: 7 MMOL/L (ref 3–14)
BUN SERPL-MCNC: 25 MG/DL (ref 7–30)
CALCIUM SERPL-MCNC: 9.2 MG/DL (ref 8.5–10.1)
CHLORIDE SERPL-SCNC: 112 MMOL/L (ref 94–109)
CHOLEST SERPL-MCNC: 145 MG/DL
CO2 SERPL-SCNC: 22 MMOL/L (ref 20–32)
CREAT SERPL-MCNC: 1.36 MG/DL (ref 0.66–1.25)
GFR SERPL CREATININE-BSD FRML MDRD: 57 ML/MIN/{1.73_M2}
GLUCOSE SERPL-MCNC: 56 MG/DL (ref 70–99)
HDLC SERPL-MCNC: 45 MG/DL
LDLC SERPL CALC-MCNC: 81 MG/DL
NONHDLC SERPL-MCNC: 100 MG/DL
POTASSIUM SERPL-SCNC: 4.5 MMOL/L (ref 3.4–5.3)
SODIUM SERPL-SCNC: 141 MMOL/L (ref 133–144)
TRIGL SERPL-MCNC: 96 MG/DL

## 2020-11-03 NOTE — TELEPHONE ENCOUNTER
Spoke to patient regarding:  Elevated serum creatinine.  Patient states that he was at work and did not have recent BP's with him.  Instructed patient to send recent BP readings to txp office via [facility] or call txp office.    Confirmed no N/V/D or other illness.  Confirmed current daily total intake of hydrating fluids.     Encouraged patient to INCREASE oral hydration to at least 2L daily and repeat labs in 1 week.  Patient verbalizes understanding.

## 2020-11-03 NOTE — TELEPHONE ENCOUNTER
ISSUE:  Elevated serum creatinine.    PLAN:  Call to Sean to obtain most recent BPs.  Confirm no N/V/D or other illness.  Confirm current daily total intake of hydrating fluids.    INCREASE oral hydration to at least 2L daily and repeat labs in 1 week.

## 2020-11-20 ENCOUNTER — TELEPHONE (OUTPATIENT)
Dept: PHARMACY | Facility: CLINIC | Age: 55
End: 2020-11-20

## 2020-11-20 NOTE — TELEPHONE ENCOUNTER
Clinical Pharmacy Consult:                                                      Unable to reach Sean for 2 year post transplant medication review. He is now filling with [facility]. Removing from pharmacy call list.

## 2020-11-23 DIAGNOSIS — Z94.0 KIDNEY REPLACED BY TRANSPLANT: Primary | ICD-10-CM

## 2020-11-23 DIAGNOSIS — Z94.83 PANCREAS REPLACED BY TRANSPLANT (H): ICD-10-CM

## 2020-11-23 DIAGNOSIS — Z79.899 ENCOUNTER FOR LONG-TERM (CURRENT) USE OF MEDICATIONS: ICD-10-CM

## 2020-12-21 DIAGNOSIS — Z94.83 PANCREAS REPLACED BY TRANSPLANT (H): ICD-10-CM

## 2020-12-21 DIAGNOSIS — Z94.0 KIDNEY REPLACED BY TRANSPLANT: ICD-10-CM

## 2020-12-21 LAB
ERYTHROCYTE [DISTWIDTH] IN BLOOD BY AUTOMATED COUNT: 14.5 % (ref 10–15)
HCT VFR BLD AUTO: 51.3 % (ref 40–53)
HGB BLD-MCNC: 17.6 G/DL (ref 13.3–17.7)
LIPASE SERPL-CCNC: 111 U/L (ref 73–393)
MCH RBC QN AUTO: 29.8 PG (ref 26.5–33)
MCHC RBC AUTO-ENTMCNC: 34.3 G/DL (ref 31.5–36.5)
MCV RBC AUTO: 87 FL (ref 78–100)
PLATELET # BLD AUTO: 203 10E9/L (ref 150–450)
RBC # BLD AUTO: 5.91 10E12/L (ref 4.4–5.9)
WBC # BLD AUTO: 6.7 10E9/L (ref 4–11)

## 2020-12-21 PROCEDURE — 80197 ASSAY OF TACROLIMUS: CPT | Performed by: INTERNAL MEDICINE

## 2020-12-21 PROCEDURE — 36415 COLL VENOUS BLD VENIPUNCTURE: CPT | Performed by: INTERNAL MEDICINE

## 2020-12-21 PROCEDURE — 82150 ASSAY OF AMYLASE: CPT | Performed by: INTERNAL MEDICINE

## 2020-12-21 PROCEDURE — 80048 BASIC METABOLIC PNL TOTAL CA: CPT | Performed by: INTERNAL MEDICINE

## 2020-12-21 PROCEDURE — 83690 ASSAY OF LIPASE: CPT | Performed by: INTERNAL MEDICINE

## 2020-12-21 PROCEDURE — 85027 COMPLETE CBC AUTOMATED: CPT | Performed by: INTERNAL MEDICINE

## 2020-12-22 LAB
AMYLASE SERPL-CCNC: 65 U/L (ref 30–110)
ANION GAP SERPL CALCULATED.3IONS-SCNC: 2 MMOL/L (ref 3–14)
BUN SERPL-MCNC: 15 MG/DL (ref 7–30)
CALCIUM SERPL-MCNC: 8.8 MG/DL (ref 8.5–10.1)
CHLORIDE SERPL-SCNC: 108 MMOL/L (ref 94–109)
CO2 SERPL-SCNC: 26 MMOL/L (ref 20–32)
CREAT SERPL-MCNC: 0.93 MG/DL (ref 0.66–1.25)
GFR SERPL CREATININE-BSD FRML MDRD: >90 ML/MIN/{1.73_M2}
GLUCOSE SERPL-MCNC: 77 MG/DL (ref 70–99)
POTASSIUM SERPL-SCNC: 4.6 MMOL/L (ref 3.4–5.3)
SODIUM SERPL-SCNC: 136 MMOL/L (ref 133–144)
TACROLIMUS BLD-MCNC: 7 UG/L (ref 5–15)
TME LAST DOSE: NORMAL H

## 2021-01-04 ENCOUNTER — VIRTUAL VISIT (OUTPATIENT)
Dept: NEPHROLOGY | Facility: CLINIC | Age: 56
End: 2021-01-04
Attending: INTERNAL MEDICINE
Payer: COMMERCIAL

## 2021-01-04 VITALS — SYSTOLIC BLOOD PRESSURE: 120 MMHG | DIASTOLIC BLOOD PRESSURE: 70 MMHG

## 2021-01-04 DIAGNOSIS — D84.9 IMMUNOSUPPRESSION (H): ICD-10-CM

## 2021-01-04 DIAGNOSIS — Z94.0 HTN, KIDNEY TRANSPLANT RELATED: ICD-10-CM

## 2021-01-04 DIAGNOSIS — Z48.298 AFTERCARE FOLLOWING ORGAN TRANSPLANT: Primary | ICD-10-CM

## 2021-01-04 DIAGNOSIS — I15.1 HTN, KIDNEY TRANSPLANT RELATED: ICD-10-CM

## 2021-01-04 DIAGNOSIS — Q61.3 POLYCYSTIC KIDNEY: ICD-10-CM

## 2021-01-04 PROCEDURE — 99214 OFFICE O/P EST MOD 30 MIN: CPT | Mod: 95

## 2021-01-04 ASSESSMENT — PAIN SCALES - GENERAL: PAINLEVEL: NO PAIN (0)

## 2021-01-04 NOTE — LETTER
"1/4/2021        RE: Amadou Lewis  [address]  [place] [address]     Dear Colleague,    Thank you for referring your patient, Amadou Lewis, to the [facility] [place] at [facility]. Please see a copy of my visit note below.    Amadou Lewis is a 55 year old male who is being evaluated via a billable telephone visit.      What phone number would you like to be contacted at? [phone]    How would you like to obtain your AVS? Mail a copy    The patient has been notified of following:     \"This telephone visit will be conducted via a call between you and your physician/provider. We have found that certain health care needs can be provided without the need for a physical exam.  This service lets us provide the care you need with a short phone conversation.  If a prescription is necessary we can send it directly to your pharmacy.  If lab work is needed we can place an order for that and you can then stop by our lab to have the test done at a later time.    Telephone visits are billed at different rates depending on your insurance coverage. During this emergency period, for some insurers they may be billed the same as an in-person visit.  Please reach out to your insurance provider with any questions.    If during the course of the call the physician/provider feels a telephone visit is not appropriate, you will not be charged for this service.\"    Patient has given verbal consent for Telephone visit?  Yes    What phone number would you like to be contacted at?     How would you like to obtain your AVS? Mail a copy    Phone call duration: 14 minutes          CHRONIC TRANSPLANT NEPHROLOGY VISIT    Assessment & Plan   # DDKT (SPK): Stable   - Baseline Cr ~ 1.0-1.2   - Proteinuria: Normal (<0.2 grams)   - Date DSA Last Checked: Aug/2019      Latest DSA: No   - BK Viremia: No   - Kidney Tx Biopsy: No    # Pancreas Tx (SPK):    - Pancreatic " Exocrine Drainage: Enteric drained     - Blood glucose: Euglycemia      On insulin: No   - HbA1c: Stable      Latest HbA1c: 5.3%   - Pancreatic enzymes: Stable   - Date DSA Last Checked: Aug/2019  Latest DSA: No   - Pancreas Tx Biopsy: No    # Immunosuppression: Tacrolimus immediate release (goal 5-8) and Mycophenolic acid (dose 540 mg every 12 hours)   - Changes: No    # Infection Prophylaxis:   - PJP: Sulfa/TMP (Bactrim)    # Blood Pressure: Controlled;  Goal BP: < 130/80   - Changes: No    # Relative Erythrocytosis: Hgb: Stable;  On ACEI/ARB: No  Imaging: yes 11/2019    # Mineral Bone Disorder:   - Vitamin D; level: Not checked recently        On supplement: Yes  - Calcium; level: Normal        On supplement: No    # Electrolytes:   - Potassium; level: Normal        On supplement: No  - Magnesium; level: Not checked recently        On supplement: Yes  - Bicarbonate; level: Low        On supplement: No    # Overweight: Weight is down ~ 15-20 lbs.   - Recommend weight loss for overall health by increasing exercise and watching caloric intake.    # Skin Cancer Risk:    - Discussed sun protection and recommend regular follow up with Dermatology.    # Medical Compliance: Yes       # No flu shot but plans to get covid19 vaccine    # COVID-19 Virus Review: Discussed COVID-19 virus and the potential medical risks.  Reviewed preventative health recommendations, which includes washing hands for 20 seconds, avoid touching your face, and social distancing.  Asked patient to inform the transplant center if they are exposed or diagnosed with this virus.    # Transplant History:  Etiology of Kidney Failure: Polycystic kidney disease (PKD), Diabetes mellitus type 1  Tx: SPK  Transplant: 11/12/2018 (Kidney / Pancreas), 10/10/2013 (Kidney)  Donor Type: Donation after Brain Death Donor Class:   Significant changes in immunosuppression: None  Significant transplant-related complications: None    Transplant Office Phone Number:  [phone]    Assessment and plan was discussed with the patient and he voiced his understanding and agreement.    Return visit: No follow-ups on file.    Angela Barajas MD    Chief Complaint   Mr. Lewis is a 55 year old here for kidney transplant, pancreas transplant and immunosuppression management.    History of Present Illness    Mr. Debbie Aguilerafeels ok overall but reports few issues over the past few months. He reports significant weight gain due to COVID-19 related precautions and limited opportunities to remain active. His weight is up to 187 lbs from 175 lbs. He also notes some issues with his vision recently and will f/up with ophthalmology. He is frustrated about insurance coverage and the need to resend a form to Medicare in order to refill his immunosuppressive meds, he is not out yet and will contact transplant office if he has any issues obtaining his meds. He does have chronic pain due to underlying PKD and uses norco pen. He denies any urinary symptoms, hematuria, fevers or chills.      Recent Hospitalizations:  [x] No [] Yes    New Medical Issues: [x] No [] Yes    Decreased energy: [x] No [] Yes    Chest pain or SOB with exertion:  [x] No [] Yes    Appetite change or weight change: [] No [x] Yes    Nausea, vomiting or diarrhea:  [x] No [] Yes    Fever, sweats or chills: [x] No [] Yes    Leg swelling: [x] No [] Yes      Home BP: 120/60-70    Review of Systems   A comprehensive review of systems was obtained and negative, except as noted in the HPI or PMH.    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)     Diabetes mellitus with background retinopathy (H)     Polycystic kidney     CAD (coronary artery disease)     Retinopathy     Hyperlipidemia LDL goal <70     Premature ventricular contractions (PVCs) (VPCs)     Kidney replaced by transplant     Immunosuppression (H)     Kidney transplant rejection     Aftercare following organ  transplant     Esophageal ulcer     Status post simultaneous kidney and pancreas transplant (H)     Other chronic pain     Pancreas replaced by transplant (H)     Vitamin D deficiency       Social History   Social History     Tobacco Use     Smoking status: Never Smoker     Smokeless tobacco: Never Used   Substance Use Topics     Alcohol use: Yes     Frequency: Monthly or less     Drug use: No       Allergies   Allergies   Allergen Reactions     No Known Allergies        Medications   Current Outpatient Medications   Medication Sig     aspirin 81 MG EC tablet Take 1 tablet (81 mg) by mouth daily     atorvastatin (LIPITOR) 20 MG tablet Take 1 tablet (20 mg) by mouth daily     blood glucose monitoring (NO BRAND SPECIFIED) test strip Use to test blood sugar 4 times daily or as directed.     cholecalciferol 50 MCG (2000 UT) CAPS Take 2,000 Units by mouth daily     HYDROcodone-acetaminophen (NORCO) 5-325 MG tablet Take 1 tablet by mouth every 6 hours as needed for severe pain     mycophenolic acid (GENERIC EQUIVALENT) 180 MG EC tablet Take 3 tablets (540 mg) by mouth 2 times daily     sulfamethoxazole-trimethoprim (BACTRIM) 400-80 MG tablet Take 1 tablet by mouth daily     tacrolimus (GENERIC EQUIVALENT) 0.5 MG capsule Take 1 capsule (0.5 mg) by mouth 2 times daily Total dose = 1.5mg twice daily     tacrolimus (GENERIC EQUIVALENT) 1 MG capsule Take 1 capsule (1 mg) by mouth 2 times daily Total dose = 1.5 mg twice daily     magnesium oxide (MAG-OX) 400 MG tablet Take 2 tablets (800mg) at noon and 2 tablets (800mg) at 6pm. (Patient not taking: Reported on 1/4/2021)     No current facility-administered medications for this visit.      There are no discontinued medications.    Physical Exam   Vital signs and physical exam were deferred for this telemedicine visit.      Data     Renal Latest Ref Rng & Units 12/21/2020 11/2/2020 7/28/2020   Na 133 - 144 mmol/L 136 141 140   K 3.4 - 5.3 mmol/L 4.6 4.5 5.1   Cl 94 - 109 mmol/L  108 112(H) 111(H)   CO2 20 - 32 mmol/L 26 22 22   BUN 7 - 30 mg/dL 15 25 21   Cr 0.66 - 1.25 mg/dL 0.93 1.36(H) 1.05   Cr (external) 0.5 - 1.5 mg/dL - - -   Glucose 70 - 99 mg/dL 77 56(L) 66(L)   Ca  8.5 - 10.1 mg/dL 8.8 9.2 9.2   Mg 1.6 - 2.3 mg/dL - - 1.9     Bone Health Latest Ref Rng & Units 7/28/2020 3/7/2019 2/14/2019   Phos 2.5 - 4.5 mg/dL - - 4.1   PTHi 18 - 80 pg/mL - 104(H) -   Vit D Def 20 - 75 ug/L 32 36 -     Heme Latest Ref Rng & Units 12/21/2020 11/2/2020 7/28/2020   WBC 4.0 - 11.0 10e9/L 6.7 6.2 4.3   Hgb 13.3 - 17.7 g/dL 17.6 17.0 17.6   Plt 150 - 450 10e9/L 203 206 180   ABSOLUTE NEUTROPHIL 1.6 - 8.3 10e9/L - - -   ABSOLUTE LYMPHOCYTES 0.8 - 5.3 10e9/L - - -   ABSOLUTE MONOCYTES 0.0 - 1.3 10e9/L - - -   ABSOLUTE EOSINOPHILS 0.0 - 0.7 10e9/L - - -   ABSOLUTE BASOPHILS 0.0 - 0.2 10e9/L - - -   ABS IMMATURE GRANULOCYTES 0 - 0.4 10e9/L - - -   ABSOLUTE NUCLEATED RBC - - - -     Liver Latest Ref Rng & Units 11/11/2018 6/27/2018 8/3/2017   AP 40 - 150 U/L 189(H) - -   TBili 0.2 - 1.3 mg/dL 0.3 - -   ALT 0 - 70 U/L 14 - -   AST 0 - 45 U/L 9 - -   Tot Protein 6.8 - 8.8 g/dL 7.6 - -   Albumin 3.4 - 5.0 g/dL 4.0 4.2 4.2     Pancreas Latest Ref Rng & Units 12/21/2020 11/2/2020 7/28/2020   A1C 0 - 5.6 % - 5.5 -   Amylase 30 - 110 U/L 65 68 66   Lipase 73 - 393 U/L 111 119 113     Iron studies Latest Ref Rng & Units 9/11/2018 8/3/2017 11/18/2016   Iron 35 - 180 ug/dL 85 79 82   Iron sat 15 - 46 % 37 34 34   Ferritin 26 - 388 ng/mL 761(H) 736(H) -     [facility] Txp Virology Latest Ref Rng & Units 11/2/2020 5/18/2020 10/7/2019   CVM DNA Quant - - - -   CMV Quant <100 Copies/mL - - -   CMV QT Log <2.0 Log copies/mL - - -   CMV QUANT IU/ML CMVND:CMV DNA Not Detected [IU]/mL - - -   LOG IU/ML OF CMVQNT <2.1 [Log:IU]/mL - - -   BK Spec - Plasma Plasma Plasma   BK Res BKNEG:BK Virus DNA Not Detected copies/mL BK Virus DNA Not Detected BK Virus DNA Not Detected BK Virus DNA Not Detected   BK Log <2.7 Log copies/mL Not  Calculated Not Calculated Not Calculated   EBV VCA IGG ANTIBODY U/mL - - -   EBV VCA IGM ANTIBODY U/mL - - -   EBV CAPSID ANTIBODY IGG 0.0 - 0.8 AI - - -   EBV DNA COPIES/ML EBVNEG:EBV DNA Not Detected [Copies]/mL - - -   EBV DNA LOG OF COPIES <2.7 [Log:copies]/mL - - -   Hep B Core NR:Nonreactive - - -   Hep B Surf - - - -   HIV 1&2 NEG - - -        Recent Labs   Lab Test 07/28/20  1018 11/02/20  1446 12/21/20  1503   DOSTAC 07/27/2020 AT 1030 PM last dose 0830 am 11/02 LAST DOST 0400AM 12.21.2020   TACROL 7.1 8.5 7.0     Recent Labs   Lab Test 02/11/19  0740 02/14/19  0728 02/21/19  0749   DOSMPA LAST DOSE 8:00PM AT 2.10.2019 LAST DOSE 8:00PM 2/13/2019 02/20/2019 AT 0730 PM   MPACID 2.04 3.64* 1.41   MPAG 50.8 43.4 60.7     CT a/p  1. Polycystic native kidneys and liver appear grossly similar to prior  with measurements as above.  2. Mid abdomen pancreas transplant with nonspecific mild mesenteric  haziness which can be seen with pancreatitis in the appropriate  clinical setting. Distension of the duodenal cuff.   3. Postsurgical changes of lower quadrant renal transplants as above.  No hydronephrosis      Again, thank you for allowing me to participate in the care of your patient.      Sincerely,    Kidney/Pancreas Recipient

## 2021-01-04 NOTE — PROGRESS NOTES
"Amadou Lewis is a 55 year old male who is being evaluated via a billable telephone visit.      What phone number would you like to be contacted at? [phone]    How would you like to obtain your AVS? Mail a copy    The patient has been notified of following:     \"This telephone visit will be conducted via a call between you and your physician/provider. We have found that certain health care needs can be provided without the need for a physical exam.  This service lets us provide the care you need with a short phone conversation.  If a prescription is necessary we can send it directly to your pharmacy.  If lab work is needed we can place an order for that and you can then stop by our lab to have the test done at a later time.    Telephone visits are billed at different rates depending on your insurance coverage. During this emergency period, for some insurers they may be billed the same as an in-person visit.  Please reach out to your insurance provider with any questions.    If during the course of the call the physician/provider feels a telephone visit is not appropriate, you will not be charged for this service.\"    Patient has given verbal consent for Telephone visit?  Yes    What phone number would you like to be contacted at?     How would you like to obtain your AVS? Mail a copy    Phone call duration: 14 minutes          CHRONIC TRANSPLANT NEPHROLOGY VISIT    Assessment & Plan   # DDKT (SPK): Stable   - Baseline Cr ~ 1.0-1.2   - Proteinuria: Normal (<0.2 grams)   - Date DSA Last Checked: Aug/2019      Latest DSA: No   - BK Viremia: No   - Kidney Tx Biopsy: No    # Pancreas Tx (SPK):    - Pancreatic Exocrine Drainage: Enteric drained     - Blood glucose: Euglycemia      On insulin: No   - HbA1c: Stable      Latest HbA1c: 5.3%   - Pancreatic enzymes: Stable   - Date DSA Last Checked: Aug/2019  Latest DSA: No   - Pancreas Tx Biopsy: No    # Immunosuppression: Tacrolimus immediate release (goal 5-8) and " Mycophenolic acid (dose 540 mg every 12 hours)   - Changes: No    # Infection Prophylaxis:   - PJP: Sulfa/TMP (Bactrim)    # Blood Pressure: Controlled;  Goal BP: < 130/80   - Changes: No    # Relative Erythrocytosis: Hgb: Stable;  On ACEI/ARB: No  Imaging: yes 11/2019    # Mineral Bone Disorder:   - Vitamin D; level: Not checked recently        On supplement: Yes  - Calcium; level: Normal        On supplement: No    # Electrolytes:   - Potassium; level: Normal        On supplement: No  - Magnesium; level: Not checked recently        On supplement: Yes  - Bicarbonate; level: Low        On supplement: No    # Overweight: Weight is down ~ 15-20 lbs.   - Recommend weight loss for overall health by increasing exercise and watching caloric intake.    # Skin Cancer Risk:    - Discussed sun protection and recommend regular follow up with Dermatology.    # Medical Compliance: Yes       # No flu shot but plans to get covid19 vaccine    # COVID-19 Virus Review: Discussed COVID-19 virus and the potential medical risks.  Reviewed preventative health recommendations, which includes washing hands for 20 seconds, avoid touching your face, and social distancing.  Asked patient to inform the transplant center if they are exposed or diagnosed with this virus.    # Transplant History:  Etiology of Kidney Failure: Polycystic kidney disease (PKD), Diabetes mellitus type 1  Tx: SPK  Transplant: 11/12/2018 (Kidney / Pancreas), 10/10/2013 (Kidney)  Donor Type: Donation after Brain Death Donor Class:   Significant changes in immunosuppression: None  Significant transplant-related complications: None    Transplant Office Phone Number: [phone]    Assessment and plan was discussed with the patient and he voiced his understanding and agreement.    Return visit: No follow-ups on file.    Angela Barajas MD    Chief Complaint   Mr. Lewis is a 55 year old here for kidney transplant, pancreas transplant and immunosuppression  management.    History of Present Illness    Mr. Debbie Aguilerafeels ok overall but reports few issues over the past few months. He reports significant weight gain due to COVID-19 related precautions and limited opportunities to remain active. His weight is up to 187 lbs from 175 lbs. He also notes some issues with his vision recently and will f/up with ophthalmology. He is frustrated about insurance coverage and the need to resend a form to Medicare in order to refill his immunosuppressive meds, he is not out yet and will contact transplant office if he has any issues obtaining his meds. He does have chronic pain due to underlying PKD and uses norco pen. He denies any urinary symptoms, hematuria, fevers or chills.      Recent Hospitalizations:  [x] No [] Yes    New Medical Issues: [x] No [] Yes    Decreased energy: [x] No [] Yes    Chest pain or SOB with exertion:  [x] No [] Yes    Appetite change or weight change: [] No [x] Yes    Nausea, vomiting or diarrhea:  [x] No [] Yes    Fever, sweats or chills: [x] No [] Yes    Leg swelling: [x] No [] Yes      Home BP: 120/60-70    Review of Systems   A comprehensive review of systems was obtained and negative, except as noted in the HPI or PMH.    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)     Diabetes mellitus with background retinopathy (H)     Polycystic kidney     CAD (coronary artery disease)     Retinopathy     Hyperlipidemia LDL goal <70     Premature ventricular contractions (PVCs) (VPCs)     Kidney replaced by transplant     Immunosuppression (H)     Kidney transplant rejection     Aftercare following organ transplant     Esophageal ulcer     Status post simultaneous kidney and pancreas transplant (H)     Other chronic pain     Pancreas replaced by transplant (H)     Vitamin D deficiency       Social History   Social History     Tobacco Use     Smoking status: Never Smoker     Smokeless tobacco:  Never Used   Substance Use Topics     Alcohol use: Yes     Frequency: Monthly or less     Drug use: No       Allergies   Allergies   Allergen Reactions     No Known Allergies        Medications   Current Outpatient Medications   Medication Sig     aspirin 81 MG EC tablet Take 1 tablet (81 mg) by mouth daily     atorvastatin (LIPITOR) 20 MG tablet Take 1 tablet (20 mg) by mouth daily     blood glucose monitoring (NO BRAND SPECIFIED) test strip Use to test blood sugar 4 times daily or as directed.     cholecalciferol 50 MCG (2000 UT) CAPS Take 2,000 Units by mouth daily     HYDROcodone-acetaminophen (NORCO) 5-325 MG tablet Take 1 tablet by mouth every 6 hours as needed for severe pain     mycophenolic acid (GENERIC EQUIVALENT) 180 MG EC tablet Take 3 tablets (540 mg) by mouth 2 times daily     sulfamethoxazole-trimethoprim (BACTRIM) 400-80 MG tablet Take 1 tablet by mouth daily     tacrolimus (GENERIC EQUIVALENT) 0.5 MG capsule Take 1 capsule (0.5 mg) by mouth 2 times daily Total dose = 1.5mg twice daily     tacrolimus (GENERIC EQUIVALENT) 1 MG capsule Take 1 capsule (1 mg) by mouth 2 times daily Total dose = 1.5 mg twice daily     magnesium oxide (MAG-OX) 400 MG tablet Take 2 tablets (800mg) at noon and 2 tablets (800mg) at 6pm. (Patient not taking: Reported on 1/4/2021)     No current facility-administered medications for this visit.      There are no discontinued medications.    Physical Exam   Vital signs and physical exam were deferred for this telemedicine visit.      Data     Renal Latest Ref Rng & Units 12/21/2020 11/2/2020 7/28/2020   Na 133 - 144 mmol/L 136 141 140   K 3.4 - 5.3 mmol/L 4.6 4.5 5.1   Cl 94 - 109 mmol/L 108 112(H) 111(H)   CO2 20 - 32 mmol/L 26 22 22   BUN 7 - 30 mg/dL 15 25 21   Cr 0.66 - 1.25 mg/dL 0.93 1.36(H) 1.05   Cr (external) 0.5 - 1.5 mg/dL - - -   Glucose 70 - 99 mg/dL 77 56(L) 66(L)   Ca  8.5 - 10.1 mg/dL 8.8 9.2 9.2   Mg 1.6 - 2.3 mg/dL - - 1.9     Bone Health Latest Ref Rng &  Units 7/28/2020 3/7/2019 2/14/2019   Phos 2.5 - 4.5 mg/dL - - 4.1   PTHi 18 - 80 pg/mL - 104(H) -   Vit D Def 20 - 75 ug/L 32 36 -     Heme Latest Ref Rng & Units 12/21/2020 11/2/2020 7/28/2020   WBC 4.0 - 11.0 10e9/L 6.7 6.2 4.3   Hgb 13.3 - 17.7 g/dL 17.6 17.0 17.6   Plt 150 - 450 10e9/L 203 206 180   ABSOLUTE NEUTROPHIL 1.6 - 8.3 10e9/L - - -   ABSOLUTE LYMPHOCYTES 0.8 - 5.3 10e9/L - - -   ABSOLUTE MONOCYTES 0.0 - 1.3 10e9/L - - -   ABSOLUTE EOSINOPHILS 0.0 - 0.7 10e9/L - - -   ABSOLUTE BASOPHILS 0.0 - 0.2 10e9/L - - -   ABS IMMATURE GRANULOCYTES 0 - 0.4 10e9/L - - -   ABSOLUTE NUCLEATED RBC - - - -     Liver Latest Ref Rng & Units 11/11/2018 6/27/2018 8/3/2017   AP 40 - 150 U/L 189(H) - -   TBili 0.2 - 1.3 mg/dL 0.3 - -   ALT 0 - 70 U/L 14 - -   AST 0 - 45 U/L 9 - -   Tot Protein 6.8 - 8.8 g/dL 7.6 - -   Albumin 3.4 - 5.0 g/dL 4.0 4.2 4.2     Pancreas Latest Ref Rng & Units 12/21/2020 11/2/2020 7/28/2020   A1C 0 - 5.6 % - 5.5 -   Amylase 30 - 110 U/L 65 68 66   Lipase 73 - 393 U/L 111 119 113     Iron studies Latest Ref Rng & Units 9/11/2018 8/3/2017 11/18/2016   Iron 35 - 180 ug/dL 85 79 82   Iron sat 15 - 46 % 37 34 34   Ferritin 26 - 388 ng/mL 761(H) 736(H) -     [facility] Txp Virology Latest Ref Rng & Units 11/2/2020 5/18/2020 10/7/2019   CVM DNA Quant - - - -   CMV Quant <100 Copies/mL - - -   CMV QT Log <2.0 Log copies/mL - - -   CMV QUANT IU/ML CMVND:CMV DNA Not Detected [IU]/mL - - -   LOG IU/ML OF CMVQNT <2.1 [Log:IU]/mL - - -   BK Spec - Plasma Plasma Plasma   BK Res BKNEG:BK Virus DNA Not Detected copies/mL BK Virus DNA Not Detected BK Virus DNA Not Detected BK Virus DNA Not Detected   BK Log <2.7 Log copies/mL Not Calculated Not Calculated Not Calculated   EBV VCA IGG ANTIBODY U/mL - - -   EBV VCA IGM ANTIBODY U/mL - - -   EBV CAPSID ANTIBODY IGG 0.0 - 0.8 AI - - -   EBV DNA COPIES/ML EBVNEG:EBV DNA Not Detected [Copies]/mL - - -   EBV DNA LOG OF COPIES <2.7 [Log:copies]/mL - - -   Hep B Core  NR:Nonreactive - - -   Hep B Surf - - - -   HIV 1&2 NEG - - -        Recent Labs   Lab Test 07/28/20  1018 11/02/20  1446 12/21/20  1503   DOSTAC 07/27/2020 AT 1030 PM last dose 0830 am 11/02 LAST DOST 0400AM 12.21.2020   TACROL 7.1 8.5 7.0     Recent Labs   Lab Test 02/11/19  0740 02/14/19  0728 02/21/19  0749   DOSMPA LAST DOSE 8:00PM AT 2.10.2019 LAST DOSE 8:00PM 2/13/2019 02/20/2019 AT 0730 PM   MPACID 2.04 3.64* 1.41   MPAG 50.8 43.4 60.7     CT a/p  1. Polycystic native kidneys and liver appear grossly similar to prior  with measurements as above.  2. Mid abdomen pancreas transplant with nonspecific mild mesenteric  haziness which can be seen with pancreatitis in the appropriate  clinical setting. Distension of the duodenal cuff.   3. Postsurgical changes of lower quadrant renal transplants as above.  No hydronephrosis

## 2021-01-15 ENCOUNTER — HEALTH MAINTENANCE LETTER (OUTPATIENT)
Age: 56
End: 2021-01-15

## 2021-01-26 DIAGNOSIS — Z94.0 KIDNEY TRANSPLANTED: ICD-10-CM

## 2021-01-26 DIAGNOSIS — Z94.83 PANCREAS REPLACED BY TRANSPLANT (H): ICD-10-CM

## 2021-01-26 DIAGNOSIS — Z94.83 PANCREAS TRANSPLANTED (H): ICD-10-CM

## 2021-01-26 DIAGNOSIS — Z94.0 KIDNEY REPLACED BY TRANSPLANT: Primary | ICD-10-CM

## 2021-01-26 RX ORDER — MYCOPHENOLIC ACID 180 MG/1
540 TABLET, DELAYED RELEASE ORAL 2 TIMES DAILY
Qty: 540 TABLET | Refills: 3 | Status: SHIPPED | OUTPATIENT
Start: 2021-01-26 | End: 2021-12-09

## 2021-03-01 DIAGNOSIS — Z94.83 PANCREAS REPLACED BY TRANSPLANT (H): ICD-10-CM

## 2021-03-01 DIAGNOSIS — Z94.0 KIDNEY REPLACED BY TRANSPLANT: ICD-10-CM

## 2021-03-01 LAB
ERYTHROCYTE [DISTWIDTH] IN BLOOD BY AUTOMATED COUNT: 14.4 % (ref 10–15)
HCT VFR BLD AUTO: 51.3 % (ref 40–53)
HGB BLD-MCNC: 17.6 G/DL (ref 13.3–17.7)
LIPASE SERPL-CCNC: 167 U/L (ref 73–393)
MCH RBC QN AUTO: 29.8 PG (ref 26.5–33)
MCHC RBC AUTO-ENTMCNC: 34.3 G/DL (ref 31.5–36.5)
MCV RBC AUTO: 87 FL (ref 78–100)
PLATELET # BLD AUTO: 194 10E9/L (ref 150–450)
RBC # BLD AUTO: 5.91 10E12/L (ref 4.4–5.9)
WBC # BLD AUTO: 7.1 10E9/L (ref 4–11)

## 2021-03-01 PROCEDURE — 82150 ASSAY OF AMYLASE: CPT | Performed by: INTERNAL MEDICINE

## 2021-03-01 PROCEDURE — 80048 BASIC METABOLIC PNL TOTAL CA: CPT | Performed by: INTERNAL MEDICINE

## 2021-03-01 PROCEDURE — 85027 COMPLETE CBC AUTOMATED: CPT | Performed by: INTERNAL MEDICINE

## 2021-03-01 PROCEDURE — 36415 COLL VENOUS BLD VENIPUNCTURE: CPT | Performed by: INTERNAL MEDICINE

## 2021-03-01 PROCEDURE — 80197 ASSAY OF TACROLIMUS: CPT | Performed by: INTERNAL MEDICINE

## 2021-03-01 PROCEDURE — 83690 ASSAY OF LIPASE: CPT | Performed by: INTERNAL MEDICINE

## 2021-03-02 LAB
AMYLASE SERPL-CCNC: 69 U/L (ref 30–110)
ANION GAP SERPL CALCULATED.3IONS-SCNC: 6 MMOL/L (ref 3–14)
BUN SERPL-MCNC: 17 MG/DL (ref 7–30)
CALCIUM SERPL-MCNC: 10 MG/DL (ref 8.5–10.1)
CHLORIDE SERPL-SCNC: 109 MMOL/L (ref 94–109)
CO2 SERPL-SCNC: 24 MMOL/L (ref 20–32)
CREAT SERPL-MCNC: 1.03 MG/DL (ref 0.66–1.25)
GFR SERPL CREATININE-BSD FRML MDRD: 80 ML/MIN/{1.73_M2}
GLUCOSE SERPL-MCNC: 72 MG/DL (ref 70–99)
POTASSIUM SERPL-SCNC: 4.5 MMOL/L (ref 3.4–5.3)
SODIUM SERPL-SCNC: 139 MMOL/L (ref 133–144)
TACROLIMUS BLD-MCNC: 6.2 UG/L (ref 5–15)
TME LAST DOSE: NORMAL H

## 2021-03-15 ENCOUNTER — IMMUNIZATION (OUTPATIENT)
Dept: NURSING | Facility: CLINIC | Age: 56
End: 2021-03-15
Payer: COMMERCIAL

## 2021-03-15 PROCEDURE — 91300 PR COVID VAC PFIZER DIL RECON 30 MCG/0.3 ML IM: CPT

## 2021-03-15 PROCEDURE — 0001A PR COVID VAC PFIZER DIL RECON 30 MCG/0.3 ML IM: CPT

## 2021-04-05 ENCOUNTER — IMMUNIZATION (OUTPATIENT)
Dept: NURSING | Facility: CLINIC | Age: 56
End: 2021-04-05
Attending: FAMILY MEDICINE
Payer: MEDICARE

## 2021-04-05 PROCEDURE — 0002A PR COVID VAC PFIZER DIL RECON 30 MCG/0.3 ML IM: CPT

## 2021-04-05 PROCEDURE — 91300 PR COVID VAC PFIZER DIL RECON 30 MCG/0.3 ML IM: CPT

## 2021-04-13 DIAGNOSIS — Z94.83 PANCREAS REPLACED BY TRANSPLANT (H): ICD-10-CM

## 2021-04-13 DIAGNOSIS — Z94.0 KIDNEY REPLACED BY TRANSPLANT: ICD-10-CM

## 2021-04-13 LAB
AMYLASE SERPL-CCNC: 54 U/L (ref 30–110)
ANION GAP SERPL CALCULATED.3IONS-SCNC: 2 MMOL/L (ref 3–14)
BUN SERPL-MCNC: 21 MG/DL (ref 7–30)
CALCIUM SERPL-MCNC: 9.2 MG/DL (ref 8.5–10.1)
CHLORIDE SERPL-SCNC: 110 MMOL/L (ref 94–109)
CO2 SERPL-SCNC: 26 MMOL/L (ref 20–32)
CREAT SERPL-MCNC: 0.93 MG/DL (ref 0.66–1.25)
ERYTHROCYTE [DISTWIDTH] IN BLOOD BY AUTOMATED COUNT: 14.3 % (ref 10–15)
GFR SERPL CREATININE-BSD FRML MDRD: >90 ML/MIN/{1.73_M2}
GLUCOSE SERPL-MCNC: 97 MG/DL (ref 70–99)
HCT VFR BLD AUTO: 49.1 % (ref 40–53)
HGB BLD-MCNC: 16.6 G/DL (ref 13.3–17.7)
LIPASE SERPL-CCNC: 103 U/L (ref 73–393)
MCH RBC QN AUTO: 29.6 PG (ref 26.5–33)
MCHC RBC AUTO-ENTMCNC: 33.8 G/DL (ref 31.5–36.5)
MCV RBC AUTO: 88 FL (ref 78–100)
PLATELET # BLD AUTO: 232 10E9/L (ref 150–450)
POTASSIUM SERPL-SCNC: 4.7 MMOL/L (ref 3.4–5.3)
RBC # BLD AUTO: 5.61 10E12/L (ref 4.4–5.9)
SODIUM SERPL-SCNC: 138 MMOL/L (ref 133–144)
WBC # BLD AUTO: 6.1 10E9/L (ref 4–11)

## 2021-04-13 PROCEDURE — 83690 ASSAY OF LIPASE: CPT | Performed by: INTERNAL MEDICINE

## 2021-04-13 PROCEDURE — 82150 ASSAY OF AMYLASE: CPT | Performed by: INTERNAL MEDICINE

## 2021-04-13 PROCEDURE — 36415 COLL VENOUS BLD VENIPUNCTURE: CPT | Performed by: INTERNAL MEDICINE

## 2021-04-13 PROCEDURE — 85027 COMPLETE CBC AUTOMATED: CPT | Performed by: INTERNAL MEDICINE

## 2021-04-13 PROCEDURE — 80048 BASIC METABOLIC PNL TOTAL CA: CPT | Performed by: INTERNAL MEDICINE

## 2021-04-13 PROCEDURE — 80197 ASSAY OF TACROLIMUS: CPT | Performed by: INTERNAL MEDICINE

## 2021-04-14 LAB
TACROLIMUS BLD-MCNC: 7.2 UG/L (ref 5–15)
TME LAST DOSE: NORMAL H

## 2021-05-03 DIAGNOSIS — Z94.0 KIDNEY REPLACED BY TRANSPLANT: ICD-10-CM

## 2021-05-03 DIAGNOSIS — Z94.83 PANCREAS REPLACED BY TRANSPLANT (H): ICD-10-CM

## 2021-05-03 DIAGNOSIS — Z94.0 KIDNEY TRANSPLANTED: ICD-10-CM

## 2021-05-03 DIAGNOSIS — Z94.83 PANCREAS TRANSPLANTED (H): Primary | ICD-10-CM

## 2021-05-03 RX ORDER — TACROLIMUS 1 MG/1
1 CAPSULE ORAL 2 TIMES DAILY
Qty: 60 CAPSULE | Refills: 11 | Status: SHIPPED | OUTPATIENT
Start: 2021-05-03 | End: 2022-08-22

## 2021-05-03 RX ORDER — TACROLIMUS 0.5 MG/1
0.5 CAPSULE ORAL 2 TIMES DAILY
Qty: 60 CAPSULE | Refills: 11 | Status: SHIPPED | OUTPATIENT
Start: 2021-05-03 | End: 2022-08-22

## 2021-05-09 ENCOUNTER — HEALTH MAINTENANCE LETTER (OUTPATIENT)
Age: 56
End: 2021-05-09

## 2021-05-20 DIAGNOSIS — Z94.83 PANCREAS REPLACED BY TRANSPLANT (H): ICD-10-CM

## 2021-05-20 DIAGNOSIS — Z94.0 KIDNEY REPLACED BY TRANSPLANT: ICD-10-CM

## 2021-05-20 DIAGNOSIS — Z79.899 ENCOUNTER FOR LONG-TERM (CURRENT) USE OF MEDICATIONS: ICD-10-CM

## 2021-05-20 LAB
AMYLASE SERPL-CCNC: 69 U/L (ref 30–110)
ANION GAP SERPL CALCULATED.3IONS-SCNC: <1 MMOL/L (ref 3–14)
BUN SERPL-MCNC: 27 MG/DL (ref 7–30)
CALCIUM SERPL-MCNC: 9.3 MG/DL (ref 8.5–10.1)
CHLORIDE SERPL-SCNC: 109 MMOL/L (ref 94–109)
CHOLEST SERPL-MCNC: 142 MG/DL
CO2 SERPL-SCNC: 27 MMOL/L (ref 20–32)
CREAT SERPL-MCNC: 1.04 MG/DL (ref 0.66–1.25)
ERYTHROCYTE [DISTWIDTH] IN BLOOD BY AUTOMATED COUNT: 14 % (ref 10–15)
GFR SERPL CREATININE-BSD FRML MDRD: 79 ML/MIN/{1.73_M2}
GLUCOSE SERPL-MCNC: 87 MG/DL (ref 70–99)
HBA1C MFR BLD: 5.5 % (ref 0–5.6)
HCT VFR BLD AUTO: 53.3 % (ref 40–53)
HDLC SERPL-MCNC: 47 MG/DL
HGB BLD-MCNC: 17.3 G/DL (ref 13.3–17.7)
LDLC SERPL CALC-MCNC: 80 MG/DL
LIPASE SERPL-CCNC: 128 U/L (ref 73–393)
MCH RBC QN AUTO: 28.8 PG (ref 26.5–33)
MCHC RBC AUTO-ENTMCNC: 32.5 G/DL (ref 31.5–36.5)
MCV RBC AUTO: 89 FL (ref 78–100)
NONHDLC SERPL-MCNC: 95 MG/DL
PLATELET # BLD AUTO: 197 10E9/L (ref 150–450)
POTASSIUM SERPL-SCNC: 4.2 MMOL/L (ref 3.4–5.3)
RBC # BLD AUTO: 6 10E12/L (ref 4.4–5.9)
SODIUM SERPL-SCNC: 135 MMOL/L (ref 133–144)
TACROLIMUS BLD-MCNC: 6.4 UG/L (ref 5–15)
TME LAST DOSE: 1800 H
TRIGL SERPL-MCNC: 72 MG/DL
WBC # BLD AUTO: 5.3 10E9/L (ref 4–11)

## 2021-05-20 PROCEDURE — 80197 ASSAY OF TACROLIMUS: CPT | Performed by: INTERNAL MEDICINE

## 2021-05-20 PROCEDURE — 87799 DETECT AGENT NOS DNA QUANT: CPT | Performed by: INTERNAL MEDICINE

## 2021-05-20 PROCEDURE — 80048 BASIC METABOLIC PNL TOTAL CA: CPT | Performed by: PATHOLOGY

## 2021-05-20 PROCEDURE — 36415 COLL VENOUS BLD VENIPUNCTURE: CPT | Performed by: PATHOLOGY

## 2021-05-20 PROCEDURE — 83690 ASSAY OF LIPASE: CPT | Performed by: PATHOLOGY

## 2021-05-20 PROCEDURE — 85027 COMPLETE CBC AUTOMATED: CPT | Performed by: PATHOLOGY

## 2021-05-20 PROCEDURE — 80061 LIPID PANEL: CPT | Performed by: PATHOLOGY

## 2021-05-20 PROCEDURE — 83036 HEMOGLOBIN GLYCOSYLATED A1C: CPT | Performed by: PATHOLOGY

## 2021-05-20 PROCEDURE — 82150 ASSAY OF AMYLASE: CPT | Performed by: PATHOLOGY

## 2021-07-05 ENCOUNTER — RESULTS ONLY (OUTPATIENT)
Dept: OTHER | Facility: CLINIC | Age: 56
End: 2021-07-05

## 2021-07-05 DIAGNOSIS — Z94.83 PANCREAS REPLACED BY TRANSPLANT (H): ICD-10-CM

## 2021-07-05 DIAGNOSIS — Z79.899 ENCOUNTER FOR LONG-TERM (CURRENT) USE OF MEDICATIONS: ICD-10-CM

## 2021-07-05 DIAGNOSIS — Z94.0 KIDNEY REPLACED BY TRANSPLANT: Primary | ICD-10-CM

## 2021-07-05 LAB
ERYTHROCYTE [DISTWIDTH] IN BLOOD BY AUTOMATED COUNT: 14.6 % (ref 10–15)
HCT VFR BLD AUTO: 49.3 % (ref 40–53)
HGB BLD-MCNC: 17 G/DL (ref 13.3–17.7)
LIPASE SERPL-CCNC: 107 U/L (ref 73–393)
MCH RBC QN AUTO: 29.6 PG (ref 26.5–33)
MCHC RBC AUTO-ENTMCNC: 34.5 G/DL (ref 31.5–36.5)
MCV RBC AUTO: 86 FL (ref 78–100)
PLATELET # BLD AUTO: 190 10E9/L (ref 150–450)
RBC # BLD AUTO: 5.75 10E12/L (ref 4.4–5.9)
WBC # BLD AUTO: 6.1 10E9/L (ref 4–11)

## 2021-07-05 PROCEDURE — 86832 HLA CLASS I HIGH DEFIN QUAL: CPT | Performed by: INTERNAL MEDICINE

## 2021-07-05 PROCEDURE — 83690 ASSAY OF LIPASE: CPT | Performed by: INTERNAL MEDICINE

## 2021-07-05 PROCEDURE — 36415 COLL VENOUS BLD VENIPUNCTURE: CPT | Performed by: INTERNAL MEDICINE

## 2021-07-05 PROCEDURE — 87799 DETECT AGENT NOS DNA QUANT: CPT | Performed by: INTERNAL MEDICINE

## 2021-07-05 PROCEDURE — 82150 ASSAY OF AMYLASE: CPT | Performed by: INTERNAL MEDICINE

## 2021-07-05 PROCEDURE — 85027 COMPLETE CBC AUTOMATED: CPT | Performed by: INTERNAL MEDICINE

## 2021-07-05 PROCEDURE — 80048 BASIC METABOLIC PNL TOTAL CA: CPT | Performed by: INTERNAL MEDICINE

## 2021-07-05 PROCEDURE — 80197 ASSAY OF TACROLIMUS: CPT | Performed by: INTERNAL MEDICINE

## 2021-07-05 PROCEDURE — 86833 HLA CLASS II HIGH DEFIN QUAL: CPT | Performed by: INTERNAL MEDICINE

## 2021-07-06 LAB
AMYLASE SERPL-CCNC: 68 U/L (ref 30–110)
ANION GAP SERPL CALCULATED.3IONS-SCNC: 6 MMOL/L (ref 3–14)
BKV DNA # SPEC NAA+PROBE: NORMAL COPIES/ML
BKV DNA SPEC NAA+PROBE-LOG#: NORMAL LOG COPIES/ML
BUN SERPL-MCNC: 12 MG/DL (ref 7–30)
CALCIUM SERPL-MCNC: 9.1 MG/DL (ref 8.5–10.1)
CHLORIDE SERPL-SCNC: 109 MMOL/L (ref 94–109)
CO2 SERPL-SCNC: 24 MMOL/L (ref 20–32)
CREAT SERPL-MCNC: 0.91 MG/DL (ref 0.66–1.25)
GFR SERPL CREATININE-BSD FRML MDRD: >90 ML/MIN/{1.73_M2}
GLUCOSE SERPL-MCNC: 93 MG/DL (ref 70–99)
POTASSIUM SERPL-SCNC: 4.8 MMOL/L (ref 3.4–5.3)
SODIUM SERPL-SCNC: 139 MMOL/L (ref 133–144)
SPECIMEN SOURCE: NORMAL
TACROLIMUS BLD-MCNC: 7.3 UG/L (ref 5–15)
TME LAST DOSE: NORMAL H

## 2021-09-13 ENCOUNTER — LAB (OUTPATIENT)
Dept: LAB | Facility: CLINIC | Age: 56
End: 2021-09-13
Payer: MEDICARE

## 2021-09-13 DIAGNOSIS — Z94.0 KIDNEY REPLACED BY TRANSPLANT: ICD-10-CM

## 2021-09-13 DIAGNOSIS — Z94.83 PANCREAS REPLACED BY TRANSPLANT (H): ICD-10-CM

## 2021-09-13 LAB
ERYTHROCYTE [DISTWIDTH] IN BLOOD BY AUTOMATED COUNT: 14.8 % (ref 10–15)
HCT VFR BLD AUTO: 50.6 % (ref 40–53)
HGB BLD-MCNC: 17.1 G/DL (ref 13.3–17.7)
LIPASE SERPL-CCNC: 120 U/L (ref 73–393)
MCH RBC QN AUTO: 29.4 PG (ref 26.5–33)
MCHC RBC AUTO-ENTMCNC: 33.8 G/DL (ref 31.5–36.5)
MCV RBC AUTO: 87 FL (ref 78–100)
PLATELET # BLD AUTO: 196 10E3/UL (ref 150–450)
RBC # BLD AUTO: 5.81 10E6/UL (ref 4.4–5.9)
TACROLIMUS BLD-MCNC: 7.8 UG/L (ref 5–15)
TME LAST DOSE: NORMAL H
TME LAST DOSE: NORMAL H
WBC # BLD AUTO: 7.2 10E3/UL (ref 4–11)

## 2021-09-13 PROCEDURE — 80048 BASIC METABOLIC PNL TOTAL CA: CPT

## 2021-09-13 PROCEDURE — 82150 ASSAY OF AMYLASE: CPT

## 2021-09-13 PROCEDURE — 80197 ASSAY OF TACROLIMUS: CPT

## 2021-09-13 PROCEDURE — 36415 COLL VENOUS BLD VENIPUNCTURE: CPT

## 2021-09-13 PROCEDURE — 83690 ASSAY OF LIPASE: CPT

## 2021-09-13 PROCEDURE — 85027 COMPLETE CBC AUTOMATED: CPT

## 2021-09-14 LAB
AMYLASE SERPL-CCNC: 63 U/L (ref 30–110)
ANION GAP SERPL CALCULATED.3IONS-SCNC: 9 MMOL/L (ref 3–14)
BUN SERPL-MCNC: 16 MG/DL (ref 7–30)
CALCIUM SERPL-MCNC: 9.2 MG/DL (ref 8.5–10.1)
CHLORIDE BLD-SCNC: 105 MMOL/L (ref 94–109)
CO2 SERPL-SCNC: 23 MMOL/L (ref 20–32)
CREAT SERPL-MCNC: 1.07 MG/DL (ref 0.66–1.25)
GFR SERPL CREATININE-BSD FRML MDRD: 77 ML/MIN/1.73M2
GLUCOSE BLD-MCNC: 76 MG/DL (ref 70–99)
POTASSIUM BLD-SCNC: 4.5 MMOL/L (ref 3.4–5.3)
SODIUM SERPL-SCNC: 137 MMOL/L (ref 133–144)

## 2021-10-22 ENCOUNTER — LAB (OUTPATIENT)
Dept: LAB | Facility: CLINIC | Age: 56
End: 2021-10-22
Payer: MEDICARE

## 2021-10-22 DIAGNOSIS — Z94.0 KIDNEY REPLACED BY TRANSPLANT: ICD-10-CM

## 2021-10-22 DIAGNOSIS — Z94.83 PANCREAS REPLACED BY TRANSPLANT (H): ICD-10-CM

## 2021-10-22 LAB
ERYTHROCYTE [DISTWIDTH] IN BLOOD BY AUTOMATED COUNT: 14.4 % (ref 10–15)
HCT VFR BLD AUTO: 54.1 % (ref 40–53)
HGB BLD-MCNC: 18.1 G/DL (ref 13.3–17.7)
LIPASE SERPL-CCNC: 114 U/L (ref 73–393)
MCH RBC QN AUTO: 29.6 PG (ref 26.5–33)
MCHC RBC AUTO-ENTMCNC: 33.5 G/DL (ref 31.5–36.5)
MCV RBC AUTO: 88 FL (ref 78–100)
PLATELET # BLD AUTO: 192 10E3/UL (ref 150–450)
RBC # BLD AUTO: 6.12 10E6/UL (ref 4.4–5.9)
TACROLIMUS BLD-MCNC: 6.9 UG/L (ref 5–15)
TME LAST DOSE: NORMAL H
TME LAST DOSE: NORMAL H
WBC # BLD AUTO: 5.2 10E3/UL (ref 4–11)

## 2021-10-22 PROCEDURE — 82150 ASSAY OF AMYLASE: CPT

## 2021-10-22 PROCEDURE — 80048 BASIC METABOLIC PNL TOTAL CA: CPT

## 2021-10-22 PROCEDURE — 36415 COLL VENOUS BLD VENIPUNCTURE: CPT

## 2021-10-22 PROCEDURE — 83690 ASSAY OF LIPASE: CPT

## 2021-10-22 PROCEDURE — 85027 COMPLETE CBC AUTOMATED: CPT

## 2021-10-22 PROCEDURE — 80197 ASSAY OF TACROLIMUS: CPT

## 2021-10-24 ENCOUNTER — HEALTH MAINTENANCE LETTER (OUTPATIENT)
Age: 56
End: 2021-10-24

## 2021-10-24 LAB
AMYLASE SERPL-CCNC: 65 U/L (ref 30–110)
ANION GAP SERPL CALCULATED.3IONS-SCNC: 7 MMOL/L (ref 3–14)
BUN SERPL-MCNC: 23 MG/DL (ref 7–30)
CALCIUM SERPL-MCNC: 9.1 MG/DL (ref 8.5–10.1)
CHLORIDE BLD-SCNC: 108 MMOL/L (ref 94–109)
CO2 SERPL-SCNC: 25 MMOL/L (ref 20–32)
CREAT SERPL-MCNC: 0.94 MG/DL (ref 0.66–1.25)
GFR SERPL CREATININE-BSD FRML MDRD: 90 ML/MIN/1.73M2
GLUCOSE BLD-MCNC: 61 MG/DL (ref 70–99)
POTASSIUM BLD-SCNC: 5 MMOL/L (ref 3.4–5.3)
SODIUM SERPL-SCNC: 140 MMOL/L (ref 133–144)

## 2021-11-15 ENCOUNTER — LAB (OUTPATIENT)
Dept: LAB | Facility: CLINIC | Age: 56
End: 2021-11-15
Payer: MEDICARE

## 2021-11-15 DIAGNOSIS — Z94.0 KIDNEY REPLACED BY TRANSPLANT: ICD-10-CM

## 2021-11-15 DIAGNOSIS — Z94.83 PANCREAS REPLACED BY TRANSPLANT (H): ICD-10-CM

## 2021-11-15 LAB
ERYTHROCYTE [DISTWIDTH] IN BLOOD BY AUTOMATED COUNT: 14.2 % (ref 10–15)
HCT VFR BLD AUTO: 50.9 % (ref 40–53)
HGB BLD-MCNC: 17.1 G/DL (ref 13.3–17.7)
MCH RBC QN AUTO: 29.7 PG (ref 26.5–33)
MCHC RBC AUTO-ENTMCNC: 33.6 G/DL (ref 31.5–36.5)
MCV RBC AUTO: 89 FL (ref 78–100)
PLATELET # BLD AUTO: 207 10E3/UL (ref 150–450)
RBC # BLD AUTO: 5.75 10E6/UL (ref 4.4–5.9)
WBC # BLD AUTO: 7.4 10E3/UL (ref 4–11)

## 2021-11-15 PROCEDURE — 85027 COMPLETE CBC AUTOMATED: CPT

## 2021-11-15 PROCEDURE — 80197 ASSAY OF TACROLIMUS: CPT

## 2021-11-15 PROCEDURE — 80048 BASIC METABOLIC PNL TOTAL CA: CPT

## 2021-11-15 PROCEDURE — 36415 COLL VENOUS BLD VENIPUNCTURE: CPT

## 2021-11-15 PROCEDURE — 83690 ASSAY OF LIPASE: CPT

## 2021-11-15 PROCEDURE — 82150 ASSAY OF AMYLASE: CPT

## 2021-11-16 LAB
AMYLASE SERPL-CCNC: 61 U/L (ref 30–110)
ANION GAP SERPL CALCULATED.3IONS-SCNC: 6 MMOL/L (ref 3–14)
BUN SERPL-MCNC: 24 MG/DL (ref 7–30)
CALCIUM SERPL-MCNC: 9.4 MG/DL (ref 8.5–10.1)
CHLORIDE BLD-SCNC: 112 MMOL/L (ref 94–109)
CO2 SERPL-SCNC: 22 MMOL/L (ref 20–32)
CREAT SERPL-MCNC: 0.92 MG/DL (ref 0.66–1.25)
GFR SERPL CREATININE-BSD FRML MDRD: >90 ML/MIN/1.73M2
GLUCOSE BLD-MCNC: 86 MG/DL (ref 70–99)
LIPASE SERPL-CCNC: 113 U/L (ref 73–393)
POTASSIUM BLD-SCNC: 5 MMOL/L (ref 3.4–5.3)
SODIUM SERPL-SCNC: 140 MMOL/L (ref 133–144)
TACROLIMUS BLD-MCNC: 8.6 UG/L (ref 5–15)
TME LAST DOSE: NORMAL H
TME LAST DOSE: NORMAL H

## 2021-12-09 DIAGNOSIS — Z94.0 KIDNEY TRANSPLANTED: ICD-10-CM

## 2021-12-09 DIAGNOSIS — Z94.83 PANCREAS TRANSPLANTED (H): ICD-10-CM

## 2021-12-09 DIAGNOSIS — Z94.83 PANCREAS REPLACED BY TRANSPLANT (H): ICD-10-CM

## 2021-12-09 DIAGNOSIS — Z94.0 KIDNEY REPLACED BY TRANSPLANT: Primary | ICD-10-CM

## 2021-12-09 RX ORDER — MYCOPHENOLIC ACID 180 MG/1
540 TABLET, DELAYED RELEASE ORAL 2 TIMES DAILY
Qty: 540 TABLET | Refills: 3 | Status: SHIPPED | OUTPATIENT
Start: 2021-12-09 | End: 2023-10-04

## 2021-12-09 NOTE — TELEPHONE ENCOUNTER
[facility] Call Center    Phone Message    May a detailed message be left on voicemail: yes     Reason for Call: Medication Refill Request    Has the patient contacted the pharmacy for the refill? Yes   Name of medication being requested: mycophenolic acid (GENERIC EQUIVALENT) 180 MG EC tablet  Provider who prescribed the medication: Dr. Dick  [facility] [address]  Date medication is needed: 12/9/21         Action Taken: Message routed to:  [facility] ([facility] nephro    Travel Screening: Not Applicable

## 2021-12-17 ENCOUNTER — TELEPHONE (OUTPATIENT)
Dept: TRANSPLANT | Facility: CLINIC | Age: 56
End: 2021-12-17
Payer: COMMERCIAL

## 2021-12-17 NOTE — TELEPHONE ENCOUNTER
Transplant Social Work Services Phone Call      Data: Patient left  regarding high copays.  Intervention: Patient indicated he is no longer eligible Medicare due to being three years post transplant.  He indicated his Mycophenolic copay is very high.  Assessment: Will refer patient to Brooklynn Jimenez, pharmacy advocate.  Patient indicated understanding.  Plan:  SW will remain available to assist as indicated.

## 2021-12-19 ENCOUNTER — HEALTH MAINTENANCE LETTER (OUTPATIENT)
Age: 56
End: 2021-12-19

## 2021-12-20 ENCOUNTER — TELEPHONE (OUTPATIENT)
Dept: TRANSPLANT | Facility: CLINIC | Age: 56
End: 2021-12-20
Payer: COMMERCIAL

## 2021-12-20 DIAGNOSIS — Z48.298 AFTERCARE FOLLOWING ORGAN TRANSPLANT: Primary | ICD-10-CM

## 2021-12-20 NOTE — TELEPHONE ENCOUNTER
Kidney and Pancreas Transplant 3 Year RN to RN Coordinator Clinical Status Hand Off    Transplant type: SPK  Transplant date: 11/12/2018    Pancreas Drainage: enteric     Baseline Creatinine: 1-1.2      Present Maintenance Immunosuppression: Tacrolimus and Mycophenolic acid  IS and any reason for alteration from standards / protocols     DSA Present: No.  History of rejection  No.  History of infection No.  Most recent transplant complications: none  Additional specialty information Due for follow up with [facility], scheduling request sent.   Patient independent with medications: Yes    Majo Weeks RN

## 2021-12-22 ENCOUNTER — TELEPHONE (OUTPATIENT)
Dept: TRANSPLANT | Facility: CLINIC | Age: 56
End: 2021-12-22
Payer: COMMERCIAL

## 2021-12-22 DIAGNOSIS — Z94.0 KIDNEY TRANSPLANTED: Primary | ICD-10-CM

## 2021-12-22 RX ORDER — MYCOPHENOLATE MOFETIL 250 MG/1
750 CAPSULE ORAL 2 TIMES DAILY
Qty: 540 CAPSULE | Refills: 3 | Status: SHIPPED | OUTPATIENT
Start: 2021-12-22 | End: 2023-01-02

## 2021-12-22 NOTE — TELEPHONE ENCOUNTER
ISSUE:  Pharmacy liason sent message that patient unable to afford Mycophenolic acid, per Dr. Wolfe ok to transition to mycophenolate if pt tolerates without GI symptoms and it is more affordable.   Pharmacy liason requesting script be sent to [facility] - [place], [place] - [address] so it can be run through insurance    PLAN:  Patient currently on mycophenolic acid 540mg BID. Prescription for mycophenolate 750mg BID sent to above pharmacy.     OUTCOME:  Call to patient to update that Rx has been sent to [facility] and liason will check coverage, recommended he call insurance in 1-2 days to check coverage himself if we are not able to confirm with insurance. He v/u.

## 2022-01-31 ENCOUNTER — LAB (OUTPATIENT)
Dept: LAB | Facility: CLINIC | Age: 57
End: 2022-01-31
Payer: COMMERCIAL

## 2022-01-31 DIAGNOSIS — Z94.83 PANCREAS REPLACED BY TRANSPLANT (H): ICD-10-CM

## 2022-01-31 DIAGNOSIS — Z94.0 KIDNEY REPLACED BY TRANSPLANT: ICD-10-CM

## 2022-01-31 LAB
ERYTHROCYTE [DISTWIDTH] IN BLOOD BY AUTOMATED COUNT: 14.3 % (ref 10–15)
HBA1C MFR BLD: 5.5 % (ref 0–5.6)
HCT VFR BLD AUTO: 53.7 % (ref 40–53)
HGB BLD-MCNC: 17.9 G/DL (ref 13.3–17.7)
LIPASE SERPL-CCNC: 110 U/L (ref 73–393)
MCH RBC QN AUTO: 29.1 PG (ref 26.5–33)
MCHC RBC AUTO-ENTMCNC: 33.3 G/DL (ref 31.5–36.5)
MCV RBC AUTO: 87 FL (ref 78–100)
PLATELET # BLD AUTO: 206 10E3/UL (ref 150–450)
RBC # BLD AUTO: 6.15 10E6/UL (ref 4.4–5.9)
TACROLIMUS BLD-MCNC: 13.1 UG/L (ref 5–15)
TME LAST DOSE: NORMAL H
TME LAST DOSE: NORMAL H
WBC # BLD AUTO: 6.2 10E3/UL (ref 4–11)

## 2022-01-31 PROCEDURE — 83036 HEMOGLOBIN GLYCOSYLATED A1C: CPT

## 2022-01-31 PROCEDURE — 85027 COMPLETE CBC AUTOMATED: CPT

## 2022-01-31 PROCEDURE — 80197 ASSAY OF TACROLIMUS: CPT

## 2022-01-31 PROCEDURE — 83690 ASSAY OF LIPASE: CPT

## 2022-01-31 PROCEDURE — 82150 ASSAY OF AMYLASE: CPT

## 2022-01-31 PROCEDURE — 80048 BASIC METABOLIC PNL TOTAL CA: CPT

## 2022-01-31 PROCEDURE — 36415 COLL VENOUS BLD VENIPUNCTURE: CPT

## 2022-02-01 ENCOUNTER — TELEPHONE (OUTPATIENT)
Dept: TRANSPLANT | Facility: CLINIC | Age: 57
End: 2022-02-01
Payer: COMMERCIAL

## 2022-02-01 DIAGNOSIS — Z48.298 AFTERCARE FOLLOWING ORGAN TRANSPLANT: Primary | ICD-10-CM

## 2022-02-01 LAB
AMYLASE SERPL-CCNC: 64 U/L (ref 30–110)
ANION GAP SERPL CALCULATED.3IONS-SCNC: 6 MMOL/L (ref 3–14)
BUN SERPL-MCNC: 15 MG/DL (ref 7–30)
CALCIUM SERPL-MCNC: 9.6 MG/DL (ref 8.5–10.1)
CHLORIDE BLD-SCNC: 108 MMOL/L (ref 94–109)
CO2 SERPL-SCNC: 24 MMOL/L (ref 20–32)
CREAT SERPL-MCNC: 1.02 MG/DL (ref 0.66–1.25)
GFR SERPL CREATININE-BSD FRML MDRD: 85 ML/MIN/1.73M2
GLUCOSE BLD-MCNC: 92 MG/DL (ref 70–99)
POTASSIUM BLD-SCNC: 4.4 MMOL/L (ref 3.4–5.3)
SODIUM SERPL-SCNC: 138 MMOL/L (ref 133–144)

## 2022-02-01 NOTE — TELEPHONE ENCOUNTER
Call placed to patient. Patient confirms current dose at tacrolimus 1.5/1 mg and inaccurate trough level. Denies any recent illness, diarrhea or medication changes. Patient v\u to re[epat level in one week. Order sent

## 2022-02-01 NOTE — TELEPHONE ENCOUNTER
ISSUE:  Tacrolimus level 13.1 on 1/31/22, goal 5-8  Current dose 1.5 mg BID    PLAN:  Call to patient to confirm current dose Tacrolimus 1.5 mg BID.  Confirm no recent illness or medication changes.  Confirm no missed doses.  Confirm level was accurate 12-hour trough.  If no missed doses or recent illness and level was accurate trough, decrease dose to 1.5 mg/1 mg BID and repeat level in 1-2 weeks ensuring accurate 12-hour trough.  If level was not an accurate 12-hour trough please continue current dose and repeat accurate 12-hour tacrolimus level in 1 week.    LPN TASK:  Please call patient with above information.   Send lab order.  Update Rx if dose is changed.  Thanks!

## 2022-02-13 ENCOUNTER — HEALTH MAINTENANCE LETTER (OUTPATIENT)
Age: 57
End: 2022-02-13

## 2022-03-25 NOTE — TELEPHONE ENCOUNTER
Drug Name: mycophenolate 180mg  Last Fill Date: 5/31/17  Quantity: 240    Marie Andinojudith   [facility]  [phone]          
Pfizer dose 1, 2, and 3

## 2022-04-04 ENCOUNTER — LAB (OUTPATIENT)
Dept: LAB | Facility: CLINIC | Age: 57
End: 2022-04-04
Payer: COMMERCIAL

## 2022-04-04 DIAGNOSIS — Z94.83 PANCREAS REPLACED BY TRANSPLANT (H): ICD-10-CM

## 2022-04-04 DIAGNOSIS — Z94.0 KIDNEY REPLACED BY TRANSPLANT: ICD-10-CM

## 2022-04-04 LAB
ERYTHROCYTE [DISTWIDTH] IN BLOOD BY AUTOMATED COUNT: 14.4 % (ref 10–15)
HCT VFR BLD AUTO: 53.2 % (ref 40–53)
HGB BLD-MCNC: 17.8 G/DL (ref 13.3–17.7)
LIPASE SERPL-CCNC: 126 U/L (ref 73–393)
MCH RBC QN AUTO: 29.3 PG (ref 26.5–33)
MCHC RBC AUTO-ENTMCNC: 33.5 G/DL (ref 31.5–36.5)
MCV RBC AUTO: 88 FL (ref 78–100)
PLATELET # BLD AUTO: 185 10E3/UL (ref 150–450)
RBC # BLD AUTO: 6.07 10E6/UL (ref 4.4–5.9)
TACROLIMUS BLD-MCNC: 6.6 UG/L (ref 5–15)
TME LAST DOSE: NORMAL H
TME LAST DOSE: NORMAL H
WBC # BLD AUTO: 7.9 10E3/UL (ref 4–11)

## 2022-04-04 PROCEDURE — 80197 ASSAY OF TACROLIMUS: CPT

## 2022-04-04 PROCEDURE — 36415 COLL VENOUS BLD VENIPUNCTURE: CPT

## 2022-04-04 PROCEDURE — 82150 ASSAY OF AMYLASE: CPT

## 2022-04-04 PROCEDURE — 85027 COMPLETE CBC AUTOMATED: CPT

## 2022-04-04 PROCEDURE — 83690 ASSAY OF LIPASE: CPT

## 2022-04-04 PROCEDURE — 80048 BASIC METABOLIC PNL TOTAL CA: CPT

## 2022-04-05 LAB
AMYLASE SERPL-CCNC: 70 U/L (ref 30–110)
ANION GAP SERPL CALCULATED.3IONS-SCNC: 5 MMOL/L (ref 3–14)
BUN SERPL-MCNC: 16 MG/DL (ref 7–30)
CALCIUM SERPL-MCNC: 9.9 MG/DL (ref 8.5–10.1)
CHLORIDE BLD-SCNC: 109 MMOL/L (ref 94–109)
CO2 SERPL-SCNC: 24 MMOL/L (ref 20–32)
CREAT SERPL-MCNC: 1.16 MG/DL (ref 0.66–1.25)
GFR SERPL CREATININE-BSD FRML MDRD: 73 ML/MIN/1.73M2
GLUCOSE BLD-MCNC: 91 MG/DL (ref 70–99)
POTASSIUM BLD-SCNC: 4.9 MMOL/L (ref 3.4–5.3)
SODIUM SERPL-SCNC: 138 MMOL/L (ref 133–144)

## 2022-04-25 ENCOUNTER — VIRTUAL VISIT (OUTPATIENT)
Dept: NEPHROLOGY | Facility: CLINIC | Age: 57
End: 2022-04-25
Attending: INTERNAL MEDICINE
Payer: COMMERCIAL

## 2022-04-25 DIAGNOSIS — D75.1 POST-TRANSPLANT ERYTHROCYTOSIS: Primary | ICD-10-CM

## 2022-04-25 DIAGNOSIS — Z94.0 KIDNEY REPLACED BY TRANSPLANT: ICD-10-CM

## 2022-04-25 DIAGNOSIS — Z48.298 AFTERCARE FOLLOWING ORGAN TRANSPLANT: ICD-10-CM

## 2022-04-25 DIAGNOSIS — D84.9 IMMUNOSUPPRESSION (H): ICD-10-CM

## 2022-04-25 DIAGNOSIS — E55.9 VITAMIN D DEFICIENCY: ICD-10-CM

## 2022-04-25 DIAGNOSIS — Z94.83 PANCREAS REPLACED BY TRANSPLANT (H): ICD-10-CM

## 2022-04-25 PROCEDURE — 99214 OFFICE O/P EST MOD 30 MIN: CPT | Mod: TEL | Performed by: INTERNAL MEDICINE

## 2022-04-25 RX ORDER — LISINOPRIL 2.5 MG/1
2.5 TABLET ORAL AT BEDTIME
Qty: 90 TABLET | Refills: 3 | Status: SHIPPED | OUTPATIENT
Start: 2022-04-25

## 2022-04-25 ASSESSMENT — PATIENT HEALTH QUESTIONNAIRE - PHQ9: SUM OF ALL RESPONSES TO PHQ QUESTIONS 1-9: 7

## 2022-04-25 NOTE — PROGRESS NOTES
Sean is a 56 year old who is being evaluated via a billable telephone visit.      What phone number would you like to be contacted at? [phone]  How would you like to obtain your AVS? [email or web address]  Phone call duration: 17 minutes      TRANSPLANT NEPHROLOGY CHRONIC POST TRANSPLANT VISIT    Assessment & Plan   # DDKT (SPK): Stable   - Baseline Creatinine:  ~ 1.0-1.2   - Proteinuria: Not checked recently   - Date DSA Last Checked: Jul/2021      Latest DSA: No   - BK Viremia: No   - Kidney Tx Biopsy: No    # Pancreas Tx (SPK):    - Pancreatic Exocrine Drainage: Enteric drained     - Blood glucose: Euglycemia      On insulin: No   - HbA1c: Stable      Latest HbA1c: 5.5%   - Pancreatic enzymes: Stable   - Date DSA Last Checked: Jul/2021  Latest DSA: No   - Pancreas Tx Biopsy: No    # Immunosuppression: Tacrolimus immediate release (goal 5-8) and Mycophenolate mofetil (dose 750 mg every 12 hours)   - Continue with intensive monitoring of immunosuppression for efficacy and toxicity.   - Changes: No    # Infection Prophylaxis:   Last CD4 Level: Not checked.  - PJP: None    # Blood Pressure: Controlled;  Goal BP: < 130/80   - Changes: Yes - Blood pressure is well controlled, but due to post transplant erythrocytosis, will try starting lisinopril 2.5 mg nightly.  Patient was instructed to hold it should he become lightheaded or symptomatic hypotension.    # Post-Transplant Erythrocytosis: Hgb: Stable;  On ACEI/ARB: No   Imaging: Yes - 11/2019    - Will start low dose lisinopril to see how patient does with it.   - It has been about 2 and a half years since last imaging of native kidneys, although stable to slight trend up, will reimage bilateral native and transplant kidneys.    # Mineral Bone Disorder:   - Vitamin D; level: Not checked recently        On supplement: Yes  - Calcium; level: Normal        On supplement: No    # Electrolytes:   - Potassium; level: Normal        On supplement: No  - Magnesium; level: Not checked  "recently        On supplement: Yes  - Bicarbonate; level: Normal        On supplement: No    # Obesity, Class I (BMI = 31.0): Stable weight.   - Recommend weight loss for overall health by increasing exercise and watching caloric intake.    # Skin Cancer Risk:    - Discussed sun protection and recommend regular follow up with Dermatology.    # Medical Compliance: Yes    # COVID-19 Virus Review: Discussed COVID-19 virus and the potential medical risks.  Reviewed preventative health recommendations, including wearing a mask where appropriate.  Recommended COVID vaccination should be up to date with either an initial vaccination or booster shot when appropriate.  Asked the patient to inform the transplant center if they are exposed or diagnosed with this virus.    # COVID Vaccination Up To Date: No, due for next dose    # Transplant History:  Etiology of Kidney Failure: Polycystic kidney disease (PKD) and Diabetes mellitus, type 1  Tx: SPK  Transplant: 11/12/2018 (Kidney / Pancreas), 10/10/2013 (Kidney)  Significant changes in immunosuppression: None  Significant transplant-related complications: None    Transplant Office Phone Number: [phone]    Assessment and plan was discussed with the patient and he voiced his understanding and agreement.    Return visit: Return in about 1 year (around 4/25/2023).    Jori Wolfe MD    Chief Complaint   Mr. Lewis is a 56 year old here for kidney transplant, pancreas transplant and immunosuppression management.    History of Present Illness    Mr. Lewis reports feeling good overall with some medical complaints.  Since last clinic visit, patient reports no hospitalizations or new medical complaints and has been doing well overall.  His energy level has been \"decent,\" and remains stable, near normal.  He is active and does get some exercise.  Denies any chest pain or shortness of breath with exertion.  No leg swelling.    Appetite is good and weight is unchanged.  No " nausea, vomiting or diarrhea.  He does note some increased gas since changing back to mycophenolate mofetil from mycophenolic acid.  No fever, sweats or chills.  No night sweats.    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)     Diabetes mellitus with background retinopathy (H)     Polycystic kidney     CAD (coronary artery disease)     Retinopathy     Hyperlipidemia LDL goal <70     Premature ventricular contractions (PVCs) (VPCs)     Kidney replaced by transplant     Immunosuppression (H)     Kidney transplant rejection     Aftercare following organ transplant     Esophageal ulcer     Status post simultaneous kidney and pancreas transplant (H)     Other chronic pain     Pancreas replaced by transplant (H)     Vitamin D deficiency     Post-transplant erythrocytosis       Allergies   Allergies   Allergen Reactions     No Known Allergies        Medications   Current Outpatient Medications   Medication Sig     aspirin 81 MG EC tablet Take 1 tablet (81 mg) by mouth daily     blood glucose monitoring (NO BRAND SPECIFIED) test strip Use to test blood sugar 4 times daily or as directed.     cholecalciferol 50 MCG (2000 UT) CAPS Take 2,000 Units by mouth daily     HYDROcodone-acetaminophen (NORCO) 5-325 MG tablet Take 1 tablet by mouth every 6 hours as needed for severe pain     lisinopril (ZESTRIL) 2.5 MG tablet Take 1 tablet (2.5 mg) by mouth At Bedtime     mycophenolate (GENERIC EQUIVALENT) 250 MG capsule Take 3 capsules (750 mg) by mouth 2 times daily     mycophenolic acid (GENERIC EQUIVALENT) 180 MG EC tablet Take 3 tablets (540 mg) by mouth 2 times daily     tacrolimus (GENERIC EQUIVALENT) 0.5 MG capsule Take 1 capsule (0.5 mg) by mouth 2 times daily Total dose = 1.5mg twice daily     tacrolimus (GENERIC EQUIVALENT) 1 MG capsule Take 1 capsule (1 mg) by mouth 2 times daily Total dose = 1.5 mg twice daily     atorvastatin  (LIPITOR) 20 MG tablet Take 1 tablet (20 mg) by mouth daily (Patient not taking: Reported on 4/25/2022)     sulfamethoxazole-trimethoprim (BACTRIM) 400-80 MG tablet Take 1 tablet by mouth daily (Patient not taking: Reported on 4/25/2022)     No current facility-administered medications for this visit.     There are no discontinued medications.    Physical Exam   Vital signs and physical exam were deferred for this telemedicine visit.    Data     Renal Latest Ref Rng & Units 4/4/2022 1/31/2022 11/15/2021   Na 133 - 144 mmol/L 138 138 140   K 3.4 - 5.3 mmol/L 4.9 4.4 5.0   Cl 94 - 109 mmol/L 109 108 112(H)   CO2 20 - 32 mmol/L 24 24 22   BUN 7 - 30 mg/dL 16 15 24   Cr 0.66 - 1.25 mg/dL 1.16 1.02 0.92   Cr (external) 0.5 - 1.5 mg/dL - - -   Glucose 70 - 99 mg/dL 91 92 86   Ca  8.5 - 10.1 mg/dL 9.9 9.6 9.4   Mg 1.6 - 2.3 mg/dL - - -     Bone Health Latest Ref Rng & Units 7/28/2020 3/7/2019 2/14/2019   Phos 2.5 - 4.5 mg/dL - - 4.1   PTHi 18 - 80 pg/mL - 104(H) -   Vit D Def 20 - 75 ug/L 32 36 -     Heme Latest Ref Rng & Units 4/4/2022 1/31/2022 11/15/2021   WBC 4.0 - 11.0 10e3/uL 7.9 6.2 7.4   Hgb 13.3 - 17.7 g/dL 17.8(H) 17.9(H) 17.1   Plt 150 - 450 10e3/uL 185 206 207   ABSOLUTE NEUTROPHIL 1.6 - 8.3 10e9/L - - -   ABSOLUTE LYMPHOCYTES 0.8 - 5.3 10e9/L - - -   ABSOLUTE MONOCYTES 0.0 - 1.3 10e9/L - - -   ABSOLUTE EOSINOPHILS 0.0 - 0.7 10e9/L - - -   ABSOLUTE BASOPHILS 0.0 - 0.2 10e9/L - - -   ABS IMMATURE GRANULOCYTES 0 - 0.4 10e9/L - - -   ABSOLUTE NUCLEATED RBC - - - -     Liver Latest Ref Rng & Units 11/11/2018 6/27/2018 8/3/2017   AP 40 - 150 U/L 189(H) - -   TBili 0.2 - 1.3 mg/dL 0.3 - -   ALT 0 - 70 U/L 14 - -   AST 0 - 45 U/L 9 - -   Tot Protein 6.8 - 8.8 g/dL 7.6 - -   Albumin 3.4 - 5.0 g/dL 4.0 4.2 4.2     Pancreas Latest Ref Rng & Units 4/4/2022 1/31/2022 11/15/2021   A1C 0.0 - 5.6 % - 5.5 -   Amylase 30 - 110 U/L 70 64 61   Lipase 73 - 393 U/L 126 110 113     Iron studies Latest Ref Rng & Units 9/11/2018  8/3/2017 11/18/2016   Iron 35 - 180 ug/dL 85 79 82   Iron sat 15 - 46 % 37 34 34   Ferritin 26 - 388 ng/mL 761(H) 736(H) -     [facility] Txp Virology Latest Ref Rng & Units 7/5/2021 5/20/2021 11/2/2020   CVM DNA Quant - - - -   CMV Quant <100 Copies/mL - - -   CMV QT Log <2.0 Log copies/mL - - -   CMV QUANT IU/ML CMVND:CMV DNA Not Detected [IU]/mL - - -   LOG IU/ML OF CMVQNT <2.1 [Log:IU]/mL - - -   BK Spec - Plasma Plasma Plasma   BK Res BKNEG:BK Virus DNA Not Detected copies/mL BK Virus DNA Not Detected BK Virus DNA Not Detected BK Virus DNA Not Detected   BK Log <2.7 Log copies/mL Not Calculated Not Calculated Not Calculated   EBV VCA IGG ANTIBODY U/mL - - -   EBV VCA IGM ANTIBODY U/mL - - -   EBV CAPSID ANTIBODY IGG 0.0 - 0.8 AI - - -   EBV DNA COPIES/ML EBVNEG:EBV DNA Not Detected [Copies]/mL - - -   EBV DNA LOG OF COPIES <2.7 [Log:copies]/mL - - -   Hep B Core NR:Nonreactive - - -   Hep B Surf - - - -   HIV 1&2 NEG - - -        Recent Labs   Lab Test 11/15/21  1437 01/31/22  1444 04/04/22  1453   DOSTAC 11/14/2021 1/31/2022 4/4/2022   TACROL 8.6 13.1 6.6     Recent Labs   Lab Test 02/11/19  0740 02/14/19  0728 02/21/19  0749   DOSMPA LAST DOSE 8:00PM AT 2.10.2019 LAST DOSE 8:00PM 2/13/2019 02/20/2019 AT 0730 PM   MPACID 2.04 3.64* 1.41   MPAG 50.8 43.4 60.7

## 2022-04-25 NOTE — LETTER
4/25/2022      RE: Amadou Lewis  [address]  [place] [address]       Sean is a 56 year old who is being evaluated via a billable telephone visit.      What phone number would you like to be contacted at? [phone]  How would you like to obtain your AVS? Shelley  Phone call duration: 17 minutes      TRANSPLANT NEPHROLOGY CHRONIC POST TRANSPLANT VISIT    Assessment & Plan   # DDKT (SPK): Stable   - Baseline Creatinine:  ~ 1.0-1.2   - Proteinuria: Not checked recently   - Date DSA Last Checked: Jul/2021      Latest DSA: No   - BK Viremia: No   - Kidney Tx Biopsy: No    # Pancreas Tx (SPK):    - Pancreatic Exocrine Drainage: Enteric drained     - Blood glucose: Euglycemia      On insulin: No   - HbA1c: Stable      Latest HbA1c: 5.5%   - Pancreatic enzymes: Stable   - Date DSA Last Checked: Jul/2021  Latest DSA: No   - Pancreas Tx Biopsy: No    # Immunosuppression: Tacrolimus immediate release (goal 5-8) and Mycophenolate mofetil (dose 750 mg every 12 hours)   - Continue with intensive monitoring of immunosuppression for efficacy and toxicity.   - Changes: No    # Infection Prophylaxis:   Last CD4 Level: Not checked.  - PJP: None    # Blood Pressure: Controlled;  Goal BP: < 130/80   - Changes: Yes - Blood pressure is well controlled, but due to post transplant erythrocytosis, will try starting lisinopril 2.5 mg nightly.  Patient was instructed to hold it should he become lightheaded or symptomatic hypotension.    # Post-Transplant Erythrocytosis: Hgb: Stable;  On ACEI/ARB: No   Imaging: Yes - 11/2019    - Will start low dose lisinopril to see how patient does with it.   - It has been about 2 and a half years since last imaging of native kidneys, although stable to slight trend up, will reimage bilateral native and transplant kidneys.    # Mineral Bone Disorder:   - Vitamin D; level: Not checked recently        On supplement: Yes  - Calcium; level: Normal        On supplement: No    # Electrolytes:   -  "Potassium; level: Normal        On supplement: No  - Magnesium; level: Not checked recently        On supplement: Yes  - Bicarbonate; level: Normal        On supplement: No    # Obesity, Class I (BMI = 31.0): Stable weight.   - Recommend weight loss for overall health by increasing exercise and watching caloric intake.    # Skin Cancer Risk:    - Discussed sun protection and recommend regular follow up with Dermatology.    # Medical Compliance: Yes    # COVID-19 Virus Review: Discussed COVID-19 virus and the potential medical risks.  Reviewed preventative health recommendations, including wearing a mask where appropriate.  Recommended COVID vaccination should be up to date with either an initial vaccination or booster shot when appropriate.  Asked the patient to inform the transplant center if they are exposed or diagnosed with this virus.    # COVID Vaccination Up To Date: No, due for next dose    # Transplant History:  Etiology of Kidney Failure: Polycystic kidney disease (PKD) and Diabetes mellitus, type 1  Tx: SPK  Transplant: 11/12/2018 (Kidney / Pancreas), 10/10/2013 (Kidney)  Significant changes in immunosuppression: None  Significant transplant-related complications: None    Transplant Office Phone Number: [phone]    Assessment and plan was discussed with the patient and he voiced his understanding and agreement.    Return visit: Return in about 1 year (around 4/25/2023).    Jori Wolfe MD    Chief Complaint   Mr. Lewis is a 56 year old here for kidney transplant, pancreas transplant and immunosuppression management.    History of Present Illness    Mr. Lewis reports feeling good overall with some medical complaints.  Since last clinic visit, patient reports no hospitalizations or new medical complaints and has been doing well overall.  His energy level has been \"decent,\" and remains stable, near normal.  He is active and does get some exercise.  Denies any chest pain or shortness of breath " with exertion.  No leg swelling.    Appetite is good and weight is unchanged.  No nausea, vomiting or diarrhea.  He does note some increased gas since changing back to mycophenolate mofetil from mycophenolic acid.  No fever, sweats or chills.  No night sweats.    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)     Diabetes mellitus with background retinopathy (H)     Polycystic kidney     CAD (coronary artery disease)     Retinopathy     Hyperlipidemia LDL goal <70     Premature ventricular contractions (PVCs) (VPCs)     Kidney replaced by transplant     Immunosuppression (H)     Kidney transplant rejection     Aftercare following organ transplant     Esophageal ulcer     Status post simultaneous kidney and pancreas transplant (H)     Other chronic pain     Pancreas replaced by transplant (H)     Vitamin D deficiency     Post-transplant erythrocytosis       Allergies   Allergies   Allergen Reactions     No Known Allergies        Medications   Current Outpatient Medications   Medication Sig     aspirin 81 MG EC tablet Take 1 tablet (81 mg) by mouth daily     blood glucose monitoring (NO BRAND SPECIFIED) test strip Use to test blood sugar 4 times daily or as directed.     cholecalciferol 50 MCG (2000 UT) CAPS Take 2,000 Units by mouth daily     HYDROcodone-acetaminophen (NORCO) 5-325 MG tablet Take 1 tablet by mouth every 6 hours as needed for severe pain     lisinopril (ZESTRIL) 2.5 MG tablet Take 1 tablet (2.5 mg) by mouth At Bedtime     mycophenolate (GENERIC EQUIVALENT) 250 MG capsule Take 3 capsules (750 mg) by mouth 2 times daily     mycophenolic acid (GENERIC EQUIVALENT) 180 MG EC tablet Take 3 tablets (540 mg) by mouth 2 times daily     tacrolimus (GENERIC EQUIVALENT) 0.5 MG capsule Take 1 capsule (0.5 mg) by mouth 2 times daily Total dose = 1.5mg twice daily     tacrolimus (GENERIC EQUIVALENT) 1 MG capsule Take 1  capsule (1 mg) by mouth 2 times daily Total dose = 1.5 mg twice daily     atorvastatin (LIPITOR) 20 MG tablet Take 1 tablet (20 mg) by mouth daily (Patient not taking: Reported on 4/25/2022)     sulfamethoxazole-trimethoprim (BACTRIM) 400-80 MG tablet Take 1 tablet by mouth daily (Patient not taking: Reported on 4/25/2022)     No current facility-administered medications for this visit.     There are no discontinued medications.    Physical Exam   Vital signs and physical exam were deferred for this telemedicine visit.    Data     Renal Latest Ref Rng & Units 4/4/2022 1/31/2022 11/15/2021   Na 133 - 144 mmol/L 138 138 140   K 3.4 - 5.3 mmol/L 4.9 4.4 5.0   Cl 94 - 109 mmol/L 109 108 112(H)   CO2 20 - 32 mmol/L 24 24 22   BUN 7 - 30 mg/dL 16 15 24   Cr 0.66 - 1.25 mg/dL 1.16 1.02 0.92   Cr (external) 0.5 - 1.5 mg/dL - - -   Glucose 70 - 99 mg/dL 91 92 86   Ca  8.5 - 10.1 mg/dL 9.9 9.6 9.4   Mg 1.6 - 2.3 mg/dL - - -     Bone Health Latest Ref Rng & Units 7/28/2020 3/7/2019 2/14/2019   Phos 2.5 - 4.5 mg/dL - - 4.1   PTHi 18 - 80 pg/mL - 104(H) -   Vit D Def 20 - 75 ug/L 32 36 -     Heme Latest Ref Rng & Units 4/4/2022 1/31/2022 11/15/2021   WBC 4.0 - 11.0 10e3/uL 7.9 6.2 7.4   Hgb 13.3 - 17.7 g/dL 17.8(H) 17.9(H) 17.1   Plt 150 - 450 10e3/uL 185 206 207   ABSOLUTE NEUTROPHIL 1.6 - 8.3 10e9/L - - -   ABSOLUTE LYMPHOCYTES 0.8 - 5.3 10e9/L - - -   ABSOLUTE MONOCYTES 0.0 - 1.3 10e9/L - - -   ABSOLUTE EOSINOPHILS 0.0 - 0.7 10e9/L - - -   ABSOLUTE BASOPHILS 0.0 - 0.2 10e9/L - - -   ABS IMMATURE GRANULOCYTES 0 - 0.4 10e9/L - - -   ABSOLUTE NUCLEATED RBC - - - -     Liver Latest Ref Rng & Units 11/11/2018 6/27/2018 8/3/2017   AP 40 - 150 U/L 189(H) - -   TBili 0.2 - 1.3 mg/dL 0.3 - -   ALT 0 - 70 U/L 14 - -   AST 0 - 45 U/L 9 - -   Tot Protein 6.8 - 8.8 g/dL 7.6 - -   Albumin 3.4 - 5.0 g/dL 4.0 4.2 4.2     Pancreas Latest Ref Rng & Units 4/4/2022 1/31/2022 11/15/2021   A1C 0.0 - 5.6 % - 5.5 -   Amylase 30 - 110 U/L 70 64 61    Lipase 73 - 393 U/L 126 110 113     Iron studies Latest Ref Rng & Units 9/11/2018 8/3/2017 11/18/2016   Iron 35 - 180 ug/dL 85 79 82   Iron sat 15 - 46 % 37 34 34   Ferritin 26 - 388 ng/mL 761(H) 736(H) -     [facility] Txp Virology Latest Ref Rng & Units 7/5/2021 5/20/2021 11/2/2020   CVM DNA Quant - - - -   CMV Quant <100 Copies/mL - - -   CMV QT Log <2.0 Log copies/mL - - -   CMV QUANT IU/ML CMVND:CMV DNA Not Detected [IU]/mL - - -   LOG IU/ML OF CMVQNT <2.1 [Log:IU]/mL - - -   BK Spec - Plasma Plasma Plasma   BK Res BKNEG:BK Virus DNA Not Detected copies/mL BK Virus DNA Not Detected BK Virus DNA Not Detected BK Virus DNA Not Detected   BK Log <2.7 Log copies/mL Not Calculated Not Calculated Not Calculated   EBV VCA IGG ANTIBODY U/mL - - -   EBV VCA IGM ANTIBODY U/mL - - -   EBV CAPSID ANTIBODY IGG 0.0 - 0.8 AI - - -   EBV DNA COPIES/ML EBVNEG:EBV DNA Not Detected [Copies]/mL - - -   EBV DNA LOG OF COPIES <2.7 [Log:copies]/mL - - -   Hep B Core NR:Nonreactive - - -   Hep B Surf - - - -   HIV 1&2 NEG - - -        Recent Labs   Lab Test 11/15/21  1437 01/31/22  1444 04/04/22  1453   DOSTAC 11/14/2021 1/31/2022 4/4/2022   TACROL 8.6 13.1 6.6     Recent Labs   Lab Test 02/11/19  0740 02/14/19  0728 02/21/19  0749   DOSMPA LAST DOSE 8:00PM AT 2.10.2019 LAST DOSE 8:00PM 2/13/2019 02/20/2019 AT 0730 PM   MPACID 2.04 3.64* 1.41   MPAG 50.8 43.4 60.7       Jori Wolfe MD

## 2022-04-25 NOTE — LETTER
4/25/2022       RE: Amadou Lewis  [address]  [place] [address]     Dear Colleague,    Thank you for referring your patient, Amadou Lewis, to the [facility] [place] at [facility]. Please see a copy of my visit note below.    Sean is a 56 year old who is being evaluated via a billable telephone visit.      What phone number would you like to be contacted at? [phone]  How would you like to obtain your AVS? MyChart  Phone call duration: 17 minutes      TRANSPLANT NEPHROLOGY CHRONIC POST TRANSPLANT VISIT    Assessment & Plan   # DDKT (SPK): Stable   - Baseline Creatinine:  ~ 1.0-1.2   - Proteinuria: Not checked recently   - Date DSA Last Checked: Jul/2021      Latest DSA: No   - BK Viremia: No   - Kidney Tx Biopsy: No    # Pancreas Tx (SPK):    - Pancreatic Exocrine Drainage: Enteric drained     - Blood glucose: Euglycemia      On insulin: No   - HbA1c: Stable      Latest HbA1c: 5.5%   - Pancreatic enzymes: Stable   - Date DSA Last Checked: Jul/2021  Latest DSA: No   - Pancreas Tx Biopsy: No    # Immunosuppression: Tacrolimus immediate release (goal 5-8) and Mycophenolate mofetil (dose 750 mg every 12 hours)   - Continue with intensive monitoring of immunosuppression for efficacy and toxicity.   - Changes: No    # Infection Prophylaxis:   Last CD4 Level: Not checked.  - PJP: None    # Blood Pressure: Controlled;  Goal BP: < 130/80   - Changes: Yes - Blood pressure is well controlled, but due to post transplant erythrocytosis, will try starting lisinopril 2.5 mg nightly.  Patient was instructed to hold it should he become lightheaded or symptomatic hypotension.    # Post-Transplant Erythrocytosis: Hgb: Stable;  On ACEI/ARB: No   Imaging: Yes - 11/2019    - Will start low dose lisinopril to see how patient does with it.   - It has been about 2 and a half years since last imaging of native kidneys, although stable to slight  trend up, will reimage bilateral native and transplant kidneys.    # Mineral Bone Disorder:   - Vitamin D; level: Not checked recently        On supplement: Yes  - Calcium; level: Normal        On supplement: No    # Electrolytes:   - Potassium; level: Normal        On supplement: No  - Magnesium; level: Not checked recently        On supplement: Yes  - Bicarbonate; level: Normal        On supplement: No    # Obesity, Class I (BMI = 31.0): Stable weight.   - Recommend weight loss for overall health by increasing exercise and watching caloric intake.    # Skin Cancer Risk:    - Discussed sun protection and recommend regular follow up with Dermatology.    # Medical Compliance: Yes    # COVID-19 Virus Review: Discussed COVID-19 virus and the potential medical risks.  Reviewed preventative health recommendations, including wearing a mask where appropriate.  Recommended COVID vaccination should be up to date with either an initial vaccination or booster shot when appropriate.  Asked the patient to inform the transplant center if they are exposed or diagnosed with this virus.    # COVID Vaccination Up To Date: No, due for next dose    # Transplant History:  Etiology of Kidney Failure: Polycystic kidney disease (PKD) and Diabetes mellitus, type 1  Tx: SPK  Transplant: 11/12/2018 (Kidney / Pancreas), 10/10/2013 (Kidney)  Significant changes in immunosuppression: None  Significant transplant-related complications: None    Transplant Office Phone Number: [phone]    Assessment and plan was discussed with the patient and he voiced his understanding and agreement.    Return visit: Return in about 1 year (around 4/25/2023).    Jori Wolfe MD    Chief Complaint   Mr. Lewis is a 56 year old here for kidney transplant, pancreas transplant and immunosuppression management.    History of Present Illness    Mr. Lewis reports feeling good overall with some medical complaints.  Since last clinic visit, patient reports no  "hospitalizations or new medical complaints and has been doing well overall.  His energy level has been \"decent,\" and remains stable, near normal.  He is active and does get some exercise.  Denies any chest pain or shortness of breath with exertion.  No leg swelling.    Appetite is good and weight is unchanged.  No nausea, vomiting or diarrhea.  He does note some increased gas since changing back to mycophenolate mofetil from mycophenolic acid.  No fever, sweats or chills.  No night sweats.    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)     Diabetes mellitus with background retinopathy (H)     Polycystic kidney     CAD (coronary artery disease)     Retinopathy     Hyperlipidemia LDL goal <70     Premature ventricular contractions (PVCs) (VPCs)     Kidney replaced by transplant     Immunosuppression (H)     Kidney transplant rejection     Aftercare following organ transplant     Esophageal ulcer     Status post simultaneous kidney and pancreas transplant (H)     Other chronic pain     Pancreas replaced by transplant (H)     Vitamin D deficiency     Post-transplant erythrocytosis       Allergies   Allergies   Allergen Reactions     No Known Allergies        Medications   Current Outpatient Medications   Medication Sig     aspirin 81 MG EC tablet Take 1 tablet (81 mg) by mouth daily     blood glucose monitoring (NO BRAND SPECIFIED) test strip Use to test blood sugar 4 times daily or as directed.     cholecalciferol 50 MCG (2000 UT) CAPS Take 2,000 Units by mouth daily     HYDROcodone-acetaminophen (NORCO) 5-325 MG tablet Take 1 tablet by mouth every 6 hours as needed for severe pain     lisinopril (ZESTRIL) 2.5 MG tablet Take 1 tablet (2.5 mg) by mouth At Bedtime     mycophenolate (GENERIC EQUIVALENT) 250 MG capsule Take 3 capsules (750 mg) by mouth 2 times daily     mycophenolic acid (GENERIC EQUIVALENT) 180 MG EC tablet " Take 3 tablets (540 mg) by mouth 2 times daily     tacrolimus (GENERIC EQUIVALENT) 0.5 MG capsule Take 1 capsule (0.5 mg) by mouth 2 times daily Total dose = 1.5mg twice daily     tacrolimus (GENERIC EQUIVALENT) 1 MG capsule Take 1 capsule (1 mg) by mouth 2 times daily Total dose = 1.5 mg twice daily     atorvastatin (LIPITOR) 20 MG tablet Take 1 tablet (20 mg) by mouth daily (Patient not taking: Reported on 4/25/2022)     sulfamethoxazole-trimethoprim (BACTRIM) 400-80 MG tablet Take 1 tablet by mouth daily (Patient not taking: Reported on 4/25/2022)     No current facility-administered medications for this visit.     There are no discontinued medications.    Physical Exam   Vital signs and physical exam were deferred for this telemedicine visit.    Data     Renal Latest Ref Rng & Units 4/4/2022 1/31/2022 11/15/2021   Na 133 - 144 mmol/L 138 138 140   K 3.4 - 5.3 mmol/L 4.9 4.4 5.0   Cl 94 - 109 mmol/L 109 108 112(H)   CO2 20 - 32 mmol/L 24 24 22   BUN 7 - 30 mg/dL 16 15 24   Cr 0.66 - 1.25 mg/dL 1.16 1.02 0.92   Cr (external) 0.5 - 1.5 mg/dL - - -   Glucose 70 - 99 mg/dL 91 92 86   Ca  8.5 - 10.1 mg/dL 9.9 9.6 9.4   Mg 1.6 - 2.3 mg/dL - - -     Bone Health Latest Ref Rng & Units 7/28/2020 3/7/2019 2/14/2019   Phos 2.5 - 4.5 mg/dL - - 4.1   PTHi 18 - 80 pg/mL - 104(H) -   Vit D Def 20 - 75 ug/L 32 36 -     Heme Latest Ref Rng & Units 4/4/2022 1/31/2022 11/15/2021   WBC 4.0 - 11.0 10e3/uL 7.9 6.2 7.4   Hgb 13.3 - 17.7 g/dL 17.8(H) 17.9(H) 17.1   Plt 150 - 450 10e3/uL 185 206 207   ABSOLUTE NEUTROPHIL 1.6 - 8.3 10e9/L - - -   ABSOLUTE LYMPHOCYTES 0.8 - 5.3 10e9/L - - -   ABSOLUTE MONOCYTES 0.0 - 1.3 10e9/L - - -   ABSOLUTE EOSINOPHILS 0.0 - 0.7 10e9/L - - -   ABSOLUTE BASOPHILS 0.0 - 0.2 10e9/L - - -   ABS IMMATURE GRANULOCYTES 0 - 0.4 10e9/L - - -   ABSOLUTE NUCLEATED RBC - - - -     Liver Latest Ref Rng & Units 11/11/2018 6/27/2018 8/3/2017   AP 40 - 150 U/L 189(H) - -   TBili 0.2 - 1.3 mg/dL 0.3 - -   ALT 0 - 70  U/L 14 - -   AST 0 - 45 U/L 9 - -   Tot Protein 6.8 - 8.8 g/dL 7.6 - -   Albumin 3.4 - 5.0 g/dL 4.0 4.2 4.2     Pancreas Latest Ref Rng & Units 4/4/2022 1/31/2022 11/15/2021   A1C 0.0 - 5.6 % - 5.5 -   Amylase 30 - 110 U/L 70 64 61   Lipase 73 - 393 U/L 126 110 113     Iron studies Latest Ref Rng & Units 9/11/2018 8/3/2017 11/18/2016   Iron 35 - 180 ug/dL 85 79 82   Iron sat 15 - 46 % 37 34 34   Ferritin 26 - 388 ng/mL 761(H) 736(H) -     [facility] Txp Virology Latest Ref Rng & Units 7/5/2021 5/20/2021 11/2/2020   CVM DNA Quant - - - -   CMV Quant <100 Copies/mL - - -   CMV QT Log <2.0 Log copies/mL - - -   CMV QUANT IU/ML CMVND:CMV DNA Not Detected [IU]/mL - - -   LOG IU/ML OF CMVQNT <2.1 [Log:IU]/mL - - -   BK Spec - Plasma Plasma Plasma   BK Res BKNEG:BK Virus DNA Not Detected copies/mL BK Virus DNA Not Detected BK Virus DNA Not Detected BK Virus DNA Not Detected   BK Log <2.7 Log copies/mL Not Calculated Not Calculated Not Calculated   EBV VCA IGG ANTIBODY U/mL - - -   EBV VCA IGM ANTIBODY U/mL - - -   EBV CAPSID ANTIBODY IGG 0.0 - 0.8 AI - - -   EBV DNA COPIES/ML EBVNEG:EBV DNA Not Detected [Copies]/mL - - -   EBV DNA LOG OF COPIES <2.7 [Log:copies]/mL - - -   Hep B Core NR:Nonreactive - - -   Hep B Surf - - - -   HIV 1&2 NEG - - -        Recent Labs   Lab Test 11/15/21  1437 01/31/22  1444 04/04/22  1453   DOSTAC 11/14/2021 1/31/2022 4/4/2022   TACROL 8.6 13.1 6.6     Recent Labs   Lab Test 02/11/19  0740 02/14/19  0728 02/21/19  0749   DOSMPA LAST DOSE 8:00PM AT 2.10.2019 LAST DOSE 8:00PM 2/13/2019 02/20/2019 AT 0730 PM   MPACID 2.04 3.64* 1.41   MPAG 50.8 43.4 60.7       Again, thank you for allowing me to participate in the care of your patient.      Sincerely,    Jori Wolfe MD

## 2022-05-11 PROBLEM — D75.1 POST-TRANSPLANT ERYTHROCYTOSIS: Status: ACTIVE | Noted: 2022-05-11

## 2022-05-17 ENCOUNTER — TELEPHONE (OUTPATIENT)
Dept: TRANSPLANT | Facility: CLINIC | Age: 57
End: 2022-05-17
Payer: COMMERCIAL

## 2022-05-17 DIAGNOSIS — E55.9 VITAMIN D DEFICIENCY: ICD-10-CM

## 2022-05-17 DIAGNOSIS — Z94.0 KIDNEY TRANSPLANTED: Primary | ICD-10-CM

## 2022-05-17 NOTE — TELEPHONE ENCOUNTER
Patient started on Lisinopril.  Lab orders placed for BMP, vitamin D, magnesium, and CD4 next week.   Orders placed for native/transplant kidney ultrasound.    nuPSYS message sent to Sean with instructions to schedule a lab appointment and an ultrasound appointment (phone number for US given).    Jori Wolfe MD Buboltz, Brittany J, RN Brittany,     Patient needs new lab orders.  I addition, I want him to repeat his BMP this next week since I started him on lisinopril.     Please also check the following labs: Vitamin D and Magnesium.     Finally, he has had a stable to slight trend up in his hemoglobin and would obtain bilateral native kidney and transplant kidney ultrasound to rule out renal cell cancer.     Thanks     Jori Wolfe MD Buboltz, Brittany J, RN Sorry, he also needs a CD4 level (T-cell subset).     Thanks.

## 2022-05-18 NOTE — TELEPHONE ENCOUNTER
Attempted to reach pt but he did not answer. Left message to call back if he still had questions.     Felicity Dobbins RN on 5/18/2022 at 3:28 PM

## 2022-06-01 ENCOUNTER — TELEPHONE (OUTPATIENT)
Dept: TRANSPLANT | Facility: CLINIC | Age: 57
End: 2022-06-01
Payer: COMMERCIAL

## 2022-06-01 NOTE — TELEPHONE ENCOUNTER
VM left as reminder for overdue items.  Request for return correspondence.    Letter mailed to address on file.

## 2022-06-01 NOTE — LETTER
Amadou Lewis  [address] [place] [address]          Dear Amadou Lewis,     We hope this letter finds you well. We at the [facility] have been trying to reach you via phone, however we have been unsuccessful. Please contact us at your earliest convenience at [phone]. Thank you.           Sincerely,     Your Transplant Team

## 2022-06-07 NOTE — TELEPHONE ENCOUNTER
[facility]    Date/Time: 06/07/22 9:07 am    Reason for call: Patient was returning missed call from Zora

## 2022-06-07 NOTE — TELEPHONE ENCOUNTER
RNCC spoke with Sean. He recognizes that he is overdue for transplant labs and imaging.   RNCC reviewed rationale for remaining compliant with completion of transplant cares. Sean voiced understanding.    He voiced that he will work toward scheduling ordered US and labs. Voiced that he has contact to do so.

## 2022-06-08 DIAGNOSIS — Z94.0 KIDNEY REPLACED BY TRANSPLANT: ICD-10-CM

## 2022-06-08 DIAGNOSIS — Z94.83 PANCREAS REPLACED BY TRANSPLANT (H): Primary | ICD-10-CM

## 2022-06-27 ENCOUNTER — LAB (OUTPATIENT)
Dept: LAB | Facility: CLINIC | Age: 57
End: 2022-06-27
Payer: COMMERCIAL

## 2022-06-27 DIAGNOSIS — Z94.0 KIDNEY REPLACED BY TRANSPLANT: ICD-10-CM

## 2022-06-27 DIAGNOSIS — Z94.83 PANCREAS REPLACED BY TRANSPLANT (H): ICD-10-CM

## 2022-06-27 LAB
ERYTHROCYTE [DISTWIDTH] IN BLOOD BY AUTOMATED COUNT: 14.7 % (ref 10–15)
HCT VFR BLD AUTO: 52.2 % (ref 40–53)
HGB BLD-MCNC: 17.7 G/DL (ref 13.3–17.7)
LIPASE SERPL-CCNC: 37 U/L (ref 13–60)
MCH RBC QN AUTO: 29.4 PG (ref 26.5–33)
MCHC RBC AUTO-ENTMCNC: 33.9 G/DL (ref 31.5–36.5)
MCV RBC AUTO: 87 FL (ref 78–100)
PLATELET # BLD AUTO: 195 10E3/UL (ref 150–450)
RBC # BLD AUTO: 6.02 10E6/UL (ref 4.4–5.9)
TACROLIMUS BLD-MCNC: 7.2 UG/L (ref 5–15)
TME LAST DOSE: NORMAL H
TME LAST DOSE: NORMAL H
WBC # BLD AUTO: 6.7 10E3/UL (ref 4–11)

## 2022-06-27 PROCEDURE — 83690 ASSAY OF LIPASE: CPT

## 2022-06-27 PROCEDURE — 36415 COLL VENOUS BLD VENIPUNCTURE: CPT

## 2022-06-27 PROCEDURE — 86832 HLA CLASS I HIGH DEFIN QUAL: CPT

## 2022-06-27 PROCEDURE — 80048 BASIC METABOLIC PNL TOTAL CA: CPT

## 2022-06-27 PROCEDURE — 82150 ASSAY OF AMYLASE: CPT

## 2022-06-27 PROCEDURE — 86833 HLA CLASS II HIGH DEFIN QUAL: CPT

## 2022-06-27 PROCEDURE — 87799 DETECT AGENT NOS DNA QUANT: CPT

## 2022-06-27 PROCEDURE — 85027 COMPLETE CBC AUTOMATED: CPT

## 2022-06-27 PROCEDURE — 80197 ASSAY OF TACROLIMUS: CPT

## 2022-06-28 LAB
BKV DNA # SPEC NAA+PROBE: <500 COPIES/ML
BKV DNA SPEC NAA+PROBE-LOG#: <2.7 {LOG_COPIES}/ML
DONOR IDENTIFICATION: NORMAL
DSA COMMENTS: NORMAL
DSA PRESENT: NO
DSA TEST METHOD: NORMAL
ORGAN: NORMAL
SA 1 CELL: NORMAL
SA 1 TEST METHOD: NORMAL
SA 2 CELL: NORMAL
SA 2 TEST METHOD: NORMAL
SA1 HI RISK ABY: NORMAL
SA1 MOD RISK ABY: NORMAL
SA2 HI RISK ABY: NORMAL
SA2 MOD RISK ABY: NORMAL
UNACCEPTABLE ANTIGENS: NORMAL
UNOS CPRA: 22
ZZZSA 1  COMMENTS: NORMAL
ZZZSA 2 COMMENTS: NORMAL

## 2022-06-29 LAB
AMYLASE SERPL-CCNC: 66 U/L (ref 30–110)
ANION GAP SERPL CALCULATED.3IONS-SCNC: 10 MMOL/L (ref 3–14)
BUN SERPL-MCNC: 16 MG/DL (ref 7–30)
CALCIUM SERPL-MCNC: 9.1 MG/DL (ref 8.5–10.1)
CHLORIDE BLD-SCNC: 105 MMOL/L (ref 94–109)
CO2 SERPL-SCNC: 24 MMOL/L (ref 20–32)
CREAT SERPL-MCNC: 0.8 MG/DL (ref 0.66–1.25)
GFR SERPL CREATININE-BSD FRML MDRD: >90 ML/MIN/1.73M2
GLUCOSE BLD-MCNC: 91 MG/DL (ref 70–99)
POTASSIUM BLD-SCNC: 4.8 MMOL/L (ref 3.4–5.3)
SODIUM SERPL-SCNC: 139 MMOL/L (ref 133–144)

## 2022-07-12 ENCOUNTER — TELEPHONE (OUTPATIENT)
Dept: TRANSPLANT | Facility: CLINIC | Age: 57
End: 2022-07-12

## 2022-07-12 NOTE — TELEPHONE ENCOUNTER
Left message for patient to schedule renal US appointments.  Asked patient to call back to schedule.

## 2022-07-12 NOTE — TELEPHONE ENCOUNTER
General  Route to LPN    Reason for call: Dayne was returning a missed call from scheduling. 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 He will try to call again    Call back needed? Yes  Return Call Needed  Same as documented in contacts section  When to return call?: Same day: Route High Priority   No

## 2022-07-29 NOTE — CONSULTS
Transplant Admission Psychosocial Assessment    Patient Name: Amadou Lewis  : 1965  Age: 52 year old  MRN: [id number]  Completed assessment with: Mr. Lewis and a chart review.     Patient underwent  donor kidney and pancreas transplant.   Met with patient to update psychosocial assessment and provide brief education about SW role while inpatient, as well as expectations/requirements and follow up needs post-transplant. SW also provided education about need for compliance with transplant medications, and explained ESRD Medicare benefits and medication coverage under Medicare part B.  Medicare 2728 forms completed and signed by patient.  Presenting Information   Living Situation: Mr. Lewis lives alone in [place].   If not local, plans for short term stay:  N/A  Previous Functional Status: No reported functional limitations. He was working full-time.   Cultural/Language/Spiritual Considerations: Not assessed today.     Support System  Primary Support Person His daughter Kisha.   Other support:  His son and his significant other, Milvia.   Plan for support in immediate post-transplant period: His daughter will be staying with him after discharge.     Health Care Directive  Decision Maker: Mr. Lewis is his own decision-maker.   Alternate Decision Maker: His daughter and son, as next of kin.   Health Care Directive: Declined completing, per outpatient social work notes.     Mental Health/Coping:   History of Mental Health: No prior concerns reported.   History of Chemical Health: He reports normally consuming about three drinks a month.   Current status: No concerns reported.   Coping: He is fatigued, but appears to be coping appropriately at this time. Having a prior kidney transplant has been helpful for knowing some of the things to anticipate after a transplant.   Services Needed/Recommended: None at this time.     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   Impact  of transplant on income: He only has a few days of vacation left, so will likely need to apply for short-term disability benefits, which would only be a portion of his wages.   Insurance and medication coverage: He has [facility] ([facility]). Reported medication co payments prior to this admission were reported to be about $15. He is planning to apply for Renal Medicare, Part A and likely Part B.   Financial concerns: None reported.   Resources needed: Potential assistance in applying for short-term disability benefits.     Assessment, recommendations and discharge plan:  Mr. Lewis is knowledgeable about Renal Medicare, as he was on it for three years after his first kidney transplant. He has already initiated an application at his dialysis unit, prior to is transplant. His only reported concern today is his car, as he drove himself here for transplant and it is still in the parking ramp. He will see if/when someone can come to retrieve it,as he is aware that he will not be able to drive himself home at discharge. His daughter plans to stay with him and assist with his post transplant care needs. The care coordinator will arrange for any needed home care services. I will update his outpatient , Hermila Roberts, about his potential need for assistance applying for disability from his employer. I will continue to be available for Mr. Lewis social work needs while inpatient this week.     WILLARD Oliveros, [facility]  Pager [phone]   Saeid Pablo(Attending)

## 2022-08-12 ENCOUNTER — LAB (OUTPATIENT)
Dept: LAB | Facility: CLINIC | Age: 57
End: 2022-08-12
Payer: COMMERCIAL

## 2022-08-12 DIAGNOSIS — Z94.83 PANCREAS REPLACED BY TRANSPLANT (H): ICD-10-CM

## 2022-08-12 DIAGNOSIS — Z94.0 KIDNEY REPLACED BY TRANSPLANT: ICD-10-CM

## 2022-08-12 LAB
ERYTHROCYTE [DISTWIDTH] IN BLOOD BY AUTOMATED COUNT: 14.2 % (ref 10–15)
HCT VFR BLD AUTO: 49.9 % (ref 40–53)
HGB BLD-MCNC: 16.8 G/DL (ref 13.3–17.7)
LIPASE SERPL-CCNC: 26 U/L (ref 13–60)
MCH RBC QN AUTO: 29.6 PG (ref 26.5–33)
MCHC RBC AUTO-ENTMCNC: 33.7 G/DL (ref 31.5–36.5)
MCV RBC AUTO: 88 FL (ref 78–100)
PLATELET # BLD AUTO: 212 10E3/UL (ref 150–450)
RBC # BLD AUTO: 5.68 10E6/UL (ref 4.4–5.9)
TACROLIMUS BLD-MCNC: 12.2 UG/L (ref 5–15)
TME LAST DOSE: NORMAL H
TME LAST DOSE: NORMAL H
WBC # BLD AUTO: 6.1 10E3/UL (ref 4–11)

## 2022-08-12 PROCEDURE — 85027 COMPLETE CBC AUTOMATED: CPT

## 2022-08-12 PROCEDURE — 83690 ASSAY OF LIPASE: CPT

## 2022-08-12 PROCEDURE — 80048 BASIC METABOLIC PNL TOTAL CA: CPT

## 2022-08-12 PROCEDURE — 87799 DETECT AGENT NOS DNA QUANT: CPT

## 2022-08-12 PROCEDURE — 80197 ASSAY OF TACROLIMUS: CPT

## 2022-08-12 PROCEDURE — 36415 COLL VENOUS BLD VENIPUNCTURE: CPT

## 2022-08-12 PROCEDURE — 82150 ASSAY OF AMYLASE: CPT

## 2022-08-13 LAB
AMYLASE SERPL-CCNC: 56 U/L (ref 30–110)
ANION GAP SERPL CALCULATED.3IONS-SCNC: 6 MMOL/L (ref 3–14)
BUN SERPL-MCNC: 17 MG/DL (ref 7–30)
CALCIUM SERPL-MCNC: 9.3 MG/DL (ref 8.5–10.1)
CHLORIDE BLD-SCNC: 109 MMOL/L (ref 94–109)
CO2 SERPL-SCNC: 23 MMOL/L (ref 20–32)
CREAT SERPL-MCNC: 0.95 MG/DL (ref 0.66–1.25)
GFR SERPL CREATININE-BSD FRML MDRD: >90 ML/MIN/1.73M2
GLUCOSE BLD-MCNC: 90 MG/DL (ref 70–99)
POTASSIUM BLD-SCNC: 4.8 MMOL/L (ref 3.4–5.3)
SODIUM SERPL-SCNC: 138 MMOL/L (ref 133–144)

## 2022-08-15 ENCOUNTER — TELEPHONE (OUTPATIENT)
Dept: TRANSPLANT | Facility: CLINIC | Age: 57
End: 2022-08-15

## 2022-08-15 DIAGNOSIS — Z94.0 KIDNEY REPLACED BY TRANSPLANT: Primary | ICD-10-CM

## 2022-08-15 LAB
BKV DNA # SPEC NAA+PROBE: <500 COPIES/ML
BKV DNA SPEC NAA+PROBE-LOG#: <2.7 {LOG_COPIES}/ML

## 2022-08-15 NOTE — TELEPHONE ENCOUNTER
ISSUE:   Tacrolimus IR level 12.2 on 8/12/22, goal 5-8, dose 1.5 mg BID.    PLAN:   Please call patient and confirm this was an accurate 12-hour trough. Verify Tacrolimus IR dose 1.5 mg BID. Confirm no new medications or illness. Confirm no missed doses. If accurate trough and accurate dose, decrease Tacrolimus IR dose to 1.5 mg/ 1.0mg BID and repeat labs in 1 weeks    If not an accurate trough repeat labs in 1-2 weeks      LPN TASK:   Please contact patient to assess and review the plan.  Update prescription and place repeat lab orders as necessary.

## 2022-08-16 NOTE — TELEPHONE ENCOUNTER
Call placed to patient. Patient confirms current dose and inaccurate trough level. Denies any recent illness, diarrhea or medication changes. Patient v\u to continue current dose and repeat level in 1-2 weeks. Order sent

## 2022-08-22 DIAGNOSIS — Z94.83 PANCREAS TRANSPLANTED (H): ICD-10-CM

## 2022-08-22 DIAGNOSIS — Z94.0 KIDNEY REPLACED BY TRANSPLANT: ICD-10-CM

## 2022-08-22 DIAGNOSIS — Z94.83 PANCREAS REPLACED BY TRANSPLANT (H): ICD-10-CM

## 2022-08-22 DIAGNOSIS — Z94.0 KIDNEY TRANSPLANTED: ICD-10-CM

## 2022-08-22 RX ORDER — TACROLIMUS 0.5 MG/1
0.5 CAPSULE ORAL 2 TIMES DAILY
Qty: 60 CAPSULE | Refills: 11 | Status: SHIPPED | OUTPATIENT
Start: 2022-08-22

## 2022-08-22 RX ORDER — TACROLIMUS 1 MG/1
1 CAPSULE ORAL 2 TIMES DAILY
Qty: 60 CAPSULE | Refills: 11 | Status: SHIPPED | OUTPATIENT
Start: 2022-08-22 | End: 2023-08-28

## 2022-10-12 ENCOUNTER — LAB (OUTPATIENT)
Dept: LAB | Facility: CLINIC | Age: 57
End: 2022-10-12
Payer: COMMERCIAL

## 2022-10-12 DIAGNOSIS — Z94.0 KIDNEY REPLACED BY TRANSPLANT: ICD-10-CM

## 2022-10-12 DIAGNOSIS — Z94.83 PANCREAS REPLACED BY TRANSPLANT (H): ICD-10-CM

## 2022-10-12 LAB
AMYLASE SERPL-CCNC: 65 U/L (ref 30–110)
ANION GAP SERPL CALCULATED.3IONS-SCNC: 6 MMOL/L (ref 3–14)
BUN SERPL-MCNC: 22 MG/DL (ref 7–30)
CALCIUM SERPL-MCNC: 9.3 MG/DL (ref 8.5–10.1)
CHLORIDE BLD-SCNC: 109 MMOL/L (ref 94–109)
CO2 SERPL-SCNC: 25 MMOL/L (ref 20–32)
CREAT SERPL-MCNC: 0.93 MG/DL (ref 0.66–1.25)
ERYTHROCYTE [DISTWIDTH] IN BLOOD BY AUTOMATED COUNT: 15.2 % (ref 10–15)
GFR SERPL CREATININE-BSD FRML MDRD: >90 ML/MIN/1.73M2
GLUCOSE BLD-MCNC: 93 MG/DL (ref 70–99)
HCT VFR BLD AUTO: 54.1 % (ref 40–53)
HGB BLD-MCNC: 18 G/DL (ref 13.3–17.7)
LIPASE SERPL-CCNC: 37 U/L (ref 13–60)
MCH RBC QN AUTO: 30.2 PG (ref 26.5–33)
MCHC RBC AUTO-ENTMCNC: 33.3 G/DL (ref 31.5–36.5)
MCV RBC AUTO: 91 FL (ref 78–100)
PLATELET # BLD AUTO: 206 10E3/UL (ref 150–450)
POTASSIUM BLD-SCNC: 4.5 MMOL/L (ref 3.4–5.3)
RBC # BLD AUTO: 5.97 10E6/UL (ref 4.4–5.9)
SODIUM SERPL-SCNC: 140 MMOL/L (ref 133–144)
TACROLIMUS BLD-MCNC: 4.6 UG/L (ref 5–15)
TME LAST DOSE: ABNORMAL H
TME LAST DOSE: ABNORMAL H
WBC # BLD AUTO: 6.1 10E3/UL (ref 4–11)

## 2022-10-12 PROCEDURE — 82150 ASSAY OF AMYLASE: CPT

## 2022-10-12 PROCEDURE — 36415 COLL VENOUS BLD VENIPUNCTURE: CPT

## 2022-10-12 PROCEDURE — 85027 COMPLETE CBC AUTOMATED: CPT

## 2022-10-12 PROCEDURE — 83690 ASSAY OF LIPASE: CPT

## 2022-10-12 PROCEDURE — 80048 BASIC METABOLIC PNL TOTAL CA: CPT

## 2022-10-12 PROCEDURE — 87799 DETECT AGENT NOS DNA QUANT: CPT

## 2022-10-12 PROCEDURE — 80197 ASSAY OF TACROLIMUS: CPT

## 2022-10-13 LAB — BKV DNA # SPEC NAA+PROBE: NOT DETECTED COPIES/ML

## 2022-10-14 ENCOUNTER — TELEPHONE (OUTPATIENT)
Dept: TRANSPLANT | Facility: CLINIC | Age: 57
End: 2022-10-14

## 2022-10-14 DIAGNOSIS — Z48.298 AFTERCARE FOLLOWING ORGAN TRANSPLANT: Primary | ICD-10-CM

## 2022-10-14 NOTE — TELEPHONE ENCOUNTER
ISSUE:   Tacrolimus IR level 4.6 on 10/12/22, goal 5-8, dose 1.5/1.0 mg BID.    PLAN:   Please call patient and confirm this was an accurate 12-hour trough. Verify Tacrolimus IR dose 1.5/1.0 mg BID. Confirm no new medications or illness. Confirm no missed doses. If accurate trough and accurate dose, stay on the same dose and repeat BMP, CBC, drug level in 1 week.     LPN TASK:   Please contact patient to assess and review the plan.  Update prescription and place repeat lab orders as necessary.    Please also remind pt to call [phone] and schedule ultrasound of native and transplanted kidneys due to elevated hemoglobin levels. This was ordered by Dr. Wolfe in April at his visit. If any questions about this route to RN[facility]

## 2022-10-14 NOTE — TELEPHONE ENCOUNTER
RNCC left detailed VM with tacrolimus and US instructions - phone number given to schedule appt. Tac level ordered.

## 2022-10-16 ENCOUNTER — HEALTH MAINTENANCE LETTER (OUTPATIENT)
Age: 57
End: 2022-10-16

## 2022-10-18 NOTE — TELEPHONE ENCOUNTER
Call placed to patient. No answer. Detailed voice message left requesting patient to return call to discuss tacrolimus level. Instruction left for patient to continue current dose and repeat level in one week. Order sent.

## 2022-12-05 ENCOUNTER — LAB (OUTPATIENT)
Dept: LAB | Facility: CLINIC | Age: 57
End: 2022-12-05
Payer: COMMERCIAL

## 2022-12-05 DIAGNOSIS — Z94.83 PANCREAS REPLACED BY TRANSPLANT (H): ICD-10-CM

## 2022-12-05 DIAGNOSIS — Z94.0 KIDNEY REPLACED BY TRANSPLANT: ICD-10-CM

## 2022-12-05 LAB
AMYLASE SERPL-CCNC: 76 U/L (ref 28–100)
ANION GAP SERPL CALCULATED.3IONS-SCNC: 13 MMOL/L (ref 7–15)
BUN SERPL-MCNC: 20.8 MG/DL (ref 6–20)
CALCIUM SERPL-MCNC: 9.3 MG/DL (ref 8.6–10)
CHLORIDE SERPL-SCNC: 103 MMOL/L (ref 98–107)
CREAT SERPL-MCNC: 1.02 MG/DL (ref 0.67–1.17)
DEPRECATED HCO3 PLAS-SCNC: 23 MMOL/L (ref 22–29)
ERYTHROCYTE [DISTWIDTH] IN BLOOD BY AUTOMATED COUNT: 14.8 % (ref 10–15)
GFR SERPL CREATININE-BSD FRML MDRD: 85 ML/MIN/1.73M2
GLUCOSE SERPL-MCNC: 83 MG/DL (ref 70–99)
HCT VFR BLD AUTO: 53.1 % (ref 40–53)
HGB BLD-MCNC: 17.6 G/DL (ref 13.3–17.7)
LIPASE SERPL-CCNC: 37 U/L (ref 13–60)
MCH RBC QN AUTO: 29.8 PG (ref 26.5–33)
MCHC RBC AUTO-ENTMCNC: 33.1 G/DL (ref 31.5–36.5)
MCV RBC AUTO: 90 FL (ref 78–100)
PLATELET # BLD AUTO: 203 10E3/UL (ref 150–450)
POTASSIUM SERPL-SCNC: 4.7 MMOL/L (ref 3.4–5.3)
RBC # BLD AUTO: 5.9 10E6/UL (ref 4.4–5.9)
SODIUM SERPL-SCNC: 139 MMOL/L (ref 136–145)
WBC # BLD AUTO: 8.3 10E3/UL (ref 4–11)

## 2022-12-05 PROCEDURE — 80048 BASIC METABOLIC PNL TOTAL CA: CPT

## 2022-12-05 PROCEDURE — 86832 HLA CLASS I HIGH DEFIN QUAL: CPT

## 2022-12-05 PROCEDURE — 83690 ASSAY OF LIPASE: CPT

## 2022-12-05 PROCEDURE — 87799 DETECT AGENT NOS DNA QUANT: CPT

## 2022-12-05 PROCEDURE — 86833 HLA CLASS II HIGH DEFIN QUAL: CPT

## 2022-12-05 PROCEDURE — 85027 COMPLETE CBC AUTOMATED: CPT

## 2022-12-05 PROCEDURE — 80197 ASSAY OF TACROLIMUS: CPT

## 2022-12-05 PROCEDURE — 36415 COLL VENOUS BLD VENIPUNCTURE: CPT

## 2022-12-05 PROCEDURE — 82150 ASSAY OF AMYLASE: CPT

## 2022-12-06 LAB
BKV DNA # SPEC NAA+PROBE: NOT DETECTED COPIES/ML
DONOR IDENTIFICATION: NORMAL
DSA COMMENTS: NORMAL
DSA PRESENT: NO
DSA TEST METHOD: NORMAL
ORGAN: NORMAL
SA 1 CELL: NORMAL
SA 1 TEST METHOD: NORMAL
SA 2 CELL: NORMAL
SA 2 TEST METHOD: NORMAL
SA1 HI RISK ABY: NORMAL
SA1 MOD RISK ABY: NORMAL
SA2 HI RISK ABY: NORMAL
SA2 MOD RISK ABY: NORMAL
TACROLIMUS BLD-MCNC: 5.8 UG/L (ref 5–15)
TME LAST DOSE: NORMAL H
TME LAST DOSE: NORMAL H
UNACCEPTABLE ANTIGENS: NORMAL
UNOS CPRA: 22
ZZZSA 1  COMMENTS: NORMAL
ZZZSA 2 COMMENTS: NORMAL

## 2023-01-02 DIAGNOSIS — Z94.0 KIDNEY TRANSPLANTED: Primary | ICD-10-CM

## 2023-01-02 RX ORDER — MYCOPHENOLATE MOFETIL 250 MG/1
750 CAPSULE ORAL 2 TIMES DAILY
Qty: 540 CAPSULE | Refills: 3 | Status: SHIPPED | OUTPATIENT
Start: 2023-01-02 | End: 2023-12-29

## 2023-03-13 ENCOUNTER — LAB (OUTPATIENT)
Dept: LAB | Facility: CLINIC | Age: 58
End: 2023-03-13
Payer: COMMERCIAL

## 2023-03-13 DIAGNOSIS — Z94.0 KIDNEY REPLACED BY TRANSPLANT: ICD-10-CM

## 2023-03-13 DIAGNOSIS — Z94.83 PANCREAS REPLACED BY TRANSPLANT (H): ICD-10-CM

## 2023-03-13 LAB
AMYLASE SERPL-CCNC: 65 U/L (ref 28–100)
ANION GAP SERPL CALCULATED.3IONS-SCNC: 9 MMOL/L (ref 7–15)
BUN SERPL-MCNC: 18.1 MG/DL (ref 8–23)
CALCIUM SERPL-MCNC: 9.5 MG/DL (ref 8.6–10)
CHLORIDE SERPL-SCNC: 108 MMOL/L (ref 98–107)
CREAT SERPL-MCNC: 0.94 MG/DL (ref 0.67–1.17)
DEPRECATED HCO3 PLAS-SCNC: 22 MMOL/L (ref 22–29)
ERYTHROCYTE [DISTWIDTH] IN BLOOD BY AUTOMATED COUNT: 14.6 % (ref 10–15)
GFR SERPL CREATININE-BSD FRML MDRD: >90 ML/MIN/1.73M2
GLUCOSE SERPL-MCNC: 66 MG/DL (ref 70–99)
HCT VFR BLD AUTO: 54.1 % (ref 40–53)
HGB BLD-MCNC: 18.1 G/DL (ref 13.3–17.7)
LIPASE SERPL-CCNC: 36 U/L (ref 13–60)
MCH RBC QN AUTO: 30.3 PG (ref 26.5–33)
MCHC RBC AUTO-ENTMCNC: 33.5 G/DL (ref 31.5–36.5)
MCV RBC AUTO: 91 FL (ref 78–100)
PLATELET # BLD AUTO: 189 10E3/UL (ref 150–450)
POTASSIUM SERPL-SCNC: 5.1 MMOL/L (ref 3.4–5.3)
RBC # BLD AUTO: 5.97 10E6/UL (ref 4.4–5.9)
SODIUM SERPL-SCNC: 139 MMOL/L (ref 136–145)
TACROLIMUS BLD-MCNC: 7.1 UG/L (ref 5–15)
TME LAST DOSE: NORMAL H
TME LAST DOSE: NORMAL H
WBC # BLD AUTO: 5.8 10E3/UL (ref 4–11)

## 2023-03-13 PROCEDURE — 82150 ASSAY OF AMYLASE: CPT

## 2023-03-13 PROCEDURE — 80048 BASIC METABOLIC PNL TOTAL CA: CPT

## 2023-03-13 PROCEDURE — 36415 COLL VENOUS BLD VENIPUNCTURE: CPT

## 2023-03-13 PROCEDURE — 80197 ASSAY OF TACROLIMUS: CPT

## 2023-03-13 PROCEDURE — 85027 COMPLETE CBC AUTOMATED: CPT

## 2023-03-13 PROCEDURE — 87799 DETECT AGENT NOS DNA QUANT: CPT

## 2023-03-13 PROCEDURE — 83690 ASSAY OF LIPASE: CPT

## 2023-03-14 LAB — BKV DNA # SPEC NAA+PROBE: NOT DETECTED COPIES/ML

## 2023-05-10 DIAGNOSIS — Z94.83 PANCREAS REPLACED BY TRANSPLANT (H): Primary | ICD-10-CM

## 2023-05-10 DIAGNOSIS — Z94.0 KIDNEY REPLACED BY TRANSPLANT: ICD-10-CM

## 2023-06-01 ENCOUNTER — HEALTH MAINTENANCE LETTER (OUTPATIENT)
Age: 58
End: 2023-06-01

## 2023-06-02 ENCOUNTER — LAB (OUTPATIENT)
Dept: LAB | Facility: CLINIC | Age: 58
End: 2023-06-02
Payer: COMMERCIAL

## 2023-06-02 DIAGNOSIS — Z94.83 PANCREAS REPLACED BY TRANSPLANT (H): ICD-10-CM

## 2023-06-02 DIAGNOSIS — Z94.0 KIDNEY REPLACED BY TRANSPLANT: ICD-10-CM

## 2023-06-02 LAB
AMYLASE SERPL-CCNC: 53 U/L (ref 28–100)
ANION GAP SERPL CALCULATED.3IONS-SCNC: 10 MMOL/L (ref 7–15)
BUN SERPL-MCNC: 17.3 MG/DL (ref 8–23)
CALCIUM SERPL-MCNC: 9.4 MG/DL (ref 8.6–10)
CHLORIDE SERPL-SCNC: 109 MMOL/L (ref 98–107)
CREAT SERPL-MCNC: 1.1 MG/DL (ref 0.67–1.17)
DEPRECATED HCO3 PLAS-SCNC: 23 MMOL/L (ref 22–29)
ERYTHROCYTE [DISTWIDTH] IN BLOOD BY AUTOMATED COUNT: 13.5 % (ref 10–15)
GFR SERPL CREATININE-BSD FRML MDRD: 77 ML/MIN/1.73M2
GLUCOSE SERPL-MCNC: 80 MG/DL (ref 70–99)
HCT VFR BLD AUTO: 52 % (ref 40–53)
HGB BLD-MCNC: 16.7 G/DL (ref 13.3–17.7)
LIPASE SERPL-CCNC: 25 U/L (ref 13–60)
MCH RBC QN AUTO: 29.7 PG (ref 26.5–33)
MCHC RBC AUTO-ENTMCNC: 32.1 G/DL (ref 31.5–36.5)
MCV RBC AUTO: 92 FL (ref 78–100)
PLATELET # BLD AUTO: 213 10E3/UL (ref 150–450)
POTASSIUM SERPL-SCNC: 4.4 MMOL/L (ref 3.4–5.3)
RBC # BLD AUTO: 5.63 10E6/UL (ref 4.4–5.9)
SODIUM SERPL-SCNC: 142 MMOL/L (ref 136–145)
TACROLIMUS BLD-MCNC: 7.5 UG/L (ref 5–15)
TME LAST DOSE: NORMAL H
TME LAST DOSE: NORMAL H
WBC # BLD AUTO: 6.2 10E3/UL (ref 4–11)

## 2023-06-02 PROCEDURE — 86833 HLA CLASS II HIGH DEFIN QUAL: CPT

## 2023-06-02 PROCEDURE — 85027 COMPLETE CBC AUTOMATED: CPT

## 2023-06-02 PROCEDURE — 36415 COLL VENOUS BLD VENIPUNCTURE: CPT

## 2023-06-02 PROCEDURE — 86832 HLA CLASS I HIGH DEFIN QUAL: CPT

## 2023-06-02 PROCEDURE — 82150 ASSAY OF AMYLASE: CPT

## 2023-06-02 PROCEDURE — 80197 ASSAY OF TACROLIMUS: CPT

## 2023-06-02 PROCEDURE — 80048 BASIC METABOLIC PNL TOTAL CA: CPT

## 2023-06-02 PROCEDURE — 83690 ASSAY OF LIPASE: CPT

## 2023-06-05 LAB
DONOR IDENTIFICATION: NORMAL
DSA COMMENTS: NORMAL
DSA PRESENT: NO
DSA TEST METHOD: NORMAL
ORGAN: NORMAL
SA 1 CELL: NORMAL
SA 1 TEST METHOD: NORMAL
SA 2 CELL: NORMAL
SA 2 TEST METHOD: NORMAL
SA1 HI RISK ABY: NORMAL
SA1 MOD RISK ABY: NORMAL
SA2 HI RISK ABY: NORMAL
SA2 MOD RISK ABY: NORMAL
UNACCEPTABLE ANTIGENS: NORMAL
UNOS CPRA: 22
ZZZSA 1  COMMENTS: NORMAL
ZZZSA 2 COMMENTS: NORMAL

## 2023-07-24 ENCOUNTER — LAB (OUTPATIENT)
Dept: LAB | Facility: CLINIC | Age: 58
End: 2023-07-24
Payer: COMMERCIAL

## 2023-07-24 DIAGNOSIS — Z94.83 PANCREAS REPLACED BY TRANSPLANT (H): ICD-10-CM

## 2023-07-24 DIAGNOSIS — Z94.0 KIDNEY REPLACED BY TRANSPLANT: ICD-10-CM

## 2023-07-24 LAB
AMYLASE SERPL-CCNC: 68 U/L (ref 28–100)
ANION GAP SERPL CALCULATED.3IONS-SCNC: 15 MMOL/L (ref 7–15)
BUN SERPL-MCNC: 20.8 MG/DL (ref 8–23)
CALCIUM SERPL-MCNC: 11 MG/DL (ref 8.6–10)
CHLORIDE SERPL-SCNC: 106 MMOL/L (ref 98–107)
CREAT SERPL-MCNC: 1.06 MG/DL (ref 0.67–1.17)
DEPRECATED HCO3 PLAS-SCNC: 21 MMOL/L (ref 22–29)
ERYTHROCYTE [DISTWIDTH] IN BLOOD BY AUTOMATED COUNT: 13.2 % (ref 10–15)
GFR SERPL CREATININE-BSD FRML MDRD: 81 ML/MIN/1.73M2
GLUCOSE SERPL-MCNC: 74 MG/DL (ref 70–99)
HCT VFR BLD AUTO: 54.1 % (ref 40–53)
HGB BLD-MCNC: 17.8 G/DL (ref 13.3–17.7)
LIPASE SERPL-CCNC: 34 U/L (ref 13–60)
MCH RBC QN AUTO: 29.8 PG (ref 26.5–33)
MCHC RBC AUTO-ENTMCNC: 32.9 G/DL (ref 31.5–36.5)
MCV RBC AUTO: 91 FL (ref 78–100)
PLATELET # BLD AUTO: 199 10E3/UL (ref 150–450)
POTASSIUM SERPL-SCNC: 5.3 MMOL/L (ref 3.4–5.3)
RBC # BLD AUTO: 5.97 10E6/UL (ref 4.4–5.9)
SODIUM SERPL-SCNC: 142 MMOL/L (ref 136–145)
WBC # BLD AUTO: 8 10E3/UL (ref 4–11)

## 2023-07-24 PROCEDURE — 83690 ASSAY OF LIPASE: CPT

## 2023-07-24 PROCEDURE — 80048 BASIC METABOLIC PNL TOTAL CA: CPT

## 2023-07-24 PROCEDURE — 85027 COMPLETE CBC AUTOMATED: CPT

## 2023-07-24 PROCEDURE — 80197 ASSAY OF TACROLIMUS: CPT

## 2023-07-24 PROCEDURE — 82150 ASSAY OF AMYLASE: CPT

## 2023-07-24 PROCEDURE — 36415 COLL VENOUS BLD VENIPUNCTURE: CPT

## 2023-07-25 ENCOUNTER — TELEPHONE (OUTPATIENT)
Dept: TRANSPLANT | Facility: CLINIC | Age: 58
End: 2023-07-25
Payer: COMMERCIAL

## 2023-07-25 DIAGNOSIS — E83.52 HYPERCALCEMIA: Primary | ICD-10-CM

## 2023-07-25 LAB
TACROLIMUS BLD-MCNC: 6.3 UG/L (ref 5–15)
TME LAST DOSE: NORMAL H
TME LAST DOSE: NORMAL H

## 2023-07-25 NOTE — TELEPHONE ENCOUNTER
----- Message from Jori Wolfe MD sent at 7/25/2023  7:53 AM CDT -----  Stable kidney function, but elevated serum calcium level and recommend stopping cholecalciferol, good hydration and ensuring patient isn't taking any calcium supplement.  Would repeat labs in a few days and if still elevated, recommend further evaluation.

## 2023-07-25 NOTE — TELEPHONE ENCOUNTER
ISSUE  Elevated calcium 11.0    OUTCOME  Pt states he just started taking his Vitamin D3 about 1.5 weeks ago. He is not taking any other calcium supplements.     Pt will stop vitamin D3, hydrate really well and repeat labs later this week.

## 2023-08-04 ENCOUNTER — LAB (OUTPATIENT)
Dept: LAB | Facility: CLINIC | Age: 58
End: 2023-08-04
Payer: COMMERCIAL

## 2023-08-04 DIAGNOSIS — E83.52 HYPERCALCEMIA: ICD-10-CM

## 2023-08-04 LAB
ANION GAP SERPL CALCULATED.3IONS-SCNC: 10 MMOL/L (ref 7–15)
BUN SERPL-MCNC: 20.9 MG/DL (ref 8–23)
CALCIUM SERPL-MCNC: 9.6 MG/DL (ref 8.6–10)
CHLORIDE SERPL-SCNC: 108 MMOL/L (ref 98–107)
CREAT SERPL-MCNC: 0.92 MG/DL (ref 0.67–1.17)
DEPRECATED HCO3 PLAS-SCNC: 19 MMOL/L (ref 22–29)
GFR SERPL CREATININE-BSD FRML MDRD: >90 ML/MIN/1.73M2
GLUCOSE SERPL-MCNC: 83 MG/DL (ref 70–99)
POTASSIUM SERPL-SCNC: 4.6 MMOL/L (ref 3.4–5.3)
SODIUM SERPL-SCNC: 137 MMOL/L (ref 136–145)

## 2023-08-04 PROCEDURE — 80048 BASIC METABOLIC PNL TOTAL CA: CPT

## 2023-08-04 PROCEDURE — 36415 COLL VENOUS BLD VENIPUNCTURE: CPT

## 2023-08-07 ENCOUNTER — TELEPHONE (OUTPATIENT)
Dept: TRANSPLANT | Facility: CLINIC | Age: 58
End: 2023-08-07
Payer: COMMERCIAL

## 2023-08-07 DIAGNOSIS — E87.8 LOW BICARBONATE: Primary | ICD-10-CM

## 2023-08-07 NOTE — TELEPHONE ENCOUNTER
Patient Call: Voicemail  Date/Time: 08/07/2023 @ 2:23pm  Reason for call: Patient calling in regards to not having consistent calcium and chloride levels when he got his labs drawn. States levels are not in range and needing to know what to do and why this is.

## 2023-08-09 RX ORDER — SODIUM BICARBONATE 650 MG/1
650 TABLET ORAL 2 TIMES DAILY
Qty: 180 TABLET | Refills: 3 | Status: SHIPPED | OUTPATIENT
Start: 2023-08-09

## 2023-08-09 NOTE — TELEPHONE ENCOUNTER
Return call to patient. BMP drawn 8/4/23 to follow up on elevated calcium from prior month. Pt stopped vitamin D and calcium now normal at 9.6. Discussed that there are no concerns with slightly elevated chloride.     Of note CO2 running low at 19, 21 month prior. Pt denies recent illness or diarrhea.     PLAN  Will discuss if pt should start sodium bicarbonate 650mg BID for low CO2 and will call pt back with response.     Pharmacy choice: [facility]

## 2023-08-09 NOTE — TELEPHONE ENCOUNTER
OUTCOME  Spoke to pt and provided recommendations to start sodium bicarbonate 650mg BID. Sent to his pharmacy and he has no further questions.

## 2023-08-28 DIAGNOSIS — Z94.83 PANCREAS REPLACED BY TRANSPLANT (H): ICD-10-CM

## 2023-08-28 DIAGNOSIS — Z94.83 PANCREAS TRANSPLANTED (H): ICD-10-CM

## 2023-08-28 DIAGNOSIS — Z94.0 KIDNEY TRANSPLANTED: ICD-10-CM

## 2023-08-28 DIAGNOSIS — Z94.0 KIDNEY REPLACED BY TRANSPLANT: ICD-10-CM

## 2023-08-28 RX ORDER — TACROLIMUS 1 MG/1
CAPSULE ORAL
Qty: 60 CAPSULE | Refills: 11 | Status: SHIPPED | OUTPATIENT
Start: 2023-08-28 | End: 2024-01-11

## 2023-10-04 ENCOUNTER — LAB (OUTPATIENT)
Dept: LAB | Facility: CLINIC | Age: 58
End: 2023-10-04
Attending: INTERNAL MEDICINE
Payer: COMMERCIAL

## 2023-10-04 ENCOUNTER — OFFICE VISIT (OUTPATIENT)
Dept: TRANSPLANT | Facility: CLINIC | Age: 58
End: 2023-10-04
Attending: INTERNAL MEDICINE
Payer: COMMERCIAL

## 2023-10-04 VITALS
DIASTOLIC BLOOD PRESSURE: 80 MMHG | SYSTOLIC BLOOD PRESSURE: 133 MMHG | OXYGEN SATURATION: 94 % | TEMPERATURE: 97.8 F | HEART RATE: 88 BPM | WEIGHT: 196.5 LBS | BODY MASS INDEX: 30.78 KG/M2

## 2023-10-04 DIAGNOSIS — Z94.0 KIDNEY REPLACED BY TRANSPLANT: ICD-10-CM

## 2023-10-04 DIAGNOSIS — Z94.83 PANCREAS REPLACED BY TRANSPLANT (H): ICD-10-CM

## 2023-10-04 DIAGNOSIS — D75.1 POST-TRANSPLANT ERYTHROCYTOSIS: Primary | ICD-10-CM

## 2023-10-04 DIAGNOSIS — Z48.298 AFTERCARE FOLLOWING ORGAN TRANSPLANT: ICD-10-CM

## 2023-10-04 DIAGNOSIS — D84.9 IMMUNOSUPPRESSED STATUS (H): ICD-10-CM

## 2023-10-04 DIAGNOSIS — E87.20 METABOLIC ACIDOSIS: ICD-10-CM

## 2023-10-04 LAB
AMYLASE SERPL-CCNC: 61 U/L (ref 28–100)
ANION GAP SERPL CALCULATED.3IONS-SCNC: 8 MMOL/L (ref 7–15)
BUN SERPL-MCNC: 17.3 MG/DL (ref 8–23)
CALCIUM SERPL-MCNC: 9.4 MG/DL (ref 8.6–10)
CHLORIDE SERPL-SCNC: 106 MMOL/L (ref 98–107)
CREAT SERPL-MCNC: 0.93 MG/DL (ref 0.67–1.17)
DEPRECATED HCO3 PLAS-SCNC: 24 MMOL/L (ref 22–29)
EGFRCR SERPLBLD CKD-EPI 2021: >90 ML/MIN/1.73M2
ERYTHROCYTE [DISTWIDTH] IN BLOOD BY AUTOMATED COUNT: 13.6 % (ref 10–15)
GLUCOSE SERPL-MCNC: 71 MG/DL (ref 70–99)
HCT VFR BLD AUTO: 54.4 % (ref 40–53)
HGB BLD-MCNC: 17.8 G/DL (ref 13.3–17.7)
LIPASE SERPL-CCNC: 26 U/L (ref 13–60)
MCH RBC QN AUTO: 29.3 PG (ref 26.5–33)
MCHC RBC AUTO-ENTMCNC: 32.7 G/DL (ref 31.5–36.5)
MCV RBC AUTO: 90 FL (ref 78–100)
PLATELET # BLD AUTO: 192 10E3/UL (ref 150–450)
POTASSIUM SERPL-SCNC: 4.9 MMOL/L (ref 3.4–5.3)
RBC # BLD AUTO: 6.07 10E6/UL (ref 4.4–5.9)
SODIUM SERPL-SCNC: 138 MMOL/L (ref 135–145)
TACROLIMUS BLD-MCNC: 10.8 UG/L (ref 5–15)
TME LAST DOSE: NORMAL H
TME LAST DOSE: NORMAL H
WBC # BLD AUTO: 5.6 10E3/UL (ref 4–11)

## 2023-10-04 PROCEDURE — 36415 COLL VENOUS BLD VENIPUNCTURE: CPT | Performed by: PATHOLOGY

## 2023-10-04 PROCEDURE — 85027 COMPLETE CBC AUTOMATED: CPT | Performed by: PATHOLOGY

## 2023-10-04 PROCEDURE — 80197 ASSAY OF TACROLIMUS: CPT | Performed by: INTERNAL MEDICINE

## 2023-10-04 PROCEDURE — 99214 OFFICE O/P EST MOD 30 MIN: CPT | Performed by: INTERNAL MEDICINE

## 2023-10-04 PROCEDURE — 80048 BASIC METABOLIC PNL TOTAL CA: CPT | Performed by: PATHOLOGY

## 2023-10-04 PROCEDURE — 83690 ASSAY OF LIPASE: CPT | Performed by: PATHOLOGY

## 2023-10-04 PROCEDURE — 82150 ASSAY OF AMYLASE: CPT | Performed by: PATHOLOGY

## 2023-10-04 PROCEDURE — 99000 SPECIMEN HANDLING OFFICE-LAB: CPT | Performed by: PATHOLOGY

## 2023-10-04 RX ORDER — HYDROCODONE BITARTRATE AND ACETAMINOPHEN 5; 325 MG/1; MG/1
1 TABLET ORAL EVERY 6 HOURS PRN
Refills: 0 | COMMUNITY
Start: 2023-10-04

## 2023-10-04 ASSESSMENT — PAIN SCALES - GENERAL: PAINLEVEL: NO PAIN (0)

## 2023-10-04 NOTE — PATIENT INSTRUCTIONS
Patient Recommendations:  - Recommend weight loss for overall health by increasing exercise and watching caloric intake.     Transplant Patient Information  Your Post Transplant Coordinator is: Felicity Dobbins  For non urgent items, we encourage you to contact your coordinator/care team online via [employer]  You and your care team can also contact your transplant coordinator Monday - Friday, 8am - 5pm at [phone] (Option 2 to reach the coordinator or Option 4 to schedule an appointment).  After hours for urgent matters, please call [facility] at [phone].

## 2023-10-04 NOTE — LETTER
10/4/2023         RE: Amadou Lewis  [id number]  [place] [address]        Dear Colleague,    Thank you for referring your patient, Amadou Lewis, to the [facility]. Please see a copy of my visit note below.    TRANSPLANT NEPHROLOGY CHRONIC POST TRANSPLANT VISIT    Assessment & Plan  # DDKT (SPK): Stable   - Baseline Creatinine:  ~ 1.0-1.2   - Proteinuria: Not checked recently   - Date DSA Last Checked: Jun/2023      Latest DSA: No cPRA: 22%   - BK Viremia: No   - Kidney Tx Biopsy: No    # Pancreas Tx (SPK):    - Pancreatic Exocrine Drainage: Enteric drained     - Blood glucose: Euglycemia      On insulin: No   - HbA1c: Not checked recently      Latest HbA1c: ??%   - Pancreatic enzymes: Stable   - Date DSA Last Checked: Jun/2023  Latest DSA: No   - Pancreas Tx Biopsy: No    # Immunosuppression: Tacrolimus immediate release (goal 5-8) and Mycophenolate mofetil (dose 750 mg every 12 hours)   - Continue with intensive monitoring of immunosuppression for efficacy and toxicity.   - Changes: No    # Infection Prophylaxis:   Last CD4 Level: Not checked.  - PJP: None    # Blood Pressure: Controlled;  Goal BP: < 130/80   - Changes: Not at this time; Recommend weight loss.    # Post-Transplant Erythrocytosis: Hgb: Stable;  On ACEI/ARB: Yes   Imaging: Yes - 11/2019     - Will reimage bilateral native kidneys.    # Mineral Bone Disorder:   - Vitamin D; level: Not checked recently        On supplement: No  - Calcium; level: Normal        On supplement: No    # Electrolytes:   - Potassium; level: Normal        On supplement: No  - Bicarbonate; level: Stable low        On supplement: Yes    # Obesity, Class I (BMI = 30.8): Weight is up ~ 20 lbs.   - Recommend weight loss for overall health by increasing exercise and watching caloric intake.    # Skin Cancer Risk:    - Discussed sun protection and recommend regular follow up with Dermatology.    # Medical Compliance: Yes    # Health  "Maintenance and Vaccination Review: Not Reviewed    # Transplant History:  Etiology of Kidney Failure: Polycystic kidney disease (PKD) and Diabetes mellitus, type 1  Tx: SPK  Transplant: 11/12/2018 (Kidney / Pancreas), 10/10/2013 (Kidney)  Significant changes in immunosuppression: None  Significant transplant-related complications: None    Transplant Office Phone Number: [phone]    Assessment and plan was discussed with the patient and he voiced his understanding and agreement.    Return visit: Return in about 1 year (around 10/4/2024).    Jori Wolfe MD    Chief Complaint  Mr. Lewis is a 57 year old here for kidney transplant, pancreas transplant and immunosuppression management.    History of Present Illness   Mr. Lewis reports feeling good overall with some medical complaints.  Since last clinic visit, patient reports no hospitalizations or new medical complaints and has been doing well overall.  His energy level is good and remains normal.  He is active and does get some exercise, mostly with walking.  Denies any chest pain or shortness of breath with exertion.  No leg swelling.    Appetite is \"too good\" and he has gained about 20 lbs.  He is trying to eat healthier lately.  No nausea, vomiting or diarrhea.  Rare heartburn symptoms and hasn't required any medications for this.  He will still get chronic abdominal/back pain due to his polycystic kidneys and is on narcotic pain medications for this.  No fever, sweats or chills.  Occasional, mild night sweats, which is stable to lessened.    Home BP:  120/60-70s    Problem List  Patient Active Problem List   Diagnosis     Type II diabetes mellitus with renal manifestations (H)     Diabetes mellitus with background retinopathy (H)     Polycystic kidney     CAD (coronary artery disease)     Retinopathy     Hyperlipidemia LDL goal <70     Premature ventricular contractions (PVCs) (VPCs)     Kidney replaced by transplant     Immunosuppressed status (H24) "     Kidney transplant rejection     Aftercare following organ transplant     Esophageal ulcer     Status post simultaneous kidney and pancreas transplant (H)     Pancreas replaced by transplant (H)     Vitamin D deficiency     Post-transplant erythrocytosis     Metabolic acidosis       Allergies  Allergies   Allergen Reactions     No Known Allergies        Medications  Current Outpatient Medications   Medication Sig     HYDROcodone-acetaminophen (NORCO) 5-325 MG tablet Take 1 tablet by mouth every 6 hours as needed for pain (Back/kidney pain)     aspirin 81 MG EC tablet Take 1 tablet (81 mg) by mouth daily     blood glucose monitoring (NO BRAND SPECIFIED) test strip Use to test blood sugar 4 times daily or as directed.     lisinopril (ZESTRIL) 2.5 MG tablet Take 1 tablet (2.5 mg) by mouth At Bedtime     mycophenolate (GENERIC EQUIVALENT) 250 MG capsule Take 3 capsules (750 mg) by mouth 2 times daily     sodium bicarbonate 650 MG tablet Take 1 tablet (650 mg) by mouth 2 times daily     tacrolimus (GENERIC EQUIVALENT) 0.5 MG capsule Take 1 capsule (0.5 mg) by mouth 2 times daily Total dose = 1.5mg twice daily     tacrolimus (GENERIC EQUIVALENT) 1 MG capsule TAKE 1 CAPSULE BY MOUTH TWICE  DAILY WITH 0.5MG CAPSULE FOR A  TOTAL DOSE OF 1.5MG TWICE DAILY     No current facility-administered medications for this visit.     Medications Discontinued During This Encounter   Medication Reason     mycophenolic acid (GENERIC EQUIVALENT) 180 MG EC tablet      HYDROcodone-acetaminophen (NORCO) 5-325 MG tablet        Physical Exam  Vital Signs: /80   Pulse 88   Temp 97.8  F (36.6  C) (Oral)   Wt 89.1 kg (196 lb 8 oz)   SpO2 94%   BMI 30.78 kg/m      GENERAL APPEARANCE: alert and no distress  HENT: mouth without ulcers or lesions  RESP: lungs clear to auscultation - no rales, rhonchi or wheezes  CV: regular rhythm, normal rate, no rub, no murmur  EDEMA: trace LE edema bilaterally  ABDOMEN: soft, nondistended, nontender,  bowel sounds normal, overweight  MS: extremities normal - no gross deformities noted, no evidence of inflammation in joints, no muscle tenderness  SKIN: no rash  TX KIDNEY: normal  DIALYSIS ACCESS:  LUE AV fistula with fair thrill    Data        Latest Ref Rng & Units 10/4/2023    10:21 AM 8/4/2023     2:52 PM 7/24/2023     2:46 PM   Renal   Sodium 135 - 145 mmol/L 138  137  142    K 3.4 - 5.3 mmol/L 4.9  4.6  5.3    Cl 98 - 107 mmol/L 106  108  106    Cl (external) 98 - 107 mmol/L 106  108  106    CO2 22 - 29 mmol/L 24  19  21    Urea Nitrogen 8.0 - 23.0 mg/dL 17.3  20.9  20.8    Creatinine 0.67 - 1.17 mg/dL 0.93  0.92  1.06    Glucose 70 - 99 mg/dL 71  83  74    Calcium 8.6 - 10.0 mg/dL 9.4  9.6  11.0          Latest Ref Rng & Units 7/28/2020    10:17 AM 3/7/2019     7:33 AM 2/14/2019     7:28 AM   Bone Health   Phosphorus 2.5 - 4.5 mg/dL   4.1    Parathyroid Hormone Intact 18 - 80 pg/mL  104     Vit D Def 20 - 75 ug/L 32  36           Latest Ref Rng & Units 10/4/2023    10:21 AM 7/24/2023     2:46 PM 6/2/2023     2:21 PM   Heme   WBC 4.0 - 11.0 10e3/uL 5.6  8.0  6.2    Hgb 13.3 - 17.7 g/dL 17.8  17.8  16.7    Plt 150 - 450 10e3/uL 192  199  213          Latest Ref Rng & Units 11/11/2018     9:55 AM 6/27/2018     1:57 PM 8/3/2017     2:40 PM   Liver   AP 40 - 150 U/L 189      TBili 0.2 - 1.3 mg/dL 0.3      ALT 0 - 70 U/L 14      AST 0 - 45 U/L 9      Tot Protein 6.8 - 8.8 g/dL 7.6      Albumin 3.4 - 5.0 g/dL 4.0  4.2  4.2          Latest Ref Rng & Units 10/4/2023    10:21 AM 7/24/2023     2:46 PM 6/2/2023     2:21 PM   Pancreas   Amylase 28 - 100 U/L 61  68  53    Lipase (Roche) 13 - 60 U/L 26  34  25          Latest Ref Rng & Units 9/11/2018     2:02 PM 8/3/2017     2:40 PM 11/18/2016     2:48 PM   Iron studies   Iron 35 - 180 ug/dL 85  79  82    Iron Saturation Index 15 - 46 % 37  34  34    Ferritin 26 - 388 ng/mL 761  736           Latest Ref Rng & Units 7/5/2021     3:25 PM 5/20/2021     6:19 AM 11/2/2020      2:47 PM   [facility] Txp Virology   BK Spec  Plasma  Plasma  Plasma    BK Res BKNEG^BK Virus DNA Not Detected copies/mL BK Virus DNA Not Detected  BK Virus DNA Not Detected  BK Virus DNA Not Detected    BK Log <2.7 Log copies/mL Not Calculated  Not Calculated  Not Calculated        Recent Labs   Lab Test 03/13/23  0956 06/02/23  1421 07/24/23  1446 10/04/23  1021   DOSTAC 3/12/2023 6/2/2023  --  10/3/2023   TACROL 7.1 7.5 6.3 10.8     Recent Labs   Lab Test 02/11/19  0740 02/14/19  0728 02/21/19  0749   DOSMPA LAST DOSE 8:00PM AT 2.10.2019 LAST DOSE 8:00PM 2/13/2019 02/20/2019 AT 0730 PM   MPACID 2.04 3.64* 1.41   MPAG 50.8 43.4 60.7         Again, thank you for allowing me to participate in the care of your patient.        Sincerely,        Jori Wolfe MD

## 2023-10-04 NOTE — NURSING NOTE
Chief Complaint   Patient presents with    RECHECK       /80   Pulse 88   Temp 97.8  F (36.6  C) (Oral)   Wt 89.1 kg (196 lb 8 oz)   SpO2 94%   BMI 30.78 kg/m      Sekou Benson on 10/4/2023 at 10:44 AM

## 2023-10-04 NOTE — LETTER
10/4/2023      RE: Amadou Lewis  [address]  [place] [address]       TRANSPLANT NEPHROLOGY CHRONIC POST TRANSPLANT VISIT    Assessment & Plan  # DDKT (SPK): Stable   - Baseline Creatinine:  ~ 1.0-1.2   - Proteinuria: Not checked recently   - Date DSA Last Checked: Jun/2023      Latest DSA: No cPRA: 22%   - BK Viremia: No   - Kidney Tx Biopsy: No    # Pancreas Tx (SPK):    - Pancreatic Exocrine Drainage: Enteric drained     - Blood glucose: Euglycemia      On insulin: No   - HbA1c: Not checked recently      Latest HbA1c: ??%   - Pancreatic enzymes: Stable   - Date DSA Last Checked: Jun/2023  Latest DSA: No   - Pancreas Tx Biopsy: No    # Immunosuppression: Tacrolimus immediate release (goal 5-8) and Mycophenolate mofetil (dose 750 mg every 12 hours)   - Continue with intensive monitoring of immunosuppression for efficacy and toxicity.   - Changes: No    # Infection Prophylaxis:   Last CD4 Level: Not checked.  - PJP: None    # Blood Pressure: Controlled;  Goal BP: < 130/80   - Changes: Not at this time; Recommend weight loss.    # Post-Transplant Erythrocytosis: Hgb: Stable;  On ACEI/ARB: Yes   Imaging: Yes - 11/2019     - Will reimage bilateral native kidneys.    # Mineral Bone Disorder:   - Vitamin D; level: Not checked recently        On supplement: No  - Calcium; level: Normal        On supplement: No    # Electrolytes:   - Potassium; level: Normal        On supplement: No  - Bicarbonate; level: Stable low        On supplement: Yes    # Obesity, Class I (BMI = 30.8): Weight is up ~ 20 lbs.   - Recommend weight loss for overall health by increasing exercise and watching caloric intake.    # Skin Cancer Risk:    - Discussed sun protection and recommend regular follow up with Dermatology.    # Medical Compliance: Yes    # Health Maintenance and Vaccination Review: Not Reviewed    # Transplant History:  Etiology of Kidney Failure: Polycystic kidney disease (PKD) and Diabetes mellitus, type 1  Tx:  "SPK  Transplant: 11/12/2018 (Kidney / Pancreas), 10/10/2013 (Kidney)  Significant changes in immunosuppression: None  Significant transplant-related complications: None    Transplant Office Phone Number: [phone]    Assessment and plan was discussed with the patient and he voiced his understanding and agreement.    Return visit: Return in about 1 year (around 10/4/2024).    Jori Wolfe MD    Chief Complaint  Mr. Lewis is a 57 year old here for kidney transplant, pancreas transplant and immunosuppression management.    History of Present Illness   Mr. Lewis reports feeling good overall with some medical complaints.  Since last clinic visit, patient reports no hospitalizations or new medical complaints and has been doing well overall.  His energy level is good and remains normal.  He is active and does get some exercise, mostly with walking.  Denies any chest pain or shortness of breath with exertion.  No leg swelling.    Appetite is \"too good\" and he has gained about 20 lbs.  He is trying to eat healthier lately.  No nausea, vomiting or diarrhea.  Rare heartburn symptoms and hasn't required any medications for this.  He will still get chronic abdominal/back pain due to his polycystic kidneys and is on narcotic pain medications for this.  No fever, sweats or chills.  Occasional, mild night sweats, which is stable to lessened.    Home BP:  120/60-70s    Problem List  Patient Active Problem List   Diagnosis     Type II diabetes mellitus with renal manifestations (H)     Diabetes mellitus with background retinopathy (H)     Polycystic kidney     CAD (coronary artery disease)     Retinopathy     Hyperlipidemia LDL goal <70     Premature ventricular contractions (PVCs) (VPCs)     Kidney replaced by transplant     Immunosuppressed status (H24)     Kidney transplant rejection     Aftercare following organ transplant     Esophageal ulcer     Status post simultaneous kidney and pancreas transplant (H)     " Pancreas replaced by transplant (H)     Vitamin D deficiency     Post-transplant erythrocytosis     Metabolic acidosis       Allergies  Allergies   Allergen Reactions     No Known Allergies        Medications  Current Outpatient Medications   Medication Sig     HYDROcodone-acetaminophen (NORCO) 5-325 MG tablet Take 1 tablet by mouth every 6 hours as needed for pain (Back/kidney pain)     aspirin 81 MG EC tablet Take 1 tablet (81 mg) by mouth daily     blood glucose monitoring (NO BRAND SPECIFIED) test strip Use to test blood sugar 4 times daily or as directed.     lisinopril (ZESTRIL) 2.5 MG tablet Take 1 tablet (2.5 mg) by mouth At Bedtime     mycophenolate (GENERIC EQUIVALENT) 250 MG capsule Take 3 capsules (750 mg) by mouth 2 times daily     sodium bicarbonate 650 MG tablet Take 1 tablet (650 mg) by mouth 2 times daily     tacrolimus (GENERIC EQUIVALENT) 0.5 MG capsule Take 1 capsule (0.5 mg) by mouth 2 times daily Total dose = 1.5mg twice daily     tacrolimus (GENERIC EQUIVALENT) 1 MG capsule TAKE 1 CAPSULE BY MOUTH TWICE  DAILY WITH 0.5MG CAPSULE FOR A  TOTAL DOSE OF 1.5MG TWICE DAILY     No current facility-administered medications for this visit.     Medications Discontinued During This Encounter   Medication Reason     mycophenolic acid (GENERIC EQUIVALENT) 180 MG EC tablet      HYDROcodone-acetaminophen (NORCO) 5-325 MG tablet        Physical Exam  Vital Signs: /80   Pulse 88   Temp 97.8  F (36.6  C) (Oral)   Wt 89.1 kg (196 lb 8 oz)   SpO2 94%   BMI 30.78 kg/m      GENERAL APPEARANCE: alert and no distress  HENT: mouth without ulcers or lesions  RESP: lungs clear to auscultation - no rales, rhonchi or wheezes  CV: regular rhythm, normal rate, no rub, no murmur  EDEMA: trace LE edema bilaterally  ABDOMEN: soft, nondistended, nontender, bowel sounds normal, overweight  MS: extremities normal - no gross deformities noted, no evidence of inflammation in joints, no muscle tenderness  SKIN: no  rash  TX KIDNEY: normal  DIALYSIS ACCESS:  LUE AV fistula with fair thrill    Data        Latest Ref Rng & Units 10/4/2023    10:21 AM 8/4/2023     2:52 PM 7/24/2023     2:46 PM   Renal   Sodium 135 - 145 mmol/L 138  137  142    K 3.4 - 5.3 mmol/L 4.9  4.6  5.3    Cl 98 - 107 mmol/L 106  108  106    Cl (external) 98 - 107 mmol/L 106  108  106    CO2 22 - 29 mmol/L 24  19  21    Urea Nitrogen 8.0 - 23.0 mg/dL 17.3  20.9  20.8    Creatinine 0.67 - 1.17 mg/dL 0.93  0.92  1.06    Glucose 70 - 99 mg/dL 71  83  74    Calcium 8.6 - 10.0 mg/dL 9.4  9.6  11.0          Latest Ref Rng & Units 7/28/2020    10:17 AM 3/7/2019     7:33 AM 2/14/2019     7:28 AM   Bone Health   Phosphorus 2.5 - 4.5 mg/dL   4.1    Parathyroid Hormone Intact 18 - 80 pg/mL  104     Vit D Def 20 - 75 ug/L 32  36           Latest Ref Rng & Units 10/4/2023    10:21 AM 7/24/2023     2:46 PM 6/2/2023     2:21 PM   Heme   WBC 4.0 - 11.0 10e3/uL 5.6  8.0  6.2    Hgb 13.3 - 17.7 g/dL 17.8  17.8  16.7    Plt 150 - 450 10e3/uL 192  199  213          Latest Ref Rng & Units 11/11/2018     9:55 AM 6/27/2018     1:57 PM 8/3/2017     2:40 PM   Liver   AP 40 - 150 U/L 189      TBili 0.2 - 1.3 mg/dL 0.3      ALT 0 - 70 U/L 14      AST 0 - 45 U/L 9      Tot Protein 6.8 - 8.8 g/dL 7.6      Albumin 3.4 - 5.0 g/dL 4.0  4.2  4.2          Latest Ref Rng & Units 10/4/2023    10:21 AM 7/24/2023     2:46 PM 6/2/2023     2:21 PM   Pancreas   Amylase 28 - 100 U/L 61  68  53    Lipase (Roche) 13 - 60 U/L 26  34  25          Latest Ref Rng & Units 9/11/2018     2:02 PM 8/3/2017     2:40 PM 11/18/2016     2:48 PM   Iron studies   Iron 35 - 180 ug/dL 85  79  82    Iron Saturation Index 15 - 46 % 37  34  34    Ferritin 26 - 388 ng/mL 761  736           Latest Ref Rng & Units 7/5/2021     3:25 PM 5/20/2021     6:19 AM 11/2/2020     2:47 PM   [facility] Txp Virology   BK Spec  Plasma  Plasma  Plasma    BK Res BKNEG^BK Virus DNA Not Detected copies/mL BK Virus DNA Not Detected  BK Virus DNA Not  Detected  BK Virus DNA Not Detected    BK Log <2.7 Log copies/mL Not Calculated  Not Calculated  Not Calculated        Recent Labs   Lab Test 03/13/23  0956 06/02/23  1421 07/24/23  1446 10/04/23  1021   DOSTAC 3/12/2023 6/2/2023  --  10/3/2023   TACROL 7.1 7.5 6.3 10.8     Recent Labs   Lab Test 02/11/19  0740 02/14/19  0728 02/21/19  0749   DOSMPA LAST DOSE 8:00PM AT 2.10.2019 LAST DOSE 8:00PM 2/13/2019 02/20/2019 AT 0730 PM   MPACID 2.04 3.64* 1.41   MPAG 50.8 43.4 60.7       Jori Wolfe MD

## 2023-10-05 ENCOUNTER — TELEPHONE (OUTPATIENT)
Dept: TRANSPLANT | Facility: CLINIC | Age: 58
End: 2023-10-05
Payer: COMMERCIAL

## 2023-10-05 DIAGNOSIS — Z48.298 AFTERCARE FOLLOWING ORGAN TRANSPLANT: Primary | ICD-10-CM

## 2023-10-05 NOTE — TELEPHONE ENCOUNTER
ISSUE  Tac level 10.8, goal 5-8, dose 1.5 mg BID.      PLAN  Call Sean:  Confirm current Tac dose 1.5 mg BID.  Assess if this was a good 12 hour trough?  Any new meds? Diarrhea?  If current dose and good trough DECREASE dose to 1.5 mg in the AM and 1.0 mg in the PM and repeat a level 1-2 weeks after dose change.  If not a good trough then continue current dose and repeat a level in the the next week taking care to get a good 12 hour trough.    LPN task:  Please call with above plan. Update labs and Rx if needed.  Thanks!

## 2023-10-06 NOTE — TELEPHONE ENCOUNTER
[employer] message sent to patient regarding:  Tac level 10.8, goal 5-8, dose 1.5 mg BID.        PLAN  Call Sean:  Confirm current Tac dose 1.5 mg BID.  Assess if this was a good 12 hour trough?  Any new meds? Diarrhea?  If current dose and good trough DECREASE dose to 1.5 mg in the AM and 1.0 mg in the PM and repeat a level 1-2 weeks after dose change.  If not a good trough then continue current dose and repeat a level in the the next week taking care to get a good 12 hour trough.

## 2023-10-09 ENCOUNTER — TELEPHONE (OUTPATIENT)
Dept: TRANSPLANT | Facility: CLINIC | Age: 58
End: 2023-10-09
Payer: COMMERCIAL

## 2023-10-09 DIAGNOSIS — Z94.0 KIDNEY TRANSPLANTED: Primary | ICD-10-CM

## 2023-10-09 NOTE — TELEPHONE ENCOUNTER
Lab order placed for UPC.  [employer] message sent to Sean giving him the phone number to call to schedule ultrasound.       Jori Wolfe MD Buboltz, Brittany J RN  Please check the following labs: UPC.    I also order bilateral native kidney ultrasound to rule out renal mass, but I don't see it scheduled.    Gallo

## 2023-10-09 NOTE — TELEPHONE ENCOUNTER
Call placed to patient. Patient confirms current dose and inaccurate trough level. Denies any recent illness, diarrhea or medication changes. Note that he took medication prior to his lab draw. Patient v\u to continue current dose and repeat level in 1-2 weeks. Order sent

## 2023-10-16 ENCOUNTER — TELEPHONE (OUTPATIENT)
Dept: TRANSPLANT | Facility: CLINIC | Age: 58
End: 2023-10-16
Payer: COMMERCIAL

## 2023-10-16 NOTE — TELEPHONE ENCOUNTER
Called pt to remind him to please schedule abdominal ultrasound. Pt states he forgot to do so but does have the number to schedule and will call today or tomorrow to arrange it. Let him know the number for scheduling is also in his [employer] message if needed. Pt appreciated call. Will follow to ensure scheduled.

## 2023-10-22 PROBLEM — E87.20 METABOLIC ACIDOSIS: Status: ACTIVE | Noted: 2023-10-22

## 2023-10-22 PROBLEM — G89.29 OTHER CHRONIC PAIN: Chronic | Status: RESOLVED | Noted: 2018-11-13 | Resolved: 2023-10-22

## 2023-10-22 NOTE — PROGRESS NOTES
TRANSPLANT NEPHROLOGY CHRONIC POST TRANSPLANT VISIT    Assessment & Plan   # DDKT (K): Stable   - Baseline Creatinine:  ~ 1.0-1.2   - Proteinuria: Not checked recently   - Date DSA Last Checked: Jun/2023      Latest DSA: No cPRA: 22%   - BK Viremia: No   - Kidney Tx Biopsy: No    # Pancreas Tx (SPK):    - Pancreatic Exocrine Drainage: Enteric drained     - Blood glucose: Euglycemia      On insulin: No   - HbA1c: Not checked recently      Latest HbA1c: ??%   - Pancreatic enzymes: Stable   - Date DSA Last Checked: Jun/2023  Latest DSA: No   - Pancreas Tx Biopsy: No    # Immunosuppression: Tacrolimus immediate release (goal 5-8) and Mycophenolate mofetil (dose 750 mg every 12 hours)   - Continue with intensive monitoring of immunosuppression for efficacy and toxicity.   - Changes: No    # Infection Prophylaxis:   Last CD4 Level: Not checked.  - PJP: None    # Blood Pressure: Controlled;  Goal BP: < 130/80   - Changes: Not at this time; Recommend weight loss.    # Post-Transplant Erythrocytosis: Hgb: Stable;  On ACEI/ARB: Yes   Imaging: Yes - 11/2019     - Will reimage bilateral native kidneys.    # Mineral Bone Disorder:   - Vitamin D; level: Not checked recently        On supplement: No  - Calcium; level: Normal        On supplement: No    # Electrolytes:   - Potassium; level: Normal        On supplement: No  - Bicarbonate; level: Stable low        On supplement: Yes    # Obesity, Class I (BMI = 30.8): Weight is up ~ 20 lbs.   - Recommend weight loss for overall health by increasing exercise and watching caloric intake.    # Skin Cancer Risk:    - Discussed sun protection and recommend regular follow up with Dermatology.    # Medical Compliance: Yes    # Health Maintenance and Vaccination Review: Not Reviewed    # Transplant History:  Etiology of Kidney Failure: Polycystic kidney disease (PKD) and Diabetes mellitus, type 1  Tx: SPK  Transplant: 11/12/2018 (Kidney / Pancreas), 10/10/2013 (Kidney)  Significant changes  "in immunosuppression: None  Significant transplant-related complications: None    Transplant Office Phone Number: [phone]    Assessment and plan was discussed with the patient and he voiced his understanding and agreement.    Return visit: Return in about 1 year (around 10/4/2024).    Jori Wolfe MD    Chief Complaint   Mr. Lewis is a 57 year old here for kidney transplant, pancreas transplant and immunosuppression management.    History of Present Illness    Mr. Lewis reports feeling good overall with some medical complaints.  Since last clinic visit, patient reports no hospitalizations or new medical complaints and has been doing well overall.  His energy level is good and remains normal.  He is active and does get some exercise, mostly with walking.  Denies any chest pain or shortness of breath with exertion.  No leg swelling.    Appetite is \"too good\" and he has gained about 20 lbs.  He is trying to eat healthier lately.  No nausea, vomiting or diarrhea.  Rare heartburn symptoms and hasn't required any medications for this.  He will still get chronic abdominal/back pain due to his polycystic kidneys and is on narcotic pain medications for this.  No fever, sweats or chills.  Occasional, mild night sweats, which is stable to lessened.    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)    Diabetes mellitus with background retinopathy (H)    Polycystic kidney    CAD (coronary artery disease)    Retinopathy    Hyperlipidemia LDL goal <70    Premature ventricular contractions (PVCs) (VPCs)    Kidney replaced by transplant    Immunosuppressed status (H24)    Kidney transplant rejection    Aftercare following organ transplant    Esophageal ulcer    Status post simultaneous kidney and pancreas transplant (H)    Pancreas replaced by transplant (H)    Vitamin D deficiency    Post-transplant erythrocytosis    Metabolic " acidosis       Allergies   Allergies   Allergen Reactions    No Known Allergies        Medications   Current Outpatient Medications   Medication Sig    HYDROcodone-acetaminophen (NORCO) 5-325 MG tablet Take 1 tablet by mouth every 6 hours as needed for pain (Back/kidney pain)    aspirin 81 MG EC tablet Take 1 tablet (81 mg) by mouth daily    blood glucose monitoring (NO BRAND SPECIFIED) test strip Use to test blood sugar 4 times daily or as directed.    lisinopril (ZESTRIL) 2.5 MG tablet Take 1 tablet (2.5 mg) by mouth At Bedtime    mycophenolate (GENERIC EQUIVALENT) 250 MG capsule Take 3 capsules (750 mg) by mouth 2 times daily    sodium bicarbonate 650 MG tablet Take 1 tablet (650 mg) by mouth 2 times daily    tacrolimus (GENERIC EQUIVALENT) 0.5 MG capsule Take 1 capsule (0.5 mg) by mouth 2 times daily Total dose = 1.5mg twice daily    tacrolimus (GENERIC EQUIVALENT) 1 MG capsule TAKE 1 CAPSULE BY MOUTH TWICE  DAILY WITH 0.5MG CAPSULE FOR A  TOTAL DOSE OF 1.5MG TWICE DAILY     No current facility-administered medications for this visit.     Medications Discontinued During This Encounter   Medication Reason    mycophenolic acid (GENERIC EQUIVALENT) 180 MG EC tablet     HYDROcodone-acetaminophen (NORCO) 5-325 MG tablet        Physical Exam   Vital Signs: /80   Pulse 88   Temp 97.8  F (36.6  C) (Oral)   Wt 89.1 kg (196 lb 8 oz)   SpO2 94%   BMI 30.78 kg/m      GENERAL APPEARANCE: alert and no distress  HENT: mouth without ulcers or lesions  RESP: lungs clear to auscultation - no rales, rhonchi or wheezes  CV: regular rhythm, normal rate, no rub, no murmur  EDEMA: trace LE edema bilaterally  ABDOMEN: soft, nondistended, nontender, bowel sounds normal, overweight  MS: extremities normal - no gross deformities noted, no evidence of inflammation in joints, no muscle tenderness  SKIN: no rash  TX KIDNEY: normal  DIALYSIS ACCESS:  LUE AV fistula with fair thrill    Data         Latest Ref Rng & Units 10/4/2023     10:21 AM 8/4/2023     2:52 PM 7/24/2023     2:46 PM   Renal   Sodium 135 - 145 mmol/L 138  137  142    K 3.4 - 5.3 mmol/L 4.9  4.6  5.3    Cl 98 - 107 mmol/L 106  108  106    Cl (external) 98 - 107 mmol/L 106  108  106    CO2 22 - 29 mmol/L 24  19  21    Urea Nitrogen 8.0 - 23.0 mg/dL 17.3  20.9  20.8    Creatinine 0.67 - 1.17 mg/dL 0.93  0.92  1.06    Glucose 70 - 99 mg/dL 71  83  74    Calcium 8.6 - 10.0 mg/dL 9.4  9.6  11.0          Latest Ref Rng & Units 7/28/2020    10:17 AM 3/7/2019     7:33 AM 2/14/2019     7:28 AM   Bone Health   Phosphorus 2.5 - 4.5 mg/dL   4.1    Parathyroid Hormone Intact 18 - 80 pg/mL  104     Vit D Def 20 - 75 ug/L 32  36           Latest Ref Rng & Units 10/4/2023    10:21 AM 7/24/2023     2:46 PM 6/2/2023     2:21 PM   Heme   WBC 4.0 - 11.0 10e3/uL 5.6  8.0  6.2    Hgb 13.3 - 17.7 g/dL 17.8  17.8  16.7    Plt 150 - 450 10e3/uL 192  199  213          Latest Ref Rng & Units 11/11/2018     9:55 AM 6/27/2018     1:57 PM 8/3/2017     2:40 PM   Liver   AP 40 - 150 U/L 189      TBili 0.2 - 1.3 mg/dL 0.3      ALT 0 - 70 U/L 14      AST 0 - 45 U/L 9      Tot Protein 6.8 - 8.8 g/dL 7.6      Albumin 3.4 - 5.0 g/dL 4.0  4.2  4.2          Latest Ref Rng & Units 10/4/2023    10:21 AM 7/24/2023     2:46 PM 6/2/2023     2:21 PM   Pancreas   Amylase 28 - 100 U/L 61  68  53    Lipase (Roche) 13 - 60 U/L 26  34  25          Latest Ref Rng & Units 9/11/2018     2:02 PM 8/3/2017     2:40 PM 11/18/2016     2:48 PM   Iron studies   Iron 35 - 180 ug/dL 85  79  82    Iron Saturation Index 15 - 46 % 37  34  34    Ferritin 26 - 388 ng/mL 761  736           Latest Ref Rng & Units 7/5/2021     3:25 PM 5/20/2021     6:19 AM 11/2/2020     2:47 PM   [facility] Txp Virology   BK Spec  Plasma  Plasma  Plasma    BK Res BKNEG^BK Virus DNA Not Detected copies/mL BK Virus DNA Not Detected  BK Virus DNA Not Detected  BK Virus DNA Not Detected    BK Log <2.7 Log copies/mL Not Calculated  Not Calculated  Not Calculated         Recent Labs   Lab Test 03/13/23  0956 06/02/23  1421 07/24/23  1446 10/04/23  1021   DOSTAC 3/12/2023 6/2/2023  --  10/3/2023   TACROL 7.1 7.5 6.3 10.8     Recent Labs   Lab Test 02/11/19  0740 02/14/19  0728 02/21/19  0749   DOSMPA LAST DOSE 8:00PM AT 2.10.2019 LAST DOSE 8:00PM 2/13/2019 02/20/2019 AT 0730 PM   MPACID 2.04 3.64* 1.41   MPAG 50.8 43.4 60.7

## 2023-10-25 DIAGNOSIS — Z48.298 AFTERCARE FOLLOWING ORGAN TRANSPLANT: ICD-10-CM

## 2023-10-25 DIAGNOSIS — D75.1 POST-TRANSPLANT ERYTHROCYTOSIS: Primary | ICD-10-CM

## 2023-10-25 NOTE — NURSING NOTE
"Chief Complaint   Patient presents with     RECHECK     S/P Kidney/Pancreas TX       Vital signs:  Temp: 98.1  F (36.7  C) Temp src: Oral BP: 111/76 Pulse: 90   Resp: 20 SpO2: 94 %     Height: 170.2 cm (5' 7\") Weight: 82 kg (180 lb 12.8 oz)  Estimated body mass index is 28.32 kg/m  as calculated from the following:    Height as of this encounter: 1.702 m (5' 7\").    Weight as of this encounter: 82 kg (180 lb 12.8 oz).        Harper Li [facility]  7/1/2019 3:08 PM      " Topical Retinoid counseling:  Patient advised to apply a pea-sized amount only at bedtime and wait 30 minutes after washing their face before applying.  If too drying, patient may add a non-comedogenic moisturizer. The patient verbalized understanding of the proper use and possible adverse effects of retinoids.  All of the patient's questions and concerns were addressed.

## 2023-12-20 ENCOUNTER — LAB (OUTPATIENT)
Dept: LAB | Facility: CLINIC | Age: 58
End: 2023-12-20
Payer: COMMERCIAL

## 2023-12-20 DIAGNOSIS — Z94.83 PANCREAS REPLACED BY TRANSPLANT (H): ICD-10-CM

## 2023-12-20 DIAGNOSIS — Z94.0 KIDNEY REPLACED BY TRANSPLANT: ICD-10-CM

## 2023-12-20 LAB
AMYLASE SERPL-CCNC: 73 U/L (ref 28–100)
ANION GAP SERPL CALCULATED.3IONS-SCNC: 9 MMOL/L (ref 7–15)
BUN SERPL-MCNC: 20.1 MG/DL (ref 8–23)
CALCIUM SERPL-MCNC: 10 MG/DL (ref 8.6–10)
CHLORIDE SERPL-SCNC: 105 MMOL/L (ref 98–107)
CREAT SERPL-MCNC: 1.04 MG/DL (ref 0.67–1.17)
DEPRECATED HCO3 PLAS-SCNC: 24 MMOL/L (ref 22–29)
EGFRCR SERPLBLD CKD-EPI 2021: 83 ML/MIN/1.73M2
ERYTHROCYTE [DISTWIDTH] IN BLOOD BY AUTOMATED COUNT: 13.9 % (ref 10–15)
GLUCOSE SERPL-MCNC: 90 MG/DL (ref 70–99)
HCT VFR BLD AUTO: 52.8 % (ref 40–53)
HGB BLD-MCNC: 17.3 G/DL (ref 13.3–17.7)
LIPASE SERPL-CCNC: 32 U/L (ref 13–60)
MCH RBC QN AUTO: 29.2 PG (ref 26.5–33)
MCHC RBC AUTO-ENTMCNC: 32.8 G/DL (ref 31.5–36.5)
MCV RBC AUTO: 89 FL (ref 78–100)
PLATELET # BLD AUTO: 190 10E3/UL (ref 150–450)
POTASSIUM SERPL-SCNC: 5 MMOL/L (ref 3.4–5.3)
RBC # BLD AUTO: 5.92 10E6/UL (ref 4.4–5.9)
SODIUM SERPL-SCNC: 138 MMOL/L (ref 135–145)
WBC # BLD AUTO: 7.4 10E3/UL (ref 4–11)

## 2023-12-20 PROCEDURE — 80048 BASIC METABOLIC PNL TOTAL CA: CPT

## 2023-12-20 PROCEDURE — 36415 COLL VENOUS BLD VENIPUNCTURE: CPT

## 2023-12-20 PROCEDURE — 83690 ASSAY OF LIPASE: CPT

## 2023-12-20 PROCEDURE — 82150 ASSAY OF AMYLASE: CPT

## 2023-12-20 PROCEDURE — 85027 COMPLETE CBC AUTOMATED: CPT

## 2023-12-20 PROCEDURE — 80197 ASSAY OF TACROLIMUS: CPT

## 2023-12-21 LAB
TACROLIMUS BLD-MCNC: 8.1 UG/L (ref 5–15)
TME LAST DOSE: NORMAL H
TME LAST DOSE: NORMAL H

## 2023-12-29 DIAGNOSIS — Z94.0 KIDNEY TRANSPLANTED: Primary | ICD-10-CM

## 2023-12-29 RX ORDER — MYCOPHENOLATE MOFETIL 250 MG/1
750 CAPSULE ORAL 2 TIMES DAILY
Qty: 180 CAPSULE | Refills: 11 | Status: SHIPPED | OUTPATIENT
Start: 2023-12-29 | End: 2024-01-26

## 2024-01-11 DIAGNOSIS — Z94.83 PANCREAS REPLACED BY TRANSPLANT (H): ICD-10-CM

## 2024-01-11 DIAGNOSIS — Z94.83 PANCREAS TRANSPLANTED (H): ICD-10-CM

## 2024-01-11 DIAGNOSIS — Z94.0 KIDNEY TRANSPLANTED: ICD-10-CM

## 2024-01-11 DIAGNOSIS — Z94.0 KIDNEY REPLACED BY TRANSPLANT: Primary | ICD-10-CM

## 2024-01-11 RX ORDER — TACROLIMUS 1 MG/1
1 CAPSULE ORAL 2 TIMES DAILY
Qty: 60 CAPSULE | Refills: 11 | Status: SHIPPED | OUTPATIENT
Start: 2024-01-11

## 2024-01-13 ENCOUNTER — HEALTH MAINTENANCE LETTER (OUTPATIENT)
Age: 59
End: 2024-01-13

## 2024-01-17 NOTE — OP NOTE
Transplant Surgery  Operative Note     Procedure Date:  18    Preoperative Diagnosis:  End Stage renal failure due to diabetic nephropathy and diabetes mellitus type 2    Postoperative Diagnosis:  Same,     Procedure:  1. Right Kidney  - Brain Death Kidney  Re-, Left iliac fossa, with venous reconstruction. A J-J stent was placed.   2. Kidney allograft preparation on Back Table   3. Open appendectomy    4. Whole Pancreas  - Brain Death Pancreas Transplant   5. Pancreas allograft preparation on Back Table  6. Umbilical hernia repair     Surgeon:  Surgeon(s) and Role:     * Monique Luevano MD - Primary     * Rodolfo Man MD - Assisting     * Eldon Henry MD - Assisting     * Florence Lu MD - Resident - Assisting    Fellow/Assistant:  Rodolfo Man, fellow, Eldon Henry fellow, Florence Lu, resident     Anesthesia:  General    Specimen:  Umbilical hernia sac    Drains:  Josiah drain    Urine Output:  670 mls    Estimated Blood Loss:  200    Fluids Administered:    Fluid Amount   Crystalloid (mL) 2800   Colloid (mL) 500         Intraoperative Events: None    Complications: None.    Findings: Umbilical hernia, right iliac fossa           Indication: The patient has Type 2 diabetes with End Stage kidney failure. The patient received an organ offer for a  - Brain Death kidney and  - Brain Death pancreas. After discussing the risks and benefits of proceeding, the patient agreed to proceed with surgery and provided informed consent.    Final ABO/Crossmatch Verification: After the donor organ arrived to the operating room and prior to anastomosis, I participated in the transplant pre-verification upon organ receipt timeout by visually verifying the donor ID, organ and laterality, donor blood type, recipient unique identifier, recipient blood type, and that the donor and recipient are blood type compatible. The crossmatch was done prospectively; and  the T cell crossmatch result was negative and B cell Flow crossmatch result was negative. The patient received Thymoglobulin, Cellcept and Solumedrol on induction.    Pancreas Donor Organ Information:   Donor UNOS ID: [id number]  Donor ABO: B  Donor Arterial Clamp on: 11/12/2018  5:09 AM  Vessels with organ: Yes    Ischemic time:  Total:  375  Cold: 343  Warm: 32     Pancreas Back Table Details:   Procedure:  Bench preparation of the pancreas allograft for transplantation with arterial reconstruction    Preoperative Diagnosis:  End stage renal failure due to diabetes mellitus type 2    Postoperative Diagnosis:  Same,    Surgeon:  Surgeon(s) and Role:     * Monique Luevano MD - Primary     * Rodolfo Man MD - Assisting     * Eldon Henry MD - Assisting     * Florence Lu MD - Resident - Assisting    Faculty Co-Surgeon:      Fellow/Assistant:  Rodolfo Man fellow, Eldon Henry fellow Flornece Lu, resident      Anesthesia:  None    Graft Injury:  No    Donor Arrival to Recipient Room:  11/12/2018  7:50 AM    Portal Venous Extension:  No    Donor Iliac Vessel Quality:  Normal     Findings:     Pancreas Back Table Preparation: The donor pancreas was received and inspected. It was not flushed. We removed the spleen by doubly ligating vessels between the tail of the pancreas and the spleen. The peripancreatic fat was ligated with 3-0 silk ties and removed. The proximal duodenum staple line was inverted and oversewn using running 4-0 Prolene suture. The bile duct and GDA were re-secured. The previously stapled root of the mesentery was oversewn using 4-0 Prolene forward and back. The third and fourth portions of the duodenum were freed away from the pancreas by ligating and dividing the intervening tissue with a series of 3-0 and 4-0 silk ties. Ganglionectomy was performed with 3-0 and 4-0 silk ties.    Y-Graft to SA and SMA . The pancreas was transferred to a new bowl with fresh iced  cold preservation solution. Faculty was present for key portions of the procedure.    Operative Procedure:   Arterial Anastomosis Start:  11/12/2018 10:52 AM    Recipient Arterial Unclamp:  11/12/2018 11:24 AM    Extra Vessels Used:  no    Extra Vessels Banked:  Yes     Kidney Donor Organ Information:    Donor UNOS ID: [id number]  Donor ABO: B  Donor Arterial Clamp on: 11/12/2018  5:09 AM  Vessels with organ: Yes    Ischemic time:  Total:  497  Cold: 451  Warm: 46     Kidney Back Table Details:    Preoperative Procedure:  Bench preparation of the kidney allograft for transplantation with venous caval conduit     Diagnosis:  Type 2 Diabetes    Postoperative Diagnosis:  Same,     Surgeon:  Surgeon(s) and Role:     * Pearl Luevano MD - Primary     * Rodolfo Man MD - Assisting     * Eldon Henry MD - Assisting     * Florence Lu MD - Resident - Assisting    Faculty Co-Surgeon:  PEARL LUEVANO    Fellow/Assistant:  Rodolfo Man fellow, Eldon Henry fellow resident Natalia    Anesthesia:  None    Donor Arrival to Recipient Room:  11/12/2018  7:50 AM      Graft Injury: No  Graft Biopsy: no      Organ Received On: Ice  Pump Resistance:    Pump Flow:     Ureteral Anatomy: Single  Arterial Anatomy: Double  Venous Anatomy: Single      Any Reconstruction:  No    Artery:   no    Vein:   Vacal conduit      Findings:     Kidney Back Table Preparation: The donor kidney was received and inspected. It was not flushed . The graft was prepared on the back table by removing perinephric fat and ligating venous tributaries and lymphatics. The ureter was also cleaned of excess tissue. If required, reconstruction was performed as detailed above. The kidney was stored in iced cold preservation solution until ready for transplantation. Faculty was present for the critical portions of the procedure.    Operative Procedure:   Arterial Anastomosis Start:  11/12/2018 12:40 PM    Recipient Arterial  Unclamp:  11/12/2018  1:26 PM    Extra Vessels Used:  NO    Extra Vessels Banked:  Yes       The patient was brought to the operating room, placed in a supine position, and a time out was performed. Sequential compression devices were placed on both lower extremities and general endotracheal anesthesia was induced. The patient was given IV antibiotics, Thymoglobulin, Cellcept and Solumedrol, and a Ramirez catheter. A central line and arterial lines were placed by Anesthesia service. The abdomen was then shaved, prepped, and draped in the usual sterile fashion. We performed a lower midline incision, divided the linea alba and opened the peritoneum sharply under direct vision. Retractors were placed.    Pancreas Transplant: We mobilized the right colon and the ureter medially and proceeded to circumferentially dissect the right iliac vessels. The internal iliac vein was not ligated and divided. We obtained vascular control, performed a venotomy, and performed an anastomosis between the donor portal vein and the recipient's right Common Iliac. We then obtained proximal and distal control of the right common iliac artery . Arteriotomy and irrigation ensued, and the donor Y graft was anastomosed to the common iliac artery  in an end to side fashion. After both anastomoses were completed, we opened the clamps. The pancreas consistency and reperfusion quality was Pink throughout, the graft duodenum was Pink throughout. There was no pancreatitis. Overall the graft was rated [place] grade A. The exocrine secretions of the pancreas were drained via DJS loop employing  a side to side hand sewn 2-layered. The pancreas placement was Intra-Peritoneal. Faculty was present for key portions of the procedure.    Kidney Transplant: The patient was not heparinized. We applied atraumatic vascular clamps and the donor kidney was brought to the operative field. We made a venotomy and the caval conduit was anastomosed to the recipient left  Common Iliac vein in an end-to-side fashion. An arteriotomy was made and the donor renal artery was anastomosed to the recipient left in an end to side fashion. The patient was simultaneously loaded with IV mannitol, Lasix and volume. The renal artery was protected and the clamps were removed. After several cardiac cycles, we opened the renal artery and the kidney had Good reperfusion and was firm and pink .    The transplant ureter was managed by creating a Liche (anterior multistitch) anastomosis with absorbable suture. A stent was placed across the anastomosis. The kidney made Yes urine prior to implantation.    Hemostasis was obtained, the anastomoses inspected, and the kidney placed in the iliac fossa. After placement, the vessel lay was inspected and found to be acceptable. The kidney position was Intra-peritoneal. The field was irrigated with antibiotic solution. A drain was placed. The retractor was removed and the abdominal wall fascia reapproximated. Subcutaneous tissues were irrigated and hemostasis obtained. The skin was reapproximated with staples and a dry dressing was applied. Faculty was present for key portions of the procedure.    The order of the organ reperfusion was: pancreas then kidney.    The appendix was excised using standard open technique.    All needle, sponge and instrument counts were correct x 2. The patient was awakened, extubated, and transferred to the PACU for post-op monitoring.I was present during the key portions of the procedure, and I was immediately available for the entire procedure.             Assistance OOB with selected safe patient handling equipment/Assistance with ambulation/Communicate Fall Risk and Risk Factors to all staff, patient, and family/Monitor gait and stability/Reinforce activity limits and safety measures with patient and family/Sit up slowly, dangle for a short time, stand at bedside before walking/Visual Cue: Yellow wristband/Bed in lowest position, wheels locked, appropriate side rails in place/Call bell, personal items and telephone in reach/Instruct patient to call for assistance before getting out of bed or chair/Non-slip footwear when patient is out of bed/Pitcher to call system/Physically safe environment - no spills, clutter or unnecessary equipment/Purposeful Proactive Rounding/Room/bathroom lighting operational, light cord in reach

## 2024-01-24 ENCOUNTER — TELEPHONE (OUTPATIENT)
Dept: TRANSPLANT | Facility: CLINIC | Age: 59
End: 2024-01-24
Payer: COMMERCIAL

## 2024-01-24 ASSESSMENT — ENCOUNTER SYMPTOMS: NEW SYMPTOMS OF CORONARY ARTERY DISEASE: 0

## 2024-01-26 ENCOUNTER — LAB (OUTPATIENT)
Dept: LAB | Facility: CLINIC | Age: 59
End: 2024-01-26
Payer: COMMERCIAL

## 2024-01-26 DIAGNOSIS — Z94.0 KIDNEY TRANSPLANTED: ICD-10-CM

## 2024-01-26 DIAGNOSIS — Z94.0 KIDNEY REPLACED BY TRANSPLANT: ICD-10-CM

## 2024-01-26 DIAGNOSIS — Z94.83 PANCREAS REPLACED BY TRANSPLANT (H): ICD-10-CM

## 2024-01-26 LAB
ERYTHROCYTE [DISTWIDTH] IN BLOOD BY AUTOMATED COUNT: 13.6 % (ref 10–15)
HCT VFR BLD AUTO: 52.1 % (ref 40–53)
HGB BLD-MCNC: 16.7 G/DL (ref 13.3–17.7)
MCH RBC QN AUTO: 28.7 PG (ref 26.5–33)
MCHC RBC AUTO-ENTMCNC: 32.1 G/DL (ref 31.5–36.5)
MCV RBC AUTO: 90 FL (ref 78–100)
PLATELET # BLD AUTO: 231 10E3/UL (ref 150–450)
RBC # BLD AUTO: 5.82 10E6/UL (ref 4.4–5.9)
TACROLIMUS BLD-MCNC: 7.2 UG/L (ref 5–15)
TME LAST DOSE: NORMAL H
TME LAST DOSE: NORMAL H
WBC # BLD AUTO: 8.6 10E3/UL (ref 4–11)

## 2024-01-26 PROCEDURE — 82150 ASSAY OF AMYLASE: CPT

## 2024-01-26 PROCEDURE — 80197 ASSAY OF TACROLIMUS: CPT

## 2024-01-26 PROCEDURE — 83690 ASSAY OF LIPASE: CPT

## 2024-01-26 PROCEDURE — 80048 BASIC METABOLIC PNL TOTAL CA: CPT

## 2024-01-26 PROCEDURE — 85027 COMPLETE CBC AUTOMATED: CPT

## 2024-01-26 PROCEDURE — 36415 COLL VENOUS BLD VENIPUNCTURE: CPT

## 2024-01-26 RX ORDER — MYCOPHENOLATE MOFETIL 250 MG/1
750 CAPSULE ORAL 2 TIMES DAILY
Qty: 180 CAPSULE | Refills: 0 | Status: SHIPPED | OUTPATIENT
Start: 2024-01-26 | End: 2024-02-19

## 2024-01-26 NOTE — TELEPHONE ENCOUNTER
Pt calling as he is low on MMF with last dose Tuesday and his [facility] pharmacy does not have any current supply with no known date of stock.     Reviewed with Brandi and script sent to [facility] in [place]. Cost will be $36 for 30 day supply. Coupon sent to pt and he will  supply this weekend. He will call me Monday if any issues.

## 2024-01-27 LAB
AMYLASE SERPL-CCNC: 65 U/L (ref 28–100)
ANION GAP SERPL CALCULATED.3IONS-SCNC: 8 MMOL/L (ref 7–15)
BUN SERPL-MCNC: 13.3 MG/DL (ref 8–23)
CALCIUM SERPL-MCNC: 9.9 MG/DL (ref 8.8–10.2)
CHLORIDE SERPL-SCNC: 107 MMOL/L (ref 98–107)
CREAT SERPL-MCNC: 0.95 MG/DL (ref 0.67–1.17)
DEPRECATED HCO3 PLAS-SCNC: 23 MMOL/L (ref 22–29)
EGFRCR SERPLBLD CKD-EPI 2021: >90 ML/MIN/1.73M2
GLUCOSE SERPL-MCNC: 91 MG/DL (ref 70–99)
LIPASE SERPL-CCNC: 29 U/L (ref 13–60)
POTASSIUM SERPL-SCNC: 5 MMOL/L (ref 3.4–5.3)
SODIUM SERPL-SCNC: 138 MMOL/L (ref 135–145)

## 2024-02-19 DIAGNOSIS — Z94.0 KIDNEY TRANSPLANTED: ICD-10-CM

## 2024-02-19 RX ORDER — MYCOPHENOLATE MOFETIL 250 MG/1
750 CAPSULE ORAL 2 TIMES DAILY
Qty: 180 CAPSULE | Refills: 0 | Status: SHIPPED | OUTPATIENT
Start: 2024-02-19 | End: 2024-03-14

## 2024-03-14 DIAGNOSIS — Z94.0 KIDNEY TRANSPLANTED: ICD-10-CM

## 2024-03-14 RX ORDER — MYCOPHENOLATE MOFETIL 250 MG/1
750 CAPSULE ORAL 2 TIMES DAILY
Qty: 180 CAPSULE | Refills: 11 | Status: SHIPPED | OUTPATIENT
Start: 2024-03-14

## 2024-03-23 ENCOUNTER — HEALTH MAINTENANCE LETTER (OUTPATIENT)
Age: 59
End: 2024-03-23

## 2024-04-11 DIAGNOSIS — Z94.83 PANCREAS REPLACED BY TRANSPLANT (H): Primary | ICD-10-CM

## 2024-04-11 DIAGNOSIS — Z98.890 OTHER SPECIFIED POSTPROCEDURAL STATES: ICD-10-CM

## 2024-04-11 DIAGNOSIS — Z94.0 KIDNEY REPLACED BY TRANSPLANT: ICD-10-CM

## 2024-05-17 ENCOUNTER — LAB (OUTPATIENT)
Dept: LAB | Facility: CLINIC | Age: 59
End: 2024-05-17
Payer: COMMERCIAL

## 2024-05-17 DIAGNOSIS — Z98.890 OTHER SPECIFIED POSTPROCEDURAL STATES: ICD-10-CM

## 2024-05-17 DIAGNOSIS — Z94.83 PANCREAS REPLACED BY TRANSPLANT (H): ICD-10-CM

## 2024-05-17 DIAGNOSIS — Z94.0 KIDNEY REPLACED BY TRANSPLANT: ICD-10-CM

## 2024-05-17 LAB
ERYTHROCYTE [DISTWIDTH] IN BLOOD BY AUTOMATED COUNT: 13.2 % (ref 10–15)
HCT VFR BLD AUTO: 47.2 % (ref 40–53)
HGB BLD-MCNC: 16.1 G/DL (ref 13.3–17.7)
MCH RBC QN AUTO: 30 PG (ref 26.5–33)
MCHC RBC AUTO-ENTMCNC: 34.1 G/DL (ref 31.5–36.5)
MCV RBC AUTO: 88 FL (ref 78–100)
PLATELET # BLD AUTO: 198 10E3/UL (ref 150–450)
RBC # BLD AUTO: 5.37 10E6/UL (ref 4.4–5.9)
TACROLIMUS BLD-MCNC: 5.6 UG/L (ref 5–15)
TME LAST DOSE: NORMAL H
TME LAST DOSE: NORMAL H
WBC # BLD AUTO: 8.8 10E3/UL (ref 4–11)

## 2024-05-17 PROCEDURE — 80048 BASIC METABOLIC PNL TOTAL CA: CPT

## 2024-05-17 PROCEDURE — 85027 COMPLETE CBC AUTOMATED: CPT

## 2024-05-17 PROCEDURE — 36415 COLL VENOUS BLD VENIPUNCTURE: CPT

## 2024-05-17 PROCEDURE — 83690 ASSAY OF LIPASE: CPT

## 2024-05-17 PROCEDURE — 80197 ASSAY OF TACROLIMUS: CPT

## 2024-05-17 PROCEDURE — 82150 ASSAY OF AMYLASE: CPT

## 2024-05-18 LAB
AMYLASE SERPL-CCNC: 57 U/L (ref 28–100)
ANION GAP SERPL CALCULATED.3IONS-SCNC: 11 MMOL/L (ref 7–15)
BUN SERPL-MCNC: 20.3 MG/DL (ref 8–23)
CALCIUM SERPL-MCNC: 9.9 MG/DL (ref 8.8–10.2)
CHLORIDE SERPL-SCNC: 111 MMOL/L (ref 98–107)
CREAT SERPL-MCNC: 1.05 MG/DL (ref 0.67–1.17)
DEPRECATED HCO3 PLAS-SCNC: 20 MMOL/L (ref 22–29)
EGFRCR SERPLBLD CKD-EPI 2021: 81 ML/MIN/1.73M2
GLUCOSE SERPL-MCNC: 87 MG/DL (ref 70–99)
LIPASE SERPL-CCNC: 27 U/L (ref 13–60)
POTASSIUM SERPL-SCNC: 4.8 MMOL/L (ref 3.4–5.3)
SODIUM SERPL-SCNC: 142 MMOL/L (ref 135–145)

## 2024-08-02 ENCOUNTER — LAB (OUTPATIENT)
Dept: LAB | Facility: CLINIC | Age: 59
End: 2024-08-02
Payer: COMMERCIAL

## 2024-08-02 DIAGNOSIS — Z94.0 KIDNEY REPLACED BY TRANSPLANT: ICD-10-CM

## 2024-08-02 DIAGNOSIS — Z98.890 OTHER SPECIFIED POSTPROCEDURAL STATES: ICD-10-CM

## 2024-08-02 DIAGNOSIS — Z94.83 PANCREAS REPLACED BY TRANSPLANT (H): ICD-10-CM

## 2024-08-02 LAB
AMYLASE SERPL-CCNC: 60 U/L (ref 28–100)
ANION GAP SERPL CALCULATED.3IONS-SCNC: 10 MMOL/L (ref 7–15)
BUN SERPL-MCNC: 21.5 MG/DL (ref 8–23)
CALCIUM SERPL-MCNC: 9.8 MG/DL (ref 8.8–10.4)
CHLORIDE SERPL-SCNC: 108 MMOL/L (ref 98–107)
CREAT SERPL-MCNC: 0.97 MG/DL (ref 0.67–1.17)
EGFRCR SERPLBLD CKD-EPI 2021: 89 ML/MIN/1.73M2
ERYTHROCYTE [DISTWIDTH] IN BLOOD BY AUTOMATED COUNT: 13.7 % (ref 10–15)
GLUCOSE SERPL-MCNC: 83 MG/DL (ref 70–99)
HCO3 SERPL-SCNC: 22 MMOL/L (ref 22–29)
HCT VFR BLD AUTO: 49.7 % (ref 40–53)
HGB BLD-MCNC: 16.5 G/DL (ref 13.3–17.7)
LIPASE SERPL-CCNC: 32 U/L (ref 13–60)
MCH RBC QN AUTO: 29.4 PG (ref 26.5–33)
MCHC RBC AUTO-ENTMCNC: 33.2 G/DL (ref 31.5–36.5)
MCV RBC AUTO: 88 FL (ref 78–100)
PLATELET # BLD AUTO: 202 10E3/UL (ref 150–450)
POTASSIUM SERPL-SCNC: 4.5 MMOL/L (ref 3.4–5.3)
RBC # BLD AUTO: 5.62 10E6/UL (ref 4.4–5.9)
SODIUM SERPL-SCNC: 140 MMOL/L (ref 135–145)
WBC # BLD AUTO: 6.6 10E3/UL (ref 4–11)

## 2024-08-02 PROCEDURE — 80197 ASSAY OF TACROLIMUS: CPT

## 2024-08-02 PROCEDURE — 83690 ASSAY OF LIPASE: CPT

## 2024-08-02 PROCEDURE — 36415 COLL VENOUS BLD VENIPUNCTURE: CPT

## 2024-08-02 PROCEDURE — 80048 BASIC METABOLIC PNL TOTAL CA: CPT

## 2024-08-02 PROCEDURE — 82150 ASSAY OF AMYLASE: CPT

## 2024-08-02 PROCEDURE — 85027 COMPLETE CBC AUTOMATED: CPT

## 2024-08-03 LAB
TACROLIMUS BLD-MCNC: 6.9 UG/L (ref 5–15)
TME LAST DOSE: NORMAL H
TME LAST DOSE: NORMAL H

## 2024-08-05 DIAGNOSIS — Z94.83 PANCREAS REPLACED BY TRANSPLANT (H): Primary | ICD-10-CM

## 2024-08-05 DIAGNOSIS — Z94.0 KIDNEY REPLACED BY TRANSPLANT: ICD-10-CM

## 2024-08-10 ENCOUNTER — HEALTH MAINTENANCE LETTER (OUTPATIENT)
Age: 59
End: 2024-08-10

## 2024-09-20 ENCOUNTER — LAB (OUTPATIENT)
Dept: LAB | Facility: CLINIC | Age: 59
End: 2024-09-20
Payer: COMMERCIAL

## 2024-09-20 DIAGNOSIS — Z94.0 KIDNEY REPLACED BY TRANSPLANT: ICD-10-CM

## 2024-09-20 DIAGNOSIS — Z94.83 PANCREAS REPLACED BY TRANSPLANT (H): ICD-10-CM

## 2024-09-20 DIAGNOSIS — Z98.890 OTHER SPECIFIED POSTPROCEDURAL STATES: ICD-10-CM

## 2024-09-20 LAB
AMYLASE SERPL-CCNC: 59 U/L (ref 28–100)
ANION GAP SERPL CALCULATED.3IONS-SCNC: 10 MMOL/L (ref 7–15)
BUN SERPL-MCNC: 20.6 MG/DL (ref 8–23)
CALCIUM SERPL-MCNC: 10.1 MG/DL (ref 8.8–10.4)
CHLORIDE SERPL-SCNC: 106 MMOL/L (ref 98–107)
CREAT SERPL-MCNC: 1.16 MG/DL (ref 0.67–1.17)
EGFRCR SERPLBLD CKD-EPI 2021: 72 ML/MIN/1.73M2
ERYTHROCYTE [DISTWIDTH] IN BLOOD BY AUTOMATED COUNT: 14.2 % (ref 10–15)
GLUCOSE SERPL-MCNC: 81 MG/DL (ref 70–99)
HCO3 SERPL-SCNC: 22 MMOL/L (ref 22–29)
HCT VFR BLD AUTO: 50.4 % (ref 40–53)
HGB BLD-MCNC: 17 G/DL (ref 13.3–17.7)
LIPASE SERPL-CCNC: 29 U/L (ref 13–60)
MCH RBC QN AUTO: 29.8 PG (ref 26.5–33)
MCHC RBC AUTO-ENTMCNC: 33.7 G/DL (ref 31.5–36.5)
MCV RBC AUTO: 88 FL (ref 78–100)
PLATELET # BLD AUTO: 196 10E3/UL (ref 150–450)
POTASSIUM SERPL-SCNC: 4.7 MMOL/L (ref 3.4–5.3)
RBC # BLD AUTO: 5.7 10E6/UL (ref 4.4–5.9)
SODIUM SERPL-SCNC: 138 MMOL/L (ref 135–145)
TACROLIMUS BLD-MCNC: 7.8 UG/L (ref 5–15)
TME LAST DOSE: NORMAL H
TME LAST DOSE: NORMAL H
WBC # BLD AUTO: 8.9 10E3/UL (ref 4–11)

## 2024-09-20 PROCEDURE — 36415 COLL VENOUS BLD VENIPUNCTURE: CPT

## 2024-09-20 PROCEDURE — 83690 ASSAY OF LIPASE: CPT

## 2024-09-20 PROCEDURE — 82150 ASSAY OF AMYLASE: CPT

## 2024-09-20 PROCEDURE — 85027 COMPLETE CBC AUTOMATED: CPT

## 2024-09-20 PROCEDURE — 80048 BASIC METABOLIC PNL TOTAL CA: CPT

## 2024-09-20 PROCEDURE — 80197 ASSAY OF TACROLIMUS: CPT

## 2024-09-26 ENCOUNTER — OFFICE VISIT (OUTPATIENT)
Dept: OPTOMETRY | Facility: CLINIC | Age: 59
End: 2024-09-26
Payer: COMMERCIAL

## 2024-09-26 DIAGNOSIS — E11.3553 STABLE PROLIFERATIVE DIABETIC RETINOPATHY OF BOTH EYES ASSOCIATED WITH TYPE 2 DIABETES MELLITUS (H): Primary | ICD-10-CM

## 2024-09-26 RX ORDER — CALCITRIOL 0.25 UG/1
1 CAPSULE, LIQUID FILLED ORAL DAILY
COMMUNITY

## 2024-09-26 ASSESSMENT — REFRACTION_WEARINGRX
SPECS_TYPE: PAL
OD_CYLINDER: +1.00
OD_SPHERE: -1.75
OS_CYLINDER: +1.25
OS_SPHERE: -0.50
OS_AXIS: 157
OS_ADD: +3.00
OD_ADD: +3.00
OD_AXIS: 022

## 2024-09-26 ASSESSMENT — CONF VISUAL FIELD
OD_INFERIOR_TEMPORAL_RESTRICTION: 3
OS_SUPERIOR_TEMPORAL_RESTRICTION: 3
OD_INFERIOR_NASAL_RESTRICTION: 1
OS_INFERIOR_TEMPORAL_RESTRICTION: 1
OD_SUPERIOR_TEMPORAL_RESTRICTION: 3
OS_INFERIOR_NASAL_RESTRICTION: 3
OD_SUPERIOR_NASAL_RESTRICTION: 1
OS_SUPERIOR_NASAL_RESTRICTION: 3

## 2024-09-26 ASSESSMENT — TONOMETRY
OS_IOP_MMHG: 20
IOP_METHOD: ICARE
OD_IOP_MMHG: 20

## 2024-09-26 ASSESSMENT — VISUAL ACUITY
CORRECTION_TYPE: GLASSES
METHOD: SNELLEN - LINEAR
OS_CC: 20/40
OS_CC+: -2
OD_CC+: +2
OD_CC: 20/40

## 2024-09-26 NOTE — NURSING NOTE
Chief Complaints and History of Present Illnesses   Patient presents with    Low Vision     Pt here for a low vision evaluation.      Chief Complaint(s) and History of Present Illness(es)       Low Vision              Comments: Pt here for a low vision evaluation.               Comments    Pt last seen with [facility] 05/30/2024 and has hx of proliferative diabetic retinopathy of both eyes associated with type 2 diabetes mellitus (s/p PRP each eye), ERM each eye, Vitreomacular adhesion of both eyes, Vitreous syneresis of both eyes, Peripheral reticular degeneration of both eyes, and is Pseudophakic each eye.     Pt notes peripheral vision is what he struggles with the most. Pt states his DL was revoked after his last appt with [facility] and he would like to get it back if possible. He had two VF done with [facility] as well.    A1c- 5.7 03/15/2024- pulled from labs.     Lab Results       Component                Value               Date                       A1C                      5.5                 01/31/2022                 A1C                      5.5                 05/20/2021                 A1C                      5.5                 11/02/2020                 A1C                      5.3                 05/18/2020                 A1C                      5.4                 12/16/2019                 A1C                      5.3                 05/09/2019              CYRIL Chapman on 9/26/2024 at 7:34 AM

## 2024-09-26 NOTE — PROGRESS NOTES
Assessment/Plan  (E11.5780) Stable proliferative diabetic retinopathy of both eyes associated with type 2 diabetes mellitus (H)  (primary encounter diagnosis)  Comment: Visual field testing and interpretation today.   Plan: [facility]        Discussed findings with patient. Advised patient that he is well short of 105 degrees of continuous visual field needed for [place] '[place] license. Advised patient that field expanding aids are not currently allowed to pass DMV test, but that if this changes could try repeating test with prisms in place as a last resort. Since patient does not currently struggle with clarity or mobility, no new glasses Rx is warranted today. Patient is welcome to contact clinic with additional concerns or questions in the future. Recommend continuing care with [facility] as before.           30 minutes were spent on the date of the encounter doing chart review, history and exam, documentation, and further activities as noted above.    Complete documentation of historical and exam elements from today's encounter can  be found in the full encounter summary report (not reduplicated in this progress  note). I personally obtained the chief complaint(s) and history of present illness. I  confirmed and edited as necessary the review of systems, past medical/surgical  history, family history, social history, and examination findings as documented by  others; and I examined the patient myself. I personally reviewed the relevant tests,  images, and reports as documented above. I formulated and edited as necessary the  assessment and plan and discussed the findings and management plan with the  patient and family.    Gio Ramirez OD

## 2024-11-11 DIAGNOSIS — Z94.0 KIDNEY TRANSPLANTED: ICD-10-CM

## 2024-11-11 DIAGNOSIS — Z94.83 PANCREAS REPLACED BY TRANSPLANT (H): ICD-10-CM

## 2024-11-11 DIAGNOSIS — Z94.83 PANCREAS TRANSPLANTED (H): ICD-10-CM

## 2024-11-11 DIAGNOSIS — Z94.0 KIDNEY REPLACED BY TRANSPLANT: ICD-10-CM

## 2024-11-11 RX ORDER — TACROLIMUS 1 MG/1
1 CAPSULE ORAL 2 TIMES DAILY
Qty: 60 CAPSULE | Refills: 11 | Status: SHIPPED | OUTPATIENT
Start: 2024-11-11

## 2025-02-03 NOTE — PROGRESS NOTES
TRANSPLANT NEPHROLOGY CLINIC VISIT     Assessment & Plan   # DDKT (SPK): CKD Stage 2 - Stable   - Baseline Creatinine: ~ 1-1.2   - Proteinuria: Not checked recently   - DSA Hx: No DSA   - Last cPRA: 22%   - BK Viremia: No   - Kidney Tx Biopsy Hx: No biopsy history.    # Pancreas Tx (SPK):    - Pancreatic Exocrine Drainage: Enteric drained     - Blood glucose: Euglycemia      On insulin: No   - HbA1c: Stable, low      Latest HbA1c: 5.7% in March, 2024.   - Pancreatic enzymes: Stable   - DSA Hx: No DSA   - Pancreas Tx Biopsy Hx: No biopsy history    # Immunosuppression: Tacrolimus immediate release (goal 5-8) and Mycophenolate mofetil (dose 750 mg every 12 hours)   - Induction with Recent Transplant:  High Intensity Protocol   - Continue with intensive monitoring of immunosuppression for efficacy and toxicity.   - Historical Changes in Immunosuppression: None   - Changes: No    # Infection Prevention:  - PJP: None        - CMV IgG Ab High Risk Discordance (D+/R-): No  CMV Serostatus: Positive  - EBV IgG Ab High Risk Discordance (D+/R-): No  EBV Serostatus: Positive    # Blood Pressure: Controlled;  Goal BP: < 130/80   - Lisinopril 2.5 mg every day.   - Changes: No    # Post-Transplant Erythrocytosis: Hgb: Stable;  On ACEI/ARB: Yes   Imaging: Yes - innumerable cyst in his native kidney but no masses (kidney US in 2019)    # Mineral Bone Disorder:    - Vitamin D; level: Not checked recently        On supplement: No  - Calcium; level: Normal        On supplement: No    # Electrolytes:   - Potassium; level: Normal        On supplement: No  - Bicarbonate; level: Normal        On supplement: Yes sodium bicarbonate 650 mg twice a day    # Obesity, Class I (BMI = 30.8): Recommend weight loss for overall health by increasing exercise and watching caloric intake.    # Other Significant PMH:   - Midline abdominal hernia: Seems localized to the superior aspect of the incision.  I will refer him to see transplant surgery.   - CAD  s/p stent (2013): at the [facility] as part of the work-up for the kidney transplant. Last nuclear stress test in 2018 was normal.   - He reports exertional shortness of breath and some chest discomfort at the end of exertion.  I will refer him to cardiology for possible repeat stress test.     # Skin Cancer Risk: Discussed sun protection and recommend regular follow up with Dermatology.    # Transplant History:  Etiology of Kidney Failure: Polycystic kidney disease (PKD) and type 1 diabetes mellitus  Tx: SPK  Transplant: 11/12/2018 (Kidney / Pancreas), 10/10/2013 (Kidney)  Significant transplant-related complications: None    Transplant Office Phone Number: [phone]    Assessment and plan was discussed with the patient and he voiced his understanding and agreement.    Return visit: Return in about 1 year (around 2/4/2026).    Zachariah Elliott MD    The longitudinal plan of care for the diagnosis(es)/condition(s) as documented were addressed during this visit. Due to the added complexity in care, I will continue to support Sean in the subsequent management and with ongoing continuity of care.      Chief Complaint   Mr. Lewis is a 59 year old here for kidney transplant, pancreas transplant, and immunosuppression management.     History of Present Illness   Mr. Lewis reports feeling good overall.    Since last clinic visit:   Hospitalizations: No   New Medical Issues: Yes, he reports exertional shortness of breath  and sprinting to catch the bus.  He has happened a couple of times in the last 6 months, no symptoms prior to this.  It improves with rest is associated with chest discomfort at the end, but improves probably with rest.  This does not happen when he walks 3 flights of stairs at home.  However, he is not too active over the winter.  Additionally, he noticed an anterior abdominal hernia that he had to push to couple times.     Activity: camping during the summer, walks the dog in the park.   Lower  extremity swelling: No  Weight change: No, stable at 200 lbs, but increased since the transplant.   Nausea and vomiting: No  Diarrhea: No  Heartburn symptoms: No  Fever, sweats or chills: No  Night sweats: No  Urinary complaints: No    Home BP:  110-120/70s      Problem List   Patient Active Problem List   Diagnosis    Type II diabetes mellitus with renal manifestations (H)    Diabetes mellitus with background retinopathy (H)    Polycystic kidney    CAD (coronary artery disease)    Retinopathy    Hyperlipidemia LDL goal <70    Premature ventricular contractions (PVCs) (VPCs)    Kidney replaced by transplant    Immunosuppressed status    Kidney transplant rejection    Aftercare following organ transplant    Esophageal ulcer    Status post simultaneous kidney and pancreas transplant (H)    Pancreas replaced by transplant (H)    Vitamin D deficiency    Post-transplant erythrocytosis    Metabolic acidosis    Anemia due to chronic kidney disease    Chronic kidney disease    Congenital cystic kidney disease    Cough    Generalized ischemic myocardial dysfunction    Hematemesis    Impotence of organic origin    Neuropathy, peripheral    Other specified health status       Allergies   Allergies   Allergen Reactions    No Known Allergies        Medications   Current Outpatient Medications   Medication Sig Dispense Refill    aspirin 81 MG EC tablet Take 1 tablet (81 mg) by mouth daily 30 tablet 5    blood glucose monitoring (NO BRAND SPECIFIED) test strip Use to test blood sugar 4 times daily or as directed. 1 Box prn    calcitRIOL (ROCALTROL) 0.25 MCG capsule Take 1 capsule by mouth daily.      HYDROcodone-acetaminophen (NORCO) 5-325 MG tablet Take 1 tablet by mouth every 6 hours as needed for pain (Back/kidney pain)  0    lisinopril (ZESTRIL) 2.5 MG tablet Take 1 tablet (2.5 mg) by mouth At Bedtime 90 tablet 3    mycophenolate (GENERIC EQUIVALENT) 250 MG capsule Take 3 capsules (750 mg) by mouth 2 times daily 180 capsule 11  "   sodium bicarbonate 650 MG tablet Take 1 tablet (650 mg) by mouth 2 times daily 180 tablet 3    tacrolimus (GENERIC EQUIVALENT) 0.5 MG capsule Take 1 capsule (0.5 mg) by mouth 2 times daily Total dose = 1.5mg twice daily 60 capsule 11    tacrolimus (GENERIC EQUIVALENT) 1 MG capsule Take 1 capsule (1 mg) by mouth 2 times daily. 60 capsule 11     No current facility-administered medications for this visit.     There are no discontinued medications.    Physical Exam   Vital Signs: /71 (BP Location: Right arm, Patient Position: Sitting, Cuff Size: Adult Regular)   Pulse 91   Temp 98.5  F (36.9  C) (Oral)   Ht 1.702 m (5' 7\")   Wt 89.9 kg (198 lb 3.2 oz)   SpO2 95%   BMI 31.04 kg/m      GENERAL APPEARANCE: alert and no distress  HENT: mouth without ulcers or lesions  RESP: lungs clear to auscultation - no rales, rhonchi or wheezes  CV: regular rhythm, normal rate, no rub, no murmur  EDEMA: no LE edema bilaterally  ABDOMEN: soft, nondistended, nontender, bowel sounds normal.  Anterior abdominal hernia currently reduced.  MS: extremities normal - no gross deformities noted, no evidence of inflammation in joints, no muscle tenderness  SKIN: no rash  TX KIDNEY: normal  DIALYSIS ACCESS:  LUE AV fistula with bruit and thrill    Data         Latest Ref Rng & Units 1/3/2025     2:30 PM 9/20/2024     2:23 PM 8/2/2024     2:19 PM   Renal   Sodium 135 - 145 mmol/L 139  138  140    K 3.4 - 5.3 mmol/L 4.7  4.7  4.5    Cl 98 - 107 mmol/L 106  106  108    Cl (external) 98 - 107 mmol/L 106  106  108    CO2 22 - 29 mmol/L 23  22  22    Urea Nitrogen 8.0 - 23.0 mg/dL 22.1  20.6  21.5    Creatinine 0.67 - 1.17 mg/dL 1.03  1.16  0.97    Glucose 70 - 99 mg/dL 89  81  83    Calcium 8.8 - 10.4 mg/dL 9.6  10.1  9.8          Latest Ref Rng & Units 7/28/2020    10:17 AM 3/7/2019     7:33 AM 2/14/2019     7:28 AM   Bone Health   Phosphorus 2.5 - 4.5 mg/dL   4.1    Parathyroid Hormone Intact 18 - 80 pg/mL  104     Vit D Def 20 - 75 " ug/L 32  36           Latest Ref Rng & Units 1/3/2025     2:30 PM 9/20/2024     2:23 PM 8/2/2024     2:19 PM   Heme   WBC 4.0 - 11.0 10e3/uL 8.2  8.9  6.6    Hgb 13.3 - 17.7 g/dL 16.3  17.0  16.5    Plt 150 - 450 10e3/uL 191  196  202          Latest Ref Rng & Units 11/11/2018     9:55 AM 6/27/2018     1:57 PM 8/3/2017     2:40 PM   Liver   AP 40 - 150 U/L 189      TBili 0.2 - 1.3 mg/dL 0.3      ALT 0 - 70 U/L 14      AST 0 - 45 U/L 9      Tot Protein 6.8 - 8.8 g/dL 7.6      Albumin 3.4 - 5.0 g/dL 4.0  4.2  4.2          Latest Ref Rng & Units 1/3/2025     2:30 PM 9/20/2024     2:23 PM 8/2/2024     2:19 PM   Pancreas   Amylase 28 - 100 U/L 68  59  60    Lipase (Roche) 13 - 60 U/L 38  29  32          Latest Ref Rng & Units 9/11/2018     2:02 PM 8/3/2017     2:40 PM 11/18/2016     2:48 PM   Iron studies   Iron 35 - 180 ug/dL 85  79  82    Iron Saturation Index 15 - 46 % 37  34  34    Ferritin 26 - 388 ng/mL 761  736           Latest Ref Rng & Units 7/5/2021     3:25 PM 5/20/2021     6:19 AM 11/2/2020     2:47 PM   [facility] Txp Virology   BK Spec  Plasma  Plasma  Plasma    BK Res BKNEG^BK Virus DNA Not Detected copies/mL BK Virus DNA Not Detected  BK Virus DNA Not Detected  BK Virus DNA Not Detected    BK Log <2.7 Log copies/mL Not Calculated  Not Calculated  Not Calculated      Failed to redirect to the Timeline version of the REVFS SmartLink.  Recent Labs   Lab Test 08/02/24  1419 09/20/24  1423 01/03/25  1430   DOSTAC 8/2/2024 9/20/2024 1/3/2025   TACROL 6.9 7.8 6.3     Recent Labs   Lab Test 02/11/19  0740 02/14/19  0728 02/21/19  0749   DOSMPA LAST DOSE 8:00PM AT 2.10.2019 LAST DOSE 8:00PM 2/13/2019 02/20/2019 AT 0730 PM   MPACID 2.04 3.64* 1.41   MPAG 50.8 43.4 60.7

## 2025-02-04 ENCOUNTER — OFFICE VISIT (OUTPATIENT)
Dept: TRANSPLANT | Facility: CLINIC | Age: 60
End: 2025-02-04
Attending: INTERNAL MEDICINE
Payer: COMMERCIAL

## 2025-02-04 VITALS
TEMPERATURE: 98.5 F | HEIGHT: 67 IN | OXYGEN SATURATION: 95 % | HEART RATE: 91 BPM | DIASTOLIC BLOOD PRESSURE: 71 MMHG | WEIGHT: 198.2 LBS | BODY MASS INDEX: 31.11 KG/M2 | SYSTOLIC BLOOD PRESSURE: 109 MMHG

## 2025-02-04 DIAGNOSIS — Z94.83 PANCREAS REPLACED BY TRANSPLANT (H): ICD-10-CM

## 2025-02-04 DIAGNOSIS — K43.9 VENTRAL HERNIA WITHOUT OBSTRUCTION OR GANGRENE: ICD-10-CM

## 2025-02-04 DIAGNOSIS — I25.10 CORONARY ARTERY DISEASE INVOLVING NATIVE HEART WITHOUT ANGINA PECTORIS, UNSPECIFIED VESSEL OR LESION TYPE: ICD-10-CM

## 2025-02-04 DIAGNOSIS — N18.2 CKD (CHRONIC KIDNEY DISEASE) STAGE 2, GFR 60-89 ML/MIN: ICD-10-CM

## 2025-02-04 DIAGNOSIS — R06.02 EXERTIONAL SHORTNESS OF BREATH: Primary | ICD-10-CM

## 2025-02-04 DIAGNOSIS — Z48.298 AFTERCARE FOLLOWING ORGAN TRANSPLANT: ICD-10-CM

## 2025-02-04 DIAGNOSIS — Z94.0 KIDNEY REPLACED BY TRANSPLANT: ICD-10-CM

## 2025-02-04 DIAGNOSIS — D84.9 IMMUNOSUPPRESSED STATUS: ICD-10-CM

## 2025-02-04 PROCEDURE — 99213 OFFICE O/P EST LOW 20 MIN: CPT | Performed by: STUDENT IN AN ORGANIZED HEALTH CARE EDUCATION/TRAINING PROGRAM

## 2025-02-04 ASSESSMENT — PAIN SCALES - GENERAL: PAINLEVEL_OUTOF10: NO PAIN (0)

## 2025-02-04 NOTE — PATIENT INSTRUCTIONS
Patient Recommendations:  - We will refer you to see a heart doctor given your exertional shortness of breath.  - We will refer you to see a transplant surgeon for your hernia.    Transplant Patient Information  Your Post Transplant Coordinator is: Felicity Dobbins  For non urgent items, we encourage you to contact your coordinator/care team online via [employer]  You and your care team can also contact your transplant coordinator Monday - Friday, 8am - 5pm at [phone] (Option 2 to reach the coordinator or Option 4 to schedule an appointment).  After hours for urgent matters, please call [facility] at [phone].

## 2025-02-04 NOTE — NURSING NOTE
"Chief Complaint   Patient presents with    RECHECK     Follow up.        Vitals:    02/04/25 1436   BP: 109/71   BP Location: Right arm   Patient Position: Sitting   Cuff Size: Adult Regular   Pulse: 91   Temp: 98.5  F (36.9  C)   TempSrc: Oral   SpO2: 95%   Weight: 89.9 kg (198 lb 3.2 oz)   Height: 1.702 m (5' 7\")       BP Readings from Last 3 Encounters:   02/04/25 109/71   10/04/23 133/80   01/04/21 120/70       /71 (BP Location: Right arm, Patient Position: Sitting, Cuff Size: Adult Regular)   Pulse 91   Temp 98.5  F (36.9  C) (Oral)   Ht 1.702 m (5' 7\")   Wt 89.9 kg (198 lb 3.2 oz)   SpO2 95%   BMI 31.04 kg/m       Supriya Aaron    "

## 2025-02-04 NOTE — LETTER
2/4/2025      Amadou Lewis  [address]  [place] [address]      Dear Colleague,    Thank you for referring your patient, Amadou Lewis, to the [facility]. Please see a copy of my visit note below.    TRANSPLANT NEPHROLOGY CLINIC VISIT     Assessment & Plan  # DDKT (SPK): CKD Stage 2 - Stable   - Baseline Creatinine: ~ 1-1.2   - Proteinuria: Not checked recently   - DSA Hx: No DSA   - Last cPRA: 22%   - BK Viremia: No   - Kidney Tx Biopsy Hx: No biopsy history.    # Pancreas Tx (SPK):    - Pancreatic Exocrine Drainage: Enteric drained     - Blood glucose: Euglycemia      On insulin: No   - HbA1c: Stable, low      Latest HbA1c: 5.7% in March, 2024.   - Pancreatic enzymes: Stable   - DSA Hx: No DSA   - Pancreas Tx Biopsy Hx: No biopsy history    # Immunosuppression: Tacrolimus immediate release (goal 5-8) and Mycophenolate mofetil (dose 750 mg every 12 hours)   - Induction with Recent Transplant:  High Intensity Protocol   - Continue with intensive monitoring of immunosuppression for efficacy and toxicity.   - Historical Changes in Immunosuppression: None   - Changes: No    # Infection Prevention:  - PJP: None        - CMV IgG Ab High Risk Discordance (D+/R-): No  CMV Serostatus: Positive  - EBV IgG Ab High Risk Discordance (D+/R-): No  EBV Serostatus: Positive    # Blood Pressure: Controlled;  Goal BP: < 130/80   - Lisinopril 2.5 mg every day.   - Changes: No    # Post-Transplant Erythrocytosis: Hgb: Stable;  On ACEI/ARB: Yes   Imaging: Yes - innumerable cyst in his native kidney but no masses (kidney US in 2019)    # Mineral Bone Disorder:    - Vitamin D; level: Not checked recently        On supplement: No  - Calcium; level: Normal        On supplement: No    # Electrolytes:   - Potassium; level: Normal        On supplement: No  - Bicarbonate; level: Normal        On supplement: Yes sodium bicarbonate 650 mg twice a day    # Obesity, Class I (BMI = 30.8): Recommend weight  loss for overall health by increasing exercise and watching caloric intake.    # Other Significant PMH:   - Midline abdominal hernia: Seems localized to the superior aspect of the incision.  I will refer him to see transplant surgery.   - CAD s/p stent (2013): at the [facility] as part of the work-up for the kidney transplant. Last nuclear stress test in 2018 was normal.   - He reports exertional shortness of breath and some chest discomfort at the end of exertion.  I will refer him to cardiology for possible repeat stress test.     # Skin Cancer Risk: Discussed sun protection and recommend regular follow up with Dermatology.    # Transplant History:  Etiology of Kidney Failure: Polycystic kidney disease (PKD) and type 1 diabetes mellitus  Tx: SPK  Transplant: 11/12/2018 (Kidney / Pancreas), 10/10/2013 (Kidney)  Significant transplant-related complications: None    Transplant Office Phone Number: [phone]    Assessment and plan was discussed with the patient and he voiced his understanding and agreement.    Return visit: Return in about 1 year (around 2/4/2026).    Zachariah Elliott MD    The longitudinal plan of care for the diagnosis(es)/condition(s) as documented were addressed during this visit. Due to the added complexity in care, I will continue to support Sean in the subsequent management and with ongoing continuity of care.      Chief Complaint  Mr. Lewis is a 59 year old here for kidney transplant, pancreas transplant, and immunosuppression management.     History of Present Illness  Mr. Lewis reports feeling good overall.    Since last clinic visit:   Hospitalizations: No   New Medical Issues: Yes, he reports exertional shortness of breath  and sprinting to catch the bus.  He has happened a couple of times in the last 6 months, no symptoms prior to this.  It improves with rest is associated with chest discomfort at the end, but improves probably with rest.  This does not happen when he walks 3  flights of stairs at home.  However, he is not too active over the winter.  Additionally, he noticed an anterior abdominal hernia that he had to push to couple times.     Activity: camping during the summer, walks the dog in the park.   Lower extremity swelling: No  Weight change: No, stable at 200 lbs, but increased since the transplant.   Nausea and vomiting: No  Diarrhea: No  Heartburn symptoms: No  Fever, sweats or chills: No  Night sweats: No  Urinary complaints: No    Home BP:  110-120/70s      Problem List  Patient Active Problem List   Diagnosis     Type II diabetes mellitus with renal manifestations (H)     Diabetes mellitus with background retinopathy (H)     Polycystic kidney     CAD (coronary artery disease)     Retinopathy     Hyperlipidemia LDL goal <70     Premature ventricular contractions (PVCs) (VPCs)     Kidney replaced by transplant     Immunosuppressed status     Kidney transplant rejection     Aftercare following organ transplant     Esophageal ulcer     Status post simultaneous kidney and pancreas transplant (H)     Pancreas replaced by transplant (H)     Vitamin D deficiency     Post-transplant erythrocytosis     Metabolic acidosis     Anemia due to chronic kidney disease     Chronic kidney disease     Congenital cystic kidney disease     Cough     Generalized ischemic myocardial dysfunction     Hematemesis     Impotence of organic origin     Neuropathy, peripheral     Other specified health status       Allergies  Allergies   Allergen Reactions     No Known Allergies        Medications  Current Outpatient Medications   Medication Sig Dispense Refill     aspirin 81 MG EC tablet Take 1 tablet (81 mg) by mouth daily 30 tablet 5     blood glucose monitoring (NO BRAND SPECIFIED) test strip Use to test blood sugar 4 times daily or as directed. 1 Box prn     calcitRIOL (ROCALTROL) 0.25 MCG capsule Take 1 capsule by mouth daily.       HYDROcodone-acetaminophen (NORCO) 5-325 MG tablet Take 1 tablet  "by mouth every 6 hours as needed for pain (Back/kidney pain)  0     lisinopril (ZESTRIL) 2.5 MG tablet Take 1 tablet (2.5 mg) by mouth At Bedtime 90 tablet 3     mycophenolate (GENERIC EQUIVALENT) 250 MG capsule Take 3 capsules (750 mg) by mouth 2 times daily 180 capsule 11     sodium bicarbonate 650 MG tablet Take 1 tablet (650 mg) by mouth 2 times daily 180 tablet 3     tacrolimus (GENERIC EQUIVALENT) 0.5 MG capsule Take 1 capsule (0.5 mg) by mouth 2 times daily Total dose = 1.5mg twice daily 60 capsule 11     tacrolimus (GENERIC EQUIVALENT) 1 MG capsule Take 1 capsule (1 mg) by mouth 2 times daily. 60 capsule 11     No current facility-administered medications for this visit.     There are no discontinued medications.    Physical Exam  Vital Signs: /71 (BP Location: Right arm, Patient Position: Sitting, Cuff Size: Adult Regular)   Pulse 91   Temp 98.5  F (36.9  C) (Oral)   Ht 1.702 m (5' 7\")   Wt 89.9 kg (198 lb 3.2 oz)   SpO2 95%   BMI 31.04 kg/m      GENERAL APPEARANCE: alert and no distress  HENT: mouth without ulcers or lesions  RESP: lungs clear to auscultation - no rales, rhonchi or wheezes  CV: regular rhythm, normal rate, no rub, no murmur  EDEMA: no LE edema bilaterally  ABDOMEN: soft, nondistended, nontender, bowel sounds normal.  Anterior abdominal hernia currently reduced.  MS: extremities normal - no gross deformities noted, no evidence of inflammation in joints, no muscle tenderness  SKIN: no rash  TX KIDNEY: normal  DIALYSIS ACCESS:  LUE AV fistula with bruit and thrill    Data        Latest Ref Rng & Units 1/3/2025     2:30 PM 9/20/2024     2:23 PM 8/2/2024     2:19 PM   Renal   Sodium 135 - 145 mmol/L 139  138  140    K 3.4 - 5.3 mmol/L 4.7  4.7  4.5    Cl 98 - 107 mmol/L 106  106  108    Cl (external) 98 - 107 mmol/L 106  106  108    CO2 22 - 29 mmol/L 23  22  22    Urea Nitrogen 8.0 - 23.0 mg/dL 22.1  20.6  21.5    Creatinine 0.67 - 1.17 mg/dL 1.03  1.16  0.97    Glucose 70 - 99 " mg/dL 89  81  83    Calcium 8.8 - 10.4 mg/dL 9.6  10.1  9.8          Latest Ref Rng & Units 7/28/2020    10:17 AM 3/7/2019     7:33 AM 2/14/2019     7:28 AM   Bone Health   Phosphorus 2.5 - 4.5 mg/dL   4.1    Parathyroid Hormone Intact 18 - 80 pg/mL  104     Vit D Def 20 - 75 ug/L 32  36           Latest Ref Rng & Units 1/3/2025     2:30 PM 9/20/2024     2:23 PM 8/2/2024     2:19 PM   Heme   WBC 4.0 - 11.0 10e3/uL 8.2  8.9  6.6    Hgb 13.3 - 17.7 g/dL 16.3  17.0  16.5    Plt 150 - 450 10e3/uL 191  196  202          Latest Ref Rng & Units 11/11/2018     9:55 AM 6/27/2018     1:57 PM 8/3/2017     2:40 PM   Liver   AP 40 - 150 U/L 189      TBili 0.2 - 1.3 mg/dL 0.3      ALT 0 - 70 U/L 14      AST 0 - 45 U/L 9      Tot Protein 6.8 - 8.8 g/dL 7.6      Albumin 3.4 - 5.0 g/dL 4.0  4.2  4.2          Latest Ref Rng & Units 1/3/2025     2:30 PM 9/20/2024     2:23 PM 8/2/2024     2:19 PM   Pancreas   Amylase 28 - 100 U/L 68  59  60    Lipase (Roche) 13 - 60 U/L 38  29  32          Latest Ref Rng & Units 9/11/2018     2:02 PM 8/3/2017     2:40 PM 11/18/2016     2:48 PM   Iron studies   Iron 35 - 180 ug/dL 85  79  82    Iron Saturation Index 15 - 46 % 37  34  34    Ferritin 26 - 388 ng/mL 761  736           Latest Ref Rng & Units 7/5/2021     3:25 PM 5/20/2021     6:19 AM 11/2/2020     2:47 PM   [facility] Txp Virology   BK Spec  Plasma  Plasma  Plasma    BK Res BKNEG^BK Virus DNA Not Detected copies/mL BK Virus DNA Not Detected  BK Virus DNA Not Detected  BK Virus DNA Not Detected    BK Log <2.7 Log copies/mL Not Calculated  Not Calculated  Not Calculated      Failed to redirect to the Timeline version of the REVFS SmartLink.  Recent Labs   Lab Test 08/02/24  1419 09/20/24  1423 01/03/25  1430   DOSTAC 8/2/2024 9/20/2024 1/3/2025   TACROL 6.9 7.8 6.3     Recent Labs   Lab Test 02/11/19  0740 02/14/19  0728 02/21/19  0749   DOSMPA LAST DOSE 8:00PM AT 2.10.2019 LAST DOSE 8:00PM 2/13/2019 02/20/2019 AT 0730 PM   MPACID 2.04 3.64* 1.41    MPAG 50.8 43.4 60.7       Again, thank you for allowing me to participate in the care of your patient.        Sincerely,        Zachariah Elliott MD    Electronically signed

## 2025-02-12 ENCOUNTER — TELEPHONE (OUTPATIENT)
Dept: TRANSPLANT | Facility: CLINIC | Age: 60
End: 2025-02-12
Payer: COMMERCIAL

## 2025-02-18 ENCOUNTER — TELEPHONE (OUTPATIENT)
Dept: TRANSPLANT | Facility: CLINIC | Age: 60
End: 2025-02-18
Payer: COMMERCIAL

## 2025-02-19 ENCOUNTER — TELEPHONE (OUTPATIENT)
Dept: TRANSPLANT | Facility: CLINIC | Age: 60
End: 2025-02-19
Payer: COMMERCIAL

## 2025-02-19 NOTE — TELEPHONE ENCOUNTER
Noted. If pt reaches out regarding hernia consultation, will provide contact for tx surgery coordinators.

## 2025-02-19 NOTE — TELEPHONE ENCOUNTER
----- Message from Ivy CUI sent at 2/19/2025 12:31 PM CST -----  Regarding: RE: Transplant surgery referral  Just an update. We have left 2 voicemails for Sean and have not received a call back. If you hear from him can you give him our direct number so we can get his consult scheduled?    Ivy: [phone]  Kilo: [phone]    Ivy  ----- Message -----  From: Felicity Dobbins, RN  Sent: 2/9/2025   9:24 PM CST  To: Kilo Moore, RN; Ivy Paris RN  Subject: FW: Transplant surgery referral                  Hi Ivy/Kilo!     Is this appropriate to route to you guys for transplant surgery evaluation visit for ventral and incisional hernia repair? If so, can you guys arrange consult?    If not appropriate to route please let me know.    Thanks so much!  Felicity  ----- Message -----  From: Zachariah Andersen MD  Sent: 2/4/2025   3:12 PM CST  To: Felicity Dobbins, RN  Subject: Transplant surgery referral                      Betty Blevins,    Can you please enter a referral for transplant surgery evaluation for a ventral hernia/incisional hernia.  I have also sent him to see cardiology as he has had exertional shortness of breath and some chest discomfort.    Thank you,  Zachariah

## 2025-02-22 ENCOUNTER — HEALTH MAINTENANCE LETTER (OUTPATIENT)
Age: 60
End: 2025-02-22

## 2025-03-03 ENCOUNTER — TELEPHONE (OUTPATIENT)
Dept: TRANSPLANT | Facility: CLINIC | Age: 60
End: 2025-03-03
Payer: COMMERCIAL

## 2025-03-17 DIAGNOSIS — Z94.0 KIDNEY TRANSPLANTED: ICD-10-CM

## 2025-03-17 RX ORDER — MYCOPHENOLATE MOFETIL 250 MG/1
750 CAPSULE ORAL 2 TIMES DAILY
Qty: 180 CAPSULE | Refills: 11 | Status: SHIPPED | OUTPATIENT
Start: 2025-03-17

## 2025-04-16 ENCOUNTER — TELEPHONE (OUTPATIENT)
Dept: TRANSPLANT | Facility: CLINIC | Age: 60
End: 2025-04-16
Payer: COMMERCIAL

## 2025-04-16 DIAGNOSIS — Z86.39 HISTORY OF DIABETES MELLITUS: ICD-10-CM

## 2025-04-16 DIAGNOSIS — Z94.0 KIDNEY TRANSPLANTED: ICD-10-CM

## 2025-04-16 DIAGNOSIS — Z94.83 PANCREAS TRANSPLANTED (H): Primary | ICD-10-CM

## 2025-04-17 ENCOUNTER — MYC REFILL (OUTPATIENT)
Dept: NEPHROLOGY | Facility: CLINIC | Age: 60
End: 2025-04-17
Payer: COMMERCIAL

## 2025-04-17 DIAGNOSIS — D75.1 POST-TRANSPLANT ERYTHROCYTOSIS: ICD-10-CM

## 2025-04-17 RX ORDER — LISINOPRIL 2.5 MG/1
2.5 TABLET ORAL AT BEDTIME
Qty: 90 TABLET | Refills: 3 | Status: SHIPPED | OUTPATIENT
Start: 2025-04-17

## 2025-05-09 ENCOUNTER — RESULTS FOLLOW-UP (OUTPATIENT)
Dept: TRANSPLANT | Facility: CLINIC | Age: 60
End: 2025-05-09

## 2025-07-07 ENCOUNTER — TELEPHONE (OUTPATIENT)
Dept: TRANSPLANT | Facility: CLINIC | Age: 60
End: 2025-07-07
Payer: COMMERCIAL

## 2025-07-07 NOTE — TELEPHONE ENCOUNTER
Attempted to contact the patient.  This communication is to inform you that we have made several attempts to contact the above Patient to fill his or her  Tacrolimus Cap 1 Mg  Please notify us if there have been changes to this patient's therapy or updated contact number on file for this patient.

## 2025-07-07 NOTE — TELEPHONE ENCOUNTER
Call placed to Amadou Lewis. He states he has plenty of tacrolimus and does not need a refill yet. He says he let [facility] know. He has no concerns with his medications now, will call SOT if he does.

## 2025-07-31 ENCOUNTER — LAB (OUTPATIENT)
Dept: LAB | Facility: CLINIC | Age: 60
End: 2025-07-31
Payer: COMMERCIAL

## 2025-07-31 DIAGNOSIS — Z94.83 PANCREAS TRANSPLANTED (H): ICD-10-CM

## 2025-07-31 DIAGNOSIS — Z94.0 KIDNEY TRANSPLANTED: ICD-10-CM

## 2025-07-31 DIAGNOSIS — Z86.39 HISTORY OF DIABETES MELLITUS: ICD-10-CM

## 2025-07-31 LAB
AMYLASE SERPL-CCNC: 63 U/L (ref 28–100)
ANION GAP SERPL CALCULATED.3IONS-SCNC: 10 MMOL/L (ref 7–15)
BUN SERPL-MCNC: 16.3 MG/DL (ref 8–23)
CALCIUM SERPL-MCNC: 9.1 MG/DL (ref 8.8–10.4)
CHLORIDE SERPL-SCNC: 107 MMOL/L (ref 98–107)
CREAT SERPL-MCNC: 0.96 MG/DL (ref 0.67–1.17)
EGFRCR SERPLBLD CKD-EPI 2021: 90 ML/MIN/1.73M2
ERYTHROCYTE [DISTWIDTH] IN BLOOD BY AUTOMATED COUNT: 14.3 % (ref 10–15)
GLUCOSE SERPL-MCNC: 97 MG/DL (ref 70–99)
HCO3 SERPL-SCNC: 21 MMOL/L (ref 22–29)
HCT VFR BLD AUTO: 46 % (ref 40–53)
HGB BLD-MCNC: 15.1 G/DL (ref 13.3–17.7)
LIPASE SERPL-CCNC: 31 U/L (ref 13–60)
MCH RBC QN AUTO: 29.8 PG (ref 26.5–33)
MCHC RBC AUTO-ENTMCNC: 32.8 G/DL (ref 31.5–36.5)
MCV RBC AUTO: 91 FL (ref 78–100)
PLATELET # BLD AUTO: 207 10E3/UL (ref 150–450)
POTASSIUM SERPL-SCNC: 4.2 MMOL/L (ref 3.4–5.3)
RBC # BLD AUTO: 5.07 10E6/UL (ref 4.4–5.9)
SODIUM SERPL-SCNC: 138 MMOL/L (ref 135–145)
WBC # BLD AUTO: 7.7 10E3/UL (ref 4–11)

## 2025-08-21 ENCOUNTER — TELEPHONE (OUTPATIENT)
Dept: TRANSPLANT | Facility: CLINIC | Age: 60
End: 2025-08-21
Payer: COMMERCIAL

## 2025-09-04 ENCOUNTER — TELEPHONE (OUTPATIENT)
Dept: CARDIOLOGY | Facility: CLINIC | Age: 60
End: 2025-09-04
Payer: COMMERCIAL

## (undated) DEVICE — SU PROLENE 7-0 BV-1DA 30" 8703H

## (undated) DEVICE — SPONGE LAP 18X18" X8435

## (undated) DEVICE — DRAPE SHEET REV FOLD 3/4 9349

## (undated) DEVICE — CATH PLUG W/CAP 000076

## (undated) DEVICE — SU ETHILON 3-0 PS-1 18" 1663H

## (undated) DEVICE — DEVICE CATH STABILIZATION STATLOCK FOLEY 3-WAY FOL0105

## (undated) DEVICE — SU SILK 3-0 TIE 12X30" A304H

## (undated) DEVICE — SU PDS II 4-0 SH 27" Z315H

## (undated) DEVICE — SOL NACL 0.9% INJ 1000ML BAG 2B1324X

## (undated) DEVICE — SUTURE BOOTS 051003PBX

## (undated) DEVICE — INSERT FOGARTY 33MM TRACTION HYDRAJAW HYDRA33

## (undated) DEVICE — STPL RELOAD LINEAR CUT 55MM VASC TVR55

## (undated) DEVICE — SU SILK 3-0 SH CR 8X18" C013D

## (undated) DEVICE — ESU ELEC BLADE 2.75" COATED/INSULATED E1455

## (undated) DEVICE — CUP AND LID 2PK 2OZ STERILE  SSK9006A

## (undated) DEVICE — BASIN SET SINGLE STERILE 13752-624

## (undated) DEVICE — CATH FOLEY 3WAY 16FR 30ML SIL 73016SI

## (undated) DEVICE — INSERT FOGARTY 61MM TRACTION HYDRAJAW HYDRA61

## (undated) DEVICE — SU PROLENE 5-0 RB-1DA 36"  8556H

## (undated) DEVICE — STPL LINEAR CUT 55MM TLC55

## (undated) DEVICE — LINEN TOWEL PACK X30 5481

## (undated) DEVICE — Device

## (undated) DEVICE — PITCHER STERILE 1000ML  SSK9004A

## (undated) DEVICE — SU PDS II 5-0 RB-2DA 30" Z148H

## (undated) DEVICE — SU SILK 2-0 TIE 12X30" A305H

## (undated) DEVICE — SUCTION MANIFOLD DORNOCH ULTRA CART UL-CL500

## (undated) DEVICE — SU PDS II 6-0 RB-2DA 30" Z149H

## (undated) DEVICE — STPL RELOAD LINEAR CUT 55MM TCR55

## (undated) DEVICE — SOL NACL 0.9% IRRIG 1000ML BOTTLE 2F7124

## (undated) DEVICE — STPL SKIN 35W ROTATING HEAD PRW35

## (undated) DEVICE — SURGICEL ABSORBABLE HEMOSTAT SNOW 4"X4" 2083

## (undated) DEVICE — PREP CHLORAPREP 26ML TINTED ORANGE  260815

## (undated) DEVICE — SU PROLENE 7-0 BV-1DA 4X24" M8702

## (undated) DEVICE — SU PROLENE 4-0 SHDA 36" 8521H

## (undated) DEVICE — SU PDS II 0 TP-1 60" Z991G

## (undated) DEVICE — SU SILK 0 TIE 6X30" A306H

## (undated) DEVICE — NDL COUNTER 20CT 31142493

## (undated) DEVICE — SU PROLENE 6-0 RB-2DA 30" 8711H

## (undated) DEVICE — SU VICRYL 3-0 SH 27" J316H

## (undated) DEVICE — SU SILK 1 TIE 6X30" A307H

## (undated) DEVICE — DRAPE ISOLATION BAG 1003

## (undated) DEVICE — DRAPE SLUSH/WARMER 66X44" ORS-320

## (undated) DEVICE — SPONGE RAY-TEC 4X8" 7318

## (undated) DEVICE — SU SILK 4-0 TIE 12X30" A303H

## (undated) DEVICE — DRAIN JACKSON PRATT CHANNEL 19FR ROUND HUBLESS SIL JP-2230

## (undated) DEVICE — BLADE CLIPPER SGL USE 9680

## (undated) DEVICE — TUBING IRRIG CYSTO/BLADDER SET 81" LF 2C4040

## (undated) DEVICE — LINEN TOWEL PACK X6 WHITE 5487

## (undated) DEVICE — DRAIN JACKSON PRATT RESERVOIR 100ML SU130-1305

## (undated) RX ORDER — FENTANYL CITRATE 50 UG/ML
INJECTION, SOLUTION INTRAMUSCULAR; INTRAVENOUS
Status: DISPENSED
Start: 2018-11-12

## (undated) RX ORDER — EPHEDRINE SULFATE 50 MG/ML
INJECTION, SOLUTION INTRAMUSCULAR; INTRAVENOUS; SUBCUTANEOUS
Status: DISPENSED
Start: 2018-11-12

## (undated) RX ORDER — HYDROMORPHONE HYDROCHLORIDE 1 MG/ML
INJECTION, SOLUTION INTRAMUSCULAR; INTRAVENOUS; SUBCUTANEOUS
Status: DISPENSED
Start: 2018-11-12

## (undated) RX ORDER — ALBUMIN, HUMAN INJ 5% 5 %
SOLUTION INTRAVENOUS
Status: DISPENSED
Start: 2018-11-12

## (undated) RX ORDER — REGADENOSON 0.08 MG/ML
INJECTION, SOLUTION INTRAVENOUS
Status: DISPENSED
Start: 2018-11-01

## (undated) RX ORDER — GLYCOPYRROLATE 0.2 MG/ML
INJECTION, SOLUTION INTRAMUSCULAR; INTRAVENOUS
Status: DISPENSED
Start: 2018-11-12

## (undated) RX ORDER — DEXTROSE, SODIUM CHLORIDE, SODIUM LACTATE, POTASSIUM CHLORIDE, AND CALCIUM CHLORIDE 5; .6; .31; .03; .02 G/100ML; G/100ML; G/100ML; G/100ML; G/100ML
INJECTION, SOLUTION INTRAVENOUS
Status: DISPENSED
Start: 2018-11-12

## (undated) RX ORDER — PROPOFOL 10 MG/ML
INJECTION, EMULSION INTRAVENOUS
Status: DISPENSED
Start: 2018-11-12

## (undated) RX ORDER — CALCIUM CHLORIDE 100 MG/ML
INJECTION INTRAVENOUS; INTRAVENTRICULAR
Status: DISPENSED
Start: 2018-11-12

## (undated) RX ORDER — LIDOCAINE HYDROCHLORIDE 20 MG/ML
INJECTION, SOLUTION EPIDURAL; INFILTRATION; INTRACAUDAL; PERINEURAL
Status: DISPENSED
Start: 2018-11-12

## (undated) RX ORDER — REGADENOSON 0.08 MG/ML
INJECTION, SOLUTION INTRAVENOUS
Status: DISPENSED
Start: 2017-09-07

## (undated) RX ORDER — HEPARIN SODIUM 1000 [USP'U]/ML
INJECTION, SOLUTION INTRAVENOUS; SUBCUTANEOUS
Status: DISPENSED
Start: 2018-11-12

## (undated) RX ORDER — ONDANSETRON 2 MG/ML
INJECTION INTRAMUSCULAR; INTRAVENOUS
Status: DISPENSED
Start: 2018-11-12

## (undated) RX ORDER — PHENYLEPHRINE HCL IN 0.9% NACL 1 MG/10 ML
SYRINGE (ML) INTRAVENOUS
Status: DISPENSED
Start: 2018-11-12